# Patient Record
Sex: MALE | Race: WHITE | NOT HISPANIC OR LATINO | Employment: OTHER | URBAN - METROPOLITAN AREA
[De-identification: names, ages, dates, MRNs, and addresses within clinical notes are randomized per-mention and may not be internally consistent; named-entity substitution may affect disease eponyms.]

---

## 2017-01-17 PROBLEM — I65.23 BILATERAL CAROTID ARTERY OBSTRUCTION WITHOUT CEREBRAL INFARCTION: Status: ACTIVE | Noted: 2017-01-17

## 2017-03-17 ENCOUNTER — TELEPHONE (OUTPATIENT)
Dept: INTERNAL MEDICINE | Facility: CLINIC | Age: 82
End: 2017-03-17

## 2017-03-17 DIAGNOSIS — E78.5 HYPERLIPIDEMIA WITH TARGET LDL LESS THAN 100: Primary | ICD-10-CM

## 2017-03-17 DIAGNOSIS — E11.59 TYPE 2 DIABETES MELLITUS WITH OTHER CIRCULATORY COMPLICATIONS (H): ICD-10-CM

## 2017-03-17 NOTE — TELEPHONE ENCOUNTER
Pt calls, requesting orders be placed for lab appt prior to diabetes follow up in May.     Pt has standing order for A1c. Orders pended for CMP and Lipid panel. Sent to provider to review if any additional labs are needed.

## 2017-03-27 ENCOUNTER — OFFICE VISIT (OUTPATIENT)
Dept: INTERNAL MEDICINE | Facility: CLINIC | Age: 82
End: 2017-03-27
Payer: MEDICARE

## 2017-03-27 VITALS
HEIGHT: 65 IN | OXYGEN SATURATION: 95 % | SYSTOLIC BLOOD PRESSURE: 118 MMHG | HEART RATE: 70 BPM | DIASTOLIC BLOOD PRESSURE: 60 MMHG | TEMPERATURE: 97.9 F | WEIGHT: 185 LBS | BODY MASS INDEX: 30.82 KG/M2

## 2017-03-27 DIAGNOSIS — H60.313 ACUTE DIFFUSE OTITIS EXTERNA OF BOTH EARS: Primary | ICD-10-CM

## 2017-03-27 PROCEDURE — 99213 OFFICE O/P EST LOW 20 MIN: CPT | Performed by: NURSE PRACTITIONER

## 2017-03-27 RX ORDER — NEOMYCIN SULFATE, POLYMYXIN B SULFATE AND HYDROCORTISONE 10; 3.5; 1 MG/ML; MG/ML; [USP'U]/ML
4 SUSPENSION/ DROPS AURICULAR (OTIC) 4 TIMES DAILY
Qty: 10 ML | Refills: 0 | Status: SHIPPED | OUTPATIENT
Start: 2017-03-27 | End: 2017-05-18

## 2017-03-27 NOTE — MR AVS SNAPSHOT
After Visit Summary   3/27/2017    Reid Jimenes    MRN: [id number]           Patient Information     Date Of Birth          2/14/1934        Visit Information        Provider Department      3/27/2017 2:00 PM Brit Parsons NP [facility]        Today's Diagnoses     Acute diffuse otitis externa of both ears    -  1      Care Instructions    Have hearing aids reevaluated    Brit Parsons CNP          Follow-ups after your visit        Your next 10 appointments already scheduled     May 11, 2017  8:15 AM CDT   LAB with [facility]   [facility]    [address]  [facility] [id number] [phone]           Patient must bring picture ID.  Patient should be prepared to give a urine specimen  Please do not eat 10-12 hours before your appointment if you are coming in fasting for labs on lipids, cholesterol, or glucose (sugar).  Pregnant women should follow their Care Team instructions. Water with medications is okay. Do not drink coffee or other fluids.   If you have concerns about taking  your medications, please ask at office or if scheduling via Shotfarm, send a message by clicking on Secure Messaging, Message Your Care Team.            May 18, 2017  4:20 PM CDT   [facility] Visit with Viet Bingham MD   [facility]    [address]  [facility] [id number] [phone]           Bring a current list of meds and any records pertaining to this visit.  For Physicals, please bring immunization records and any forms needing to be filled out.  Please arrive 10 minutes early to complete paperwork.              Who to contact     If you have questions or need follow up information about today's clinic visit or your schedule please contact [facility] directly at [phone].  Normal or non-critical lab and imaging results will be communicated to you by  "MyChart, letter or phone within 4 business days after the clinic has received the results. If you do not hear from us within 7 days, please contact the clinic through [employer]Fire[employer] or phone. If you have a critical or abnormal lab result, we will notify you by phone as soon as possible.  Submit refill requests through [employer] or call your pharmacy and they will forward the refill request to us. Please allow 3 business days for your refill to be completed.          Additional Information About Your Visit        SureFireharTextbroker Information     [employer] lets you send messages to your doctor, view your test results, renew your prescriptions, schedule appointments and more. To sign up, go to [email or web address] . Click on \"Log in\" on the left side of the screen, which will take you to the Welcome page. Then click on \"Sign up Now\" on the right side of the page.     You will be asked to enter the access code listed below, as well as some personal information. Please follow the directions to create your username and password.     Your access code is: [email or web address]  Expires: 2017  2:31 PM     Your access code will  in 90 days. If you need help or a new code, please call your [place] clinic or [phone].        Care EveryWhere ID     This is your Care EveryWhere ID. This could be used by other organizations to access your [facility] medical records  [id number]        Your Vitals Were     Pulse Temperature Height Pulse Oximetry BMI (Body Mass Index)       70 97.9  F (36.6  C) (Oral) 5' 5\" (1.651 m) 95% 30.79 kg/m2        Blood Pressure from Last 3 Encounters:   17 118/60   10/05/16 136/64   16 130/60    Weight from Last 3 Encounters:   17 185 lb (83.9 kg)   10/05/16 185 lb (83.9 kg)   16 188 lb (85.3 kg)              Today, you had the following     No orders found for display         Today's Medication Changes          These changes are accurate as of: 3/27/17  2:31 PM.  If you have any questions, " ask your nurse or doctor.               Start taking these medicines.        Dose/Directions    neomycin-polymyxin-hydrocortisone 3.5-16772-7 otic suspension   Commonly known as:  CORTISPORIN   Used for:  Acute diffuse otitis externa of both ears   Started by:  Brit Parsons NP        Dose:  4 drop   Place 4 drops in ear(s) 4 times daily   Quantity:  10 mL   Refills:  0         These medicines have changed or have updated prescriptions.        Dose/Directions    blood glucose monitoring lancets   This may have changed:  Another medication with the same name was removed. Continue taking this medication, and follow the directions you see here.   Used for:  Type 2 diabetes mellitus with other circulatory complications (H)   Changed by:  Viet Bingham MD        Use to test blood sugar 1 times daily or as directed.   Quantity:  1 Box   Refills:  4            Where to get your medicines      These medications were sent to [id number] - [place], [place] - [address]  [address] [address]     Phone:  [phone]     neomycin-polymyxin-hydrocortisone 3.5-48731-6 otic suspension                Primary Care Provider Office Phone # Fax #    Viet Bingham [phone]       [facility] [address]  [facility] [address]        Thank you!     Thank you for choosing [facility]  for your care. Our goal is always to provide you with excellent care. Hearing back from our patients is one way we can continue to improve our services. Please take a few minutes to complete the written survey that you may receive in the mail after your visit with us. Thank you!             Your Updated Medication List - Protect others around you: Learn how to safely use, store and throw away your medicines at www.disposemymeds.org.          This list is accurate as of: 3/27/17  2:31 PM.  Always use your most recent med list.                   Brand Name  Dispense Instructions for use    ascorbic acid 500 MG Tabs          aspirin 81 MG tablet      Take by mouth daily       BIOTIN PO          blood glucose monitoring lancets     1 Box    Use to test blood sugar 1 times daily or as directed.       blood glucose monitoring test strip    MIRA CONTOUR NEXT    100 each    Use to test blood sugar one time daily or as directed.       Calcium 200 MG Tabs      Take 250 mg by mouth       CVS OMEPRAZOLE PO      Take by mouth daily       fluocinonide 0.05 % ointment    LIDEX     Apply topically 2 times daily       GLUCOSAMINE-CHONDROITIN PO          metFORMIN 1000 MG tablet    GLUCOPHAGE    90 tablet    Take 0.5 tablets (500 mg) by mouth 2 times daily (with meals)       MULTIVITAMIN/IRON PO          neomycin-polymyxin-hydrocortisone 3.5-22115-2 otic suspension    CORTISPORIN    10 mL    Place 4 drops in ear(s) 4 times daily       simvastatin 40 MG tablet    ZOCOR    45 tablet    Take 0.5 tablets (20 mg) by mouth At Bedtime

## 2017-03-27 NOTE — NURSING NOTE
"Chief Complaint   Patient presents with     Ear Problem       Initial /60 (BP Location: Right arm, Patient Position: Chair, Cuff Size: Adult Large)  Pulse 70  Temp 97.9  F (36.6  C) (Oral)  Ht 5' 5\" (1.651 m)  Wt 185 lb (83.9 kg)  SpO2 95%  BMI 30.79 kg/m2 Estimated body mass index is 30.79 kg/(m^2) as calculated from the following:    Height as of this encounter: 5' 5\" (1.651 m).    Weight as of this encounter: 185 lb (83.9 kg).  Medication Reconciliation: complete    "

## 2017-03-27 NOTE — PROGRESS NOTES
SUBJECTIVE:                                                    Reid Jimenes is a 83 year old male who presents to clinic today for the following health issues:    Bilateral ear problem x 1 week.    Bilateral ear pain      Duration: 3-4 days    Description (location/character/radiation): bilateral ears    Intensity:  mild, moderate    Accompanying signs and symptoms: afebrile, no URI sx    History (similar episodes/previous evaluation): None    Precipitating or alleviating factors: starting wearing his 10 year old hearing aids per his wifes request    Therapies tried and outcome: None       Problem list and histories reviewed & adjusted, as indicated.  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     Acute diffuse otitis externa of both ears     Past Surgical History:   Procedure Laterality Date     MANDIBLE SURGERY  1958       Social History   Substance Use Topics     Smoking status: Former Smoker     Packs/day: 1.00     Years: 30.00     Types: Cigarettes     Smokeless tobacco: Former User     Types: Chew     Quit date: 1/5/1983     Alcohol use No     Family History   Problem Relation Age of Onset     HEART DISEASE Father      Alcohol/Drug Father      Myocardial Infarction Grandchild 18     Grandson     Myocardial Infarction Other 18     Nephew      CANCER Mother 58     Stomach cancer     Alcohol/Drug Mother      CANCER Maternal Grandmother      stomach cancer     Alcohol/Drug Maternal Grandmother      Myocardial Infarction Sister 70     Name: Reena       Alcohol/Drug Sister      CEREBROVASCULAR DISEASE Brother 65     Name: Miguelito     Alcohol/Drug Brother      Alcohol/Drug Maternal Grandfather      Alcohol/Drug Paternal Grandmother      Alcohol/Drug Paternal Grandfather          Current Outpatient Prescriptions   Medication Sig Dispense Refill     neomycin-polymyxin-hydrocortisone (CORTISPORIN) 3.5-87264-0 otic suspension Place 4 drops in ear(s) 4 times daily 10 mL 0     simvastatin (ZOCOR) 40 MG tablet Take 0.5 tablets (20 mg) by mouth At Bedtime 45 tablet 3     metFORMIN (GLUCOPHAGE) 1000 MG tablet Take 0.5 tablets (500 mg) by mouth 2 times daily (with meals) 90 tablet 3     blood glucose monitoring (MIRA MICROLET) lancets Use to test blood sugar 1 times daily or as directed. 1 Box 4     blood glucose monitoring (MIRA CONTOUR NEXT) test strip Use to test blood sugar one time daily or as directed. 100 each 5     fluocinonide (LIDEX) 0.05 % ointment Apply topically 2 times daily       CVS OMEPRAZOLE PO Take by mouth daily       aspirin 81 MG tablet Take by mouth daily       Multiple Vitamins-Iron (MULTIVITAMIN/IRON PO)        GLUCOSAMINE-CHONDROITIN PO        Calcium 200 MG TABS Take 250 mg by mouth       BIOTIN PO        ascorbic acid 500 MG TABS        BP Readings from Last 3 Encounters:   03/27/17 118/60   10/05/16 136/64   04/13/16 130/60    Wt Readings from Last 3 Encounters:   03/27/17 185 lb (83.9 kg)   10/05/16 185 lb (83.9 kg)   04/13/16 188 lb (85.3 kg)                    Reviewed and updated as needed this visit by clinical staff  Tobacco  Allergies  Meds  Med Hx  Surg Hx  Fam Hx  Soc Hx      Reviewed and updated as needed this visit by Provider         ROS:  Constitutional, HEENT, cardiovascular, pulmonary, gi and gu systems are negative, except as otherwise noted.    OBJECTIVE:                                                    /60 (BP Location: Right arm, Patient Position: Chair, Cuff Size: Adult Large)  Pulse 70  Temp 97.9  F (36.6  C)  "(Oral)  Ht 5' 5\" (1.651 m)  Wt 185 lb (83.9 kg)  SpO2 95%  BMI 30.79 kg/m2  Body mass index is 30.79 kg/(m^2).  GENERAL: healthy, alert and no distress  HENT: bilateral tragus and ear canals swollen, canals have moderate debrie, TM's normal, nose and mouth without ulcers or lesions         ASSESSMENT/PLAN:                                                              ICD-10-CM    1. Acute diffuse otitis externa of both ears H60.313 neomycin-polymyxin-hydrocortisone (CORTISPORIN) 3.5-46422-7 otic suspension       Patient Instructions   Have hearing aids reevaluated    Brit Parsons, NP  [facility]  "

## 2017-05-11 DIAGNOSIS — E78.5 HYPERLIPIDEMIA WITH TARGET LDL LESS THAN 100: ICD-10-CM

## 2017-05-11 LAB
ALBUMIN SERPL-MCNC: 3.9 G/DL (ref 3.4–5)
ALP SERPL-CCNC: 82 U/L (ref 40–150)
ALT SERPL W P-5'-P-CCNC: 21 U/L (ref 0–70)
ANION GAP SERPL CALCULATED.3IONS-SCNC: 8 MMOL/L (ref 3–14)
AST SERPL W P-5'-P-CCNC: 15 U/L (ref 0–45)
BILIRUB SERPL-MCNC: 0.5 MG/DL (ref 0.2–1.3)
BUN SERPL-MCNC: 19 MG/DL (ref 7–30)
CALCIUM SERPL-MCNC: 9.1 MG/DL (ref 8.5–10.1)
CHLORIDE SERPL-SCNC: 105 MMOL/L (ref 94–109)
CHOLEST SERPL-MCNC: 105 MG/DL
CO2 SERPL-SCNC: 28 MMOL/L (ref 20–32)
CREAT SERPL-MCNC: 0.87 MG/DL (ref 0.66–1.25)
GFR SERPL CREATININE-BSD FRML MDRD: 84 ML/MIN/1.7M2
GLUCOSE SERPL-MCNC: 133 MG/DL (ref 70–99)
HDLC SERPL-MCNC: 45 MG/DL
LDLC SERPL CALC-MCNC: 37 MG/DL
NONHDLC SERPL-MCNC: 60 MG/DL
POTASSIUM SERPL-SCNC: 4.2 MMOL/L (ref 3.4–5.3)
PROT SERPL-MCNC: 7 G/DL (ref 6.8–8.8)
SODIUM SERPL-SCNC: 141 MMOL/L (ref 133–144)
TRIGL SERPL-MCNC: 113 MG/DL

## 2017-05-11 PROCEDURE — 80053 COMPREHEN METABOLIC PANEL: CPT | Performed by: INTERNAL MEDICINE

## 2017-05-11 PROCEDURE — 36415 COLL VENOUS BLD VENIPUNCTURE: CPT | Performed by: INTERNAL MEDICINE

## 2017-05-11 PROCEDURE — 80061 LIPID PANEL: CPT | Performed by: INTERNAL MEDICINE

## 2017-05-18 ENCOUNTER — OFFICE VISIT (OUTPATIENT)
Dept: INTERNAL MEDICINE | Facility: CLINIC | Age: 82
End: 2017-05-18
Payer: MEDICARE

## 2017-05-18 VITALS
DIASTOLIC BLOOD PRESSURE: 66 MMHG | WEIGHT: 185 LBS | TEMPERATURE: 97.9 F | HEIGHT: 65 IN | BODY MASS INDEX: 30.82 KG/M2 | SYSTOLIC BLOOD PRESSURE: 124 MMHG | RESPIRATION RATE: 12 BRPM | OXYGEN SATURATION: 94 % | HEART RATE: 85 BPM

## 2017-05-18 DIAGNOSIS — I65.23 BILATERAL CAROTID ARTERY OBSTRUCTION WITHOUT CEREBRAL INFARCTION: ICD-10-CM

## 2017-05-18 DIAGNOSIS — E11.59 TYPE 2 DIABETES MELLITUS WITH OTHER CIRCULATORY COMPLICATIONS (H): ICD-10-CM

## 2017-05-18 DIAGNOSIS — E11.29 TYPE 2 DIABETES MELLITUS WITH MICROALBUMINURIA, WITHOUT LONG-TERM CURRENT USE OF INSULIN (H): ICD-10-CM

## 2017-05-18 DIAGNOSIS — E78.5 HYPERLIPIDEMIA WITH TARGET LDL LESS THAN 100: ICD-10-CM

## 2017-05-18 DIAGNOSIS — I10 ESSENTIAL HYPERTENSION WITH GOAL BLOOD PRESSURE LESS THAN 140/90: ICD-10-CM

## 2017-05-18 DIAGNOSIS — E11.59 TYPE 2 DIABETES MELLITUS WITH OTHER CIRCULATORY COMPLICATIONS (H): Primary | ICD-10-CM

## 2017-05-18 DIAGNOSIS — I73.9 PVD (PERIPHERAL VASCULAR DISEASE) (H): ICD-10-CM

## 2017-05-18 DIAGNOSIS — R80.9 TYPE 2 DIABETES MELLITUS WITH MICROALBUMINURIA, WITHOUT LONG-TERM CURRENT USE OF INSULIN (H): ICD-10-CM

## 2017-05-18 DIAGNOSIS — I77.9 BILATERAL CAROTID ARTERY DISEASE (H): ICD-10-CM

## 2017-05-18 LAB — HBA1C MFR BLD: 6.6 % (ref 4.3–6)

## 2017-05-18 PROCEDURE — 83036 HEMOGLOBIN GLYCOSYLATED A1C: CPT | Performed by: INTERNAL MEDICINE

## 2017-05-18 PROCEDURE — 99214 OFFICE O/P EST MOD 30 MIN: CPT | Performed by: INTERNAL MEDICINE

## 2017-05-18 PROCEDURE — 36415 COLL VENOUS BLD VENIPUNCTURE: CPT | Performed by: INTERNAL MEDICINE

## 2017-05-18 NOTE — MR AVS SNAPSHOT
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     All the other labs from 5/11/2017 looked fine.     No medication changes.     Schedule an office appointment to see me in October, with a \"lab only\" appointment a few days prior to office appointment.   In October 2017 you will also be due to update the carotid artery ultrasound.         Follow-ups after your visit        Future tests that were ordered for you today     Open Future Orders        Priority Expected Expires Ordered    US Carotid Bilateral Routine 10/1/2017 5/18/2018 5/18/2017            Who to contact     If you have questions or need follow up information about today's clinic visit or your schedule please contact [facility] directly at [phone].  Normal or non-critical lab and imaging results will be communicated to you by MyChart, letter or phone within 4 business days after the clinic has received the results. If you do not hear from us within 7 days, please contact the clinic through MyChart or phone. If you have a critical or abnormal lab result, we will notify you by phone " "as soon as possible.  Submit refill requests through [employer] or call your pharmacy and they will forward the refill request to us. Please allow 3 business days for your refill to be completed.          Additional Information About Your Visit        [employer]amba Information     [employer] lets you send messages to your doctor, view your test results, renew your prescriptions, schedule appointments and more. To sign up, go to [email or web address] . Click on \"Log in\" on the left side of the screen, which will take you to the Welcome page. Then click on \"Sign up Now\" on the right side of the page.     You will be asked to enter the access code listed below, as well as some personal information. Please follow the directions to create your username and password.     Your access code is: [email or web address]  Expires: 2017  2:31 PM     Your access code will  in 90 days. If you need help or a new code, please call your [place] clinic or [phone].        Care EveryWhere ID     This is your Care EveryWhere ID. This could be used by other organizations to access your [facility] medical records  [id number]        Your Vitals Were     Pulse Temperature Respirations Height Pulse Oximetry BMI (Body Mass Index)    85 97.9  F (36.6  C) (Oral) 12 5' 5\" (1.651 m) 94% 30.79 kg/m2       Blood Pressure from Last 3 Encounters:   17 124/66   17 118/60   10/05/16 136/64    Weight from Last 3 Encounters:   17 185 lb (83.9 kg)   17 185 lb (83.9 kg)   10/05/16 185 lb (83.9 kg)              We Performed the Following     HEMOGLOBIN A1C          Where to get your medicines      These medications were sent to [id number] - [place] - [address]  [address] [place] [address]     Phone:  [phone]     blood glucose monitoring lancets    blood glucose monitoring test strip          Primary Care Provider Office Phone # Fax #    Viet Bingham [phone]       " [facility] [address] [facility] [address]  [facility] [address]        Thank you!     Thank you for choosing [facility]  for your care. Our goal is always to provide you with excellent care. Hearing back from our patients is one way we can continue to improve our services. Please take a few minutes to complete the written survey that you may receive in the mail after your visit with us. Thank you!             Your Updated Medication List - Protect others around you: Learn how to safely use, store and throw away your medicines at www.disposemymeds.org.          This list is accurate as of: 5/18/17  4:52 PM.  Always use your most recent med list.                   Brand Name Dispense Instructions for use    ascorbic acid 500 MG Tabs          aspirin 81 MG tablet      Take by mouth daily       BIOTIN PO          blood glucose monitoring lancets     1 Box    Use to test blood sugar 1 times daily or as directed.       blood glucose monitoring test strip    MIRA CONTOUR NEXT    100 each    Use to test blood sugar one time daily or as directed.       Calcium 200 MG Tabs      Take 250 mg by mouth       CVS OMEPRAZOLE PO      Take by mouth daily       fluocinonide 0.05 % ointment    LIDEX     Apply topically 2 times daily       GLUCOSAMINE-CHONDROITIN PO          metFORMIN 1000 MG tablet    GLUCOPHAGE    90 tablet    Take 0.5 tablets (500 mg) by mouth 2 times daily (with meals)       MULTIVITAMIN/IRON PO          simvastatin 40 MG tablet    ZOCOR    45 tablet    Take 0.5 tablets (20 mg) by mouth At Bedtime

## 2017-05-18 NOTE — PROGRESS NOTES
SUBJECTIVE:                                                    Reid Jimenes is a 83 year old male who presents to clinic today for the following health issues:  diabetes mellitus, carotid artery disease/PVD, hyperlipidemia, hypertension.     Medications: Patient denies any side effects from current medications.     Neurological: Patient notes paresthesias from left hip down to left ankle. No back pain. No weakness or pain into the legs.     Diabetes Follow-up      Patient is checking blood sugars: rarely.  Results 130's    Diabetic concerns: None     Symptoms of hypoglycemia (low blood sugar): shaky, dizzy, weak, confusion, Symptoms occur if sugars < 100.     Paresthesias (numbness or burning in feet) or sores: Yes      Date of last diabetic eye exam: December 2016       Amount of exercise or physical activity: daily activities    Problems taking medications regularly: No    Medication side effects: none    Diet: low salt and carbohydrate counting    Problem list and histories reviewed & adjusted, as indicated.  Additional history: as documented    BP Readings from Last 3 Encounters:   05/18/17 124/66   03/27/17 118/60   10/05/16 136/64    Wt Readings from Last 3 Encounters:   05/18/17 83.9 kg (185 lb)   03/27/17 83.9 kg (185 lb)   10/05/16 83.9 kg (185 lb)          Labs reviewed in EPIC    Reviewed and updated as needed this visit by clinical staff  Tobacco  Allergies  Meds  Med Hx  Surg Hx  Fam Hx  Soc Hx      Reviewed and updated as needed this visit by Provider    ROS:  E: NEGATIVE for vision changes or irritation  R: NEGATIVE for significant cough or SOB   CV: NEGATIVE for chest pain, palpitations or peripheral edema   GI: POSITIVE for occasional heartburn (antacids PRN) NEGATIVE for nausea, abdominal pain or change in bowel habits   N: POSITIVE for paresthesias from left hip down to left ankle NEGATIVE for weakness, dizziness    Past/recent records reviewed and discussed for --   Medications  Lab  "results   OBJECTIVE:                                                    /66  Pulse 85  Temp 97.9  F (36.6  C) (Oral)  Resp 12  Ht 1.651 m (5' 5\")  Wt 83.9 kg (185 lb)  SpO2 94%  BMI 30.79 kg/m2  Body mass index is 30.79 kg/(m^2).  GENERAL: healthy, alert and no distress  EYES: Eyes grossly normal to inspection, PERRL and conjunctivae and sclerae normal  RESP: lungs clear to auscultation - no rales, rhonchi or wheezes  CV: murmur, otherwise regular rate and rhythm, normal S1 S2, no S3 or S4, click or rub, no peripheral edema and peripheral pulses strong  NEURO: Normal strength and tone, mentation intact and speech normal  PSYCH: mentation appears normal, affect normal/bright         ASSESSMENT/PLAN:                                                      (E11.59) Type 2 diabetes mellitus with other circulatory complications (H)  (primary encounter diagnosis)  (E11.29,  R80.9) Type 2 diabetes mellitus with microalbuminuria, without long-term current use of insulin (H)  Plan: Update A1c. Continue current meds.   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 No symptoms of CNS ischemia.   Plan: US Carotid Bilateral            (E78.5) Hyperlipidemia with target LDL less than 100  Comment: LDL at target. Continue current meds  Plan: simvastatin (ZOCOR) 40 MG tablet    (I10) Essential hypertension with goal blood pressure less than 140/90  Comment: BP at target.     (E11.59) Type 2 diabetes mellitus with other circulatory complications  Plan: blood glucose monitoring (MIRA MICROLET)         lancets, blood glucose monitoring (MIRA         CONTOUR NEXT) test strip          Patient Instructions   Stop by the lab ([address]) to update your Hemoglobin A1c.     All the other labs from 5/11/2017 looked fine.     No medication changes. " "    Schedule an office appointment to see me in October, with a \"lab only\" appointment a few days prior to office appointment.   In October 2017 you will also be due to update the carotid artery ultrasound.     Viet Bingham MD  [facility]    This document serves as a record of the services and decisions personally performed and made by Viet Bingham MD. It was created on their behalf by Dasha Landers, a trained medical scribe. The creation of this document is based the provider's statements to the medical scribe.  Dasha Landers May 18, 2017 4:38 PM      "

## 2017-05-18 NOTE — NURSING NOTE
"Chief Complaint   Patient presents with     Diabetes       Initial /66  Pulse 85  Temp 97.9  F (36.6  C) (Oral)  Resp 12  Ht 5' 5\" (1.651 m)  Wt 185 lb (83.9 kg)  SpO2 94%  BMI 30.79 kg/m2 Estimated body mass index is 30.79 kg/(m^2) as calculated from the following:    Height as of this encounter: 5' 5\" (1.651 m).    Weight as of this encounter: 185 lb (83.9 kg).  Medication Reconciliation: complete   Jairon CMA      "

## 2017-05-18 NOTE — LETTER
"[facility]  [address] [place] [address]  [phone]    5/18/2017    Reid Jimenes  [address]   [facility] [address]           Dear Mr. Jimenes,    The results of your lab tests are enclosed. Everything looks fine. Unless noted otherwise below, any results that are outside the \"normal\" range are within acceptable limits and are of no concern.    LDL= Bad Cholesterol-- the target is below 100.     HDL= Good Cholesterol-- although this is determined mostly by heredity, exercise and/or medications may sometimes raise this number.    Triglycerides are another type of fat in the blood, and can sometimes be lowered by reducing intake of sweets or excess carbohydrates, alcohol, and by weight reduction if needed.  Sometimes medications are also used.    AST and ALT are liver tests, as are the bilirubin (total and direct), albumin, total protein, and alkaline phosphatase. Yours are all normal.     Urea Nitrogen and Creatinine are kidney tests--yours are normal. GFR stands for Glomerular Filtration Rate, a more precise estimate of kidney function.    Sodium, Potassium, Chloride, Carbon Dioxide, and Calcium are all normal salts in the bloodstream. Yours all look normal. Your glucose (blood sugar) also looks fine. (You can ignore the anion gap result).    If you have any further questions or problems, please contact our office.    Sincerely,        Viet Bingham MD  Attachment: Lab results     "

## 2017-05-18 NOTE — PATIENT INSTRUCTIONS
"Stop by the lab ([address]) to update your Hemoglobin A1c.     All the other labs from 5/11/2017 looked fine.     No medication changes.     Schedule an office appointment to see me in October, with a \"lab only\" appointment a few days prior to office appointment.   In October 2017 you will also be due to update the carotid artery ultrasound.   "

## 2017-10-17 ENCOUNTER — MEDICAL CORRESPONDENCE (OUTPATIENT)
Dept: HEALTH INFORMATION MANAGEMENT | Facility: CLINIC | Age: 82
End: 2017-10-17

## 2017-10-17 ENCOUNTER — TELEPHONE (OUTPATIENT)
Dept: INTERNAL MEDICINE | Facility: CLINIC | Age: 82
End: 2017-10-17

## 2017-10-20 ENCOUNTER — HOSPITAL ENCOUNTER (OUTPATIENT)
Dept: ULTRASOUND IMAGING | Facility: CLINIC | Age: 82
Discharge: HOME | End: 2017-10-20
Attending: INTERNAL MEDICINE | Admitting: INTERNAL MEDICINE
Payer: MEDICARE

## 2017-10-20 DIAGNOSIS — I65.23 BILATERAL CAROTID ARTERY OBSTRUCTION WITHOUT CEREBRAL INFARCTION: ICD-10-CM

## 2017-10-20 PROCEDURE — 93880 EXTRACRANIAL BILAT STUDY: CPT

## 2017-11-01 DIAGNOSIS — E11.59 TYPE 2 DIABETES MELLITUS WITH OTHER CIRCULATORY COMPLICATION, UNSPECIFIED LONG TERM INSULIN USE STATUS: ICD-10-CM

## 2017-11-01 LAB
CREAT UR-MCNC: 157 MG/DL
HBA1C MFR BLD: 6.7 % (ref 4.3–6)
MICROALBUMIN UR-MCNC: 36 MG/L
MICROALBUMIN/CREAT UR: 22.99 MG/G CR (ref 0–17)

## 2017-11-01 PROCEDURE — 82043 UR ALBUMIN QUANTITATIVE: CPT | Performed by: INTERNAL MEDICINE

## 2017-11-01 PROCEDURE — 36415 COLL VENOUS BLD VENIPUNCTURE: CPT | Performed by: INTERNAL MEDICINE

## 2017-11-01 PROCEDURE — 83036 HEMOGLOBIN GLYCOSYLATED A1C: CPT | Performed by: INTERNAL MEDICINE

## 2017-11-08 ENCOUNTER — TELEPHONE (OUTPATIENT)
Dept: OTHER | Facility: CLINIC | Age: 82
End: 2017-11-08

## 2017-11-08 ENCOUNTER — OFFICE VISIT (OUTPATIENT)
Dept: INTERNAL MEDICINE | Facility: CLINIC | Age: 82
End: 2017-11-08
Payer: MEDICARE

## 2017-11-08 VITALS
HEIGHT: 65 IN | HEART RATE: 74 BPM | DIASTOLIC BLOOD PRESSURE: 60 MMHG | OXYGEN SATURATION: 98 % | BODY MASS INDEX: 30.32 KG/M2 | TEMPERATURE: 97.6 F | WEIGHT: 182 LBS | SYSTOLIC BLOOD PRESSURE: 124 MMHG

## 2017-11-08 DIAGNOSIS — I10 ESSENTIAL HYPERTENSION WITH GOAL BLOOD PRESSURE LESS THAN 140/90: ICD-10-CM

## 2017-11-08 DIAGNOSIS — E11.29 TYPE 2 DIABETES MELLITUS WITH MICROALBUMINURIA, WITHOUT LONG-TERM CURRENT USE OF INSULIN (H): Primary | ICD-10-CM

## 2017-11-08 DIAGNOSIS — K21.9 GASTROESOPHAGEAL REFLUX DISEASE WITHOUT ESOPHAGITIS: ICD-10-CM

## 2017-11-08 DIAGNOSIS — R80.9 TYPE 2 DIABETES MELLITUS WITH MICROALBUMINURIA, WITHOUT LONG-TERM CURRENT USE OF INSULIN (H): Primary | ICD-10-CM

## 2017-11-08 DIAGNOSIS — I65.23 CAROTID ARTERY STENOSIS, ASYMPTOMATIC, BILATERAL: ICD-10-CM

## 2017-11-08 DIAGNOSIS — E78.5 HYPERLIPIDEMIA WITH TARGET LDL LESS THAN 100: ICD-10-CM

## 2017-11-08 PROCEDURE — 99207 C FOOT EXAM  NO CHARGE: CPT | Performed by: INTERNAL MEDICINE

## 2017-11-08 PROCEDURE — 99214 OFFICE O/P EST MOD 30 MIN: CPT | Performed by: INTERNAL MEDICINE

## 2017-11-08 RX ORDER — SIMVASTATIN 40 MG
20 TABLET ORAL AT BEDTIME
Qty: 45 TABLET | Refills: 3 | Status: SHIPPED | OUTPATIENT
Start: 2017-11-08 | End: 2018-12-27

## 2017-11-08 RX ORDER — LOSARTAN POTASSIUM 25 MG/1
12.5 TABLET ORAL DAILY
Qty: 45 TABLET | Refills: 3 | Status: SHIPPED | OUTPATIENT
Start: 2017-11-08 | End: 2017-11-30

## 2017-11-08 NOTE — TELEPHONE ENCOUNTER
Pt referred to [facility] by  for carotid artery stenosis.    Pt had carotid US 10/20/17:  IMPRESSION:    1. 50-69% diameter stenosis of the right ICA relative to the distal  ICA diameter, increased when compared to the prior exam.   2. 50-69% diameter stenosis of the left ICA relative to the distal ICA  diameter, velocities are increased when compared to the prior exam.       Pt needs to be scheduled for a consult with Vascular Surgery.  Will route to scheduling to coordinate an appointment next available.    Mary Alejandro RN BSN

## 2017-11-08 NOTE — PATIENT INSTRUCTIONS
"[facility]. This is a blood pressure pill, but also protects the kidneys in a patient with diabetes who \"spills\" microscopic amounts of protein in their urine. Take one-half tablet of this new pill every day.     All of the other medications stay the same. [facility]     You may want to consider seeing the Vascular Surgeon in their office (phone number provided). My guess is that surgery would not be recommended yet, unless/until narrowing is worsened further, or if you start having \"mini-stroke\" symptoms.     See me in six months, with a fasting \"lab only\" appointment a few days before the office appointment.   "

## 2017-11-08 NOTE — TELEPHONE ENCOUNTER
Left message on home number for patient to call back to schedule consult appointment for carotid stenosis with Vascular Surgery.

## 2017-11-08 NOTE — LETTER
"  [facility]  [address] [place] [address]  [phone]    11/8/2017    Reid L Yudy  [address]   [facility] [address]           Dear Gui Yudy,    The results of your lab tests are enclosed. Everything looks fine. Unless noted otherwise below, any results that are outside the \"normal\" range are within acceptable limits and are of no concern.    Hemoglobin A1C measures control of diabetes. Your Hemoglobin A1C was 6.7, with the target being under 7.0.    The results of your recent urine test showed an elevation in microalbumin, an early indicator of ill-effects to the kidney from diabetes.       If you have any further questions or problems, please contact our office.    Sincerely,        Viet Bingham MD  Attachment: Lab results      "

## 2017-11-08 NOTE — PROGRESS NOTES
SUBJECTIVE:   Reid Jimenes is a 83 year old male who presents to clinic today for the following health issues:    Diabetes Follow-up      Patient is checking blood sugars: not at all    Diabetic concerns: None     Symptoms of hypoglycemia (low blood sugar): shaky and irritable     Paresthesias (numbness or burning in feet) or sores: No     Date of last diabetic eye exam: a week or 2 ago    Patient's A1c is 6.7, previously 6.6 on 5/18. Albumin urine was 22.99 mg/g Cr. Discussed considering a BP med to help protect kidney function. Patient agreed. He has not been checking his blood glucose levels. He is taking half a tablet of metformin twice daily.     Hyperlipidemia Follow-Up      Rate your low fat/cholesterol diet?: good    Taking statin?  Yes, no muscle aches from statin    Other lipid medications/supplements?:  none    Continues taking half a tablet of simvastatin daily.     Hypertension Follow-up      Outpatient blood pressures are not being checked.    Low Salt Diet: no added salt    Amount of exercise or physical activity: None    Problems taking medications regularly: No    Medication side effects: none  Diet: low salt and low fat/cholesterol    GERD  He continues to take omeprazole daily for heartburn. Omeprazole works well for him.     Carotid arterial disease  Patient is not following with any specialists for carotid arterial disease. Bilateral carotid US from 10/20/2017 revealed 50-69% diameter stenosis of the right and left ICA relative to the distal ICA diameter. This is an increase in degree of stenosis on the right ICA compared to previous US 10//2016.     Past/recent records reviewed and discussed for:  [facility] Has an [facility] in about a month    Problem list and histories reviewed & adjusted, as indicated.  Additional history: as documented    Reviewed and updated as needed this visit by clinical staffTobacco  Allergies  Meds  Med Hx  Surg Hx  Fam Hx  Soc Hx  "     Reviewed and updated as needed this visit by Provider         ROS:  No dyspnea or cough. No chest discomfort, dizziness or palpitations. No diarrhea, abdominal pain or rectal bleeding.   No acute problems with vision or speech, lateralizing weakness or paresthesias.    ROS: as above or negative for Respiratory, CV, GI, endocrine, neuro systems.    GI: POSITIVE for heartburn    This document serves as a record of the services and decisions personally performed and made by Viet Bingham MD. It was created on his behalf by Stephanie Chow, a trained medical scribe. The creation of this document is based on the provider's statements to the medical scribe.  Stephanie Chow November 8, 2017 8:21 AM     OBJECTIVE:     /60 (BP Location: Left arm, Patient Position: Sitting, Cuff Size: Adult Large)  Pulse 74  Temp 97.6  F (36.4  C) (Oral)  Ht 1.651 m (5' 5\")  Wt 82.6 kg (182 lb)  SpO2 98%  BMI 30.29 kg/m2  Body mass index is 30.29 kg/(m^2).    GENERAL: healthy, alert and no distress  RESP: lungs clear to auscultation - no rales, rhonchi or wheezes  CV: regular rate and rhythm, normal S1 S2, no S3 or S4, no murmur, click or rub, no peripheral edema and peripheral pulses strong  MS: no gross musculoskeletal defects noted, no edema  SKIN: no suspicious lesions or rashes  NEURO: Normal strength and tone, mentation intact and speech normal  PSYCH: mentation appears normal, affect normal/bright  Diabetic foot exam: normal DP and PT pulses, no trophic changes or ulcerative lesions, normal sensory exam and normal monofilament exam    ASSESSMENT/PLAN:   (E11.29,  R80.9) Type 2 diabetes mellitus with microalbuminuria, without long-term current use of insulin (H)  (primary encounter diagnosis)  Comment: A1c at target. Continue current measures. Patient will start half a tablet of losartan (12.5 mg) daily due to high levels of microalbuminuria.   Plan: FOOT EXAM, blood glucose monitoring (MIRA         MICROLET) lancets, " "blood glucose monitoring         (MIRA CONTOUR NEXT) test strip, losartan         (COZAAR) 25 MG tablet, metFORMIN (GLUCOPHAGE)         1000 MG tablet          (I65.23) Carotid artery stenosis, asymptomatic, bilateral  Comment: Offered consult with vascular surgeon due to an increases in diameter stenosis of the right ICA from most recent US  Plan: VASCULAR SURGERY REFERRAL          (E78.5) Hyperlipidemia with target LDL less than 100  Comment: LDL at target. Continue current meds.  Plan: simvastatin (ZOCOR) 40 MG tablet         (I10) Essential hypertension with goal blood pressure less than 140/90  Comment: BP at target. Will start 12.5 mg of losartan daily.     (K21.9) Gastroesophageal reflux disease without esophagitis  Comment: Controlled with omeprazole. Refilled rx  Plan: omeprazole (PRILOSEC) 20 MG CR capsule          FUTURE APPOINTMENTS:       - Follow-up visit in 6 months    Patient Instructions   [facility]. This is a blood pressure pill, but also protects the kidneys in a patient with diabetes who \"spills\" microscopic amounts of protein in their urine. Take one-half tablet of this new pill every day.     All of the other medications stay the same. [facility]     You may want to consider seeing the Vascular Surgeon in their office (phone number provided). My guess is that surgery would not be recommended yet, unless/until narrowing is worsened further, or if you start having \"mini-stroke\" symptoms.     See me in six months, with a fasting \"lab only\" appointment a few days before the office appointment.     The information in this document, created by the medical scribe for me, accurately reflects the services I personally performed and the decisions made by me. I have reviewed and approved this document for accuracy prior to leaving the patient care area.  November 8, 2017 8:21 AM    Viet Bingham MD  [facility]    "

## 2017-11-08 NOTE — MR AVS SNAPSHOT
"              After Visit Summary   11/8/2017    Reid Jimenes    MRN: [id number]           Patient Information     Date Of Birth          2/14/1934        Visit Information        Provider Department      11/8/2017 8:00 AM Viet Bingham MD [facility]        Today's Diagnoses     Type 2 diabetes mellitus with microalbuminuria, without long-term current use of insulin (H)    -  1    Carotid artery stenosis, asymptomatic, bilateral        Hyperlipidemia with target LDL less than 100        Essential hypertension with goal blood pressure less than 140/90        Gastroesophageal reflux disease without esophagitis          Care Instructions    [facility] Losartan. This is a blood pressure pill, but also protects the kidneys in a patient with diabetes who \"spills\" microscopic amounts of protein in their urine. Take one-half tablet of this new pill every day.     All of the other medications stay the same. [facility]     You may want to consider seeing the Vascular Surgeon in their office (phone number provided). My guess is that surgery would not be recommended yet, unless/until narrowing is worsened further, or if you start having \"mini-stroke\" symptoms.     See me in six months, with a fasting \"lab only\" appointment a few days before the office appointment.           Follow-ups after your visit        Additional Services     VASCULAR SURGERY REFERRAL       Your provider has referred you to: **Vascular  Services [phone] - Carotid Artery Disease - Asymptomatic & Ultrasound   [email or web address]    Please be aware that coverage of these services is subject to the terms and limitations of your health insurance plan.  Call member services at your health plan with any benefit or coverage questions.      Please bring the following with you to your appointment:    (1) Any X-Rays, CTs or MRIs which have " "been performed.  Contact the facility where they were done to arrange for  prior to your scheduled appointment.    (2) List of current medications   (3) This referral request   (4) Any documents/labs given to you for this referral                  Who to contact     If you have questions or need follow up information about today's clinic visit or your schedule please contact [facility] directly at [phone].  Normal or non-critical lab and imaging results will be communicated to you by MyChart, letter or phone within 4 business days after the clinic has received the results. If you do not hear from us within 7 days, please contact the clinic through [employer] or phone. If you have a critical or abnormal lab result, we will notify you by phone as soon as possible.  Submit refill requests through [employer] or call your pharmacy and they will forward the refill request to us. Please allow 3 business days for your refill to be completed.          Additional Information About Your Visit        [employer] Information     [employer] lets you send messages to your doctor, view your test results, renew your prescriptions, schedule appointments and more. To sign up, go to [email or web address] . Click on \"Log in\" on the left side of the screen, which will take you to the Welcome page. Then click on \"Sign up Now\" on the right side of the page.     You will be asked to enter the access code listed below, as well as some personal information. Please follow the directions to create your username and password.     Your access code is: [email or web address]  Expires: 2018  8:44 AM     Your access code will  in 90 days. If you need help or a new code, please call your [facility] or [phone].        Care EveryWhere ID     This is your Care EveryWhere ID. This could be used by other organizations to access your [facility] medical records  [id number]        Your Vitals Were     Pulse Temperature Height " "Pulse Oximetry BMI (Body Mass Index)       74 97.6  F (36.4  C) (Oral) 5' 5\" (1.651 m) 98% 30.29 kg/m2        Blood Pressure from Last 3 Encounters:   11/08/17 124/60   05/18/17 124/66   03/27/17 118/60    Weight from Last 3 Encounters:   11/08/17 182 lb (82.6 kg)   05/18/17 185 lb (83.9 kg)   03/27/17 185 lb (83.9 kg)              We Performed the Following     FOOT EXAM     VASCULAR SURGERY REFERRAL          Today's Medication Changes          These changes are accurate as of: 11/8/17  8:44 AM.  If you have any questions, ask your nurse or doctor.               Start taking these medicines.        Dose/Directions    losartan 25 MG tablet   Commonly known as:  COZAAR   Used for:  Type 2 diabetes mellitus with microalbuminuria, without long-term current use of insulin (H)   Started by:  Viet Bingham MD        Dose:  12.5 mg   Take 0.5 tablets (12.5 mg) by mouth daily   Quantity:  45 tablet   Refills:  3       omeprazole 20 MG CR capsule   Commonly known as:  priLOSEC   Used for:  Gastroesophageal reflux disease without esophagitis   Started by:  Viet Bingham MD        Dose:  20 mg   Take 1 capsule (20 mg) by mouth daily   Quantity:  90 capsule   Refills:  3            Where to get your medicines      These medications were sent to [facility] [address] [place] [address]     Phone:  [phone]     blood glucose monitoring lancets    blood glucose monitoring test strip         Some of these will need a paper prescription and others can be bought over the counter.  Ask your nurse if you have questions.     Bring a paper prescription for each of these medications     losartan 25 MG tablet    metFORMIN 1000 MG tablet    omeprazole 20 MG CR capsule    simvastatin 40 MG tablet                Primary Care Provider Office Phone # Fax #    Viet Bingham [phone]       [address] [place] [address]        Equal " Access to Services     [facility]GER[facility]IMO : Hadii aad ku hadlashawnlinsey Sophiacinthya, wamarcusda luqadaha, qaybta karosyrigo darby. So [facility] [phone].    ATENCIÓN: Si habla español, tiene a philip disposición servicios gratuitos de asistencia lingüística. Llame al [phone].    We comply with applicable federal civil rights laws and [place] laws. We do not discriminate on the basis of race, color, national origin, age, disability, sex, sexual orientation, or gender identity.            Thank you!     Thank you for choosing [facility]  for your care. Our goal is always to provide you with excellent care. Hearing back from our patients is one way we can continue to improve our services. Please take a few minutes to complete the written survey that you may receive in the mail after your visit with us. Thank you!             Your Updated Medication List - Protect others around you: Learn how to safely use, store and throw away your medicines at www.disposemymeds.org.          This list is accurate as of: 11/8/17  8:44 AM.  Always use your most recent med list.                   Brand Name Dispense Instructions for use Diagnosis    ascorbic acid 500 MG Tabs           aspirin 81 MG tablet      Take 325 mg by mouth daily        BIOTIN PO      Take 5,000 mg by mouth        blood glucose monitoring lancets     100 each    Use to test blood sugar 1 times daily or as directed.    Type 2 diabetes mellitus with microalbuminuria, without long-term current use of insulin (H)       blood glucose monitoring test strip    MIRA CONTOUR NEXT    100 each    Use to test blood sugar one time daily or as directed.    Type 2 diabetes mellitus with microalbuminuria, without long-term current use of insulin (H)       Calcium 200 MG Tabs      Take 1,500 mg by mouth daily        [employer] OMEPRAZOLE PO      Take 20 mg by mouth daily        fluocinonide 0.05 % ointment    LIDEX     Apply topically 2 times  daily        GLUCOSAMINE-CHONDROITIN PO      Take by mouth daily        losartan 25 MG tablet    COZAAR    45 tablet    Take 0.5 tablets (12.5 mg) by mouth daily    Type 2 diabetes mellitus with microalbuminuria, without long-term current use of insulin (H)       metFORMIN 1000 MG tablet    GLUCOPHAGE    90 tablet    Take 0.5 tablets (500 mg) by mouth 2 times daily (with meals)    Type 2 diabetes mellitus with microalbuminuria, without long-term current use of insulin (H)       MULTIVITAMIN/IRON PO           omeprazole 20 MG CR capsule    priLOSEC    90 capsule    Take 1 capsule (20 mg) by mouth daily    Gastroesophageal reflux disease without esophagitis       simvastatin 40 MG tablet    ZOCOR    45 tablet    Take 0.5 tablets (20 mg) by mouth At Bedtime    Hyperlipidemia with target LDL less than 100

## 2017-11-08 NOTE — TELEPHONE ENCOUNTER
Pt is scheduled at [facility] with Dr Angel on 11/29/17. Des 11/8

## 2017-11-08 NOTE — NURSING NOTE
"Chief Complaint   Patient presents with     Diabetes     Hypertension     Lipids       Initial /60 (BP Location: Left arm, Patient Position: Sitting, Cuff Size: Adult Large)  Pulse 74  Temp 97.6  F (36.4  C) (Oral)  Ht 5' 5\" (1.651 m)  Wt 182 lb (82.6 kg)  SpO2 98%  BMI 30.29 kg/m2 Estimated body mass index is 30.29 kg/(m^2) as calculated from the following:    Height as of this encounter: 5' 5\" (1.651 m).    Weight as of this encounter: 182 lb (82.6 kg).  Medication Reconciliation: complete   Jairon CRAFT      "

## 2017-11-29 ENCOUNTER — OFFICE VISIT (OUTPATIENT)
Dept: SURGERY | Facility: CLINIC | Age: 82
End: 2017-11-29
Attending: INTERNAL MEDICINE
Payer: MEDICARE

## 2017-11-29 VITALS
HEART RATE: 61 BPM | HEIGHT: 65 IN | SYSTOLIC BLOOD PRESSURE: 122 MMHG | BODY MASS INDEX: 30.32 KG/M2 | OXYGEN SATURATION: 96 % | RESPIRATION RATE: 16 BRPM | DIASTOLIC BLOOD PRESSURE: 78 MMHG | WEIGHT: 182 LBS

## 2017-11-29 DIAGNOSIS — I65.23 CAROTID STENOSIS, BILATERAL: Primary | ICD-10-CM

## 2017-11-29 PROCEDURE — 99204 OFFICE O/P NEW MOD 45 MIN: CPT | Performed by: SURGERY

## 2017-11-29 NOTE — MR AVS SNAPSHOT
"              After Visit Summary   2017    Reid Jimenes    MRN: [id number]           Patient Information     Date Of Birth          1934        Visit Information        Provider Department      2017 11:30 AM Loki Angel MD [facility]      Today's Diagnoses     Carotid stenosis, bilateral    -  1       Follow-ups after your visit        Who to contact     If you have questions or need follow up information about today's clinic visit or your schedule please contact [facility] directly at [phone].  Normal or non-critical lab and imaging results will be communicated to you by Pomogatelhart, letter or phone within 4 business days after the clinic has received the results. If you do not hear from us within 7 days, please contact the clinic through Pomogatelhart or phone. If you have a critical or abnormal lab result, we will notify you by phone as soon as possible.  Submit refill requests through [employer] or call your pharmacy and they will forward the refill request to us. Please allow 3 business days for your refill to be completed.          Additional Information About Your Visit        MyChart Information     [employer] lets you send messages to your doctor, view your test results, renew your prescriptions, schedule appointments and more. To sign up, go to [email or web address] . Click on \"Log in\" on the left side of the screen, which will take you to the Welcome page. Then click on \"Sign up Now\" on the right side of the page.     You will be asked to enter the access code listed below, as well as some personal information. Please follow the directions to create your username and password.     Your access code is: [email or web address]  Expires: 2018  8:44 AM     Your access code will  in 90 days. If you need help or a new code, please call your [place] clinic or [phone].        Care EveryWhere ID     This is " "your Care EveryWhere ID. This could be used by other organizations to access your [place] medical records  [id number]        Your Vitals Were     Pulse Respirations Height Pulse Oximetry BMI (Body Mass Index)       61 16 5' 5\" (1.651 m) 96% 30.29 kg/m2        Blood Pressure from Last 3 Encounters:   11/29/17 122/78   11/08/17 124/60   05/18/17 124/66    Weight from Last 3 Encounters:   11/29/17 182 lb (82.6 kg)   11/08/17 182 lb (82.6 kg)   05/18/17 185 lb (83.9 kg)              Today, you had the following     No orders found for display       Primary Care Provider Office Phone # Fax #    Viet Bingham [phone]       [address] [facility]   [facility] [address]        Equal Access to Services     [facility]: Hadii maurice martinez hadasho Soomaali, waaxda luqadaha, qaybta kaalmada adeegyada, rigo llanes hayeleni hampton . So [facility] [phone].    ATENCIÓN: Si habla español, tiene a philip disposición servicios gratuitos de asistencia lingüística. Llame al [phone].    We comply with applicable federal civil rights laws and [place] laws. We do not discriminate on the basis of race, color, national origin, age, disability, sex, sexual orientation, or gender identity.            Thank you!     Thank you for choosing [facility]  for your care. Our goal is always to provide you with excellent care. Hearing back from our patients is one way we can continue to improve our services. Please take a few minutes to complete the written survey that you may receive in the mail after your visit with us. Thank you!             Your Updated Medication List - Protect others around you: Learn how to safely use, store and throw away your medicines at www.disposemymeds.org.          This list is accurate as of: 11/29/17 11:39 AM.  Always use your most recent med list.                   Brand Name Dispense Instructions for use Diagnosis    ascorbic acid 500 MG Tabs           aspirin " 81 MG tablet      Take 325 mg by mouth daily        BIOTIN PO      Take 5,000 mg by mouth        blood glucose monitoring lancets     100 each    Use to test blood sugar 1 times daily or as directed.    Type 2 diabetes mellitus with microalbuminuria, without long-term current use of insulin (H)       blood glucose monitoring test strip    MIRA CONTOUR NEXT    100 each    Use to test blood sugar one time daily or as directed.    Type 2 diabetes mellitus with microalbuminuria, without long-term current use of insulin (H)       Calcium 200 MG Tabs      Take 1,500 mg by mouth daily        [employer] OMEPRAZOLE PO      Take 20 mg by mouth daily        fluocinonide 0.05 % ointment    LIDEX     Apply topically 2 times daily        GLUCOSAMINE-CHONDROITIN PO      Take by mouth daily        losartan 25 MG tablet    COZAAR    45 tablet    Take 0.5 tablets (12.5 mg) by mouth daily    Type 2 diabetes mellitus with microalbuminuria, without long-term current use of insulin (H)       metFORMIN 1000 MG tablet    GLUCOPHAGE    90 tablet    Take 0.5 tablets (500 mg) by mouth 2 times daily (with meals)    Type 2 diabetes mellitus with microalbuminuria, without long-term current use of insulin (H)       MULTIVITAMIN/IRON PO           omeprazole 20 MG CR capsule    priLOSEC    90 capsule    Take 1 capsule (20 mg) by mouth daily    Gastroesophageal reflux disease without esophagitis       simvastatin 40 MG tablet    ZOCOR    45 tablet    Take 0.5 tablets (20 mg) by mouth At Bedtime    Hyperlipidemia with target LDL less than 100

## 2017-11-29 NOTE — LETTER
[facility] [address]  [place] [address]  Phone: [phone]  Fax: [phone]    2017    Re: Reid METCALF Ryanchemo, : 1934    82 y/o male being followed for a left carotid artery stenosis on ultrasound--change noted on the most recent U/S. Peak velocity left 266 right 138--by ratios both sides are 50-69% stenotic. Carotids 4/4 no bruits motor/sensory function intact Cr. N 2-12 intact no history TIA CVA Amaurosis fugax.  I believe a CTA of his carotids is indicated.  Discussed in detail with the patient, as well as parameters for operation--risks goals and alternatives.  He appears to understand and wishes to proceed with CTA.      JAYRO Angel MD

## 2017-11-29 NOTE — PROGRESS NOTES
82 y/o male being followed for a left carotid artery stenosis on ultrasound--change noted on the most recent U/S. Peak velocity left 266 right 138--by ratios both sides are 50-69% stenotic. Carotids 4/4 no bruits motor/sensory function intact Cr. N 2-12 intact no history TIA CVA Amaurosis fugax.  I believe a CTA of his carotids is indicated.  Discussed in detail with the patient, as well as parameters for operation--risks goals and alternatives.  He appears to understand and wishes to proceed with CTA.  Face to face time 45 minutes greater than 50% in  Consultation.

## 2017-11-30 ENCOUNTER — TELEPHONE (OUTPATIENT)
Dept: INTERNAL MEDICINE | Facility: CLINIC | Age: 82
End: 2017-11-30

## 2017-11-30 ENCOUNTER — TELEPHONE (OUTPATIENT)
Dept: ULTRASOUND IMAGING | Facility: CLINIC | Age: 82
End: 2017-11-30

## 2017-11-30 DIAGNOSIS — R80.9 TYPE 2 DIABETES MELLITUS WITH MICROALBUMINURIA, WITHOUT LONG-TERM CURRENT USE OF INSULIN (H): Primary | ICD-10-CM

## 2017-11-30 DIAGNOSIS — I65.29 CAROTID ARTERY STENOSIS: Primary | ICD-10-CM

## 2017-11-30 DIAGNOSIS — E11.29 TYPE 2 DIABETES MELLITUS WITH MICROALBUMINURIA, WITHOUT LONG-TERM CURRENT USE OF INSULIN (H): Primary | ICD-10-CM

## 2017-11-30 RX ORDER — LISINOPRIL 2.5 MG/1
2.5 TABLET ORAL DAILY
Qty: 90 TABLET | Refills: 3 | Status: SHIPPED | OUTPATIENT
Start: 2017-11-30 | End: 2018-12-24

## 2017-12-19 NOTE — TELEPHONE ENCOUNTER
Patient scheduled for 12/21/17 at [facility] 10am , flag put in Dr Angel's file to call with results *jam [id number]

## 2017-12-21 ENCOUNTER — HOSPITAL ENCOUNTER (OUTPATIENT)
Dept: CT IMAGING | Facility: CLINIC | Age: 82
Discharge: HOME | End: 2017-12-21
Attending: SURGERY | Admitting: SURGERY
Payer: MEDICARE

## 2017-12-21 DIAGNOSIS — I65.29 CAROTID ARTERY STENOSIS: ICD-10-CM

## 2017-12-21 LAB
CREAT BLD-MCNC: 1 MG/DL (ref 0.66–1.25)
GFR SERPL CREATININE-BSD FRML MDRD: 71 ML/MIN/1.7M2

## 2017-12-21 PROCEDURE — 25000128 H RX IP 250 OP 636: Performed by: RADIOLOGY

## 2017-12-21 PROCEDURE — 70498 CT ANGIOGRAPHY NECK: CPT

## 2017-12-21 PROCEDURE — 82565 ASSAY OF CREATININE: CPT

## 2017-12-21 RX ORDER — IOPAMIDOL 755 MG/ML
500 INJECTION, SOLUTION INTRAVASCULAR ONCE
Status: COMPLETED | OUTPATIENT
Start: 2017-12-21 | End: 2017-12-21

## 2017-12-21 RX ADMIN — SODIUM CHLORIDE 80 ML: 9 INJECTION, SOLUTION INTRAVENOUS at 10:21

## 2017-12-21 RX ADMIN — IOPAMIDOL 70 ML: 755 INJECTION, SOLUTION INTRAVENOUS at 10:21

## 2017-12-28 DIAGNOSIS — I65.29 CAROTID ARTERY STENOSIS: Primary | ICD-10-CM

## 2018-04-12 ENCOUNTER — OFFICE VISIT (OUTPATIENT)
Dept: INTERNAL MEDICINE | Facility: CLINIC | Age: 83
End: 2018-04-12
Payer: MEDICARE

## 2018-04-12 VITALS
HEART RATE: 68 BPM | RESPIRATION RATE: 16 BRPM | OXYGEN SATURATION: 97 % | DIASTOLIC BLOOD PRESSURE: 60 MMHG | BODY MASS INDEX: 29.99 KG/M2 | SYSTOLIC BLOOD PRESSURE: 124 MMHG | WEIGHT: 180 LBS | TEMPERATURE: 97.5 F | HEIGHT: 65 IN

## 2018-04-12 DIAGNOSIS — I73.9 PERIPHERAL VASCULAR DISEASE (H): ICD-10-CM

## 2018-04-12 DIAGNOSIS — R80.9 TYPE 2 DIABETES MELLITUS WITH MICROALBUMINURIA, WITHOUT LONG-TERM CURRENT USE OF INSULIN (H): Primary | ICD-10-CM

## 2018-04-12 DIAGNOSIS — I10 ESSENTIAL HYPERTENSION WITH GOAL BLOOD PRESSURE LESS THAN 140/90: ICD-10-CM

## 2018-04-12 DIAGNOSIS — R01.1 SYSTOLIC MURMUR: ICD-10-CM

## 2018-04-12 DIAGNOSIS — I65.23 BILATERAL CAROTID ARTERY OBSTRUCTION WITHOUT CEREBRAL INFARCTION: ICD-10-CM

## 2018-04-12 DIAGNOSIS — E78.5 HYPERLIPIDEMIA WITH TARGET LDL LESS THAN 100: ICD-10-CM

## 2018-04-12 DIAGNOSIS — E11.29 TYPE 2 DIABETES MELLITUS WITH MICROALBUMINURIA, WITHOUT LONG-TERM CURRENT USE OF INSULIN (H): Primary | ICD-10-CM

## 2018-04-12 LAB — HBA1C MFR BLD: 6.5 % (ref 0–6.4)

## 2018-04-12 PROCEDURE — 36415 COLL VENOUS BLD VENIPUNCTURE: CPT | Performed by: INTERNAL MEDICINE

## 2018-04-12 PROCEDURE — 83036 HEMOGLOBIN GLYCOSYLATED A1C: CPT | Performed by: INTERNAL MEDICINE

## 2018-04-12 PROCEDURE — 99214 OFFICE O/P EST MOD 30 MIN: CPT | Performed by: INTERNAL MEDICINE

## 2018-04-12 PROCEDURE — 80053 COMPREHEN METABOLIC PANEL: CPT | Performed by: INTERNAL MEDICINE

## 2018-04-12 PROCEDURE — 80061 LIPID PANEL: CPT | Performed by: INTERNAL MEDICINE

## 2018-04-12 RX ORDER — ASPIRIN 325 MG
325 TABLET ORAL EVERY EVENING
Status: ON HOLD | COMMUNITY
End: 2018-08-31

## 2018-04-12 NOTE — NURSING NOTE
"Chief Complaint   Patient presents with     Diabetes     Hypertension     Lipids       Initial /60 (BP Location: Left arm, Patient Position: Sitting, Cuff Size: Adult Large)  Pulse 68  Temp 97.5  F (36.4  C) (Oral)  Resp 16  Ht 5' 5\" (1.651 m)  Wt 180 lb (81.6 kg)  SpO2 97%  BMI 29.95 kg/m2 Estimated body mass index is 29.95 kg/(m^2) as calculated from the following:    Height as of this encounter: 5' 5\" (1.651 m).    Weight as of this encounter: 180 lb (81.6 kg).  Medication Reconciliation: complete   Jairon CRAFT      "

## 2018-04-12 NOTE — MR AVS SNAPSHOT
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     The heart murmur is likely from calcium buildup on your aortic valve. Someone will contact you to help schedule a heart echocardiogram (ultrasound) to get a better look at the valve.     Stop by the lab downstairs ([address]) before you go home today.     See me back in October 2018, if possible, with labs a few days in advance.           Follow-ups after your visit        Future tests that were ordered for you today     Open Future Orders        Priority Expected Expires Ordered    Echocardiogram Complete Routine  4/12/2019 4/12/2018            Who to contact     If you have questions or need follow up information about today's clinic visit or your schedule please contact [facility] directly at [phone].  Normal or non-critical lab and imaging results will be communicated to you by MyChart, letter or phone within 4 business days after the clinic has received the results. If you do not hear from us within 7 days, please contact the clinic through MyChart or phone. If you have a critical or abnormal lab result, we will notify you by phone as soon as possible.  Submit  "refill requests through [facility] or call your pharmacy and they will forward the refill request to us. Please allow 3 business days for your refill to be completed.          Additional Information About Your Visit        [employer] Information     [facility] lets you send messages to your doctor, view your test results, renew your prescriptions, schedule appointments and more. To sign up, go to [email or web address] . Click on \"Log in\" on the left side of the screen, which will take you to the Welcome page. Then click on \"Sign up Now\" on the right side of the page.     You will be asked to enter the access code listed below, as well as some personal information. Please follow the directions to create your username and password.     Your access code is: [email or web address]  Expires: 2018  4:27 PM     Your access code will  in 90 days. If you need help or a new code, please call your [place] clinic or [phone].        Care EveryWhere ID     This is your Care EveryWhere ID. This could be used by other organizations to access your [place] medical records  [id number]        Your Vitals Were     Pulse Temperature Respirations Height Pulse Oximetry BMI (Body Mass Index)    68 97.5  F (36.4  C) (Oral) 16 5' 5\" (1.651 m) 97% 29.95 kg/m2       Blood Pressure from Last 3 Encounters:   18 124/60   17 122/78   17 124/60    Weight from Last 3 Encounters:   18 180 lb (81.6 kg)   17 182 lb (82.6 kg)   17 182 lb (82.6 kg)              We Performed the Following     Comprehensive metabolic panel     Hemoglobin A1c     Lipid panel reflex to direct LDL Fasting          Today's Medication Changes          These changes are accurate as of 18  4:27 PM.  If you have any questions, ask your nurse or doctor.               These medicines have changed or have updated prescriptions.        Dose/Directions    lisinopril 2.5 MG tablet   Commonly known as:  PRINIVIL/Zestril   This may have changed:  " additional instructions   Used for:  Type 2 diabetes mellitus with microalbuminuria, without long-term current use of insulin (H)        Dose:  2.5 mg   Take 1 tablet (2.5 mg) by mouth daily   Quantity:  90 tablet   Refills:  3            Where to get your medicines      These medications were sent to [place] [address] [facility] - [place], [place] - [address] [place] [address] [address]  [address], [facility] [address]     Phone:  [phone]     blood glucose monitoring lancets    blood glucose monitoring test strip                Primary Care Provider Office Phone # Fax #    Viet Bingham [phone]       [address] [facility] [address]  [facility] [address]        Equal Access to Services     [facility] CR[facility] : Hadii aad ku hadasho Socinthya, waaxda luqadaha, qaybta kaalmada adeegyada, rigo myers. So [facility] [phone].    ATENCIÓN: Si habla español, tiene a philip disposición servicios gratuitos de asistencia lingüística. Llame al [phone].    We comply with applicable federal civil rights laws and [place] laws. We do not discriminate on the basis of race, color, national origin, age, disability, sex, sexual orientation, or gender identity.            Thank you!     Thank you for choosing [facility]  for your care. Our goal is always to provide you with excellent care. Hearing back from our patients is one way we can continue to improve our services. Please take a few minutes to complete the written survey that you may receive in the mail after your visit with us. Thank you!             Your Updated Medication List - Protect others around you: Learn how to safely use, store and throw away your medicines at www.disposemymeds.org.          This list is accurate as of 4/12/18  4:27 PM.  Always use your most recent med list.                   Brand Name Dispense Instructions for use Diagnosis    ascorbic acid 500 MG Tabs           aspirin 325 MG tablet      Take by mouth  daily        BIOTIN PO      Take 5,000 mg by mouth        blood glucose monitoring lancets     100 each    Use to test blood sugar 1 times daily or as directed.    Type 2 diabetes mellitus with microalbuminuria, without long-term current use of insulin (H)       blood glucose monitoring test strip    MIRA CONTOUR NEXT    100 each    Use to test blood sugar one time daily or as directed.    Type 2 diabetes mellitus with microalbuminuria, without long-term current use of insulin (H)       CALCIUM 500 PO      Take 500 mg by mouth 3 times daily        CVS OMEPRAZOLE PO      Take 20 mg by mouth daily        fluocinonide 0.05 % ointment    LIDEX     Apply topically 2 times daily        GLUCOSAMINE-CHONDROITIN PO      Take by mouth daily        lisinopril 2.5 MG tablet    PRINIVIL/Zestril    90 tablet    Take 1 tablet (2.5 mg) by mouth daily    Type 2 diabetes mellitus with microalbuminuria, without long-term current use of insulin (H)       metFORMIN 1000 MG tablet    GLUCOPHAGE    90 tablet    Take 0.5 tablets (500 mg) by mouth 2 times daily (with meals)    Type 2 diabetes mellitus with microalbuminuria, without long-term current use of insulin (H)       MULTIVITAMIN/IRON PO           simvastatin 40 MG tablet    ZOCOR    45 tablet    Take 0.5 tablets (20 mg) by mouth At Bedtime    Hyperlipidemia with target LDL less than 100

## 2018-04-12 NOTE — PROGRESS NOTES
SUBJECTIVE:   Reid Jimenes is a 84 year old male who presents to clinic today for the following health issues:      Diabetes Follow-up      Patient is checking blood sugars: 2-3 times a week; am 140-150, after eating is 180    Diabetic concerns: None     Symptoms of hypoglycemia (low blood sugar): shaky, weak and grouchy     Paresthesias (numbness or burning in feet) or sores: No     Date of last diabetic eye exam: December 2017    BP Readings from Last 2 Encounters:   04/12/18 124/60   11/29/17 122/78     Hemoglobin A1C (%)   Date Value   11/01/2017 6.7 (H)   05/18/2017 6.6 (H)     LDL Cholesterol Calculated (mg/dL)   Date Value   05/11/2017 37   04/07/2016 47     Hyperlipidemia Follow-Up      Rate your low fat/cholesterol diet?: good    Taking statin?  Yes, no muscle aches from statin    Other lipid medications/supplements?:  none    Hypertension Follow-up      Outpatient blood pressures are not being checked.    Low Salt Diet: no added salt      Amount of exercise or physical activity: None    Problems taking medications regularly: No    Medication side effects: none    Diet: low salt, low fat/cholesterol and carbohydrate counting    Diabetes  Patient checks blood sugars in the morning 2-3 times per week. Sugars usually around 140-160. He has not noticed adverse side effects from Metformin.     Heart Murmur  Patient relates he has had a longstanding heart murmur. He wonders if there is a test to see if it is worsening. [facility] not [facility].     Past/recent records reviewed and discussed for:  - Follows with [facility]  - CTA angiogram 12/21/2017 - left 65%, right 30%  - last lipid labs 05/11/2017    Problem list and histories reviewed & adjusted, as indicated.  Additional history: as documented      Reviewed and updated as needed this visit by clinical staff  Tobacco  Allergies  Meds  Med Hx  Surg Hx  Fam Hx  Soc Hx      Reviewed and updated as needed this visit by  "Provider         ROS:  POSITIVE for shortness of breath with activity, heartburn - managed with omeprazole    No cough. No chest discomfort, dizziness or palpitations. No diarrhea, abdominal pain or rectal bleeding.   No acute problems with vision or speech, lateralizing weakness or paresthesias.    ROS: as above or negative for Respiratory, CV, GI, endocrine, neuro systems.     This document serves as a record of the services and decisions personally performed and made by Viet Bingham MD. It was created on his behalf by Fatuma Montelongo, a trained medical scribe. The creation of this document is based on the provider's statements to the medical scribe.  Fatuma Montelongo April 12, 2018 4:17 PM      OBJECTIVE:   /60 (BP Location: Left arm, Patient Position: Sitting, Cuff Size: Adult Large)  Pulse 68  Temp 97.5  F (36.4  C) (Oral)  Resp 16  Ht 1.651 m (5' 5\")  Wt 81.6 kg (180 lb)  SpO2 97%  BMI 29.95 kg/m2  Body mass index is 29.95 kg/(m^2).  GENERAL: healthy, alert and no distress  RESP: lungs clear to auscultation - no rales, rhonchi or wheezes  CV: regular rate and rhythm, normal S1 S2, no S3 or S4, click or rub, no peripheral edema and peripheral pulses strong. Grade 2 systolic murmur.  MS: no gross musculoskeletal defects noted, no edema  NEURO: Normal strength and tone, mentation intact and speech normal  PSYCH: mentation appears normal, affect normal/bright    Lab Results   Component Value Date    A1C 6.5 04/12/2018    A1C 6.7 11/01/2017    A1C 6.6 05/18/2017    A1C 6.6 09/28/2016    A1C 6.5 04/07/2016     LDL-Cholesterol   LDL Cholesterol Calculated   Date Value Ref Range Status   04/12/2018 37 <100 mg/dL Final     Comment:     Desirable:       <100 mg/dl       ASSESSMENT/PLAN:   (E11.29,  R80.9) Type 2 diabetes mellitus with microalbuminuria, without long-term current use of insulin (H)  (primary encounter diagnosis)  Comment: A1c at target. Continue current measures.  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     (E78.5) Hyperlipidemia with target LDL less than 100  Comment: LDL at target. Continue current measures.   Plan: Comprehensive metabolic panel, Lipid panel         reflex to direct LDL Fasting          (I10) Essential hypertension with goal blood pressure less than 140/90  Comment: BP at target. Continue current meds.     (R01.1) Systolic murmur  Comment: Grade 2 systolic heard on exam. Update Echo.   Plan: Echocardiogram Complete            Patient Instructions   If today's Hemoglobin A1c lab is running higher than expected, we could consider increasing your metformin 1000 mg tabs up to a full tablet twice a day.     The heart murmur is likely from calcium buildup on your aortic valve. Someone will contact you to help schedule a heart echocardiogram (ultrasound) to get a better look at the valve.     Stop by the lab downstairs ([address]) before you go home today.     See me back in October 2018, if possible, with labs a few days in advance.       The information in this document, created by the medical scribe for me, accurately reflects the services I personally performed and the decisions made by me. I have reviewed and approved this document for accuracy prior to leaving the patient care area.  April 12, 2018 4:15 PM    Viet Bingham MD  [facility]    "

## 2018-04-12 NOTE — PATIENT INSTRUCTIONS
If today's Hemoglobin A1c lab is running higher than expected, we could consider increasing your metformin 1000 mg tabs up to a full tablet twice a day.     The heart murmur is likely from calcium buildup on your aortic valve. Someone will contact you to help schedule a heart echocardiogram (ultrasound) to get a better look at the valve.     Stop by the lab downstairs ([address]) before you go home today.     See me back in October 2018, if possible, with labs a few days in advance.

## 2018-04-13 LAB
ALBUMIN SERPL-MCNC: 4 G/DL (ref 3.4–5)
ALP SERPL-CCNC: 79 U/L (ref 40–150)
ALT SERPL W P-5'-P-CCNC: 25 U/L (ref 0–70)
ANION GAP SERPL CALCULATED.3IONS-SCNC: 3 MMOL/L (ref 3–14)
AST SERPL W P-5'-P-CCNC: 19 U/L (ref 0–45)
BILIRUB SERPL-MCNC: 0.3 MG/DL (ref 0.2–1.3)
BUN SERPL-MCNC: 19 MG/DL (ref 7–30)
CALCIUM SERPL-MCNC: 9.1 MG/DL (ref 8.5–10.1)
CHLORIDE SERPL-SCNC: 107 MMOL/L (ref 94–109)
CHOLEST SERPL-MCNC: 97 MG/DL
CO2 SERPL-SCNC: 33 MMOL/L (ref 20–32)
CREAT SERPL-MCNC: 1.25 MG/DL (ref 0.66–1.25)
GFR SERPL CREATININE-BSD FRML MDRD: 55 ML/MIN/1.7M2
GLUCOSE SERPL-MCNC: 166 MG/DL (ref 70–99)
HDLC SERPL-MCNC: 39 MG/DL
LDLC SERPL CALC-MCNC: 37 MG/DL
NONHDLC SERPL-MCNC: 58 MG/DL
POTASSIUM SERPL-SCNC: 4.8 MMOL/L (ref 3.4–5.3)
PROT SERPL-MCNC: 7.1 G/DL (ref 6.8–8.8)
SODIUM SERPL-SCNC: 143 MMOL/L (ref 133–144)
TRIGL SERPL-MCNC: 106 MG/DL

## 2018-05-01 ENCOUNTER — TELEPHONE (OUTPATIENT)
Dept: INTERNAL MEDICINE | Facility: CLINIC | Age: 83
End: 2018-05-01

## 2018-05-01 NOTE — LETTER
"  [facility]  [address] [place] [address]  [phone]    5/2/2018    Reid Baronesperanzachemo  [address]   [facility] [address]           Dear Mr. Jimenes,    The results of your lab tests are enclosed. Everything looks fine.   Please see me back in around October 2018 for an office visit, and schedule a NON-fasting \"lab only\" appointment a few days before the office visit.       Hemoglobin A1C measures control of diabetes. Your Hemoglobin A1C is shown. The ideal target is under 7.0, although below 8.0 may be acceptable for some patients.    LDL= Bad Cholesterol-- the target is below 100.     HDL= Good Cholesterol-- although this is determined mostly by heredity, exercise and/or medications may sometimes raise this number.    Triglycerides are another type of fat in the blood, and can sometimes be lowered by reducing intake of sweets or excess carbohydrates, alcohol, and by weight reduction if needed.  Sometimes medications are also used.    AST and ALT are liver tests, as are the bilirubin (total and direct), albumin, total protein, and alkaline phosphatase. Yours are all normal.     Urea Nitrogen and Creatinine are kidney tests--yours are normal. GFR stands for Glomerular Filtration Rate, a more complicated estimate of kidney function.    Sodium, Potassium, Chloride, Carbon Dioxide, and Calcium are all normal salts in the bloodstream. Yours all look normal. Your glucose (blood sugar) also looks fine. (You can ignore the anion gap result).     If you have any further questions or problems, please contact our office.    Sincerely,    Viet Bingham MD  Attachment: Lab results     "

## 2018-05-01 NOTE — TELEPHONE ENCOUNTER
Patient walked into clinic requesting copy of recent lab results. Copy given. No result note by MD. Please review and advise. Patient advised MD still to review results.

## 2018-05-10 ENCOUNTER — HOSPITAL ENCOUNTER (OUTPATIENT)
Dept: CARDIOLOGY | Facility: CLINIC | Age: 83
Discharge: HOME | End: 2018-05-10
Attending: INTERNAL MEDICINE | Admitting: INTERNAL MEDICINE
Payer: MEDICARE

## 2018-05-10 PROCEDURE — 93306 TTE W/DOPPLER COMPLETE: CPT

## 2018-05-10 PROCEDURE — 93306 TTE W/DOPPLER COMPLETE: CPT | Mod: 26 | Performed by: INTERNAL MEDICINE

## 2018-06-04 ENCOUNTER — TELEPHONE (OUTPATIENT)
Dept: INTERNAL MEDICINE | Facility: CLINIC | Age: 83
End: 2018-06-04

## 2018-06-04 ENCOUNTER — OFFICE VISIT (OUTPATIENT)
Dept: INTERNAL MEDICINE | Facility: CLINIC | Age: 83
End: 2018-06-04
Payer: MEDICARE

## 2018-06-04 VITALS
DIASTOLIC BLOOD PRESSURE: 66 MMHG | WEIGHT: 179.3 LBS | HEIGHT: 65 IN | BODY MASS INDEX: 29.87 KG/M2 | SYSTOLIC BLOOD PRESSURE: 122 MMHG | OXYGEN SATURATION: 95 % | TEMPERATURE: 97.7 F | HEART RATE: 72 BPM | RESPIRATION RATE: 16 BRPM

## 2018-06-04 DIAGNOSIS — B35.4 TINEA CORPORIS: Primary | ICD-10-CM

## 2018-06-04 DIAGNOSIS — I77.9 BILATERAL CAROTID ARTERY DISEASE (H): ICD-10-CM

## 2018-06-04 DIAGNOSIS — I35.0 SEVERE AORTIC STENOSIS: ICD-10-CM

## 2018-06-04 DIAGNOSIS — I35.0 NONRHEUMATIC AORTIC VALVE STENOSIS: Primary | ICD-10-CM

## 2018-06-04 PROCEDURE — 99214 OFFICE O/P EST MOD 30 MIN: CPT | Performed by: INTERNAL MEDICINE

## 2018-06-04 RX ORDER — CLOTRIMAZOLE 1 %
CREAM (GRAM) TOPICAL 2 TIMES DAILY
Qty: 15 G | Refills: 3 | Status: SHIPPED | OUTPATIENT
Start: 2018-06-04 | End: 2018-06-15

## 2018-06-04 NOTE — TELEPHONE ENCOUNTER
Patient informed Dr. Bingham recommends office visit. Appointment scheduled with Dr. Shah today.   Next 5 appointments (look out 90 days)     Jun 04, 2018  2:15 PM CDT   [facility]    [address]  [facility] [id number] [phone]

## 2018-06-04 NOTE — TELEPHONE ENCOUNTER
Reid Jimenes is a 84 year old male who calls with a rash.     NURSING ASSESSMENT:   The rash began  3 weeks ago. He also has noticed an increase in development of brown spots under his skin.   Patient's rash is located on arms bilateral.  Rash is described as flat, with a color of red with No drainage.  Rash is itching.  Associated symptoms: none  Patient denies exposure to allergens.  Patient is on any new medications - started on Lisinopril about 2 months ago.  Patient has not tried new soaps, detergents, perfumes or lotions.  Patients is allergic to none.  Other members of the household have not had symptoms.  Past medical history includes psoriasis - tried medicaiton he has for psoriasis, but no change.  Allergies: No Known Allergies    MEDICATIONS:  Taking medication(s) as prescribed? Yes      Patient has tried medication prescribed for psoriasis, no change.  Patient informed of the following home remedies  Ice packs - patient is not able to use hydrocortisone    NURSING PLAN: Routed to provider Yes    Indira Mena RN

## 2018-06-04 NOTE — MR AVS SNAPSHOT
After Visit Summary   6/4/2018    Reid Jimenes    MRN: [id number]           Patient Information     Date Of Birth          2/14/1934        Visit Information        Provider Department      6/4/2018 2:15 PM Monroe Shah MD [facility]        Today's Diagnoses     Tinea corporis    -  1    Severe aortic stenosis        Bilateral carotid artery disease (H)           Follow-ups after your visit        Additional Services     CARDIOLOGY EVAL ADULT REFERRAL       Preferred location:  [facility] [phone]   [email or web address]    Please be aware that coverage of these services is subject to the terms and limitations of your health insurance plan.  Call member services at your health plan with any benefit or coverage questions.      Please bring the following to your appointment:  Any x-rays, CTs or MRIs which have been performed. Contact the facility where they were done to arrange for  prior to your scheduled appointment.    List of current medications  This referral request   Any documents/labs given to you for this referral                  Who to contact     If you have questions or need follow up information about today's clinic visit or your schedule please contact [facility] directly at [phone].  Normal or non-critical lab and imaging results will be communicated to you by MyChart, letter or phone within 4 business days after the clinic has received the results. If you do not hear from us within 7 days, please contact the clinic through MyChart or phone. If you have a critical or abnormal lab result, we will notify you by phone as soon as possible.  Submit refill requests through [email or web address] or call your pharmacy and they will forward the refill request to us. Please allow 3 business days for your refill to be completed.          Additional Information About Your  "Visit        [place] Information     [employer] lets you send messages to your doctor, view your test results, renew your prescriptions, schedule appointments and more. To sign up, go to [email or web address] . Click on \"Log in\" on the left side of the screen, which will take you to the Welcome page. Then click on \"Sign up Now\" on the right side of the page.     You will be asked to enter the access code listed below, as well as some personal information. Please follow the directions to create your username and password.     Your access code is: [email or web address]  Expires: 2018  4:27 PM     Your access code will  in 90 days. If you need help or a new code, please call your [place] clinic or [phone].        Care EveryWhere ID     This is your Care EveryWhere ID. This could be used by other organizations to access your [place] medical records  [id number]        Your Vitals Were     Pulse Temperature Respirations Height Pulse Oximetry BMI (Body Mass Index)    72 97.7  F (36.5  C) (Oral) 16 5' 5\" (1.651 m) 95% 29.84 kg/m2       Blood Pressure from Last 3 Encounters:   18 122/66   18 124/60   17 122/78    Weight from Last 3 Encounters:   18 179 lb 4.8 oz (81.3 kg)   18 180 lb (81.6 kg)   17 182 lb (82.6 kg)              We Performed the Following     CARDIOLOGY EVAL ADULT REFERRAL          Today's Medication Changes          These changes are accurate as of 18  2:42 PM.  If you have any questions, ask your nurse or doctor.               Start taking these medicines.        Dose/Directions    clotrimazole 1 % cream   Commonly known as:  LOTRIMIN   Used for:  Tinea corporis   Started by:  Monroe Shah MD        Apply topically 2 times daily   Quantity:  15 g   Refills:  3         These medicines have changed or have updated prescriptions.        Dose/Directions    lisinopril 2.5 MG tablet   Commonly known as:  PRINIVIL/Zestril   This may have changed:  additional " instructions   Used for:  Type 2 diabetes mellitus with microalbuminuria, without long-term current use of insulin (H)        Dose:  2.5 mg   Take 1 tablet (2.5 mg) by mouth daily   Quantity:  90 tablet   Refills:  3            Where to get your medicines      Some of these will need a paper prescription and others can be bought over the counter.  Ask your nurse if you have questions.     Bring a paper prescription for each of these medications     clotrimazole 1 % cream                Primary Care Provider Office Phone # Fax #    Viet Bingham [phone]       [address] [facility] [address]  [facility] [address]        Equal Access to Services     [facility]: Hadii maurice martinez hadasho Soomaali, waaxda luqadaha, qaybta kaalmada adebrittanyyada, rigo hampton . So [facility] [phone].    ATENCIÓN: Si habla español, tiene a philip disposición servicios gratuitos de asistencia lingüística. Llame al [phone].    We comply with applicable federal civil rights laws and [place] laws. We do not discriminate on the basis of race, color, national origin, age, disability, sex, sexual orientation, or gender identity.            Thank you!     Thank you for choosing [facility]  for your care. Our goal is always to provide you with excellent care. Hearing back from our patients is one way we can continue to improve our services. Please take a few minutes to complete the written survey that you may receive in the mail after your visit with us. Thank you!             Your Updated Medication List - Protect others around you: Learn how to safely use, store and throw away your medicines at www.disposemymeds.org.          This list is accurate as of 6/4/18  2:42 PM.  Always use your most recent med list.                   Brand Name Dispense Instructions for use Diagnosis    ascorbic acid 500 MG Tabs           aspirin 325 MG tablet      Take by mouth daily        BIOTIN PO      Take 5,000  mg by mouth        blood glucose monitoring lancets     100 each    Use to test blood sugar 1 times daily or as directed.    Type 2 diabetes mellitus with microalbuminuria, without long-term current use of insulin (H)       blood glucose monitoring test strip    MIRA CONTOUR NEXT    100 each    Use to test blood sugar one time daily or as directed.    Type 2 diabetes mellitus with microalbuminuria, without long-term current use of insulin (H)       CALCIUM 500 PO      Take 500 mg by mouth 3 times daily        clotrimazole 1 % cream    LOTRIMIN    15 g    Apply topically 2 times daily    Tinea corporis       [employer] OMEPRAZOLE PO      Take 20 mg by mouth daily        fluocinonide 0.05 % ointment    LIDEX     Apply topically 2 times daily        GLUCOSAMINE-CHONDROITIN PO      Take by mouth daily        lisinopril 2.5 MG tablet    PRINIVIL/Zestril    90 tablet    Take 1 tablet (2.5 mg) by mouth daily    Type 2 diabetes mellitus with microalbuminuria, without long-term current use of insulin (H)       metFORMIN 1000 MG tablet    GLUCOPHAGE    90 tablet    Take 0.5 tablets (500 mg) by mouth 2 times daily (with meals)    Type 2 diabetes mellitus with microalbuminuria, without long-term current use of insulin (H)       MULTIVITAMIN/IRON PO           simvastatin 40 MG tablet    ZOCOR    45 tablet    Take 0.5 tablets (20 mg) by mouth At Bedtime    Hyperlipidemia with target LDL less than 100

## 2018-06-04 NOTE — TELEPHONE ENCOUNTER
Patient had significant aortic valve stenosis on recent echo. [facility].   Placed order for cardiology consult with [facility].     Please advise patient either by phone, or when he comes in for appt with Dr Shah today.

## 2018-06-04 NOTE — PROGRESS NOTES
ASSESSMENT/PLAN:     Reid Jimenes is a 84 year old male with hx of severe AS, DMII, Carotid artery disease who presents to clinic today for the following health issues:    1. Tinea corporis    Rash consistent with tinea likely.  Ketoconazole cream will interact with statin  Start with clotrimazole, if no improvement [facility]    - clotrimazole (LOTRIMIN) 1 % cream; Apply topically 2 times daily  Dispense: 15 g; Refill: 3    2. Severe aortic stenosis    ECHO showing severe AS.  Murmur on exam radiating to carotids  He denies syncope, does have shortness of breath intermittently.    Discussed importance of following with cardiology    - CARDIOLOGY EVAL ADULT REFERRAL    3. Bilateral carotid artery disease (H)  above  - CARDIOLOGY EVAL ADULT REFERRAL      Monroe Shah MD  [facility]        SUBJECTIVE:   Reid Jimenes is a 84 year old male with hx of severe AS, DMII, Carotid artery disease who presents to clinic today for the following health issues:    Rash  Patient notes rash bilateral antecubitall fossa for 3 weeks  He also has hx of psoriasis and tried lidex to area with no improvement.  Rash is pruritic   He started lisinopril 2 months ago but per records here was prescribed this back in November    Patient was seen last month for physical and murmur was detected, ECHO was done showing severe aortic stenosis      Problem list and histories reviewed & adjusted, as indicated.  Additional history: as documented    Patient Active Problem List   Diagnosis     Hyperlipidemia with target LDL less than 100     Carotid arterial disease (H)     Advanced directives, counseling/discussion     Type 2 diabetes mellitus with other circulatory complications     Peripheral vascular disease (H)     Essential hypertension with goal blood pressure less than 140/90     Carotid artery disease without cerebral infarction (H)     Bilateral carotid artery obstruction without cerebral  "infarction     Acute diffuse otitis externa of both ears     Type 2 diabetes mellitus with microalbuminuria, without long-term current use of insulin (H)     Past Surgical History:   Procedure Laterality Date     MANDIBLE SURGERY  1958       Social History   Substance Use Topics     Smoking status: Former Smoker     Packs/day: 1.00     Years: 30.00     Types: Cigarettes     Smokeless tobacco: Former User     Types: Chew     Quit date: 1/5/1983     Alcohol use No     Family History   Problem Relation Age of Onset     HEART DISEASE Father      Alcohol/Drug Father      Myocardial Infarction Grandchild 18     Grandson     Myocardial Infarction Other 18     Nephew      CANCER Mother 58     Stomach cancer     Alcohol/Drug Mother      CANCER Maternal Grandmother      stomach cancer     Alcohol/Drug Maternal Grandmother      Myocardial Infarction Sister 70     Name: Reena      Alcohol/Drug Sister      CEREBROVASCULAR DISEASE Brother 65     Name: Miguelito     Alcohol/Drug Brother      Alcohol/Drug Maternal Grandfather      Alcohol/Drug Paternal Grandmother      Alcohol/Drug Paternal Grandfather            Reviewed and updated as needed this visit by clinical staff  Tobacco  Allergies  Med Hx  Surg Hx  Fam Hx  Soc Hx      Reviewed and updated as needed this visit by Provider         ROS:  Constitutional, HEENT, cardiovascular, pulmonary, gi and gu systems are negative, except as otherwise noted.    OBJECTIVE:     /66 (BP Location: Right arm, Patient Position: Sitting, Cuff Size: Adult Regular)  Pulse 72  Temp 97.7  F (36.5  C) (Oral)  Resp 16  Ht 5' 5\" (1.651 m)  Wt 179 lb 4.8 oz (81.3 kg)  SpO2 95%  BMI 29.84 kg/m2  Body mass index is 29.84 kg/(m^2).  GENERAL: healthy, alert and no distress  NECK: no adenopathy, no asymmetry, masses, or scars and thyroid normal to palpation  RESP: lungs clear to auscultation - no rales, rhonchi or wheezes  CV: regular rate and rhythm, normal S1 S2, no S3 or S4, 4/6 systolic " ejection murmur radiating to carotids, no peripheral edema   ABDOMEN: obese, soft, nontender, no hepatosplenomegaly, no masses and bowel sounds normal  MS: no gross musculoskeletal defects noted, no edema  SKIN: Bilateral antecubital fossa with erythema, faint overlying scale    Diagnostic Test Results:  none

## 2018-06-05 ENCOUNTER — OFFICE VISIT (OUTPATIENT)
Dept: CARDIOLOGY | Facility: CLINIC | Age: 83
End: 2018-06-05
Attending: INTERNAL MEDICINE
Payer: MEDICARE

## 2018-06-05 VITALS
DIASTOLIC BLOOD PRESSURE: 74 MMHG | HEIGHT: 65 IN | HEART RATE: 64 BPM | WEIGHT: 180.6 LBS | BODY MASS INDEX: 30.09 KG/M2 | SYSTOLIC BLOOD PRESSURE: 116 MMHG

## 2018-06-05 DIAGNOSIS — I35.0 NONRHEUMATIC AORTIC VALVE STENOSIS: ICD-10-CM

## 2018-06-05 DIAGNOSIS — I73.9 PERIPHERAL VASCULAR DISEASE (H): ICD-10-CM

## 2018-06-05 DIAGNOSIS — I77.9 CAROTID ARTERY DISEASE WITHOUT CEREBRAL INFARCTION (H): ICD-10-CM

## 2018-06-05 DIAGNOSIS — I77.9 BILATERAL CAROTID ARTERY DISEASE (H): Primary | ICD-10-CM

## 2018-06-05 PROCEDURE — 99204 OFFICE O/P NEW MOD 45 MIN: CPT | Mod: 25 | Performed by: INTERNAL MEDICINE

## 2018-06-05 PROCEDURE — 93000 ELECTROCARDIOGRAM COMPLETE: CPT | Performed by: INTERNAL MEDICINE

## 2018-06-05 NOTE — PROGRESS NOTES
CARDIOLOGY CONSULT    REASON FOR CONSULT: Aortic stenosis    PRIMARY CARE PHYSICIAN:  Viet Bingham MD    HISTORY OF PRESENT ILLNESS:  84-year-old male with no previous cardiac history is seen for aortic stenosis.    He has a history of carotid artery disease.  Head and neck CTA December 2017 showed 30% right and 65% left internal carotid stenosis.    At baseline he does some light walking, but no moderate activity.  He can go walking in stores and go up 2 flights of stairs with minimal shortness of breath.  He denies any exertional chest pain or anginal symptoms.  He has no lightheadedness, dizziness, syncope, or lower extremity edema.  He feels a little more fatigued than one year ago, but not dramatically different.  Blood pressure historically is in the 120s.    Because of his mild fatigue and a murmur noted on exam, an echo was performed.    Echo May 2018 showed EF 60%, moderate LVH, severe aortic stenosis with mean 42 mmHg, V-max 3.9 m/s, HUNG 1.1 cm , DI 0.18, SVI 63 mL/m , mild aortic insufficiency.    PAST MEDICAL HISTORY:  Past Medical History:   Diagnosis Date     Hyperlipidaemia LDL goal < 100      Type 2 diabetes mellitus (H)      MEDICATIONS:  Current Outpatient Prescriptions   Medication     ascorbic acid 500 MG TABS     aspirin 325 MG tablet     BIOTIN PO     blood glucose monitoring (MIRA CONTOUR NEXT) test strip     blood glucose monitoring (MIRA MICROLET) lancets     Calcium-Magnesium-Vitamin D (CALCIUM 500 PO)     clotrimazole (LOTRIMIN) 1 % cream     CVS OMEPRAZOLE PO     fluocinonide (LIDEX) 0.05 % ointment     GLUCOSAMINE-CHONDROITIN PO     lisinopril (PRINIVIL/ZESTRIL) 2.5 MG tablet     metFORMIN (GLUCOPHAGE) 1000 MG tablet     Multiple Vitamins-Iron (MULTIVITAMIN/IRON PO)     simvastatin (ZOCOR) 40 MG tablet     No current facility-administered medications for this visit.      ALLERGIES:  No Known Allergies    SOCIAL HISTORY:  I have reviewed this patient's social history and updated it  with pertinent information if needed. Reid Jimenes  reports that he has quit smoking. His smoking use included Cigarettes. He has a 30.00 pack-year smoking history. He quit smokeless tobacco use about 35 years ago. His smokeless tobacco use included Chew. He reports that he does not drink alcohol or use illicit drugs.    FAMILY HISTORY:  I have reviewed this patient's family history and updated it with pertinent information if needed.   Family History   Problem Relation Age of Onset     HEART DISEASE Father      Alcohol/Drug Father      Myocardial Infarction Grandchild 18     Grandson     Myocardial Infarction Other 18     Nephew      CANCER Mother 58     Stomach cancer     Alcohol/Drug Mother      CANCER Maternal Grandmother      stomach cancer     Alcohol/Drug Maternal Grandmother      Myocardial Infarction Sister 70     Name: Reena      Alcohol/Drug Sister      CEREBROVASCULAR DISEASE Brother 65     Name: Miguelito     Alcohol/Drug Brother      Alcohol/Drug Maternal Grandfather      Alcohol/Drug Paternal Grandmother      Alcohol/Drug Paternal Grandfather        REVIEW OF SYSTEMS:  Constitutional:  No weight loss, fever, chills, weakness or fatigue.  HEENT:  Eyes:  No visual loss, blurred vision, double vision or yellow sclerae. No hearing loss, sneezing, congestion, runny nose or sore throat.  Skin:  No rash or itching.  Cardiovascular: per HPI  Respiratory: per HPI  GI:  No anorexia, nausea, vomiting or diarrhea. No abdominal pain or blood.  :  No dysurea, hematuria  Neurologic:  No headache, dizziness, syncope, paralysis, ataxia, numbness or tingling in the extremities. No change in bowel or bladder control.  Musculoskeletal:  No muscle, back pain, joint pain or stiffness.  Hematologic:  No anemia, bleeding or bruising.  Lymphatics:  No enlarged nodes. No history of splenectomy.  Psychiatric:  No history of depression or anxiety.  Endocrine:  No reports of sweating, cold or heat intolerance. No polyuria or  polydipsia.  Allergies:  No history of asthma, hives, eczema or rhinitis.    PHYSICAL EXAM:                     Vital Signs with Ranges  Temp:  [97.7  F (36.5  C)] 97.7  F (36.5  C)  Pulse:  [72] 72  Resp:  [16] 16  BP: (122)/(66) 122/66  SpO2:  [95 %] 95 %  0 lbs 0 oz    Constitutional: awake, alert, no distress  Eyes: PERRL, sclera nonicteric  ENT: trachea midline  Respiratory: Lungs clear  Cardiovascular: Regular rate and rhythm, 3/6 mid peaking crescendo systolic murmur at the upper sternal border with radiation to both carotids, no lower extremity edema  GI: nondistended, nontender, bowel sounds present  Lymph/Hematologic: no lymphadenopathy  Skin: dry, no rash  Musculoskeletal: good muscle tone, strength 5/5 in upper and lower extremities  Neurologic: no focal deficits  Neuropsychiatric: appropriate affact    DATA:  Labs:   April 2018: Cholesterol 97, LDL 37, creatinine 1.2    EKG: June 5, 2018: Sinus rhythm, rate 62, intraventricular conduction delay, no Q waves    ASSESSMENT:  84-year-old male seen for severe aortic stenosis.  His symptoms are quite minimal, he has some mild dyspnea and fatigue, but no angina, heart failure, or syncope.  He is euvolemic on exam.  Ejection fraction is preserved.    He is entering the window where aortic valve replacement would be appropriate.  We discussed the options of surgical versus transcatheter valve replacement.  He of course will need further evaluation such as a chest CTA and coronary angiogram.  He potentially could be a good TAVR candidate because of his advanced age.  The patient will be referred to the TAVR clinic at [facility].  Timing of surgery would likely be sometime this year.  [place] and would probably want to be recovered by January.    RECOMMENDATIONS:  1.  Severe aortic stenosis, minimally symptomatic with preserved ejection fraction  - Refer to TAVR clinic    Further workup per TAVR team, ultimate follow-up at [facility] after AVR.       Eulalio Higginbotham MD  Cardiology - [facility]  Pager:  [phone]  Text Page  June 5, 2018

## 2018-06-05 NOTE — PATIENT INSTRUCTIONS
1.  Your echocardiogram shows that you have severe aortic stenosis.  This means that the aortic valve and your heart is very stiffened and not opening very well.  Your heart function is still pumping normally.  If the aortic stenosis is not fixed, in coming years it can lead to heart failure.    2.  The only treatment option for this is to replace the aortic valve.  One option is to do an open heart surgery with a prosthetic aortic valve replacement.  The other option is to replace the valve with a catheter method that goes up a vessel in your chest and into your heart (transcatheter aortic valve replacement, or TAVR).    3.  I would like you to meet with our specialists at [facility] to discuss which of these options would be best for you.  There are additional tests that will be needed including a CT scan of your chest to look at your heart and the size of the blood vessels.  Also a coronary angiogram will be needed at some point, which is a procedure where a small catheter is advanced through a blood vessel up to the heart to look at the arteries to see if there are any blockages that will also need to be fixed.

## 2018-06-05 NOTE — LETTER
6/5/2018    Viet Bingham MD, MD  [address]  [facility] [address]    RE: Reid Jimenes       Dear Colleague,    I had the pleasure of seeing Reid DIXON Jimenes in the [facility].    CARDIOLOGY CONSULT    REASON FOR CONSULT: Aortic stenosis    PRIMARY CARE PHYSICIAN:  Viet Bingham MD    HISTORY OF PRESENT ILLNESS:  84-year-old male with no previous cardiac history is seen for aortic stenosis.    He has a history of carotid artery disease.  Head and neck CTA December 2017 showed 30% right and 65% left internal carotid stenosis.    At baseline he does some light walking, but no moderate activity.  He can go walking in stores and go up 2 flights of stairs with minimal shortness of breath.  He denies any exertional chest pain or anginal symptoms.  He has no lightheadedness, dizziness, syncope, or lower extremity edema.  He feels a little more fatigued than one year ago, but not dramatically different.  Blood pressure historically is in the 120s.    Because of his mild fatigue and a murmur noted on exam, an echo was performed.    Echo May 2018 showed EF 60%, moderate LVH, severe aortic stenosis with mean 42 mmHg, V-max 3.9 m/s, HUNG 1.1 cm , DI 0.18, SVI 63 mL/m , mild aortic insufficiency.    PAST MEDICAL HISTORY:  Past Medical History:   Diagnosis Date     Hyperlipidaemia LDL goal < 100      Type 2 diabetes mellitus (H)      MEDICATIONS:  Current Outpatient Prescriptions   Medication     ascorbic acid 500 MG TABS     aspirin 325 MG tablet     BIOTIN PO     blood glucose monitoring (MIRA CONTOUR NEXT) test strip     blood glucose monitoring (MIRA MICROLET) lancets     Calcium-Magnesium-Vitamin D (CALCIUM 500 PO)     clotrimazole (LOTRIMIN) 1 % cream     CVS OMEPRAZOLE PO     fluocinonide (LIDEX) 0.05 % ointment     GLUCOSAMINE-CHONDROITIN PO     lisinopril (PRINIVIL/ZESTRIL) 2.5 MG tablet     metFORMIN (GLUCOPHAGE) 1000 MG tablet     Multiple Vitamins-Iron  (MULTIVITAMIN/IRON PO)     simvastatin (ZOCOR) 40 MG tablet     No current facility-administered medications for this visit.      ALLERGIES:  No Known Allergies    SOCIAL HISTORY:  I have reviewed this patient's social history and updated it with pertinent information if needed. Reid Jimenes  reports that he has quit smoking. His smoking use included Cigarettes. He has a 30.00 pack-year smoking history. He quit smokeless tobacco use about 35 years ago. His smokeless tobacco use included Chew. He reports that he does not drink alcohol or use illicit drugs.    FAMILY HISTORY:  I have reviewed this patient's family history and updated it with pertinent information if needed.   Family History   Problem Relation Age of Onset     HEART DISEASE Father      Alcohol/Drug Father      Myocardial Infarction Grandchild 18     Grandson     Myocardial Infarction Other 18     Nephew      CANCER Mother 58     Stomach cancer     Alcohol/Drug Mother      CANCER Maternal Grandmother      stomach cancer     Alcohol/Drug Maternal Grandmother      Myocardial Infarction Sister 70     Name: Reena      Alcohol/Drug Sister      CEREBROVASCULAR DISEASE Brother 65     Name: Miguelito     Alcohol/Drug Brother      Alcohol/Drug Maternal Grandfather      Alcohol/Drug Paternal Grandmother      Alcohol/Drug Paternal Grandfather        REVIEW OF SYSTEMS:  Constitutional:  No weight loss, fever, chills, weakness or fatigue.  HEENT:  Eyes:  No visual loss, blurred vision, double vision or yellow sclerae. No hearing loss, sneezing, congestion, runny nose or sore throat.  Skin:  No rash or itching.  Cardiovascular: per HPI  Respiratory: per HPI  GI:  No anorexia, nausea, vomiting or diarrhea. No abdominal pain or blood.  :  No dysurea, hematuria  Neurologic:  No headache, dizziness, syncope, paralysis, ataxia, numbness or tingling in the extremities. No change in bowel or bladder control.  Musculoskeletal:  No muscle, back pain, joint pain or  stiffness.  Hematologic:  No anemia, bleeding or bruising.  Lymphatics:  No enlarged nodes. No history of splenectomy.  Psychiatric:  No history of depression or anxiety.  Endocrine:  No reports of sweating, cold or heat intolerance. No polyuria or polydipsia.  Allergies:  No history of asthma, hives, eczema or rhinitis.    PHYSICAL EXAM:                     Vital Signs with Ranges  Temp:  [97.7  F (36.5  C)] 97.7  F (36.5  C)  Pulse:  [72] 72  Resp:  [16] 16  BP: (122)/(66) 122/66  SpO2:  [95 %] 95 %  0 lbs 0 oz    Constitutional: awake, alert, no distress  Eyes: PERRL, sclera nonicteric  ENT: trachea midline  Respiratory: Lungs clear  Cardiovascular: Regular rate and rhythm, 3/6 mid peaking crescendo systolic murmur at the upper sternal border with radiation to both carotids, no lower extremity edema  GI: nondistended, nontender, bowel sounds present  Lymph/Hematologic: no lymphadenopathy  Skin: dry, no rash  Musculoskeletal: good muscle tone, strength 5/5 in upper and lower extremities  Neurologic: no focal deficits  Neuropsychiatric: appropriate affact    DATA:  Labs:   April 2018: Cholesterol 97, LDL 37, creatinine 1.2    EKG: June 5, 2018: Sinus rhythm, rate 62, intraventricular conduction delay, no Q waves    ASSESSMENT:  84-year-old male seen for severe aortic stenosis.  His symptoms are quite minimal, he has some mild dyspnea and fatigue, but no angina, heart failure, or syncope.  He is euvolemic on exam.  Ejection fraction is preserved.    He is entering the window where aortic valve replacement would be appropriate.  We discussed the options of surgical versus transcatheter valve replacement.  He of course will need further evaluation such as a chest CTA and coronary angiogram.  He potentially could be a good TAVR candidate because of his advanced age.  The patient will be referred to the TAVR clinic at [facility].  Timing of surgery would likely be sometime this year.  [place] and  would probably want to be recovered by January.    RECOMMENDATIONS:  1.  Severe aortic stenosis, minimally symptomatic with preserved ejection fraction  - Refer to TAVR clinic    Further workup per TAVR team, ultimate follow-up at [facility] after AVR.      Eulalio Higginbotham MD  Cardiology - [facility]  Pager:  [phone]  Text Page  June 5, 2018      Thank you for allowing me to participate in the care of your patient.    Sincerely,     Eulalio Higginbotham MD     [facility]    cc:   Monroe Shah MD  [address] [place] [address]

## 2018-06-05 NOTE — MR AVS SNAPSHOT
After Visit Summary   6/5/2018    Reid Jimenes    MRN: [id number]           Patient Information     Date Of Birth          2/14/1934        Visit Information        Provider Department      6/5/2018 10:15 AM Eulalio Higginbotham MD [facility]        Today's Diagnoses     Bilateral carotid artery disease (H)    -  1    Peripheral vascular disease (H)        Carotid artery disease without cerebral infarction (H)        Nonrheumatic aortic valve stenosis          Care Instructions    1.  Your echocardiogram shows that you have severe aortic stenosis.  This means that the aortic valve and your heart is very stiffened and not opening very well.  Your heart function is still pumping normally.  If the aortic stenosis is not fixed, in coming years it can lead to heart failure.    2.  The only treatment option for this is to replace the aortic valve.  One option is to do an open heart surgery with a prosthetic aortic valve replacement.  The other option is to replace the valve with a catheter method that goes up a vessel in your chest and into your heart (transcatheter aortic valve replacement, or TAVR).    3.  I would like you to meet with our specialists at [facility] to discuss which of these options would be best for you.  There are additional tests that will be needed including a CT scan of your chest to look at your heart and the size of the blood vessels.  Also a coronary angiogram will be needed at some point, which is a procedure where a small catheter is advanced through a blood vessel up to the heart to look at the arteries to see if there are any blockages that will also need to be fixed.          Follow-ups after your visit        Additional Services     Follow-Up with [facility]       Severe AS                  Your next 10 appointments already scheduled     Jul 03, 2018 12:45 PM CDT   [facility] with Eliseo Dye  "MD Martin   [facility] ([facility])    [address] [place] [id number] [phone] OPT 2              Future tests that were ordered for you today     Open Future Orders        Priority Expected Expires Ordered    Follow-Up with Card[facility] Clinic Routine 2018            Who to contact     If you have questions or need follow up information about today's clinic visit or your schedule please contact [facility] directly at [phone].  Normal or non-critical lab and imaging results will be communicated to you by Sovahart, letter or phone within 4 business days after the clinic has received the results. If you do not hear from us within 7 days, please contact the clinic through [facility] or phone. If you have a critical or abnormal lab result, we will notify you by phone as soon as possible.  Submit refill requests through [employer] or call your pharmacy and they will forward the refill request to us. Please allow 3 business days for your refill to be completed.          Additional Information About Your Visit        MyChart Information     [employer] lets you send messages to your doctor, view your test results, renew your prescriptions, schedule appointments and more. To sign up, go to [email or web address] . Click on \"Log in\" on the left side of the screen, which will take you to the Welcome page. Then click on \"Sign up Now\" on the right side of the page.     You will be asked to enter the access code listed below, as well as some personal information. Please follow the directions to create your username and password.     Your access code is: [email or web address]  Expires: 2018  4:27 PM     Your access code will  in 90 days. If you need help or a new code, please call your [facility] clinic or [phone].        Care EveryWhere ID     This is your Care EveryWhere ID. " "This could be used by other organizations to access your [facility] medical records  [id number]        Your Vitals Were     Pulse Height BMI (Body Mass Index)             64 1.651 m (5' 5\") 30.05 kg/m2          Blood Pressure from Last 3 Encounters:   06/05/18 116/74   06/04/18 122/66   04/12/18 124/60    Weight from Last 3 Encounters:   06/05/18 81.9 kg (180 lb 9.6 oz)   06/04/18 81.3 kg (179 lb 4.8 oz)   04/12/18 81.6 kg (180 lb)              We Performed the Following     EKG 12-lead complete w/read - Clinics (performed today)          Today's Medication Changes          These changes are accurate as of 6/5/18 11:15 AM.  If you have any questions, ask your nurse or doctor.               These medicines have changed or have updated prescriptions.        Dose/Directions    lisinopril 2.5 MG tablet   Commonly known as:  PRINIVIL/Zestril   This may have changed:  additional instructions   Used for:  Type 2 diabetes mellitus with microalbuminuria, without long-term current use of insulin (H)        Dose:  2.5 mg   Take 1 tablet (2.5 mg) by mouth daily   Quantity:  90 tablet   Refills:  3                Primary Care Provider Office Phone # Fax #    Viet Bingham [phone]       [address] [address]        Equal Access to Services     JEANNETTE CROW AH: Hadii maurice zaidi Socinthya, waaxda luqadaha, qaybta kaalmaray morales, rigo hampton . [facility] [phone].    ATENCIÓN: Si habla español, tiene a philip disposición servicios gratuitos de asistencia lingüística. Silke al [phone].    We comply with applicable federal civil rights laws and [place] laws. We do not discriminate on the basis of race, color, national origin, age, disability, sex, sexual orientation, or gender identity.            Thank you!     Thank you for choosing [facility]  for your care. Our goal is always to provide you with excellent " care. Hearing back from our patients is one way we can continue to improve our services. Please take a few minutes to complete the written survey that you may receive in the mail after your visit with us. Thank you!             Your Updated Medication List - Protect others around you: Learn how to safely use, store and throw away your medicines at www.disposemymeds.org.          This list is accurate as of 6/5/18 11:15 AM.  Always use your most recent med list.                   Brand Name Dispense Instructions for use Diagnosis    ascorbic acid 500 MG Tabs           aspirin 325 MG tablet      Take by mouth daily        BIOTIN PO      Take 5,000 mg by mouth        blood glucose monitoring lancets     100 each    Use to test blood sugar 1 times daily or as directed.    Type 2 diabetes mellitus with microalbuminuria, without long-term current use of insulin (H)       blood glucose monitoring test strip    MIRA CONTOUR NEXT    100 each    Use to test blood sugar one time daily or as directed.    Type 2 diabetes mellitus with microalbuminuria, without long-term current use of insulin (H)       CALCIUM 500 PO      Take 500 mg by mouth 3 times daily        clotrimazole 1 % cream    LOTRIMIN    15 g    Apply topically 2 times daily    Tinea corporis       CVS OMEPRAZOLE PO      Take 20 mg by mouth daily        fluocinonide 0.05 % ointment    LIDEX     Apply topically 2 times daily        GLUCOSAMINE-CHONDROITIN PO      Take by mouth daily        lisinopril 2.5 MG tablet    PRINIVIL/Zestril    90 tablet    Take 1 tablet (2.5 mg) by mouth daily    Type 2 diabetes mellitus with microalbuminuria, without long-term current use of insulin (H)       metFORMIN 1000 MG tablet    GLUCOPHAGE    90 tablet    Take 0.5 tablets (500 mg) by mouth 2 times daily (with meals)    Type 2 diabetes mellitus with microalbuminuria, without long-term current use of insulin (H)       MULTIVITAMIN/IRON PO           simvastatin 40 MG tablet    ZOCOR     45 tablet    Take 0.5 tablets (20 mg) by mouth At Bedtime    Hyperlipidemia with target LDL less than 100

## 2018-06-05 NOTE — LETTER
6/5/2018    Viet Bingham MD, MD  [address]  [facility] [address]    RE: Reid Jimenes       Dear Colleague,    I had the pleasure of seeing Reid DIXON Jimenes in the [facility].    CARDIOLOGY CONSULT    REASON FOR CONSULT: Aortic stenosis    PRIMARY CARE PHYSICIAN:  Viet Bingham MD    HISTORY OF PRESENT ILLNESS:  84-year-old male with no previous cardiac history is seen for aortic stenosis.    He has a history of carotid artery disease.  Head and neck CTA December 2017 showed 30% right and 65% left internal carotid stenosis.    At baseline he does some light walking, but no moderate activity.  He can go walking in stores and go up 2 flights of stairs with minimal shortness of breath.  He denies any exertional chest pain or anginal symptoms.  He has no lightheadedness, dizziness, syncope, or lower extremity edema.  He feels a little more fatigued than one year ago, but not dramatically different.  Blood pressure historically is in the 120s.    Because of his mild fatigue and a murmur noted on exam, an echo was performed.    Echo May 2018 showed EF 60%, moderate LVH, severe aortic stenosis with mean 42 mmHg, V-max 3.9 m/s, HUNG 1.1 cm , DI 0.18, SVI 63 mL/m , mild aortic insufficiency.    PAST MEDICAL HISTORY:  Past Medical History:   Diagnosis Date     Hyperlipidaemia LDL goal < 100      Type 2 diabetes mellitus (H)      MEDICATIONS:  Current Outpatient Prescriptions   Medication     ascorbic acid 500 MG TABS     aspirin 325 MG tablet     BIOTIN PO     blood glucose monitoring (MIRA CONTOUR NEXT) test strip     blood glucose monitoring (MIRA MICROLET) lancets     Calcium-Magnesium-Vitamin D (CALCIUM 500 PO)     clotrimazole (LOTRIMIN) 1 % cream     CVS OMEPRAZOLE PO     fluocinonide (LIDEX) 0.05 % ointment     GLUCOSAMINE-CHONDROITIN PO     lisinopril (PRINIVIL/ZESTRIL) 2.5 MG tablet     metFORMIN (GLUCOPHAGE) 1000 MG tablet     Multiple Vitamins-Iron  (MULTIVITAMIN/IRON PO)     simvastatin (ZOCOR) 40 MG tablet     No current facility-administered medications for this visit.      ALLERGIES:  No Known Allergies    SOCIAL HISTORY:  I have reviewed this patient's social history and updated it with pertinent information if needed. Reid Jimenes  reports that he has quit smoking. His smoking use included Cigarettes. He has a 30.00 pack-year smoking history. He quit smokeless tobacco use about 35 years ago. His smokeless tobacco use included Chew. He reports that he does not drink alcohol or use illicit drugs.    FAMILY HISTORY:  I have reviewed this patient's family history and updated it with pertinent information if needed.   Family History   Problem Relation Age of Onset     HEART DISEASE Father      Alcohol/Drug Father      Myocardial Infarction Grandchild 18     Grandson     Myocardial Infarction Other 18     Nephew      CANCER Mother 58     Stomach cancer     Alcohol/Drug Mother      CANCER Maternal Grandmother      stomach cancer     Alcohol/Drug Maternal Grandmother      Myocardial Infarction Sister 70     Name: Reena      Alcohol/Drug Sister      CEREBROVASCULAR DISEASE Brother 65     Name: Miguelito     Alcohol/Drug Brother      Alcohol/Drug Maternal Grandfather      Alcohol/Drug Paternal Grandmother      Alcohol/Drug Paternal Grandfather        REVIEW OF SYSTEMS:  Constitutional:  No weight loss, fever, chills, weakness or fatigue.  HEENT:  Eyes:  No visual loss, blurred vision, double vision or yellow sclerae. No hearing loss, sneezing, congestion, runny nose or sore throat.  Skin:  No rash or itching.  Cardiovascular: per HPI  Respiratory: per HPI  GI:  No anorexia, nausea, vomiting or diarrhea. No abdominal pain or blood.  :  No dysurea, hematuria  Neurologic:  No headache, dizziness, syncope, paralysis, ataxia, numbness or tingling in the extremities. No change in bowel or bladder control.  Musculoskeletal:  No muscle, back pain, joint pain or  stiffness.  Hematologic:  No anemia, bleeding or bruising.  Lymphatics:  No enlarged nodes. No history of splenectomy.  Psychiatric:  No history of depression or anxiety.  Endocrine:  No reports of sweating, cold or heat intolerance. No polyuria or polydipsia.  Allergies:  No history of asthma, hives, eczema or rhinitis.    PHYSICAL EXAM:                     Vital Signs with Ranges  Temp:  [97.7  F (36.5  C)] 97.7  F (36.5  C)  Pulse:  [72] 72  Resp:  [16] 16  BP: (122)/(66) 122/66  SpO2:  [95 %] 95 %  0 lbs 0 oz    Constitutional: awake, alert, no distress  Eyes: PERRL, sclera nonicteric  ENT: trachea midline  Respiratory: Lungs clear  Cardiovascular: Regular rate and rhythm, 3/6 mid peaking crescendo systolic murmur at the upper sternal border with radiation to both carotids, no lower extremity edema  GI: nondistended, nontender, bowel sounds present  Lymph/Hematologic: no lymphadenopathy  Skin: dry, no rash  Musculoskeletal: good muscle tone, strength 5/5 in upper and lower extremities  Neurologic: no focal deficits  Neuropsychiatric: appropriate affact    DATA:  Labs:   April 2018: Cholesterol 97, LDL 37, creatinine 1.2    EKG: June 5, 2018: Sinus rhythm, rate 62, intraventricular conduction delay, no Q waves    ASSESSMENT:  84-year-old male seen for severe aortic stenosis.  His symptoms are quite minimal, he has some mild dyspnea and fatigue, but no angina, heart failure, or syncope.  He is euvolemic on exam.  Ejection fraction is preserved.    He is entering the window where aortic valve replacement would be appropriate.  We discussed the options of surgical versus transcatheter valve replacement.  He of course will need further evaluation such as a chest CTA and coronary angiogram.  He potentially could be a good TAVR candidate because of his advanced age.  The patient will be referred to the TAVR clinic at [facility].  Timing of surgery would likely be sometime this year.  [place] and  would probably want to be recovered by January.    RECOMMENDATIONS:  1.  Severe aortic stenosis, minimally symptomatic with preserved ejection fraction  - Refer to TAVR clinic    Further workup per TAVR team, ultimate follow-up at [facility] after AVR.      Eulalio Higginbotham MD  Cardiology - [facility]  Pager:  [phone]  Text Page  June 5, 2018      Thank you for allowing me to participate in the care of your patient.      Sincerely,     Eulalio Higginbotham MD     [facility]    cc:   Monroe Shah MD  [address] [place] [address]

## 2018-06-06 ENCOUNTER — CARE COORDINATION (OUTPATIENT)
Dept: CARDIOLOGY | Facility: CLINIC | Age: 83
End: 2018-06-06

## 2018-06-06 DIAGNOSIS — I35.0 AORTIC STENOSIS: Primary | ICD-10-CM

## 2018-06-06 NOTE — PROGRESS NOTES
Dr. Higginbotham would like patient seen in TAVR clinic.  Called patient to introduce him to our program and the TAVR process.  Will schedule him for TAVR CT.  He has had a carotid US done 10/20/2018  Patient was given my contact information Lisa Mark RN ([phone]) and instructed to call for any questions.

## 2018-06-11 ENCOUNTER — PRE VISIT (OUTPATIENT)
Dept: CARDIOLOGY | Facility: CLINIC | Age: 83
End: 2018-06-11

## 2018-06-11 DIAGNOSIS — Z53.9 ERRONEOUS ENCOUNTER--DISREGARD: Primary | ICD-10-CM

## 2018-06-15 ENCOUNTER — TELEPHONE (OUTPATIENT)
Dept: INTERNAL MEDICINE | Facility: CLINIC | Age: 83
End: 2018-06-15

## 2018-06-15 DIAGNOSIS — B35.4 TINEA CORPORIS: ICD-10-CM

## 2018-06-15 RX ORDER — CLOTRIMAZOLE 1 %
CREAM (GRAM) TOPICAL 2 TIMES DAILY
Qty: 15 G | Refills: 3 | Status: SHIPPED | OUTPATIENT
Start: 2018-06-15 | End: 2020-07-10

## 2018-06-15 NOTE — TELEPHONE ENCOUNTER
Pt calling [facility] since [facility] not been able to get it [facility] yet.  [facility] written by MD. Barbara Moran RN

## 2018-06-21 NOTE — TELEPHONE ENCOUNTER
Progress notes 6/4/18 and Rx (Lotrimin) 1% cream were faxed to Fabiana Acevedo LPN, [facility] at [phone].

## 2018-06-28 ENCOUNTER — TELEPHONE (OUTPATIENT)
Dept: INTERNAL MEDICINE | Facility: CLINIC | Age: 83
End: 2018-06-28

## 2018-06-28 DIAGNOSIS — I10 ESSENTIAL HYPERTENSION WITH GOAL BLOOD PRESSURE LESS THAN 140/90: Primary | ICD-10-CM

## 2018-06-28 DIAGNOSIS — I10 ESSENTIAL HYPERTENSION WITH GOAL BLOOD PRESSURE LESS THAN 140/90: ICD-10-CM

## 2018-06-28 PROCEDURE — 36415 COLL VENOUS BLD VENIPUNCTURE: CPT | Performed by: INTERNAL MEDICINE

## 2018-06-28 PROCEDURE — 80048 BASIC METABOLIC PNL TOTAL CA: CPT | Performed by: INTERNAL MEDICINE

## 2018-06-28 NOTE — TELEPHONE ENCOUNTER
Pt stopped by clinic. Stated he needs a BMP within 30d of his CT angio that is sched for 7/2.    Per Otilia-ok to order BMP     Last BMP-4/12/18      Entered order-pt will go to lab

## 2018-06-29 LAB
ANION GAP SERPL CALCULATED.3IONS-SCNC: 6 MMOL/L (ref 3–14)
BUN SERPL-MCNC: 19 MG/DL (ref 7–30)
CALCIUM SERPL-MCNC: 9.4 MG/DL (ref 8.5–10.1)
CHLORIDE SERPL-SCNC: 104 MMOL/L (ref 94–109)
CO2 SERPL-SCNC: 31 MMOL/L (ref 20–32)
CREAT SERPL-MCNC: 0.92 MG/DL (ref 0.66–1.25)
GFR SERPL CREATININE-BSD FRML MDRD: 78 ML/MIN/1.7M2
GLUCOSE SERPL-MCNC: 131 MG/DL (ref 70–99)
POTASSIUM SERPL-SCNC: 4.9 MMOL/L (ref 3.4–5.3)
SODIUM SERPL-SCNC: 141 MMOL/L (ref 133–144)

## 2018-07-03 ENCOUNTER — HOSPITAL ENCOUNTER (OUTPATIENT)
Dept: CARDIOLOGY | Facility: CLINIC | Age: 83
Discharge: HOME | End: 2018-07-03
Attending: INTERNAL MEDICINE | Admitting: INTERNAL MEDICINE
Payer: MEDICARE

## 2018-07-03 ENCOUNTER — TELEPHONE (OUTPATIENT)
Dept: INTERNAL MEDICINE | Facility: CLINIC | Age: 83
End: 2018-07-03

## 2018-07-03 DIAGNOSIS — I35.0 AORTIC STENOSIS: ICD-10-CM

## 2018-07-03 PROCEDURE — 74174 CTA ABD&PLVS W/CONTRAST: CPT | Mod: 26 | Performed by: INTERNAL MEDICINE

## 2018-07-03 PROCEDURE — 71275 CT ANGIOGRAPHY CHEST: CPT | Mod: 26 | Performed by: INTERNAL MEDICINE

## 2018-07-03 PROCEDURE — 25000128 H RX IP 250 OP 636: Performed by: INTERNAL MEDICINE

## 2018-07-03 PROCEDURE — 74174 CTA ABD&PLVS W/CONTRAST: CPT

## 2018-07-03 RX ORDER — ACYCLOVIR 200 MG/1
0-1 CAPSULE ORAL
Status: DISCONTINUED | OUTPATIENT
Start: 2018-07-03 | End: 2018-07-04 | Stop reason: HOSPADM

## 2018-07-03 RX ORDER — ONDANSETRON 2 MG/ML
4 INJECTION INTRAMUSCULAR; INTRAVENOUS
Status: DISCONTINUED | OUTPATIENT
Start: 2018-07-03 | End: 2018-07-04 | Stop reason: HOSPADM

## 2018-07-03 RX ORDER — IOPAMIDOL 755 MG/ML
150 INJECTION, SOLUTION INTRAVASCULAR ONCE
Status: COMPLETED | OUTPATIENT
Start: 2018-07-03 | End: 2018-07-03

## 2018-07-03 RX ORDER — DIPHENHYDRAMINE HCL 25 MG
25 CAPSULE ORAL
Status: DISCONTINUED | OUTPATIENT
Start: 2018-07-03 | End: 2018-07-04 | Stop reason: HOSPADM

## 2018-07-03 RX ORDER — METHYLPREDNISOLONE SODIUM SUCCINATE 125 MG/2ML
125 INJECTION, POWDER, LYOPHILIZED, FOR SOLUTION INTRAMUSCULAR; INTRAVENOUS
Status: DISCONTINUED | OUTPATIENT
Start: 2018-07-03 | End: 2018-07-04 | Stop reason: HOSPADM

## 2018-07-03 RX ORDER — DIPHENHYDRAMINE HYDROCHLORIDE 50 MG/ML
25-50 INJECTION INTRAMUSCULAR; INTRAVENOUS
Status: DISCONTINUED | OUTPATIENT
Start: 2018-07-03 | End: 2018-07-04 | Stop reason: HOSPADM

## 2018-07-03 RX ADMIN — IOPAMIDOL 115 ML: 755 INJECTION, SOLUTION INTRAVENOUS at 13:30

## 2018-07-03 RX ADMIN — SODIUM CHLORIDE 100 ML: 9 INJECTION, SOLUTION INTRAVENOUS at 13:30

## 2018-07-03 NOTE — TELEPHONE ENCOUNTER
Reason for Call:  Request for results:    Name of test or procedure: labs    Date of test of procedure: 06/28/18    Location of the test or procedure: Pt needs to hear today before 1:00.  Pt has a test at this time.    OK to leave the result message on voice mail or with a family member? YES    Phone number Patient can be reached at:  Home number on file [phone] (home)    Additional comments: none    Call taken on 7/3/2018 at 9:34 AM by Martina White

## 2018-07-03 NOTE — TELEPHONE ENCOUNTER
Left voice message for patient apologizing that message was not responded to prior to his appointment. The hospital should have had access to the results from Dr. Bingham.

## 2018-07-05 LAB — RADIOLOGIST FLAGS: ABNORMAL

## 2018-07-12 ENCOUNTER — MEDICAL CORRESPONDENCE (OUTPATIENT)
Dept: HEALTH INFORMATION MANAGEMENT | Facility: CLINIC | Age: 83
End: 2018-07-12

## 2018-07-12 ENCOUNTER — CARE COORDINATION (OUTPATIENT)
Dept: CARDIOLOGY | Facility: CLINIC | Age: 83
End: 2018-07-12

## 2018-07-12 ENCOUNTER — OFFICE VISIT (OUTPATIENT)
Dept: CARDIOLOGY | Facility: CLINIC | Age: 83
End: 2018-07-12
Attending: INTERNAL MEDICINE
Payer: MEDICARE

## 2018-07-12 ENCOUNTER — TELEPHONE (OUTPATIENT)
Dept: INTERNAL MEDICINE | Facility: CLINIC | Age: 83
End: 2018-07-12

## 2018-07-12 ENCOUNTER — OFFICE VISIT (OUTPATIENT)
Dept: CARDIOLOGY | Facility: CLINIC | Age: 83
End: 2018-07-12
Payer: MEDICARE

## 2018-07-12 VITALS
DIASTOLIC BLOOD PRESSURE: 61 MMHG | BODY MASS INDEX: 29.32 KG/M2 | HEART RATE: 61 BPM | WEIGHT: 176 LBS | SYSTOLIC BLOOD PRESSURE: 115 MMHG | HEIGHT: 65 IN

## 2018-07-12 DIAGNOSIS — I35.0 NONRHEUMATIC AORTIC VALVE STENOSIS: ICD-10-CM

## 2018-07-12 DIAGNOSIS — I35.0 NONRHEUMATIC AORTIC VALVE STENOSIS: Primary | ICD-10-CM

## 2018-07-12 DIAGNOSIS — I35.0 AORTIC STENOSIS: Primary | ICD-10-CM

## 2018-07-12 PROCEDURE — 99203 OFFICE O/P NEW LOW 30 MIN: CPT | Performed by: INTERNAL MEDICINE

## 2018-07-12 NOTE — LETTER
7/12/2018    Viet Bingham MD, MD  [address] [facility] [address]  [facility] [address]    RE: Reid L Yudy       Dear Colleague,    I had the pleasure of seeing Reid Jimenes in the [facility].    HPI and Plan:   See dictation    No orders of the defined types were placed in this encounter.    No orders of the defined types were placed in this encounter.    Medications Discontinued During This Encounter   Medication Reason     Calcium-Magnesium-Vitamin D (CALCIUM 500 PO) Stopped by Patient         Encounter Diagnosis   Name Primary?     Nonrheumatic aortic valve stenosis        CURRENT MEDICATIONS:  Current Outpatient Prescriptions   Medication Sig Dispense Refill     ascorbic acid 500 MG TABS        aspirin 325 MG tablet Take by mouth daily       BIOTIN PO Take 5,000 mg by mouth        blood glucose monitoring (MIRA CONTOUR NEXT) test strip Use to test blood sugar one time daily or as directed. 100 each 5     blood glucose monitoring (MIRA MICROLET) lancets Use to test blood sugar 1 times daily or as directed. 100 each 5     clotrimazole (LOTRIMIN) 1 % cream Apply topically 2 times daily 15 g 3     CVS OMEPRAZOLE PO Take 20 mg by mouth daily        fluocinonide (LIDEX) 0.05 % ointment Apply topically 2 times daily       GLUCOSAMINE-CHONDROITIN PO Take by mouth daily        lisinopril (PRINIVIL/ZESTRIL) 2.5 MG tablet Take 1 tablet (2.5 mg) by mouth daily (Patient taking differently: Take 2.5 mg by mouth daily Patient takes 1/2 tablet of a 5 mg tablet to equal 2.5 mg daily) 90 tablet 3     metFORMIN (GLUCOPHAGE) 1000 MG tablet Take 0.5 tablets (500 mg) by mouth 2 times daily (with meals) 90 tablet 3     Multiple Vitamins-Iron (MULTIVITAMIN/IRON PO)        simvastatin (ZOCOR) 40 MG tablet Take 0.5 tablets (20 mg) by mouth At Bedtime 45 tablet 3       ALLERGIES   No Known Allergies    PAST MEDICAL HISTORY:  Past Medical History:   Diagnosis Date     Hyperlipidaemia LDL goal < 100       "Type 2 diabetes mellitus (H)        PAST SURGICAL HISTORY:  Past Surgical History:   Procedure Laterality Date     MANDIBLE SURGERY  1958       FAMILY HISTORY:  Family History   Problem Relation Age of Onset     HEART DISEASE Father      Alcohol/Drug Father      Myocardial Infarction Grandchild 18     Grandson     Myocardial Infarction Other 18     Nephew      Cancer Mother 58     Stomach cancer     Alcohol/Drug Mother      Cancer Maternal Grandmother      stomach cancer     Alcohol/Drug Maternal Grandmother      Myocardial Infarction Sister 70     Name: Reena      Alcohol/Drug Sister      Cerebrovascular Disease Brother 65     Name: Miguelito     Alcohol/Drug Brother      Alcohol/Drug Maternal Grandfather      Alcohol/Drug Paternal Grandmother      Alcohol/Drug Paternal Grandfather        SOCIAL HISTORY:  Social History     Social History     Marital status:      Spouse name: Alyssa     Number of children: 3     Years of education: N/A     Social History Main Topics     Smoking status: Former Smoker     Packs/day: 1.00     Years: 30.00     Types: Cigarettes     Smokeless tobacco: Former User     Types: Chew     Quit date: 1/5/1983     Alcohol use No     Drug use: No     Sexual activity: Not Currently     Partners: Female     Other Topics Concern     None     Social History Narrative       Review of Systems:  Skin:  Positive for rash   Eyes:  Positive for glasses  ENT:  Positive for hearing loss  Respiratory:  Positive for dyspnea on exertion  Cardiovascular:    fatigue;Positive for  Gastroenterology: Negative    Genitourinary:  Negative    Musculoskeletal:  Positive for nocturnal cramping  Neurologic:  Negative    Psychiatric:  Positive for sleep disturbances  Heme/Lymph/Imm:  Negative    Endocrine:    diabetes    Physical Exam:  Vitals: /61  Pulse 61  Ht 1.651 m (5' 5\")  Wt 79.8 kg (176 lb)  BMI 29.29 kg/m2    Constitutional:           Skin:             Head:           Eyes:           Lymph:      ENT:   "         Neck:           Respiratory:            Cardiac:                                                           GI:           Extremities and Muscular Skeletal:                 Neurological:           Psych:           Recent Lab Results:  LIPID RESULTS:  Lab Results   Component Value Date    CHOL 97 04/12/2018    HDL 39 (L) 04/12/2018    LDL 37 04/12/2018    TRIG 106 04/12/2018    CHOLHDLRATIO 2.3 10/07/2015       LIVER ENZYME RESULTS:  Lab Results   Component Value Date    AST 19 04/12/2018    ALT 25 04/12/2018       CBC RESULTS:  No results found for: WBC, RBC, HGB, HCT, MCV, MCH, MCHC, RDW, PLT    BMP RESULTS:  Lab Results   Component Value Date     06/28/2018    POTASSIUM 4.9 06/28/2018    CHLORIDE 104 06/28/2018    CO2 31 06/28/2018    ANIONGAP 6 06/28/2018     (H) 06/28/2018    BUN 19 06/28/2018    CR 0.92 06/28/2018    GFRESTIMATED 78 06/28/2018    GFRESTBLACK >90 06/28/2018    DEEPTI 9.4 06/28/2018        A1C RESULTS:  Lab Results   Component Value Date    A1C 6.5 (H) 04/12/2018       INR RESULTS:  No results found for: INR        CC  Eulalio Higginbotham MD  [place] [address] [place] [address]                  Thank you for allowing me to participate in the care of your patient.      Sincerely,     UMESH ADEN MD     [facility]    cc:   Eulalio Higginbotham MD  [facility]  [address] [address]

## 2018-07-12 NOTE — TELEPHONE ENCOUNTER
Lisa from [facility] calling regarding an incidental finding on CT angiogram TAVR.  CT done as part of their prep for aortic valve replacement.  They will advise the patient of results and they will proceed toward aortic valve replacement but would like PCP to follow up on these findings. AVI Meek R.N.        IMPRESSION: 5 mm nodule in the left lower lobe. Recommend follow-up as  below.   Recommendations for one or multiple incidental lung nodules < 6mm :    Low risk patients: No routine follow-up.    High risk patients: Optional follow-up CT at 12 months; if  unchanged, no further follow-up.

## 2018-07-12 NOTE — MR AVS SNAPSHOT
After Visit Summary   7/12/2018    Reid Jimenes    MRN: [id number]           Patient Information     Date Of Birth          2/14/1934        Visit Information        Provider Department      7/12/2018 12:45 PM Leticia Morales MD [facility] Cardiothoracic        Today's Diagnoses     Nonrheumatic aortic valve stenosis    -  1       Follow-ups after your visit        Your next 10 appointments already scheduled     Jul 31, 2018 12:30 PM CDT   Return Visit with SARY Ball CNP   [facility] ([facility])    [address] [place] [id number] [phone] OPT 2              Who to contact     Please call your clinic at [phone] to:    Ask questions about your health    Make or cancel appointments    Discuss your medicines    Learn about your test results    Speak to your doctor            Additional Information About Your Visit        MyChart Information     [employer]ot gives you secure access to your electronic health record. If you see a primary care provider, you can also send messages to your care team and make appointments. If you have questions, please call your primary care clinic.  If you do not have a primary care provider, please call [phone] and they will assist you.      Foldees is an electronic gateway that provides easy, online access to your medical records. With [employer]es, you can request a clinic appointment, read your test results, renew a prescription or communicate with your care team.     To access your existing account, please contact your [facility] or call [phone] for assistance.        Care EveryWhere ID     This is your Care EveryWhere ID. This could be used by other organizations to access your [place] medical records  [id number]         Blood Pressure from Last 3 Encounters:   07/24/18 120/55   07/12/18 115/61   06/05/18 116/74    Weight from Last 3  Encounters:   07/24/18 79.8 kg (176 lb)   07/12/18 79.8 kg (176 lb)   06/05/18 81.9 kg (180 lb 9.6 oz)              Today, you had the following     No orders found for display         Today's Medication Changes          These changes are accurate as of 7/12/18 11:59 PM.  If you have any questions, ask your nurse or doctor.               These medicines have changed or have updated prescriptions.        Dose/Directions    lisinopril 2.5 MG tablet   Commonly known as:  PRINIVIL/Zestril   This may have changed:  additional instructions   Used for:  Type 2 diabetes mellitus with microalbuminuria, without long-term current use of insulin (H)        Dose:  2.5 mg   Take 1 tablet (2.5 mg) by mouth daily   Quantity:  90 tablet   Refills:  3                Primary Care Provider Office Phone # Fax #    Viet Bingham [phone]       [address] [address]        Equal Access to Services     [facility] : Hadii maurice martinez hadasho Soomaali, waaxda luqadaha, qaybta kaalmada adeegyada, rigo hampton . So [facility] [phone].    ATENCIÓN: Si habla español, tiene a philip disposición servicios gratuitos de asistencia lingüística. Llame al [phone].    We comply with applicable federal civil rights laws and [place] laws. We do not discriminate on the basis of race, color, national origin, age, disability, sex, sexual orientation, or gender identity.            Thank you!     Thank you for choosing [facility]  for your care. Our goal is always to provide you with excellent care. Hearing back from our patients is one way we can continue to improve our services. Please take a few minutes to complete the written survey that you may receive in the mail after your visit with us. Thank you!             Your Updated Medication List - Protect others around you: Learn how to safely use, store and throw away your medicines at www.disposemymeds.org.          This list is  accurate as of 7/12/18 11:59 PM.  Always use your most recent med list.                   Brand Name Dispense Instructions for use Diagnosis    ascorbic acid 500 MG Tabs           aspirin 325 MG tablet      Take by mouth daily        BIOTIN PO      Take 5,000 mg by mouth        blood glucose monitoring lancets     100 each    Use to test blood sugar 1 times daily or as directed.    Type 2 diabetes mellitus with microalbuminuria, without long-term current use of insulin (H)       blood glucose monitoring test strip    MIRA CONTOUR NEXT    100 each    Use to test blood sugar one time daily or as directed.    Type 2 diabetes mellitus with microalbuminuria, without long-term current use of insulin (H)       clotrimazole 1 % cream    LOTRIMIN    15 g    Apply topically 2 times daily    Tinea corporis       CVS OMEPRAZOLE PO      Take 20 mg by mouth daily        fluocinonide 0.05 % ointment    LIDEX     Apply topically 2 times daily        GLUCOSAMINE-CHONDROITIN PO      Take by mouth daily        lisinopril 2.5 MG tablet    PRINIVIL/Zestril    90 tablet    Take 1 tablet (2.5 mg) by mouth daily    Type 2 diabetes mellitus with microalbuminuria, without long-term current use of insulin (H)       metFORMIN 1000 MG tablet    GLUCOPHAGE    90 tablet    Take 0.5 tablets (500 mg) by mouth 2 times daily (with meals)    Type 2 diabetes mellitus with microalbuminuria, without long-term current use of insulin (H)       MULTIVITAMIN/IRON PO           simvastatin 40 MG tablet    ZOCOR    45 tablet    Take 0.5 tablets (20 mg) by mouth At Bedtime    Hyperlipidemia with target LDL less than 100

## 2018-07-12 NOTE — MR AVS SNAPSHOT
"              After Visit Summary   7/12/2018    Reid Jimenes    MRN: [id number]           Patient Information     Date Of Birth          2/14/1934        Visit Information        Provider Department      7/12/2018 12:45 PM Eliseo Salazar MD [facility]        Today's Diagnoses     Nonrheumatic aortic valve stenosis           Follow-ups after your visit        Future tests that were ordered for you today     Open Future Orders        Priority Expected Expires Ordered    Cardiac Cath: Coronary Angiography Adult Order Routine 7/19/2018 7/12/2019 7/12/2018            Who to contact     If you have questions or need follow up information about today's clinic visit or your schedule please contact [facility] directly at [phone].  Normal or non-critical lab and imaging results will be communicated to you by MyChart, letter or phone within 4 business days after the clinic has received the results. If you do not hear from us within 7 days, please contact the clinic through MyChart or phone. If you have a critical or abnormal lab result, we will notify you by phone as soon as possible.  Submit refill requests through [employer] or call your pharmacy and they will forward the refill request to us. Please allow 3 business days for your refill to be completed.          Additional Information About Your Visit        Care EveryWhere ID     This is your Care EveryWhere ID. This could be used by other organizations to access your [place] medical records  [id number]        Your Vitals Were     Pulse Height BMI (Body Mass Index)             61 1.651 m (5' 5\") 29.29 kg/m2          Blood Pressure from Last 3 Encounters:   07/12/18 115/61   06/05/18 116/74   06/04/18 122/66    Weight from Last 3 Encounters:   07/12/18 79.8 kg (176 lb)   06/05/18 81.9 kg (180 lb 9.6 oz)   06/04/18 81.3 kg (179 lb 4.8 oz)              We Performed the Following  "    Follow-Up with [facility]          Today's Medication Changes          These changes are accurate as of 7/12/18  1:46 PM.  If you have any questions, ask your nurse or doctor.               These medicines have changed or have updated prescriptions.        Dose/Directions    lisinopril 2.5 MG tablet   Commonly known as:  PRINIVIL/Zestril   This may have changed:  additional instructions   Used for:  Type 2 diabetes mellitus with microalbuminuria, without long-term current use of insulin (H)        Dose:  2.5 mg   Take 1 tablet (2.5 mg) by mouth daily   Quantity:  90 tablet   Refills:  3                Primary Care Provider Office Phone # Fax #    Viet Bingham [phone]       [address] [place] [address]        Equal Access to Services     JEANNETTE CROW : Candace diaso Socinthya, waaxda luqadaha, qaybta kaalmada adeegyada, rigo hampton . So [facility] [phone].    ATENCIÓN: Si habla español, tiene a philip disposición servicios gratuitos de asistencia lingüística. LlKettering Health Hamilton [phone].    We comply with applicable federal civil rights laws and [place] laws. We do not discriminate on the basis of race, color, national origin, age, disability, sex, sexual orientation, or gender identity.            Thank you!     Thank you for choosing [facility]  for your care. Our goal is always to provide you with excellent care. Hearing back from our patients is one way we can continue to improve our services. Please take a few minutes to complete the written survey that you may receive in the mail after your visit with us. Thank you!             Your Updated Medication List - Protect others around you: Learn how to safely use, store and throw away your medicines at www.disposemymeds.org.          This list is accurate as of 7/12/18  1:46 PM.  Always use your most recent med list.                    Brand Name Dispense Instructions for use Diagnosis    ascorbic acid 500 MG Tabs           aspirin 325 MG tablet      Take by mouth daily        BIOTIN PO      Take 5,000 mg by mouth        blood glucose monitoring lancets     100 each    Use to test blood sugar 1 times daily or as directed.    Type 2 diabetes mellitus with microalbuminuria, without long-term current use of insulin (H)       blood glucose monitoring test strip    MIRA CONTOUR NEXT    100 each    Use to test blood sugar one time daily or as directed.    Type 2 diabetes mellitus with microalbuminuria, without long-term current use of insulin (H)       clotrimazole 1 % cream    LOTRIMIN    15 g    Apply topically 2 times daily    Tinea corporis       CVS OMEPRAZOLE PO      Take 20 mg by mouth daily        fluocinonide 0.05 % ointment    LIDEX     Apply topically 2 times daily        GLUCOSAMINE-CHONDROITIN PO      Take by mouth daily        lisinopril 2.5 MG tablet    PRINIVIL/Zestril    90 tablet    Take 1 tablet (2.5 mg) by mouth daily    Type 2 diabetes mellitus with microalbuminuria, without long-term current use of insulin (H)       metFORMIN 1000 MG tablet    GLUCOPHAGE    90 tablet    Take 0.5 tablets (500 mg) by mouth 2 times daily (with meals)    Type 2 diabetes mellitus with microalbuminuria, without long-term current use of insulin (H)       MULTIVITAMIN/IRON PO           simvastatin 40 MG tablet    ZOCOR    45 tablet    Take 0.5 tablets (20 mg) by mouth At Bedtime    Hyperlipidemia with target LDL less than 100

## 2018-07-12 NOTE — LETTER
7/12/2018      Viet Bingham MD, MD  [address] [facility] [address]  [facility] [address]      RE: Reid Jimenes       Dear Colleague,    I had the pleasure of seeing Reid Jimenes in the [facility].    Service Date: 07/12/2018      HISTORY OF PRESENT ILLNESS:  Mr. Jimenes is a very pleasant 84-year-old gentleman who comes in with his wife today to TAVR clinic and is seen both myself and Dr. Morales.  He is referred by Dr. Dale Higginbotham from Cardiology.  Mr. Jimenes is 84.  He has a history of carotid stenosis without previous stroke, history of hypertension, peripheral vascular disease, hyperlipidemia, diabetes, who over the past 2 years has had increasing fatigue and shortness of breath with activity.  He states it is slowly progressive.  An echocardiogram done on 05/10/2018 showed severe aortic valve stenosis with preserved ejection fraction.  Mean gradient 42 mmHg.  There was trace to mild mitral regurgitation, otherwise unremarkable.  He had a carotid CTA done in 12/2017 showing 65% stenosis of the left carotid artery and 30% on the right.  He has a distant history of smoking but no history of COPD, emphysema, asthma.  He has had no syncope or near-syncope.  He does get some mild chest discomfort or tightness, he says, when walking further distances or up an incline.      A 12-lead EKG done from 06/05 shows LVH with voltage and some QRS widening.      ASSESSMENT AND PLAN:  Overall, the patient is a delightful 84-year-old who has STS risk of 4%, making him intermediate risk, who presents with slowly progressive symptoms consistent with severe aortic valve stenosis and echocardiogram confirming that.  He has some shortness of breath, fatigue and chest tightness with activity.  I think the patient would best be served by a transcatheter aortic valve replacement.  He needs coronary angiography prior.  We will review the TAVR CT scan, and he is to notify us of any changes in his  clinical condition.      Eliseo Aden MD, [facility]         ELISEO ADEN MD [facility]             D: 2018   T: 2018   MT: STEPH      Name:     SARAH BOYD   MRN:      [id number]        Account:      [id number]   :      1934           Service Date: 2018      Document: [id number]         Outpatient Encounter Prescriptions as of 2018   Medication Sig Dispense Refill     ascorbic acid 500 MG TABS        aspirin 325 MG tablet Take by mouth daily       BIOTIN PO Take 5,000 mg by mouth        blood glucose monitoring (MIRA CONTOUR NEXT) test strip Use to test blood sugar one time daily or as directed. 100 each 5     blood glucose monitoring (MIRA MICROLET) lancets Use to test blood sugar 1 times daily or as directed. 100 each 5     clotrimazole (LOTRIMIN) 1 % cream Apply topically 2 times daily 15 g 3     CVS OMEPRAZOLE PO Take 20 mg by mouth daily        fluocinonide (LIDEX) 0.05 % ointment Apply topically 2 times daily       GLUCOSAMINE-CHONDROITIN PO Take by mouth daily        lisinopril (PRINIVIL/ZESTRIL) 2.5 MG tablet Take 1 tablet (2.5 mg) by mouth daily (Patient taking differently: Take 2.5 mg by mouth daily Patient takes 1/2 tablet of a 5 mg tablet to equal 2.5 mg daily) 90 tablet 3     metFORMIN (GLUCOPHAGE) 1000 MG tablet Take 0.5 tablets (500 mg) by mouth 2 times daily (with meals) 90 tablet 3     Multiple Vitamins-Iron (MULTIVITAMIN/IRON PO)        simvastatin (ZOCOR) 40 MG tablet Take 0.5 tablets (20 mg) by mouth At Bedtime 45 tablet 3     [DISCONTINUED] Calcium-Magnesium-Vitamin D (CALCIUM 500 PO) Take 500 mg by mouth 3 times daily       No facility-administered encounter medications on file as of 2018.        Again, thank you for allowing me to participate in the care of your patient.      Sincerely,    ELISEO ADEN MD     [facility]

## 2018-07-12 NOTE — PROGRESS NOTES
CT radiology report showing 5 mm nodule in the left lower lobe.   This report was called to Dr. Viet Bingham,  his nurse will give him the report.  Patient has also been given a copy of report so he knows to follow up in the future.

## 2018-07-12 NOTE — LETTER
7/12/2018      RE: Reid Jimenes  [address]  [facility] [address]       Dear Colleague,    Thank you for the opportunity to participate in the care of your patient, Reid Jimenes, at the [facility] CARDIOTHORACIC at [facility]. Please see a copy of my visit note below.    Service Date: 07/12/2018      REFERRING CARDIOLOGIST:  Eulalio Higginbotham MD       REASON FOR CONSULTATION:  Evaluation for severe aortic stenosis.      HISTORY OF PRESENT ILLNESS:  Mr. Jimenes is a very pleasant, 84-year-old gentleman who comes to our [facility] today to be evaluated for his severe aortic stenosis.  I am seeing him together with my Interventional Cardiology colleague, Dr. Salazar.  He has a history of previous stroke, carotid disease, hypertension, peripheral vascular disease, diabetes and hyperlipidemia.  Over the last couple of years, he has developed progressively worsening shortness of breath with activity.  His most recent echocardiogram from May demonstrated severe aortic stenosis with a mean aortic valve gradient of 42 mmHg.  For this reason and given his age, he was referred to us for consideration for possible TAVR.        PAST MEDICAL HISTORY:     1.  Hypertension.     2.  Hyperlipidemia.   3.  Type 2 diabetes.      PAST SURGICAL HISTORY:  Mandibular surgery in 1958.      FAMILY HISTORY:  Noncontributory.      SOCIAL HISTORY:  He is .  Former pack-a-day smoker for 30 years, quit in 1983.  He also quit alcohol in 1983.      ALLERGIES:  No known drug allergies.      CURRENT MEDICATIONS.  Reviewed in EPIC chart.        REVIEW OF SYSTEMS:  Noted in the HPI.  All other 10 point reviews of systems are completed and were otherwise negative unless stated above.      PHYSICAL EXAMINATION:   VITAL SIGNS:  All vital signs are stable.   GENERAL:  He appears well, in no acute distress.   HEENT:  Within normal limits.   NECK:  Supple.  No lymphadenopathy.   No thyromegaly.   CARDIOVASCULAR:  Regular rate and rhythm.  Normal S1, S2.  Grade 3/6 systolic murmur at the right upper sternal border.   LUNGS:  Clear bilaterally.   ABDOMEN:  Soft, nontender, nondistended.   EXTREMITIES:  Negative for edema, cyanosis or clubbing.   NEUROLOGIC:  A&O x3 with no focal deficits.      LABORATORY STUDIES:  Transthoracic echo from 2018 demonstrates normal LV systolic size and function with EF of 55%-60%, severe aortic stenosis with a mean gradient of 42 mmHg, calculated aortic valve area of 1.0 cm2.  Trace to mild mitral valve regurgitation.  Trace tricuspid valve regurgitation.      IMPRESSION AND PLAN:  Mr. Jimenes is a very pleasant, 84-year-old gentleman with severe symptomatic aortic stenosis with preserved LV function.  His calculated STS risk score is 4%, making him an intermediate risk.  I think he is a reasonable TAVR candidate.  We will review his TAVR protocol CT scan, and we will order a coronary angiogram and discuss his case at our upcoming multidisciplinary conference.        It was a pleasure to see Mr. Jimenes today.      Thank you very much for this referral.         OMKAR PEPE MD          D: 2018   T: 2018   MT: lewis   Name:     SARAH JIMENES   MRN:      [id number]        Account:      [id number]   :      1934           Service Date: 2018   Document: [id number]

## 2018-07-12 NOTE — PROGRESS NOTES
HPI and Plan:   See dictation    No orders of the defined types were placed in this encounter.    No orders of the defined types were placed in this encounter.    Medications Discontinued During This Encounter   Medication Reason     Calcium-Magnesium-Vitamin D (CALCIUM 500 PO) Stopped by Patient         Encounter Diagnosis   Name Primary?     Nonrheumatic aortic valve stenosis        CURRENT MEDICATIONS:  Current Outpatient Prescriptions   Medication Sig Dispense Refill     ascorbic acid 500 MG TABS        aspirin 325 MG tablet Take by mouth daily       BIOTIN PO Take 5,000 mg by mouth        blood glucose monitoring (MIRA CONTOUR NEXT) test strip Use to test blood sugar one time daily or as directed. 100 each 5     blood glucose monitoring (MIRA MICROLET) lancets Use to test blood sugar 1 times daily or as directed. 100 each 5     clotrimazole (LOTRIMIN) 1 % cream Apply topically 2 times daily 15 g 3     CVS OMEPRAZOLE PO Take 20 mg by mouth daily        fluocinonide (LIDEX) 0.05 % ointment Apply topically 2 times daily       GLUCOSAMINE-CHONDROITIN PO Take by mouth daily        lisinopril (PRINIVIL/ZESTRIL) 2.5 MG tablet Take 1 tablet (2.5 mg) by mouth daily (Patient taking differently: Take 2.5 mg by mouth daily Patient takes 1/2 tablet of a 5 mg tablet to equal 2.5 mg daily) 90 tablet 3     metFORMIN (GLUCOPHAGE) 1000 MG tablet Take 0.5 tablets (500 mg) by mouth 2 times daily (with meals) 90 tablet 3     Multiple Vitamins-Iron (MULTIVITAMIN/IRON PO)        simvastatin (ZOCOR) 40 MG tablet Take 0.5 tablets (20 mg) by mouth At Bedtime 45 tablet 3       ALLERGIES   No Known Allergies    PAST MEDICAL HISTORY:  Past Medical History:   Diagnosis Date     Hyperlipidaemia LDL goal < 100      Type 2 diabetes mellitus (H)        PAST SURGICAL HISTORY:  Past Surgical History:   Procedure Laterality Date     MANDIBLE SURGERY  1958       FAMILY HISTORY:  Family History   Problem Relation Age of Onset     HEART DISEASE  "Father      Alcohol/Drug Father      Myocardial Infarction Grandchild 18     Grandson     Myocardial Infarction Other 18     Nephew      Cancer Mother 58     Stomach cancer     Alcohol/Drug Mother      Cancer Maternal Grandmother      stomach cancer     Alcohol/Drug Maternal Grandmother      Myocardial Infarction Sister 70     Name: Reena      Alcohol/Drug Sister      Cerebrovascular Disease Brother 65     Name: Miguelito     Alcohol/Drug Brother      Alcohol/Drug Maternal Grandfather      Alcohol/Drug Paternal Grandmother      Alcohol/Drug Paternal Grandfather        SOCIAL HISTORY:  Social History     Social History     Marital status:      Spouse name: Alyssa     Number of children: 3     Years of education: N/A     Social History Main Topics     Smoking status: Former Smoker     Packs/day: 1.00     Years: 30.00     Types: Cigarettes     Smokeless tobacco: Former User     Types: Chew     Quit date: 1/5/1983     Alcohol use No     Drug use: No     Sexual activity: Not Currently     Partners: Female     Other Topics Concern     None     Social History Narrative       Review of Systems:  Skin:  Positive for rash   Eyes:  Positive for glasses  ENT:  Positive for hearing loss  Respiratory:  Positive for dyspnea on exertion  Cardiovascular:    fatigue;Positive for  Gastroenterology: Negative    Genitourinary:  Negative    Musculoskeletal:  Positive for nocturnal cramping  Neurologic:  Negative    Psychiatric:  Positive for sleep disturbances  Heme/Lymph/Imm:  Negative    Endocrine:    diabetes    Physical Exam:  Vitals: /61  Pulse 61  Ht 1.651 m (5' 5\")  Wt 79.8 kg (176 lb)  BMI 29.29 kg/m2    Constitutional:           Skin:             Head:           Eyes:           Lymph:      ENT:           Neck:           Respiratory:            Cardiac:                                                           GI:           Extremities and Muscular Skeletal:                 Neurological:           Psych:     "       Recent Lab Results:  LIPID RESULTS:  Lab Results   Component Value Date    CHOL 97 04/12/2018    HDL 39 (L) 04/12/2018    LDL 37 04/12/2018    TRIG 106 04/12/2018    CHOLHDLRATIO 2.3 10/07/2015       LIVER ENZYME RESULTS:  Lab Results   Component Value Date    AST 19 04/12/2018    ALT 25 04/12/2018       CBC RESULTS:  No results found for: WBC, RBC, HGB, HCT, MCV, MCH, MCHC, RDW, PLT    BMP RESULTS:  Lab Results   Component Value Date     06/28/2018    POTASSIUM 4.9 06/28/2018    CHLORIDE 104 06/28/2018    CO2 31 06/28/2018    ANIONGAP 6 06/28/2018     (H) 06/28/2018    BUN 19 06/28/2018    CR 0.92 06/28/2018    GFRESTIMATED 78 06/28/2018    GFRESTBLACK >90 06/28/2018    DEEPTI 9.4 06/28/2018        A1C RESULTS:  Lab Results   Component Value Date    A1C 6.5 (H) 04/12/2018       INR RESULTS:  No results found for: INR        CC  Eulalio Higginbotham MD  [facility]  [address] [id number]

## 2018-07-13 NOTE — PROGRESS NOTES
Service Date: 2018      HISTORY OF PRESENT ILLNESS:  Mr. Boyd is a very pleasant 84-year-old gentleman who comes in with his wife today to TAVR clinic and is seen both myself and Dr. Morales.  He is referred by Dr. Dale Higginbotham from Cardiology.  Mr. Boyd is 84.  He has a history of carotid stenosis without previous stroke, history of hypertension, peripheral vascular disease, hyperlipidemia, diabetes, who over the past 2 years has had increasing fatigue and shortness of breath with activity.  He states it is slowly progressive.  An echocardiogram done on 05/10/2018 showed severe aortic valve stenosis with preserved ejection fraction.  Mean gradient 42 mmHg.  There was trace to mild mitral regurgitation, otherwise unremarkable.  He had a carotid CTA done in 2017 showing 65% stenosis of the left carotid artery and 30% on the right.  He has a distant history of smoking but no history of COPD, emphysema, asthma.  He has had no syncope or near-syncope.  He does get some mild chest discomfort or tightness, he says, when walking further distances or up an incline.      A 12-lead EKG done from  shows LVH with voltage and some QRS widening.      ASSESSMENT AND PLAN:  Overall, the patient is a delightful 84-year-old who has STS risk of 4%, making him intermediate risk, who presents with slowly progressive symptoms consistent with severe aortic valve stenosis and echocardiogram confirming that.  He has some shortness of breath, fatigue and chest tightness with activity.  I think the patient would best be served by a transcatheter aortic valve replacement.  He needs coronary angiography prior.  We will review the TAVR CT scan, and he is to notify us of any changes in his clinical condition.      Eliseo Aden MD, FACC         ELISEO ADEN MD FACC             D: 2018   T: 2018   MT: STEPH      Name:     SARAH BOYD   MRN:      [id number]        Account:      [id number]   :       02/14/1934           Service Date: 07/12/2018      Document: [id number]

## 2018-07-17 NOTE — PROGRESS NOTES
Service Date: 07/12/2018      REFERRING CARDIOLOGIST:  Eulalio Higginbotham MD       REASON FOR CONSULTATION:  Evaluation for severe aortic stenosis.      HISTORY OF PRESENT ILLNESS:  Mr. Jimenes is a very pleasant, 84-year-old gentleman who comes to our [facility] Clinic today to be evaluated for his severe aortic stenosis.  I am seeing him together with my Interventional Cardiology colleague, Dr. Salazar.  He has a history of previous stroke, carotid disease, hypertension, peripheral vascular disease, diabetes and hyperlipidemia.  Over the last couple of years, he has developed progressively worsening shortness of breath with activity.  His most recent echocardiogram from May demonstrated severe aortic stenosis with a mean aortic valve gradient of 42 mmHg.  For this reason and given his age, he was referred to us for consideration for possible TAVR.        PAST MEDICAL HISTORY:     1.  Hypertension.     2.  Hyperlipidemia.   3.  Type 2 diabetes.      PAST SURGICAL HISTORY:  Mandibular surgery in 1958.      FAMILY HISTORY:  Noncontributory.      SOCIAL HISTORY:  He is .  Former pack-a-day smoker for 30 years, quit in 1983.  He also quit alcohol in 1983.      ALLERGIES:  No known drug allergies.      CURRENT MEDICATIONS.  Reviewed in EPIC chart.        REVIEW OF SYSTEMS:  Noted in the HPI.  All other 10 point reviews of systems are completed and were otherwise negative unless stated above.      PHYSICAL EXAMINATION:   VITAL SIGNS:  All vital signs are stable.   GENERAL:  He appears well, in no acute distress.   HEENT:  Within normal limits.   NECK:  Supple.  No lymphadenopathy.  No thyromegaly.   CARDIOVASCULAR:  Regular rate and rhythm.  Normal S1, S2.  Grade 3/6 systolic murmur at the right upper sternal border.   LUNGS:  Clear bilaterally.   ABDOMEN:  Soft, nontender, nondistended.   EXTREMITIES:  Negative for edema, cyanosis or clubbing.   NEUROLOGIC:  A&O x3 with no focal deficits.      LABORATORY  STUDIES:  Transthoracic echo from 2018 demonstrates normal LV systolic size and function with EF of 55%-60%, severe aortic stenosis with a mean gradient of 42 mmHg, calculated aortic valve area of 1.0 cm2.  Trace to mild mitral valve regurgitation.  Trace tricuspid valve regurgitation.      IMPRESSION AND PLAN:  Mr. Jimenes is a very pleasant, 84-year-old gentleman with severe symptomatic aortic stenosis with preserved LV function.  His calculated STS risk score is 4%, making him an intermediate risk.  I think he is a reasonable TAVR candidate.  We will review his TAVR protocol CT scan, and we will order a coronary angiogram and discuss his case at our upcoming multidisciplinary conference.        It was a pleasure to see Mr. Jimenes today.      Thank you very much for this referral.         OMKAR PEPE MD             D: 2018   T: 2018   MT: lewis      Name:     SARAH JIMENES   MRN:      [id number]        Account:      [id number]   :      1934           Service Date: 2018      Document: [id number]

## 2018-07-19 ENCOUNTER — TELEPHONE (OUTPATIENT)
Dept: INTERNAL MEDICINE | Facility: CLINIC | Age: 83
End: 2018-07-19

## 2018-07-23 ENCOUNTER — TELEPHONE (OUTPATIENT)
Dept: CARDIOLOGY | Facility: CLINIC | Age: 83
End: 2018-07-23

## 2018-07-23 RX ORDER — SODIUM CHLORIDE 9 MG/ML
INJECTION, SOLUTION INTRAVENOUS CONTINUOUS
Status: CANCELLED | OUTPATIENT
Start: 2018-07-23 | End: 2019-07-23

## 2018-07-23 RX ORDER — ASPIRIN 81 MG/1
81 TABLET ORAL DAILY
Status: CANCELLED | OUTPATIENT
Start: 2018-07-23 | End: 2019-07-23

## 2018-07-23 RX ORDER — POTASSIUM CHLORIDE 1500 MG/1
20 TABLET, EXTENDED RELEASE ORAL
Status: CANCELLED | OUTPATIENT
Start: 2018-07-23 | End: 2018-07-24

## 2018-07-23 RX ORDER — LIDOCAINE 40 MG/G
CREAM TOPICAL
Status: CANCELLED | OUTPATIENT
Start: 2018-07-23 | End: 2019-07-23

## 2018-07-23 NOTE — TELEPHONE ENCOUNTER
Called patient with Pre cath instructions:     Contrast allergy: no  Anticoagulation: no   Metformin: yes, holding day of and two days after.   Oral DM meds: no  Insulin: no  Diuretic: no  Use of phosphodiesterase type 5 inhibitor: no  Aspirin: yes, 81 mg. He is aware to take today and tomorrow morning.  Pt informed to be NPO at midnight  Renal issues no  Pt has transportation and 24 hours post procedure monitoring set up.   Pt aware of no driving for 24 hours post procedure.     Pt aware of arrival time and location. Pt verbalized understanding of instructions.

## 2018-07-24 ENCOUNTER — APPOINTMENT (OUTPATIENT)
Dept: CARDIOLOGY | Facility: CLINIC | Age: 83
End: 2018-07-24
Attending: INTERNAL MEDICINE
Payer: MEDICARE

## 2018-07-24 ENCOUNTER — HOSPITAL ENCOUNTER (OUTPATIENT)
Facility: CLINIC | Age: 83
Discharge: HOME | End: 2018-07-24
Attending: INTERNAL MEDICINE | Admitting: INTERNAL MEDICINE
Payer: MEDICARE

## 2018-07-24 VITALS
RESPIRATION RATE: 18 BRPM | BODY MASS INDEX: 29.32 KG/M2 | OXYGEN SATURATION: 95 % | DIASTOLIC BLOOD PRESSURE: 55 MMHG | TEMPERATURE: 97 F | WEIGHT: 176 LBS | SYSTOLIC BLOOD PRESSURE: 120 MMHG | HEIGHT: 65 IN | HEART RATE: 51 BPM

## 2018-07-24 DIAGNOSIS — I35.0 AORTIC STENOSIS: ICD-10-CM

## 2018-07-24 PROBLEM — Z98.890 STATUS POST CORONARY ANGIOGRAM: Status: ACTIVE | Noted: 2018-07-24

## 2018-07-24 LAB
ANION GAP SERPL CALCULATED.3IONS-SCNC: 6 MMOL/L (ref 3–14)
APTT PPP: 27 SEC (ref 22–37)
BUN SERPL-MCNC: 12 MG/DL (ref 7–30)
CALCIUM SERPL-MCNC: 8.4 MG/DL (ref 8.5–10.1)
CHLORIDE SERPL-SCNC: 108 MMOL/L (ref 94–109)
CO2 BLDCOV-SCNC: 28 MMOL/L (ref 21–28)
CO2 SERPL-SCNC: 28 MMOL/L (ref 20–32)
CREAT SERPL-MCNC: 0.92 MG/DL (ref 0.66–1.25)
ERYTHROCYTE [DISTWIDTH] IN BLOOD BY AUTOMATED COUNT: 13 % (ref 10–15)
GFR SERPL CREATININE-BSD FRML MDRD: 78 ML/MIN/1.7M2
GLUCOSE SERPL-MCNC: 137 MG/DL (ref 70–99)
HCT VFR BLD AUTO: 37.4 % (ref 40–53)
HGB BLD-MCNC: 12.5 G/DL (ref 13.3–17.7)
INR PPP: 1.01 (ref 0.86–1.14)
MCH RBC QN AUTO: 29.8 PG (ref 26.5–33)
MCHC RBC AUTO-ENTMCNC: 33.4 G/DL (ref 31.5–36.5)
MCV RBC AUTO: 89 FL (ref 78–100)
PCO2 BLDV: 48 MM HG (ref 40–50)
PH BLDV: 7.37 PH (ref 7.32–7.43)
PLATELET # BLD AUTO: 144 10E9/L (ref 150–450)
PO2 BLDV: 36 MM HG (ref 25–47)
POTASSIUM SERPL-SCNC: 4.4 MMOL/L (ref 3.4–5.3)
RBC # BLD AUTO: 4.2 10E12/L (ref 4.4–5.9)
SAO2 % BLDV FROM PO2: 67 %
SODIUM SERPL-SCNC: 142 MMOL/L (ref 133–144)
WBC # BLD AUTO: 6.5 10E9/L (ref 4–11)

## 2018-07-24 PROCEDURE — 27210946 ZZH KIT HC TOTES DISP CR8

## 2018-07-24 PROCEDURE — 93456 R HRT CORONARY ARTERY ANGIO: CPT | Mod: 26 | Performed by: INTERNAL MEDICINE

## 2018-07-24 PROCEDURE — 36415 COLL VENOUS BLD VENIPUNCTURE: CPT | Performed by: INTERNAL MEDICINE

## 2018-07-24 PROCEDURE — 27210795 ZZH PAD DEFIB QUICK CR4

## 2018-07-24 PROCEDURE — 99152 MOD SED SAME PHYS/QHP 5/>YRS: CPT

## 2018-07-24 PROCEDURE — 99153 MOD SED SAME PHYS/QHP EA: CPT

## 2018-07-24 PROCEDURE — 27210856 ZZH ACCESS HEART CATH CR2

## 2018-07-24 PROCEDURE — 93005 ELECTROCARDIOGRAM TRACING: CPT

## 2018-07-24 PROCEDURE — 40000852 ZZH STATISTIC HEART CATH LAB OR EP LAB

## 2018-07-24 PROCEDURE — 40000235 ZZH STATISTIC TELEMETRY

## 2018-07-24 PROCEDURE — 27210890 ZZ H BALLOON (NON-PTA) CR5

## 2018-07-24 PROCEDURE — 85610 PROTHROMBIN TIME: CPT | Performed by: INTERNAL MEDICINE

## 2018-07-24 PROCEDURE — 25000128 H RX IP 250 OP 636: Performed by: STUDENT IN AN ORGANIZED HEALTH CARE EDUCATION/TRAINING PROGRAM

## 2018-07-24 PROCEDURE — 85027 COMPLETE CBC AUTOMATED: CPT | Performed by: INTERNAL MEDICINE

## 2018-07-24 PROCEDURE — 27210892 ZZH CATH CR4

## 2018-07-24 PROCEDURE — 93010 ELECTROCARDIOGRAM REPORT: CPT | Performed by: INTERNAL MEDICINE

## 2018-07-24 PROCEDURE — 82803 BLOOD GASES ANY COMBINATION: CPT

## 2018-07-24 PROCEDURE — 27211089 ZZH KIT ACIST INJECTOR CR3

## 2018-07-24 PROCEDURE — 99152 MOD SED SAME PHYS/QHP 5/>YRS: CPT | Mod: GC | Performed by: INTERNAL MEDICINE

## 2018-07-24 PROCEDURE — 27210910 ZZH NEEDLE CR6

## 2018-07-24 PROCEDURE — 85730 THROMBOPLASTIN TIME PARTIAL: CPT | Performed by: INTERNAL MEDICINE

## 2018-07-24 PROCEDURE — 93456 R HRT CORONARY ARTERY ANGIO: CPT

## 2018-07-24 PROCEDURE — 40000065 ZZH STATISTIC EKG NON-CHARGEABLE

## 2018-07-24 PROCEDURE — C1769 GUIDE WIRE: HCPCS

## 2018-07-24 PROCEDURE — 80048 BASIC METABOLIC PNL TOTAL CA: CPT | Performed by: INTERNAL MEDICINE

## 2018-07-24 PROCEDURE — 25000125 ZZHC RX 250: Performed by: STUDENT IN AN ORGANIZED HEALTH CARE EDUCATION/TRAINING PROGRAM

## 2018-07-24 PROCEDURE — 25000128 H RX IP 250 OP 636: Performed by: INTERNAL MEDICINE

## 2018-07-24 PROCEDURE — 27210914 ZZH SHEATH CR8

## 2018-07-24 PROCEDURE — 27210787 ZZH MANIFOLD CR2

## 2018-07-24 RX ORDER — DIPHENHYDRAMINE HYDROCHLORIDE 50 MG/ML
25-50 INJECTION INTRAMUSCULAR; INTRAVENOUS
Status: DISCONTINUED | OUTPATIENT
Start: 2018-07-24 | End: 2018-07-24 | Stop reason: HOSPADM

## 2018-07-24 RX ORDER — ASPIRIN 81 MG/1
81-324 TABLET, CHEWABLE ORAL
Status: DISCONTINUED | OUTPATIENT
Start: 2018-07-24 | End: 2018-07-24 | Stop reason: HOSPADM

## 2018-07-24 RX ORDER — SODIUM CHLORIDE 9 MG/ML
INJECTION, SOLUTION INTRAVENOUS CONTINUOUS
Status: DISCONTINUED | OUTPATIENT
Start: 2018-07-24 | End: 2018-07-24 | Stop reason: HOSPADM

## 2018-07-24 RX ORDER — ONDANSETRON 2 MG/ML
4 INJECTION INTRAMUSCULAR; INTRAVENOUS EVERY 4 HOURS PRN
Status: DISCONTINUED | OUTPATIENT
Start: 2018-07-24 | End: 2018-07-24 | Stop reason: HOSPADM

## 2018-07-24 RX ORDER — NITROGLYCERIN 0.4 MG/1
0.4 TABLET SUBLINGUAL EVERY 5 MIN PRN
Status: DISCONTINUED | OUTPATIENT
Start: 2018-07-24 | End: 2018-07-24 | Stop reason: HOSPADM

## 2018-07-24 RX ORDER — POTASSIUM CHLORIDE 29.8 MG/ML
20 INJECTION INTRAVENOUS
Status: DISCONTINUED | OUTPATIENT
Start: 2018-07-24 | End: 2018-07-24 | Stop reason: HOSPADM

## 2018-07-24 RX ORDER — NITROGLYCERIN 5 MG/ML
100-200 VIAL (ML) INTRAVENOUS
Status: DISCONTINUED | OUTPATIENT
Start: 2018-07-24 | End: 2018-07-24 | Stop reason: HOSPADM

## 2018-07-24 RX ORDER — PROTAMINE SULFATE 10 MG/ML
25-100 INJECTION, SOLUTION INTRAVENOUS EVERY 5 MIN PRN
Status: DISCONTINUED | OUTPATIENT
Start: 2018-07-24 | End: 2018-07-24 | Stop reason: HOSPADM

## 2018-07-24 RX ORDER — ACETAMINOPHEN 325 MG/1
325-650 TABLET ORAL EVERY 4 HOURS PRN
Status: DISCONTINUED | OUTPATIENT
Start: 2018-07-24 | End: 2018-07-24 | Stop reason: HOSPADM

## 2018-07-24 RX ORDER — PHENYLEPHRINE HCL IN 0.9% NACL 1 MG/10 ML
20-100 SYRINGE (ML) INTRAVENOUS
Status: DISCONTINUED | OUTPATIENT
Start: 2018-07-24 | End: 2018-07-24 | Stop reason: HOSPADM

## 2018-07-24 RX ORDER — EPINEPHRINE 1 MG/ML
0.3 INJECTION, SOLUTION, CONCENTRATE INTRAVENOUS
Status: DISCONTINUED | OUTPATIENT
Start: 2018-07-24 | End: 2018-07-24 | Stop reason: HOSPADM

## 2018-07-24 RX ORDER — NALOXONE HYDROCHLORIDE 0.4 MG/ML
.1-.4 INJECTION, SOLUTION INTRAMUSCULAR; INTRAVENOUS; SUBCUTANEOUS
Status: DISCONTINUED | OUTPATIENT
Start: 2018-07-24 | End: 2018-07-24 | Stop reason: HOSPADM

## 2018-07-24 RX ORDER — FENTANYL CITRATE 50 UG/ML
25-50 INJECTION, SOLUTION INTRAMUSCULAR; INTRAVENOUS
Status: DISCONTINUED | OUTPATIENT
Start: 2018-07-24 | End: 2018-07-24 | Stop reason: HOSPADM

## 2018-07-24 RX ORDER — VERAPAMIL HYDROCHLORIDE 2.5 MG/ML
1-2.5 INJECTION, SOLUTION INTRAVENOUS
Status: DISCONTINUED | OUTPATIENT
Start: 2018-07-24 | End: 2018-07-24 | Stop reason: HOSPADM

## 2018-07-24 RX ORDER — CLOPIDOGREL 300 MG/1
300-600 TABLET, FILM COATED ORAL
Status: DISCONTINUED | OUTPATIENT
Start: 2018-07-24 | End: 2018-07-24 | Stop reason: HOSPADM

## 2018-07-24 RX ORDER — METHYLPREDNISOLONE SODIUM SUCCINATE 125 MG/2ML
125 INJECTION, POWDER, LYOPHILIZED, FOR SOLUTION INTRAMUSCULAR; INTRAVENOUS
Status: DISCONTINUED | OUTPATIENT
Start: 2018-07-24 | End: 2018-07-24 | Stop reason: HOSPADM

## 2018-07-24 RX ORDER — LIDOCAINE 40 MG/G
CREAM TOPICAL
Status: DISCONTINUED | OUTPATIENT
Start: 2018-07-24 | End: 2018-07-24 | Stop reason: HOSPADM

## 2018-07-24 RX ORDER — POTASSIUM CHLORIDE 1500 MG/1
20 TABLET, EXTENDED RELEASE ORAL
Status: DISCONTINUED | OUTPATIENT
Start: 2018-07-24 | End: 2018-07-24 | Stop reason: HOSPADM

## 2018-07-24 RX ORDER — CLOPIDOGREL BISULFATE 75 MG/1
75 TABLET ORAL
Status: DISCONTINUED | OUTPATIENT
Start: 2018-07-24 | End: 2018-07-24 | Stop reason: HOSPADM

## 2018-07-24 RX ORDER — FLUMAZENIL 0.1 MG/ML
0.2 INJECTION, SOLUTION INTRAVENOUS
Status: DISCONTINUED | OUTPATIENT
Start: 2018-07-24 | End: 2018-07-24 | Stop reason: HOSPADM

## 2018-07-24 RX ORDER — DOBUTAMINE HYDROCHLORIDE 200 MG/100ML
2-20 INJECTION INTRAVENOUS CONTINUOUS PRN
Status: DISCONTINUED | OUTPATIENT
Start: 2018-07-24 | End: 2018-07-24 | Stop reason: HOSPADM

## 2018-07-24 RX ORDER — FUROSEMIDE 10 MG/ML
20-100 INJECTION INTRAMUSCULAR; INTRAVENOUS
Status: DISCONTINUED | OUTPATIENT
Start: 2018-07-24 | End: 2018-07-24 | Stop reason: HOSPADM

## 2018-07-24 RX ORDER — HYDRALAZINE HYDROCHLORIDE 20 MG/ML
10-20 INJECTION INTRAMUSCULAR; INTRAVENOUS
Status: DISCONTINUED | OUTPATIENT
Start: 2018-07-24 | End: 2018-07-24 | Stop reason: HOSPADM

## 2018-07-24 RX ORDER — METOPROLOL TARTRATE 1 MG/ML
5 INJECTION, SOLUTION INTRAVENOUS EVERY 5 MIN PRN
Status: DISCONTINUED | OUTPATIENT
Start: 2018-07-24 | End: 2018-07-24 | Stop reason: HOSPADM

## 2018-07-24 RX ORDER — ENALAPRILAT 1.25 MG/ML
1.25-2.5 INJECTION INTRAVENOUS
Status: DISCONTINUED | OUTPATIENT
Start: 2018-07-24 | End: 2018-07-24 | Stop reason: HOSPADM

## 2018-07-24 RX ORDER — DEXTROSE MONOHYDRATE 25 G/50ML
12.5-5 INJECTION, SOLUTION INTRAVENOUS EVERY 30 MIN PRN
Status: DISCONTINUED | OUTPATIENT
Start: 2018-07-24 | End: 2018-07-24 | Stop reason: HOSPADM

## 2018-07-24 RX ORDER — MORPHINE SULFATE 2 MG/ML
1-2 INJECTION, SOLUTION INTRAMUSCULAR; INTRAVENOUS EVERY 5 MIN PRN
Status: DISCONTINUED | OUTPATIENT
Start: 2018-07-24 | End: 2018-07-24 | Stop reason: HOSPADM

## 2018-07-24 RX ORDER — ADENOSINE 3 MG/ML
12-12000 INJECTION, SOLUTION INTRAVENOUS
Status: DISCONTINUED | OUTPATIENT
Start: 2018-07-24 | End: 2018-07-24 | Stop reason: HOSPADM

## 2018-07-24 RX ORDER — HYDROCODONE BITARTRATE AND ACETAMINOPHEN 5; 325 MG/1; MG/1
1-2 TABLET ORAL EVERY 4 HOURS PRN
Status: DISCONTINUED | OUTPATIENT
Start: 2018-07-24 | End: 2018-07-24 | Stop reason: HOSPADM

## 2018-07-24 RX ORDER — PRASUGREL 10 MG/1
10-60 TABLET, FILM COATED ORAL
Status: DISCONTINUED | OUTPATIENT
Start: 2018-07-24 | End: 2018-07-24 | Stop reason: HOSPADM

## 2018-07-24 RX ORDER — LORAZEPAM 2 MG/ML
.5-2 INJECTION INTRAMUSCULAR EVERY 4 HOURS PRN
Status: DISCONTINUED | OUTPATIENT
Start: 2018-07-24 | End: 2018-07-24 | Stop reason: HOSPADM

## 2018-07-24 RX ORDER — ATROPINE SULFATE 0.1 MG/ML
0.5 INJECTION INTRAVENOUS EVERY 5 MIN PRN
Status: DISCONTINUED | OUTPATIENT
Start: 2018-07-24 | End: 2018-07-24 | Stop reason: HOSPADM

## 2018-07-24 RX ORDER — NITROGLYCERIN 5 MG/ML
100-500 VIAL (ML) INTRAVENOUS
Status: DISCONTINUED | OUTPATIENT
Start: 2018-07-24 | End: 2018-07-24 | Stop reason: HOSPADM

## 2018-07-24 RX ORDER — NICARDIPINE HYDROCHLORIDE 2.5 MG/ML
100 INJECTION INTRAVENOUS
Status: DISCONTINUED | OUTPATIENT
Start: 2018-07-24 | End: 2018-07-24 | Stop reason: HOSPADM

## 2018-07-24 RX ORDER — LIDOCAINE HYDROCHLORIDE 10 MG/ML
1-10 INJECTION, SOLUTION EPIDURAL; INFILTRATION; INTRACAUDAL; PERINEURAL
Status: COMPLETED | OUTPATIENT
Start: 2018-07-24 | End: 2018-07-24

## 2018-07-24 RX ORDER — POTASSIUM CHLORIDE 7.45 MG/ML
10 INJECTION INTRAVENOUS
Status: DISCONTINUED | OUTPATIENT
Start: 2018-07-24 | End: 2018-07-24 | Stop reason: HOSPADM

## 2018-07-24 RX ORDER — BUPIVACAINE HYDROCHLORIDE 2.5 MG/ML
1-10 INJECTION, SOLUTION EPIDURAL; INFILTRATION; INTRACAUDAL
Status: DISCONTINUED | OUTPATIENT
Start: 2018-07-24 | End: 2018-07-24 | Stop reason: HOSPADM

## 2018-07-24 RX ORDER — PROTAMINE SULFATE 10 MG/ML
1-5 INJECTION, SOLUTION INTRAVENOUS
Status: DISCONTINUED | OUTPATIENT
Start: 2018-07-24 | End: 2018-07-24 | Stop reason: HOSPADM

## 2018-07-24 RX ORDER — DOPAMINE HYDROCHLORIDE 160 MG/100ML
2-20 INJECTION, SOLUTION INTRAVENOUS CONTINUOUS PRN
Status: DISCONTINUED | OUTPATIENT
Start: 2018-07-24 | End: 2018-07-24 | Stop reason: HOSPADM

## 2018-07-24 RX ORDER — ATROPINE SULFATE 0.1 MG/ML
.5-1 INJECTION INTRAVENOUS
Status: DISCONTINUED | OUTPATIENT
Start: 2018-07-24 | End: 2018-07-24 | Stop reason: HOSPADM

## 2018-07-24 RX ORDER — NALOXONE HYDROCHLORIDE 0.4 MG/ML
0.4 INJECTION, SOLUTION INTRAMUSCULAR; INTRAVENOUS; SUBCUTANEOUS EVERY 5 MIN PRN
Status: DISCONTINUED | OUTPATIENT
Start: 2018-07-24 | End: 2018-07-24 | Stop reason: HOSPADM

## 2018-07-24 RX ORDER — HEPARIN SODIUM 1000 [USP'U]/ML
1000-10000 INJECTION, SOLUTION INTRAVENOUS; SUBCUTANEOUS EVERY 5 MIN PRN
Status: DISCONTINUED | OUTPATIENT
Start: 2018-07-24 | End: 2018-07-24 | Stop reason: HOSPADM

## 2018-07-24 RX ORDER — IOPAMIDOL 755 MG/ML
65 INJECTION, SOLUTION INTRAVASCULAR ONCE
Status: COMPLETED | OUTPATIENT
Start: 2018-07-24 | End: 2018-07-24

## 2018-07-24 RX ORDER — NIFEDIPINE 10 MG/1
10 CAPSULE ORAL
Status: DISCONTINUED | OUTPATIENT
Start: 2018-07-24 | End: 2018-07-24 | Stop reason: HOSPADM

## 2018-07-24 RX ORDER — NITROGLYCERIN 20 MG/100ML
.07-2 INJECTION INTRAVENOUS CONTINUOUS PRN
Status: DISCONTINUED | OUTPATIENT
Start: 2018-07-24 | End: 2018-07-24 | Stop reason: HOSPADM

## 2018-07-24 RX ORDER — ASPIRIN 81 MG/1
81 TABLET ORAL DAILY
Status: DISCONTINUED | OUTPATIENT
Start: 2018-07-24 | End: 2018-07-24 | Stop reason: HOSPADM

## 2018-07-24 RX ORDER — ASPIRIN 325 MG
325 TABLET ORAL
Status: DISCONTINUED | OUTPATIENT
Start: 2018-07-24 | End: 2018-07-24 | Stop reason: HOSPADM

## 2018-07-24 RX ORDER — SODIUM NITROPRUSSIDE 25 MG/ML
100-200 INJECTION INTRAVENOUS
Status: DISCONTINUED | OUTPATIENT
Start: 2018-07-24 | End: 2018-07-24 | Stop reason: HOSPADM

## 2018-07-24 RX ORDER — NALOXONE HYDROCHLORIDE 0.4 MG/ML
.2-.4 INJECTION, SOLUTION INTRAMUSCULAR; INTRAVENOUS; SUBCUTANEOUS
Status: DISCONTINUED | OUTPATIENT
Start: 2018-07-24 | End: 2018-07-24 | Stop reason: HOSPADM

## 2018-07-24 RX ORDER — LIDOCAINE HYDROCHLORIDE 10 MG/ML
30 INJECTION, SOLUTION EPIDURAL; INFILTRATION; INTRACAUDAL; PERINEURAL
Status: DISCONTINUED | OUTPATIENT
Start: 2018-07-24 | End: 2018-07-24 | Stop reason: HOSPADM

## 2018-07-24 RX ADMIN — IOPAMIDOL 65 ML: 755 INJECTION, SOLUTION INTRAVASCULAR at 11:00

## 2018-07-24 RX ADMIN — SODIUM CHLORIDE: 9 INJECTION, SOLUTION INTRAVENOUS at 08:18

## 2018-07-24 RX ADMIN — LIDOCAINE HYDROCHLORIDE 10 ML: 10 INJECTION, SOLUTION EPIDURAL; INFILTRATION; INTRACAUDAL; PERINEURAL at 10:39

## 2018-07-24 RX ADMIN — MIDAZOLAM 0.5 MG: 1 INJECTION INTRAMUSCULAR; INTRAVENOUS at 10:27

## 2018-07-24 RX ADMIN — HEPARIN SODIUM 5000 UNITS: 1000 INJECTION, SOLUTION INTRAVENOUS; SUBCUTANEOUS at 10:38

## 2018-07-24 RX ADMIN — FENTANYL CITRATE 25 MCG: 50 INJECTION, SOLUTION INTRAMUSCULAR; INTRAVENOUS at 10:27

## 2018-07-24 NOTE — IP AVS SNAPSHOT
MRN:[id number]                      After Visit Summary   7/24/2018    Reid Jimenes    MRN: [id number]           Visit Information        Department      7/24/2018  7:18 AM [facility]          Review of your medicines      UNREVIEWED medicines. Ask your doctor about these medicines        Dose / Directions    ascorbic acid 500 MG Tabs        Refills:  0       aspirin 325 MG tablet        Take by mouth daily   Refills:  0       BIOTIN PO        Dose:  5000 mg   Take 5,000 mg by mouth   Refills:  0       clotrimazole 1 % cream   Commonly known as:  LOTRIMIN   Used for:  Tinea corporis        Apply topically 2 times daily   Quantity:  15 g   Refills:  3       CVS OMEPRAZOLE PO        Dose:  20 mg   Take 20 mg by mouth daily   Refills:  0       fluocinonide 0.05 % ointment   Commonly known as:  LIDEX        Apply topically 2 times daily   Refills:  0       GLUCOSAMINE-CHONDROITIN PO        Take by mouth daily   Refills:  0       lisinopril 2.5 MG tablet   Commonly known as:  PRINIVIL/Zestril   Used for:  Type 2 diabetes mellitus with microalbuminuria, without long-term current use of insulin (H)        Dose:  2.5 mg   Take 1 tablet (2.5 mg) by mouth daily   Quantity:  90 tablet   Refills:  3       metFORMIN 1000 MG tablet   Commonly known as:  GLUCOPHAGE   Used for:  Type 2 diabetes mellitus with microalbuminuria, without long-term current use of insulin (H)        Dose:  500 mg   Take 0.5 tablets (500 mg) by mouth 2 times daily (with meals)   Quantity:  90 tablet   Refills:  3       MULTIVITAMIN/IRON PO        Refills:  0       simvastatin 40 MG tablet   Commonly known as:  ZOCOR   Used for:  Hyperlipidemia with target LDL less than 100        Dose:  20 mg   Take 0.5 tablets (20 mg) by mouth At Bedtime   Quantity:  45 tablet   Refills:  3         CONTINUE these medicines which have NOT CHANGED        Dose / Directions    blood glucose monitoring lancets   Used for:  Type 2  diabetes mellitus with microalbuminuria, without long-term current use of insulin (H)        Use to test blood sugar 1 times daily or as directed.   Quantity:  100 each   Refills:  5       blood glucose monitoring test strip   Commonly known as:  MIRA CONTOUR NEXT   Used for:  Type 2 diabetes mellitus with microalbuminuria, without long-term current use of insulin (H)        Use to test blood sugar one time daily or as directed.   Quantity:  100 each   Refills:  5                Protect others around you: Learn how to safely use, store and throw away your medicines at www.disposemymeds.org.         Follow-ups after your visit        Your next 10 appointments already scheduled     Jul 31, 2018 12:30 PM CDT   Return Visit with SARY Ball CNP   [facility] ([facility])    [address] [place] [id number] [phone] OPT 2               Care Instructions        After Care Instructions     Discharge Instructions - IF on Metformin (Glucophage or Glucovance) or Metformin containing medications       IF on Metformin (Glucophage or Glucovance) or Metformin containing medications , schedule a Basic Metabolic Panel at [facility] Primary [facility] in 48 - 72 hours post procedure and PRIOR TO resuming the Metformin or Metformin containing medications.  Hold Metformin (Glucophage or Glucovance) or Metformin containing medications until after the Basic Metabolic Panel on the 2nd or 3rd day following the procedure.  May resume after blood draw is complete.                  Further instructions from your care team       Cardiac Angiogram Discharge Instructions - Brachial & Radial    After you go home:      Have an adult stay with you until tomorrow.    Drink extra fluids for 2 days.    You may resume your normal diet.    No smoking       For 24 hours - due to the sedation you received:    Relax and take it easy.    Do NOT make any important or legal  decisions.    Do NOT drive or operate machines at home or at work.    Do NOT drink alcohol.    Care of Arm & Wrist Puncture Sites:      For the first 24 hrs - check the puncture site every 1-2 hours while awake.    It is normal to have soreness at the puncture site and mild tingling in your hand for up to 3 days.    Remove the bandaid after 24 hours. If there is minor oozing, apply another bandaid and remove it after 12 hours.    You may shower. Do NOT take a bath, or use a hot tub or pool for at least 3 days. Do NOT scrub the site. Do not use lotion or powder near the puncture site.           Activity - For 2 days:    Arm site:      Do not use your hand or arm to support your weight (such as rising from a chair)     Do not lift more than 5 pounds or exercise your arm (such as tennis, golf or bowling).       Wrist site:    do not use your hand or arm to support your weight (such as rising from a chair)     do not bend your wrist (such as lifting a garage door).    do not lift more than 5 pounds or exercise your arm (such as tennis, golf or bowling).    Do NOT do any heavy activity such as exercise, lifting, or straining.     Bleeding:     Arm site:    If you start bleeding from the site in your arm, sit down and press firmly on/above the site with your fingers for 10 minutes.    Once bleeding stops, keep your arm straight for 2 hours.     Call the [facility] as soon as you can.    Wrist site:    If you start bleeding from the site in your wrist, sit down and press firmly on/above the site for 10 minutes.     Once bleeding stops, keep arm still for 2 hours.     Call [facility] as soon as you can.    Call 911 right away if you have heavy bleeding or bleeding that does not stop.      Medicines:         If you are on Metformin (Glucophage), do not restart it until you have blood tests (within 2 to 3 days after discharge).  After you have your blood drawn, you may restart the Metformin.    Take your  "medications, including blood thinners, unless your provider tells you not to.     If you have stopped any other medicines, check with your provider about when to restart them.    Follow Up Appointments:      Follow up with [facility] Nurse Practitioner at [facility] of patient preference in 7-10 days.    Call the clinic if:      You have increased pain or a large or growing hard lump around the site.    The site is red, swollen, hot or tender.    Blood or fluid is draining from the site.    You have chills or a fever greater than 101 F (38 C).    Your arm feels numb, cool or changes color.    You have hives, a rash or unusual itching.    Any questions or concerns.      [facility] [place]:    [phone] [facility] (7 days a week)     Additional Information About Your Visit        MyChart Information     [employer] gives you secure access to your electronic health record. If you see a primary care provider, you can also send messages to your care team and make appointments. If you have questions, please call your primary care clinic.  If you do not have a primary care provider, please call [phone] and they will assist you.        Care EveryWhere ID     This is your Care EveryWhere ID. This could be used by other organizations to access your [facility] medical records  [id number]        Your Vitals Were     Blood Pressure Pulse Temperature Respirations Height Weight    160/60 51 97  F (36.1  C) (Oral) 18 1.651 m (5' 5\") 79.8 kg (176 lb)    Pulse Oximetry BMI (Body Mass Index)                96% 29.29 kg/m2           Primary Care Provider Office Phone # Fax #    Viet Bingham [phone]      Equal Access to Services     [facility] : Hadii maurice Mendez, wanena hong, qarigo rudolph. So [facility] [phone].    ATENCIÓN: Si habla español, tiene a philip disposición servicios gratuitos de asistencia " lingüísticaGui Edouard al [phone].    We comply with applicable federal civil rights laws and [place] laws. We do not discriminate on the basis of race, color, national origin, age, disability, sex, sexual orientation, or gender identity.            Thank you!     Thank you for choosing [facility] for your care. Our goal is always to provide you with excellent care. Hearing back from our patients is one way we can continue to improve our services. Please take a few minutes to complete the written survey that you may receive in the mail after you visit with us. Thank you!             Medication List: This is a list of all your medications and when to take them. Check marks below indicate your daily home schedule. Keep this list as a reference.      Medications           Morning Afternoon Evening Bedtime As Needed    ascorbic acid 500 MG Tabs                                aspirin 325 MG tablet   Take by mouth daily                                BIOTIN PO   Take 5,000 mg by mouth                                blood glucose monitoring lancets   Use to test blood sugar 1 times daily or as directed.                                blood glucose monitoring test strip   Commonly known as:  [employer] CONTOUR NEXT   Use to test blood sugar one time daily or as directed.                                clotrimazole 1 % cream   Commonly known as:  LOTRIMIN   Apply topically 2 times daily                                [employer]S OMEPRAZOLE PO   Take 20 mg by mouth daily                                fluocinonide 0.05 % ointment   Commonly known as:  LIDEX   Apply topically 2 times daily                                GLUCOSAMINE-CHONDROITIN PO   Take by mouth daily                                lisinopril 2.5 MG tablet   Commonly known as:  PRINIVIL/Zestril   Take 1 tablet (2.5 mg) by mouth daily                                metFORMIN 1000 MG tablet   Commonly known as:  GLUCOPHAGE   Take 0.5 tablets (500 mg) by mouth 2 times daily  (with meals)                                MULTIVITAMIN/IRON PO                                simvastatin 40 MG tablet   Commonly known as:  ZOCOR   Take 0.5 tablets (20 mg) by mouth At Bedtime

## 2018-07-24 NOTE — PROGRESS NOTES
0842 denies pain. Procedure explained and questions answered. Reviewed contrast discharge instructions sheet with pt and wife and copy given. Quit smoking about 30 yrs ago. Wife buddy here,  and to be with pt after discharge and overnight. Left hearing aids at home. Wallet and cell to wife.  1130 pt back at 1115, no pain. See flow sheets. Right brachial venous site has large dark bruise below site, site dresssed with 4x4 and coban. Tr band site CDI, soft. Wife here, dr simon in to speak with them both.  1245 denies pain. Both sites unchanged. Pt voided in urinal per henrique rn. Pt has had meal. Air being removed from tr band no bleeding. See flow sheets.  1345 reviewed avs discharge instructions with pt and wife and copy given. Questions answered.  1515 VSS. Denies pain. Radial site soft, no bleed or hematoma, bandaid to site, CDI. bracial site no change since return at 1115, no change to bruise, no bleeding, site soft, no hematoma. Pt walked and tolerated well. Used restroom, able to void. Iv d/c'd. Discharged via wheel chair to wife.

## 2018-07-24 NOTE — IP AVS SNAPSHOT
[facility] [address]    [place] [address]    Phone:  [phone]                                       After Visit Summary   7/24/2018    Reid Jimenes    MRN: [id number]           After Visit Summary Signature Page     I have received my discharge instructions, and my questions have been answered. I have discussed any challenges I see with this plan with the nurse or doctor.    ..........................................................................................................................................  Patient/Patient Representative Signature      ..........................................................................................................................................  Patient Representative Print Name and Relationship to Patient    ..................................................               ................................................  Date                                            Time    ..........................................................................................................................................  Reviewed by Signature/Title    ...................................................              ..............................................  Date                                                            Time

## 2018-07-24 NOTE — DISCHARGE INSTRUCTIONS
Cardiac Angiogram Discharge Instructions - Brachial & Radial    After you go home:      Have an adult stay with you until tomorrow.    Drink extra fluids for 2 days.    You may resume your normal diet.    No smoking       For 24 hours - due to the sedation you received:    Relax and take it easy.    Do NOT make any important or legal decisions.    Do NOT drive or operate machines at home or at work.    Do NOT drink alcohol.    Care of Arm & Wrist Puncture Sites:      For the first 24 hrs - check the puncture site every 1-2 hours while awake.    It is normal to have soreness at the puncture site and mild tingling in your hand for up to 3 days.    Remove the bandaid after 24 hours. If there is minor oozing, apply another bandaid and remove it after 12 hours.    You may shower. Do NOT take a bath, or use a hot tub or pool for at least 3 days. Do NOT scrub the site. Do not use lotion or powder near the puncture site.           Activity - For 2 days:    Arm site:      Do not use your hand or arm to support your weight (such as rising from a chair)     Do not lift more than 5 pounds or exercise your arm (such as tennis, golf or bowling).       Wrist site:    do not use your hand or arm to support your weight (such as rising from a chair)     do not bend your wrist (such as lifting a garage door).    do not lift more than 5 pounds or exercise your arm (such as tennis, golf or bowling).    Do NOT do any heavy activity such as exercise, lifting, or straining.     Bleeding:     Arm site:    If you start bleeding from the site in your arm, sit down and press firmly on/above the site with your fingers for 10 minutes.    Once bleeding stops, keep your arm straight for 2 hours.     [facility] as soon as you can.    Wrist site:    If you start bleeding from the site in your wrist, sit down and press firmly on/above the site for 10 minutes.     Once bleeding stops, keep arm still for 2 hours.     Call [facility] as soon as you can.    Call 911 right away if you have heavy bleeding or bleeding that does not stop.      Medicines:         If you are on Metformin (Glucophage), do not restart it until you have blood tests (within 2 to 3 days after discharge).  After you have your blood drawn, you may restart the Metformin.    Take your medications, including blood thinners, unless your provider tells you not to.     If you have stopped any other medicines, check with your provider about when to restart them.    Follow Up Appointments:      Follow up with [facility] Nurse Practitioner at [facility] of patient preference in 7-10 days.    Call the clinic if:      You have increased pain or a large or growing hard lump around the site.    The site is red, swollen, hot or tender.    Blood or fluid is draining from the site.    You have chills or a fever greater than 101 F (38 C).    Your arm feels numb, cool or changes color.    You have hives, a rash or unusual itching.    Any questions or concerns.      [facility] [place]:    [phone] [facility] (7 days a week)

## 2018-07-24 NOTE — PROGRESS NOTES
CARDIAC SURGERY CONSULT NOTE    Consult Reason: aortic valve stenosis    HPI: 84 year old male with progressively symptomatic severe aortic stenosis. He was previously seen by my colleague Dr Morales.    He underwent cor angio today, which revealed non obstructive disease. His additional history is notable for diabetes, hypertension, prior CVA, and peripheral vascular disease.    He affirms the history as noted.      A/P: Patient is a 84 year old male with severe symptomatic aortic stenosis. His STS risk is in the intermediate range. With his age and comorbidities, I think he is an appropriate candidate for TAVR. Would proceed with any remaining workup and present him at our multidisciplinary conference.    Thank you for the opportunity to participate in the care of this patient.      Reji Bird MD        PMH:  Past Medical History:   Diagnosis Date     Hyperlipidaemia LDL goal < 100      Hypertension      Type 2 diabetes mellitus (H)          PSH:  Past Surgical History:   Procedure Laterality Date     MANDIBLE SURGERY  1958         FH:  family history includes Alcohol/Drug in his brother, father, maternal grandfather, maternal grandmother, mother, paternal grandfather, paternal grandmother, and sister; Cancer in his maternal grandmother; Cancer (age of onset: 58) in his mother; Cerebrovascular Disease (age of onset: 65) in his brother; HEART DISEASE in his father; Myocardial Infarction (age of onset: 18) in his grandchild and another family member; Myocardial Infarction (age of onset: 70) in his sister.      SH:  Former Tobacco use      Allergies:  No Known Allergies    Home Meds:  No prescriptions prior to admission.       ROS: + as noted above    Physical Exam:  Temp:  [97  F (36.1  C)] 97  F (36.1  C)  Pulse:  [48-51] 51  Heart Rate:  [50-54] 51  Resp:  [16-18] 18  BP: (120-160)/(50-99) 120/55  SpO2:  [93 %-98 %] 95 %    Gen: NAD, resting comfortably in bed  Lungs: CTAB, non-labored breathing  CV: regular  rhythm, normal rate  Abd: soft  Ext: warm  Neuro: AOx3    Labs:  ABG No lab results found in last 7 days.  CBC  Recent Labs  Lab 07/24/18  0819   WBC 6.5   HGB 12.5*   *     BMP  Recent Labs  Lab 07/24/18  0819      POTASSIUM 4.4   CHLORIDE 108   CO2 28   BUN 12   CR 0.92   *     LFT  Recent Labs  Lab 07/24/18  0819   INR 1.01     PancreasNo lab results found in last 7 days.    Imaging:    COR ANGIO    SUMMARY:  1. Symptomatic severe aortic stenosis.  2. Normal right-sided and normal left-sided filling pressures.  3. Mild pulmonary hypertension with a mean PAP of 26 mmHg.  4. Reduced cardiac output of 4.3 L/min and reduced cardiac index of  2.3.  5. Mild nonobstructive coronary artery disease.        TAVR CTA    FINDINGS:     1.  Tricuspid aortic valve. Aortic valve calcium score is 2038. The  ascending aorta is moderate calcified, aortic arch is  moderate  calcified, the descending thoracic aorta is moderate to severe  calcified.   2.  The arch vessel branching pattern is normal.   3.  The suprarenal abdominal aorta is moderate calcified; infrarenal  abdominal aorta is moderate to severe Calcified  4.  Widely patent origins of celiac trunk, superior mesenteric artery  and inferior mesenteric artery.  Single bilateral renal arteries with  widely patent origins.   5.  There is no acute aortic pathology, such as dissection, intramural  hematoma, or contained rupture.      MEASUREMENTS:   Representative dimensions of the thoracoabdominal aorta are as  follows:     1. AORTIC ANNULUS MEASUREMENTS:     *  The average aortic annulus diameter is 24.6 mm,   *  Aortic annulus area is 4.77 cm2  *  Aortic annulus perimeter is 80 mm  *  The 3 cusp coaxial angle for aortic valve is ([place]oz[place]ican 21 , CAU 10) and  the AP coaxial angle is ([place]oz[place] 0 , CAU 29 ), these  angles will vary  depending upon the patient's body position  *  Left main - Annulus distance: 15.6 mm, RCA - Annulus distance: 20.4  mm     2. LVOT  MEASUREMENTS:     *  The average LVOT diameter is 25.1 mm  *  LVOT area is 4.94 cm2  *  LVOT perimeter is 81.2 mm  *  LVOT calcification moderate mm     3. AORTA MEASUREMENTS     1.  The aortic root at the sinuses of Valsalva (L/R/N) 28.5 mm x  30.1  mm 99.4 mm  2.  The sinotubular junction:  26.2 mm x 28.7 mm there is moderate  calcification  3.  Sinotubular junction height: 21 mm x 24.4 mm  4.  Proximal ascending aorta: 32.9 mm x 1.1 mm  5.  Distal abdominal aorta proximal to the bifurcation: 22 mm x 20.3  mm  this is a small abdominal aortic aneurysm.     4. ILIOFEMORAL MEASUREMENTS:     RIGHT:  1.  Right common iliac artery: 8.84 mm x 6.91 mm   2.  Right external iliac artery: 7.68 mm x 6.44 mm   3.  Right common femoral artery: 6.7 mm x 6.92 mm high right common  femoral stick  4.  Tortuosity: mild   5.  Calcification: Moderate severe      LEFT:  1.  Left common iliac artery: 5.09 mm x  5.36 mm  2.  Left external iliac artery: 6.48 mm x 6.17 mm  3.  Left common femoral artery: 5.63 mm x 7.45 mm  4.  Tortuosity: mild   5.  Calcification: severe      6.  Mitral Annular Calcification  none     7. Aortic Root Angle 44.7 degrees     8. Innominate Artery :  4.08 mm X 7.97mm  this represents a 50-60%  calcified stenosis     9. Left Carotid Artery:  23mm X 5.99 mm        ECHO    Interpretation Summary     The left ventricle is normal in size.  Left ventricular systolic function is normal.  The visual ejection fraction is estimated at 55-60%.  The right ventricle is normal in structure, function and size.  Severe valvular aortic stenosis. The mean AoV pressure gradient is 42mmHg. The  calculated aortic valve area is 1.0cm2.  The inferior vena cava is not dilated.  The study was technically adequate. There is no comparison study available.  _____________________________________________________________________________  __        Left Ventricle  The left ventricle is normal in size. There is moderate concentric  left  ventricular hypertrophy. The visual ejection fraction is estimated at 55-60%.  Left ventricular systolic function is normal. Left ventricular diastolic  function is indeterminate. No regional wall motion abnormalities noted. There  is no thrombus seen in the left ventricle.     Right Ventricle  The right ventricle is normal in structure, function and size. There is normal  right ventricular wall thickness.     Atria  Normal left atrial size. Right atrial size is normal.     Mitral Valve  The mitral valve leaflets appear normal. There is no evidence of stenosis,  fluttering, or prolapse. There is trace to mild mitral regurgitation.        Tricuspid Valve  The tricuspid valve is not well visualized. There is trace tricuspid  regurgitation.     Aortic Valve  The aortic valve leaflets are heavily calcified. There is mild (1+) aortic  regurgitation. Severe valvular aortic stenosis. The mean AoV pressure gradient  is 42mmHg. The calculated aortic valve area is 1.0cm2.     Pulmonic Valve  The pulmonic valve is not well visualized.     Vessels  The aortic root is normal size. Normal size ascending aorta. The inferior vena  cava is not dilated. Pulmonary arteries not well visualized.     Pericardium  The pericardium appears normal. There is no pericardial effusion.        Rhythm  Sinus rhythm was noted.

## 2018-07-26 DIAGNOSIS — I35.0 AORTIC STENOSIS: ICD-10-CM

## 2018-07-26 DIAGNOSIS — I35.0 AORTIC STENOSIS: Primary | ICD-10-CM

## 2018-07-26 LAB — INTERPRETATION ECG - MUSE: NORMAL

## 2018-07-26 PROCEDURE — 36415 COLL VENOUS BLD VENIPUNCTURE: CPT | Performed by: INTERNAL MEDICINE

## 2018-07-26 PROCEDURE — 80048 BASIC METABOLIC PNL TOTAL CA: CPT | Performed by: INTERNAL MEDICINE

## 2018-07-27 LAB
ANION GAP SERPL CALCULATED.3IONS-SCNC: 4 MMOL/L (ref 3–14)
BUN SERPL-MCNC: 16 MG/DL (ref 7–30)
CALCIUM SERPL-MCNC: 8.7 MG/DL (ref 8.5–10.1)
CHLORIDE SERPL-SCNC: 106 MMOL/L (ref 94–109)
CO2 SERPL-SCNC: 30 MMOL/L (ref 20–32)
CREAT SERPL-MCNC: 0.9 MG/DL (ref 0.66–1.25)
GFR SERPL CREATININE-BSD FRML MDRD: 81 ML/MIN/1.7M2
GLUCOSE SERPL-MCNC: 101 MG/DL (ref 70–99)
POTASSIUM SERPL-SCNC: 4.5 MMOL/L (ref 3.4–5.3)
SODIUM SERPL-SCNC: 140 MMOL/L (ref 133–144)

## 2018-07-30 ENCOUNTER — CARE COORDINATION (OUTPATIENT)
Dept: CARDIOLOGY | Facility: CLINIC | Age: 83
End: 2018-07-30

## 2018-07-30 DIAGNOSIS — I35.0 AORTIC STENOSIS: Primary | ICD-10-CM

## 2018-07-30 NOTE — PROGRESS NOTES
Patient was discussed at TAVR conference.  He is good candidate for TAVR procedure.  Plan is:  Greene Valve - 26 mm  Right TF approach  No sentinel device due to carotid artery disease  MAC sedation.  Will see Lizz CÁRDENAS tomorrow post angiogram.  Patient states he has a skin tear which he received in hospital.  We will look at this tomorrow in clinic.

## 2018-07-31 ENCOUNTER — OFFICE VISIT (OUTPATIENT)
Dept: CARDIOLOGY | Facility: CLINIC | Age: 83
End: 2018-07-31
Payer: MEDICARE

## 2018-07-31 VITALS
SYSTOLIC BLOOD PRESSURE: 130 MMHG | HEIGHT: 65 IN | DIASTOLIC BLOOD PRESSURE: 66 MMHG | WEIGHT: 179 LBS | BODY MASS INDEX: 29.82 KG/M2 | HEART RATE: 58 BPM

## 2018-07-31 DIAGNOSIS — I77.9 CAROTID ARTERY DISEASE WITHOUT CEREBRAL INFARCTION (H): ICD-10-CM

## 2018-07-31 DIAGNOSIS — I35.0 SEVERE AORTIC STENOSIS: Primary | ICD-10-CM

## 2018-07-31 DIAGNOSIS — E78.5 HYPERLIPIDEMIA WITH TARGET LDL LESS THAN 100: ICD-10-CM

## 2018-07-31 DIAGNOSIS — I10 ESSENTIAL HYPERTENSION WITH GOAL BLOOD PRESSURE LESS THAN 140/90: ICD-10-CM

## 2018-07-31 PROCEDURE — 99214 OFFICE O/P EST MOD 30 MIN: CPT | Performed by: NURSE PRACTITIONER

## 2018-07-31 NOTE — PROGRESS NOTES
Cardiology Clinic Progress Note  Reid Jimenes MRN# [id number]   YOB: 1934 Age: 84 year old     Reason For Visit:  Follow-up post right and left heart cath   Primary Cardiologist:   Dr. Higginbotham          History of Presenting Illness:    Reid Jimenes is a pleasant 84 year old patient who comes to the office today for follow up after his right and left heart cath.     He carries a  history significant for severe aortic stenosis, hypertension, hyperlipidemia, type 2 diabetes.  Over the last couple of years, he has developed progressively worsening shortness of breath with activity.   He was recently evaluated in our valve clinic due to his last echocardiogram on 5/10/2018 showing a mean aortic pressure gradient of 42 mmHg, aortic valve area of 1.0 cm , and normal ejection fraction of 55-60%. As part of his TAVR workup, he underwent a right and left heart cath that showed mild nonobstructive coronary artery disease, mild pulmonary hypertension and normal right-sided and normal left-sided filling pressures.  In December, a CT of the head and neck showed approximately 30% stenosis to the right internal carotid and approximately 65% stenosis to the left internal carotid.  He was reviewed at TAVR conference and felt to be a good candidate to proceed with TAVR.  He is scheduled for August 28.    He is feeling well on a cardiac standpoint with the exception of exertional shortness of breath.  He denies chest pain, , PND, orthopnea, presyncope, syncope, edema, heart racing, or palpitations.     Blood pressure is well controlled at 130/66, heart rate 58 managed well on lisinopril.  Recent blood work showed sodium 140, potassium 4.5, BUN 16, creatinine 0.9, GFR 81, hemoglobin 12.5, platelet 144  A1c is 6.5, managed on metformin.  He  remains compliant with all medications.                   Assessment and Plan:     1. Severe aortic stenosis -symptomatic. Recent echo showed mean aortic pressure gradient of  "42 mmHg, aortic valve area of 1.0 cm  planned TAVR scheduled for August 28.  Continue on current medical therapy.   2. Carotid disease -last evaluation in December 2017 showed 30% stenosis to the right internal carotid and 65% stenosis to the left internal carotid.  Continue on aspirin and statin  3. Hypertension-well-controlled on lisinopril  4. Hyperlipidemia -LDL at goal, continue on low-dose simvastatin.                Thank you for allowing me to participate in this delightful patient's care.        Lizz Love, APRN, CNP          Review of Systems:   Review of Systems:  Skin:  Positive for     Eyes:  Positive for glasses  ENT:  Positive for hearing loss  Respiratory:  Positive for dyspnea on exertion  Cardiovascular:  Negative    Gastroenterology: Negative    Genitourinary:  Negative    Musculoskeletal:  Negative    Neurologic:  Negative    Psychiatric:  Negative    Heme/Lymph/Imm:  Negative    Endocrine:  Positive for diabetes              Physical Exam:     Vitals: /66  Pulse 58  Ht 1.651 m (5' 5\")  Wt 81.2 kg (179 lb)  BMI 29.79 kg/m2  Constitutional:  cooperative, alert and oriented, well developed, well nourished, in no acute distress        Skin:  warm and dry to the touch   Small skin tear at right anticubital site    Head:  normocephalic, no masses or lesions        Eyes:  pupils equal and round, conjunctivae and lids unremarkable, sclera white, no xanthalasma, EOMS intact, no nystagmus        ENT:  no pallor or cyanosis, dentition good        Neck:    bilateral carotid bruit      Chest:  normal breath sounds, clear to auscultation, normal A-P diameter, normal symmetry, normal respiratory excursion, no use of accessory muscles        Cardiac: regular rhythm;normal S1 and S2       systolic murmur          Abdomen:  abdomen soft, non-tender, BS normoactive, no mass, no HSM, no bruits        Vascular:       3+ 3+           3+ 3+                Extremities and Back:  no deformities, clubbing, " cyanosis, erythema observed        Neurological:  no gross motor deficits                 Medications:     Current Outpatient Prescriptions   Medication Sig Dispense Refill     ascorbic acid 500 MG TABS        aspirin 325 MG tablet Take by mouth daily       BIOTIN PO Take 5,000 mg by mouth        blood glucose monitoring (MIRA CONTOUR NEXT) test strip Use to test blood sugar one time daily or as directed. 100 each 5     blood glucose monitoring (MIRA MICROLET) lancets Use to test blood sugar 1 times daily or as directed. 100 each 5     clotrimazole (LOTRIMIN) 1 % cream Apply topically 2 times daily 15 g 3     [employer] OMEPRAZOLE PO Take 20 mg by mouth daily        fluocinonide (LIDEX) 0.05 % ointment Apply topically 2 times daily       GLUCOSAMINE-CHONDROITIN PO Take by mouth daily        lisinopril (PRINIVIL/ZESTRIL) 2.5 MG tablet Take 1 tablet (2.5 mg) by mouth daily (Patient taking differently: Take 2.5 mg by mouth daily Patient takes 1/2 tablet of a 5 mg tablet to equal 2.5 mg daily) 90 tablet 3     metFORMIN (GLUCOPHAGE) 1000 MG tablet Take 0.5 tablets (500 mg) by mouth 2 times daily (with meals) 90 tablet 3     Multiple Vitamins-Iron (MULTIVITAMIN/IRON PO)        simvastatin (ZOCOR) 40 MG tablet Take 0.5 tablets (20 mg) by mouth At Bedtime 45 tablet 3           Family History   Problem Relation Age of Onset     HEART DISEASE Father      Alcohol/Drug Father      Myocardial Infarction Grandchild 18     Grandson     Myocardial Infarction Other 18     Nephew      Cancer Mother 58     Stomach cancer     Alcohol/Drug Mother      Cancer Maternal Grandmother      stomach cancer     Alcohol/Drug Maternal Grandmother      Myocardial Infarction Sister 70     Name: Reena      Alcohol/Drug Sister      Cerebrovascular Disease Brother 65     Name: Miguelito     Alcohol/Drug Brother      Alcohol/Drug Maternal Grandfather      Alcohol/Drug Paternal Grandmother      Alcohol/Drug Paternal Grandfather        Social History     Social  History     Marital status:      Spouse name: Alyssa     Number of children: 3     Years of education: N/A     Occupational History     Not on file.     Social History Main Topics     Smoking status: Former Smoker     Packs/day: 1.00     Years: 30.00     Types: Cigarettes     Smokeless tobacco: Former User     Types: Chew     Quit date: 1/5/1983     Alcohol use No     Drug use: No     Sexual activity: Not Currently     Partners: Female     Other Topics Concern     Not on file     Social History Narrative            Past Medical History:     Past Medical History:   Diagnosis Date     Hyperlipidaemia LDL goal < 100      Hypertension      Type 2 diabetes mellitus (H)               Past Surgical History:     Past Surgical History:   Procedure Laterality Date     MANDIBLE SURGERY  1958              Allergies:   Review of patient's allergies indicates no known allergies.       Data:   All laboratory data reviewed:    LAST CHOLESTEROL:  Lab Results   Component Value Date    CHOL 97 04/12/2018     Lab Results   Component Value Date    HDL 39 04/12/2018     Lab Results   Component Value Date    LDL 37 04/12/2018     Lab Results   Component Value Date    TRIG 106 04/12/2018     Lab Results   Component Value Date    CHOLHDLRATIO 2.3 10/07/2015       LAST BMP:  Lab Results   Component Value Date     07/26/2018      Lab Results   Component Value Date    POTASSIUM 4.5 07/26/2018     Lab Results   Component Value Date    CHLORIDE 106 07/26/2018     Lab Results   Component Value Date    DEEPTI 8.7 07/26/2018     Lab Results   Component Value Date    CO2 30 07/26/2018     Lab Results   Component Value Date    BUN 16 07/26/2018     Lab Results   Component Value Date    CR 0.90 07/26/2018     Lab Results   Component Value Date     07/26/2018       LAST CBC:  Lab Results   Component Value Date    WBC 6.5 07/24/2018     Lab Results   Component Value Date    RBC 4.20 07/24/2018     Lab Results   Component Value Date    HGB  12.5 07/24/2018     Lab Results   Component Value Date    HCT 37.4 07/24/2018     Lab Results   Component Value Date    MCV 89 07/24/2018     Lab Results   Component Value Date    MCH 29.8 07/24/2018     Lab Results   Component Value Date    MCHC 33.4 07/24/2018     Lab Results   Component Value Date    RDW 13.0 07/24/2018     Lab Results   Component Value Date     07/24/2018

## 2018-07-31 NOTE — MR AVS SNAPSHOT
After Visit Summary   7/31/2018    Reid Jimenes    MRN: [id number]           Patient Information     Date Of Birth          2/14/1934        Visit Information        Provider Department      7/31/2018 12:30 PM Lizz Love APRN [facility]        Care Instructions    Thanks for coming into [facility] clinic today.    Doing well post right and left heart catheterization.  Continue on current medical therapy.   Will need to follow up in office prior to TAVR procedure.           Please call my nurse at [phone] with any questions or concerns.    Scheduling phone number: [phone]  Reminder: Please bring in all current medications, over the counter supplements and vitamin bottles to your next appointment.                Follow-ups after your visit        Who to contact     If you have questions or need follow up information about today's clinic visit or your schedule please contact [facility] directly at [phone].  Normal or non-critical lab and imaging results will be communicated to you by IN-PIPE TECHNOLOGYhart, letter or phone within 4 business days after the clinic has received the results. If you do not hear from us within 7 days, please contact the clinic through IN-PIPE TECHNOLOGYhart or phone. If you have a critical or abnormal lab result, we will notify you by phone as soon as possible.  Submit refill requests through Roadstruck or call your pharmacy and they will forward the refill request to us. Please allow 3 business days for your refill to be completed.          Additional Information About Your Visit        IN-PIPE TECHNOLOGYhart Information     Roadstruck gives you secure access to your electronic health record. If you see a primary care provider, you can also send messages to your care team and make appointments. If you have questions, please call your primary care clinic.  If you do not have a primary care provider,  "please call [phone] and they will assist you.        Care EveryWhere ID     This is your Care EveryWhere ID. This could be used by other organizations to access your [facility] medical records  [id number]        Your Vitals Were     Pulse Height BMI (Body Mass Index)             58 1.651 m (5' 5\") 29.79 kg/m2          Blood Pressure from Last 3 Encounters:   07/31/18 130/66   07/24/18 120/55   07/12/18 115/61    Weight from Last 3 Encounters:   07/31/18 81.2 kg (179 lb)   07/24/18 79.8 kg (176 lb)   07/12/18 79.8 kg (176 lb)              Today, you had the following     No orders found for display         Today's Medication Changes          These changes are accurate as of 7/31/18  1:01 PM.  If you have any questions, ask your nurse or doctor.               These medicines have changed or have updated prescriptions.        Dose/Directions    lisinopril 2.5 MG tablet   Commonly known as:  PRINIVIL/Zestril   This may have changed:  additional instructions   Used for:  Type 2 diabetes mellitus with microalbuminuria, without long-term current use of insulin (H)        Dose:  2.5 mg   Take 1 tablet (2.5 mg) by mouth daily   Quantity:  90 tablet   Refills:  3                Primary Care Provider Office Phone # Fax #    Viet Bingham [phone]       [address]  [place] [id number]        Equal Access to Services     [facility]: Hadii aad ku hadasho Soomaali, waaxda luqadaha, qaybta kaalmada adeegyada, rigo myers. So [facility] [phone].    ATENCIÓN: Si habla español, tiene a philip disposición servicios gratuitos de asistencia lingüística. Silke al [phone].    We comply with applicable federal civil rights laws and [place] laws. We do not discriminate on the basis of race, color, national origin, age, disability, sex, sexual orientation, or gender identity.            Thank you!     Thank you for choosing [facility]  for your care. Our goal is always to provide you with excellent care. Hearing back from our patients is one way we can continue to improve our services. Please take a few minutes to complete the written survey that you may receive in the mail after your visit with us. Thank you!             Your Updated Medication List - Protect others around you: Learn how to safely use, store and throw away your medicines at www.disposemymeds.org.          This list is accurate as of 7/31/18  1:01 PM.  Always use your most recent med list.                   Brand Name Dispense Instructions for use Diagnosis    ascorbic acid 500 MG Tabs           aspirin 325 MG tablet      Take by mouth daily        BIOTIN PO      Take 5,000 mg by mouth        blood glucose monitoring lancets     100 each    Use to test blood sugar 1 times daily or as directed.    Type 2 diabetes mellitus with microalbuminuria, without long-term current use of insulin (H)       blood glucose monitoring test strip    MIRA CONTOUR NEXT    100 each    Use to test blood sugar one time daily or as directed.    Type 2 diabetes mellitus with microalbuminuria, without long-term current use of insulin (H)       clotrimazole 1 % cream    LOTRIMIN    15 g    Apply topically 2 times daily    Tinea corporis       CVS OMEPRAZOLE PO      Take 20 mg by mouth daily        fluocinonide 0.05 % ointment    LIDEX     Apply topically 2 times daily        GLUCOSAMINE-CHONDROITIN PO      Take by mouth daily        lisinopril 2.5 MG tablet    PRINIVIL/Zestril    90 tablet    Take 1 tablet (2.5 mg) by mouth daily    Type 2 diabetes mellitus with microalbuminuria, without long-term current use of insulin (H)       metFORMIN 1000 MG tablet    GLUCOPHAGE    90 tablet    Take 0.5 tablets (500 mg) by mouth 2 times daily (with meals)    Type 2 diabetes mellitus with microalbuminuria, without long-term current use of insulin (H)       MULTIVITAMIN/IRON PO           simvastatin 40 MG tablet     ZOCOR    45 tablet    Take 0.5 tablets (20 mg) by mouth At Bedtime    Hyperlipidemia with target LDL less than 100

## 2018-07-31 NOTE — LETTER
7/31/2018    Viet Bingham MD, MD  [address] [facility] [address]  [facility] [address]    RE: Reid Jimenes       Dear Colleague,    I had the pleasure of seeing Reid Jimenes in the [facility].    Cardiology Clinic Progress Note  Reid Jimenes MRN# [id number]   YOB: 1934 Age: 84 year old     Reason For Visit:  Follow-up post right and left heart cath   Primary Cardiologist:   Dr. Higginbotham          History of Presenting Illness:    Reid Jimenes is a pleasant 84 year old patient who comes to the office today for follow up after his right and left heart cath.     He carries a  history significant for severe aortic stenosis, hypertension, hyperlipidemia, type 2 diabetes.  Over the last couple of years, he has developed progressively worsening shortness of breath with activity.   He was recently evaluated in our valve clinic due to his last echocardiogram on 5/10/2018 showing a mean aortic pressure gradient of 42 mmHg, aortic valve area of 1.0 cm  , and normal ejection fraction of 55-60%. As part of his TAVR workup, he underwent a right and left heart cath that showed mild nonobstructive coronary artery disease, mild pulmonary hypertension and normal right-sided and normal left-sided filling pressures.  In December, a CT of the head and neck showed approximately 30% stenosis to the right internal carotid and approximately 65% stenosis to the left internal carotid.  He was reviewed at TAVR conference and felt to be a good candidate to proceed with TAVR.  He is scheduled for August 28.    He is feeling well on a cardiac standpoint with the exception of exertional shortness of breath.  He denies chest pain, , PND, orthopnea, presyncope, syncope, edema, heart racing, or palpitations.     Blood pressure is well controlled at 130/66, heart rate 58 managed well on lisinopril.  Recent blood work showed sodium 140, potassium 4.5, BUN 16, creatinine 0.9, GFR 81, hemoglobin  "12.5, platelet 144  A1c is 6.5, managed on metformin.  He  remains compliant with all medications.                   Assessment and Plan:     1. Severe aortic stenosis -symptomatic. Recent echo showed mean aortic pressure gradient of 42 mmHg, aortic valve area of 1.0 cm  planned TAVR scheduled for August 28.  Continue on current medical therapy.   2. Carotid disease -last evaluation in December 2017 showed 30% stenosis to the right internal carotid and 65% stenosis to the left internal carotid.  Continue on aspirin and statin  3. Hypertension-well-controlled on lisinopril  4. Hyperlipidemia -LDL at goal, continue on low-dose simvastatin.                Thank you for allowing me to participate in this delightful patient's care.        Lizz Love, APRN, CNP          Review of Systems:   Review of Systems:  Skin:  Positive for     Eyes:  Positive for glasses  ENT:  Positive for hearing loss  Respiratory:  Positive for dyspnea on exertion  Cardiovascular:  Negative    Gastroenterology: Negative    Genitourinary:  Negative    Musculoskeletal:  Negative    Neurologic:  Negative    Psychiatric:  Negative    Heme/Lymph/Imm:  Negative    Endocrine:  Positive for diabetes              Physical Exam:     Vitals: /66  Pulse 58  Ht 1.651 m (5' 5\")  Wt 81.2 kg (179 lb)  BMI 29.79 kg/m2  Constitutional:  cooperative, alert and oriented, well developed, well nourished, in no acute distress        Skin:  warm and dry to the touch   Small skin tear at right anticubital site    Head:  normocephalic, no masses or lesions        Eyes:  pupils equal and round, conjunctivae and lids unremarkable, sclera white, no xanthalasma, EOMS intact, no nystagmus        ENT:  no pallor or cyanosis, dentition good        Neck:    bilateral carotid bruit      Chest:  normal breath sounds, clear to auscultation, normal A-P diameter, normal symmetry, normal respiratory excursion, no use of accessory muscles        Cardiac: regular " rhythm;normal S1 and S2       systolic murmur          Abdomen:  abdomen soft, non-tender, BS normoactive, no mass, no HSM, no bruits        Vascular:       3+ 3+           3+ 3+                Extremities and Back:  no deformities, clubbing, cyanosis, erythema observed        Neurological:  no gross motor deficits                 Medications:     Current Outpatient Prescriptions   Medication Sig Dispense Refill     ascorbic acid 500 MG TABS        aspirin 325 MG tablet Take by mouth daily       BIOTIN PO Take 5,000 mg by mouth        blood glucose monitoring (MIRA CONTOUR NEXT) test strip Use to test blood sugar one time daily or as directed. 100 each 5     blood glucose monitoring (MIRA MICROLET) lancets Use to test blood sugar 1 times daily or as directed. 100 each 5     clotrimazole (LOTRIMIN) 1 % cream Apply topically 2 times daily 15 g 3     CVS OMEPRAZOLE PO Take 20 mg by mouth daily        fluocinonide (LIDEX) 0.05 % ointment Apply topically 2 times daily       GLUCOSAMINE-CHONDROITIN PO Take by mouth daily        lisinopril (PRINIVIL/ZESTRIL) 2.5 MG tablet Take 1 tablet (2.5 mg) by mouth daily (Patient taking differently: Take 2.5 mg by mouth daily Patient takes 1/2 tablet of a 5 mg tablet to equal 2.5 mg daily) 90 tablet 3     metFORMIN (GLUCOPHAGE) 1000 MG tablet Take 0.5 tablets (500 mg) by mouth 2 times daily (with meals) 90 tablet 3     Multiple Vitamins-Iron (MULTIVITAMIN/IRON PO)        simvastatin (ZOCOR) 40 MG tablet Take 0.5 tablets (20 mg) by mouth At Bedtime 45 tablet 3           Family History   Problem Relation Age of Onset     HEART DISEASE Father      Alcohol/Drug Father      Myocardial Infarction Grandchild 18     Grandson     Myocardial Infarction Other 18     Nephew      Cancer Mother 58     Stomach cancer     Alcohol/Drug Mother      Cancer Maternal Grandmother      stomach cancer     Alcohol/Drug Maternal Grandmother      Myocardial Infarction Sister 70     Name: Reena       Alcohol/Drug Sister      Cerebrovascular Disease Brother 65     Name: Miguelito     Alcohol/Drug Brother      Alcohol/Drug Maternal Grandfather      Alcohol/Drug Paternal Grandmother      Alcohol/Drug Paternal Grandfather        Social History     Social History     Marital status:      Spouse name: Alyssa     Number of children: 3     Years of education: N/A     Occupational History     Not on file.     Social History Main Topics     Smoking status: Former Smoker     Packs/day: 1.00     Years: 30.00     Types: Cigarettes     Smokeless tobacco: Former User     Types: Chew     Quit date: 1/5/1983     Alcohol use No     Drug use: No     Sexual activity: Not Currently     Partners: Female     Other Topics Concern     Not on file     Social History Narrative            Past Medical History:     Past Medical History:   Diagnosis Date     Hyperlipidaemia LDL goal < 100      Hypertension      Type 2 diabetes mellitus (H)               Past Surgical History:     Past Surgical History:   Procedure Laterality Date     MANDIBLE SURGERY  1958              Allergies:   Review of patient's allergies indicates no known allergies.       Data:   All laboratory data reviewed:    LAST CHOLESTEROL:  Lab Results   Component Value Date    CHOL 97 04/12/2018     Lab Results   Component Value Date    HDL 39 04/12/2018     Lab Results   Component Value Date    LDL 37 04/12/2018     Lab Results   Component Value Date    TRIG 106 04/12/2018     Lab Results   Component Value Date    CHOLHDLRATIO 2.3 10/07/2015       LAST BMP:  Lab Results   Component Value Date     07/26/2018      Lab Results   Component Value Date    POTASSIUM 4.5 07/26/2018     Lab Results   Component Value Date    CHLORIDE 106 07/26/2018     Lab Results   Component Value Date    DEEPTI 8.7 07/26/2018     Lab Results   Component Value Date    CO2 30 07/26/2018     Lab Results   Component Value Date    BUN 16 07/26/2018     Lab Results   Component Value Date    CR 0.90  07/26/2018     Lab Results   Component Value Date     07/26/2018       LAST CBC:  Lab Results   Component Value Date    WBC 6.5 07/24/2018     Lab Results   Component Value Date    RBC 4.20 07/24/2018     Lab Results   Component Value Date    HGB 12.5 07/24/2018     Lab Results   Component Value Date    HCT 37.4 07/24/2018     Lab Results   Component Value Date    MCV 89 07/24/2018     Lab Results   Component Value Date    MCH 29.8 07/24/2018     Lab Results   Component Value Date    MCHC 33.4 07/24/2018     Lab Results   Component Value Date    RDW 13.0 07/24/2018     Lab Results   Component Value Date     07/24/2018       Thank you for allowing me to participate in the care of your patient.    Sincerely,     SARY Ball CNP     [facility]

## 2018-07-31 NOTE — PATIENT INSTRUCTIONS
Thanks for coming into [facility] clinic today.    Doing well post right and left heart catheterization.  Continue on current medical therapy.   Will need to follow up in office prior to TAVR procedure.           Please call my nurse at [phone] with any questions or concerns.    Scheduling phone number: [phone]  Reminder: Please bring in all current medications, over the counter supplements and vitamin bottles to your next appointment.

## 2018-08-02 DIAGNOSIS — I35.0 AORTIC STENOSIS: Primary | ICD-10-CM

## 2018-08-10 ENCOUNTER — HOSPITAL ENCOUNTER (OUTPATIENT)
Dept: CARDIOLOGY | Facility: CLINIC | Age: 83
Discharge: HOME | End: 2018-08-10
Attending: SURGERY | Admitting: SURGERY
Payer: MEDICARE

## 2018-08-10 DIAGNOSIS — I65.29 CAROTID ARTERY STENOSIS: ICD-10-CM

## 2018-08-10 PROCEDURE — 93880 EXTRACRANIAL BILAT STUDY: CPT

## 2018-08-10 PROCEDURE — 93880 EXTRACRANIAL BILAT STUDY: CPT | Mod: 26 | Performed by: INTERNAL MEDICINE

## 2018-08-26 RX ORDER — CEFAZOLIN SODIUM 2 G/100ML
2 INJECTION, SOLUTION INTRAVENOUS
Status: CANCELLED | OUTPATIENT
Start: 2018-08-26 | End: 2038-08-27

## 2018-08-26 RX ORDER — LIDOCAINE 40 MG/G
CREAM TOPICAL
Status: CANCELLED | OUTPATIENT
Start: 2018-08-26 | End: 2019-08-26

## 2018-08-26 RX ORDER — SODIUM CHLORIDE 9 MG/ML
INJECTION, SOLUTION INTRAVENOUS CONTINUOUS
Status: CANCELLED | OUTPATIENT
Start: 2018-08-26 | End: 2019-08-26

## 2018-08-26 RX ORDER — ASPIRIN 81 MG/1
81 TABLET ORAL DAILY
Status: CANCELLED | OUTPATIENT
Start: 2018-08-26 | End: 2019-08-26

## 2018-08-27 ENCOUNTER — ANESTHESIA EVENT (OUTPATIENT)
Dept: SURGERY | Facility: CLINIC | Age: 83
DRG: 267 | End: 2018-08-27
Payer: MEDICARE

## 2018-08-27 ENCOUNTER — HOSPITAL ENCOUNTER (OUTPATIENT)
Dept: LAB | Facility: CLINIC | Age: 83
Discharge: HOME | DRG: 267 | End: 2018-08-27
Attending: INTERNAL MEDICINE | Admitting: INTERNAL MEDICINE
Payer: MEDICARE

## 2018-08-27 ENCOUNTER — OFFICE VISIT (OUTPATIENT)
Dept: CARDIOLOGY | Facility: CLINIC | Age: 83
End: 2018-08-27
Payer: MEDICARE

## 2018-08-27 VITALS
BODY MASS INDEX: 29.32 KG/M2 | HEART RATE: 60 BPM | WEIGHT: 176 LBS | SYSTOLIC BLOOD PRESSURE: 130 MMHG | DIASTOLIC BLOOD PRESSURE: 62 MMHG | HEIGHT: 65 IN

## 2018-08-27 DIAGNOSIS — I35.0 SEVERE AORTIC STENOSIS: Primary | ICD-10-CM

## 2018-08-27 DIAGNOSIS — I10 ESSENTIAL HYPERTENSION WITH GOAL BLOOD PRESSURE LESS THAN 140/90: ICD-10-CM

## 2018-08-27 DIAGNOSIS — I77.9 CAROTID ARTERY DISEASE WITHOUT CEREBRAL INFARCTION (H): ICD-10-CM

## 2018-08-27 DIAGNOSIS — E78.5 HYPERLIPIDEMIA WITH TARGET LDL LESS THAN 100: ICD-10-CM

## 2018-08-27 DIAGNOSIS — I35.0 AORTIC STENOSIS: ICD-10-CM

## 2018-08-27 LAB
ABO + RH BLD: NORMAL
ABO + RH BLD: NORMAL
ALBUMIN SERPL-MCNC: 3.8 G/DL (ref 3.4–5)
ALP SERPL-CCNC: 81 U/L (ref 40–150)
ALT SERPL W P-5'-P-CCNC: 23 U/L (ref 0–70)
ANION GAP SERPL CALCULATED.3IONS-SCNC: 7 MMOL/L (ref 3–14)
AST SERPL W P-5'-P-CCNC: 17 U/L (ref 0–45)
BILIRUB SERPL-MCNC: 0.5 MG/DL (ref 0.2–1.3)
BLD GP AB SCN SERPL QL: NORMAL
BLOOD BANK CMNT PATIENT-IMP: NORMAL
BUN SERPL-MCNC: 22 MG/DL (ref 7–30)
CALCIUM SERPL-MCNC: 8.8 MG/DL (ref 8.5–10.1)
CHLORIDE SERPL-SCNC: 106 MMOL/L (ref 94–109)
CO2 SERPL-SCNC: 29 MMOL/L (ref 20–32)
CREAT SERPL-MCNC: 0.94 MG/DL (ref 0.66–1.25)
ERYTHROCYTE [DISTWIDTH] IN BLOOD BY AUTOMATED COUNT: 12.9 % (ref 10–15)
GFR SERPL CREATININE-BSD FRML MDRD: 76 ML/MIN/1.7M2
GLUCOSE SERPL-MCNC: 125 MG/DL (ref 70–99)
HCT VFR BLD AUTO: 40.9 % (ref 40–53)
HGB BLD-MCNC: 13.7 G/DL (ref 13.3–17.7)
MCH RBC QN AUTO: 29.7 PG (ref 26.5–33)
MCHC RBC AUTO-ENTMCNC: 33.5 G/DL (ref 31.5–36.5)
MCV RBC AUTO: 89 FL (ref 78–100)
PLATELET # BLD AUTO: 147 10E9/L (ref 150–450)
POTASSIUM SERPL-SCNC: 4.2 MMOL/L (ref 3.4–5.3)
PROT SERPL-MCNC: 7.1 G/DL (ref 6.8–8.8)
RBC # BLD AUTO: 4.62 10E12/L (ref 4.4–5.9)
SODIUM SERPL-SCNC: 142 MMOL/L (ref 133–144)
SPECIMEN EXP DATE BLD: NORMAL
WBC # BLD AUTO: 7.7 10E9/L (ref 4–11)

## 2018-08-27 PROCEDURE — 99215 OFFICE O/P EST HI 40 MIN: CPT | Performed by: NURSE PRACTITIONER

## 2018-08-27 PROCEDURE — 86901 BLOOD TYPING SEROLOGIC RH(D): CPT | Performed by: INTERNAL MEDICINE

## 2018-08-27 PROCEDURE — 86900 BLOOD TYPING SEROLOGIC ABO: CPT | Performed by: INTERNAL MEDICINE

## 2018-08-27 PROCEDURE — 80053 COMPREHEN METABOLIC PANEL: CPT | Performed by: INTERNAL MEDICINE

## 2018-08-27 PROCEDURE — 86850 RBC ANTIBODY SCREEN: CPT | Performed by: INTERNAL MEDICINE

## 2018-08-27 PROCEDURE — 36415 COLL VENOUS BLD VENIPUNCTURE: CPT | Performed by: INTERNAL MEDICINE

## 2018-08-27 PROCEDURE — 85027 COMPLETE CBC AUTOMATED: CPT | Performed by: INTERNAL MEDICINE

## 2018-08-27 NOTE — MR AVS SNAPSHOT
After Visit Summary   8/27/2018    Reid Jimenes    MRN: [id number]           Patient Information     Date Of Birth          2/14/1934        Visit Information        Provider Department      8/27/2018 10:10 AM Lizz Love APRN CNP [facility]   [place]        Today's Diagnoses     Severe aortic stenosis    -  1    Carotid artery disease without cerebral infarction (H)        Essential hypertension with goal blood pressure less than 140/90        Hyperlipidemia with target LDL less than 100           Follow-ups after your visit        Your next 10 appointments already scheduled     Aug 28, 2018  7:30 AM CDT   Transcath Aortic-Valve Replace with SHCVR1   [facility] Cardiac Catheterization Lab ([facility])    [address]  [place] [address] [phone]            Aug 28, 2018  7:30 AM CDT   ECH VIDHI INTERVENTIONAL with SHECHR2   [facility] Radiology ([facility])    [address] [place] [address]   [phone]           1.  Please bring or wear a comfortable two-piece outfit and walking shoes. 2.  Arrival time: -   [facility]:  arrive 90 minutes prior to examination time. -   [facility]:  arrive 2 hrs prior to examination time. -   [facility]:   arrive 15 minutes prior to examination time. 3.  Plan to have someone here to drive you home after the test. -   Someone should stay with you for 6 hours after your test. 4.  No food or drink: -   6 hours before the test 5.  If you take antacids or water pills (diuretics): Do not take them until after your test. You may take blood pressure medicine with a few sips of water. 6.  If you have diabetes: -   Morning slots preferred -   If you take insulin, call your diabetes care team. Ask if you should take a   dose the morning of your test. -   If you take diabetes medicine by mouth, don't take it on the morning of your test. Bring it with you to take after  the test. (If you have questions, call your diabetes care team.) 7.  Bring a list of any medicines you are taking. 8.  Do not drive for 24 hours after the test. 9.  For any questions that cannot be answered, please contact the ordering physician 10.  Please do not wear perfumes or scented lotions on the day of your exam.            Aug 28, 2018   Procedure with GENERIC ANESTHESIA PROVIDER   [facility] PeriOP Services (--)    [address]  [facility] [address] [phone]            Sep 06, 2018  3:10 PM CDT   Return Visit with SARY Ball CNP   [facility] ([facility])    [address] [facility] [address]  [place] [address] [phone] OPT 2            Sep 27, 2018  2:30 PM CDT   TAVR Return with Jose Saldana PA-C   [facility] ([facility])    [address] [place] [id number]  [facility] [id number] [phone] OPT 2              Who to contact     If you have questions or need follow up information about today's clinic visit or your schedule please contact [facility] directly at [phone].  Normal or non-critical lab and imaging results will be communicated to you by Cactushart, letter or phone within 4 business days after the clinic has received the results. If you do not hear from us within 7 days, please contact the clinic through MyChart or phone. If you have a critical or abnormal lab result, we will notify you by phone as soon as possible.  Submit refill requests through Practice Fusion or call your pharmacy and they will forward the refill request to us. Please allow 3 business days for your refill to be completed.          Additional Information About Your Visit        Practice Fusion Information     Practice Fusion gives you secure access to your electronic health record. If you see a primary care provider, you can also send messages to your care team  "and make appointments. If you have questions, please call your primary care clinic.  If you do not have a primary care provider, please call [phone] and they will assist you.        Care EveryWhere ID     This is your Care EveryWhere ID. This could be used by other organizations to access your [facility] medical records  [id number]        Your Vitals Were     Pulse Height BMI (Body Mass Index)             60 1.651 m (5' 5\") 29.29 kg/m2          Blood Pressure from Last 3 Encounters:   08/27/18 130/62   07/31/18 130/66   07/24/18 120/55    Weight from Last 3 Encounters:   08/27/18 79.8 kg (176 lb)   07/31/18 81.2 kg (179 lb)   07/24/18 79.8 kg (176 lb)              Today, you had the following     No orders found for display         Today's Medication Changes          These changes are accurate as of 8/27/18  5:43 PM.  If you have any questions, ask your nurse or doctor.               These medicines have changed or have updated prescriptions.        Dose/Directions    lisinopril 2.5 MG tablet   Commonly known as:  PRINIVIL/Zestril   This may have changed:  additional instructions   Used for:  Type 2 diabetes mellitus with microalbuminuria, without long-term current use of insulin (H)        Dose:  2.5 mg   Take 1 tablet (2.5 mg) by mouth daily   Quantity:  90 tablet   Refills:  3                Primary Care Provider Office Phone # Fax #    Viet Bingham [phone]       [address] [facility] [address]  [place] [id number]        Equal Access to Services     [facility] AH: Hadii maurice martinez hadasho Soomaali, waaxda luqadaha, qaybta kaalmada adeegyada, rigo myers. So [facility] [phone].    ATENCIÓN: Si habla español, tiene a philip disposición servicios gratuitos de asistencia lingüística. Llame al [phone].    We comply with applicable federal civil rights laws and [place] laws. We do not discriminate on the basis of race, color, national origin, age, disability, sex, " sexual orientation, or gender identity.            Thank you!     Thank you for choosing [facility]  for your care. Our goal is always to provide you with excellent care. Hearing back from our patients is one way we can continue to improve our services. Please take a few minutes to complete the written survey that you may receive in the mail after your visit with us. Thank you!             Your Updated Medication List - Protect others around you: Learn how to safely use, store and throw away your medicines at www.disposemymeds.org.          This list is accurate as of 8/27/18  5:43 PM.  Always use your most recent med list.                   Brand Name Dispense Instructions for use Diagnosis    ascorbic acid 500 MG Tabs           aspirin 325 MG tablet      Take by mouth daily        BIOTIN PO      Take 5,000 mg by mouth        blood glucose monitoring lancets     100 each    Use to test blood sugar 1 times daily or as directed.    Type 2 diabetes mellitus with microalbuminuria, without long-term current use of insulin (H)       blood glucose monitoring test strip    MIRA CONTOUR NEXT    100 each    Use to test blood sugar one time daily or as directed.    Type 2 diabetes mellitus with microalbuminuria, without long-term current use of insulin (H)       clotrimazole 1 % cream    LOTRIMIN    15 g    Apply topically 2 times daily    Tinea corporis       CVS OMEPRAZOLE PO      Take 20 mg by mouth daily        fluocinonide 0.05 % ointment    LIDEX     Apply topically 2 times daily        GLUCOSAMINE-CHONDROITIN PO      Take by mouth daily        lisinopril 2.5 MG tablet    PRINIVIL/Zestril    90 tablet    Take 1 tablet (2.5 mg) by mouth daily    Type 2 diabetes mellitus with microalbuminuria, without long-term current use of insulin (H)       metFORMIN 1000 MG tablet    GLUCOPHAGE    90 tablet    Take 0.5 tablets (500 mg) by mouth 2 times daily (with meals)    Type 2 diabetes  mellitus with microalbuminuria, without long-term current use of insulin (H)       MULTIVITAMIN/IRON PO           simvastatin 40 MG tablet    ZOCOR    45 tablet    Take 0.5 tablets (20 mg) by mouth At Bedtime    Hyperlipidemia with target LDL less than 100

## 2018-08-27 NOTE — H&P (VIEW-ONLY)
Cardiology Clinic Progress Note  Reid Jimenes MRN# [id number]   YOB: 1934 Age: 84 year old     Reason For Visit:  Follow-up post right and left heart cath   Primary Cardiologist:   Dr. Higginbotham          History of Presenting Illness:    Reid Jimenes is a pleasant 84 year old patient who comes to the office today for follow up after his right and left heart cath.     He carries a  history significant for severe aortic stenosis, hypertension, hyperlipidemia, type 2 diabetes.  Over the last couple of years, he has developed progressively worsening shortness of breath with activity.   He was recently evaluated in our valve clinic due to his last echocardiogram on 5/10/2018 showing a mean aortic pressure gradient of 42 mmHg, aortic valve area of 1.0 cm , and normal ejection fraction of 55-60%. As part of his TAVR workup, he underwent a right and left heart cath that showed mild nonobstructive coronary artery disease, mild pulmonary hypertension and normal right-sided and normal left-sided filling pressures.  In December, a CT of the head and neck showed approximately 30% stenosis to the right internal carotid and approximately 65% stenosis to the left internal carotid.  He was reviewed at TAVR conference and felt to be a good candidate to proceed with TAVR.  He is scheduled for August 28.    He is feeling well on a cardiac standpoint with the exception of exertional shortness of breath.  He denies chest pain, , PND, orthopnea, presyncope, syncope, edema, heart racing, or palpitations.     Blood pressure is well controlled at 130/66, heart rate 58 managed well on lisinopril.  Recent blood work showed sodium 140, potassium 4.5, BUN 16, creatinine 0.9, GFR 81, hemoglobin 12.5, platelet 144  A1c is 6.5, managed on metformin.  He  remains compliant with all medications.                   Assessment and Plan:     1. Severe aortic stenosis -symptomatic. Recent echo showed mean aortic pressure gradient of  "42 mmHg, aortic valve area of 1.0 cm  planned TAVR scheduled for August 28.  Continue on current medical therapy.   2. Carotid disease -last evaluation in December 2017 showed 30% stenosis to the right internal carotid and 65% stenosis to the left internal carotid.  Continue on aspirin and statin  3. Hypertension-well-controlled on lisinopril  4. Hyperlipidemia -LDL at goal, continue on low-dose simvastatin.                Thank you for allowing me to participate in this delightful patient's care.        Lizz Love, APRN, CNP          Review of Systems:   Review of Systems:  Skin:  Positive for     Eyes:  Positive for glasses  ENT:  Positive for hearing loss  Respiratory:  Positive for dyspnea on exertion  Cardiovascular:  Negative    Gastroenterology: Negative    Genitourinary:  Negative    Musculoskeletal:  Negative    Neurologic:  Negative    Psychiatric:  Negative    Heme/Lymph/Imm:  Negative    Endocrine:  Positive for diabetes              Physical Exam:     Vitals: /66  Pulse 58  Ht 1.651 m (5' 5\")  Wt 81.2 kg (179 lb)  BMI 29.79 kg/m2  Constitutional:  cooperative, alert and oriented, well developed, well nourished, in no acute distress        Skin:  warm and dry to the touch   Small skin tear at right anticubital site    Head:  normocephalic, no masses or lesions        Eyes:  pupils equal and round, conjunctivae and lids unremarkable, sclera white, no xanthalasma, EOMS intact, no nystagmus        ENT:  no pallor or cyanosis, dentition good        Neck:    bilateral carotid bruit      Chest:  normal breath sounds, clear to auscultation, normal A-P diameter, normal symmetry, normal respiratory excursion, no use of accessory muscles        Cardiac: regular rhythm;normal S1 and S2       systolic murmur          Abdomen:  abdomen soft, non-tender, BS normoactive, no mass, no HSM, no bruits        Vascular:       3+ 3+           3+ 3+                Extremities and Back:  no deformities, clubbing, " cyanosis, erythema observed        Neurological:  no gross motor deficits                 Medications:     Current Outpatient Prescriptions   Medication Sig Dispense Refill     ascorbic acid 500 MG TABS        aspirin 325 MG tablet Take by mouth daily       BIOTIN PO Take 5,000 mg by mouth        blood glucose monitoring (MIRA CONTOUR NEXT) test strip Use to test blood sugar one time daily or as directed. 100 each 5     blood glucose monitoring (MIRA MICROLET) lancets Use to test blood sugar 1 times daily or as directed. 100 each 5     clotrimazole (LOTRIMIN) 1 % cream Apply topically 2 times daily 15 g 3     [employer] OMEPRAZOLE PO Take 20 mg by mouth daily        fluocinonide (LIDEX) 0.05 % ointment Apply topically 2 times daily       GLUCOSAMINE-CHONDROITIN PO Take by mouth daily        lisinopril (PRINIVIL/ZESTRIL) 2.5 MG tablet Take 1 tablet (2.5 mg) by mouth daily (Patient taking differently: Take 2.5 mg by mouth daily Patient takes 1/2 tablet of a 5 mg tablet to equal 2.5 mg daily) 90 tablet 3     metFORMIN (GLUCOPHAGE) 1000 MG tablet Take 0.5 tablets (500 mg) by mouth 2 times daily (with meals) 90 tablet 3     Multiple Vitamins-Iron (MULTIVITAMIN/IRON PO)        simvastatin (ZOCOR) 40 MG tablet Take 0.5 tablets (20 mg) by mouth At Bedtime 45 tablet 3           Family History   Problem Relation Age of Onset     HEART DISEASE Father      Alcohol/Drug Father      Myocardial Infarction Grandchild 18     Grandson     Myocardial Infarction Other 18     Nephew      Cancer Mother 58     Stomach cancer     Alcohol/Drug Mother      Cancer Maternal Grandmother      stomach cancer     Alcohol/Drug Maternal Grandmother      Myocardial Infarction Sister 70     Name: Reena      Alcohol/Drug Sister      Cerebrovascular Disease Brother 65     Name: Miguelito     Alcohol/Drug Brother      Alcohol/Drug Maternal Grandfather      Alcohol/Drug Paternal Grandmother      Alcohol/Drug Paternal Grandfather        Social History     Social  History     Marital status:      Spouse name: Alyssa     Number of children: 3     Years of education: N/A     Occupational History     Not on file.     Social History Main Topics     Smoking status: Former Smoker     Packs/day: 1.00     Years: 30.00     Types: Cigarettes     Smokeless tobacco: Former User     Types: Chew     Quit date: 1/5/1983     Alcohol use No     Drug use: No     Sexual activity: Not Currently     Partners: Female     Other Topics Concern     Not on file     Social History Narrative            Past Medical History:     Past Medical History:   Diagnosis Date     Hyperlipidaemia LDL goal < 100      Hypertension      Type 2 diabetes mellitus (H)               Past Surgical History:     Past Surgical History:   Procedure Laterality Date     MANDIBLE SURGERY  1958              Allergies:   Review of patient's allergies indicates no known allergies.       Data:   All laboratory data reviewed:    LAST CHOLESTEROL:  Lab Results   Component Value Date    CHOL 97 04/12/2018     Lab Results   Component Value Date    HDL 39 04/12/2018     Lab Results   Component Value Date    LDL 37 04/12/2018     Lab Results   Component Value Date    TRIG 106 04/12/2018     Lab Results   Component Value Date    CHOLHDLRATIO 2.3 10/07/2015       LAST BMP:  Lab Results   Component Value Date     07/26/2018      Lab Results   Component Value Date    POTASSIUM 4.5 07/26/2018     Lab Results   Component Value Date    CHLORIDE 106 07/26/2018     Lab Results   Component Value Date    DEEPTI 8.7 07/26/2018     Lab Results   Component Value Date    CO2 30 07/26/2018     Lab Results   Component Value Date    BUN 16 07/26/2018     Lab Results   Component Value Date    CR 0.90 07/26/2018     Lab Results   Component Value Date     07/26/2018       LAST CBC:  Lab Results   Component Value Date    WBC 6.5 07/24/2018     Lab Results   Component Value Date    RBC 4.20 07/24/2018     Lab Results   Component Value Date    HGB  12.5 07/24/2018     Lab Results   Component Value Date    HCT 37.4 07/24/2018     Lab Results   Component Value Date    MCV 89 07/24/2018     Lab Results   Component Value Date    MCH 29.8 07/24/2018     Lab Results   Component Value Date    MCHC 33.4 07/24/2018     Lab Results   Component Value Date    RDW 13.0 07/24/2018     Lab Results   Component Value Date     07/24/2018

## 2018-08-27 NOTE — LETTER
8/27/2018    Viet Bingham MD, MD  [address] [facility] [address]  [facility] [address]    RE: Reid Jimenes       Dear Colleague,    I had the pleasure of seeing Reid Jimenes in the [facility].        [facility]   pre-TAVR H&P     Primary Cardiologist:  Dr. Neftaly SANZ   Reid Jimenes is a pleasant 84 year old patient who presents for pre-operative H&P in preparation for TAVR  . He is scheduled for TAVR via transfemoral approach using a Greene Valve 26 mm on 8/28/18 at [facility].     He carries a  history significant for severe aortic stenosis, hypertension, hyperlipidemia, type 2 diabetes.  Over the last couple of years, he has developed progressively worsening shortness of breath with activity.   He was recently evaluated in our valve clinic due to his last echocardiogram on 5/10/2018 showing a mean aortic pressure gradient of 42 mmHg, aortic valve area of 1.0 cm , and normal ejection fraction of 55-60%. As part of his TAVR workup, he underwent a right and left heart cath that showed mild nonobstructive coronary artery disease, mild pulmonary hypertension and normal right-sided and normal left-sided filling pressures.  In December, a CT of the head and neck showed approximately 30% stenosis to the right internal carotid and approximately 65% stenosis to the left internal carotid.  He was reviewed at TAVR conference and felt to be a good candidate to proceed with TAVR.     He is feeling well on a cardiac standpoint with the exception of exertional shortness of breath.  He denies chest pain, , PND, orthopnea, presyncope, syncope, edema, heart racing, or palpitations.     Blood pressure today is stable at 130/62, heart rate 60, managed well on lisinopril.  Labs today showed a sodium of 142, potassium 4.2, BUN 22, creatinine 0.94, GFR 76, hemoglobin is stable at 13.7, hematocrit 40.9, and platelets at 147.    His diabetes is well managed on  metformin, Last A1C 6.5    Activity level is unchanged, and remains compliant with all medication.    History of blood transfusions/reactions: No   History of abnormal bleeding/anti-platelet use: No   Steroid use in the last year: No   Personal or family history with difficulty with anesthesia: No      Assessment and Plan    1. Severe aortic stenosis -symptomatic.  -  Recent echo showed mean aortic pressure gradient of 42 mmHg, aortic valve area of 1.0 cm  .   - normal ejection fraction of 55-60%.   -  Right and left heart cath that showed mild nonobstructive coronary artery disease, mild pulmonary hypertension and normal right-sided and normal left-sided filling pressures.  - CT of the head and neck showed approximately 30% stenosis to the right internal carotid and approximately 65% stenosis to the left internal carotid.  - Reviewed in TAVR clinic with recommendation to undergo TAVR via femoral approach with a 26 mm Greene Valve.  -  TAVR scheduled for August 28.       Type and screen orders completed. Supplies for scrubbing provided. Patient is cleared by their dentist.   Patient has no fever, chills, or urinary symptoms. Patient does not have contrast dye allergy.   Patient is optimized and is acceptable candidate for the proposed procedure.  No further diagnostic evaluation is needed. Pre-TAVR instructions provided in written format.     Medication Recommendations:  - Hold metformin   - Continue on all other medication.     2. Carotid disease -last evaluation in December 2017 showed 30% stenosis to the right internal carotid and 65% stenosis to the left internal carotid.  Continue on aspirin and statin  3. Hypertension-well-controlled on lisinopril  4. Hyperlipidemia -LDL at goal, continue on low-dose simvastatin.        Current Medications:  Current Outpatient Prescriptions   Medication Sig Dispense Refill     ascorbic acid 500 MG TABS        aspirin 325 MG tablet Take by mouth daily       BIOTIN PO Take  5,000 mg by mouth        blood glucose monitoring (MIRA CONTOUR NEXT) test strip Use to test blood sugar one time daily or as directed. 100 each 5     blood glucose monitoring (MIRA MICROLET) lancets Use to test blood sugar 1 times daily or as directed. 100 each 5     clotrimazole (LOTRIMIN) 1 % cream Apply topically 2 times daily 15 g 3     [employer] OMEPRAZOLE PO Take 20 mg by mouth daily        fluocinonide (LIDEX) 0.05 % ointment Apply topically 2 times daily       GLUCOSAMINE-CHONDROITIN PO Take by mouth daily        lisinopril (PRINIVIL/ZESTRIL) 2.5 MG tablet Take 1 tablet (2.5 mg) by mouth daily (Patient taking differently: Take 2.5 mg by mouth daily Patient takes 1/2 tablet of a 5 mg tablet to equal 2.5 mg daily) 90 tablet 3     metFORMIN (GLUCOPHAGE) 1000 MG tablet Take 0.5 tablets (500 mg) by mouth 2 times daily (with meals) 90 tablet 3     Multiple Vitamins-Iron (MULTIVITAMIN/IRON PO)        simvastatin (ZOCOR) 40 MG tablet Take 0.5 tablets (20 mg) by mouth At Bedtime 45 tablet 3       Allergies:   No Known Allergies    Past Medical History:  Past Medical History:   Diagnosis Date     Hyperlipidaemia LDL goal < 100      Hypertension      Type 2 diabetes mellitus (H)        Past Surgical History:  Past Surgical History:   Procedure Laterality Date     MANDIBLE SURGERY  1958       Family History:  Family History   Problem Relation Age of Onset     HEART DISEASE Father      Alcohol/Drug Father      Myocardial Infarction Grandchild 18     Grandson     Myocardial Infarction Other 18     Nephew      Cancer Mother 58     Stomach cancer     Alcohol/Drug Mother      Cancer Maternal Grandmother      stomach cancer     Alcohol/Drug Maternal Grandmother      Myocardial Infarction Sister 70     Name: Reena      Alcohol/Drug Sister      Cerebrovascular Disease Brother 65     Name: Miguelito     Alcohol/Drug Brother      Alcohol/Drug Maternal Grandfather      Alcohol/Drug Paternal Grandmother      Alcohol/Drug Paternal  "Grandfather        Social History:  Social History     Social History     Marital status:      Spouse name: Alyssa     Number of children: 3     Years of education: N/A     Social History Main Topics     Smoking status: Former Smoker     Packs/day: 1.00     Years: 30.00     Types: Cigarettes     Smokeless tobacco: Former User     Types: Chew     Quit date: 1/5/1983     Alcohol use No     Drug use: No     Sexual activity: Not Currently     Partners: Female     Other Topics Concern     None     Social History Narrative       Review of Systems:  Constitutional: No fever, chills, or sweats. No weight gain/loss   ENT: No visual disturbance, ear ache, epistaxis, sore throat  Allergies/Immunologic: Negative.   Respiratory: No cough, hemoptysia  Cardiovascular: As per HPI  GI: No nausea, vomiting, hematemesis, melena, or hematochezia  : No urinary frequency, dysuria, or hematuria  Integument: Negative  Psychiatric: Negative  Neuro: Negative  Endocrinology: Negative   Musculoskeletal: Negative      Physical Exam:  Vitals: Ht 1.651 m (5' 5\")  Wt 79.8 kg (176 lb)  BMI 29.29 kg/m2   In general, the patient is a pleasant male in no apparent distress.    HEENT: NC/AT.  PERRLA.  EOMI.  Sclerae white, not injected.  Nares clear.  Pharynx without erythema or exudate.    Upper dentures, Lower Dentition intact.    Neck: No adenopathy.  No thyromegaly. Carotids +4/4 bilaterally without bruits.  No jugular venous distension.   Heart: RRR. Normal S1, S2 splits physiologically. Audible murmur, no rub, click, or gallop.   Lungs: CTA.  No ronchi, wheezes, rales.  No dullness to percussion.   Abdomen: Soft, nontender, nondistended. No organomegaly.  No bruits.   Extremities: No clubbing, cyanosis, or edema.  The pulses are +4/4 at the radial, brachial, femoral, popliteal, DP, and PT sites bilaterally.  No bruits are noted.  Neurologic: Alert and oriented to person/place/time, mild short term memory deficit, normal speech, gait and " affect.   Skin: No petechiae, purpura or rash.      Laboratory Studies:  LIPID RESULTS:  Lab Results   Component Value Date    CHOL 97 04/12/2018    HDL 39 (L) 04/12/2018    LDL 37 04/12/2018    TRIG 106 04/12/2018    CHOLHDLRATIO 2.3 10/07/2015       LIVER ENZYME RESULTS:  Lab Results   Component Value Date    AST 19 04/12/2018    ALT 25 04/12/2018       CBC RESULTS:  Lab Results   Component Value Date    WBC 6.5 07/24/2018    RBC 4.20 (L) 07/24/2018    HGB 12.5 (L) 07/24/2018    HCT 37.4 (L) 07/24/2018    MCV 89 07/24/2018    MCH 29.8 07/24/2018    MCHC 33.4 07/24/2018    RDW 13.0 07/24/2018     (L) 07/24/2018       BMP RESULTS:  Lab Results   Component Value Date     07/26/2018    POTASSIUM 4.5 07/26/2018    CHLORIDE 106 07/26/2018    CO2 30 07/26/2018    ANIONGAP 4 07/26/2018     (H) 07/26/2018    BUN 16 07/26/2018    CR 0.90 07/26/2018    GFRESTIMATED 81 07/26/2018    GFRESTBLACK >90 07/26/2018    DEEPTI 8.7 07/26/2018        A1C RESULTS:  Lab Results   Component Value Date    A1C 6.5 (H) 04/12/2018       INR RESULTS:  Lab Results   Component Value Date    INR 1.01 07/24/2018               Thank you for allowing me to participate in the care of your patient.      Sincerely,     SARY [facility]    cc:   No referring provider defined for this encounter.

## 2018-08-27 NOTE — PROGRESS NOTES
[facility] HEART CARE   pre-TAVR H&P     Primary Cardiologist:  Dr. Higginbotham    YVON Jimenes is a pleasant 84 year old patient who presents for pre-operative H&P in preparation for TAVR . He is scheduled for TAVR via transfemoral approach using a Greene Valve 26 mm on 8/28/18 at [facility].     He carries a  history significant for severe aortic stenosis, hypertension, hyperlipidemia, type 2 diabetes.  Over the last couple of years, he has developed progressively worsening shortness of breath with activity.   He was recently evaluated in our valve clinic due to his last echocardiogram on 5/10/2018 showing a mean aortic pressure gradient of 42 mmHg, aortic valve area of 1.0 cm , and normal ejection fraction of 55-60%. As part of his TAVR workup, he underwent a right and left heart cath that showed mild nonobstructive coronary artery disease, mild pulmonary hypertension and normal right-sided and normal left-sided filling pressures.  In December, a CT of the head and neck showed approximately 30% stenosis to the right internal carotid and approximately 65% stenosis to the left internal carotid.  He was reviewed at TAVR conference and felt to be a good candidate to proceed with TAVR.     He is feeling well on a cardiac standpoint with the exception of exertional shortness of breath.  He denies chest pain, , PND, orthopnea, presyncope, syncope, edema, heart racing, or palpitations.     Blood pressure today is stable at 130/62, heart rate 60, managed well on lisinopril.  Labs today showed a sodium of 142, potassium 4.2, BUN 22, creatinine 0.94, GFR 76, hemoglobin is stable at 13.7, hematocrit 40.9, and platelets at 147.    His diabetes is well managed on metformin, Last A1C 6.5    Activity level is unchanged, and remains compliant with all medication.    History of blood transfusions/reactions: No   History of abnormal bleeding/anti-platelet use: No   Steroid use in the last year: No    Personal or family history with difficulty with anesthesia: No      Assessment and Plan    1. Severe aortic stenosis -symptomatic.  -  Recent echo showed mean aortic pressure gradient of 42 mmHg, aortic valve area of 1.0 cm .   - normal ejection fraction of 55-60%.   -  Right and left heart cath that showed mild nonobstructive coronary artery disease, mild pulmonary hypertension and normal right-sided and normal left-sided filling pressures.  - CT of the head and neck showed approximately 30% stenosis to the right internal carotid and approximately 65% stenosis to the left internal carotid.  - Reviewed in TAVR clinic with recommendation to undergo TAVR via femoral approach with a 26 mm Greene Valve.  -  TAVR scheduled for August 28.       Type and screen orders completed. Supplies for scrubbing provided. Patient is cleared by their dentist.   Patient has no fever, chills, or urinary symptoms. Patient does not have contrast dye allergy.   Patient is optimized and is acceptable candidate for the proposed procedure.  No further diagnostic evaluation is needed. Pre-TAVR instructions provided in written format.     Medication Recommendations:  - Hold metformin   - Continue on all other medication.     2. Carotid disease -last evaluation in December 2017 showed 30% stenosis to the right internal carotid and 65% stenosis to the left internal carotid.  Continue on aspirin and statin  3. Hypertension-well-controlled on lisinopril  4. Hyperlipidemia -LDL at goal, continue on low-dose simvastatin.        Current Medications:  Current Outpatient Prescriptions   Medication Sig Dispense Refill     ascorbic acid 500 MG TABS        aspirin 325 MG tablet Take by mouth daily       BIOTIN PO Take 5,000 mg by mouth        blood glucose monitoring (MIRA CONTOUR NEXT) test strip Use to test blood sugar one time daily or as directed. 100 each 5     blood glucose monitoring (MIRA MICROLET) lancets Use to test blood sugar 1 times daily  or as directed. 100 each 5     clotrimazole (LOTRIMIN) 1 % cream Apply topically 2 times daily 15 g 3     [employer] OMEPRAZOLE PO Take 20 mg by mouth daily        fluocinonide (LIDEX) 0.05 % ointment Apply topically 2 times daily       GLUCOSAMINE-CHONDROITIN PO Take by mouth daily        lisinopril (PRINIVIL/ZESTRIL) 2.5 MG tablet Take 1 tablet (2.5 mg) by mouth daily (Patient taking differently: Take 2.5 mg by mouth daily Patient takes 1/2 tablet of a 5 mg tablet to equal 2.5 mg daily) 90 tablet 3     metFORMIN (GLUCOPHAGE) 1000 MG tablet Take 0.5 tablets (500 mg) by mouth 2 times daily (with meals) 90 tablet 3     Multiple Vitamins-Iron (MULTIVITAMIN/IRON PO)        simvastatin (ZOCOR) 40 MG tablet Take 0.5 tablets (20 mg) by mouth At Bedtime 45 tablet 3       Allergies:   No Known Allergies    Past Medical History:  Past Medical History:   Diagnosis Date     Hyperlipidaemia LDL goal < 100      Hypertension      Type 2 diabetes mellitus (H)        Past Surgical History:  Past Surgical History:   Procedure Laterality Date     MANDIBLE SURGERY  1958       Family History:  Family History   Problem Relation Age of Onset     HEART DISEASE Father      Alcohol/Drug Father      Myocardial Infarction Grandchild 18     Grandson     Myocardial Infarction Other 18     Nephew      Cancer Mother 58     Stomach cancer     Alcohol/Drug Mother      Cancer Maternal Grandmother      stomach cancer     Alcohol/Drug Maternal Grandmother      Myocardial Infarction Sister 70     Name: Reena      Alcohol/Drug Sister      Cerebrovascular Disease Brother 65     Name: Miguelito     Alcohol/Drug Brother      Alcohol/Drug Maternal Grandfather      Alcohol/Drug Paternal Grandmother      Alcohol/Drug Paternal Grandfather        Social History:  Social History     Social History     Marital status:      Spouse name: Alyssa     Number of children: 3     Years of education: N/A     Social History Main Topics     Smoking status: Former Smoker      "Packs/day: 1.00     Years: 30.00     Types: Cigarettes     Smokeless tobacco: Former User     Types: Chew     Quit date: 1/5/1983     Alcohol use No     Drug use: No     Sexual activity: Not Currently     Partners: Female     Other Topics Concern     None     Social History Narrative       Review of Systems:  Constitutional: No fever, chills, or sweats. No weight gain/loss   ENT: No visual disturbance, ear ache, epistaxis, sore throat  Allergies/Immunologic: Negative.   Respiratory: No cough, hemoptysia  Cardiovascular: As per HPI  GI: No nausea, vomiting, hematemesis, melena, or hematochezia  : No urinary frequency, dysuria, or hematuria  Integument: Negative  Psychiatric: Negative  Neuro: Negative  Endocrinology: Negative   Musculoskeletal: Negative      Physical Exam:  Vitals: Ht 1.651 m (5' 5\")  Wt 79.8 kg (176 lb)  BMI 29.29 kg/m2   In general, the patient is a pleasant male in no apparent distress.    HEENT: NC/AT.  PERRLA.  EOMI.  Sclerae white, not injected.  Nares clear.  Pharynx without erythema or exudate.    Upper dentures, Lower Dentition intact.    Neck: No adenopathy.  No thyromegaly. Carotids +4/4 bilaterally without bruits.  No jugular venous distension.   Heart: RRR. Normal S1, S2 splits physiologically. Audible murmur, no rub, click, or gallop.   Lungs: CTA.  No ronchi, wheezes, rales.  No dullness to percussion.   Abdomen: Soft, nontender, nondistended. No organomegaly.  No bruits.   Extremities: No clubbing, cyanosis, or edema.  The pulses are +4/4 at the radial, brachial, femoral, popliteal, DP, and PT sites bilaterally.  No bruits are noted.  Neurologic: Alert and oriented to person/place/time, mild short term memory deficit, normal speech, gait and affect.   Skin: No petechiae, purpura or rash.      Laboratory Studies:  LIPID RESULTS:  Lab Results   Component Value Date    CHOL 97 04/12/2018    HDL 39 (L) 04/12/2018    LDL 37 04/12/2018    TRIG 106 04/12/2018    CHOLHDLRATIO 2.3 " 10/07/2015       LIVER ENZYME RESULTS:  Lab Results   Component Value Date    AST 19 04/12/2018    ALT 25 04/12/2018       CBC RESULTS:  Lab Results   Component Value Date    WBC 6.5 07/24/2018    RBC 4.20 (L) 07/24/2018    HGB 12.5 (L) 07/24/2018    HCT 37.4 (L) 07/24/2018    MCV 89 07/24/2018    MCH 29.8 07/24/2018    MCHC 33.4 07/24/2018    RDW 13.0 07/24/2018     (L) 07/24/2018       BMP RESULTS:  Lab Results   Component Value Date     07/26/2018    POTASSIUM 4.5 07/26/2018    CHLORIDE 106 07/26/2018    CO2 30 07/26/2018    ANIONGAP 4 07/26/2018     (H) 07/26/2018    BUN 16 07/26/2018    CR 0.90 07/26/2018    GFRESTIMATED 81 07/26/2018    GFRESTBLACK >90 07/26/2018    DEEPTI 8.7 07/26/2018        A1C RESULTS:  Lab Results   Component Value Date    A1C 6.5 (H) 04/12/2018       INR RESULTS:  Lab Results   Component Value Date    INR 1.01 07/24/2018

## 2018-08-27 NOTE — INTERVAL H&P NOTE
This note is for the purpose of making the H & P performed in clinic within the last 30 days available in the hospital surgical encounter.

## 2018-08-27 NOTE — LETTER
8/27/2018    Viet Bingham MD, MD  [address] [facility] [address]  [facility] [address]    RE: Reid Jimenes       Dear Colleague,    I had the pleasure of seeing Reid Jimenes in the [facility].        [facility]CT[facility] HEART CARE   pre-TAVR H&P     Primary Cardiologist:  Dr. Neftaly SANZ   Reid Jimenes is a pleasant 84 year old patient who presents for pre-operative H&P in preparation for TAVR  . He is scheduled for TAVR via transfemoral approach using a Greene Valve 26 mm on 8/28/18 at [facility].     He carries a  history significant for severe aortic stenosis, hypertension, hyperlipidemia, type 2 diabetes.  Over the last couple of years, he has developed progressively worsening shortness of breath with activity.   He was recently evaluated in our valve clinic due to his last echocardiogram on 5/10/2018 showing a mean aortic pressure gradient of 42 mmHg, aortic valve area of 1.0 cm , and normal ejection fraction of 55-60%. As part of his TAVR workup, he underwent a right and left heart cath that showed mild nonobstructive coronary artery disease, mild pulmonary hypertension and normal right-sided and normal left-sided filling pressures.  In December, a CT of the head and neck showed approximately 30% stenosis to the right internal carotid and approximately 65% stenosis to the left internal carotid.  He was reviewed at TAVR conference and felt to be a good candidate to proceed with TAVR.     He is feeling well on a cardiac standpoint with the exception of exertional shortness of breath.  He denies chest pain, , PND, orthopnea, presyncope, syncope, edema, heart racing, or palpitations.     Blood pressure today is stable at 130/62, heart rate 60, managed well on lisinopril.  Labs today showed a sodium of 142, potassium 4.2, BUN 22, creatinine 0.94, GFR 76, hemoglobin is stable at 13.7, hematocrit 40.9, and platelets at 147.    His diabetes is well managed on  metformin, Last A1C 6.5    Activity level is unchanged, and remains compliant with all medication.    History of blood transfusions/reactions: No   History of abnormal bleeding/anti-platelet use: No   Steroid use in the last year: No   Personal or family history with difficulty with anesthesia: No      Assessment and Plan    1. Severe aortic stenosis -symptomatic.  -  Recent echo showed mean aortic pressure gradient of 42 mmHg, aortic valve area of 1.0 cm  .   - normal ejection fraction of 55-60%.   -  Right and left heart cath that showed mild nonobstructive coronary artery disease, mild pulmonary hypertension and normal right-sided and normal left-sided filling pressures.  - CT of the head and neck showed approximately 30% stenosis to the right internal carotid and approximately 65% stenosis to the left internal carotid.  - Reviewed in TAVR clinic with recommendation to undergo TAVR via femoral approach with a 26 mm Greene Valve.  -  TAVR scheduled for August 28.       Type and screen orders completed. Supplies for scrubbing provided. Patient is cleared by their dentist.   Patient has no fever, chills, or urinary symptoms. Patient does not have contrast dye allergy.   Patient is optimized and is acceptable candidate for the proposed procedure.  No further diagnostic evaluation is needed. Pre-TAVR instructions provided in written format.     Medication Recommendations:  - Hold metformin   - Continue on all other medication.     2. Carotid disease -last evaluation in December 2017 showed 30% stenosis to the right internal carotid and 65% stenosis to the left internal carotid.  Continue on aspirin and statin  3. Hypertension-well-controlled on lisinopril  4. Hyperlipidemia -LDL at goal, continue on low-dose simvastatin.        Current Medications:  Current Outpatient Prescriptions   Medication Sig Dispense Refill     ascorbic acid 500 MG TABS        aspirin 325 MG tablet Take by mouth daily       BIOTIN PO Take  5,000 mg by mouth        blood glucose monitoring (MIRA CONTOUR NEXT) test strip Use to test blood sugar one time daily or as directed. 100 each 5     blood glucose monitoring (MIRA MICROLET) lancets Use to test blood sugar 1 times daily or as directed. 100 each 5     clotrimazole (LOTRIMIN) 1 % cream Apply topically 2 times daily 15 g 3     [employer]S OMEPRAZOLE PO Take 20 mg by mouth daily        fluocinonide (LIDEX) 0.05 % ointment Apply topically 2 times daily       GLUCOSAMINE-CHONDROITIN PO Take by mouth daily        lisinopril (PRINIVIL/ZESTRIL) 2.5 MG tablet Take 1 tablet (2.5 mg) by mouth daily (Patient taking differently: Take 2.5 mg by mouth daily Patient takes 1/2 tablet of a 5 mg tablet to equal 2.5 mg daily) 90 tablet 3     metFORMIN (GLUCOPHAGE) 1000 MG tablet Take 0.5 tablets (500 mg) by mouth 2 times daily (with meals) 90 tablet 3     Multiple Vitamins-Iron (MULTIVITAMIN/IRON PO)        simvastatin (ZOCOR) 40 MG tablet Take 0.5 tablets (20 mg) by mouth At Bedtime 45 tablet 3       Allergies:   No Known Allergies    Past Medical History:  Past Medical History:   Diagnosis Date     Hyperlipidaemia LDL goal < 100      Hypertension      Type 2 diabetes mellitus (H)        Past Surgical History:  Past Surgical History:   Procedure Laterality Date     MANDIBLE SURGERY  1958       Family History:  Family History   Problem Relation Age of Onset     HEART DISEASE Father      Alcohol/Drug Father      Myocardial Infarction Grandchild 18     Grandson     Myocardial Infarction Other 18     Nephew      Cancer Mother 58     Stomach cancer     Alcohol/Drug Mother      Cancer Maternal Grandmother      stomach cancer     Alcohol/Drug Maternal Grandmother      Myocardial Infarction Sister 70     Name: Reena      Alcohol/Drug Sister      Cerebrovascular Disease Brother 65     Name: Miguelito     Alcohol/Drug Brother      Alcohol/Drug Maternal Grandfather      Alcohol/Drug Paternal Grandmother      Alcohol/Drug Paternal  "Grandfather        Social History:  Social History     Social History     Marital status:      Spouse name: Alyssa     Number of children: 3     Years of education: N/A     Social History Main Topics     Smoking status: Former Smoker     Packs/day: 1.00     Years: 30.00     Types: Cigarettes     Smokeless tobacco: Former User     Types: Chew     Quit date: 1/5/1983     Alcohol use No     Drug use: No     Sexual activity: Not Currently     Partners: Female     Other Topics Concern     None     Social History Narrative       Review of Systems:  Constitutional: No fever, chills, or sweats. No weight gain/loss   ENT: No visual disturbance, ear ache, epistaxis, sore throat  Allergies/Immunologic: Negative.   Respiratory: No cough, hemoptysia  Cardiovascular: As per HPI  GI: No nausea, vomiting, hematemesis, melena, or hematochezia  : No urinary frequency, dysuria, or hematuria  Integument: Negative  Psychiatric: Negative  Neuro: Negative  Endocrinology: Negative   Musculoskeletal: Negative      Physical Exam:  Vitals: Ht 1.651 m (5' 5\")  Wt 79.8 kg (176 lb)  BMI 29.29 kg/m2   In general, the patient is a pleasant male in no apparent distress.    HEENT: NC/AT.  PERRLA.  EOMI.  Sclerae white, not injected.  Nares clear.  Pharynx without erythema or exudate.    Upper dentures, Lower Dentition intact.    Neck: No adenopathy.  No thyromegaly. Carotids +4/4 bilaterally without bruits.  No jugular venous distension.   Heart: RRR. Normal S1, S2 splits physiologically. Audible murmur, no rub, click, or gallop.   Lungs: CTA.  No ronchi, wheezes, rales.  No dullness to percussion.   Abdomen: Soft, nontender, nondistended. No organomegaly.  No bruits.   Extremities: No clubbing, cyanosis, or edema.  The pulses are +4/4 at the radial, brachial, femoral, popliteal, DP, and PT sites bilaterally.  No bruits are noted.  Neurologic: Alert and oriented to person/place/time, mild short term memory deficit, normal speech, gait and " affect.   Skin: No petechiae, purpura or rash.      Laboratory Studies:  LIPID RESULTS:  Lab Results   Component Value Date    CHOL 97 04/12/2018    HDL 39 (L) 04/12/2018    LDL 37 04/12/2018    TRIG 106 04/12/2018    CHOLHDLRATIO 2.3 10/07/2015       LIVER ENZYME RESULTS:  Lab Results   Component Value Date    AST 19 04/12/2018    ALT 25 04/12/2018       CBC RESULTS:  Lab Results   Component Value Date    WBC 6.5 07/24/2018    RBC 4.20 (L) 07/24/2018    HGB 12.5 (L) 07/24/2018    HCT 37.4 (L) 07/24/2018    MCV 89 07/24/2018    MCH 29.8 07/24/2018    MCHC 33.4 07/24/2018    RDW 13.0 07/24/2018     (L) 07/24/2018       BMP RESULTS:  Lab Results   Component Value Date     07/26/2018    POTASSIUM 4.5 07/26/2018    CHLORIDE 106 07/26/2018    CO2 30 07/26/2018    ANIONGAP 4 07/26/2018     (H) 07/26/2018    BUN 16 07/26/2018    CR 0.90 07/26/2018    GFRESTIMATED 81 07/26/2018    GFRESTBLACK >90 07/26/2018    DEEPTI 8.7 07/26/2018        A1C RESULTS:  Lab Results   Component Value Date    A1C 6.5 (H) 04/12/2018       INR RESULTS:  Lab Results   Component Value Date    INR 1.01 07/24/2018       Thank you for allowing me to participate in the care of your patient.    Sincerely,     SARY Ball CNP     [facility]

## 2018-08-28 ENCOUNTER — APPOINTMENT (OUTPATIENT)
Dept: CARDIOLOGY | Facility: CLINIC | Age: 83
DRG: 267 | End: 2018-08-28
Attending: INTERNAL MEDICINE
Payer: MEDICARE

## 2018-08-28 ENCOUNTER — SURGERY (OUTPATIENT)
Age: 83
End: 2018-08-28

## 2018-08-28 ENCOUNTER — HOSPITAL ENCOUNTER (OUTPATIENT)
Dept: CARDIOLOGY | Facility: CLINIC | Age: 83
DRG: 267 | End: 2018-08-28
Attending: INTERNAL MEDICINE | Admitting: INTERNAL MEDICINE
Payer: MEDICARE

## 2018-08-28 ENCOUNTER — HOSPITAL ENCOUNTER (INPATIENT)
Facility: CLINIC | Age: 83
LOS: 3 days | Discharge: HOME | DRG: 267 | End: 2018-08-31
Attending: INTERNAL MEDICINE | Admitting: SURGERY
Payer: MEDICARE

## 2018-08-28 ENCOUNTER — ANESTHESIA (OUTPATIENT)
Dept: SURGERY | Facility: CLINIC | Age: 83
DRG: 267 | End: 2018-08-28
Payer: MEDICARE

## 2018-08-28 DIAGNOSIS — I35.0 SEVERE AORTIC STENOSIS: Primary | ICD-10-CM

## 2018-08-28 DIAGNOSIS — Z95.2 S/P TAVR (TRANSCATHETER AORTIC VALVE REPLACEMENT): ICD-10-CM

## 2018-08-28 DIAGNOSIS — I35.0 AORTIC STENOSIS: ICD-10-CM

## 2018-08-28 LAB
ALBUMIN SERPL-MCNC: 3.1 G/DL (ref 3.4–5)
ALP SERPL-CCNC: 65 U/L (ref 40–150)
ALT SERPL W P-5'-P-CCNC: 19 U/L (ref 0–70)
ANION GAP SERPL CALCULATED.3IONS-SCNC: 6 MMOL/L (ref 3–14)
AST SERPL W P-5'-P-CCNC: 23 U/L (ref 0–45)
BILIRUB SERPL-MCNC: 0.2 MG/DL (ref 0.2–1.3)
BLD PROD TYP BPU: NORMAL
BLD PROD TYP BPU: NORMAL
BLD UNIT ID BPU: 0
BLD UNIT ID BPU: 0
BLOOD PRODUCT CODE: NORMAL
BLOOD PRODUCT CODE: NORMAL
BPU ID: NORMAL
BPU ID: NORMAL
BUN SERPL-MCNC: 19 MG/DL (ref 7–30)
CALCIUM SERPL-MCNC: 7.9 MG/DL (ref 8.5–10.1)
CHLORIDE SERPL-SCNC: 112 MMOL/L (ref 94–109)
CO2 SERPL-SCNC: 28 MMOL/L (ref 20–32)
CREAT SERPL-MCNC: 0.84 MG/DL (ref 0.66–1.25)
ERYTHROCYTE [DISTWIDTH] IN BLOOD BY AUTOMATED COUNT: 12.9 % (ref 10–15)
GFR SERPL CREATININE-BSD FRML MDRD: 87 ML/MIN/1.7M2
GLUCOSE BLDC GLUCOMTR-MCNC: 107 MG/DL (ref 70–99)
GLUCOSE BLDC GLUCOMTR-MCNC: 117 MG/DL (ref 70–99)
GLUCOSE BLDC GLUCOMTR-MCNC: 120 MG/DL (ref 70–99)
GLUCOSE BLDC GLUCOMTR-MCNC: 171 MG/DL (ref 70–99)
GLUCOSE SERPL-MCNC: 130 MG/DL (ref 70–99)
HBA1C MFR BLD: 6.6 % (ref 0–5.6)
HCT VFR BLD AUTO: 34.8 % (ref 40–53)
HGB BLD-MCNC: 11.8 G/DL (ref 13.3–17.7)
MAGNESIUM SERPL-MCNC: 2.2 MG/DL (ref 1.6–2.3)
MCH RBC QN AUTO: 30.3 PG (ref 26.5–33)
MCHC RBC AUTO-ENTMCNC: 33.9 G/DL (ref 31.5–36.5)
MCV RBC AUTO: 89 FL (ref 78–100)
PHOSPHATE SERPL-MCNC: 3.7 MG/DL (ref 2.5–4.5)
PLATELET # BLD AUTO: 107 10E9/L (ref 150–450)
POTASSIUM SERPL-SCNC: 4.1 MMOL/L (ref 3.4–5.3)
PROT SERPL-MCNC: 5.6 G/DL (ref 6.8–8.8)
RBC # BLD AUTO: 3.9 10E12/L (ref 4.4–5.9)
SODIUM SERPL-SCNC: 146 MMOL/L (ref 133–144)
TRANSFUSION STATUS PATIENT QL: NORMAL
WBC # BLD AUTO: 7.3 10E9/L (ref 4–11)

## 2018-08-28 PROCEDURE — 80053 COMPREHEN METABOLIC PANEL: CPT

## 2018-08-28 PROCEDURE — 93010 ELECTROCARDIOGRAM REPORT: CPT | Performed by: INTERNAL MEDICINE

## 2018-08-28 PROCEDURE — 25000125 ZZHC RX 250: Performed by: INTERNAL MEDICINE

## 2018-08-28 PROCEDURE — 27211089 ZZH KIT ACIST INJECTOR CR3

## 2018-08-28 PROCEDURE — 25000125 ZZHC RX 250: Performed by: NURSE ANESTHETIST, CERTIFIED REGISTERED

## 2018-08-28 PROCEDURE — 25000128 H RX IP 250 OP 636: Performed by: ANESTHESIOLOGY

## 2018-08-28 PROCEDURE — 33361 REPLACE AORTIC VALVE PERQ: CPT

## 2018-08-28 PROCEDURE — 99232 SBSQ HOSP IP/OBS MODERATE 35: CPT | Performed by: PHYSICIAN ASSISTANT

## 2018-08-28 PROCEDURE — 93306 TTE W/DOPPLER COMPLETE: CPT | Mod: 26 | Performed by: INTERNAL MEDICINE

## 2018-08-28 PROCEDURE — 36415 COLL VENOUS BLD VENIPUNCTURE: CPT

## 2018-08-28 PROCEDURE — 25000128 H RX IP 250 OP 636: Performed by: INTERNAL MEDICINE

## 2018-08-28 PROCEDURE — 83036 HEMOGLOBIN GLYCOSYLATED A1C: CPT

## 2018-08-28 PROCEDURE — 85027 COMPLETE CBC AUTOMATED: CPT

## 2018-08-28 PROCEDURE — 40000171 ZZH STATISTIC PRE-PROCEDURE ASSESSMENT III

## 2018-08-28 PROCEDURE — A9270 NON-COVERED ITEM OR SERVICE: HCPCS | Mod: GY | Performed by: INTERNAL MEDICINE

## 2018-08-28 PROCEDURE — 71000014 ZZH RECOVERY PHASE 1 LEVEL 2 FIRST HR

## 2018-08-28 PROCEDURE — 93005 ELECTROCARDIOGRAM TRACING: CPT

## 2018-08-28 PROCEDURE — 02RF38Z REPLACEMENT OF AORTIC VALVE WITH ZOOPLASTIC TISSUE, PERCUTANEOUS APPROACH: ICD-10-PCS | Performed by: SURGERY

## 2018-08-28 PROCEDURE — 27810348 ZZH KIT EDWARDS SAPIEN VALVE/RETRO DELIVERY SYSTEM

## 2018-08-28 PROCEDURE — 33361 REPLACE AORTIC VALVE PERQ: CPT | Mod: 62 | Performed by: INTERNAL MEDICINE

## 2018-08-28 PROCEDURE — 37000009 ZZH ANESTHESIA TECHNICAL FEE, EACH ADDTL 15 MIN

## 2018-08-28 PROCEDURE — C1769 GUIDE WIRE: HCPCS

## 2018-08-28 PROCEDURE — 27210910 ZZH NEEDLE CR6

## 2018-08-28 PROCEDURE — 93306 TTE W/DOPPLER COMPLETE: CPT

## 2018-08-28 PROCEDURE — 27210757 ZZH CATH TEMP PACING HEART CR8

## 2018-08-28 PROCEDURE — 25000128 H RX IP 250 OP 636: Performed by: NURSE ANESTHETIST, CERTIFIED REGISTERED

## 2018-08-28 PROCEDURE — 27210805 ZZH SHEATH CR4

## 2018-08-28 PROCEDURE — 25000132 ZZH RX MED GY IP 250 OP 250 PS 637: Mod: GY | Performed by: INTERNAL MEDICINE

## 2018-08-28 PROCEDURE — 27210795 ZZH PAD DEFIB QUICK CR4

## 2018-08-28 PROCEDURE — 84100 ASSAY OF PHOSPHORUS: CPT

## 2018-08-28 PROCEDURE — 85347 COAGULATION TIME ACTIVATED: CPT

## 2018-08-28 PROCEDURE — 25000132 ZZH RX MED GY IP 250 OP 250 PS 637: Mod: GY

## 2018-08-28 PROCEDURE — A9270 NON-COVERED ITEM OR SERVICE: HCPCS | Mod: GY

## 2018-08-28 PROCEDURE — 21000000 ZZH R&B IMCU HEART CARE

## 2018-08-28 PROCEDURE — 71000015 ZZH RECOVERY PHASE 1 LEVEL 2 EA ADDTL HR

## 2018-08-28 PROCEDURE — 27210804 ZZH SHEATH CR3

## 2018-08-28 PROCEDURE — 83735 ASSAY OF MAGNESIUM: CPT

## 2018-08-28 PROCEDURE — C1760 CLOSURE DEV, VASC: HCPCS

## 2018-08-28 PROCEDURE — 37000008 ZZH ANESTHESIA TECHNICAL FEE, 1ST 30 MIN

## 2018-08-28 PROCEDURE — 93010 ELECTROCARDIOGRAM REPORT: CPT | Mod: 77 | Performed by: INTERNAL MEDICINE

## 2018-08-28 PROCEDURE — 99207 ZZC CONSULT E&M CHANGED TO SUBSEQUENT LEVEL: CPT | Performed by: PHYSICIAN ASSISTANT

## 2018-08-28 PROCEDURE — 27210914 ZZH SHEATH CR8

## 2018-08-28 PROCEDURE — 00000146 ZZHCL STATISTIC GLUCOSE BY METER IP

## 2018-08-28 PROCEDURE — 27210946 ZZH KIT HC TOTES DISP CR8

## 2018-08-28 RX ORDER — ONDANSETRON 2 MG/ML
INJECTION INTRAMUSCULAR; INTRAVENOUS PRN
Status: DISCONTINUED | OUTPATIENT
Start: 2018-08-28 | End: 2018-08-28

## 2018-08-28 RX ORDER — POTASSIUM CHLORIDE 7.45 MG/ML
10 INJECTION INTRAVENOUS
Status: DISCONTINUED | OUTPATIENT
Start: 2018-08-28 | End: 2018-08-28 | Stop reason: HOSPADM

## 2018-08-28 RX ORDER — EPINEPHRINE 1 MG/ML
0.3 INJECTION, SOLUTION, CONCENTRATE INTRAVENOUS
Status: DISCONTINUED | OUTPATIENT
Start: 2018-08-28 | End: 2018-08-28 | Stop reason: HOSPADM

## 2018-08-28 RX ORDER — HEPARIN SODIUM 1000 [USP'U]/ML
INJECTION, SOLUTION INTRAVENOUS; SUBCUTANEOUS PRN
Status: DISCONTINUED | OUTPATIENT
Start: 2018-08-28 | End: 2018-08-28

## 2018-08-28 RX ORDER — IOPAMIDOL 755 MG/ML
40 INJECTION, SOLUTION INTRAVASCULAR ONCE
Status: DISCONTINUED | OUTPATIENT
Start: 2018-08-28 | End: 2018-08-31 | Stop reason: HOSPADM

## 2018-08-28 RX ORDER — CLOPIDOGREL BISULFATE 75 MG/1
75 TABLET ORAL DAILY
Status: DISCONTINUED | OUTPATIENT
Start: 2018-08-29 | End: 2018-08-31 | Stop reason: HOSPADM

## 2018-08-28 RX ORDER — ONDANSETRON 2 MG/ML
4 INJECTION INTRAMUSCULAR; INTRAVENOUS EVERY 30 MIN PRN
Status: DISCONTINUED | OUTPATIENT
Start: 2018-08-28 | End: 2018-08-28 | Stop reason: HOSPADM

## 2018-08-28 RX ORDER — ADENOSINE 3 MG/ML
12-12000 INJECTION, SOLUTION INTRAVENOUS
Status: DISCONTINUED | OUTPATIENT
Start: 2018-08-28 | End: 2018-08-28 | Stop reason: HOSPADM

## 2018-08-28 RX ORDER — NITROGLYCERIN 0.4 MG/1
0.4 TABLET SUBLINGUAL EVERY 5 MIN PRN
Status: DISCONTINUED | OUTPATIENT
Start: 2018-08-28 | End: 2018-08-31 | Stop reason: HOSPADM

## 2018-08-28 RX ORDER — NIFEDIPINE 10 MG/1
10 CAPSULE ORAL
Status: DISCONTINUED | OUTPATIENT
Start: 2018-08-28 | End: 2018-08-28 | Stop reason: HOSPADM

## 2018-08-28 RX ORDER — FLUMAZENIL 0.1 MG/ML
0.2 INJECTION, SOLUTION INTRAVENOUS
Status: DISCONTINUED | OUTPATIENT
Start: 2018-08-28 | End: 2018-08-28 | Stop reason: HOSPADM

## 2018-08-28 RX ORDER — FENTANYL CITRATE 50 UG/ML
25-50 INJECTION, SOLUTION INTRAMUSCULAR; INTRAVENOUS
Status: DISCONTINUED | OUTPATIENT
Start: 2018-08-28 | End: 2018-08-28 | Stop reason: HOSPADM

## 2018-08-28 RX ORDER — DEXTROSE MONOHYDRATE 25 G/50ML
12.5-5 INJECTION, SOLUTION INTRAVENOUS EVERY 30 MIN PRN
Status: DISCONTINUED | OUTPATIENT
Start: 2018-08-28 | End: 2018-08-28 | Stop reason: HOSPADM

## 2018-08-28 RX ORDER — NITROGLYCERIN 0.4 MG/1
0.4 TABLET SUBLINGUAL EVERY 5 MIN PRN
Status: DISCONTINUED | OUTPATIENT
Start: 2018-08-28 | End: 2018-08-28 | Stop reason: HOSPADM

## 2018-08-28 RX ORDER — LIDOCAINE 40 MG/G
CREAM TOPICAL
Status: DISCONTINUED | OUTPATIENT
Start: 2018-08-28 | End: 2018-08-28 | Stop reason: HOSPADM

## 2018-08-28 RX ORDER — PRASUGREL 10 MG/1
10-60 TABLET, FILM COATED ORAL
Status: DISCONTINUED | OUTPATIENT
Start: 2018-08-28 | End: 2018-08-28 | Stop reason: HOSPADM

## 2018-08-28 RX ORDER — CLOPIDOGREL BISULFATE 75 MG/1
75 TABLET ORAL
Status: DISCONTINUED | OUTPATIENT
Start: 2018-08-28 | End: 2018-08-28 | Stop reason: HOSPADM

## 2018-08-28 RX ORDER — LISINOPRIL 2.5 MG/1
2.5 TABLET ORAL DAILY
Status: DISCONTINUED | OUTPATIENT
Start: 2018-08-28 | End: 2018-08-30

## 2018-08-28 RX ORDER — PROTAMINE SULFATE 10 MG/ML
25-100 INJECTION, SOLUTION INTRAVENOUS EVERY 5 MIN PRN
Status: DISCONTINUED | OUTPATIENT
Start: 2018-08-28 | End: 2018-08-28 | Stop reason: HOSPADM

## 2018-08-28 RX ORDER — DIPHENHYDRAMINE HYDROCHLORIDE 50 MG/ML
25-50 INJECTION INTRAMUSCULAR; INTRAVENOUS
Status: DISCONTINUED | OUTPATIENT
Start: 2018-08-28 | End: 2018-08-28 | Stop reason: HOSPADM

## 2018-08-28 RX ORDER — ENALAPRILAT 1.25 MG/ML
1.25-2.5 INJECTION INTRAVENOUS
Status: DISCONTINUED | OUTPATIENT
Start: 2018-08-28 | End: 2018-08-28 | Stop reason: HOSPADM

## 2018-08-28 RX ORDER — NITROGLYCERIN 20 MG/100ML
.07-2 INJECTION INTRAVENOUS CONTINUOUS PRN
Status: DISCONTINUED | OUTPATIENT
Start: 2018-08-28 | End: 2018-08-28 | Stop reason: HOSPADM

## 2018-08-28 RX ORDER — BUPIVACAINE HYDROCHLORIDE 2.5 MG/ML
1-10 INJECTION, SOLUTION EPIDURAL; INFILTRATION; INTRACAUDAL
Status: DISCONTINUED | OUTPATIENT
Start: 2018-08-28 | End: 2018-08-28 | Stop reason: HOSPADM

## 2018-08-28 RX ORDER — SODIUM NITROPRUSSIDE 25 MG/ML
100-200 INJECTION INTRAVENOUS
Status: DISCONTINUED | OUTPATIENT
Start: 2018-08-28 | End: 2018-08-28 | Stop reason: HOSPADM

## 2018-08-28 RX ORDER — METHYLPREDNISOLONE SODIUM SUCCINATE 125 MG/2ML
125 INJECTION, POWDER, LYOPHILIZED, FOR SOLUTION INTRAMUSCULAR; INTRAVENOUS
Status: DISCONTINUED | OUTPATIENT
Start: 2018-08-28 | End: 2018-08-28 | Stop reason: HOSPADM

## 2018-08-28 RX ORDER — ASPIRIN 81 MG/1
81-324 TABLET, CHEWABLE ORAL
Status: DISCONTINUED | OUTPATIENT
Start: 2018-08-28 | End: 2018-08-28 | Stop reason: HOSPADM

## 2018-08-28 RX ORDER — HYDROMORPHONE HYDROCHLORIDE 1 MG/ML
.3-.5 INJECTION, SOLUTION INTRAMUSCULAR; INTRAVENOUS; SUBCUTANEOUS EVERY 5 MIN PRN
Status: DISCONTINUED | OUTPATIENT
Start: 2018-08-28 | End: 2018-08-28 | Stop reason: HOSPADM

## 2018-08-28 RX ORDER — NITROGLYCERIN 5 MG/ML
100-500 VIAL (ML) INTRAVENOUS
Status: DISCONTINUED | OUTPATIENT
Start: 2018-08-28 | End: 2018-08-28 | Stop reason: HOSPADM

## 2018-08-28 RX ORDER — DEXTROSE MONOHYDRATE 25 G/50ML
25-50 INJECTION, SOLUTION INTRAVENOUS
Status: DISCONTINUED | OUTPATIENT
Start: 2018-08-28 | End: 2018-08-31 | Stop reason: HOSPADM

## 2018-08-28 RX ORDER — FENTANYL CITRATE 50 UG/ML
INJECTION, SOLUTION INTRAMUSCULAR; INTRAVENOUS PRN
Status: DISCONTINUED | OUTPATIENT
Start: 2018-08-28 | End: 2018-08-28

## 2018-08-28 RX ORDER — SIMVASTATIN 20 MG
20 TABLET ORAL AT BEDTIME
Status: DISCONTINUED | OUTPATIENT
Start: 2018-08-28 | End: 2018-08-31 | Stop reason: HOSPADM

## 2018-08-28 RX ORDER — ASPIRIN 81 MG/1
81 TABLET ORAL DAILY
Status: DISCONTINUED | OUTPATIENT
Start: 2018-08-28 | End: 2018-08-28 | Stop reason: HOSPADM

## 2018-08-28 RX ORDER — NICOTINE POLACRILEX 4 MG
15-30 LOZENGE BUCCAL
Status: DISCONTINUED | OUTPATIENT
Start: 2018-08-28 | End: 2018-08-31 | Stop reason: HOSPADM

## 2018-08-28 RX ORDER — SODIUM CHLORIDE 9 MG/ML
INJECTION, SOLUTION INTRAVENOUS CONTINUOUS
Status: DISCONTINUED | OUTPATIENT
Start: 2018-08-28 | End: 2018-08-28 | Stop reason: HOSPADM

## 2018-08-28 RX ORDER — PROPOFOL 10 MG/ML
INJECTION, EMULSION INTRAVENOUS CONTINUOUS PRN
Status: DISCONTINUED | OUTPATIENT
Start: 2018-08-28 | End: 2018-08-28

## 2018-08-28 RX ORDER — HEPARIN SODIUM 1000 [USP'U]/ML
1000-10000 INJECTION, SOLUTION INTRAVENOUS; SUBCUTANEOUS EVERY 5 MIN PRN
Status: DISCONTINUED | OUTPATIENT
Start: 2018-08-28 | End: 2018-08-28 | Stop reason: HOSPADM

## 2018-08-28 RX ORDER — ACETAMINOPHEN 325 MG/1
325-650 TABLET ORAL EVERY 4 HOURS PRN
Status: DISCONTINUED | OUTPATIENT
Start: 2018-08-28 | End: 2018-08-31 | Stop reason: HOSPADM

## 2018-08-28 RX ORDER — ONDANSETRON 2 MG/ML
4 INJECTION INTRAMUSCULAR; INTRAVENOUS EVERY 4 HOURS PRN
Status: DISCONTINUED | OUTPATIENT
Start: 2018-08-28 | End: 2018-08-28 | Stop reason: HOSPADM

## 2018-08-28 RX ORDER — NITROGLYCERIN 5 MG/ML
100-200 VIAL (ML) INTRAVENOUS
Status: DISCONTINUED | OUTPATIENT
Start: 2018-08-28 | End: 2018-08-28 | Stop reason: HOSPADM

## 2018-08-28 RX ORDER — SODIUM CHLORIDE, SODIUM LACTATE, POTASSIUM CHLORIDE, CALCIUM CHLORIDE 600; 310; 30; 20 MG/100ML; MG/100ML; MG/100ML; MG/100ML
INJECTION, SOLUTION INTRAVENOUS CONTINUOUS PRN
Status: DISCONTINUED | OUTPATIENT
Start: 2018-08-28 | End: 2018-08-28

## 2018-08-28 RX ORDER — ASPIRIN 325 MG
325 TABLET ORAL
Status: DISCONTINUED | OUTPATIENT
Start: 2018-08-28 | End: 2018-08-28 | Stop reason: HOSPADM

## 2018-08-28 RX ORDER — ATROPINE SULFATE 0.1 MG/ML
.5-1 INJECTION INTRAVENOUS
Status: DISCONTINUED | OUTPATIENT
Start: 2018-08-28 | End: 2018-08-28 | Stop reason: HOSPADM

## 2018-08-28 RX ORDER — METOPROLOL TARTRATE 1 MG/ML
5 INJECTION, SOLUTION INTRAVENOUS EVERY 5 MIN PRN
Status: DISCONTINUED | OUTPATIENT
Start: 2018-08-28 | End: 2018-08-28 | Stop reason: HOSPADM

## 2018-08-28 RX ORDER — DOBUTAMINE HYDROCHLORIDE 200 MG/100ML
2-20 INJECTION INTRAVENOUS CONTINUOUS PRN
Status: DISCONTINUED | OUTPATIENT
Start: 2018-08-28 | End: 2018-08-28 | Stop reason: HOSPADM

## 2018-08-28 RX ORDER — ONDANSETRON 4 MG/1
4 TABLET, ORALLY DISINTEGRATING ORAL EVERY 30 MIN PRN
Status: DISCONTINUED | OUTPATIENT
Start: 2018-08-28 | End: 2018-08-28 | Stop reason: HOSPADM

## 2018-08-28 RX ORDER — MORPHINE SULFATE 2 MG/ML
1-2 INJECTION, SOLUTION INTRAMUSCULAR; INTRAVENOUS EVERY 5 MIN PRN
Status: DISCONTINUED | OUTPATIENT
Start: 2018-08-28 | End: 2018-08-28 | Stop reason: HOSPADM

## 2018-08-28 RX ORDER — PHENYLEPHRINE HCL IN 0.9% NACL 1 MG/10 ML
20-100 SYRINGE (ML) INTRAVENOUS
Status: DISCONTINUED | OUTPATIENT
Start: 2018-08-28 | End: 2018-08-28 | Stop reason: HOSPADM

## 2018-08-28 RX ORDER — HYDRALAZINE HYDROCHLORIDE 20 MG/ML
10-20 INJECTION INTRAMUSCULAR; INTRAVENOUS
Status: DISCONTINUED | OUTPATIENT
Start: 2018-08-28 | End: 2018-08-28 | Stop reason: HOSPADM

## 2018-08-28 RX ORDER — LIDOCAINE HYDROCHLORIDE 10 MG/ML
30 INJECTION, SOLUTION EPIDURAL; INFILTRATION; INTRACAUDAL; PERINEURAL
Status: COMPLETED | OUTPATIENT
Start: 2018-08-28 | End: 2018-08-28

## 2018-08-28 RX ORDER — NALOXONE HYDROCHLORIDE 0.4 MG/ML
.1-.4 INJECTION, SOLUTION INTRAMUSCULAR; INTRAVENOUS; SUBCUTANEOUS
Status: DISCONTINUED | OUTPATIENT
Start: 2018-08-28 | End: 2018-08-28

## 2018-08-28 RX ORDER — ASPIRIN 81 MG/1
81 TABLET ORAL DAILY
Status: DISCONTINUED | OUTPATIENT
Start: 2018-08-29 | End: 2018-08-31 | Stop reason: HOSPADM

## 2018-08-28 RX ORDER — NALOXONE HYDROCHLORIDE 0.4 MG/ML
.1-.4 INJECTION, SOLUTION INTRAMUSCULAR; INTRAVENOUS; SUBCUTANEOUS
Status: DISCONTINUED | OUTPATIENT
Start: 2018-08-28 | End: 2018-08-31 | Stop reason: HOSPADM

## 2018-08-28 RX ORDER — LIDOCAINE HYDROCHLORIDE 10 MG/ML
1-10 INJECTION, SOLUTION EPIDURAL; INFILTRATION; INTRACAUDAL; PERINEURAL
Status: DISCONTINUED | OUTPATIENT
Start: 2018-08-28 | End: 2018-08-28 | Stop reason: HOSPADM

## 2018-08-28 RX ORDER — VERAPAMIL HYDROCHLORIDE 2.5 MG/ML
1-2.5 INJECTION, SOLUTION INTRAVENOUS
Status: DISCONTINUED | OUTPATIENT
Start: 2018-08-28 | End: 2018-08-28 | Stop reason: HOSPADM

## 2018-08-28 RX ORDER — SODIUM CHLORIDE, SODIUM LACTATE, POTASSIUM CHLORIDE, CALCIUM CHLORIDE 600; 310; 30; 20 MG/100ML; MG/100ML; MG/100ML; MG/100ML
INJECTION, SOLUTION INTRAVENOUS CONTINUOUS
Status: DISCONTINUED | OUTPATIENT
Start: 2018-08-28 | End: 2018-08-28 | Stop reason: HOSPADM

## 2018-08-28 RX ORDER — POTASSIUM CHLORIDE 29.8 MG/ML
20 INJECTION INTRAVENOUS
Status: DISCONTINUED | OUTPATIENT
Start: 2018-08-28 | End: 2018-08-28 | Stop reason: HOSPADM

## 2018-08-28 RX ORDER — LORAZEPAM 2 MG/ML
.5-2 INJECTION INTRAMUSCULAR EVERY 4 HOURS PRN
Status: DISCONTINUED | OUTPATIENT
Start: 2018-08-28 | End: 2018-08-28 | Stop reason: HOSPADM

## 2018-08-28 RX ORDER — DOPAMINE HYDROCHLORIDE 160 MG/100ML
2-20 INJECTION, SOLUTION INTRAVENOUS CONTINUOUS PRN
Status: DISCONTINUED | OUTPATIENT
Start: 2018-08-28 | End: 2018-08-28 | Stop reason: HOSPADM

## 2018-08-28 RX ORDER — PROTAMINE SULFATE 10 MG/ML
1-5 INJECTION, SOLUTION INTRAVENOUS
Status: DISCONTINUED | OUTPATIENT
Start: 2018-08-28 | End: 2018-08-28 | Stop reason: HOSPADM

## 2018-08-28 RX ORDER — NICARDIPINE HYDROCHLORIDE 2.5 MG/ML
100 INJECTION INTRAVENOUS
Status: DISCONTINUED | OUTPATIENT
Start: 2018-08-28 | End: 2018-08-28 | Stop reason: HOSPADM

## 2018-08-28 RX ORDER — PROTAMINE SULFATE 10 MG/ML
INJECTION, SOLUTION INTRAVENOUS PRN
Status: DISCONTINUED | OUTPATIENT
Start: 2018-08-28 | End: 2018-08-28

## 2018-08-28 RX ORDER — CLOPIDOGREL 300 MG/1
300-600 TABLET, FILM COATED ORAL
Status: DISCONTINUED | OUTPATIENT
Start: 2018-08-28 | End: 2018-08-28 | Stop reason: HOSPADM

## 2018-08-28 RX ORDER — CEFAZOLIN SODIUM 2 G/100ML
2 INJECTION, SOLUTION INTRAVENOUS
Status: COMPLETED | OUTPATIENT
Start: 2018-08-28 | End: 2018-08-28

## 2018-08-28 RX ORDER — FUROSEMIDE 10 MG/ML
20-100 INJECTION INTRAMUSCULAR; INTRAVENOUS
Status: DISCONTINUED | OUTPATIENT
Start: 2018-08-28 | End: 2018-08-28 | Stop reason: HOSPADM

## 2018-08-28 RX ORDER — SODIUM CHLORIDE 9 MG/ML
INJECTION, SOLUTION INTRAVENOUS CONTINUOUS PRN
Status: DISCONTINUED | OUTPATIENT
Start: 2018-08-28 | End: 2018-08-28

## 2018-08-28 RX ORDER — EPHEDRINE SULFATE 50 MG/ML
INJECTION, SOLUTION INTRAMUSCULAR; INTRAVENOUS; SUBCUTANEOUS PRN
Status: DISCONTINUED | OUTPATIENT
Start: 2018-08-28 | End: 2018-08-28

## 2018-08-28 RX ORDER — CLOPIDOGREL 300 MG/1
300 TABLET, FILM COATED ORAL ONCE
Status: COMPLETED | OUTPATIENT
Start: 2018-08-28 | End: 2018-08-28

## 2018-08-28 RX ORDER — NALOXONE HYDROCHLORIDE 0.4 MG/ML
0.4 INJECTION, SOLUTION INTRAMUSCULAR; INTRAVENOUS; SUBCUTANEOUS EVERY 5 MIN PRN
Status: DISCONTINUED | OUTPATIENT
Start: 2018-08-28 | End: 2018-08-28 | Stop reason: HOSPADM

## 2018-08-28 RX ADMIN — FENTANYL CITRATE 25 MCG: 50 INJECTION, SOLUTION INTRAMUSCULAR; INTRAVENOUS at 08:57

## 2018-08-28 RX ADMIN — Medication 5 MG: at 09:50

## 2018-08-28 RX ADMIN — PROPOFOL 75 MCG/KG/MIN: 10 INJECTION, EMULSION INTRAVENOUS at 07:45

## 2018-08-28 RX ADMIN — SODIUM CHLORIDE: 9 INJECTION, SOLUTION INTRAVENOUS at 08:44

## 2018-08-28 RX ADMIN — SODIUM CHLORIDE, POTASSIUM CHLORIDE, SODIUM LACTATE AND CALCIUM CHLORIDE: 600; 310; 30; 20 INJECTION, SOLUTION INTRAVENOUS at 10:31

## 2018-08-28 RX ADMIN — HEPARIN SODIUM 4000 UNITS: 1000 INJECTION, SOLUTION INTRAVENOUS; SUBCUTANEOUS at 09:15

## 2018-08-28 RX ADMIN — HEPARIN SODIUM 12000 UNITS: 1000 INJECTION, SOLUTION INTRAVENOUS; SUBCUTANEOUS at 09:01

## 2018-08-28 RX ADMIN — Medication 5 MG: at 08:33

## 2018-08-28 RX ADMIN — SIMVASTATIN 20 MG: 20 TABLET, FILM COATED ORAL at 21:14

## 2018-08-28 RX ADMIN — ASPIRIN 81 MG: 81 TABLET, COATED ORAL at 07:35

## 2018-08-28 RX ADMIN — CLOPIDOGREL BISULFATE 300 MG: 300 TABLET, FILM COATED ORAL at 18:19

## 2018-08-28 RX ADMIN — DEXMEDETOMIDINE HYDROCHLORIDE 0.5 MCG/KG/HR: 100 INJECTION, SOLUTION INTRAVENOUS at 07:45

## 2018-08-28 RX ADMIN — LIDOCAINE HYDROCHLORIDE 20 ML: 10 INJECTION, SOLUTION EPIDURAL; INFILTRATION; INTRACAUDAL; PERINEURAL at 08:42

## 2018-08-28 RX ADMIN — PROTAMINE SULFATE 100 MG: 10 INJECTION, SOLUTION INTRAVENOUS at 09:31

## 2018-08-28 RX ADMIN — Medication 5 MG: at 08:37

## 2018-08-28 RX ADMIN — LISINOPRIL 2.5 MG: 2.5 TABLET ORAL at 18:19

## 2018-08-28 RX ADMIN — SODIUM CHLORIDE: 9 INJECTION, SOLUTION INTRAVENOUS at 07:34

## 2018-08-28 RX ADMIN — ONDANSETRON 4 MG: 2 INJECTION INTRAMUSCULAR; INTRAVENOUS at 09:43

## 2018-08-28 RX ADMIN — CEFAZOLIN SODIUM 2 G: 2 INJECTION, SOLUTION INTRAVENOUS at 07:53

## 2018-08-28 ASSESSMENT — LIFESTYLE VARIABLES: TOBACCO_USE: 1

## 2018-08-28 ASSESSMENT — ENCOUNTER SYMPTOMS: ORTHOPNEA: 0

## 2018-08-28 ASSESSMENT — COPD QUESTIONNAIRES: COPD: 0

## 2018-08-28 NOTE — PLAN OF CARE
Problem: Patient Care Overview  Goal: Plan of Care/Patient Progress Review  Outcome: Improving  Pt transferred post TAVR. A/O, clear speech, Neuro's Intact, vitals stable on RA. Family at bedside. Bilateral groin sites CDI, noted bruit in rt groin. Good pulses + doppler. On BR till 6pm, NPO till 6pm. Tele SR/SB w/ BBB.  Planning echo and Cxray tomorrow. continuing to monitor closely.

## 2018-08-28 NOTE — CONSULTS
Consult Date:  08/28/2018      PRIMARY CARE PROVIDER:  Viet Bingham MD      PRIMARY CARDIOLOGIST:  Eulalio Higginbotham MD      REQUESTING PHYSICIAN:  Huseyin Maza MD      REASON FOR CONSULTATION:  Medical management.      HISTORY OF PRESENT ILLNESS:  Reid Jimenes is an 84-year-old male with past medical history of carotid artery disease with no previous history of stroke and severe aortic stenosis as well as non-insulin dependent type 2 diabetes who is admitted under the care of Cardiology and is status post TAVR earlier today.  Procedure was performed under MAC anesthesia.  The patient tolerated the procedure well and was transferred to the cardiac ICU postoperatively for close management.      At present, the patient is evaluated in his hospital room with nurse and multiple family members at bedside.  Denies concerns or complaints.  No chest pain or shortness breath.  No nausea or vomiting.  He did not take his metformin for 2 days prior to surgery.      PAST MEDICAL HISTORY:   1.  Carotid artery disease with no previous history of stroke, noted to be 30% on the right and 65% on the left.   2.  Hypertension.   3.  Dyslipidemia.   4.  Non-insulin dependent type 2 diabetes.  A1c is 6.5.   5.  Peripheral vascular disease.   6.  Severe aortic stenosis followed by Dr. Higginbotham of Cardiology.      PRIOR TO ADMISSION MEDICATIONS:    Prior to Admission medications    Medication Sig Last Dose Taking? Auth Provider   ascorbic acid 500 MG TABS Take 500 mg by mouth every evening  8/27/2018 at 1900 Yes Reported, Patient   aspirin 325 MG tablet Take 325 mg by mouth every evening  8/27/2018 at 1900 Yes Reported, Patient   BIOTIN PO Take 5,000 mg by mouth 2 times daily  8/28/2018 at 0400 Yes Reported, Patient   clotrimazole (LOTRIMIN) 1 % cream Apply topically 2 times daily 8/26/2018 at PM Yes Monroe Shah MD   fluocinonide (LIDEX) 0.05 % ointment Apply topically 2 times daily 8/27/2018 at AM Yes Reported, Patient    GLUCOSAMINE-CHONDROITIN PO Take 1 tablet by mouth 2 times daily  8/28/2018 at 0400 Yes Reported, Patient   lisinopril (PRINIVIL/ZESTRIL) 2.5 MG tablet Take 1 tablet (2.5 mg) by mouth daily  Patient taking differently: Take 2.5 mg by mouth daily Patient takes 1/2 tablet of a 5 mg tablet to equal 2.5 mg daily 8/27/2018 at AM Yes Viet Bingham MD   metFORMIN (GLUCOPHAGE) 1000 MG tablet Take 0.5 tablets (500 mg) by mouth 2 times daily (with meals) 8/26/2018 at PM Yes Viet Bingham MD   Multiple Vitamins-Iron (MULTIVITAMIN/IRON PO) Take 1 tablet by mouth daily  8/28/2018 at 0400 Yes Reported, Patient   OMEPRAZOLE PO Take 20 mg by mouth daily 8/28/2018 at 0400 Yes Reported, Patient   simvastatin (ZOCOR) 40 MG tablet Take 0.5 tablets (20 mg) by mouth At Bedtime 8/27/2018 at 1900 Yes Viet Bingham MD   blood glucose monitoring (MIRA CONTOUR NEXT) test strip Use to test blood sugar one time daily or as directed.   Viet Bingham MD   blood glucose monitoring (MIRA MICROLET) lancets Use to test blood sugar 1 times daily or as directed.   Viet Bingham MD          ALLERGIES:  NONE.      PAST SURGICAL HISTORY:  Mandibular surgery.      FAMILY HISTORY:   1.  Mother had stomach cancer.   2.  Father had heart disease.   3.  Sister had an MI.      SOCIAL HISTORY:  He is a previous smoker, smoked for 30 years, quit 20 years ago.  History of alcohol abuse but has been sober for 30 years.  He is .  He and his wife live in a [facility].      REVIEW OF SYSTEMS:  A 10-point review of systems was completed.  Pertinent positives noted in HPI, all other systems negative.      PHYSICAL EXAMINATION:   GENERAL:  Reid Jimenes is a well-developed, well-nourished 84-year-old male who appears his stated age and is lying supine in bed.   VITAL SIGNS:  Blood pressure is 129/58, heart rate 55, O2 sat is 95% on room air, temp is 97.9.   HEAD:  Normocephalic and atraumatic.   CARDIOVASCULAR:  Regular rate and rhythm.  Systolic  murmur noted.   PULMONARY:  Normal effort.  Lungs are clear.   ABDOMEN:  Normal bowel sounds.  Abdomen is soft and nontender.   EXTREMITIES:  Moves all 4 extremities.  Lower extremities without edema.   NEUROLOGIC:  Alert and oriented.  Cranial nerves II through XII grossly intact.      LABORATORY DATA:  Reviewed in Epic.      ASSESSMENT:  Sarah Boyd is an 84-year-old male with past medical history of severe aortic stenosis and non-insulin dependent type 2 diabetes who is status post a TAVR earlier today.  Hospitalist Service was consulted for medical co-management.   1.  Status post transcatheter aortic valve replacement, postoperative day 0:  Routine postoperative cares and general cardiac management deferred to the primary Cardiology team.  He has already been initiated on his prior to admission lisinopril as well as aspirin and Plavix.   2.  Non-insulin dependent type 2 diabetes:  A1c is 6.5.  Hold prior to admission metformin.  Will place on sliding scale insulin with meals and at bedtime.   3.  Mild hypernatremia:  Sodium noted to be at 146.  He is on a regular diet.  We will avoid hydration at this point as he appears euvolemic.  BMP in the morning.   4.  Deep venous thrombosis prophylaxis:  Ambulation.      CODE STATUS:  FULL CODE.      We, the Hospitalist Service, thank you for this consult.  We will follow along with you.  Please call if questions.      This patient was discussed with Dr. Patricia of the Hospitalist Service who independently interviewed and examined the patient.  He is in agreement with the above plan.         PRACHI PATRICIA MD       As dictated by CRISTINO POP PA-C            D: 2018   T: 2018   MT: CARISA      Name:     SARAH BOYD   MRN:      [id number]        Account:       [id number]   :      1934           Consult Date:  2018      Document: [id number]       cc: Huseyin Bingham MD

## 2018-08-28 NOTE — IP AVS SNAPSHOT
[facility]    [address] [place] [address]., [address]    [place] [address]    Phone:  [phone]                                       After Visit Summary   8/28/2018    Reid Jimenes    MRN: [id number]           After Visit Summary Signature Page     I have received my discharge instructions, and my questions have been answered. I have discussed any challenges I see with this plan with the nurse or doctor.    ..........................................................................................................................................  Patient/Patient Representative Signature      ..........................................................................................................................................  Patient Representative Print Name and Relationship to Patient    ..................................................               ................................................  Date                                            Time    ..........................................................................................................................................  Reviewed by Signature/Title    ...................................................              ..............................................  Date                                                            Time          22EPIC Rev 08/18

## 2018-08-28 NOTE — OR NURSING
ALL OR STAFF WERE INTRA-CATHLAB;  ON STANDBY DURING THE WHOLE PROCEDURE OF THE TAVR CASE.    INITIAL SOFT COUNT DONE. TAVR WAS DONE PERCUTANEOUSLY.     POSITIONING DIRECTED AND VERIFIED BY THE SURGEON AND ANESTHESIA PROVIDER.     PREPPED BY: LEONARD SUN, RN FROM NECK DOWN TO THE KNEES.     GROUNDED ON THE LEFT LOWER EXTREMITY: LOT NUMBER: [id number]    PREOP SKIN:   NO SKIN ISSUE NOTED AT THE END OF THE CASE.     ENDORSED TO PACU NOD FOR CONTINUITY OF CARE BY THE CATHLAB RN.       Last edited by: LEONARD SUN, RN at 08/28/18  ==========================================================

## 2018-08-28 NOTE — ANESTHESIA POSTPROCEDURE EVALUATION
Patient: Reid Jimenes    Procedure(s):  ANESTHESIA NEEDED FOR TAVR PROCEDURE - Wound Class: I-Clean    Diagnosis:AORTIC STENOSIS  Diagnosis Additional Information: No value filed.    Anesthesia Type:  MAC    Note:  Anesthesia Post Evaluation    Patient location during evaluation: PACU  Patient participation: Able to fully participate in evaluation  Level of consciousness: awake  Pain management: adequate  Airway patency: patent  Cardiovascular status: acceptable, blood pressure returned to baseline and hemodynamically stable  Respiratory status: acceptable  Hydration status: acceptable  PONV: none             Last vitals:  Vitals:    08/28/18 0618 08/28/18 1000   BP: 134/59 111/51   Resp: 16 12   Temp: 36.5  C (97.7  F) 36.2  C (97.2  F)   SpO2: 97% 99%         Electronically Signed By: Ez Blancas MD  August 28, 2018  10:11 AM

## 2018-08-28 NOTE — PROGRESS NOTES
Admission medication history interview status for the 8/28/2018  admission is complete. See EPIC admission navigator for prior to admission medications     Medication history source reliability:Moderate    Medication history interview source(s):Patient    Medication history resources (including written lists, pill bottles, clinic record):[facility]    [facility]    Additional medication history information not noted on [facility] med list :None    Time spent in this activity: 40 minutes    Prior to Admission medications    Medication Sig Last Dose Taking? Auth Provider   ascorbic acid 500 MG TABS Take 500 mg by mouth every evening  8/27/2018 at 1900 Yes Reported, Patient   aspirin 325 MG tablet Take 325 mg by mouth every evening  8/27/2018 at 1900 Yes Reported, Patient   BIOTIN PO Take 5,000 mg by mouth 2 times daily  8/28/2018 at 0400 Yes Reported, Patient   clotrimazole (LOTRIMIN) 1 % cream Apply topically 2 times daily 8/26/2018 at PM Yes Monroe Shah MD   fluocinonide (LIDEX) 0.05 % ointment Apply topically 2 times daily 8/27/2018 at AM Yes Reported, Patient   GLUCOSAMINE-CHONDROITIN PO Take 1 tablet by mouth 2 times daily  8/28/2018 at 0400 Yes Reported, Patient   lisinopril (PRINIVIL/ZESTRIL) 2.5 MG tablet Take 1 tablet (2.5 mg) by mouth daily  Patient taking differently: Take 2.5 mg by mouth daily Patient takes 1/2 tablet of a 5 mg tablet to equal 2.5 mg daily 8/27/2018 at AM Yes Viet Bingham MD   metFORMIN (GLUCOPHAGE) 1000 MG tablet Take 0.5 tablets (500 mg) by mouth 2 times daily (with meals) 8/26/2018 at PM Yes Viet Bingham MD   Multiple Vitamins-Iron (MULTIVITAMIN/IRON PO) Take 1 tablet by mouth daily  8/28/2018 at 0400 Yes Reported, Patient   OMEPRAZOLE PO Take 20 mg by mouth daily 8/28/2018 at 0400 Yes Reported, Patient   simvastatin (ZOCOR) 40 MG tablet Take 0.5 tablets (20 mg) by mouth At Bedtime 8/27/2018 at 1900 Yes Viet Bingham MD   blood glucose monitoring  (MIRA CONTOUR NEXT) test strip Use to test blood sugar one time daily or as directed.   Viet Bingham MD   blood glucose monitoring (MIRA MICROLET) lancets Use to test blood sugar 1 times daily or as directed.   Viet Bingham MD

## 2018-08-28 NOTE — ANESTHESIA CARE TRANSFER NOTE
Patient: Reid Jimenes    Procedure(s):  ANESTHESIA NEEDED FOR TAVR PROCEDURE - Wound Class: I-Clean    Diagnosis: AORTIC STENOSIS  Diagnosis Additional Information: No value filed.    Anesthesia Type:   MAC     Note:  Airway :Face Mask  Patient transferred to:PACU  Comments: At end of procedure, spontaneous respirations, patient alert to voice, able to follow commands. Oxygen via facemask at 10 liters per minute to PACU. Oxygen tubing connected to wall O2 in PACU, SpO2, NiBP, and EKG monitors and alarms on and functioning, Falguni Hugger warmer connected to patient gown, report on patient's clinical status given to PACU RN, RN questions answered.Handoff Report: Identifed the Patient, Identified the Reponsible Provider, Reviewed the pertinent medical history, Discussed the surgical course, Reviewed Intra-OP anesthesia mangement and issues during anesthesia, Set expectations for post-procedure period and Allowed opportunity for questions and acknowledgement of understanding      Vitals: (Last set prior to Anesthesia Care Transfer)    CRNA VITALS  8/28/2018 0928 - 8/28/2018 1008      8/28/2018             Resp Rate (set): 10                Electronically Signed By: SARY Rey CRNA  August 28, 2018  10:08 AM

## 2018-08-28 NOTE — IP AVS SNAPSHOT
MRN:[id number]                      After Visit Summary   8/28/2018    Reid Jimenes    MRN: [id number]           Thank you!     Thank you for choosing [facility] for your care. Our goal is always to provide you with excellent care. Hearing back from our patients is one way we can continue to improve our services. Please take a few minutes to complete the written survey that you may receive in the mail after you visit with us. Thank you!        Patient Information     Date Of Birth          2/14/1934        Designated Caregiver       Most Recent Value    Caregiver    Will someone help with your care after discharge? 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                  Who to Call     For medical emergencies, please call 911.  For non-urgent questions about your medical care, please call your primary care provider or clinic, [phone]  For questions related to your surgery, please call your surgery clinic        Attending Provider     Provider Specialty    Eliseo Salazar MD Cardiology    [facility], Rod Doherty MD Cardiology       Primary Care Provider Office Phone # Fax #    Viet Bingham [phone]      Follow-up Appointments     Follow-up and recommended labs and tests        Follow up with primary care provider, Viet Bingham MD, within 7 days for hospital follow- up.  The following labs/tests are recommended: repeat CBC and BMP.   Follow-up with cardiology as suggested.                  Your next 10 appointments already scheduled     Sep 06, 2018  3:10 PM CDT   Return Visit with SARY Ball CNP  "  [facility])    [address]  [place] [address] [phone] OPT 2            Sep 07, 2018  2:20 PM CDT   SHORT with Viet Bingham MD   [facility]    [address]  [place] [address]   [phone]            Sep 27, 2018  2:30 PM CDT   TAVR Return with Jose Saldana PA-C   [facility])    [address]  [place] [address]   [phone] OPT 2              Additional Services     Cardiac Rehab Referral       *This therapy referral will be filtered to a centralized scheduling office at [facility] and the patient will receive a call to schedule an appointment at a [facility] location most convenient for them.*     If you have not heard from the scheduling office within 2 business days, please call [phone] for all locations, with the exception of [place], please call [phone].    Please be aware that coverage of these services is subject to the terms and limitations of your health insurance plan.  Call member services at your health plan with any benefit or coverage questions.      **Note to Provider:  If you are referring outside of [facility] for the therapy appointment, please list the name of the location in the \"special instructions\" above, print the referral and give to the patient to schedule the appointment.                         Further instructions from your care team         A follow-up appointment was scheduled for you [see above].  It is very important to go to it--bring these papers and your insurance card with you.  At the visit, talk about your hospital stay.  Tell your doctor how you feel.  Show your new list of medications.  Your doctor will update records, make sure you are still doing OK, and decide if any tests or medication changes " are needed.          Going home after Transcatheter Aortic Valve Replacement (TAVR)  Femoral Access    You have small procedure sites at both groins. You may have small amounts of bruising or discomfort, this is expected. If you develop worse swelling, redness and warmth that does not go away, fever and chills, Increasing numbness in your legs, worsening pain at the site then you need to contact your nurse coordinator.    You may shower, but do not soak in a bath tub or pool or apply lotions, ointments, powder, etc for 3-5 days or until sites look healed.     Activity: No lifting, pushing, pulling more than 10 pounds (examples to avoid: groceries, vacuuming, gardening, golfing): For one week with a procedure through the groin.  No driving until you follow up with your primary care provider.     After the initial healing process of the access site, we recommend cardiac rehabilitation for all TAVR patients. Cardiac rehabilitation will help you:  - Rebuild stamina, strength and balance.  - Learn how to participate in activities safely, as well as help you regain confidence to do so.  - Return to activities of daily living and leisure.  Please contact their central scheduling to arrange at [phone]    We prefer you to follow up with your primary care provider within 2 weeks. You will be arranged to see the TAVR clinic in month. At that time you will have a repeat ultrasound of the heart to look at the valve.     Should you have any questions or concerns please contact your assigned TAVR nurse coordinator:  Lisa [phone] (Dr. Salazar nurse)   Syeda [phone] ( Dr. Reed nurse)  Fernanda [phone] ([place] nurse.  At the first prompt enter #1, second prompt enter #3, then ask the triage nurse if you can speak with Fernanda).          Pending Results     Date and Time Order Name Status Description    8/31/2018 0500 EKG 12-lead, tracing only Preliminary     8/29/2018 0500 EKG 12-lead, tracing only Preliminary      "8/28/2018 1019 EKG 12-lead, tracing only Preliminary             Statement of Approval     Ordered          08/31/18 0934  I have reviewed and agree with all the recommendations and orders detailed in this document.  EFFECTIVE NOW     Approved and electronically signed by:  Luke Patricia MD             Admission Information     Date & Time Provider Department Dept. Phone    8/28/2018 Rod Taylor MD [facility] [phone]      Your Vitals Were     Blood Pressure Pulse Temperature Respirations Height Weight    148/69 90 98.3  F (36.8  C) (Oral) 16 1.651 m (5' 5\") 78.9 kg (173 lb 14.4 oz)    Pulse Oximetry BMI (Body Mass Index)                97% 28.94 kg/m2          MyChart Information     [facility] gives you secure access to your electronic health record. If you see a primary care provider, you can also send messages to your care team and make appointments. If you have questions, please call your primary care clinic.  If you do not have a primary care provider, please call [phone] and they will assist you.        Care EveryWhere ID     This is your Care EveryWhere ID. This could be used by other organizations to access your [facility] medical records  [id number]        Equal Access to Services     JEANNETTE CROW AH: Candace diaso Vanessa, waaxda luqadaha, qaybta kaalmada adejamelda, rigo myers. So [facility] [phone].    ATENCIÓN: Si habla español, tiene a philip disposición servicios gratuitos de asistencia lingüística. Llame al [phone].    We comply with applicable federal civil rights laws and [place] laws. We do not discriminate on the basis of race, color, national origin, age, disability, sex, sexual orientation, or gender identity.               Review of your medicines      START taking        Dose / Directions    aspirin 81 MG EC tablet   Used for:  Severe aortic stenosis   Replaces:  aspirin 325 MG tablet        Dose:  81 " mg   Take 1 tablet (81 mg) by mouth daily   Quantity:  30 tablet   Refills:  0       clopidogrel 75 MG tablet   Commonly known as:  PLAVIX   Used for:  Severe aortic stenosis        Dose:  75 mg   Take 1 tablet (75 mg) by mouth daily   Quantity:  30 tablet   Refills:  3       pantoprazole 40 MG EC tablet   Commonly known as:  PROTONIX   Used for:  Severe aortic stenosis        Dose:  40 mg   Take 1 tablet (40 mg) by mouth daily Take 30-60 minutes before a meal.   Quantity:  30 tablet   Refills:  3         CONTINUE these medicines which may have CHANGED, or have new prescriptions. If we are uncertain of the size of tablets/capsules you have at home, strength may be listed as something that might have changed.        Dose / Directions    lisinopril 2.5 MG tablet   Commonly known as:  PRINIVIL/Zestril   This may have changed:  additional instructions   Used for:  Type 2 diabetes mellitus with microalbuminuria, without long-term current use of insulin (H)        Dose:  2.5 mg   Take 1 tablet (2.5 mg) by mouth daily   Quantity:  90 tablet   Refills:  3         CONTINUE these medicines which have NOT CHANGED        Dose / Directions    ascorbic acid 500 MG Tabs        Dose:  500 mg   Take 500 mg by mouth every evening   Refills:  0       BIOTIN PO        Dose:  5000 mg   Take 5,000 mg by mouth 2 times daily   Refills:  0       blood glucose monitoring lancets   Used for:  Type 2 diabetes mellitus with microalbuminuria, without long-term current use of insulin (H)        Use to test blood sugar 1 times daily or as directed.   Quantity:  100 each   Refills:  5       blood glucose monitoring test strip   Commonly known as:  MIRA CONTOUR NEXT   Used for:  Type 2 diabetes mellitus with microalbuminuria, without long-term current use of insulin (H)        Use to test blood sugar one time daily or as directed.   Quantity:  100 each   Refills:  5       clotrimazole 1 % cream   Commonly known as:  LOTRIMIN   Used for:  Tinea  corporis        Apply topically 2 times daily   Quantity:  15 g   Refills:  3       fluocinonide 0.05 % ointment   Commonly known as:  LIDEX        Apply topically 2 times daily   Refills:  0       GLUCOSAMINE-CHONDROITIN PO        Dose:  1 tablet   Take 1 tablet by mouth 2 times daily   Refills:  0       metFORMIN 1000 MG tablet   Commonly known as:  GLUCOPHAGE   Used for:  Type 2 diabetes mellitus with microalbuminuria, without long-term current use of insulin (H)        Dose:  500 mg   Take 0.5 tablets (500 mg) by mouth 2 times daily (with meals)   Quantity:  90 tablet   Refills:  3       MULTIVITAMIN/IRON PO        Dose:  1 tablet   Take 1 tablet by mouth daily   Refills:  0       simvastatin 40 MG tablet   Commonly known as:  ZOCOR   Used for:  Hyperlipidemia with target LDL less than 100        Dose:  20 mg   Take 0.5 tablets (20 mg) by mouth At Bedtime   Quantity:  45 tablet   Refills:  3         STOP taking     aspirin 325 MG tablet   Replaced by:  aspirin 81 MG EC tablet           OMEPRAZOLE PO                Where to get your medicines      These medications were sent to [facility] - [address]  [address] [facility] [address], [place] [address]     Phone:  [phone]     clopidogrel 75 MG tablet    pantoprazole 40 MG EC tablet         Some of these will need a paper prescription and others can be bought over the counter. Ask your nurse if you have questions.     You don't need a prescription for these medications     aspirin 81 MG EC tablet                Protect others around you: Learn how to safely use, store and throw away your medicines at www.disposemymeds.org.             Medication List: This is a list of all your medications and when to take them. Check marks below indicate your daily home schedule. Keep this list as a reference.      Medications           Morning Afternoon Evening Bedtime As Needed    ascorbic acid 500 MG Tabs   Take 500 mg by mouth every evening                                    aspirin 81 MG EC tablet   Take 1 tablet (81 mg) by mouth daily   Last time this was given:  81 mg on 8/31/2018  8:48 AM                                   BIOTIN PO   Take 5,000 mg by mouth 2 times daily                                      blood glucose monitoring lancets   Use to test blood sugar 1 times daily or as directed.                                blood glucose monitoring test strip   Commonly known as:  [employer] CONTOUR NEXT   Use to test blood sugar one time daily or as directed.                                clopidogrel 75 MG tablet   Commonly known as:  PLAVIX   Take 1 tablet (75 mg) by mouth daily   Last time this was given:  75 mg on 8/31/2018  8:48 AM                                   clotrimazole 1 % cream   Commonly known as:  LOTRIMIN   Apply topically 2 times daily                                      fluocinonide 0.05 % ointment   Commonly known as:  LIDEX   Apply topically 2 times daily                                      GLUCOSAMINE-CHONDROITIN PO   Take 1 tablet by mouth 2 times daily                                      lisinopril 2.5 MG tablet   Commonly known as:  PRINIVIL/Zestril   Take 1 tablet (2.5 mg) by mouth daily   Last time this was given:  5 mg on 8/31/2018  8:48 AM                                   metFORMIN 1000 MG tablet   Commonly known as:  GLUCOPHAGE   Take 0.5 tablets (500 mg) by mouth 2 times daily (with meals)                                      MULTIVITAMIN/IRON PO   Take 1 tablet by mouth daily                                   pantoprazole 40 MG EC tablet   Commonly known as:  PROTONIX   Take 1 tablet (40 mg) by mouth daily Take 30-60 minutes before a meal.                                   simvastatin 40 MG tablet   Commonly known as:  ZOCOR   Take 0.5 tablets (20 mg) by mouth At Bedtime   Last time this was given:  20 mg on 8/30/2018  8:55 PM

## 2018-08-28 NOTE — PROVIDER NOTIFICATION
Pt has + bruit on right groin site, now pt c/o pain in that site, paged out to Dr. Maza to update.     Dr. Maza came to unit to assess. Does not note any mass, area soft. advised to continue to monitor, pain does not increase with palpation.  No new orders.

## 2018-08-28 NOTE — ANESTHESIA PREPROCEDURE EVALUATION
"Procedure: Procedure(s):  TRANSCATHETER AORTIC VALVE IMPLANT ANESTHESIA  Preop diagnosis: AORTIC STENOSIS    No Known Allergies  Past Medical History:   Diagnosis Date     Hyperlipidaemia LDL goal < 100      Hypertension      Type 2 diabetes mellitus (H)      Past Surgical History:   Procedure Laterality Date     MANDIBLE SURGERY  1958     Social History   Substance Use Topics     Smoking status: Former Smoker     Packs/day: 1.00     Years: 30.00     Types: Cigarettes     Quit date: 8/28/1980     Smokeless tobacco: Former User     Types: Chew     Quit date: 1/5/1983     Alcohol use No      Comment: quit 30+ years ago. states, \"I'm an alcoholic\".     Prior to Admission medications    Medication Sig Start Date End Date Taking? Authorizing Provider   ascorbic acid 500 MG TABS Take 500 mg by mouth every evening    Yes Reported, Patient   aspirin 325 MG tablet Take 325 mg by mouth every evening    Yes Reported, Patient   BIOTIN PO Take 5,000 mg by mouth 2 times daily    Yes Reported, Patient   clotrimazole (LOTRIMIN) 1 % cream Apply topically 2 times daily 6/15/18  Yes Monroe Shah MD   fluocinonide (LIDEX) 0.05 % ointment Apply topically 2 times daily   Yes Reported, Patient   GLUCOSAMINE-CHONDROITIN PO Take 1 tablet by mouth 2 times daily    Yes Reported, Patient   lisinopril (PRINIVIL/ZESTRIL) 2.5 MG tablet Take 1 tablet (2.5 mg) by mouth daily  Patient taking differently: Take 2.5 mg by mouth daily Patient takes 1/2 tablet of a 5 mg tablet to equal 2.5 mg daily 11/30/17  Yes Viet Bingham MD   metFORMIN (GLUCOPHAGE) 1000 MG tablet Take 0.5 tablets (500 mg) by mouth 2 times daily (with meals) 11/8/17  Yes Viet Bingham MD   Multiple Vitamins-Iron (MULTIVITAMIN/IRON PO) Take 1 tablet by mouth daily    Yes Reported, Patient   OMEPRAZOLE PO Take 20 mg by mouth daily   Yes Reported, Patient   simvastatin (ZOCOR) 40 MG tablet Take 0.5 tablets (20 mg) by mouth At Bedtime 11/8/17  Yes Viet Bingham MD   blood " glucose monitoring (MIRA CONTOUR NEXT) test strip Use to test blood sugar one time daily or as directed. 4/12/18   Viet Bingham MD   blood glucose monitoring (MIRA MICROLET) lancets Use to test blood sugar 1 times daily or as directed. 4/12/18   Viet Bingham MD     Current Facility-Administered Medications Ordered in Epic   Medication Dose Route Frequency Last Rate Last Dose     aspirin EC tablet 81 mg  81 mg Oral Daily         ceFAZolin (ANCEF) intermittent infusion 2 g in 100 mL dextrose PRE-MIX  2 g Intravenous Pre-Op/Pre-procedure x 1 dose         HOLD: metformin (GLUCOPHAGE, GLUMETZA, FORTAMET, RIOMET) and metformin containing medications: alogliptin/metformin (KAZANO), glipizide/metformin (METAGLIP), glyburide/metformin (GLUCOVANCE), rosiglitazone/metformin (AVANDAMET), dapagliflozin/metformin (XIGDUO XR), sitagliptin/metformin (JANUMET, JANUMET XR), linagliptin/metformin (JENTADUETO), repaglinide/metformin (PRANDIMET), saxagliptin/metformin (KOMBIGLYZE XR), canagliflozin/metformin (INVOKAMET), and pioglitazone/metformin (ACTOPLUS MET, ACTOPLUS MET XR) the morning of the procedure.   Does not apply HOLD         lidocaine (LMX4) cream   Topical Q1H PRN         lidocaine 1 % 1 mL  1 mL Other Q1H PRN         lidocaine 1 % 1 mL  1 mL Other Q1H PRN         Patient is NOT allergic to contrast dye OR was given pre-procedure oral steroids for treatment   Does not apply DOES NOT GO TO MAR         sodium chloride (PF) 0.9% PF flush 3 mL  3 mL Intracatheter Q8H         sodium chloride (PF) 0.9% PF flush 3 mL  3 mL Intracatheter Q1H PRN         sodium chloride (PF) 0.9% PF flush 3 mL  3 mL Intracatheter Q8H         sodium chloride 0.9% infusion   Intravenous Continuous         No current [facility]-ordered outpatient prescriptions on file.       - MEDICATION INSTRUCTIONS -       sodium chloride       Wt Readings from Last 1 Encounters:   08/28/18 79.6 kg (175 lb 9 oz)     Temp Readings from Last 1 Encounters:    08/28/18 36.5  C (97.7  F) (Temporal)     BP Readings from Last 6 Encounters:   08/28/18 134/59   08/27/18 130/62   07/31/18 130/66   07/24/18 120/55   07/12/18 115/61   06/05/18 116/74     Pulse Readings from Last 4 Encounters:   08/27/18 60   07/31/18 58   07/24/18 51   07/12/18 61     Resp Readings from Last 1 Encounters:   08/28/18 16     SpO2 Readings from Last 1 Encounters:   08/28/18 97%     Recent Labs   Lab Test  08/27/18   0950  07/26/18   1453   NA  142  140   POTASSIUM  4.2  4.5   CHLORIDE  106  106   CO2  29  30   ANIONGAP  7  4   GLC  125*  101*   BUN  22  16   CR  0.94  0.90   DEEPTI  8.8  8.7     Recent Labs   Lab Test  08/27/18   0950  04/12/18   1626   AST  17  19   ALT  23  25   ALKPHOS  81  79   BILITOTAL  0.5  0.3     Recent Labs   Lab Test  08/27/18   0950  07/24/18   0819   WBC  7.7  6.5   HGB  13.7  12.5*   PLT  147*  144*     Recent Labs   Lab Test  08/27/18   0950   ABO  A   RH  Pos     Recent Labs   Lab Test  07/24/18   0819   INR  1.01   PTT  27      No results for input(s): TROPI in the last 45837 hours.  No results for input(s): PH, PCO2, PO2, HCO3 in the last 87803 hours.  No results for input(s): HCG in the last 00084 hours.  Recent Results (from the past 744 hour(s))   US Carotid Bilateral    Narrative    INDICATION: Known Carotid Artery Stenosis      Impression    IMPRESSION:  1. There is a calcific atherosclerotic plaque in the carotid  bifurcation and proximal internal carotid arteries bilaterally.  2. There is a mild (less than 50%) right internal carotid artery  stenosis and moderate (50-69%) left internal carotid artery stenosis.   3. Vertebral blood flow is anterograde bilaterally.  4. Compared to previous study dated 10/20/2017, carotid artery  stenoses are unchanged with slightly improved velocities on the left.    Right side  Right ICA peak systolic velocity (PSV) 120 cm/s  Right ICA end diastolic velocity (EDV) 27 cm/s  Right ICA/CCA peak systolic velocity ratio 1.1    Left  side  Left ICA peak systolic velocity (PSV) 221  cm/s  Left ICA end diastolic velocity (EDV) 38 cm/s  Left ICA/CCA peak systolic velocity ratio  2.5    Study quality: Excellent    [facility]  Dedicated to Excellence in Vascular Care    To schedule a Vascular Lab study or consultation  Call [phone]. OPTION 2.          TRISHA LEWIS MD       RECENT LABS:   ECG:   ECHO:     Anesthesia Evaluation     . Pt has had prior anesthetic.     No history of anesthetic complications          ROS/MED HX    ENT/Pulmonary:     (+)tobacco use, Past use , . .   (-) asthma, COPD, sleep apnea, MANNY risk factors and recent URI   Neurologic:  - neg neurologic ROS    (-) Delerium and Dementia   Cardiovascular: Comment: 1. There is a calcific atherosclerotic plaque in the carotid  bifurcation and proximal internal carotid arteries bilaterally.  2. There is a mild (less than 50%) right internal carotid artery stenosis and moderate (50-69%) left internal carotid artery stenosis.   3. Vertebral blood flow is anterograde bilaterally.  4. Compared to previous study dated 10/20/2017, carotid artery stenoses are unchanged with slightly improved velocities on the left.    1. Symptomatic severe aortic stenosis.  2. Normal right-sided and normal left-sided filling pressures.  3. Mild pulmonary hypertension with pulmonary arteriolar resistance of  3 Woods units with a mean PAP of 26 mmHg.    4. cardiac output of 4.3 L/min and cardiac index of 2.3.  5. Mild nonobstructive coronary artery disease.    (+) Dyslipidemia, hypertension-Peripheral Vascular Disease-- Carotid Stenosis and Non Symptomatic, CAD, --. : . . MAGUIRE, . :. valvular problems/murmurs type: AS . Previous cardiac testing Ech[place]ate:5/2018results:The left ventricle is normal in size.  Left ventricular systolic function is normal.  The visual ejection fraction is estimated at 55-60%.  The right ventricle is normal in structure, function and  size.  Severe valvular aortic stenosis. The mean AoV pressure gradient is 42mmHg. The  calculated aortic valve area is 1.0cm2.  The inferior vena cava is not dilated.  The study was technically adequate. There is no comparison study available.date: results: date: results: date: results:         (-) angina, past MI, taking anticoagulants/antiplatelets, CHF, orthopnea/PND, stent, angina, past MI and CABG   METS/Exercise Tolerance:  >4 METS   Hematologic:         Musculoskeletal:  - neg musculoskeletal ROS       GI/Hepatic:     (+) GERD Asymptomatic on medication,      (-) hiatal hernia   Renal/Genitourinary:  - ROS Renal section negative       Endo:     (+) type II DM .      Psychiatric:  - neg psychiatric ROS       Infectious Disease:  - neg infectious disease ROS       Malignancy:      - no malignancy   Other:    - neg other ROS                 Physical Exam  Normal systems: pulmonary    Airway   Mallampati: II  TM distance: >3 FB  Neck ROM: limited    Dental   (+) partials and caps    Cardiovascular   Rhythm and rate: regular and normal  (+) murmur (Holosystolic in RUSB)       Pulmonary    breath sounds clear to auscultation                    Anesthesia Plan      History & Physical Review  History and physical reviewed and following examination; no interval change.    ASA Status:  4 .    NPO Status:  > 8 hours    Plan for MAC Reason for MAC:  Deep or markedly invasive procedure (G8) and Chronic cardiopulmonary disease (G9)  PONV prophylaxis:  Ondansetron (or other 5HT-3)  Additional equipment: 2nd IV      Postoperative Care  Postoperative pain management:  IV analgesics.      Consents  Anesthetic plan, risks, benefits and alternatives discussed with:  Patient.  Use of blood products discussed: Yes.   Use of blood products discussed with Patient.  Consented to blood products.  .

## 2018-08-28 NOTE — PLAN OF CARE
Problem: Patient Care Overview  Goal: Plan of Care/Patient Progress Review  Outcome: Improving  VSS. Neuros intact, speech clear. Bilateral groin sites CDI, bruit present in right groin. Pulses present with doppler. Off bedrest at 1800, passed bedside dysphagia screen. Up in chair for dinner. . Will continue to monitor closely. Plan for echo and CXR in AM.

## 2018-08-28 NOTE — OR NURSING
Dr. Blancas by to see patient.  Pt has BBB noted during procedure.  12 lead ekg order obtained.  Pt denies chest pain or SOB.

## 2018-08-29 ENCOUNTER — APPOINTMENT (OUTPATIENT)
Dept: ULTRASOUND IMAGING | Facility: CLINIC | Age: 83
DRG: 267 | End: 2018-08-29
Attending: NURSE PRACTITIONER
Payer: MEDICARE

## 2018-08-29 ENCOUNTER — APPOINTMENT (OUTPATIENT)
Dept: CARDIOLOGY | Facility: CLINIC | Age: 83
DRG: 267 | End: 2018-08-29
Payer: MEDICARE

## 2018-08-29 ENCOUNTER — APPOINTMENT (OUTPATIENT)
Dept: GENERAL RADIOLOGY | Facility: CLINIC | Age: 83
DRG: 267 | End: 2018-08-29
Payer: MEDICARE

## 2018-08-29 LAB
ANION GAP SERPL CALCULATED.3IONS-SCNC: 7 MMOL/L (ref 3–14)
BUN SERPL-MCNC: 14 MG/DL (ref 7–30)
CALCIUM SERPL-MCNC: 8.6 MG/DL (ref 8.5–10.1)
CHLORIDE SERPL-SCNC: 108 MMOL/L (ref 94–109)
CO2 SERPL-SCNC: 28 MMOL/L (ref 20–32)
CREAT SERPL-MCNC: 0.83 MG/DL (ref 0.66–1.25)
GFR SERPL CREATININE-BSD FRML MDRD: 88 ML/MIN/1.7M2
GLUCOSE BLDC GLUCOMTR-MCNC: 124 MG/DL (ref 70–99)
GLUCOSE BLDC GLUCOMTR-MCNC: 136 MG/DL (ref 70–99)
GLUCOSE BLDC GLUCOMTR-MCNC: 139 MG/DL (ref 70–99)
GLUCOSE BLDC GLUCOMTR-MCNC: 146 MG/DL (ref 70–99)
GLUCOSE BLDC GLUCOMTR-MCNC: 156 MG/DL (ref 70–99)
GLUCOSE SERPL-MCNC: 128 MG/DL (ref 70–99)
INTERPRETATION ECG - MUSE: NORMAL
KCT BLD-ACNC: 107 SEC (ref 75–150)
KCT BLD-ACNC: 152 SEC (ref 75–150)
KCT BLD-ACNC: 217 SEC (ref 75–150)
KCT BLD-ACNC: 257 SEC (ref 75–150)
POTASSIUM SERPL-SCNC: 4.2 MMOL/L (ref 3.4–5.3)
SODIUM SERPL-SCNC: 143 MMOL/L (ref 133–144)

## 2018-08-29 PROCEDURE — 99233 SBSQ HOSP IP/OBS HIGH 50: CPT | Performed by: INTERNAL MEDICINE

## 2018-08-29 PROCEDURE — 25500064 ZZH RX 255 OP 636: Performed by: INTERNAL MEDICINE

## 2018-08-29 PROCEDURE — 25000132 ZZH RX MED GY IP 250 OP 250 PS 637: Mod: GY

## 2018-08-29 PROCEDURE — 99207 ZZC CDG-MDM COMPONENT: MEETS LOW - DOWN CODED: CPT | Performed by: HOSPITALIST

## 2018-08-29 PROCEDURE — 80048 BASIC METABOLIC PNL TOTAL CA: CPT

## 2018-08-29 PROCEDURE — 21000000 ZZH R&B IMCU HEART CARE

## 2018-08-29 PROCEDURE — 40000884 ZZH STATISTIC STEP DOWN HRS NIGHT

## 2018-08-29 PROCEDURE — 36415 COLL VENOUS BLD VENIPUNCTURE: CPT

## 2018-08-29 PROCEDURE — 93306 TTE W/DOPPLER COMPLETE: CPT

## 2018-08-29 PROCEDURE — 93306 TTE W/DOPPLER COMPLETE: CPT | Mod: 26 | Performed by: INTERNAL MEDICINE

## 2018-08-29 PROCEDURE — 93010 ELECTROCARDIOGRAM REPORT: CPT | Performed by: INTERNAL MEDICINE

## 2018-08-29 PROCEDURE — 25000125 ZZHC RX 250: Performed by: RADIOLOGY

## 2018-08-29 PROCEDURE — 99231 SBSQ HOSP IP/OBS SF/LOW 25: CPT | Performed by: HOSPITALIST

## 2018-08-29 PROCEDURE — 25000131 ZZH RX MED GY IP 250 OP 636 PS 637: Mod: GY | Performed by: PHYSICIAN ASSISTANT

## 2018-08-29 PROCEDURE — 36002 PSEUDOANEURYSM INJECTION TRT: CPT

## 2018-08-29 PROCEDURE — 71045 X-RAY EXAM CHEST 1 VIEW: CPT

## 2018-08-29 PROCEDURE — 93005 ELECTROCARDIOGRAM TRACING: CPT

## 2018-08-29 PROCEDURE — 00000146 ZZHCL STATISTIC GLUCOSE BY METER IP

## 2018-08-29 PROCEDURE — 40000886 ZZH STATISTIC STEP DOWN HRS DAY

## 2018-08-29 PROCEDURE — A9270 NON-COVERED ITEM OR SERVICE: HCPCS | Mod: GY

## 2018-08-29 PROCEDURE — 3E053GC INTRODUCTION OF OTHER THERAPEUTIC SUBSTANCE INTO PERIPHERAL ARTERY, PERCUTANEOUS APPROACH: ICD-10-PCS | Performed by: RADIOLOGY

## 2018-08-29 RX ORDER — LIDOCAINE HYDROCHLORIDE 10 MG/ML
5 INJECTION, SOLUTION EPIDURAL; INFILTRATION; INTRACAUDAL; PERINEURAL ONCE
Status: COMPLETED | OUTPATIENT
Start: 2018-08-29 | End: 2018-08-29

## 2018-08-29 RX ADMIN — THROMBIN, TOPICAL (BOVINE) 5000 UNITS: KIT at 15:58

## 2018-08-29 RX ADMIN — CLOPIDOGREL 75 MG: 75 TABLET, FILM COATED ORAL at 08:33

## 2018-08-29 RX ADMIN — INSULIN ASPART 1 UNITS: 100 INJECTION, SOLUTION INTRAVENOUS; SUBCUTANEOUS at 12:15

## 2018-08-29 RX ADMIN — ASPIRIN 81 MG: 81 TABLET, COATED ORAL at 08:33

## 2018-08-29 RX ADMIN — SIMVASTATIN 20 MG: 20 TABLET, FILM COATED ORAL at 22:38

## 2018-08-29 RX ADMIN — HUMAN ALBUMIN MICROSPHERES AND PERFLUTREN 7 ML: 10; .22 INJECTION, SOLUTION INTRAVENOUS at 10:07

## 2018-08-29 RX ADMIN — LIDOCAINE HYDROCHLORIDE 5 ML: 10 INJECTION, SOLUTION EPIDURAL; INFILTRATION; INTRACAUDAL; PERINEURAL at 15:57

## 2018-08-29 RX ADMIN — LISINOPRIL 2.5 MG: 2.5 TABLET ORAL at 08:33

## 2018-08-29 ASSESSMENT — PAIN DESCRIPTION - DESCRIPTORS
DESCRIPTORS: ACHING
DESCRIPTORS: ACHING

## 2018-08-29 NOTE — PROVIDER NOTIFICATION
Spoke with Dr. Taylor regarding pt having pseudoaneurysm which was compressed and required thrombin injection. Per US- keep pt on bedrest tonight, and repeat left groin US in [place]. Will complete on additional neuro exam and closely monitor groin site.

## 2018-08-29 NOTE — PLAN OF CARE
Problem: Patient Care Overview  Goal: Plan of Care/Patient Progress Review  OT/cardiac rehab lolis held this AM as patient awaiting US of groin per nursing.

## 2018-08-29 NOTE — PROGRESS NOTES
[facility]- TAVR Progress Note     Date of Service: 08/29/18     HPI  Reid Jimenes is a 84 year old male who was admitted on 8/28/2018 for elective Transfemoral Aortic Valve Replacement (TAVR). He carries a PMH for severe aortic stenosis, hypertension, hyperlipidemia, and type 2 diabetes.     Assessment and Plan  1) POD #1 S/p TAVR, using a 26 mm Greene Sabino 3 valve    - ECG demonstrated sinus rhythm with 1st degree AV block with known LBBB   - Echocardiogram this am, results pending. Post TAVR deployment echo showed normal EF 60-65%, mean gradient 6 mmHg, trace AI, no paravalvular AI.    - Access Site, bilateral groin sites bruised with positive bruit bilaterally, will get bilateral groin US this am for further evaluation.    - Brief Neuro check normal    - Antiplatelet therapy,  ASA + Plavix x 6months   - CBC results pending, BMP stable, creatinine 0.83, potassium 4.2, sodium 143   - Voiding without difficulty   - Cardiac rehab/PT/OT    2) Hypertension - Well controlled on lisinopril   3) Hyperlipidemia - LDL stable on low dose simvastatin  4) Diabetes- A1C 6.6, managed on metformin       Interval History:   Up in chair without cardiac complaint, denies chest pain, shortness of breath, palpitations, PND, orthopnea, or LE edema. Requesting to walk independently after evaluated by cardiac rehab. Reviewed need for groin US with verbal understanding.     Physical Exam   Temp: 98.4  F (36.9  C) Temp src: Oral BP: 140/53   Heart Rate: 56 Resp: 16 SpO2: 100 % O2 Device: None (Room air) Oxygen Delivery: 2 LPM  Vitals:    08/28/18 0618   Weight: 79.6 kg (175 lb 9 oz)     Vital Signs with Ranges  Temp:  [97.2  F (36.2  C)-98.9  F (37.2  C)] 98.4  F (36.9  C)  Heart Rate:  [52-71] 56  Resp:  [9-25] 16  BP: (103-162)/(50-85) 140/53  SpO2:  [92 %-100 %] 100 %  I/O last 3 completed shifts:  In: 1440 [P.O.:240; I.V.:1200]  Out: 1475 [Urine:1475]    Constitutional: awake, alert,  cooperative, no apparent distress, and appears stated age  Hematologic / Lymphatic: no cervical lymphadenopathy  Respiratory: No increased work of breathing, good air exchange, clear to auscultation bilaterally, no crackles or wheezing  Cardiovascular: regular rate and rhythm, systolic murmur, bilateral groin bruit  GI: normal bowel sounds, neg bowel movement, + flatulence   Genitounirinary: voiding   Skin: ecchymosis to bilateral groin  Neurologic: Mental Status Exam:  Level of Alertness: Awake, steady gait, equal strengths  Orientation:   person, place, time  Neuropsychiatric: General: normal, calm and normal eye contact    Medications       aspirin  81 mg Oral Daily     clopidogrel  75 mg Oral Daily     insulin aspart  1-7 Units Subcutaneous TID AC     insulin aspart  1-5 Units Subcutaneous At Bedtime     iopamidol  40 mL INTRA-ARTERIAL Once     lisinopril  2.5 mg Oral Daily     simvastatin  20 mg Oral At Bedtime       Data   Reviewed labs, echo results pending.         Lizz Love, APRN, CNP  Pager [phone]  8/29/2018

## 2018-08-29 NOTE — PROGRESS NOTES
Pt off the unit at US to assess bilateral groin bruit so RN is unable to complete neuro and vital signed at 1400. Will assess neuro and vitals signs when pt is back in room.

## 2018-08-29 NOTE — PLAN OF CARE
Problem: Patient Care Overview  Goal: Plan of Care/Patient Progress Review  Outcome: No Change  VSS. Tele: SR c/ BBB. Denies pain. Bruit present bilateral. Pulses present with doppler. Neuro intact. Left groin US- compressed pseudoaneurysm  For 18 minutes and thrombin inject into site. Bedrest until tomorrow AM when repeat US and/or compression completed. Repeat echo and CRX completed. Voiding well, passing gas no BM. Will continue to monitor closely.

## 2018-08-29 NOTE — PROGRESS NOTES
"[facility]  Hospitalist Progress Note        Luke Patricia MD   08/29/2018        Interval History:      S/p 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 bu right transfemoral approach; bruise noted right groin         Assessment and Plan:        Reid Jimenes is an 84-year-old male with past medical history of severe aortic stenosis and non-insulin dependent type 2 diabetes who is status post a TAVR .  Hospitalist Service was consulted for medical co-management.     1.  Status post transcatheter aortic valve replacement 8/28/18  # Post procedure groin bruise and bruit  - routine postoperative cares and general cardiac management deferred to the primary Cardiology team  - He has already been initiated on his prior to admission lisinopril as well as aspirin and Plavix.   - Post TAVR echo showed normal EF 60-65%, mean gradient 6 mmHg, trace AI, no paravalvular AI  - Access Site, bilateral groin sites bruised with positive bruit bilaterally, plan to get bilateral groin US per cards  ; Hb 13.7--->11.8; will monitor Hb; labs pending today    2.  Non-insulin dependent type 2 diabetes:    - A1c is 6.5; hold PTA metformin  - SSI with meals and at bedtime.     #.  Deep venous thrombosis prophylaxis:  Ambulation.       CODE STATUS:  FULL CODE.     Disposition: when cleared by cardiology; pending US groin and stable Hb    # Pain Assessment:  Current Pain Score 8/29/2018   Patient currently in pain? yes   Pain score (0-10) -   Pain location Back   Pain descriptors Aching   Reid rivera pain level was assessed and he currently denies pain.                         Physical Exam:      Blood pressure 119/50, temperature 98.4  F (36.9  C), temperature source Oral, resp. rate 14, height 1.651 m (5' 5\"), weight 79.6 kg (175 lb 9 oz), SpO2 94 %.  Vitals:    08/28/18 0618   Weight: 79.6 kg (175 lb 9 oz)     Vital Signs with Ranges  Temp:  [97.6  F (36.4  C)-98.9  F (37.2  C)] 98.4  F (36.9  C)  Heart Rate:  [52-70] 64  Resp:  [9-25] 14  BP: " (111-162)/(50-85) 119/50  SpO2:  [92 %-100 %] 94 %  I/O's Last 24 hours  I/O last 3 completed shifts:  In: 1490 [P.O.:290; I.V.:1200]  Out: 1475 [Urine:1475]    Constitutional: Alert, awake and orienetd X 3; lying comfortably in bed in no apparent distress   HEENT: Pupils equal and reactive to light and accomodation, EOMI intact; neck supple no raised JVD or rigidity    Oral cavity: Moist mucosa   Cardiovascular: Normal s1 s2, regular rate and rhythm, systolic murmur +   Lungs: B/l clear to auscultation, no wheezes or crepitations   Abdomen: Soft, nt, nd, no guarding, rigidity or rebound; BS +   LE : No edema   Musculoskeletal: Power 5/5 in all extremities   Neuro: No focal neurological deficits noted, CN II to XII grossly intact   Psychiatry: normal mood and affect  Right groin: bruise noted with bruit+; non-tender                Medications:          aspirin  81 mg Oral Daily     clopidogrel  75 mg Oral Daily     insulin aspart  1-7 Units Subcutaneous TID AC     insulin aspart  1-5 Units Subcutaneous At Bedtime     iopamidol  40 mL INTRA-ARTERIAL Once     lisinopril  2.5 mg Oral Daily     simvastatin  20 mg Oral At Bedtime     PRN Meds: acetaminophen, glucose **OR** dextrose **OR** glucagon, HOLD MEDICATION, naloxone, nitroGLYcerin         Data:      All new lab and imaging data was reviewed.   Recent Labs   Lab Test  08/28/18   1105  08/27/18   0950  07/24/18   0819   WBC  7.3  7.7  6.5   HGB  11.8*  13.7  12.5*   MCV  89  89  89   PLT  107*  147*  144*   INR   --    --   1.01      Recent Labs   Lab Test  08/29/18   0545  08/28/18   1105  08/27/18   0950   NA  143  146*  142   POTASSIUM  4.2  4.1  4.2   CHLORIDE  108  112*  106   CO2  28  28  29   BUN  14  19  22   CR  0.83  0.84  0.94   ANIONGAP  7  6  7   DEEPTI  8.6  7.9*  8.8   GLC  128*  130*  125*     No lab results found.    Invalid input(s): TROP, TROPONINIES

## 2018-08-29 NOTE — PLAN OF CARE
Problem: Patient Care Overview  Goal: Plan of Care/Patient Progress Review  Outcome: Therapy, progress toward functional goals as expected  A&O, forgetful. VSS on room air. Q 2 hour neuros intact. Tele: SR/SB w/ BBB, HR 50-60's. Denies CP and SOB. Up w/ SBA. L and R groin site post TAVR w/ bilat bruit present, L groin bruised w/ some swelling noted. CMS intact. Pedal pulses palpable bilat and present w/ doppler. Plan for chest x-ray and echo today.

## 2018-08-29 NOTE — PLAN OF CARE
Problem: Patient Care Overview  Goal: Plan of Care/Patient Progress Review  Outcome: Improving  VSS.  Afebrile. Q2hr Neuros intact. SR-SB.  On RA.  Good UOP. sliding scale insulin.   Denies pain.  Groin sites intact, dried drainage on R.  BLE pulses palpable and Doppler.   Up with SBA.

## 2018-08-30 ENCOUNTER — APPOINTMENT (OUTPATIENT)
Dept: OCCUPATIONAL THERAPY | Facility: CLINIC | Age: 83
DRG: 267 | End: 2018-08-30
Payer: MEDICARE

## 2018-08-30 ENCOUNTER — APPOINTMENT (OUTPATIENT)
Dept: ULTRASOUND IMAGING | Facility: CLINIC | Age: 83
DRG: 267 | End: 2018-08-30
Attending: RADIOLOGY
Payer: MEDICARE

## 2018-08-30 LAB
ANION GAP SERPL CALCULATED.3IONS-SCNC: 7 MMOL/L (ref 3–14)
BUN SERPL-MCNC: 15 MG/DL (ref 7–30)
CALCIUM SERPL-MCNC: 8.8 MG/DL (ref 8.5–10.1)
CHLORIDE SERPL-SCNC: 105 MMOL/L (ref 94–109)
CO2 SERPL-SCNC: 30 MMOL/L (ref 20–32)
CREAT SERPL-MCNC: 0.83 MG/DL (ref 0.66–1.25)
ERYTHROCYTE [DISTWIDTH] IN BLOOD BY AUTOMATED COUNT: 12.8 % (ref 10–15)
GFR SERPL CREATININE-BSD FRML MDRD: 89 ML/MIN/1.7M2
GLUCOSE BLDC GLUCOMTR-MCNC: 128 MG/DL (ref 70–99)
GLUCOSE BLDC GLUCOMTR-MCNC: 128 MG/DL (ref 70–99)
GLUCOSE BLDC GLUCOMTR-MCNC: 140 MG/DL (ref 70–99)
GLUCOSE BLDC GLUCOMTR-MCNC: 218 MG/DL (ref 70–99)
GLUCOSE BLDC GLUCOMTR-MCNC: 224 MG/DL (ref 70–99)
GLUCOSE SERPL-MCNC: 138 MG/DL (ref 70–99)
HCT VFR BLD AUTO: 37.1 % (ref 40–53)
HGB BLD-MCNC: 12.5 G/DL (ref 13.3–17.7)
MAGNESIUM SERPL-MCNC: 2.3 MG/DL (ref 1.6–2.3)
MCH RBC QN AUTO: 29.9 PG (ref 26.5–33)
MCHC RBC AUTO-ENTMCNC: 33.7 G/DL (ref 31.5–36.5)
MCV RBC AUTO: 89 FL (ref 78–100)
PHOSPHATE SERPL-MCNC: 2.3 MG/DL (ref 2.5–4.5)
PLATELET # BLD AUTO: 98 10E9/L (ref 150–450)
POTASSIUM SERPL-SCNC: 3.9 MMOL/L (ref 3.4–5.3)
RBC # BLD AUTO: 4.18 10E12/L (ref 4.4–5.9)
SODIUM SERPL-SCNC: 142 MMOL/L (ref 133–144)
WBC # BLD AUTO: 10.3 10E9/L (ref 4–11)

## 2018-08-30 PROCEDURE — 25000132 ZZH RX MED GY IP 250 OP 250 PS 637: Mod: GY

## 2018-08-30 PROCEDURE — 36415 COLL VENOUS BLD VENIPUNCTURE: CPT | Performed by: HOSPITALIST

## 2018-08-30 PROCEDURE — 00000146 ZZHCL STATISTIC GLUCOSE BY METER IP

## 2018-08-30 PROCEDURE — 83735 ASSAY OF MAGNESIUM: CPT | Performed by: HOSPITALIST

## 2018-08-30 PROCEDURE — 85027 COMPLETE CBC AUTOMATED: CPT | Performed by: HOSPITALIST

## 2018-08-30 PROCEDURE — 99232 SBSQ HOSP IP/OBS MODERATE 35: CPT | Performed by: HOSPITALIST

## 2018-08-30 PROCEDURE — 40000133 ZZH STATISTIC OT WARD VISIT

## 2018-08-30 PROCEDURE — 99232 SBSQ HOSP IP/OBS MODERATE 35: CPT | Performed by: INTERNAL MEDICINE

## 2018-08-30 PROCEDURE — 80048 BASIC METABOLIC PNL TOTAL CA: CPT | Performed by: HOSPITALIST

## 2018-08-30 PROCEDURE — 84100 ASSAY OF PHOSPHORUS: CPT | Performed by: HOSPITALIST

## 2018-08-30 PROCEDURE — A9270 NON-COVERED ITEM OR SERVICE: HCPCS | Mod: GY | Performed by: HOSPITALIST

## 2018-08-30 PROCEDURE — 97165 OT EVAL LOW COMPLEX 30 MIN: CPT | Mod: GO

## 2018-08-30 PROCEDURE — 25000132 ZZH RX MED GY IP 250 OP 250 PS 637: Mod: GY | Performed by: NURSE PRACTITIONER

## 2018-08-30 PROCEDURE — 93926 LOWER EXTREMITY STUDY: CPT

## 2018-08-30 PROCEDURE — 93005 ELECTROCARDIOGRAM TRACING: CPT

## 2018-08-30 PROCEDURE — A9270 NON-COVERED ITEM OR SERVICE: HCPCS | Mod: GY

## 2018-08-30 PROCEDURE — 97110 THERAPEUTIC EXERCISES: CPT | Mod: GO

## 2018-08-30 PROCEDURE — 25000132 ZZH RX MED GY IP 250 OP 250 PS 637: Mod: GY | Performed by: HOSPITALIST

## 2018-08-30 PROCEDURE — 21400004 ZZH R&B CCU CSC INTERMEDIATE

## 2018-08-30 PROCEDURE — 93010 ELECTROCARDIOGRAM REPORT: CPT | Performed by: INTERNAL MEDICINE

## 2018-08-30 PROCEDURE — A9270 NON-COVERED ITEM OR SERVICE: HCPCS | Mod: GY | Performed by: NURSE PRACTITIONER

## 2018-08-30 PROCEDURE — 97535 SELF CARE MNGMENT TRAINING: CPT | Mod: GO

## 2018-08-30 RX ORDER — LISINOPRIL 5 MG/1
5 TABLET ORAL DAILY
Status: DISCONTINUED | OUTPATIENT
Start: 2018-08-30 | End: 2018-08-31 | Stop reason: HOSPADM

## 2018-08-30 RX ADMIN — ASPIRIN 81 MG: 81 TABLET, COATED ORAL at 09:59

## 2018-08-30 RX ADMIN — OMEPRAZOLE 20 MG: 20 CAPSULE, DELAYED RELEASE ORAL at 09:59

## 2018-08-30 RX ADMIN — LISINOPRIL 5 MG: 5 TABLET ORAL at 09:59

## 2018-08-30 RX ADMIN — SIMVASTATIN 20 MG: 20 TABLET, FILM COATED ORAL at 20:55

## 2018-08-30 RX ADMIN — CLOPIDOGREL 75 MG: 75 TABLET, FILM COATED ORAL at 09:59

## 2018-08-30 RX ADMIN — INSULIN ASPART 2 UNITS: 100 INJECTION, SOLUTION INTRAVENOUS; SUBCUTANEOUS at 12:17

## 2018-08-30 ASSESSMENT — ACTIVITIES OF DAILY LIVING (ADL)
ADLS_ACUITY_SCORE: 8
PREVIOUS_RESPONSIBILITIES: MEAL PREP;HOUSEKEEPING;LAUNDRY;SHOPPING;MEDICATION MANAGEMENT;FINANCES;DRIVING

## 2018-08-30 NOTE — PROGRESS NOTES
[facility]- TAVR Progress Note     Date of Service: 08/29/18     HPI  Reid Jimenes is a 84 year old male who was admitted on 8/28/2018 for elective Transfemoral Aortic Valve Replacement (TAVR). He carries a PMH for severe aortic stenosis, hypertension, hyperlipidemia, and type 2 diabetes.     Assessment and Plan  1) POD #2 S/p TAVR, using a 26 mm Greene Sabino 3 valve    - ECG demonstrated sinus rhythm with 1st degree AV block with known LBBB   - Post TAVR Echocardiogram showed normal EF 65-70%, mild LVH, Ao mean gradient 10.7, HUNG 2.2.    - Access Site, bilateral groin sites bruised. Bilateral bruit, faint. Left sided pseudoaneurysm 3.5x2.5x 1.7cm s/p thrombin injection on 8/29/18. Follow up femoral US this am.    - Brief Neuro check normal    - Antiplatelet therapy,  ASA + Plavix x 6months   - Hgb stable 12.5, hematocrit 37.1, platelet mildly reduced to 98 from 107,  creatinine 0.83, potassium 3.9, sodium 142   - Voiding without difficulty              - No BM, + flatulance   - Cardiac rehab/PT/OT today.              - Anticipate discharge tomorrow                  2) Hypertension -  Elevated, increase lisinopril to 5mg daily, goal < 130/80   3) Hyperlipidemia - LDL stable on low dose simvastatin  4) Diabetes- A1C 6.6, managed on metformin       Interval History:   Continues on bed rest s/p pseudoaneurysm repair, denies chest pain shortness of breath, palpitations, PND, orthopnea, or LE edema. Awaiting follow up femoral US this am.       Physical Exam   Temp: 98.4  F (36.9  C) Temp src: Oral BP: 165/75   Heart Rate: 68 Resp: 16 SpO2: 95 % O2 Device: None (Room air) Oxygen Delivery: 1 LPM  Vitals:    08/28/18 0618 08/30/18 0530   Weight: 79.6 kg (175 lb 9 oz) 83 kg (183 lb)     Vital Signs with Ranges  Temp:  [98.1  F (36.7  C)-99.1  F (37.3  C)] 98.4  F (36.9  C)  Heart Rate:  [63-70] 68  Resp:  [15-20] 16  BP: (115-183)/(59-80) 165/75  SpO2:  [95 %-100 %] 95 %  I/O  last 3 completed shifts:  In: 480 [P.O.:480]  Out: 1450 [Urine:1450]    Constitutional: awake, alert, cooperative, no apparent distress, and appears stated age  Hematologic / Lymphatic: no cervical lymphadenopathy  Respiratory: No increased work of breathing, good air exchange, clear to auscultation bilaterally, no crackles or wheezing  Cardiovascular: regular rate and rhythm, soft systolic murmur, bilateral groin bruit faint. Dressing dry and intact.   GI: normal bowel sounds, neg bowel movement, + flatulence   Genitounirinary: voiding   Skin: significant ecchymosis to bilateral groins  Neurologic: Mental Status Exam:  Level of Alertness: Awake, steady gait, equal strengths  Orientation:   person, place, time  Neuropsychiatric: General: normal, calm and normal eye contact    Medications       aspirin  81 mg Oral Daily     clopidogrel  75 mg Oral Daily     insulin aspart  1-7 Units Subcutaneous TID AC     insulin aspart  1-5 Units Subcutaneous At Bedtime     iopamidol  40 mL INTRA-ARTERIAL Once     lisinopril  2.5 mg Oral Daily     omeprazole (priLOSEC) CR capsule 20 mg  20 mg Oral Daily     simvastatin  20 mg Oral At Bedtime       Data   Reviewed labs, Femoral US pending.         Lizz Love, APRN, CNP  Pager [phone]  8/29/2018

## 2018-08-30 NOTE — PLAN OF CARE
"Problem: Patient Care Overview  Goal: Plan of Care/Patient Progress Review  Discharge Planner OT   Patient plan for discharge: Home  Current status: Pt completed timed ambulation in the hallway with SBA/CGA for 12 minutes. Pt required 1 seated rest break during ambulation. Pt demonstrated some SOB and fatigue. Pt required increase in assist during ambulation SBA/CGA and demonstrated some difficulty with functional balance, pt attributing this to just waking up from a nap and reported that he typically feels \"off balance\" after upon waking up. Educated safety strategies during ambulation. Pt completed 3 sets of 15 stairs with SBA and extra time. Pt demonstrated increase in fatigue and SOB during session. Encouraged pt to walk in the hallway with nursing   Barriers to return to prior living situation: none anticipated   Recommendations for discharge: Home with OP CR at [facility]  Rationale for recommendations: Pt would benefit from monitored physical activity and education in OP CR       Entered by: Luca Rosa 08/30/2018 3:13 PM          "

## 2018-08-30 NOTE — PROGRESS NOTES
"[facility]  Hospitalist Progress Note        Luke Patricia MD   08/30/2018        Interval History:      - had thrombin injection of left common femoral artery pseudoaneurysm yesterday         Assessment and Plan:        Reid Jimenes is an 84-year-old male with past medical history of severe aortic stenosis and non-insulin dependent type 2 diabetes who is status post a TAVR .  Hospitalist Service was consulted for medical co-management.     # Severe aortic stenosis Status post transcatheter aortic valve replacement 8/28/18  # Post procedure left common femoral artery pseudoaneurysm   - routine postoperative cares and general cardiac management deferred to the primary Cardiology team  - Continue lisinopril , aspirin and Plavix.   - Post TAVR echo showed normal EF 60-65%, mean gradient 6 mmHg, trace AI, no paravalvular AI  - Access Site, bilateral groin sites bruised with positive bruit bilaterally, groin US noted with left common femoral artery pseudoaneurysm and had thrombin injection on 8/29/18; repeat US 8/301/18 noted with Residual hematoma measuring  3.4 x 2.5 x 1.5 cm in the left inguinal region, no recurrent pseudoaneurysm.; stable Hb; Hb 13.7--->11.8----12.5    2.  Non-insulin dependent type 2 diabetes:    - A1c is 6.5; hold PTA metformin, resume upon discharge  - SSI with meals and at bedtime.     #.  Deep venous thrombosis prophylaxis:  Ambulation.       CODE STATUS:  FULL CODE.     Disposition: per cards plan for home tomorrow    # Pain Assessment:  Current Pain Score 8/30/2018   Patient currently in pain? denies   Pain score (0-10) -   Pain location -   Pain descriptors -   Reid rivera pain level was assessed and he currently denies pain.                         Physical Exam:      Blood pressure 147/70, temperature 98.4  F (36.9  C), temperature source Oral, resp. rate 16, height 1.651 m (5' 5\"), weight 83 kg (183 lb), SpO2 97 %.  Vitals:    08/28/18 0618 08/30/18 0530   Weight: " 79.6 kg (175 lb 9 oz) 83 kg (183 lb)     Vital Signs with Ranges  Temp:  [98.1  F (36.7  C)-99.1  F (37.3  C)] 98.4  F (36.9  C)  Heart Rate:  [63-98] 78  Resp:  [15-20] 16  BP: (115-183)/() 147/70  SpO2:  [95 %-100 %] 97 %  I/O's Last 24 hours  I/O last 3 completed shifts:  In: 480 [P.O.:480]  Out: 1450 [Urine:1450]    Constitutional: Alert, awake and orienetd X 3; lying comfortably in bed in no apparent distress   HEENT: Pupils equal and reactive to light and accomodation, EOMI intact; neck supple no raised JVD or rigidity    Oral cavity: Moist mucosa   Cardiovascular: Normal s1 s2, regular rate and rhythm, systolic murmur +   Lungs: B/l clear to auscultation, no wheezes or crepitations   Abdomen: Soft, nt, nd, no guarding, rigidity or rebound; BS +   LE : No edema   Musculoskeletal: Power 5/5 in all extremities   Neuro: No focal neurological deficits noted, CN II to XII grossly intact   Psychiatry: normal mood and affect  Right groin: bruise noted ; non-tender                Medications:          aspirin  81 mg Oral Daily     clopidogrel  75 mg Oral Daily     insulin aspart  1-7 Units Subcutaneous TID AC     insulin aspart  1-5 Units Subcutaneous At Bedtime     iopamidol  40 mL INTRA-ARTERIAL Once     lisinopril  5 mg Oral Daily     omeprazole (priLOSEC) CR capsule 20 mg  20 mg Oral Daily     simvastatin  20 mg Oral At Bedtime     PRN Meds: acetaminophen, glucose **OR** dextrose **OR** glucagon, HOLD MEDICATION, naloxone, nitroGLYcerin         Data:      All new lab and imaging data was reviewed.   Recent Labs   Lab Test  08/30/18   0550  08/28/18   1105  08/27/18   0950  07/24/18   0819   WBC  10.3  7.3  7.7  6.5   HGB  12.5*  11.8*  13.7  12.5*   MCV  89  89  89  89   PLT  98*  107*  147*  144*   INR   --    --    --   1.01      Recent Labs   Lab Test  08/30/18   0550  08/29/18   0545  08/28/18   1105   NA  142  143  146*   POTASSIUM  3.9  4.2  4.1   CHLORIDE  105  108  112*   CO2  30 28 28   BUN  15   14  19   CR  0.83  0.83  0.84   ANIONGAP  7  7  6   DEEPTI  8.8  8.6  7.9*   GLC  138*  128*  130*     No lab results found.    Invalid input(s): TROP, TROPONINIES

## 2018-08-30 NOTE — PROGRESS NOTES
08/30/18 1015   Quick Adds   Type of Visit Initial Occupational Therapy Evaluation   Living Environment   Lives With spouse   Living Arrangements condominium   Home Accessibility bed and bath on same level   Number of Stairs to Enter Home 0   Number of Stairs Within Home (Has an elevator. 15 stairs each level (3 levels) )   Transportation Available car;family or friend will provide   Living Environment Comment Spouse available to assist as needed    Self-Care   Usual Activity Tolerance good   Current Activity Tolerance moderate   Regular Exercise no   Equipment Currently Used at Home none   Functional Level Prior   Ambulation 0-->independent   Transferring 0-->independent   Toileting 0-->independent   Bathing 0-->independent   Dressing 0-->independent   Fall history within last six months no   General Information   Onset of Illness/Injury or Date of Surgery - Date 08/28/18   Referring Physician Duc Mullins MD   Patient/Family Goals Statement Home   Additional Occupational Profile Info/Pertinent History of Current Problem Per chart: Reid Jimenes is a 84 year old male who was admitted on 8/28/2018 for elective Transfemoral Aortic Valve Replacement (TAVR). He carries a PMH for severe aortic stenosis, hypertension, hyperlipidemia, and type 2 diabetes.    Precautions/Limitations (TAVR Precautions)   Heart Disease Risk Factors Age;Race;Gender;Medical history;Lack of physical activity   Cognitive Status Examination   Orientation orientation to person, place and time   Level of Consciousness alert   Visual Perception   Visual Perception Wears glasses   Sensory Examination   Sensory Quick Adds No deficits were identified   Pain Assessment   Patient Currently in Pain No   Transfer Skills   Transfer Transfer Safety Analysis Bed/Chair;Transfer Skill: Stand to Sit;Transfer Safety Analysis Sit/Stand   Transfer Skill: Bed to Chair/Chair to Bed   Level of [place]: Bed to Chair independent   Transfer Skill: Sit to  "Stand   Level of [place]: Sit/Stand independent   Instrumental Activities of Daily Living (IADL)   Previous Responsibilities meal prep;housekeeping;laundry;shopping;medication management;finances;driving   General Therapy Interventions   Planned Therapy Interventions ADL retraining;IADL retraining;risk factor education;progressive activity/exercise;home program guidelines;transfer training;strengthening   Clinical Impression   Criteria for Skilled Therapeutic Interventions Met yes, treatment indicated   OT Diagnosis Decreased endurance for I/ADLs   Influenced by the following impairments Decreased endurance for I/ADLs   Assessment of Occupational Performance 1-3 Performance Deficits   Identified Performance Deficits Decreased endurance for I/ADLs (dressing, bathing, toileting)   Clinical Decision Making (Complexity) Low complexity   Therapy Frequency 2 times/day   Predicted Duration of Therapy Intervention (days/wks) 3 days   Anticipated Discharge Disposition Home with Outpatient Therapy  (OP CR)   Risks and Benefits of Treatment have been explained. Yes   Patient, Family & other staff in agreement with plan of care Yes   [facility] TM \"6 Clicks\"   2016, Trustees of [facility], under license to [employer].  All rights reserved.   6 Clicks Short Forms Daily Activity Inpatient Short Form   [facility] AM-PAC  \"6 Clicks\" Daily Activity Inpatient Short Form   1. Putting on and taking off regular lower body clothing? 3 - A Little   2. Bathing (including washing, rinsing, drying)? 3 - A Little   3. Toileting, which includes using toilet, bedpan or urinal? 4 - None   4. Putting on and taking off regular upper body clothing? 4 - None   5. Taking care of personal grooming such as brushing teeth? 4 - None   6. Eating meals? 4 - None   Daily Activity Raw Score (Score out of 24.Lower scores equate to lower levels of function) 22   Total Evaluation Time   Total Evaluation Time (Minutes) 8     "

## 2018-08-30 NOTE — PLAN OF CARE
"Problem: Patient Care Overview  Goal: Plan of Care/Patient Progress Review  Outcome: No Change  Pt A/O. VSS with elevated BP. Up with SBA but currently on bedrest d/t pseudoaneurysm in left groin site. Tele shows SR BBB. Pt denies any CP or SOB. Tolerated bedrest well. CMS intact, groin sites ecchymotic with bruit noted. Planned for US of groin to reassess. Pt has no new complaints.    /64  Temp 98.5  F (36.9  C) (Oral)  Resp 18  Ht 1.651 m (5' 5\")  Wt 79.6 kg (175 lb 9 oz)  SpO2 98%  BMI 29.22 kg/m2        "

## 2018-08-30 NOTE — PLAN OF CARE
Problem: Patient Care Overview  Goal: Plan of Care/Patient Progress Review  Outcome: Improving  VSS. Tele: SR c/ BBB. Denies pain. Bilateral groin sites ecchymotic. Pulses present with doppler , CMS intact. Off bedrest post US this AM. Working with cardiac rehab. Plan to discharge tomorrow. Report given to [facility] RN, pt and his belongings were transferred to [facility].

## 2018-08-30 NOTE — PLAN OF CARE
Problem: Patient Care Overview  Goal: Plan of Care/Patient Progress Review  OT/CR: Evaluation and treatment initiated. Pt lives in a senior condominium with his spouse. Prior pt independent in all I/ADLs. Pt is S/p TAVR.  Discharge Planner OT   Patient plan for discharge: Home  Current status: Pt completed timed ambulation in the hallway with SBA for 5 minutes. Pt demonstrated some SOB and fatigue however tolerated session well. Hypertensive response to exercise. Educated on safe activities post TAVR.   Barriers to return to prior living situation: none  Recommendations for discharge: Home with OP CR at [facility]  Rationale for recommendations: Pt would benefit from monitored physical activity and education in OP CR       Entered by: Luca Rosa 08/30/2018 11:07 AM

## 2018-08-31 ENCOUNTER — APPOINTMENT (OUTPATIENT)
Dept: OCCUPATIONAL THERAPY | Facility: CLINIC | Age: 83
DRG: 267 | End: 2018-08-31
Attending: INTERNAL MEDICINE
Payer: MEDICARE

## 2018-08-31 VITALS
TEMPERATURE: 98.3 F | RESPIRATION RATE: 16 BRPM | HEART RATE: 90 BPM | OXYGEN SATURATION: 97 % | BODY MASS INDEX: 28.97 KG/M2 | HEIGHT: 65 IN | DIASTOLIC BLOOD PRESSURE: 69 MMHG | SYSTOLIC BLOOD PRESSURE: 148 MMHG | WEIGHT: 173.9 LBS

## 2018-08-31 LAB
ANION GAP SERPL CALCULATED.3IONS-SCNC: 6 MMOL/L (ref 3–14)
BUN SERPL-MCNC: 29 MG/DL (ref 7–30)
CALCIUM SERPL-MCNC: 9.1 MG/DL (ref 8.5–10.1)
CHLORIDE SERPL-SCNC: 106 MMOL/L (ref 94–109)
CO2 SERPL-SCNC: 30 MMOL/L (ref 20–32)
CREAT SERPL-MCNC: 1.17 MG/DL (ref 0.66–1.25)
GFR SERPL CREATININE-BSD FRML MDRD: 59 ML/MIN/1.7M2
GLUCOSE BLDC GLUCOMTR-MCNC: 140 MG/DL (ref 70–99)
GLUCOSE BLDC GLUCOMTR-MCNC: 170 MG/DL (ref 70–99)
GLUCOSE SERPL-MCNC: 140 MG/DL (ref 70–99)
HGB BLD-MCNC: 12.5 G/DL (ref 13.3–17.7)
INTERPRETATION ECG - MUSE: NORMAL
INTERPRETATION ECG - MUSE: NORMAL
MAGNESIUM SERPL-MCNC: 2.5 MG/DL (ref 1.6–2.3)
PHOSPHATE SERPL-MCNC: 3.3 MG/DL (ref 2.5–4.5)
POTASSIUM SERPL-SCNC: 4.5 MMOL/L (ref 3.4–5.3)
SODIUM SERPL-SCNC: 142 MMOL/L (ref 133–144)

## 2018-08-31 PROCEDURE — 40000133 ZZH STATISTIC OT WARD VISIT

## 2018-08-31 PROCEDURE — 80051 ELECTROLYTE PANEL: CPT | Performed by: HOSPITALIST

## 2018-08-31 PROCEDURE — 93005 ELECTROCARDIOGRAM TRACING: CPT

## 2018-08-31 PROCEDURE — 83735 ASSAY OF MAGNESIUM: CPT | Performed by: HOSPITALIST

## 2018-08-31 PROCEDURE — 93010 ELECTROCARDIOGRAM REPORT: CPT | Performed by: INTERNAL MEDICINE

## 2018-08-31 PROCEDURE — 00000146 ZZHCL STATISTIC GLUCOSE BY METER IP

## 2018-08-31 PROCEDURE — 97110 THERAPEUTIC EXERCISES: CPT | Mod: GO

## 2018-08-31 PROCEDURE — 84520 ASSAY OF UREA NITROGEN: CPT | Performed by: HOSPITALIST

## 2018-08-31 PROCEDURE — 82310 ASSAY OF CALCIUM: CPT | Performed by: HOSPITALIST

## 2018-08-31 PROCEDURE — 85018 HEMOGLOBIN: CPT | Performed by: HOSPITALIST

## 2018-08-31 PROCEDURE — 84100 ASSAY OF PHOSPHORUS: CPT | Performed by: HOSPITALIST

## 2018-08-31 PROCEDURE — A9270 NON-COVERED ITEM OR SERVICE: HCPCS | Mod: GY | Performed by: NURSE PRACTITIONER

## 2018-08-31 PROCEDURE — A9270 NON-COVERED ITEM OR SERVICE: HCPCS | Mod: GY

## 2018-08-31 PROCEDURE — 82565 ASSAY OF CREATININE: CPT | Performed by: HOSPITALIST

## 2018-08-31 PROCEDURE — 99239 HOSP IP/OBS DSCHRG MGMT >30: CPT | Performed by: HOSPITALIST

## 2018-08-31 PROCEDURE — A9270 NON-COVERED ITEM OR SERVICE: HCPCS | Mod: GY | Performed by: HOSPITALIST

## 2018-08-31 PROCEDURE — 25000132 ZZH RX MED GY IP 250 OP 250 PS 637: Mod: GY

## 2018-08-31 PROCEDURE — 25000132 ZZH RX MED GY IP 250 OP 250 PS 637: Mod: GY | Performed by: NURSE PRACTITIONER

## 2018-08-31 PROCEDURE — 36415 COLL VENOUS BLD VENIPUNCTURE: CPT | Performed by: HOSPITALIST

## 2018-08-31 PROCEDURE — 82947 ASSAY GLUCOSE BLOOD QUANT: CPT | Performed by: HOSPITALIST

## 2018-08-31 PROCEDURE — 25000132 ZZH RX MED GY IP 250 OP 250 PS 637: Mod: GY | Performed by: HOSPITALIST

## 2018-08-31 RX ORDER — PANTOPRAZOLE SODIUM 40 MG/1
40 TABLET, DELAYED RELEASE ORAL DAILY
Qty: 30 TABLET | Refills: 3 | Status: SHIPPED | OUTPATIENT
Start: 2018-08-31 | End: 2018-09-07

## 2018-08-31 RX ORDER — CLOPIDOGREL BISULFATE 75 MG/1
75 TABLET ORAL DAILY
Qty: 30 TABLET | Refills: 3 | Status: SHIPPED | OUTPATIENT
Start: 2018-08-31 | End: 2018-09-07

## 2018-08-31 RX ADMIN — LISINOPRIL 5 MG: 5 TABLET ORAL at 08:48

## 2018-08-31 RX ADMIN — INSULIN ASPART 1 UNITS: 100 INJECTION, SOLUTION INTRAVENOUS; SUBCUTANEOUS at 09:15

## 2018-08-31 RX ADMIN — CLOPIDOGREL 75 MG: 75 TABLET, FILM COATED ORAL at 08:48

## 2018-08-31 RX ADMIN — OMEPRAZOLE 20 MG: 20 CAPSULE, DELAYED RELEASE ORAL at 08:48

## 2018-08-31 RX ADMIN — ASPIRIN 81 MG: 81 TABLET, COATED ORAL at 08:48

## 2018-08-31 ASSESSMENT — ACTIVITIES OF DAILY LIVING (ADL)
ADLS_ACUITY_SCORE: 8

## 2018-08-31 ASSESSMENT — VISUAL ACUITY: OU: NORMAL ACUITY

## 2018-08-31 NOTE — DISCHARGE SUMMARY
[facility]  Discharge Summary        Reid Jimenes MRN# [id number]   YOB: 1934 Age: 84 year old     Date of Admission:  8/28/2018  Date of Discharge:  8/31/2018  Admitting Physician:  Huseyin Maza MD  Discharge Physician: Luke Patricia MD  Discharging Service: Hospitalist     Primary Provider: Viet Bingham  Primary Care Physician Phone Number: [phone]         Discharge Diagnoses/Problem Oriented Hospital Course (Providers):    Reid Jimenes was admitted on 8/28/2018 by Huseyin Maza MD and I would refer you to their history and physical.  The following problems were addressed during his hospitalization:    Reid Jimenes is an 84-year-old male with past medical history of severe aortic stenosis and non-insulin dependent type 2 diabetes who is status post a TAVR .     # Severe aortic stenosis Status post transcatheter aortic valve replacement 8/28/18  # Post procedure left common femoral artery pseudoaneurysm   - Cardiology team followed post procedure  - was continued on lisinopril , aspirin and Plavix.   - Post TAVR echo showed normal EF 60-65%, mean gradient 6 mmHg, trace AI, no paravalvular AI  - Access Site, bilateral groin sites bruised with positive bruit bilaterally, groin US noted with left common femoral artery pseudoaneurysm and had thrombin injection on 8/29/18; repeat US 8/30/18 noted with Residual hematoma measuring 3.4 x 2.5 x 1.5 cm in the left inguinal region, no recurrent pseudoaneurysm.; stable Hb; Hb 13.7--->11.8----12.5     2.  Non-insulin dependent type 2 diabetes:    - A1c is 6.5; resumed PTA metformin upon discharge      CODE STATUS:  FULL CODE.     # Discharge Pain Plan:   - Patient currently has NO PAIN and is not being prescribed pain medications on discharge.            Brief Hospital Stay Summary Sent Home With Patient in AVS:        Reason for your hospital stay       You had transcatheter aortic valve replacement.                         Pending Results:        Unresulted Labs Ordered in the Past 30 Days of this Admission     No orders found from 6/29/2018 to 8/29/2018.            Discharge Instructions and Follow-Up:      Follow-up Appointments     Follow-up and recommended labs and tests        Follow up with primary care provider, Viet Bingham MD, within 7 days   for hospital follow- up.  The following labs/tests are recommended: repeat   CBC and BMP.   Follow-up with cardiology as suggested.                        Discharge Disposition:      Discharged to home        Discharge Medications:        Discharge Medication List as of 8/31/2018 10:32 AM      START taking these medications    Details   aspirin 81 MG EC tablet Take 1 tablet (81 mg) by mouth daily, Disp-30 tablet, OTC      clopidogrel (PLAVIX) 75 MG tablet Take 1 tablet (75 mg) by mouth daily, Disp-30 tablet, R-3, E-Prescribe      pantoprazole (PROTONIX) 40 MG EC tablet Take 1 tablet (40 mg) by mouth daily Take 30-60 minutes before a meal., Disp-30 tablet, R-3, E-Prescribe         CONTINUE these medications which have NOT CHANGED    Details   ascorbic acid 500 MG TABS Take 500 mg by mouth every evening , Historical      BIOTIN PO Take 5,000 mg by mouth 2 times daily , Historical      clotrimazole (LOTRIMIN) 1 % cream Apply topically 2 times dailyDisp-15 g, R-3Local Print      fluocinonide (LIDEX) 0.05 % ointment Apply topically 2 times dailyHistorical      GLUCOSAMINE-CHONDROITIN PO Take 1 tablet by mouth 2 times daily , Historical      lisinopril (PRINIVIL/ZESTRIL) 2.5 MG tablet Take 1 tablet (2.5 mg) by mouth daily, Disp-90 tablet, R-3, Local Print      metFORMIN (GLUCOPHAGE) 1000 MG tablet Take 0.5 tablets (500 mg) by mouth 2 times daily (with meals), Disp-90 tablet, R-3, Local Print      Multiple Vitamins-Iron (MULTIVITAMIN/IRON PO) Take 1 tablet by mouth daily , Historical      simvastatin (ZOCOR) 40 MG tablet Take 0.5 tablets (20 mg) by mouth At Bedtime, Disp-45  "tablet, R-3, Local Print      blood glucose monitoring (MIRA CONTOUR NEXT) test strip Use to test blood sugar one time daily or as directed., Disp-100 each, R-5, E-Prescribe      blood glucose monitoring (MIRA MICROLET) lancets Use to test blood sugar 1 times daily or as directed., Disp-100 each, R-5, E-Prescribe         STOP taking these medications       aspirin 325 MG tablet Comments:   Reason for Stopping:         OMEPRAZOLE PO Comments:   Reason for Stopping:                 Allergies:       No Known Allergies        Consultations This Hospital Stay:      Consultation during this admission received from internal medicine        Condition and Physical on Discharge:      Discharge condition: Stable   Vitals: Blood pressure 148/69, pulse 90, temperature 98.3  F (36.8  C), temperature source Oral, resp. rate 16, height 1.651 m (5' 5\"), weight 78.9 kg (173 lb 14.4 oz), SpO2 97 %.     Constitutional: Alert, awake and orienetd X 3; sitting comfortably in chair in no apparent distress   HEENT: Pupils equal and reactive to light and accomodation, EOMI intact; neck supple no raised JVD or rigidity    Oral cavity: Moist mucosa   Cardiovascular: Normal s1 s2, regular rate and rhythm, systolic murmur +   Lungs: B/l clear to auscultation, no wheezes or crepitations   Abdomen: Soft, nt, nd, no guarding, rigidity or rebound; BS +   LE : No edema   Musculoskeletal: Power 5/5 in all extremities   Neuro: No focal neurological deficits noted, CN II to XII grossly intact   Psychiatry: normal mood and affect  Right groin: bruise noted ; non-tender               Discharge Time:      Greater than 30 minutes.        Image Results From This Hospital Stay (For Non-EPIC Providers):        Results for orders placed or performed during the hospital encounter of 08/28/18   XR Chest Port 1 View    Narrative    CHEST ONE VIEW PORTABLE August 29, 2018 5:40 AM     HISTORY: Status post transcatheter aortic valve replacement.     COMPARISON: " None.      Impression    IMPRESSION: Transcatheter aortic valve noted in position. Heart size  and pulmonary vasculature appear normal. No infiltrates or effusions.    MARY DORMAN MD   US Pseudoaneurysm Thrombin Inj Repair    Narrative    US PSEUDOANEURYSM THROMBIN INJECTION REPAIR  8/29/2018 4:14 PM     HISTORY:  Patient has swelling in the left groin at site of recent  catheterization.    COMPARISON: None.    PROCEDURE: Evaluation of the left groin revealed a pseudoaneurysm  arising from the left common femoral artery. This measured 3.5 x 2.5 x  1.7 cm. This was compressed for 18 minutes however did not completely  resolve. Therefore, the skin overlying the pseudoaneurysm was prepped  and draped in the usual sterile manner. 1% lidocaine was injected for  local anesthesia. Under ultrasound guidance, a 25-gauge needle was  passed into the pseudoaneurysm and 0.2 cc volume of a mixture of 5000  units thrombin mixed in 5 cc normal saline was injected. This  immediately led to thrombosis of the pseudoaneurysm.    I determined this patient to be an appropriate candidate for the  planned sedation and procedure and reassessed the patient immediately  prior to sedation and procedure. The patient tolerated the procedure  well. There were no immediate postprocedure complications. The  patient's vital signs were monitored by radiology nursing staff under  my supervision and remained stable throughout the study.       Impression    IMPRESSION: Thrombin injection to treat a pseudoaneurysm arising from  the left common femoral artery performed as described above.      KARISHMA SOTO MD   Us Post Vascular Access Low Ext Duplex    Narrative    ULTRASOUND POST VASCULAR ACCESS LOW EXT DUPLEX  8/30/2018 10:20 AM     HISTORY: 84-year-old male underwent thrombin injection to treat a left  femoral artery pseudoaneurysm yesterday.    COMPARISON: Ultrasound dated 8/29/2018.    FINDINGS: Color Doppler and spectral waveform analysis  performed.    No recurrent pseudoaneurysm identified. Residual hematoma measuring  3.4 x 2.5 x 1.5 cm in the left inguinal region.    KARISHMA SOTO MD           Most Recent Lab Results In EPIC (For Non-EPIC Providers):    Most Recent 3 CBC's:  Recent Labs   Lab Test  08/31/18   0529  08/30/18   0550  08/28/18   1105  08/27/18   0950   WBC   --   10.3  7.3  7.7   HGB  12.5*  12.5*  11.8*  13.7   MCV   --   89  89  89   PLT   --   98*  107*  147*      Most Recent 3 BMP's:  Recent Labs   Lab Test  08/31/18   0529  08/30/18   0550  08/29/18   0545   NA  142  142  143   POTASSIUM  4.5  3.9  4.2   CHLORIDE  106  105  108   CO2  30  30  28   BUN  29  15  14   CR  1.17  0.83  0.83   ANIONGAP  6  7  7   DEEPTI  9.1  8.8  8.6   GLC  140*  138*  128*     Most Recent 3 Troponin's:No lab results found.    Invalid input(s): TROP, TROPONINIES  Most Recent 3 INR's:  Recent Labs   Lab Test  07/24/18   0819   INR  1.01     Most Recent 2 LFT's:  Recent Labs   Lab Test  08/28/18   1105  08/27/18   0950   AST  23  17   ALT  19  23   ALKPHOS  65  81   BILITOTAL  0.2  0.5     Most Recent Cholesterol Panel:  Recent Labs   Lab Test  04/12/18   1627   CHOL  97   LDL  37   HDL  39*   TRIG  106     Most Recent 6 Bacteria Isolates From Any Culture (See EPIC Reports for Culture Details):No lab results found.  Most Recent TSH, T4 and HgbA1c:   Recent Labs   Lab Test  08/28/18   1105   09/28/16   1119   TSH   --    --   0.88   A1C  6.6*   < >  6.6*    < > = values in this interval not displayed.

## 2018-08-31 NOTE — PROGRESS NOTES
[facility]- TAVR Progress Note     Date of Service: 08/31/18     HPI  Reid Jimenes is a 84 year old male who was admitted on 8/28/2018 for elective Transfemoral Aortic Valve Replacement (TAVR). He carries a PMH for severe aortic stenosis, hypertension, hyperlipidemia, and type 2 diabetes.     Assessment and Plan  1) POD #3 S/p TAVR, using a 26 mm Greene Sabino 3 valve    - ECG demonstrated sinus rhythm with 1st degree AV block with known LBBB   - Post TAVR Echocardiogram showed normal EF 65-70%, mild LVH, Ao mean gradient 10.7, HUNG 2.2.    - Access Site, bilateral groin sites bruised. Bilateral bruit, faint. Left sided pseudoaneurysm 3.5x2.5x 1.7cm s/p thrombin injection on 8/29/18. Follow up US showed no recurrent pseudoaneurysm.   - Brief Neuro check normal    - Antiplatelet therapy,  ASA + Plavix x 6months   - Hgb stable 12.5,               - Voiding without difficulty              - No BM, + flatulance   - Cardiac rehab/PT/OT, steady gait              - Groin precautions reviewed with verbal understanding              - Follow up with cardiology in 1 week, as scheduled                -  OK to discharge on a cardiac standpoint     2) Hypertension -  Lisinopril increased yesterday to 5mg daily. Soft BP this am, asymptomatic.  Reduce lisinopril to 2.5mg daily.  Hold lisinopril  if SBP <100, BMP this am  3) Hyperlipidemia - LDL stable on low dose simvastatin  4) Diabetes- A1C 6.6, managed on metformin       Interval History:   Up in chair without cardiac complaint. Denies chest pain, shortness of breath, palpitations, PND, orthopnea, or LE edema. Mild tenderness to right thigh, upon exam no redness, swelling, bruising, or signs of infection. Anxious to go home today.     Physical Exam   Temp: 98.4  F (36.9  C) Temp src: Oral BP: 126/49 Pulse: 90 Heart Rate: 77 Resp: 16 SpO2: 96 % O2 Device: None (Room air)    Vitals:    08/28/18 0618 08/30/18 0530 08/31/18 0500    Weight: 79.6 kg (175 lb 9 oz) 83 kg (183 lb) 78.9 kg (173 lb 14.4 oz)     Vital Signs with Ranges  Temp:  [98  F (36.7  C)-99.3  F (37.4  C)] 98.4  F (36.9  C)  Pulse:  [78-90] 90  Heart Rate:  [68-98] 77  Resp:  [16] 16  BP: ()/() 126/49  SpO2:  [90 %-98 %] 96 %  I/O last 3 completed shifts:  In: 580 [P.O.:580]  Out: 225 [Urine:225]    Constitutional: awake, alert, cooperative, no apparent distress, and appears stated age  Hematologic / Lymphatic: no cervical lymphadenopathy  Respiratory: No increased work of breathing, good air exchange, clear to auscultation bilaterally, no crackles or wheezing  Cardiovascular: regular rate and rhythm, soft systolic murmur, bilateral groin soft, flat, and dry. Faint bilateral bruit  GI: normal bowel sounds, + BS, + flatulence   Genitounirinary: voiding   Skin: significant ecchymosis to bilateral groins  Neurologic: Mental Status Exam:  Level of Alertness: Awake, steady gait, equal strengths  Orientation:   person, place, time  Neuropsychiatric: General: normal, calm and normal eye contact    Medications       aspirin  81 mg Oral Daily     clopidogrel  75 mg Oral Daily     insulin aspart  1-7 Units Subcutaneous TID AC     insulin aspart  1-5 Units Subcutaneous At Bedtime     iopamidol  40 mL INTRA-ARTERIAL Once     lisinopril  5 mg Oral Daily     omeprazole (priLOSEC) CR capsule 20 mg  20 mg Oral Daily     simvastatin  20 mg Oral At Bedtime       Data   Reviewed labs        SARY Ball, CNP  Pager [phone]  8/29/2018

## 2018-08-31 NOTE — PLAN OF CARE
Alert and oriented x4. VSS. Denies pain. Bruising throughout groin. Up independently. Discharge paperwork gone over with pt and wife, questions answered. Sent with belongings, paperwork, and medication.

## 2018-08-31 NOTE — PLAN OF CARE
Problem: Patient Care Overview  Goal: Plan of Care/Patient Progress Review  Outcome: Improving  Pt denied pain. Vitals stable RA. independent. Tele SR BBB. BG monitoring. Rt and Lt groin sites bruised w/ bilateral faint bruits. Plan is for possible discharge today. Continue to monitor.

## 2018-08-31 NOTE — PLAN OF CARE
Problem: Patient Care Overview  Goal: Plan of Care/Patient Progress Review  Discharge Planner OT   Patient plan for discharge: home w/OPCR at [facility]  Current status: Pt seen for AM cardiac rehab session. Ambulated 15min continuously maintaining stable response, denied symptoms except 2/10 fatigue.  See VS flowsheet. Pt has met all inpatient cardiac rehab goals therefore is discharged from service.   Barriers to return to prior living situation: none noted  Recommendations for discharge: home w/[facility]CR  Rationale for recommendations: pt will benefit from progressive monitored exercise program at [facility] as well as further education in CAD mgmt to maximize recovery and general cardiac health       Entered by: Abel Hyatt 08/31/2018 9:06 AM       Comments: Occupational Therapy Discharge Summary    Reason for therapy discharge:    Discharged to home with outpatient therapy.  All goals and outcomes met, no further needs identified.    Progress towards therapy goal(s). See goals on Care Plan in [facility] electronic health record for goal details.  Goals met    Therapy recommendation(s):    Continued therapy is recommended.  Rationale/Recommendations:  OPCR planned to maximize recovery and general cardiac health.

## 2018-08-31 NOTE — DISCHARGE INSTRUCTIONS
A follow-up appointment was scheduled for you [see above].  It is very important to go to it--bring these papers and your insurance card with you.  At the visit, talk about your hospital stay.  Tell your doctor how you feel.  Show your new list of medications.  Your doctor will update records, make sure you are still doing OK, and decide if any tests or medication changes are needed.          Going home after Transcatheter Aortic Valve Replacement (TAVR)  Femoral Access    You have small procedure sites at both groins. You may have small amounts of bruising or discomfort, this is expected. If you develop worse swelling, redness and warmth that does not go away, fever and chills, Increasing numbness in your legs, worsening pain at the site then you need to contact your nurse coordinator.    You may shower, but do not soak in a bath tub or pool or apply lotions, ointments, powder, etc for 3-5 days or until sites look healed.     Activity: No lifting, pushing, pulling more than 10 pounds (examples to avoid: groceries, vacuuming, gardening, golfing): For one week with a procedure through the groin.  No driving until you follow up with your primary care provider.     After the initial healing process of the access site, we recommend cardiac rehabilitation for all TAVR patients. Cardiac rehabilitation will help you:  - Rebuild stamina, strength and balance.  - Learn how to participate in activities safely, as well as help you regain confidence to do so.  - Return to activities of daily living and leisure.  Please contact their central scheduling to arrange at [phone]    We prefer you to follow up with your primary care provider within 2 weeks. You will be arranged to see the TAVR clinic in month. At that time you will have a repeat ultrasound of the heart to look at the valve.     Should you have any questions or concerns please contact your assigned TAVR nurse coordinator:  Lisa [phone] (Dr. Salazar nurse)    Syeda [phone] ( Dr. Reed nurse)  Fernanda [phone] ([place] nurse.  At the first prompt enter #1, second prompt enter #3, then ask the triage nurse if you can speak with Fernanda).

## 2018-08-31 NOTE — PLAN OF CARE
Problem: Patient Care Overview  Goal: Plan of Care/Patient Progress Review  Outcome: Improving  Neuro/CMS: A&O x4  CV: SR   Resp/O2: Room air, lungs dim  GI/Diet: Mod Cho, good appetite  : Voiding adequately  Skin/Incisions: B Groin sites ecchymotic, Bruit on R noted  Activity: Indep  Lines: PIV saline locked  Labs/tests: glucoses 128 and 218  VS/Pain: Denies pain  D/C Plan: Home tomorrow

## 2018-09-04 ENCOUNTER — TELEPHONE (OUTPATIENT)
Dept: INTERNAL MEDICINE | Facility: CLINIC | Age: 83
End: 2018-09-04

## 2018-09-04 LAB — INTERPRETATION ECG - MUSE: NORMAL

## 2018-09-04 NOTE — TELEPHONE ENCOUNTER
IP F/U    Date: 08/31/18  Diagnosis: Aortic Stenosis, Severe Aortic Stenosis  Is patient active in care coordination? No  Was patient in TCU? No    Next 5 appointments (look out 90 days)     Sep 06, 2018  3:10 PM CDT   Return Visit with SARY Ball CNP   [facility] ([facility])    [address] [place] [id number]   [phone] OPT 2            Sep 07, 2018  2:20 PM CDT   [facility]    [address]  [facility] [id number] [phone]

## 2018-09-06 ENCOUNTER — OFFICE VISIT (OUTPATIENT)
Dept: CARDIOLOGY | Facility: CLINIC | Age: 83
End: 2018-09-06
Payer: MEDICARE

## 2018-09-06 ENCOUNTER — CARE COORDINATION (OUTPATIENT)
Dept: CARDIOLOGY | Facility: CLINIC | Age: 83
End: 2018-09-06

## 2018-09-06 VITALS
SYSTOLIC BLOOD PRESSURE: 122 MMHG | HEIGHT: 65 IN | BODY MASS INDEX: 29.32 KG/M2 | WEIGHT: 176 LBS | HEART RATE: 62 BPM | DIASTOLIC BLOOD PRESSURE: 56 MMHG

## 2018-09-06 DIAGNOSIS — I35.0 SEVERE AORTIC STENOSIS: Primary | ICD-10-CM

## 2018-09-06 DIAGNOSIS — I77.9 BILATERAL CAROTID ARTERY DISEASE (H): ICD-10-CM

## 2018-09-06 DIAGNOSIS — E11.29 TYPE 2 DIABETES MELLITUS WITH MICROALBUMINURIA, WITHOUT LONG-TERM CURRENT USE OF INSULIN (H): ICD-10-CM

## 2018-09-06 DIAGNOSIS — Z95.2 S/P TAVR (TRANSCATHETER AORTIC VALVE REPLACEMENT): Primary | ICD-10-CM

## 2018-09-06 DIAGNOSIS — E78.5 HYPERLIPIDEMIA WITH TARGET LDL LESS THAN 100: ICD-10-CM

## 2018-09-06 DIAGNOSIS — I10 ESSENTIAL HYPERTENSION WITH GOAL BLOOD PRESSURE LESS THAN 140/90: ICD-10-CM

## 2018-09-06 DIAGNOSIS — I72.4 PSEUDOANEURYSM OF FEMORAL ARTERY (H): ICD-10-CM

## 2018-09-06 DIAGNOSIS — R80.9 TYPE 2 DIABETES MELLITUS WITH MICROALBUMINURIA, WITHOUT LONG-TERM CURRENT USE OF INSULIN (H): ICD-10-CM

## 2018-09-06 DIAGNOSIS — Z95.2 S/P TAVR (TRANSCATHETER AORTIC VALVE REPLACEMENT): ICD-10-CM

## 2018-09-06 PROCEDURE — 99214 OFFICE O/P EST MOD 30 MIN: CPT | Performed by: NURSE PRACTITIONER

## 2018-09-06 RX ORDER — AMOXICILLIN 500 MG/1
2000 CAPSULE ORAL ONCE
Qty: 4 CAPSULE | Refills: 0 | Status: SHIPPED | OUTPATIENT
Start: 2018-09-06 | End: 2018-09-06

## 2018-09-06 NOTE — MR AVS SNAPSHOT
After Visit Summary   9/6/2018    Reid Jimenes    MRN: [id number]           Patient Information     Date Of Birth          2/14/1934        Visit Information        Provider Department      9/6/2018 3:10 PM Lizz Love APRN CNP [facility]        Today's Diagnoses     Severe aortic stenosis          Care Instructions    Thanks for coming into [facility] today.   Doing well post TAVR   Continue with current medical therapy.   Starting cardiac rehab next week.  Monitor groin for any worsening swelling, bleeding, bruising or pain.  Follow up dental appointments will require antibiotics 30-60 min prior to any dental visit.   Follow up on 9/27/18 for echo, labs and VINOD visit.           Please call my nurse at [phone] with any questions or concerns.    Scheduling phone number: [phone]  Reminder: Please bring in all current medications, over the counter supplements and vitamin bottles to your next appointment.                Follow-ups after your visit        Your next 10 appointments already scheduled     Sep 07, 2018  2:20 PM CDT   [facility] with Viet Bingham MD   [facility]    [address] [facility] [address]   [phone]            Sep 10, 2018  1:00 PM CDT   Cardiac Evaluation with  CARDIAC REHAB 4   [facility] ([facility])    [id number] [place] [address] [address]  [facility] [id number] [phone]            Sep 27, 2018  2:30 PM CDT   TAVR Return with Jose Saldana PA-C   [facility] ([facility])    [address] [place] [address] [address]  [facility] [address] [phone] OPT 2              Future tests that were ordered for you today     Open Future Orders        Priority Expected Expires Ordered    EKG 12-lead complete w/read - Clinics (to be scheduled) Routine 9/27/2018  "9/6/2019 9/6/2018    Basic metabolic panel Routine 9/27/2018 9/6/2019 9/6/2018    CBC with platelets Routine 9/27/2018 9/6/2019 9/6/2018    Echocardiogram Routine 9/27/2018 9/6/2019 9/6/2018            Who to contact     If you have questions or need follow up information about today's clinic visit or your schedule please contact [facility] directly at [phone].  Normal or non-critical lab and imaging results will be communicated to you by Boedohart, letter or phone within 4 business days after the clinic has received the results. If you do not hear from us within 7 days, please contact the clinic through A[employer] or phone. If you have a critical or abnormal lab result, we will notify you by phone as soon as possible.  Submit refill requests through [employer] or call your pharmacy and they will forward the refill request to us. Please allow 3 business days for your refill to be completed.          Additional Information About Your Visit        [employer] Information     [employer] gives you secure access to your electronic health record. If you see a primary care provider, you can also send messages to your care team and make appointments. If you have questions, please call your primary care clinic.  If you do not have a primary care provider, please call [phone] and they will assist you.        Care EveryWhere ID     This is your Care EveryWhere ID. This could be used by other organizations to access your [facility] medical records  [id number]        Your Vitals Were     Pulse Height BMI (Body Mass Index)             62 1.651 m (5' 5\") 29.29 kg/m2          Blood Pressure from Last 3 Encounters:   09/06/18 122/56   08/31/18 148/69   08/27/18 130/62    Weight from Last 3 Encounters:   09/06/18 79.8 kg (176 lb)   08/31/18 78.9 kg (173 lb 14.4 oz)   08/27/18 79.8 kg (176 lb)              Today, you had the following     No orders found for display         Today's Medication Changes "          These changes are accurate as of 9/6/18  3:56 PM.  If you have any questions, ask your nurse or doctor.               Start taking these medicines.        Dose/Directions    amoxicillin 500 MG capsule   Commonly known as:  AMOXIL   Used for:  Severe aortic stenosis   Started by:  Lizz Love APRN CNP        Dose:  2000 mg   Take 4 capsules (2,000 mg) by mouth once for 1 dose   Quantity:  4 capsule   Refills:  0         These medicines have changed or have updated prescriptions.        Dose/Directions    lisinopril 2.5 MG tablet   Commonly known as:  PRINIVIL/Zestril   This may have changed:  additional instructions   Used for:  Type 2 diabetes mellitus with microalbuminuria, without long-term current use of insulin (H)        Dose:  2.5 mg   Take 1 tablet (2.5 mg) by mouth daily   Quantity:  90 tablet   Refills:  3            Where to get your medicines      These medications were sent to [facility] [address] [place] [address] [facility]  [address] [id number]     Phone:  [phone]     amoxicillin 500 MG capsule                Primary Care Provider Office Phone # Fax #    Viet Bingham [phone]       [address]  [facility] [address]        Equal Access to Services     JOSIAH CROW AH: Hadii maurice martinez hadasho Soomaali, waaxda luqadaha, qaybta kaalmada adeegyada, rigo myers. So [facility] [phone].    ATENCIÓN: Si habla español, tiene a philip disposición servicios gratuitos de asistencia lingüística. Silke al [phone].    We comply with applicable federal civil rights laws and [place] laws. We do not discriminate on the basis of race, color, national origin, age, disability, sex, sexual orientation, or gender identity.            Thank you!     Thank you for choosing [facility]  for your care. Our goal is always to provide you with excellent care. Hearing back from  our patients is one way we can continue to improve our services. Please take a few minutes to complete the written survey that you may receive in the mail after your visit with us. Thank you!             Your Updated Medication List - Protect others around you: Learn how to safely use, store and throw away your medicines at www.disposemymeds.org.          This list is accurate as of 9/6/18  3:56 PM.  Always use your most recent med list.                   Brand Name Dispense Instructions for use Diagnosis    amoxicillin 500 MG capsule    AMOXIL    4 capsule    Take 4 capsules (2,000 mg) by mouth once for 1 dose    Severe aortic stenosis       ascorbic acid 500 MG Tabs      Take 500 mg by mouth every evening        aspirin 81 MG EC tablet     30 tablet    Take 1 tablet (81 mg) by mouth daily    Severe aortic stenosis       BIOTIN PO      Take 5,000 mg by mouth 2 times daily        blood glucose monitoring lancets     100 each    Use to test blood sugar 1 times daily or as directed.    Type 2 diabetes mellitus with microalbuminuria, without long-term current use of insulin (H)       blood glucose monitoring test strip    MIRA CONTOUR NEXT    100 each    Use to test blood sugar one time daily or as directed.    Type 2 diabetes mellitus with microalbuminuria, without long-term current use of insulin (H)       clopidogrel 75 MG tablet    PLAVIX    30 tablet    Take 1 tablet (75 mg) by mouth daily    Severe aortic stenosis       clotrimazole 1 % cream    LOTRIMIN    15 g    Apply topically 2 times daily    Tinea corporis       fluocinonide 0.05 % ointment    LIDEX     Apply topically 2 times daily        GLUCOSAMINE-CHONDROITIN PO      Take 1 tablet by mouth 2 times daily        lisinopril 2.5 MG tablet    PRINIVIL/Zestril    90 tablet    Take 1 tablet (2.5 mg) by mouth daily    Type 2 diabetes mellitus with microalbuminuria, without long-term current use of insulin (H)       metFORMIN 1000 MG tablet    GLUCOPHAGE    90  tablet    Take 0.5 tablets (500 mg) by mouth 2 times daily (with meals)    Type 2 diabetes mellitus with microalbuminuria, without long-term current use of insulin (H)       MULTIVITAMIN/IRON PO      Take 1 tablet by mouth daily        pantoprazole 40 MG EC tablet    PROTONIX    30 tablet    Take 1 tablet (40 mg) by mouth daily Take 30-60 minutes before a meal.    Severe aortic stenosis       simvastatin 40 MG tablet    ZOCOR    45 tablet    Take 0.5 tablets (20 mg) by mouth At Bedtime    Hyperlipidemia with target LDL less than 100       VITAMIN B-12 ER PO      Take by mouth daily

## 2018-09-06 NOTE — LETTER
9/6/2018    Viet Bingham MD, MD  [address] [facility] [address]  [facility] [address]    RE: Reid Jimenes       Dear Colleague,    I had the pleasure of seeing Reid Jimenes in the [facility].    Cardiology Clinic Progress Note  Reid Jimenes MRN# [id number]   YOB: 1934 Age: 84 year old     Reason For Visit: 1 week post TAVR follow up   Primary Cardiologist:   Dr. Higginbotham          History of Presenting Illness:    Reid Jimenes is a pleasant 84 year old patient who comes to the office today for a 1 week post TAVR follow up.     He carries a  history significant for symptomatic severe aortic stenosis, hypertension, hyperlipidemia, type 2 diabetes.  Echocardiogram on 5/10/2018 showed a mean aortic pressure gradient of 42 mmHg and aortic valve area of 1.0 cm   .  He was subsequently evaluated in valve clinic and felt to be a good candidate for TAVR.  His workup for TAVR consisted of a right and left heart cath showing nonobstructive coronary disease, mild pulmonary hypertension, and normal right and left-sided filling pressures.  A CT of the head and neck showed mild right carotid stenosis of 30% and 65% stenosis to the left internal carotid.  On 8/28/2018 he underwent transcatheter aortic valve replacement via femoral approach using a 26 mm Greene Sabino 3 valve.  Post TAVR echo showed normal EF of 60-65%, mean gradient 6 mmHg, trace AI, and no perivalvular AI.  On postop day #1 he was found to have bilateral groin bruits, followed by groin ultrasound that noted a left common femoral artery pseudoaneurysm with subsequent thrombin injection.  A repeat ultrasound the following day noted a residual hematoma measuring 3.4 x 2.5 x 1.5 cm in the left inguinal region, with no reoccurrent pseudoaneurysm.  His hemoglobin remained stable at 12.5.  On postop day #3 he was discharged in stable condition.      Today, he is feeling well on a cardiac standpoint, denies chest  pain, shortness of breath, PND, orthopnea, presyncope, syncope, edema, heart racing, or palpitations.  He states there is a noticeable improvement in his exertional shortness of breath.  His only complaint is bilateral groin tenderness and bruising.  He denies any bleeding on dual antiplatelet therapy.    Blood pressure today is well controlled at 122/56, managed well on lisinopril.  Last BMP showed a sodium of 142, 4 potassium 4.5, BUN 29, creatinine 1.17, GFR 59.  Last A1c was 6.6 on 8/28/2018, managed on metformin.  Lipid panel is stable with a total cholesterol of 97, HDL 39, LDL 37, triglycerides 106, managed on simvastatin.    He is enrolled in cardiac rehab and is scheduled to begin next week.  He follows a heart healthy diet and remains compliant with all medications.                       Assessment and Plan:     1. Severe aortic stenosis-status post transcatheter aortic valve replacement with a 26 mm Greene Sabino 3 valve.  Follow-up echocardiogram showed a mean gradient of 6 mmHg, trace AI, and no perivalvular AI.  Continue on Plavix ×6 months and aspirin.  Follow-up echo in 1 month, cardiac rehab, exercise, weight loss.  2. Pseudoaneurysm -status post thrombin injection.  Follow-up ultrasound confirmed residual hematoma with no recurrent pseudoaneurysm.  3. Carotid disease-last ultrasound on 8/10/2018 showed mild right internal carotid stenosis and moderate left internal carotid stenosis.  Continue on aspirin and statin.  4.  Hypertension-well-controlled on current medical therapy  5.   Hyperlipidemia-LDL at goal on current statin  6.  Type 2 diabetes-last A1c was 6.6, managed on metformin.             Thank you for allowing me to participate in this delightful patient's care.        Lizz Love, APRN, CNP          Review of Systems:   Review of Systems:  Skin:  Positive for     Eyes:  Positive for glasses  ENT:  Positive for hearing loss  Respiratory:  Positive for dyspnea on  "exertion  Cardiovascular:  Negative    Gastroenterology: Negative    Genitourinary:  Negative    Musculoskeletal:  Positive for muscular weakness  Neurologic:  Negative    Psychiatric:  Negative    Heme/Lymph/Imm:  Negative    Endocrine:  Positive for diabetes              Physical Exam:     Vitals: /56  Pulse 62  Ht 1.651 m (5' 5\")  Wt 79.8 kg (176 lb)  BMI 29.29 kg/m2  Constitutional:  cooperative, alert and oriented, well developed, well nourished, in no acute distress        Skin:  warm and dry to the touch bruises present Bilateral groin, pubic area    Head:  normocephalic, no masses or lesions        Eyes:  pupils equal and round, conjunctivae and lids unremarkable, sclera white, no xanthalasma, EOMS intact, no nystagmus        ENT:  no pallor or cyanosis, dentition good        Neck:    left carotid bruit      Chest:  normal breath sounds, clear to auscultation, normal A-P diameter, normal symmetry, normal respiratory excursion, no use of accessory muscles        Cardiac: regular rhythm;normal S1 and S2       systolic murmur          Abdomen:  abdomen soft, non-tender, BS normoactive, no mass, no HSM, no bruits        Vascular:     3+ 3+ 3+         3+ 3+ 3+         right femoral bruit (+)      Extremities and Back:  no deformities, clubbing, cyanosis, erythema observed;no edema        Neurological:  no gross motor deficits                 Medications:     Current Outpatient Prescriptions   Medication Sig Dispense Refill     amoxicillin (AMOXIL) 500 MG capsule Take 4 capsules (2,000 mg) by mouth once for 1 dose 4 capsule 0     ascorbic acid 500 MG TABS Take 500 mg by mouth every evening        aspirin 81 MG EC tablet Take 1 tablet (81 mg) by mouth daily 30 tablet      BIOTIN PO Take 5,000 mg by mouth 2 times daily        clopidogrel (PLAVIX) 75 MG tablet Take 1 tablet (75 mg) by mouth daily 30 tablet 3     clotrimazole (LOTRIMIN) 1 % cream Apply topically 2 times daily 15 g 3     Cyanocobalamin " (VITAMIN B-12 ER PO) Take by mouth daily       fluocinonide (LIDEX) 0.05 % ointment Apply topically 2 times daily       GLUCOSAMINE-CHONDROITIN PO Take 1 tablet by mouth 2 times daily        lisinopril (PRINIVIL/ZESTRIL) 2.5 MG tablet Take 1 tablet (2.5 mg) by mouth daily (Patient taking differently: Take 2.5 mg by mouth daily Patient takes 1/2 tablet of a 5 mg tablet to equal 2.5 mg daily) 90 tablet 3     metFORMIN (GLUCOPHAGE) 1000 MG tablet Take 0.5 tablets (500 mg) by mouth 2 times daily (with meals) 90 tablet 3     Multiple Vitamins-Iron (MULTIVITAMIN/IRON PO) Take 1 tablet by mouth daily        pantoprazole (PROTONIX) 40 MG EC tablet Take 1 tablet (40 mg) by mouth daily Take 30-60 minutes before a meal. 30 tablet 3     simvastatin (ZOCOR) 40 MG tablet Take 0.5 tablets (20 mg) by mouth At Bedtime 45 tablet 3     blood glucose monitoring (MIRA CONTOUR NEXT) test strip Use to test blood sugar one time daily or as directed. 100 each 5     blood glucose monitoring (MIRA MICROLET) lancets Use to test blood sugar 1 times daily or as directed. 100 each 5           Family History   Problem Relation Age of Onset     HEART DISEASE Father      Alcohol/Drug Father      Myocardial Infarction Grandchild 18     Grandson     Myocardial Infarction Other 18     Nephew      Cancer Mother 58     Stomach cancer     Alcohol/Drug Mother      Cancer Maternal Grandmother      stomach cancer     Alcohol/Drug Maternal Grandmother      Myocardial Infarction Sister 70     Name: Reena      Alcohol/Drug Sister      Cerebrovascular Disease Brother 65     Name: Miguelito     Alcohol/Drug Brother      Alcohol/Drug Maternal Grandfather      Alcohol/Drug Paternal Grandmother      Alcohol/Drug Paternal Grandfather        Social History     Social History     Marital status:      Spouse name: Alyssa     Number of children: 3     Years of education: N/A     Occupational History     Not on file.     Social History Main Topics     Smoking status:  "Former Smoker     Packs/day: 1.00     Years: 30.00     Types: Cigarettes     Start date: 1956     Quit date: 8/28/1980     Smokeless tobacco: Former User     Types: Chew     Quit date: 1/5/1983     Alcohol use No      Comment: quit 30+ years ago. states, \"I'm an alcoholic\".     Drug use: No     Sexual activity: Not Currently     Partners: Female     Other Topics Concern     Not on file     Social History Narrative            Past Medical History:     Past Medical History:   Diagnosis Date     Hyperlipidaemia LDL goal < 100      Hypertension      Type 2 diabetes mellitus (H)               Past Surgical History:     Past Surgical History:   Procedure Laterality Date     MANDIBLE SURGERY  1958     TRANSCATHETER AORTIC VALVE IMPLANT ANESTHESIA N/A 8/28/2018    Procedure: TRANSCATHETER AORTIC VALVE IMPLANT ANESTHESIA;  ANESTHESIA NEEDED FOR TAVR PROCEDURE;  Surgeon: GENERIC ANESTHESIA PROVIDER;  Location:  OR              Allergies:   Review of patient's allergies indicates no known allergies.       Data:   All laboratory data reviewed:    LAST CHOLESTEROL:  Lab Results   Component Value Date    CHOL 97 04/12/2018     Lab Results   Component Value Date    HDL 39 04/12/2018     Lab Results   Component Value Date    LDL 37 04/12/2018     Lab Results   Component Value Date    TRIG 106 04/12/2018     Lab Results   Component Value Date    CHOLHDLRATIO 2.3 10/07/2015       LAST BMP:  Lab Results   Component Value Date     08/31/2018      Lab Results   Component Value Date    POTASSIUM 4.5 08/31/2018     Lab Results   Component Value Date    CHLORIDE 106 08/31/2018     Lab Results   Component Value Date    DEEPTI 9.1 08/31/2018     Lab Results   Component Value Date    CO2 30 08/31/2018     Lab Results   Component Value Date    BUN 29 08/31/2018     Lab Results   Component Value Date    CR 1.17 08/31/2018     Lab Results   Component Value Date     08/31/2018       LAST CBC:  Lab Results   Component Value Date    WBC " 10.3 08/30/2018     Lab Results   Component Value Date    RBC 4.18 08/30/2018     Lab Results   Component Value Date    HGB 12.5 08/31/2018     Lab Results   Component Value Date    HCT 37.1 08/30/2018     Lab Results   Component Value Date    MCV 89 08/30/2018     Lab Results   Component Value Date    MCH 29.9 08/30/2018     Lab Results   Component Value Date    MCHC 33.7 08/30/2018     Lab Results   Component Value Date    RDW 12.8 08/30/2018     Lab Results   Component Value Date    PLT 98 08/30/2018             Thank you for allowing me to participate in the care of your patient.    Sincerely,     SARY Blal [facility]

## 2018-09-06 NOTE — PATIENT INSTRUCTIONS
Thanks for coming into [facility] clinic today.   Doing well post TAVR   Continue with current medical therapy.   Starting cardiac rehab next week.  Monitor groin for any worsening swelling, bleeding, bruising or pain.  Follow up dental appointments will require antibiotics 30-60 min prior to any dental visit.   Follow up on 9/27/18 for echo, labs and VINOD visit.           Please call my nurse at [phone] with any questions or concerns.    Scheduling phone number: [phone]  Reminder: Please bring in all current medications, over the counter supplements and vitamin bottles to your next appointment.

## 2018-09-06 NOTE — LETTER
9/6/2018    Viet Bingham MD, MD  [address] [facility] [address]  [facility] [address]    RE: Reid Jimenes       Dear Colleague,    I had the pleasure of seeing Reid Jimenes in the [facility].    Cardiology Clinic Progress Note  Reid Jimenes MRN# [id number]   YOB: 1934 Age: 84 year old     Reason For Visit: 1 week post TAVR follow up   Primary Cardiologist:   Dr. Higginbotham          History of Presenting Illness:    Reid Jimenes is a pleasant 84 year old patient who comes to the office today for a 1 week post TAVR follow up.     He carries a  history significant for symptomatic severe aortic stenosis, hypertension, hyperlipidemia, type 2 diabetes.  Echocardiogram on 5/10/2018 showed a mean aortic pressure gradient of 42 mmHg and aortic valve area of 1.0 cm   .  He was subsequently evaluated in valve clinic and felt to be a good candidate for TAVR.  His workup for TAVR consisted of a right and left heart cath showing nonobstructive coronary disease, mild pulmonary hypertension, and normal right and left-sided filling pressures.  A CT of the head and neck showed mild right carotid stenosis of 30% and 65% stenosis to the left internal carotid.  On 8/28/2018 he underwent transcatheter aortic valve replacement via femoral approach using a 26 mm Greene Sabino 3 valve.  Post TAVR echo showed normal EF of 60-65%, mean gradient 6 mmHg, trace AI, and no perivalvular AI.  On postop day #1 he was found to have bilateral groin bruits, followed by groin ultrasound that noted a left common femoral artery pseudoaneurysm with subsequent thrombin injection.  A repeat ultrasound the following day noted a residual hematoma measuring 3.4 x 2.5 x 1.5 cm in the left inguinal region, with no reoccurrent pseudoaneurysm.  His hemoglobin remained stable at 12.5.  On postop day #3 he was discharged in stable condition.      Today, he is feeling well on a cardiac standpoint, denies chest  pain, shortness of breath, PND, orthopnea, presyncope, syncope, edema, heart racing, or palpitations.  He states there is a noticeable improvement in his exertional shortness of breath.  His only complaint is bilateral groin tenderness and bruising.  He denies any bleeding on dual antiplatelet therapy.    Blood pressure today is well controlled at 122/56, managed well on lisinopril.  Last BMP showed a sodium of 142, 4 potassium 4.5, BUN 29, creatinine 1.17, GFR 59.  Last A1c was 6.6 on 8/28/2018, managed on metformin.  Lipid panel is stable with a total cholesterol of 97, HDL 39, LDL 37, triglycerides 106, managed on simvastatin.    He is enrolled in cardiac rehab and is scheduled to begin next week.  He follows a heart healthy diet and remains compliant with all medications.                       Assessment and Plan:     1. Severe aortic stenosis-status post transcatheter aortic valve replacement with a 26 mm Greene Sabino 3 valve.  Follow-up echocardiogram showed a mean gradient of 6 mmHg, trace AI, and no perivalvular AI.  Continue on Plavix ×6 months and aspirin.  Follow-up echo in 1 month, cardiac rehab, exercise, weight loss.  2. Pseudoaneurysm -status post thrombin injection.  Follow-up ultrasound confirmed residual hematoma with no recurrent pseudoaneurysm.  3. Carotid disease-last ultrasound on 8/10/2018 showed mild right internal carotid stenosis and moderate left internal carotid stenosis.  Continue on aspirin and statin.  4.  Hypertension-well-controlled on current medical therapy  5.   Hyperlipidemia-LDL at goal on current statin  6.  Type 2 diabetes-last A1c was 6.6, managed on metformin.             Thank you for allowing me to participate in this delightful patient's care.        Lizz Love, APRN, CNP          Review of Systems:   Review of Systems:  Skin:  Positive for     Eyes:  Positive for glasses  ENT:  Positive for hearing loss  Respiratory:  Positive for dyspnea on  "exertion  Cardiovascular:  Negative    Gastroenterology: Negative    Genitourinary:  Negative    Musculoskeletal:  Positive for muscular weakness  Neurologic:  Negative    Psychiatric:  Negative    Heme/Lymph/Imm:  Negative    Endocrine:  Positive for diabetes              Physical Exam:     Vitals: /56  Pulse 62  Ht 1.651 m (5' 5\")  Wt 79.8 kg (176 lb)  BMI 29.29 kg/m2  Constitutional:  cooperative, alert and oriented, well developed, well nourished, in no acute distress        Skin:  warm and dry to the touch bruises present Bilateral groin, pubic area    Head:  normocephalic, no masses or lesions        Eyes:  pupils equal and round, conjunctivae and lids unremarkable, sclera white, no xanthalasma, EOMS intact, no nystagmus        ENT:  no pallor or cyanosis, dentition good        Neck:    left carotid bruit      Chest:  normal breath sounds, clear to auscultation, normal A-P diameter, normal symmetry, normal respiratory excursion, no use of accessory muscles        Cardiac: regular rhythm;normal S1 and S2       systolic murmur          Abdomen:  abdomen soft, non-tender, BS normoactive, no mass, no HSM, no bruits        Vascular:     3+ 3+ 3+         3+ 3+ 3+         right femoral bruit (+)      Extremities and Back:  no deformities, clubbing, cyanosis, erythema observed;no edema        Neurological:  no gross motor deficits                 Medications:     Current Outpatient Prescriptions   Medication Sig Dispense Refill     amoxicillin (AMOXIL) 500 MG capsule Take 4 capsules (2,000 mg) by mouth once for 1 dose 4 capsule 0     ascorbic acid 500 MG TABS Take 500 mg by mouth every evening        aspirin 81 MG EC tablet Take 1 tablet (81 mg) by mouth daily 30 tablet      BIOTIN PO Take 5,000 mg by mouth 2 times daily        clopidogrel (PLAVIX) 75 MG tablet Take 1 tablet (75 mg) by mouth daily 30 tablet 3     clotrimazole (LOTRIMIN) 1 % cream Apply topically 2 times daily 15 g 3     Cyanocobalamin " (VITAMIN B-12 ER PO) Take by mouth daily       fluocinonide (LIDEX) 0.05 % ointment Apply topically 2 times daily       GLUCOSAMINE-CHONDROITIN PO Take 1 tablet by mouth 2 times daily        lisinopril (PRINIVIL/ZESTRIL) 2.5 MG tablet Take 1 tablet (2.5 mg) by mouth daily (Patient taking differently: Take 2.5 mg by mouth daily Patient takes 1/2 tablet of a 5 mg tablet to equal 2.5 mg daily) 90 tablet 3     metFORMIN (GLUCOPHAGE) 1000 MG tablet Take 0.5 tablets (500 mg) by mouth 2 times daily (with meals) 90 tablet 3     Multiple Vitamins-Iron (MULTIVITAMIN/IRON PO) Take 1 tablet by mouth daily        pantoprazole (PROTONIX) 40 MG EC tablet Take 1 tablet (40 mg) by mouth daily Take 30-60 minutes before a meal. 30 tablet 3     simvastatin (ZOCOR) 40 MG tablet Take 0.5 tablets (20 mg) by mouth At Bedtime 45 tablet 3     blood glucose monitoring (MIRA CONTOUR NEXT) test strip Use to test blood sugar one time daily or as directed. 100 each 5     blood glucose monitoring (MIRA MICROLET) lancets Use to test blood sugar 1 times daily or as directed. 100 each 5           Family History   Problem Relation Age of Onset     HEART DISEASE Father      Alcohol/Drug Father      Myocardial Infarction Grandchild 18     Grandson     Myocardial Infarction Other 18     Nephew      Cancer Mother 58     Stomach cancer     Alcohol/Drug Mother      Cancer Maternal Grandmother      stomach cancer     Alcohol/Drug Maternal Grandmother      Myocardial Infarction Sister 70     Name: Reena      Alcohol/Drug Sister      Cerebrovascular Disease Brother 65     Name: Miguelito     Alcohol/Drug Brother      Alcohol/Drug Maternal Grandfather      Alcohol/Drug Paternal Grandmother      Alcohol/Drug Paternal Grandfather        Social History     Social History     Marital status:      Spouse name: Alyssa     Number of children: 3     Years of education: N/A     Occupational History     Not on file.     Social History Main Topics     Smoking status:  "Former Smoker     Packs/day: 1.00     Years: 30.00     Types: Cigarettes     Start date: 1956     Quit date: 8/28/1980     Smokeless tobacco: Former User     Types: Chew     Quit date: 1/5/1983     Alcohol use No      Comment: quit 30+ years ago. states, \"I'm an alcoholic\".     Drug use: No     Sexual activity: Not Currently     Partners: Female     Other Topics Concern     Not on file     Social History Narrative            Past Medical History:     Past Medical History:   Diagnosis Date     Hyperlipidaemia LDL goal < 100      Hypertension      Type 2 diabetes mellitus (H)               Past Surgical History:     Past Surgical History:   Procedure Laterality Date     MANDIBLE SURGERY  1958     TRANSCATHETER AORTIC VALVE IMPLANT ANESTHESIA N/A 8/28/2018    Procedure: TRANSCATHETER AORTIC VALVE IMPLANT ANESTHESIA;  ANESTHESIA NEEDED FOR TAVR PROCEDURE;  Surgeon: GENERIC ANESTHESIA PROVIDER;  Location:  OR              Allergies:   Review of patient's allergies indicates no known allergies.       Data:   All laboratory data reviewed:    LAST CHOLESTEROL:  Lab Results   Component Value Date    CHOL 97 04/12/2018     Lab Results   Component Value Date    HDL 39 04/12/2018     Lab Results   Component Value Date    LDL 37 04/12/2018     Lab Results   Component Value Date    TRIG 106 04/12/2018     Lab Results   Component Value Date    CHOLHDLRATIO 2.3 10/07/2015       LAST BMP:  Lab Results   Component Value Date     08/31/2018      Lab Results   Component Value Date    POTASSIUM 4.5 08/31/2018     Lab Results   Component Value Date    CHLORIDE 106 08/31/2018     Lab Results   Component Value Date    DEEPTI 9.1 08/31/2018     Lab Results   Component Value Date    CO2 30 08/31/2018     Lab Results   Component Value Date    BUN 29 08/31/2018     Lab Results   Component Value Date    CR 1.17 08/31/2018     Lab Results   Component Value Date     08/31/2018       LAST CBC:  Lab Results   Component Value Date    WBC " 10.3 08/30/2018     Lab Results   Component Value Date    RBC 4.18 08/30/2018     Lab Results   Component Value Date    HGB 12.5 08/31/2018     Lab Results   Component Value Date    HCT 37.1 08/30/2018     Lab Results   Component Value Date    MCV 89 08/30/2018     Lab Results   Component Value Date    MCH 29.9 08/30/2018     Lab Results   Component Value Date    MCHC 33.7 08/30/2018     Lab Results   Component Value Date    RDW 12.8 08/30/2018     Lab Results   Component Value Date    PLT 98 08/30/2018             Thank you for allowing me to participate in the care of your patient.      Sincerely,     SARY Ball [facility]    cc:   No referring provider defined for this encounter.

## 2018-09-07 ENCOUNTER — OFFICE VISIT (OUTPATIENT)
Dept: INTERNAL MEDICINE | Facility: CLINIC | Age: 83
End: 2018-09-07
Payer: MEDICARE

## 2018-09-07 VITALS
TEMPERATURE: 98 F | HEIGHT: 65 IN | RESPIRATION RATE: 14 BRPM | HEART RATE: 76 BPM | OXYGEN SATURATION: 97 % | SYSTOLIC BLOOD PRESSURE: 118 MMHG | WEIGHT: 177 LBS | DIASTOLIC BLOOD PRESSURE: 58 MMHG | BODY MASS INDEX: 29.49 KG/M2

## 2018-09-07 DIAGNOSIS — Z95.2 S/P TAVR (TRANSCATHETER AORTIC VALVE REPLACEMENT): Primary | ICD-10-CM

## 2018-09-07 DIAGNOSIS — D64.9 ANEMIA, UNSPECIFIED TYPE: ICD-10-CM

## 2018-09-07 DIAGNOSIS — I35.0 AORTIC STENOSIS, SEVERE: ICD-10-CM

## 2018-09-07 DIAGNOSIS — I72.4 PSEUDOANEURYSM OF FEMORAL ARTERY (H): ICD-10-CM

## 2018-09-07 LAB
ERYTHROCYTE [DISTWIDTH] IN BLOOD BY AUTOMATED COUNT: 12.9 % (ref 10–15)
HCT VFR BLD AUTO: 36.4 % (ref 40–53)
HGB BLD-MCNC: 11.7 G/DL (ref 13.3–17.7)
MCH RBC QN AUTO: 30.2 PG (ref 26.5–33)
MCHC RBC AUTO-ENTMCNC: 32.1 G/DL (ref 31.5–36.5)
MCV RBC AUTO: 94 FL (ref 78–100)
PLATELET # BLD AUTO: 202 10E9/L (ref 150–450)
RBC # BLD AUTO: 3.88 10E12/L (ref 4.4–5.9)
WBC # BLD AUTO: 8.9 10E9/L (ref 4–11)

## 2018-09-07 PROCEDURE — 99214 OFFICE O/P EST MOD 30 MIN: CPT | Performed by: INTERNAL MEDICINE

## 2018-09-07 PROCEDURE — 85027 COMPLETE CBC AUTOMATED: CPT | Performed by: INTERNAL MEDICINE

## 2018-09-07 PROCEDURE — 36415 COLL VENOUS BLD VENIPUNCTURE: CPT | Performed by: INTERNAL MEDICINE

## 2018-09-07 PROCEDURE — 80048 BASIC METABOLIC PNL TOTAL CA: CPT | Performed by: INTERNAL MEDICINE

## 2018-09-07 RX ORDER — CLOPIDOGREL BISULFATE 75 MG/1
75 TABLET ORAL DAILY
Qty: 30 TABLET | Refills: 3 | Status: SHIPPED | OUTPATIENT
Start: 2018-09-07 | End: 2018-09-27

## 2018-09-07 RX ORDER — PANTOPRAZOLE SODIUM 40 MG/1
40 TABLET, DELAYED RELEASE ORAL DAILY
Qty: 30 TABLET | Refills: 3 | Status: SHIPPED | OUTPATIENT
Start: 2018-09-07 | End: 2018-09-27

## 2018-09-07 NOTE — PATIENT INSTRUCTIONS
Check labs today ([address]).     Follow up with cardiology as they recommend.     Recent diabetes and cholesterol labs looked fine.     See me back in January 2019, before leaving for [place]. Call sooner if problems.

## 2018-09-07 NOTE — PROGRESS NOTES
SUBJECTIVE:   Reid Jimenes is a 84 year old male who presents to clinic today for the following health issues:    Hospital Follow-up Visit:    Hospital/Nursing Home/IP Rehab Facility: [facility]  Date of Admission: 8/28/2018  Date of Discharge: 8/31/2018  Reason(s) for Admission: aortic stenosis            Problems taking medications regularly:  None       Medication changes since discharge: None       Problems adhering to non-medication therapy:  None    Summary of hospitalization:  [facility] discharge summary reviewed  Diagnostic Tests/Treatments reviewed.  Follow up needed: none  Other Healthcare Providers Involved in Patient s Care:         Specialist appointment - cardiology  Update since discharge: stable.     Post Discharge Medication Reconciliation: discharge medications reconciled and changed, per note/orders (see AVS).  Plan of care communicated with patient and wife     Coding guidelines for this visit:  Type of Medical   Decision Making Face-to-Face Visit       within 7 Days of discharge Face-to-Face Visit        within 14 days of discharge   Moderate Complexity [id number]   High Complexity [id number]           Reid Jimenes is an 84-year-old male with past medical history of carotid artery disease with no previous history of stroke and severe aortic stenosis who was admitted for hospitalization on 8/28/2018 for post status transcatheter valve replacement. Noted that hemoglobin levels were 12.5 prior to surgery and were still 12.5 upon hospital discharge. He is still on plavix.    Patient did develop bilateral groin bruits post op and a groin US noted a femoral artery pseudoaneurysm. Follow up US confirmed residual hematoma with no recurrent pseudoaneurysm but a hematoma was present in left inguinal region.    Patient reports that he still has bilateral groin discomfort. This has been slowly improving. He is still getting up and moving around. Denies any chest pain,  "chest pressure, or SOB. Patient's wife comments that he has been complaining of unsteadiness when they walk at night. This is new, but she also states that he was not walking this much prior to surgery.     He was seen by Lizz Love of cardiology yesterday 9/6/2018. He was recommended a follow up echocardiogram in 1 month, cardiac rehab, exercise, and weight loss. Patient expresses that he is confused about whether Dr. Salazar is still his cardiology.     Diabetes    Lab Results   Component Value Date    A1C 6.6 08/28/2018    A1C 6.5 04/12/2018    A1C 6.7 11/01/2017    A1C 6.6 05/18/2017    A1C 6.6 09/28/2016     A1c has remained within target range. Taking metformin 500 mg twice daily.     Lung nodules  Chest CT scan on 8/28/2018 revealed an incidental finding of a left lower lung nodule and it was recommended repeating a chest CT scan 7/2019.    Problem list and histories reviewed & adjusted, as indicated.  Additional history: as documented    Reviewed and updated as needed this visit by clinical staff  Tobacco  Allergies  Meds  Med Hx  Surg Hx  Fam Hx  Soc Hx      Reviewed and updated as needed this visit by Provider         ROS:  No dyspnea or cough. No chest discomfort, dizziness or palpitations. No diarrhea, abdominal pain or rectal bleeding.   No acute problems with vision or speech, lateralizing weakness or paresthesias.    ROS: as above or negative for Respiratory, CV, GI, endocrine, neuro systems.    This document serves as a record of the services and decisions personally performed and made by Viet Bingham MD. It was created on his behalf by Stephanie Chow, a trained medical scribe. The creation of this document is based on the provider's statements to the medical scribe.  Stephanie Chow September 7, 2018 2:58 PM     OBJECTIVE:     /58 (BP Location: Left arm, Patient Position: Sitting, Cuff Size: Adult Large)  Pulse 76  Temp 98  F (36.7  C) (Oral)  Resp 14  Ht 1.651 m (5' 5\")  Wt " 80.3 kg (177 lb)  SpO2 97%  BMI 29.45 kg/m2  Body mass index is 29.45 kg/(m^2).     GENERAL: healthy, alert and no distress  RESP: lungs clear to auscultation - no rales, rhonchi or wheezes  CV: regular rate and rhythm, normal S1 S2, no S3 or S4, no murmur, click or rub, no peripheral edema and peripheral pulses strong  MS: no gross musculoskeletal defects noted, no edema  SKIN: no suspicious lesions or rashes  NEURO: Normal strength and tone, mentation intact and speech normal  PSYCH: mentation appears normal, affect normal/bright    Diagnostic Test Results:  none     ASSESSMENT/PLAN:   (Z95.2) S/P TAVR (transcatheter aortic valve replacement)  (primary encounter diagnosis)  Comment: Patient doing well post op. Continue current meds and follow up with specialists as advised  Plan: Basic metabolic panel, CBC with platelets          (I35.0) Aortic stenosis, severe  Comment: Stable, no symptoms of CHF or angina present today. Continue current meds and follow up with cardiology as recommended (suggested that patient continue to follow up with Dr. Salazar)  Plan: clopidogrel (PLAVIX) 75 MG tablet, pantoprazole        (PROTONIX) 40 MG EC tablet          (I72.4) Pseudoaneurysm of femoral artery (H)  Comment: Stable, cleared upon second groin US post op    (D64.9) Anemia, unspecified type  Comment: Checking CBC today. Noted that hemoglobin was also 12.5 prior to surgery  Plan: CBC with platelets          FUTURE APPOINTMENTS:       - Follow-up visit in January 2019     Patient Instructions   Check labs today ([address]).     Follow up with cardiology as they recommend.     Recent diabetes and cholesterol labs looked fine.     See me back in January 2019, before leaving for [place]. Call sooner if problems.     The information in this document, created by the medical scribe for me, accurately reflects the services I personally performed and the decisions made by me. I have reviewed and approved this document for accuracy  prior to leaving the patient care area.  September 7, 2018 2:58 PM    Viet Bingham MD  [facility]

## 2018-09-07 NOTE — MR AVS SNAPSHOT
After Visit Summary   9/7/2018    Reid Jimenes    MRN: [id number]           Patient Information     Date Of Birth          2/14/1934        Visit Information        Provider Department      9/7/2018 2:20 PM Viet Bingham MD [facility]        Today's Diagnoses     S/P TAVR (transcatheter aortic valve replacement)    -  1    Aortic stenosis, severe        Pseudoaneurysm of femoral artery (H)        Anemia, unspecified type          Care Instructions    Check labs today ([address]).     Follow up with cardiology as they recommend.     Recent diabetes and cholesterol labs looked fine.     See me back in January 2019, before leaving for [place]. Call sooner if problems.           Follow-ups after your visit        Your next 10 appointments already scheduled     Sep 10, 2018  1:00 PM CDT   Cardiac Evaluation with  CARDIAC REHAB 4   [facility] ([facility])    [address], [address]  [place] [id number] [phone]            Sep 26, 2018 12:10 PM CDT   LAB with [facility]   [facility] [place] ([facility])    [address]  [place] [facility] [id number] [phone]           Please do not eat 10-12 hours before your appointment if you are coming in fasting for labs on lipids, cholesterol, or glucose (sugar). This does not apply to pregnant women. Water, hot tea and black coffee (with nothing added) are okay. Do not drink other fluids, diet soda or chew gum.            Sep 26, 2018 12:45 PM CDT   Ech Complete with [id number]   [facility] CV Echocardiography (Cardiovascular Imaging at [facility])    [address]  [facility] [place] [id number] [phone]           1.  Please bring or wear a comfortable two-piece outfit. 2.  You may eat, drink and take your normal medicines. 3.  For any questions that cannot be answered, please contact the ordering  physician 4.  Please do not wear perfumes or scented lotions on the day of your exam.            Sep 27, 2018  2:30 PM CDT   TAVR Return with Jose Saldana PA-C   [facility] ([facility])    [address] [place], [place] [id number]  [place] [id number] [phone] OPT 2              Future tests that were ordered for you today     Open Future Orders        Priority Expected Expires Ordered    EKG 12-lead complete w/read - Clinics (to be scheduled) Routine 9/27/2018 9/6/2019 9/6/2018    Basic metabolic panel Routine 9/27/2018 9/6/2019 9/6/2018    CBC with platelets Routine 9/27/2018 9/6/2019 9/6/2018    Echocardiogram Routine 9/27/2018 9/6/2019 9/6/2018            Who to contact     If you have questions or need follow up information about today's clinic visit or your schedule please contact [facility] directly at [phone].  Normal or non-critical lab and imaging results will be communicated to you by [email or web address], letter or phone within 4 business days after the clinic has received the results. If you do not hear from us within 7 days, please contact the clinic through [email or web address] or phone. If you have a critical or abnormal lab result, we will notify you by phone as soon as possible.  Submit refill requests through Scandlines or call your pharmacy and they will forward the refill request to us. Please allow 3 business days for your refill to be completed.          Additional Information About Your Visit        Scandlines Information     Scandlines gives you secure access to your electronic health record. If you see a primary care provider, you can also send messages to your care team and make appointments. If you have questions, please call your primary care clinic.  If you do not have a primary care provider, please call [phone] and they will assist you.        Care EveryWhere ID     This is your Care EveryWhere ID. This could be used by other organizations  "to access your [facility] medical records  [id number]        Your Vitals Were     Pulse Temperature Respirations Height Pulse Oximetry BMI (Body Mass Index)    76 98  F (36.7  C) (Oral) 14 5' 5\" (1.651 m) 97% 29.45 kg/m2       Blood Pressure from Last 3 Encounters:   09/07/18 118/58   09/06/18 122/56   08/31/18 148/69    Weight from Last 3 Encounters:   09/07/18 177 lb (80.3 kg)   09/06/18 176 lb (79.8 kg)   08/31/18 173 lb 14.4 oz (78.9 kg)              We Performed the Following     Basic metabolic panel     CBC with platelets          Today's Medication Changes          These changes are accurate as of 9/7/18  3:19 PM.  If you have any questions, ask your nurse or doctor.               These medicines have changed or have updated prescriptions.        Dose/Directions    lisinopril 2.5 MG tablet   Commonly known as:  PRINIVIL/Zestril   This may have changed:  additional instructions   Used for:  Type 2 diabetes mellitus with microalbuminuria, without long-term current use of insulin (H)        Dose:  2.5 mg   Take 1 tablet (2.5 mg) by mouth daily   Quantity:  90 tablet   Refills:  3                Primary Care Provider Office Phone # Fax #    Viet Bingham [phone]       [address] [address]        Equal Access to Services     JEANNETTE CROW AH: Hadii maurice diaso Socinthya, waaxda luqadaha, qaybta kaalmada carmen, rigo hampton . So [facility] [phone].    ATENCIÓN: Si habla español, tiene a philip disposición servicios gratuitos de asistencia lingüística. Silke al [phone].    We comply with applicable federal civil rights laws and [place] laws. We do not discriminate on the basis of race, color, national origin, age, disability, sex, sexual orientation, or gender identity.            Thank you!     Thank you for choosing [facility]  for your care. Our goal is always to provide you with excellent care. Hearing back from our " patients is one way we can continue to improve our services. Please take a few minutes to complete the written survey that you may receive in the mail after your visit with us. Thank you!             Your Updated Medication List - Protect others around you: Learn how to safely use, store and throw away your medicines at www.disposemymeds.org.          This list is accurate as of 9/7/18  3:19 PM.  Always use your most recent med list.                   Brand Name Dispense Instructions for use Diagnosis    ascorbic acid 500 MG Tabs      Take 500 mg by mouth every evening        aspirin 81 MG EC tablet     30 tablet    Take 1 tablet (81 mg) by mouth daily    Severe aortic stenosis       BIOTIN PO      Take 5,000 mg by mouth 2 times daily        blood glucose monitoring lancets     100 each    Use to test blood sugar 1 times daily or as directed.    Type 2 diabetes mellitus with microalbuminuria, without long-term current use of insulin (H)       blood glucose monitoring test strip    MIRA CONTOUR NEXT    100 each    Use to test blood sugar one time daily or as directed.    Type 2 diabetes mellitus with microalbuminuria, without long-term current use of insulin (H)       clopidogrel 75 MG tablet    PLAVIX    30 tablet    Take 1 tablet (75 mg) by mouth daily    Severe aortic stenosis       clotrimazole 1 % cream    LOTRIMIN    15 g    Apply topically 2 times daily    Tinea corporis       fluocinonide 0.05 % ointment    LIDEX     Apply topically 2 times daily        GLUCOSAMINE-CHONDROITIN PO      Take 1 tablet by mouth 2 times daily        lisinopril 2.5 MG tablet    PRINIVIL/Zestril    90 tablet    Take 1 tablet (2.5 mg) by mouth daily    Type 2 diabetes mellitus with microalbuminuria, without long-term current use of insulin (H)       metFORMIN 1000 MG tablet    GLUCOPHAGE    90 tablet    Take 0.5 tablets (500 mg) by mouth 2 times daily (with meals)    Type 2 diabetes mellitus with microalbuminuria, without long-term  current use of insulin (H)       MULTIVITAMIN/IRON PO      Take 1 tablet by mouth daily        pantoprazole 40 MG EC tablet    PROTONIX    30 tablet    Take 1 tablet (40 mg) by mouth daily Take 30-60 minutes before a meal.    Severe aortic stenosis       simvastatin 40 MG tablet    ZOCOR    45 tablet    Take 0.5 tablets (20 mg) by mouth At Bedtime    Hyperlipidemia with target LDL less than 100       VITAMIN B-12 ER PO      Take by mouth daily

## 2018-09-07 NOTE — PROGRESS NOTES
Cardiology Clinic Progress Note  Reid Jimenes MRN# [id number]   YOB: 1934 Age: 84 year old     Reason For Visit: 1 week post TAVR follow up   Primary Cardiologist:   Dr. Higginbotham          History of Presenting Illness:    Reid Jimenes is a pleasant 84 year old patient who comes to the office today for a 1 week post TAVR follow up.     He carries a  history significant for symptomatic severe aortic stenosis, hypertension, hyperlipidemia, type 2 diabetes.  Echocardiogram on 5/10/2018 showed a mean aortic pressure gradient of 42 mmHg and aortic valve area of 1.0 cm  .  He was subsequently evaluated in valve clinic and felt to be a good candidate for TAVR.  His workup for TAVR consisted of a right and left heart cath showing nonobstructive coronary disease, mild pulmonary hypertension, and normal right and left-sided filling pressures.  A CT of the head and neck showed mild right carotid stenosis of 30% and 65% stenosis to the left internal carotid.  On 8/28/2018 he underwent transcatheter aortic valve replacement via femoral approach using a 26 mm Greene Sabino 3 valve.  Post TAVR echo showed normal EF of 60-65%, mean gradient 6 mmHg, trace AI, and no perivalvular AI.  On postop day #1 he was found to have bilateral groin bruits, followed by groin ultrasound that noted a left common femoral artery pseudoaneurysm with subsequent thrombin injection.  A repeat ultrasound the following day noted a residual hematoma measuring 3.4 x 2.5 x 1.5 cm in the left inguinal region, with no reoccurrent pseudoaneurysm.  His hemoglobin remained stable at 12.5.  On postop day #3 he was discharged in stable condition.      Today, he is feeling well on a cardiac standpoint, denies chest pain, shortness of breath, PND, orthopnea, presyncope, syncope, edema, heart racing, or palpitations.  He states there is a noticeable improvement in his exertional shortness of breath.  His only complaint is bilateral groin  tenderness and bruising.  He denies any bleeding on dual antiplatelet therapy.    Blood pressure today is well controlled at 122/56, managed well on lisinopril.  Last BMP showed a sodium of 142, 4 potassium 4.5, BUN 29, creatinine 1.17, GFR 59.  Last A1c was 6.6 on 8/28/2018, managed on metformin.  Lipid panel is stable with a total cholesterol of 97, HDL 39, LDL 37, triglycerides 106, managed on simvastatin.    He is enrolled in cardiac rehab and is scheduled to begin next week.  He follows a heart healthy diet and remains compliant with all medications.                       Assessment and Plan:     1. Severe aortic stenosis-status post transcatheter aortic valve replacement with a 26 mm Greene Sabino 3 valve.  Follow-up echocardiogram showed a mean gradient of 6 mmHg, trace AI, and no perivalvular AI.  Continue on Plavix ×6 months and aspirin.  Follow-up echo in 1 month, cardiac rehab, exercise, weight loss.  2. Pseudoaneurysm -status post thrombin injection.  Follow-up ultrasound confirmed residual hematoma with no recurrent pseudoaneurysm.  3. Carotid disease-last ultrasound on 8/10/2018 showed mild right internal carotid stenosis and moderate left internal carotid stenosis.  Continue on aspirin and statin.  4.  Hypertension-well-controlled on current medical therapy  5.   Hyperlipidemia-LDL at goal on current statin  6.  Type 2 diabetes-last A1c was 6.6, managed on metformin.             Thank you for allowing me to participate in this delightful patient's care.        SARY Ball, CNP          Review of Systems:   Review of Systems:  Skin:  Positive for     Eyes:  Positive for glasses  ENT:  Positive for hearing loss  Respiratory:  Positive for dyspnea on exertion  Cardiovascular:  Negative    Gastroenterology: Negative    Genitourinary:  Negative    Musculoskeletal:  Positive for muscular weakness  Neurologic:  Negative    Psychiatric:  Negative    Heme/Lymph/Imm:  Negative    Endocrine:  Positive  "for diabetes              Physical Exam:     Vitals: /56  Pulse 62  Ht 1.651 m (5' 5\")  Wt 79.8 kg (176 lb)  BMI 29.29 kg/m2  Constitutional:  cooperative, alert and oriented, well developed, well nourished, in no acute distress        Skin:  warm and dry to the touch bruises present Bilateral groin, pubic area    Head:  normocephalic, no masses or lesions        Eyes:  pupils equal and round, conjunctivae and lids unremarkable, sclera white, no xanthalasma, EOMS intact, no nystagmus        ENT:  no pallor or cyanosis, dentition good        Neck:    left carotid bruit      Chest:  normal breath sounds, clear to auscultation, normal A-P diameter, normal symmetry, normal respiratory excursion, no use of accessory muscles        Cardiac: regular rhythm;normal S1 and S2       systolic murmur          Abdomen:  abdomen soft, non-tender, BS normoactive, no mass, no HSM, no bruits        Vascular:     3+ 3+ 3+         3+ 3+ 3+         right femoral bruit (+)      Extremities and Back:  no deformities, clubbing, cyanosis, erythema observed;no edema        Neurological:  no gross motor deficits                 Medications:     Current Outpatient Prescriptions   Medication Sig Dispense Refill     amoxicillin (AMOXIL) 500 MG capsule Take 4 capsules (2,000 mg) by mouth once for 1 dose 4 capsule 0     ascorbic acid 500 MG TABS Take 500 mg by mouth every evening        aspirin 81 MG EC tablet Take 1 tablet (81 mg) by mouth daily 30 tablet      BIOTIN PO Take 5,000 mg by mouth 2 times daily        clopidogrel (PLAVIX) 75 MG tablet Take 1 tablet (75 mg) by mouth daily 30 tablet 3     clotrimazole (LOTRIMIN) 1 % cream Apply topically 2 times daily 15 g 3     Cyanocobalamin (VITAMIN B-12 ER PO) Take by mouth daily       fluocinonide (LIDEX) 0.05 % ointment Apply topically 2 times daily       GLUCOSAMINE-CHONDROITIN PO Take 1 tablet by mouth 2 times daily        lisinopril (PRINIVIL/ZESTRIL) 2.5 MG tablet Take 1 tablet (2.5 " mg) by mouth daily (Patient taking differently: Take 2.5 mg by mouth daily Patient takes 1/2 tablet of a 5 mg tablet to equal 2.5 mg daily) 90 tablet 3     metFORMIN (GLUCOPHAGE) 1000 MG tablet Take 0.5 tablets (500 mg) by mouth 2 times daily (with meals) 90 tablet 3     Multiple Vitamins-Iron (MULTIVITAMIN/IRON PO) Take 1 tablet by mouth daily        pantoprazole (PROTONIX) 40 MG EC tablet Take 1 tablet (40 mg) by mouth daily Take 30-60 minutes before a meal. 30 tablet 3     simvastatin (ZOCOR) 40 MG tablet Take 0.5 tablets (20 mg) by mouth At Bedtime 45 tablet 3     blood glucose monitoring (MIRA CONTOUR NEXT) test strip Use to test blood sugar one time daily or as directed. 100 each 5     blood glucose monitoring (MIRA MICROLET) lancets Use to test blood sugar 1 times daily or as directed. 100 each 5           Family History   Problem Relation Age of Onset     HEART DISEASE Father      Alcohol/Drug Father      Myocardial Infarction Grandchild 18     Grandson     Myocardial Infarction Other 18     Nephew      Cancer Mother 58     Stomach cancer     Alcohol/Drug Mother      Cancer Maternal Grandmother      stomach cancer     Alcohol/Drug Maternal Grandmother      Myocardial Infarction Sister 70     Name: Reena      Alcohol/Drug Sister      Cerebrovascular Disease Brother 65     Name: Miguelito     Alcohol/Drug Brother      Alcohol/Drug Maternal Grandfather      Alcohol/Drug Paternal Grandmother      Alcohol/Drug Paternal Grandfather        Social History     Social History     Marital status:      Spouse name: Alyssa     Number of children: 3     Years of education: N/A     Occupational History     Not on file.     Social History Main Topics     Smoking status: Former Smoker     Packs/day: 1.00     Years: 30.00     Types: Cigarettes     Start date: 1956     Quit date: 8/28/1980     Smokeless tobacco: Former User     Types: Chew     Quit date: 1/5/1983     Alcohol use No      Comment: quit 30+ years ago. states,  "\"I'm an alcoholic\".     Drug use: No     Sexual activity: Not Currently     Partners: Female     Other Topics Concern     Not on file     Social History Narrative            Past Medical History:     Past Medical History:   Diagnosis Date     Hyperlipidaemia LDL goal < 100      Hypertension      Type 2 diabetes mellitus (H)               Past Surgical History:     Past Surgical History:   Procedure Laterality Date     MANDIBLE SURGERY  1958     TRANSCATHETER AORTIC VALVE IMPLANT ANESTHESIA N/A 8/28/2018    Procedure: TRANSCATHETER AORTIC VALVE IMPLANT ANESTHESIA;  ANESTHESIA NEEDED FOR TAVR PROCEDURE;  Surgeon: GENERIC ANESTHESIA PROVIDER;  Location:  OR              Allergies:   Review of patient's allergies indicates no known allergies.       Data:   All laboratory data reviewed:    LAST CHOLESTEROL:  Lab Results   Component Value Date    CHOL 97 04/12/2018     Lab Results   Component Value Date    HDL 39 04/12/2018     Lab Results   Component Value Date    LDL 37 04/12/2018     Lab Results   Component Value Date    TRIG 106 04/12/2018     Lab Results   Component Value Date    CHOLHDLRATIO 2.3 10/07/2015       LAST BMP:  Lab Results   Component Value Date     08/31/2018      Lab Results   Component Value Date    POTASSIUM 4.5 08/31/2018     Lab Results   Component Value Date    CHLORIDE 106 08/31/2018     Lab Results   Component Value Date    DEEPTI 9.1 08/31/2018     Lab Results   Component Value Date    CO2 30 08/31/2018     Lab Results   Component Value Date    BUN 29 08/31/2018     Lab Results   Component Value Date    CR 1.17 08/31/2018     Lab Results   Component Value Date     08/31/2018       LAST CBC:  Lab Results   Component Value Date    WBC 10.3 08/30/2018     Lab Results   Component Value Date    RBC 4.18 08/30/2018     Lab Results   Component Value Date    HGB 12.5 08/31/2018     Lab Results   Component Value Date    HCT 37.1 08/30/2018     Lab Results   Component Value Date    MCV 89 " 08/30/2018     Lab Results   Component Value Date    MCH 29.9 08/30/2018     Lab Results   Component Value Date    MCHC 33.7 08/30/2018     Lab Results   Component Value Date    RDW 12.8 08/30/2018     Lab Results   Component Value Date    PLT 98 08/30/2018

## 2018-09-08 LAB
ANION GAP SERPL CALCULATED.3IONS-SCNC: 4 MMOL/L (ref 3–14)
BUN SERPL-MCNC: 18 MG/DL (ref 7–30)
CALCIUM SERPL-MCNC: 8.7 MG/DL (ref 8.5–10.1)
CHLORIDE SERPL-SCNC: 105 MMOL/L (ref 94–109)
CO2 SERPL-SCNC: 32 MMOL/L (ref 20–32)
CREAT SERPL-MCNC: 0.94 MG/DL (ref 0.66–1.25)
GFR SERPL CREATININE-BSD FRML MDRD: 77 ML/MIN/1.7M2
GLUCOSE SERPL-MCNC: 166 MG/DL (ref 70–99)
INTERPRETATION ECG - MUSE: NORMAL
POTASSIUM SERPL-SCNC: 4.6 MMOL/L (ref 3.4–5.3)
SODIUM SERPL-SCNC: 141 MMOL/L (ref 133–144)

## 2018-09-10 ENCOUNTER — HOSPITAL ENCOUNTER (OUTPATIENT)
Dept: CARDIAC REHAB | Facility: CLINIC | Age: 83
End: 2018-09-10
Attending: INTERNAL MEDICINE
Payer: MEDICARE

## 2018-09-10 PROCEDURE — 93798 PHYS/QHP OP CAR RHAB W/ECG: CPT | Performed by: OCCUPATIONAL THERAPIST

## 2018-09-10 PROCEDURE — 40000575 ZZH STATISTIC OP CARDIAC VISIT #2: Performed by: OCCUPATIONAL THERAPIST

## 2018-09-10 PROCEDURE — 40000116 ZZH STATISTIC OP CR VISIT: Performed by: OCCUPATIONAL THERAPIST

## 2018-09-10 PROCEDURE — 93797 PHYS/QHP OP CAR RHAB WO ECG: CPT | Performed by: OCCUPATIONAL THERAPIST

## 2018-09-11 ENCOUNTER — HOSPITAL ENCOUNTER (OUTPATIENT)
Dept: CARDIAC REHAB | Facility: CLINIC | Age: 83
End: 2018-09-11
Payer: MEDICARE

## 2018-09-11 PROCEDURE — 40000116 ZZH STATISTIC OP CR VISIT

## 2018-09-11 PROCEDURE — 93798 PHYS/QHP OP CAR RHAB W/ECG: CPT

## 2018-09-13 ENCOUNTER — HOSPITAL ENCOUNTER (OUTPATIENT)
Dept: CARDIAC REHAB | Facility: CLINIC | Age: 83
End: 2018-09-13
Payer: MEDICARE

## 2018-09-13 PROCEDURE — 93798 PHYS/QHP OP CAR RHAB W/ECG: CPT | Performed by: OCCUPATIONAL THERAPIST

## 2018-09-13 PROCEDURE — 40000116 ZZH STATISTIC OP CR VISIT: Performed by: OCCUPATIONAL THERAPIST

## 2018-09-14 ENCOUNTER — TELEPHONE (OUTPATIENT)
Dept: INTERNAL MEDICINE | Facility: CLINIC | Age: 83
End: 2018-09-14

## 2018-09-14 NOTE — TELEPHONE ENCOUNTER
[facility] is requesting [facility] visit notes supporting prescriptions for Plavix and Protonix.     Dr. Bingham's 9/7/18 office visit notes address Plavix, but not Protonix. Routed to provider to review if addendum can be made for Protonix for [facility] to fill this medication.     Visit note can then be faxed to [phone].   Please call [phone] (professional staff only) with questions.

## 2018-09-18 ENCOUNTER — HOSPITAL ENCOUNTER (OUTPATIENT)
Dept: CARDIAC REHAB | Facility: CLINIC | Age: 83
End: 2018-09-18
Payer: MEDICARE

## 2018-09-18 PROCEDURE — 93798 PHYS/QHP OP CAR RHAB W/ECG: CPT

## 2018-09-18 PROCEDURE — 40000116 ZZH STATISTIC OP CR VISIT

## 2018-09-20 ENCOUNTER — HOSPITAL ENCOUNTER (OUTPATIENT)
Dept: CARDIAC REHAB | Facility: CLINIC | Age: 83
End: 2018-09-20
Attending: INTERNAL MEDICINE
Payer: MEDICARE

## 2018-09-20 PROCEDURE — 93798 PHYS/QHP OP CAR RHAB W/ECG: CPT

## 2018-09-20 PROCEDURE — 40000116 ZZH STATISTIC OP CR VISIT

## 2018-09-21 ENCOUNTER — HOSPITAL ENCOUNTER (OUTPATIENT)
Dept: CARDIAC REHAB | Facility: CLINIC | Age: 83
End: 2018-09-21
Payer: MEDICARE

## 2018-09-21 PROCEDURE — 40000116 ZZH STATISTIC OP CR VISIT

## 2018-09-21 PROCEDURE — 93798 PHYS/QHP OP CAR RHAB W/ECG: CPT

## 2018-09-25 ENCOUNTER — HOSPITAL ENCOUNTER (OUTPATIENT)
Dept: CARDIAC REHAB | Facility: CLINIC | Age: 83
End: 2018-09-25
Payer: MEDICARE

## 2018-09-25 PROCEDURE — 40000116 ZZH STATISTIC OP CR VISIT: Performed by: REHABILITATION PRACTITIONER

## 2018-09-25 PROCEDURE — 93798 PHYS/QHP OP CAR RHAB W/ECG: CPT | Performed by: REHABILITATION PRACTITIONER

## 2018-09-26 ENCOUNTER — HOSPITAL ENCOUNTER (OUTPATIENT)
Dept: CARDIOLOGY | Facility: CLINIC | Age: 83
Discharge: HOME | End: 2018-09-26
Attending: INTERNAL MEDICINE | Admitting: INTERNAL MEDICINE
Payer: MEDICARE

## 2018-09-26 ENCOUNTER — HOSPITAL ENCOUNTER (OUTPATIENT)
Dept: CARDIAC REHAB | Facility: CLINIC | Age: 83
End: 2018-09-26
Payer: MEDICARE

## 2018-09-26 DIAGNOSIS — Z95.2 S/P TAVR (TRANSCATHETER AORTIC VALVE REPLACEMENT): ICD-10-CM

## 2018-09-26 DIAGNOSIS — R80.9 TYPE 2 DIABETES MELLITUS WITH MICROALBUMINURIA, WITHOUT LONG-TERM CURRENT USE OF INSULIN (H): ICD-10-CM

## 2018-09-26 DIAGNOSIS — E11.29 TYPE 2 DIABETES MELLITUS WITH MICROALBUMINURIA, WITHOUT LONG-TERM CURRENT USE OF INSULIN (H): ICD-10-CM

## 2018-09-26 LAB
ANION GAP SERPL CALCULATED.3IONS-SCNC: 11.4 MMOL/L (ref 6–17)
BUN SERPL-MCNC: 18 MG/DL (ref 7–30)
CALCIUM SERPL-MCNC: 9.8 MG/DL (ref 8.5–10.5)
CHLORIDE SERPL-SCNC: 102 MMOL/L (ref 98–107)
CO2 SERPL-SCNC: 30 MMOL/L (ref 23–29)
CREAT SERPL-MCNC: 1.14 MG/DL (ref 0.7–1.3)
ERYTHROCYTE [DISTWIDTH] IN BLOOD BY AUTOMATED COUNT: 12.9 % (ref 10–15)
GFR SERPL CREATININE-BSD FRML MDRD: 61 ML/MIN/1.7M2
GLUCOSE SERPL-MCNC: 130 MG/DL (ref 70–105)
HCT VFR BLD AUTO: 38.2 % (ref 40–53)
HGB BLD-MCNC: 12.4 G/DL (ref 13.3–17.7)
MCH RBC QN AUTO: 29.6 PG (ref 26.5–33)
MCHC RBC AUTO-ENTMCNC: 32.5 G/DL (ref 31.5–36.5)
MCV RBC AUTO: 91 FL (ref 78–100)
PLATELET # BLD AUTO: 145 10E9/L (ref 150–450)
POTASSIUM SERPL-SCNC: 4.4 MMOL/L (ref 3.5–5.1)
RBC # BLD AUTO: 4.19 10E12/L (ref 4.4–5.9)
SODIUM SERPL-SCNC: 139 MMOL/L (ref 136–145)
TSH SERPL DL<=0.005 MIU/L-ACNC: 0.68 MU/L (ref 0.4–4)
WBC # BLD AUTO: 6.9 10E9/L (ref 4–11)

## 2018-09-26 PROCEDURE — 40000116 ZZH STATISTIC OP CR VISIT: Performed by: OCCUPATIONAL THERAPIST

## 2018-09-26 PROCEDURE — 84443 ASSAY THYROID STIM HORMONE: CPT | Performed by: INTERNAL MEDICINE

## 2018-09-26 PROCEDURE — 85027 COMPLETE CBC AUTOMATED: CPT | Performed by: INTERNAL MEDICINE

## 2018-09-26 PROCEDURE — 80048 BASIC METABOLIC PNL TOTAL CA: CPT | Performed by: INTERNAL MEDICINE

## 2018-09-26 PROCEDURE — 93325 DOPPLER ECHO COLOR FLOW MAPG: CPT | Mod: 26 | Performed by: INTERNAL MEDICINE

## 2018-09-26 PROCEDURE — 93798 PHYS/QHP OP CAR RHAB W/ECG: CPT | Performed by: OCCUPATIONAL THERAPIST

## 2018-09-26 PROCEDURE — 93308 TTE F-UP OR LMTD: CPT | Mod: 26 | Performed by: INTERNAL MEDICINE

## 2018-09-26 PROCEDURE — 36415 COLL VENOUS BLD VENIPUNCTURE: CPT | Performed by: INTERNAL MEDICINE

## 2018-09-26 PROCEDURE — 93321 DOPPLER ECHO F-UP/LMTD STD: CPT | Mod: 26 | Performed by: INTERNAL MEDICINE

## 2018-09-26 PROCEDURE — 93325 DOPPLER ECHO COLOR FLOW MAPG: CPT

## 2018-09-27 ENCOUNTER — OFFICE VISIT (OUTPATIENT)
Dept: CARDIOLOGY | Facility: CLINIC | Age: 83
End: 2018-09-27
Payer: MEDICARE

## 2018-09-27 ENCOUNTER — MEDICAL CORRESPONDENCE (OUTPATIENT)
Dept: HEALTH INFORMATION MANAGEMENT | Facility: CLINIC | Age: 83
End: 2018-09-27

## 2018-09-27 VITALS
HEART RATE: 66 BPM | DIASTOLIC BLOOD PRESSURE: 55 MMHG | WEIGHT: 179 LBS | HEIGHT: 65 IN | BODY MASS INDEX: 29.82 KG/M2 | SYSTOLIC BLOOD PRESSURE: 111 MMHG

## 2018-09-27 DIAGNOSIS — I10 ESSENTIAL HYPERTENSION WITH GOAL BLOOD PRESSURE LESS THAN 140/90: ICD-10-CM

## 2018-09-27 DIAGNOSIS — I25.10 CORONARY ARTERY DISEASE INVOLVING NATIVE CORONARY ARTERY OF NATIVE HEART WITHOUT ANGINA PECTORIS: ICD-10-CM

## 2018-09-27 DIAGNOSIS — Z95.2 S/P TAVR (TRANSCATHETER AORTIC VALVE REPLACEMENT): Primary | ICD-10-CM

## 2018-09-27 DIAGNOSIS — I77.9 BILATERAL CAROTID ARTERY DISEASE (H): ICD-10-CM

## 2018-09-27 DIAGNOSIS — E11.29 TYPE 2 DIABETES MELLITUS WITH MICROALBUMINURIA, WITHOUT LONG-TERM CURRENT USE OF INSULIN (H): ICD-10-CM

## 2018-09-27 DIAGNOSIS — R80.9 TYPE 2 DIABETES MELLITUS WITH MICROALBUMINURIA, WITHOUT LONG-TERM CURRENT USE OF INSULIN (H): ICD-10-CM

## 2018-09-27 PROCEDURE — 99214 OFFICE O/P EST MOD 30 MIN: CPT | Mod: 25 | Performed by: PHYSICIAN ASSISTANT

## 2018-09-27 PROCEDURE — 93005 ELECTROCARDIOGRAM TRACING: CPT | Performed by: PHYSICIAN ASSISTANT

## 2018-09-27 RX ORDER — PANTOPRAZOLE SODIUM 40 MG/1
40 TABLET, DELAYED RELEASE ORAL DAILY
Qty: 30 TABLET | Refills: 3 | Status: SHIPPED | OUTPATIENT
Start: 2018-09-27 | End: 2018-09-27

## 2018-09-27 RX ORDER — CLOPIDOGREL BISULFATE 75 MG/1
75 TABLET ORAL DAILY
Qty: 90 TABLET | Refills: 1 | Status: SHIPPED | OUTPATIENT
Start: 2018-09-27 | End: 2019-03-13

## 2018-09-27 RX ORDER — CLOPIDOGREL BISULFATE 75 MG/1
75 TABLET ORAL DAILY
Qty: 30 TABLET | Refills: 3 | Status: SHIPPED | OUTPATIENT
Start: 2018-09-27 | End: 2018-09-27

## 2018-09-27 RX ORDER — PANTOPRAZOLE SODIUM 40 MG/1
40 TABLET, DELAYED RELEASE ORAL DAILY
Qty: 90 TABLET | Refills: 1 | Status: SHIPPED | OUTPATIENT
Start: 2018-09-27 | End: 2019-01-08

## 2018-09-27 NOTE — MR AVS SNAPSHOT
After Visit Summary   9/27/2018    Reid Jimenes    MRN: [id number]           Patient Information     Date Of Birth          2/14/1934        Visit Information        Provider Department      9/27/2018 2:30 PM Jose Saldana PA-C [facility]   [place]        Today's Diagnoses     S/P TAVR (transcatheter aortic valve replacement)    -  1    Bilateral carotid artery disease (H)        Type 2 diabetes mellitus with microalbuminuria, without long-term current use of insulin (H)        Essential hypertension with goal blood pressure less than 140/90          Care Instructions    CARDIOLOGY CLINIC INSTRUCTIONS:   -- Continue with current medications.  -- Remember you will need antibiotic medication prior to ANY dental cleaning/work.    -- Come back in 5 months for follow up with repeat heart ultrasound.     IMPORTANT PHONE NUMBERS:  Cardiology Scheduling~[phone]  Cardiology Clinic Nurse Lisa ~[phone]  Cardiology Clinic Nurse Syeda ~[phone]              Follow-ups after your visit        Your next 10 appointments already scheduled     Sep 28, 2018  1:00 PM CDT   Cardiac Treatment with Rh Cardiac Rehab 1   [facility] ([facility])    [address] [place] [address], [address]  [facility] [address]   [phone]            Oct 01, 2018  3:00 PM CDT   Cardiac Treatment with Rh Cardiac Rehab 1   [facility] ([facility])    [address], [address]  [facility] [address]   [phone]            Oct 03, 2018  1:00 PM CDT   Cardiac Treatment with Rh Cardiac Rehab 1   [facility] ([facility])    [address] [place] [address], [address]  [facility] [address]   [phone]            Oct 03, 2018  2:00 PM CDT   CONSULT with Rh Cardiac Rehab 1   [facility] ([facility])    [address], [address]  [facility] [address]   [phone]             Oct 05, 2018  1:00 PM CDT   Cardiac Treatment with Rh Cardiac Rehab 1   [facility] ([facility])    [address], [address]  [facility] [address]   [phone]            Oct 08, 2018  1:00 PM CDT   Cardiac Treatment with Rh Cardiac Rehab 1   [facility] ([facility])    [address], [address]  [facility] [address]   [phone]            Oct 10, 2018  1:00 PM CDT   Cardiac Treatment with Rh Cardiac Rehab 1   [facility] ([facility])    [address]  [facility] [address]   [phone]            Oct 15, 2018  1:00 PM CDT   Cardiac Treatment with Rh Cardiac Rehab 1   [facility] ([facility])    [address]  [facility] [address]   [phone]            Oct 17, 2018  1:00 PM CDT   Cardiac Treatment with Rh Cardiac Rehab 1   [facility] ([facility])    [address], [address]  [facility] [address]   [phone]            Oct 19, 2018  1:00 PM CDT   Cardiac Treatment with Rh Cardiac Rehab 1   [facility] ([facility])    [address]  [facility] [address]   [phone]              Future tests that were ordered for you today     Open Future Orders        Priority Expected Expires Ordered    Echocardiogram Limited Routine 9/27/2018 9/6/2019 9/6/2018            Who to contact     If you have questions or need follow up information about today's clinic visit or your schedule please contact [facility] directly at [phone].  Normal or non-critical lab and imaging results will be communicated to you by MyChart, letter or phone within 4 business days after the clinic has received the results. If you do not hear from us within 7 days, please contact the clinic through MyChart or  "phone. If you have a critical or abnormal lab result, we will notify you by phone as soon as possible.  Submit refill requests through [employer] or call your pharmacy and they will forward the refill request to us. Please allow 3 business days for your refill to be completed.          Additional Information About Your Visit        Caster[employer]art Information     [employer] gives you secure access to your electronic health record. If you see a primary care provider, you can also send messages to your care team and make appointments. If you have questions, please call your primary care clinic.  If you do not have a primary care provider, please call [phone] and they will assist you.        Care EveryWhere ID     This is your Care EveryWhere ID. This could be used by other organizations to access your [place] medical records  [id number]        Your Vitals Were     Pulse Height BMI (Body Mass Index)             66 1.651 m (5' 5\") 29.79 kg/m2          Blood Pressure from Last 3 Encounters:   09/27/18 111/55   09/07/18 118/58   09/06/18 122/56    Weight from Last 3 Encounters:   09/27/18 81.2 kg (179 lb)   09/07/18 80.3 kg (177 lb)   09/06/18 79.8 kg (176 lb)              We Performed the Following     EKG 12-lead complete w/read - Clinics (to be scheduled)          Today's Medication Changes          These changes are accurate as of 9/27/18  2:53 PM.  If you have any questions, ask your nurse or doctor.               These medicines have changed or have updated prescriptions.        Dose/Directions    lisinopril 2.5 MG tablet   Commonly known as:  PRINIVIL/Zestril   This may have changed:  additional instructions   Used for:  Type 2 diabetes mellitus with microalbuminuria, without long-term current use of insulin (H)        Dose:  2.5 mg   Take 1 tablet (2.5 mg) by mouth daily   Quantity:  90 tablet   Refills:  3            Where to get your medicines      Some of these will need a paper prescription and others can be bought " over the counter.  Ask your nurse if you have questions.     Bring a paper prescription for each of these medications     clopidogrel 75 MG tablet    pantoprazole 40 MG EC tablet                Primary Care Provider Office Phone # Fax #    Viet Bingham [phone]       [address] [facility] [address]  [facility] [address]        Equal Access to Services     JEANNETTE CROW : Hadii aad ku hadasho Soomaali, waaxda luqadaha, qaybta kaalmada adeegyada, waxay idiin hayaan adeeg kharash la'aan . So [facility] [phone].    ATENCIÓN: Si habla español, tiene a philip disposición servicios gratuitos de asistencia lingüística. Llame al [phone].    We comply with applicable federal civil rights laws and [place] laws. We do not discriminate on the basis of race, color, national origin, age, disability, sex, sexual orientation, or gender identity.            Thank you!     Thank you for choosing [facility]  for your care. Our goal is always to provide you with excellent care. Hearing back from our patients is one way we can continue to improve our services. Please take a few minutes to complete the written survey that you may receive in the mail after your visit with us. Thank you!             Your Updated Medication List - Protect others around you: Learn how to safely use, store and throw away your medicines at www.disposemymeds.org.          This list is accurate as of 9/27/18  2:53 PM.  Always use your most recent med list.                   Brand Name Dispense Instructions for use Diagnosis    ascorbic acid 500 MG Tabs      Take 500 mg by mouth every evening        aspirin 81 MG EC tablet     30 tablet    Take 1 tablet (81 mg) by mouth daily    Severe aortic stenosis       BIOTIN PO      Take 5,000 mg by mouth 2 times daily        blood glucose monitoring lancets     100 each    Use to test blood sugar 1 times daily or as directed.    Type 2 diabetes mellitus with  microalbuminuria, without long-term current use of insulin (H)       blood glucose monitoring test strip    MIRA CONTOUR NEXT    100 each    Use to test blood sugar one time daily or as directed.    Type 2 diabetes mellitus with microalbuminuria, without long-term current use of insulin (H)       clopidogrel 75 MG tablet    PLAVIX    30 tablet    Take 1 tablet (75 mg) by mouth daily        clotrimazole 1 % cream    LOTRIMIN    15 g    Apply topically 2 times daily    Tinea corporis       fluocinonide 0.05 % ointment    LIDEX     Apply topically 2 times daily        GLUCOSAMINE-CHONDROITIN PO      Take 1 tablet by mouth 2 times daily        lisinopril 2.5 MG tablet    PRINIVIL/Zestril    90 tablet    Take 1 tablet (2.5 mg) by mouth daily    Type 2 diabetes mellitus with microalbuminuria, without long-term current use of insulin (H)       metFORMIN 1000 MG tablet    GLUCOPHAGE    90 tablet    Take 0.5 tablets (500 mg) by mouth 2 times daily (with meals)    Type 2 diabetes mellitus with microalbuminuria, without long-term current use of insulin (H)       MULTIVITAMIN/IRON PO      Take 1 tablet by mouth daily        pantoprazole 40 MG EC tablet    PROTONIX    30 tablet    Take 1 tablet (40 mg) by mouth daily Take 30-60 minutes before a meal.        simvastatin 40 MG tablet    ZOCOR    45 tablet    Take 0.5 tablets (20 mg) by mouth At Bedtime    Hyperlipidemia with target LDL less than 100       VITAMIN B-12 ER PO      Take by mouth daily

## 2018-09-27 NOTE — PATIENT INSTRUCTIONS
CARDIOLOGY CLINIC INSTRUCTIONS:   -- Continue with current medications.  -- Remember you will need antibiotic medication prior to ANY dental cleaning/work.    -- Come back in 5 months for follow up with repeat heart ultrasound.     IMPORTANT PHONE NUMBERS:  Cardiology Scheduling~[phone]  Cardiology Clinic Nurse Lisa ~[phone]  Cardiology Clinic Nurse Syeda ~[phone]

## 2018-09-27 NOTE — LETTER
9/27/2018    Viet Bingham MD, MD  [address] [facility] [address]  [facility] [address]    RE: Reid METCALF Yudy       Dear Colleague,    I had the pleasure of seeing Reid METCALF Yudy in the [facility].        STRUCTURAL HEART CARE  TAVR Clinic Visit      History of Present Illness:   This is a very pleasant 84-year-old gentleman who presents to structural heart clinic for 1 month post TAVR follow-up.  His past medical history is notable for history of severe aortic stenosis (underwent TAVR on 8/28/2018 via femoral approach receiving a 26 mm Greene Sabino 3 valve), mild nonobstructive coronary artery disease, hypertension, hyperlipidemia, type 2 diabetes (non-insulin-dependent), and carotid artery disease.    He returns to clinic with his spouse stating that overall he is doing much better.  He has more energy and he feels less short of breath.  He is participating in cardiac rehab and this is going well for him.  He continues to be on aspirin and Plavix and he has had no bleeding issues.  He denies chest discomfort, shortness of breath, palpitations, PND, peripheral edema, orthopnea, or significant weight gain.    Assessment and Plan:   This is a very pleasant 84-year-old gentleman who presents to cardiology clinic for 1 month post TAVR follow-up.  His past medical history is notable for history of severe aortic stenosis, mild nonobstructive coronary artery disease, hypertension, hyperlipidemia, type 2 diabetes (non-insulin-dependent), carotid artery disease.    He underwent successful TAVR on 8/28/2018 via femoral approach, receiving a 26 mm Greene Sabino 3 valve.  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  He also developed a new left bundle branch block but he has had no high degree AV blocks.  He overall had a smooth postop procedure course and was discharged in stable condition.    His  12-lead ECG today shows no new changes.  He continues to be in left bundle branch block.  His laboratory work shows improved hemoglobin and normal kidney function.  His sodium and potassium levels are normal as well.  His echocardiogram yesterday shows well-seated bioprosthetic aortic valve with good function.  There is trace perivalvular regurgitation.  Mean gradient is measured to be 8.2 mmHg.  LVEF is preserved.    Overall he is doing well from a cardiac standpoint.  He had significant improvement in his symptoms since the TAVR procedure.  He will continue with Plavix and aspirin.  I have asked him to return in 5 months with repeat echocardiogram.  If he is doing stable at that time we will discontinue Plavix and have him continue with aspirin alone.  We also discussed SBE prophylaxis and he is well aware of this.    NYHA class: 1    Thank you for allowing me to participate in the care of this patient today.    This note was completed in part using Dragon voice recognition software. Although reviewed after completion, some word and grammatical errors may occur.    PAST MEDICAL HISTORY:  Past Medical History:   Diagnosis Date     Hyperlipidaemia LDL goal < 100      Hypertension      Type 2 diabetes mellitus (H)        CURRENT MEDICATIONS:  Current Outpatient Prescriptions   Medication Sig Dispense Refill     ascorbic acid 500 MG TABS Take 500 mg by mouth every evening        aspirin 81 MG EC tablet Take 1 tablet (81 mg) by mouth daily 30 tablet      BIOTIN PO Take 5,000 mg by mouth 2 times daily        blood glucose monitoring (MIRA CONTOUR NEXT) test strip Use to test blood sugar one time daily or as directed. 100 each 5     blood glucose monitoring (MIRA MICROLET) lancets Use to test blood sugar 1 times daily or as directed. 100 each 5     clopidogrel (PLAVIX) 75 MG tablet Take 1 tablet (75 mg) by mouth daily 30 tablet 3     clotrimazole (LOTRIMIN) 1 % cream Apply topically 2 times daily 15 g 3      Cyanocobalamin (VITAMIN B-12 ER PO) Take by mouth daily       fluocinonide (LIDEX) 0.05 % ointment Apply topically 2 times daily       GLUCOSAMINE-CHONDROITIN PO Take 1 tablet by mouth 2 times daily        lisinopril (PRINIVIL/ZESTRIL) 2.5 MG tablet Take 1 tablet (2.5 mg) by mouth daily (Patient taking differently: Take 2.5 mg by mouth daily Patient takes 1/2 tablet of a 5 mg tablet to equal 2.5 mg daily) 90 tablet 3     metFORMIN (GLUCOPHAGE) 1000 MG tablet Take 0.5 tablets (500 mg) by mouth 2 times daily (with meals) 90 tablet 3     Multiple Vitamins-Iron (MULTIVITAMIN/IRON PO) Take 1 tablet by mouth daily        pantoprazole (PROTONIX) 40 MG EC tablet Take 1 tablet (40 mg) by mouth daily Take 30-60 minutes before a meal. 30 tablet 3     simvastatin (ZOCOR) 40 MG tablet Take 0.5 tablets (20 mg) by mouth At Bedtime 45 tablet 3       PAST SURGICAL HISTORY:  Past Surgical History:   Procedure Laterality Date     MANDIBLE SURGERY  1958     TRANSCATHETER AORTIC VALVE IMPLANT ANESTHESIA N/A 8/28/2018    Procedure: TRANSCATHETER AORTIC VALVE IMPLANT ANESTHESIA;  ANESTHESIA NEEDED FOR TAVR PROCEDURE;  Surgeon: GENERIC ANESTHESIA PROVIDER;  Location: [facility] OR       ALLERGIES   No Known Allergies    FAMILY HISTORY:  Family History   Problem Relation Age of Onset     HEART DISEASE Father      Alcohol/Drug Father      Myocardial Infarction Grandchild 18     Grandson     Myocardial Infarction Other 18     Nephew      Cancer Mother 58     Stomach cancer     Alcohol/Drug Mother      Cancer Maternal Grandmother      stomach cancer     Alcohol/Drug Maternal Grandmother      Myocardial Infarction Sister 70     Name: Reena      Alcohol/Drug Sister      Cerebrovascular Disease Brother 65     Name: Miguelito     Alcohol/Drug Brother      Alcohol/Drug Maternal Grandfather      Alcohol/Drug Paternal Grandmother      Alcohol/Drug Paternal Grandfather        SOCIAL HISTORY:  Social History     Social History     Marital status:       "Spouse name: Alyssa     Number of children: 3     Years of education: N/A     Social History Main Topics     Smoking status: Former Smoker     Packs/day: 1.00     Years: 30.00     Types: Cigarettes     Start date: 1956     Quit date: 8/28/1980     Smokeless tobacco: Former User     Types: Chew     Quit date: 1/5/1983     Alcohol use No      Comment: quit 30+ years ago. states, \"I'm an alcoholic\".     Drug use: No     Sexual activity: Not Currently     Partners: Female     Other Topics Concern     None     Social History Narrative         Review of Systems:  Skin:  not assessed     Eyes:  Positive for glasses  ENT:  Positive for hearing loss  Respiratory:  Negative    Cardiovascular:    Positive for;fatigue  Gastroenterology: Negative    Genitourinary:  Negative    Musculoskeletal:  Positive for muscular weakness  Neurologic:  Negative    Psychiatric:  Negative    Heme/Lymph/Imm:  Negative    Endocrine:  Positive for diabetes    EXAM:  /55  Pulse 66  Ht 1.651 m (5' 5\")  Wt 81.2 kg (179 lb)  BMI 29.79 kg/m2  Physical Exam:  Vitals: /55  Pulse 66  Ht 1.651 m (5' 5\")  Wt 81.2 kg (179 lb)  BMI 29.79 kg/m2    GEN:  NAD  NECK: No JVD  C/V:  Regular rate and rhythm, 1 out of 6 systolic ejection murmur auscultated at right upper sternal border  RESP: Clear to auscultation bilaterally without wheezing, rales, or rhonchi.  GI: Abdomen soft, nontender, nondistended. No HSM appreciated.   EXTREM: No LE edema.   NEURO: Alert and oriented, cooperative. No obvious focal deficits.   PSYCH: Normal affect.  SKIN: Warm and dry.       Labs:  LIPID RESULTS:  Lab Results   Component Value Date    CHOL 97 04/12/2018    HDL 39 (L) 04/12/2018    LDL 37 04/12/2018    TRIG 106 04/12/2018    CHOLHDLRATIO 2.3 10/07/2015       LIVER ENZYME RESULTS:  Lab Results   Component Value Date    AST 23 08/28/2018    ALT 19 08/28/2018       CBC RESULTS:  Lab Results   Component Value Date    WBC 6.9 09/26/2018    RBC 4.19 (L) 09/26/2018 "    HGB 12.4 (L) 09/26/2018    HCT 38.2 (L) 09/26/2018    MCV 91 09/26/2018    MCH 29.6 09/26/2018    MCHC 32.5 09/26/2018    RDW 12.9 09/26/2018     (L) 09/26/2018       BMP RESULTS:  Lab Results   Component Value Date     09/26/2018    POTASSIUM 4.4 09/26/2018    CHLORIDE 102 09/26/2018    CO2 30 (H) 09/26/2018    ANIONGAP 11.4 09/26/2018     (H) 09/26/2018    BUN 18 09/26/2018    CR 1.14 09/26/2018    GFRESTIMATED 61 09/26/2018    GFRESTBLACK 74 09/26/2018    DEEPTI 9.8 09/26/2018        A1C RESULTS:  Lab Results   Component Value Date    A1C 6.6 (H) 08/28/2018       INR RESULTS:  Lab Results   Component Value Date    INR 1.01 07/24/2018           Jose Saldana PA-C   9/27/2018  Pager: [phone]    Thank you for allowing me to participate in the care of your patient.      Sincerely,     Jose Saldana PA-C     [facility]

## 2018-09-27 NOTE — PROGRESS NOTES
STRUCTURAL HEART CARE  TAVR Clinic Visit      History of Present Illness:   This is a very pleasant 84-year-old gentleman who presents to structural heart clinic for 1 month post TAVR follow-up.  His past medical history is notable for history of severe aortic stenosis (underwent TAVR on 8/28/2018 via femoral approach receiving a 26 mm Greene Sabino 3 valve), mild nonobstructive coronary artery disease, hypertension, hyperlipidemia, type 2 diabetes (non-insulin-dependent), and carotid artery disease.    He returns to clinic with his spouse stating that overall he is doing much better.  He has more energy and he feels less short of breath.  He is participating in cardiac rehab and this is going well for him.  He continues to be on aspirin and Plavix and he has had no bleeding issues.  He denies chest discomfort, shortness of breath, palpitations, PND, peripheral edema, orthopnea, or significant weight gain.    Assessment and Plan:   This is a very pleasant 84-year-old gentleman who presents to cardiology clinic for 1 month post TAVR follow-up.  His past medical history is notable for history of severe aortic stenosis, mild nonobstructive coronary artery disease, hypertension, hyperlipidemia, type 2 diabetes (non-insulin-dependent), carotid artery disease.    He underwent successful TAVR on 8/28/2018 via femoral approach, receiving a 26 mm Greene Sabino 3 valve.  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  He also developed a new left bundle branch block but he has had no high degree AV blocks.  He overall had a smooth postop procedure course and was discharged in stable condition.    His 12-lead ECG today shows no new changes.  He continues to be in left bundle branch block.  His laboratory work shows improved hemoglobin and normal kidney function.  His sodium and potassium levels are normal as well.  His echocardiogram  yesterday shows well-seated bioprosthetic aortic valve with good function.  There is trace perivalvular regurgitation.  Mean gradient is measured to be 8.2 mmHg.  LVEF is preserved.    Overall he is doing well from a cardiac standpoint.  He had significant improvement in his symptoms since the TAVR procedure.  He will continue with Plavix and aspirin.  I have asked him to return in 5 months with repeat echocardiogram.  If he is doing stable at that time we will discontinue Plavix and have him continue with aspirin alone.  We also discussed SBE prophylaxis and he is well aware of this.    NYHA class: 1    Thank you for allowing me to participate in the care of this patient today.    This note was completed in part using Dragon voice recognition software. Although reviewed after completion, some word and grammatical errors may occur.    PAST MEDICAL HISTORY:  Past Medical History:   Diagnosis Date     Hyperlipidaemia LDL goal < 100      Hypertension      Type 2 diabetes mellitus (H)        CURRENT MEDICATIONS:  Current Outpatient Prescriptions   Medication Sig Dispense Refill     ascorbic acid 500 MG TABS Take 500 mg by mouth every evening        aspirin 81 MG EC tablet Take 1 tablet (81 mg) by mouth daily 30 tablet      BIOTIN PO Take 5,000 mg by mouth 2 times daily        blood glucose monitoring (MIRA CONTOUR NEXT) test strip Use to test blood sugar one time daily or as directed. 100 each 5     blood glucose monitoring (MIRA MICROLET) lancets Use to test blood sugar 1 times daily or as directed. 100 each 5     clopidogrel (PLAVIX) 75 MG tablet Take 1 tablet (75 mg) by mouth daily 30 tablet 3     clotrimazole (LOTRIMIN) 1 % cream Apply topically 2 times daily 15 g 3     Cyanocobalamin (VITAMIN B-12 ER PO) Take by mouth daily       fluocinonide (LIDEX) 0.05 % ointment Apply topically 2 times daily       GLUCOSAMINE-CHONDROITIN PO Take 1 tablet by mouth 2 times daily        lisinopril (PRINIVIL/ZESTRIL) 2.5 MG  tablet Take 1 tablet (2.5 mg) by mouth daily (Patient taking differently: Take 2.5 mg by mouth daily Patient takes 1/2 tablet of a 5 mg tablet to equal 2.5 mg daily) 90 tablet 3     metFORMIN (GLUCOPHAGE) 1000 MG tablet Take 0.5 tablets (500 mg) by mouth 2 times daily (with meals) 90 tablet 3     Multiple Vitamins-Iron (MULTIVITAMIN/IRON PO) Take 1 tablet by mouth daily        pantoprazole (PROTONIX) 40 MG EC tablet Take 1 tablet (40 mg) by mouth daily Take 30-60 minutes before a meal. 30 tablet 3     simvastatin (ZOCOR) 40 MG tablet Take 0.5 tablets (20 mg) by mouth At Bedtime 45 tablet 3       PAST SURGICAL HISTORY:  Past Surgical History:   Procedure Laterality Date     MANDIBLE SURGERY  1958     TRANSCATHETER AORTIC VALVE IMPLANT ANESTHESIA N/A 8/28/2018    Procedure: TRANSCATHETER AORTIC VALVE IMPLANT ANESTHESIA;  ANESTHESIA NEEDED FOR TAVR PROCEDURE;  Surgeon: GENERIC ANESTHESIA PROVIDER;  Location: [facility]       ALLERGIES   No Known Allergies    FAMILY HISTORY:  Family History   Problem Relation Age of Onset     HEART DISEASE Father      Alcohol/Drug Father      Myocardial Infarction Grandchild 18     Grandson     Myocardial Infarction Other 18     Nephew      Cancer Mother 58     Stomach cancer     Alcohol/Drug Mother      Cancer Maternal Grandmother      stomach cancer     Alcohol/Drug Maternal Grandmother      Myocardial Infarction Sister 70     Name: Reena      Alcohol/Drug Sister      Cerebrovascular Disease Brother 65     Name: Miguelito     Alcohol/Drug Brother      Alcohol/Drug Maternal Grandfather      Alcohol/Drug Paternal Grandmother      Alcohol/Drug Paternal Grandfather        SOCIAL HISTORY:  Social History     Social History     Marital status:      Spouse name: Alyssa     Number of children: 3     Years of education: N/A     Social History Main Topics     Smoking status: Former Smoker     Packs/day: 1.00     Years: 30.00     Types: Cigarettes     Start date: 1956     Quit date: 8/28/1980      "Smokeless tobacco: Former User     Types: Chew     Quit date: 1/5/1983     Alcohol use No      Comment: quit 30+ years ago. states, \"I'm an alcoholic\".     Drug use: No     Sexual activity: Not Currently     Partners: Female     Other Topics Concern     None     Social History Narrative         Review of Systems:  Skin:  not assessed     Eyes:  Positive for glasses  ENT:  Positive for hearing loss  Respiratory:  Negative    Cardiovascular:    Positive for;fatigue  Gastroenterology: Negative    Genitourinary:  Negative    Musculoskeletal:  Positive for muscular weakness  Neurologic:  Negative    Psychiatric:  Negative    Heme/Lymph/Imm:  Negative    Endocrine:  Positive for diabetes    EXAM:  /55  Pulse 66  Ht 1.651 m (5' 5\")  Wt 81.2 kg (179 lb)  BMI 29.79 kg/m2  Physical Exam:  Vitals: /55  Pulse 66  Ht 1.651 m (5' 5\")  Wt 81.2 kg (179 lb)  BMI 29.79 kg/m2    GEN:  NAD  NECK: No JVD  C/V:  Regular rate and rhythm, 1 out of 6 systolic ejection murmur auscultated at right upper sternal border  RESP: Clear to auscultation bilaterally without wheezing, rales, or rhonchi.  GI: Abdomen soft, nontender, nondistended. No HSM appreciated.   EXTREM: No LE edema.   NEURO: Alert and oriented, cooperative. No obvious focal deficits.   PSYCH: Normal affect.  SKIN: Warm and dry.       Labs:  LIPID RESULTS:  Lab Results   Component Value Date    CHOL 97 04/12/2018    HDL 39 (L) 04/12/2018    LDL 37 04/12/2018    TRIG 106 04/12/2018    CHOLHDLRATIO 2.3 10/07/2015       LIVER ENZYME RESULTS:  Lab Results   Component Value Date    AST 23 08/28/2018    ALT 19 08/28/2018       CBC RESULTS:  Lab Results   Component Value Date    WBC 6.9 09/26/2018    RBC 4.19 (L) 09/26/2018    HGB 12.4 (L) 09/26/2018    HCT 38.2 (L) 09/26/2018    MCV 91 09/26/2018    MCH 29.6 09/26/2018    MCHC 32.5 09/26/2018    RDW 12.9 09/26/2018     (L) 09/26/2018       BMP RESULTS:  Lab Results   Component Value Date     09/26/2018 "    POTASSIUM 4.4 09/26/2018    CHLORIDE 102 09/26/2018    CO2 30 (H) 09/26/2018    ANIONGAP 11.4 09/26/2018     (H) 09/26/2018    BUN 18 09/26/2018    CR 1.14 09/26/2018    GFRESTIMATED 61 09/26/2018    GFRESTBLACK 74 09/26/2018    DEEPTI 9.8 09/26/2018        A1C RESULTS:  Lab Results   Component Value Date    A1C 6.6 (H) 08/28/2018       INR RESULTS:  Lab Results   Component Value Date    INR 1.01 07/24/2018           Jose Saldana PA-C   9/27/2018  Pager: [phone]

## 2018-09-28 ENCOUNTER — HOSPITAL ENCOUNTER (OUTPATIENT)
Dept: CARDIAC REHAB | Facility: CLINIC | Age: 83
End: 2018-09-28
Payer: MEDICARE

## 2018-09-28 PROCEDURE — 40000116 ZZH STATISTIC OP CR VISIT

## 2018-09-28 PROCEDURE — 93798 PHYS/QHP OP CAR RHAB W/ECG: CPT

## 2018-10-01 ENCOUNTER — HOSPITAL ENCOUNTER (OUTPATIENT)
Dept: CARDIAC REHAB | Facility: CLINIC | Age: 83
End: 2018-10-01
Payer: MEDICARE

## 2018-10-01 PROCEDURE — 93798 PHYS/QHP OP CAR RHAB W/ECG: CPT | Performed by: OCCUPATIONAL THERAPIST

## 2018-10-01 PROCEDURE — 40000116 ZZH STATISTIC OP CR VISIT: Performed by: OCCUPATIONAL THERAPIST

## 2018-10-03 ENCOUNTER — HOSPITAL ENCOUNTER (OUTPATIENT)
Dept: CARDIAC REHAB | Facility: CLINIC | Age: 83
End: 2018-10-03
Payer: MEDICARE

## 2018-10-03 ENCOUNTER — HOSPITAL ENCOUNTER (OUTPATIENT)
Dept: CARDIAC REHAB | Facility: CLINIC | Age: 83
End: 2018-10-03
Attending: INTERNAL MEDICINE
Payer: MEDICARE

## 2018-10-03 PROCEDURE — 93797 PHYS/QHP OP CAR RHAB WO ECG: CPT | Mod: 59 | Performed by: OCCUPATIONAL THERAPIST

## 2018-10-03 PROCEDURE — 40000116 ZZH STATISTIC OP CR VISIT

## 2018-10-03 PROCEDURE — 93798 PHYS/QHP OP CAR RHAB W/ECG: CPT

## 2018-10-03 PROCEDURE — 40000575 ZZH STATISTIC OP CARDIAC VISIT #2: Performed by: OCCUPATIONAL THERAPIST

## 2018-10-05 ENCOUNTER — HOSPITAL ENCOUNTER (OUTPATIENT)
Dept: CARDIAC REHAB | Facility: CLINIC | Age: 83
End: 2018-10-05
Payer: MEDICARE

## 2018-10-05 PROCEDURE — 40000116 ZZH STATISTIC OP CR VISIT

## 2018-10-05 PROCEDURE — 93798 PHYS/QHP OP CAR RHAB W/ECG: CPT

## 2018-10-08 ENCOUNTER — HOSPITAL ENCOUNTER (OUTPATIENT)
Dept: CARDIAC REHAB | Facility: CLINIC | Age: 83
End: 2018-10-08
Payer: MEDICARE

## 2018-10-08 PROCEDURE — 93798 PHYS/QHP OP CAR RHAB W/ECG: CPT | Performed by: OCCUPATIONAL THERAPIST

## 2018-10-08 PROCEDURE — 40000116 ZZH STATISTIC OP CR VISIT: Performed by: OCCUPATIONAL THERAPIST

## 2018-10-10 ENCOUNTER — HOSPITAL ENCOUNTER (OUTPATIENT)
Dept: CARDIAC REHAB | Facility: CLINIC | Age: 83
End: 2018-10-10
Payer: MEDICARE

## 2018-10-10 PROCEDURE — 93798 PHYS/QHP OP CAR RHAB W/ECG: CPT

## 2018-10-10 PROCEDURE — 40000116 ZZH STATISTIC OP CR VISIT

## 2018-10-12 ENCOUNTER — HOSPITAL ENCOUNTER (OUTPATIENT)
Dept: CARDIAC REHAB | Facility: CLINIC | Age: 83
End: 2018-10-12
Payer: MEDICARE

## 2018-10-12 PROCEDURE — 40000116 ZZH STATISTIC OP CR VISIT: Performed by: REHABILITATION PRACTITIONER

## 2018-10-12 PROCEDURE — 93798 PHYS/QHP OP CAR RHAB W/ECG: CPT | Performed by: REHABILITATION PRACTITIONER

## 2018-10-15 ENCOUNTER — HOSPITAL ENCOUNTER (OUTPATIENT)
Dept: CARDIAC REHAB | Facility: CLINIC | Age: 83
End: 2018-10-15
Payer: MEDICARE

## 2018-10-15 PROCEDURE — 93798 PHYS/QHP OP CAR RHAB W/ECG: CPT | Performed by: REHABILITATION PRACTITIONER

## 2018-10-15 PROCEDURE — 40000116 ZZH STATISTIC OP CR VISIT: Performed by: REHABILITATION PRACTITIONER

## 2018-10-19 ENCOUNTER — HOSPITAL ENCOUNTER (OUTPATIENT)
Dept: CARDIAC REHAB | Facility: CLINIC | Age: 83
End: 2018-10-19
Payer: MEDICARE

## 2018-10-19 PROCEDURE — 93798 PHYS/QHP OP CAR RHAB W/ECG: CPT

## 2018-10-19 PROCEDURE — 40000116 ZZH STATISTIC OP CR VISIT

## 2018-10-22 ENCOUNTER — HOSPITAL ENCOUNTER (OUTPATIENT)
Dept: CARDIAC REHAB | Facility: CLINIC | Age: 83
End: 2018-10-22
Payer: MEDICARE

## 2018-10-22 PROCEDURE — 93798 PHYS/QHP OP CAR RHAB W/ECG: CPT

## 2018-10-22 PROCEDURE — 40000116 ZZH STATISTIC OP CR VISIT

## 2018-10-24 ENCOUNTER — HOSPITAL ENCOUNTER (OUTPATIENT)
Dept: CARDIAC REHAB | Facility: CLINIC | Age: 83
End: 2018-10-24
Payer: MEDICARE

## 2018-10-24 PROCEDURE — 40000116 ZZH STATISTIC OP CR VISIT

## 2018-10-24 PROCEDURE — 93798 PHYS/QHP OP CAR RHAB W/ECG: CPT

## 2018-10-26 ENCOUNTER — HOSPITAL ENCOUNTER (OUTPATIENT)
Dept: CARDIAC REHAB | Facility: CLINIC | Age: 83
End: 2018-10-26
Payer: MEDICARE

## 2018-10-26 PROCEDURE — 40000116 ZZH STATISTIC OP CR VISIT

## 2018-10-26 PROCEDURE — 93798 PHYS/QHP OP CAR RHAB W/ECG: CPT

## 2018-10-29 ENCOUNTER — HOSPITAL ENCOUNTER (OUTPATIENT)
Dept: CARDIAC REHAB | Facility: CLINIC | Age: 83
End: 2018-10-29
Payer: MEDICARE

## 2018-10-29 PROCEDURE — 93798 PHYS/QHP OP CAR RHAB W/ECG: CPT

## 2018-10-29 PROCEDURE — 40000116 ZZH STATISTIC OP CR VISIT

## 2018-11-01 ENCOUNTER — TELEPHONE (OUTPATIENT)
Dept: INTERNAL MEDICINE | Facility: CLINIC | Age: 83
End: 2018-11-01

## 2018-11-04 ENCOUNTER — MEDICAL CORRESPONDENCE (OUTPATIENT)
Dept: HEALTH INFORMATION MANAGEMENT | Facility: CLINIC | Age: 83
End: 2018-11-04

## 2018-11-23 DIAGNOSIS — R80.9 TYPE 2 DIABETES MELLITUS WITH MICROALBUMINURIA, WITHOUT LONG-TERM CURRENT USE OF INSULIN (H): ICD-10-CM

## 2018-11-23 DIAGNOSIS — E11.29 TYPE 2 DIABETES MELLITUS WITH MICROALBUMINURIA, WITHOUT LONG-TERM CURRENT USE OF INSULIN (H): ICD-10-CM

## 2018-11-23 NOTE — TELEPHONE ENCOUNTER
"Requested Prescriptions   Pending Prescriptions Disp Refills     MICROLET LANCETS MISC [Pharmacy Med Name: MICROLET LANCETS MIS] 100 each 5    Last Written Prescription Date:  04/12/2018  Last Fill Quantity: 90,  # refills: 1   Last office visit: 9/7/2018 with prescribing provider:     Future Office Visit:   Next 5 appointments (look out 90 days)     Jan 08, 2019  8:20 AM CST   SHORT with Viet Bingham MD   [facility]    [address]  [facility] [address]   [phone]                Sig: USE ONE  TO CHECK GLUCOSE ONCE DAILY OR  AS  DIRECTED    Diabetic Supplies Protocol Passed    11/23/2018  1:02 PM       Passed - Patient is 18 years of age or older       Passed - Recent (6 mo) or future (30 days) visit within the authorizing provider's specialty    Patient had office visit in the last 6 months or has a visit in the next 30 days with authorizing provider.  See \"Patient Info\" tab in inbasket, or \"Choose Columns\" in Meds & Orders section of the refill encounter.            CONTOUR NEXT TEST test strip [Pharmacy Med Name: CONTOUR NEXT        NICKIE] 100 strip 5    Last Written Prescription Date:  04/12/2018  Last Fill Quantity: 100,  # refills: 5   Last office visit: 9/7/2018 with prescribing provider:     Future Office Visit:   Next 5 appointments (look out 90 days)     Jan 08, 2019  8:20 AM CST   SHORT with Viet Bingham MD   [facility]    [address]  [facility] [address]   [phone]                Sig: USE ONE STRIP TO CHECK GLUCOSE ONCE DAILY OR  AS  DIRECTED    Diabetic Supplies Protocol Passed    11/23/2018  1:02 PM       Passed - Patient is 18 years of age or older       Passed - Recent (6 mo) or future (30 days) visit within the authorizing provider's specialty    Patient had office visit in the last 6 months or has a visit in the next 30 days with authorizing provider.  See \"Patient " "Info\" tab in inbasket, or \"Choose Columns\" in Meds & Orders section of the refill encounter.            "

## 2018-11-27 RX ORDER — LANCETS
EACH MISCELLANEOUS
Qty: 100 EACH | Refills: 5 | Status: SHIPPED | OUTPATIENT
Start: 2018-11-27 | End: 2020-07-10

## 2018-12-24 DIAGNOSIS — R80.9 TYPE 2 DIABETES MELLITUS WITH MICROALBUMINURIA, WITHOUT LONG-TERM CURRENT USE OF INSULIN (H): ICD-10-CM

## 2018-12-24 DIAGNOSIS — E78.5 HYPERLIPIDEMIA WITH TARGET LDL LESS THAN 100: ICD-10-CM

## 2018-12-24 DIAGNOSIS — E11.29 TYPE 2 DIABETES MELLITUS WITH MICROALBUMINURIA, WITHOUT LONG-TERM CURRENT USE OF INSULIN (H): ICD-10-CM

## 2018-12-24 NOTE — TELEPHONE ENCOUNTER
"Requested Prescriptions   Pending Prescriptions Disp Refills     lisinopril (PRINIVIL/ZESTRIL) 2.5 MG tablet  Last Written Prescription Date:  11/30/17  Last Fill Quantity: 90,  # refills: 3   Last office visit: 9/7/2018 with prescribing provider:     Future Office Visit:   Next 5 appointments (look out 90 days)    Jan 08, 2019  8:20 AM CST  SHORT with Viet Bingham MD  [facility] ([facility]) [address]  [facility] [address]  [phone]          90 tablet 3     Sig: Take 1 tablet (2.5 mg) by mouth daily    ACE Inhibitors (Including Combos) Protocol Passed - 12/24/2018  9:27 AM       Passed - Blood pressure under 140/90 in past 12 months    BP Readings from Last 3 Encounters:   09/27/18 111/55   09/07/18 118/58   09/06/18 122/56                Passed - Recent (12 mo) or future (30 days) visit within the authorizing provider's specialty    Patient had office visit in the last 12 months or has a visit in the next 30 days with authorizing provider or within the authorizing provider's specialty.  See \"Patient Info\" tab in inbasket, or \"Choose Columns\" in Meds & Orders section of the refill encounter.             Passed - Patient is age 18 or older       Passed - Normal serum creatinine on file in past 12 months    Recent Labs   Lab Test 09/26/18  1207  12/21/17  1009   CR 1.14   < >  --    CREAT  --   --  1.0    < > = values in this interval not displayed.            Passed - Normal serum potassium on file in past 12 months    Recent Labs   Lab Test 09/26/18  1207   POTASSIUM 4.4             metFORMIN (GLUCOPHAGE) 1000 MG tablet  Last Written Prescription Date:  11/8/17  Last Fill Quantity: 90,  # refills: 3   Last office visit: 9/7/2018 with prescribing provider:     Future Office Visit:   Next 5 appointments (look out 90 days)    Jan 08, 2019  8:20 AM CST  SHORT with Viet Bingham MD  [facility] ([facility]) [address]  [facility] [address]  [phone]          90 tablet 3     Sig: Take 0.5 tablets (500 mg) by mouth 2 times daily (with meals)    Biguanide Agents Passed - 12/24/2018  9:27 AM       Passed - Blood pressure less than 140/90 in past 6 months    BP Readings from Last 3 Encounters:   09/27/18 111/55   09/07/18 118/58   09/06/18 122/56                Passed - Patient has documented LDL within the past 12 mos.    Recent Labs   Lab Test 04/12/18  1627   LDL 37            Passed - Patient has had a Microalbumin in the past 15 mos.    Recent Labs   Lab Test 11/01/17  0758   MICROL 36   UMALCR 22.99*            Passed - Patient is age 10 or older       Passed - Patient has documented A1c within the specified period of time.    If HgbA1C is 8 or greater, it needs to be on file within the past 3 months.  If less than 8, must be on file within the past 6 months.     Recent Labs   Lab Test 08/28/18  1105   A1C 6.6*            Passed - Patient's CR is NOT>1.4 OR Patient's EGFR is NOT<45 within past 12 mos.    Recent Labs   Lab Test 09/26/18  1207   GFRESTIMATED 61   GFRESTBLACK 74       Recent Labs   Lab Test 09/26/18  1207   CR 1.14            Passed - Patient does NOT have a diagnosis of CHF.       Passed - Recent (6 mo) or future (30 days) visit within the authorizing provider's specialty    Patient had office visit in the last 6 months or has a visit in the next 30 days with authorizing provider or within the authorizing provider's specialty.  See \"Patient Info\" tab in inbasket, or \"Choose Columns\" in Meds & Orders section of the refill encounter.            simvastatin (ZOCOR) 40 MG tablet  Last Written Prescription Date:  11/8/17  Last Fill Quantity: 45,  # refills: 3   Last office visit: 9/7/2018 with prescribing provider:     Future Office Visit:   Next 5 appointments (look out 90 days)    Jan 08, 2019  8:20 AM CST  SHORT with Viet Bingham MD  [facility] ([facility]) [address]  [facility] [address]  [phone]          45 tablet 3     Sig: Take 0.5 tablets (20 mg) by mouth At Bedtime    Statins Protocol Passed - 12/24/2018  9:27 AM       Passed - LDL on file in past 12 months    Recent Labs   Lab Test 04/12/18  1627   LDL 37            Passed - No abnormal creatine kinase in past 12 months    No lab results found.            Passed - Recent (12 mo) or future (30 days) visit within the authorizing provider's specialty    Patient had office visit in the last 12 months or has a visit in the next 30 days with authorizing provider or within the authorizing provider's specialty.  See \"Patient Info\" tab in inbasket, or \"Choose Columns\" in Meds & Orders section of the refill encounter.             Passed - Patient is age 18 or older          "

## 2018-12-27 RX ORDER — SIMVASTATIN 40 MG
20 TABLET ORAL AT BEDTIME
Qty: 45 TABLET | Refills: 0 | Status: SHIPPED | OUTPATIENT
Start: 2018-12-27 | End: 2019-01-08

## 2018-12-27 RX ORDER — LISINOPRIL 2.5 MG/1
2.5 TABLET ORAL DAILY
Qty: 90 TABLET | Refills: 0 | Status: SHIPPED | OUTPATIENT
Start: 2018-12-27 | End: 2019-01-08

## 2018-12-28 NOTE — TELEPHONE ENCOUNTER
Medication is being filled for 1 time refill only due to:  Patient needs to be seen because needs recheck in Jan-pt is already scheduled.. Dasha Matthews RN

## 2019-01-02 DIAGNOSIS — I10 ESSENTIAL HYPERTENSION WITH GOAL BLOOD PRESSURE LESS THAN 140/90: ICD-10-CM

## 2019-01-02 DIAGNOSIS — E78.5 HYPERLIPIDEMIA WITH TARGET LDL LESS THAN 100: ICD-10-CM

## 2019-01-02 DIAGNOSIS — E11.59 TYPE 2 DIABETES MELLITUS WITH OTHER CIRCULATORY COMPLICATIONS (H): Primary | ICD-10-CM

## 2019-01-02 LAB
ANION GAP SERPL CALCULATED.3IONS-SCNC: 11 MMOL/L (ref 3–14)
BUN SERPL-MCNC: 26 MG/DL (ref 7–30)
CALCIUM SERPL-MCNC: 8.9 MG/DL (ref 8.5–10.1)
CHLORIDE SERPL-SCNC: 104 MMOL/L (ref 94–109)
CO2 SERPL-SCNC: 24 MMOL/L (ref 20–32)
CREAT SERPL-MCNC: 1.03 MG/DL (ref 0.66–1.25)
ERYTHROCYTE [DISTWIDTH] IN BLOOD BY AUTOMATED COUNT: 13 % (ref 10–15)
GFR SERPL CREATININE-BSD FRML MDRD: 66 ML/MIN/{1.73_M2}
GLUCOSE SERPL-MCNC: 131 MG/DL (ref 70–99)
HBA1C MFR BLD: 6.7 % (ref 0–5.6)
HCT VFR BLD AUTO: 40.6 % (ref 40–53)
HGB BLD-MCNC: 13.2 G/DL (ref 13.3–17.7)
MCH RBC QN AUTO: 29.9 PG (ref 26.5–33)
MCHC RBC AUTO-ENTMCNC: 32.5 G/DL (ref 31.5–36.5)
MCV RBC AUTO: 92 FL (ref 78–100)
PLATELET # BLD AUTO: 164 10E9/L (ref 150–450)
POTASSIUM SERPL-SCNC: 4.5 MMOL/L (ref 3.4–5.3)
RBC # BLD AUTO: 4.41 10E12/L (ref 4.4–5.9)
SODIUM SERPL-SCNC: 139 MMOL/L (ref 133–144)
WBC # BLD AUTO: 8.3 10E9/L (ref 4–11)

## 2019-01-02 PROCEDURE — 82043 UR ALBUMIN QUANTITATIVE: CPT | Performed by: INTERNAL MEDICINE

## 2019-01-02 PROCEDURE — 80048 BASIC METABOLIC PNL TOTAL CA: CPT | Performed by: INTERNAL MEDICINE

## 2019-01-02 PROCEDURE — 85027 COMPLETE CBC AUTOMATED: CPT | Performed by: INTERNAL MEDICINE

## 2019-01-02 PROCEDURE — 83036 HEMOGLOBIN GLYCOSYLATED A1C: CPT | Performed by: INTERNAL MEDICINE

## 2019-01-02 PROCEDURE — 36415 COLL VENOUS BLD VENIPUNCTURE: CPT | Performed by: INTERNAL MEDICINE

## 2019-01-03 LAB
CREAT UR-MCNC: 70 MG/DL
MICROALBUMIN UR-MCNC: 18 MG/L
MICROALBUMIN/CREAT UR: 26.18 MG/G CR (ref 0–17)

## 2019-01-08 ENCOUNTER — OFFICE VISIT (OUTPATIENT)
Dept: INTERNAL MEDICINE | Facility: CLINIC | Age: 84
End: 2019-01-08
Payer: MEDICARE

## 2019-01-08 VITALS
WEIGHT: 173 LBS | RESPIRATION RATE: 16 BRPM | TEMPERATURE: 98 F | OXYGEN SATURATION: 95 % | SYSTOLIC BLOOD PRESSURE: 110 MMHG | HEIGHT: 63 IN | BODY MASS INDEX: 30.65 KG/M2 | DIASTOLIC BLOOD PRESSURE: 58 MMHG | HEART RATE: 77 BPM

## 2019-01-08 DIAGNOSIS — R80.9 TYPE 2 DIABETES MELLITUS WITH MICROALBUMINURIA, WITHOUT LONG-TERM CURRENT USE OF INSULIN (H): Primary | ICD-10-CM

## 2019-01-08 DIAGNOSIS — I77.9 BILATERAL CAROTID ARTERY DISEASE, UNSPECIFIED TYPE (H): ICD-10-CM

## 2019-01-08 DIAGNOSIS — Z95.2 S/P TAVR (TRANSCATHETER AORTIC VALVE REPLACEMENT): ICD-10-CM

## 2019-01-08 DIAGNOSIS — E78.5 HYPERLIPIDEMIA WITH TARGET LDL LESS THAN 100: ICD-10-CM

## 2019-01-08 DIAGNOSIS — R41.3 MEMORY DIFFICULTIES: ICD-10-CM

## 2019-01-08 DIAGNOSIS — R21 RASH: ICD-10-CM

## 2019-01-08 DIAGNOSIS — E11.29 TYPE 2 DIABETES MELLITUS WITH MICROALBUMINURIA, WITHOUT LONG-TERM CURRENT USE OF INSULIN (H): Primary | ICD-10-CM

## 2019-01-08 PROCEDURE — 99214 OFFICE O/P EST MOD 30 MIN: CPT | Performed by: INTERNAL MEDICINE

## 2019-01-08 RX ORDER — PANTOPRAZOLE SODIUM 40 MG/1
40 TABLET, DELAYED RELEASE ORAL DAILY
Qty: 90 TABLET | Refills: 3 | Status: SHIPPED | OUTPATIENT
Start: 2019-01-08 | End: 2019-05-10

## 2019-01-08 RX ORDER — SIMVASTATIN 40 MG
20 TABLET ORAL AT BEDTIME
Qty: 45 TABLET | Refills: 3 | Status: SHIPPED | OUTPATIENT
Start: 2019-01-08 | End: 2020-07-10

## 2019-01-08 RX ORDER — LISINOPRIL 2.5 MG/1
2.5 TABLET ORAL DAILY
Qty: 90 TABLET | Refills: 3 | Status: SHIPPED | OUTPATIENT
Start: 2019-01-08 | End: 2019-05-10

## 2019-01-08 ASSESSMENT — MIFFLIN-ST. JEOR: SCORE: 1373.02

## 2019-01-08 NOTE — PROGRESS NOTES
SUBJECTIVE:   Reid Jimenes is a 84 year old male who presents to clinic today for the following health issues:    Diabetes Follow-up    Patient is checking blood sugars: once daily.  Results are as follows:         am - 140    Diabetic concerns: None     Symptoms of hypoglycemia (low blood sugar): shaky, dizzy, weak, lethargy <100     Paresthesias (numbness or burning in feet) or sores: No     Date of last diabetic eye exam: 3 months ago at [facility]    BP Readings from Last 2 Encounters:   01/08/19 110/58   09/27/18 111/55     Hemoglobin A1C (%)   Date Value   01/02/2019 6.7 (H)   08/28/2018 6.6 (H)     LDL Cholesterol Calculated (mg/dL)   Date Value   04/12/2018 37   05/11/2017 37       Diabetes Management Resources  Hyperlipidemia Follow-Up      Rate your low fat/cholesterol diet?: fair    Taking statin?  Yes, no muscle aches from statin    Other lipid medications/supplements?:  none    Hypertension Follow-up      Outpatient blood pressures are not being checked.    Low Salt Diet: no added salt      Amount of exercise or physical activity: tries to exercise everyday, walking     Problems taking medications regularly: No    Medication side effects: none    Diet: low salt and reads labels.    Blood Sugar  Notes that he checks his blood sugar regularly in the morning when he gets up. Reports that his blood sugar is usually at 140 . Mentions that he feels shaky when his blood sugar gets down to 100.    Memory   Reports that he can not remember or recall names. Mentions that his wife noticed his decline in memory.     Carotid artery disease  Pt had a procedure done on 8/28/2018.He mentions that he does not see a specialist but will make an appointment sometime in February.     Skin Rash  Pt mentions that he has a rash on the inner part of his arm. Reports that he used antifungal cream, which did not improve his condition. Notes that the area is red during the day.     Past/recent records reviewed and discussed  "for:  -Reviewed and updated medications.       Problem list and histories reviewed & adjusted, as indicated.  Additional history: as documented    Labs reviewed in EPIC    Reviewed and updated as needed this visit by clinical staff  Tobacco  Allergies  Meds  Med Hx  Surg Hx  Fam Hx  Soc Hx      Reviewed and updated as needed this visit by Provider         ROS:  No dyspnea or cough. No chest discomfort, dizziness or palpitations. No diarrhea, abdominal pain or rectal bleeding.   No acute problems with vision or speech, lateralizing weakness or paresthesias.    ROS: as above or negative for Respiratory, CV, GI, endocrine, neuro systems.     This document serves as a record of the services and decisions personally performed and made by Viet Bingham MD. It was created on his behalf by vEelyn Martinez, a trained medical scribe. The creation of this document is based on the provider's statements to the medical scribe.  Evelyn Martinez January 8, 2019 8:48 AM      OBJECTIVE:   /58 (BP Location: Right arm, Patient Position: Sitting, Cuff Size: Adult Large)   Pulse 77   Temp 98  F (36.7  C) (Oral)   Resp 16   Ht 1.605 m (5' 3.2\")   Wt 78.5 kg (173 lb)   SpO2 95%   BMI 30.45 kg/m    Body mass index is 30.45 kg/m .  GENERAL: healthy, alert and no distress  EYES: Eyes grossly normal to inspection, PERRL and conjunctivae and sclerae normal  NECK: no adenopathy, no asymmetry, masses, or scars and thyroid normal to palpation  RESP: lungs clear to auscultation - no rales, rhonchi or wheezes  CV: regular rate and rhythm, normal S1 S2, no S3 or S4, no murmur, click or rub, no peripheral edema and peripheral pulses strong  MS: no gross musculoskeletal defects noted, no edema  SKIN: no suspicious lesions or rashes  NEURO: Normal strength and tone, mentation intact and speech normal  BACK: no CVA tenderness, no paralumbar tenderness  PSYCH: mentation appears normal, affect normal/bright  Diabetic foot exam: pedal pulses a " "bit diminished but present.  Normal soft touch sensation to monofilament testing. No ulcerations or lesions. Lower extremities otherwise normal to inspection and palpation.    Diagnostic Test Results:  No results found for this or any previous visit (from the past 24 hour(s)).    ASSESSMENT/PLAN:   (R41.3) Memory difficulties  (primary encounter diagnosis)  Comment: Recommended to see a specialist in Neurology.   Plan: NEUROLOGY ADULT REFERRAL       (E11.29,  R80.9) Type 2 diabetes mellitus with microalbuminuria, without long-term current use of insulin (H)  Comment: A1c at target. Continue current measures.  Plan: lisinopril (PRINIVIL/ZESTRIL) 2.5 MG tablet,         metFORMIN (GLUCOPHAGE) 1000 MG tablet          (I77.9) Bilateral carotid artery disease, unspecified type (H)  Comment: Followed annually by CV surgery.     (E78.5) Hyperlipidemia with target LDL less than 100  Comment: LDL at target. Continue current meds.  Plan: simvastatin (ZOCOR) 40 MG tablet          (Z95.2) S/P TAVR (transcatheter aortic valve replacement)  Comment:Continue Protonix, started evidently in the context of TAVR procedure.   Plan: pantoprazole (PROTONIX) 40 MG EC tablet         FUTURE APPOINTMENTS:       - Follow-up visit in April 2019  Patient Instructions   You might try some non-prescription \"1% Hydrocortisone cream\" over the inner elbow areas. May try using twice a day when elbows turn red. This is available without prescription.     Keep taking the lisinopril. This is helpful for diabetes effects on the kidneys (when spilling microscopic amounts of protein in the urine).     Diabetes and blood pressure look fine. Your hemoglobin keeps improving.     Last cholesterol check in April 2018 looked fine. We can check that again after you return from [place].    You might consider seeing the Neurology specialist--they are experts about memory and whether to prescribe pills that retard any decline in memory.     See me back in April 2019, " with fasting labs a few days in advance.     The information in this document, created by the medical scribe for me, accurately reflects the services I personally performed and the decisions made by me. I have reviewed and approved this document for accuracy.   January 8, 2019 9:56 AM       Viet Bingham MD, MD  [facility]

## 2019-01-08 NOTE — PATIENT INSTRUCTIONS
"You might try some non-prescription \"1% Hydrocortisone cream\" over the inner elbow areas. May try using twice a day when elbows turn red. This is available without prescription.     Keep taking the lisinopril. This is helpful for diabetes effects on the kidneys (when spilling microscopic amounts of protein in the urine).     Diabetes and blood pressure look fine. Your hemoglobin keeps improving.     Last cholesterol check in April 2018 looked fine. We can check that again after you return from [place].    You might consider seeing the Neurology specialist--they are experts about memory and whether to prescribe pills that retard any decline in memory.     See me back in April 2019, with fasting labs a few days in advance.   "

## 2019-03-13 ENCOUNTER — HOSPITAL ENCOUNTER (OUTPATIENT)
Dept: CARDIOLOGY | Facility: CLINIC | Age: 84
Discharge: HOME | End: 2019-03-13
Attending: PHYSICIAN ASSISTANT | Admitting: PHYSICIAN ASSISTANT
Payer: MEDICARE

## 2019-03-13 ENCOUNTER — OFFICE VISIT (OUTPATIENT)
Dept: CARDIOLOGY | Facility: CLINIC | Age: 84
End: 2019-03-13
Attending: PHYSICIAN ASSISTANT
Payer: MEDICARE

## 2019-03-13 VITALS
DIASTOLIC BLOOD PRESSURE: 60 MMHG | HEART RATE: 60 BPM | SYSTOLIC BLOOD PRESSURE: 120 MMHG | HEIGHT: 65 IN | WEIGHT: 174 LBS | OXYGEN SATURATION: 96 % | BODY MASS INDEX: 28.99 KG/M2

## 2019-03-13 DIAGNOSIS — R80.9 TYPE 2 DIABETES MELLITUS WITH MICROALBUMINURIA, WITHOUT LONG-TERM CURRENT USE OF INSULIN (H): ICD-10-CM

## 2019-03-13 DIAGNOSIS — I10 ESSENTIAL HYPERTENSION WITH GOAL BLOOD PRESSURE LESS THAN 140/90: ICD-10-CM

## 2019-03-13 DIAGNOSIS — E11.29 TYPE 2 DIABETES MELLITUS WITH MICROALBUMINURIA, WITHOUT LONG-TERM CURRENT USE OF INSULIN (H): ICD-10-CM

## 2019-03-13 DIAGNOSIS — I77.9 BILATERAL CAROTID ARTERY DISEASE (H): ICD-10-CM

## 2019-03-13 DIAGNOSIS — E78.5 HYPERLIPIDEMIA LDL GOAL <70: Primary | ICD-10-CM

## 2019-03-13 DIAGNOSIS — Z95.2 S/P TAVR (TRANSCATHETER AORTIC VALVE REPLACEMENT): ICD-10-CM

## 2019-03-13 LAB
ANION GAP SERPL CALCULATED.3IONS-SCNC: 11.5 MMOL/L (ref 6–17)
BUN SERPL-MCNC: 18 MG/DL (ref 7–30)
CALCIUM SERPL-MCNC: 9.4 MG/DL (ref 8.5–10.5)
CHLORIDE SERPL-SCNC: 103 MMOL/L (ref 98–107)
CO2 SERPL-SCNC: 28 MMOL/L (ref 23–29)
CREAT SERPL-MCNC: 1.3 MG/DL (ref 0.7–1.3)
GFR SERPL CREATININE-BSD FRML MDRD: 52 ML/MIN/{1.73_M2}
GLUCOSE SERPL-MCNC: 123 MG/DL (ref 70–105)
POTASSIUM SERPL-SCNC: 4.5 MMOL/L (ref 3.5–5.1)
SODIUM SERPL-SCNC: 138 MMOL/L (ref 136–145)

## 2019-03-13 PROCEDURE — 80048 BASIC METABOLIC PNL TOTAL CA: CPT | Performed by: PHYSICIAN ASSISTANT

## 2019-03-13 PROCEDURE — 36415 COLL VENOUS BLD VENIPUNCTURE: CPT | Performed by: PHYSICIAN ASSISTANT

## 2019-03-13 PROCEDURE — 93306 TTE W/DOPPLER COMPLETE: CPT

## 2019-03-13 PROCEDURE — 99214 OFFICE O/P EST MOD 30 MIN: CPT | Performed by: PHYSICIAN ASSISTANT

## 2019-03-13 PROCEDURE — 93306 TTE W/DOPPLER COMPLETE: CPT | Mod: 26 | Performed by: INTERNAL MEDICINE

## 2019-03-13 ASSESSMENT — MIFFLIN-ST. JEOR: SCORE: 1401.14

## 2019-03-13 NOTE — LETTER
3/13/2019    Viet Bingham MD, MD  [address] [facility] [address]  [facility] [address]    RE: Reid Baroncar       Dear Colleague,    I had the pleasure of seeing Reid DIXON Jimenes in the [facility].    Primary Cardiologist: Dr. Higginbotham     Reason for visit: 6-month post TAVR follow-up with echocardiogram prior    History of Present Illness:   This is a very pleasant 85-year-old gentleman who presents to structural heart clinic for 6 month post TAVR follow-up.  His past medical history is notable for history of severe aortic stenosis (underwent TAVR on 8/28/2018 via femoral approach receiving a 26 mm Greene Sabino 3 valve), mild nonobstructive coronary artery disease, hypertension, hyperlipidemia, type 2 diabetes (non-insulin-dependent), and carotid artery disease.    His one month echocardiogram demonstrated well seated bioprosthetic aortic valve with good function.  He had trace perivalvular regurgitation.  His mean gradient was 8.2 mmHg and his LVEF was preserved.    He returns to clinic with his spouse, stating he is overall doing well.  He denies any chest discomfort, shortness of breath, palpitations, peripheral edema, orthopnea, PND, or bleeding issues.    Assessment and Plan:   This is a very pleasant 85-year-old gentleman who presents to structural heart clinic for 6 month post TAVR follow-up.  His past medical history is notable for history of severe aortic stenosis (underwent TAVR on 8/28/2018 via femoral approach receiving a 26 mm Greene Sabino 3 valve), mild nonobstructive coronary artery disease, hypertension, hyperlipidemia, type 2 diabetes (non-insulin-dependent), and carotid artery disease.    His 6-month echocardiogram demonstrates good position of the bioprosthetic aortic valve.  The mean aortic gradient is 11 mmHg.  LVEF is preserved and there is mild concentric LVH.  There is no comment on whether there is regurgitation.     I have asked him to finish his current  prescription of Plavix and only continue with aspirin 81 mg daily.  We will see him back in 6 months with repeat echocardiogram, labs, and ECG.  We will need to complete a KCCQ form at that point as well.    His clinical picture is consistent with NYHA class I.    Thank you for allowing me to participate in the care of this pleasant patient today.      This note was completed in part using Dragon voice recognition software. Although reviewed after completion, some word and grammatical errors may occur.    Orders this Visit:  No orders of the defined types were placed in this encounter.    No orders of the defined types were placed in this encounter.    There are no discontinued medications.      Encounter Diagnoses   Name Primary?     S/P TAVR (transcatheter aortic valve replacement)      Bilateral carotid artery disease (H)      Type 2 diabetes mellitus with microalbuminuria, without long-term current use of insulin (H)      Essential hypertension with goal blood pressure less than 140/90        CURRENT MEDICATIONS:  Current Outpatient Medications   Medication Sig Dispense Refill     ascorbic acid 500 MG TABS Take 500 mg by mouth every evening        aspirin 81 MG EC tablet Take 1 tablet (81 mg) by mouth daily 30 tablet      BIOTIN PO Take 5,000 mg by mouth 2 times daily        blood glucose monitoring (MIRA CONTOUR NEXT) test strip Use to test blood sugar one time daily or as directed. 100 each 5     blood glucose monitoring (MIRA MICROLET) lancets Use to test blood sugar 1 times daily or as directed. 100 each 5     clopidogrel (PLAVIX) 75 MG tablet Take 1 tablet (75 mg) by mouth daily 90 tablet 1     clotrimazole (LOTRIMIN) 1 % cream Apply topically 2 times daily (Patient not taking: Reported on 1/8/2019) 15 g 3     CONTOUR NEXT TEST test strip USE ONE STRIP TO CHECK GLUCOSE ONCE DAILY OR  AS  DIRECTED 100 strip 5     Cyanocobalamin (VITAMIN B-12 ER PO) Take by mouth daily       fluocinonide (LIDEX) 0.05 %  ointment Apply topically 2 times daily       GLUCOSAMINE-CHONDROITIN PO Take 1 tablet by mouth 2 times daily        lisinopril (PRINIVIL/ZESTRIL) 2.5 MG tablet Take 1 tablet (2.5 mg) by mouth daily 90 tablet 3     metFORMIN (GLUCOPHAGE) 1000 MG tablet Take 0.5 tablets (500 mg) by mouth 2 times daily (with meals) 90 tablet 3     MICROLET LANCETS MISC USE ONE  TO CHECK GLUCOSE ONCE DAILY OR  AS  DIRECTED 100 each 5     Multiple Vitamins-Iron (MULTIVITAMIN/IRON PO) Take 1 tablet by mouth daily        pantoprazole (PROTONIX) 40 MG EC tablet Take 1 tablet (40 mg) by mouth daily Take 30-60 minutes before a meal. 90 tablet 3     simvastatin (ZOCOR) 40 MG tablet Take 0.5 tablets (20 mg) by mouth At Bedtime 45 tablet 3       ALLERGIES   No Known Allergies    PAST MEDICAL HISTORY:  Past Medical History:   Diagnosis Date     Aortic valve stenosis, nonrheumatic     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: 26mm Edward Sabino 3 valve     Coronary artery disease     cardiac cath 7/24/18: mild non-obstructive disease     Hyperlipidaemia LDL goal < 100      Hypertension      LBBB (left bundle branch block)      Need for SBE (subacute bacterial endocarditis) prophylaxis      Pulmonary hypertension (H)      Type 2 diabetes mellitus (H)        PAST SURGICAL HISTORY:  Past Surgical History:   Procedure Laterality Date     MANDIBLE SURGERY  1958     OTHER SURGICAL HISTORY      cardiac cath 7/24/18: mild non-obstructive disease     TRANSCATHETER AORTIC VALVE IMPLANT ANESTHESIA N/A 8/28/2018    Procedure: TRANSCATHETER AORTIC VALVE IMPLANT ANESTHESIA;  ANESTHESIA NEEDED FOR TAVR PROCEDURE;  Surgeon: GENERIC ANESTHESIA PROVIDER;  Location: [facility],  26mm Edward Sabino 3 valve       FAMILY HISTORY:  Family History   Problem Relation Age of Onset     Heart Disease Father      Alcohol/Drug Father      Myocardial Infarction Grandchild 18        Grandson     Myocardial Infarction Other 18        Nephew      Cancer Mother 58        Stomach cancer     Alcohol/Drug Mother       "Cancer Maternal Grandmother         stomach cancer     Alcohol/Drug Maternal Grandmother      Myocardial Infarction Sister 70        Name: Reena      Alcohol/Drug Sister      Cerebrovascular Disease Brother 65        Name: Miguelito     Alcohol/Drug Brother      Alcohol/Drug Maternal Grandfather      Alcohol/Drug Paternal Grandmother      Alcohol/Drug Paternal Grandfather        SOCIAL HISTORY:  Social History     Socioeconomic History     Marital status:      Spouse name: Alyssa     Number of children: 3     Years of education: Not on file     Highest education level: Not on file   Occupational History     Not on file   Social Needs     Financial resource strain: Not on file     Food insecurity:     Worry: Not on file     Inability: Not on file     Transportation needs:     Medical: Not on file     Non-medical: Not on file   Tobacco Use     Smoking status: Former Smoker     Packs/day: 1.00     Years: 30.00     Pack years: 30.00     Types: Cigarettes     Start date:      Last attempt to quit: 1980     Years since quittin.5     Smokeless tobacco: Former User     Types: Chew     Quit date: 1983   Substance and Sexual Activity     Alcohol use: No     Comment: quit 30+ years ago. states, \"I'm an alcoholic\".     Drug use: No     Sexual activity: Not Currently     Partners: Female   Lifestyle     Physical activity:     Days per week: Not on file     Minutes per session: Not on file     Stress: Not on file   Relationships     Social connections:     Talks on phone: Not on file     Gets together: Not on file     Attends [employer] service: Not on file     Active member of club or organization: Not on file     Attends meetings of clubs or organizations: Not on file     Relationship status: Not on file     Intimate partner violence:     Fear of current or ex partner: Not on file     Emotionally abused: Not on file     Physically abused: Not on file     Forced sexual activity: Not on file   Other Topics " Concern     Parent/sibling w/ CABG, MI or angioplasty before 65F 55M? Not Asked   Social History Narrative     Not on file       Review of Systems:  Skin:        Eyes:       ENT:       Respiratory:       Cardiovascular:       Gastroenterology:      Genitourinary:       Musculoskeletal:       Neurologic:       Psychiatric:       Heme/Lymph/Imm:       Endocrine:         Physical Exam:  Vitals: There were no vitals taken for this visit.     GEN:  NAD  NECK: No JVD  C/V:  Regular rate and rhythm, no murmur, rub or gallop.  RESP: Clear to auscultation bilaterally without wheezing, rales, or rhonchi.  GI: Abdomen soft, nontender, nondistended.   EXTREM: No LE edema.   NEURO: Alert and oriented, cooperative. No obvious focal deficits.   PSYCH: Normal affect.  SKIN: Warm and dry.       Recent Lab Results:  LIPID RESULTS:  Lab Results   Component Value Date    CHOL 97 04/12/2018    HDL 39 (L) 04/12/2018    LDL 37 04/12/2018    TRIG 106 04/12/2018    CHOLHDLRATIO 2.3 10/07/2015       LIVER ENZYME RESULTS:  Lab Results   Component Value Date    AST 23 08/28/2018    ALT 19 08/28/2018       CBC RESULTS:  Lab Results   Component Value Date    WBC 8.3 01/02/2019    RBC 4.41 01/02/2019    HGB 13.2 (L) 01/02/2019    HCT 40.6 01/02/2019    MCV 92 01/02/2019    MCH 29.9 01/02/2019    MCHC 32.5 01/02/2019    RDW 13.0 01/02/2019     01/02/2019       BMP RESULTS:  Lab Results   Component Value Date     03/13/2019    POTASSIUM 4.5 03/13/2019    CHLORIDE 103 03/13/2019    CO2 28 03/13/2019    ANIONGAP 11.5 03/13/2019     (H) 03/13/2019    BUN 18 03/13/2019    CR 1.30 03/13/2019    GFRESTIMATED 52 (L) 03/13/2019    GFRESTBLACK 63 03/13/2019    DEEPTI 9.4 03/13/2019        A1C RESULTS:  Lab Results   Component Value Date    A1C 6.7 (H) 01/02/2019       INR RESULTS:  Lab Results   Component Value Date    INR 1.01 07/24/2018         Thank you for allowing me to participate in the care of your patient.    Sincerely,     Jose  BERTHA Saldana     [facility]

## 2019-03-13 NOTE — PATIENT INSTRUCTIONS
Today's Plan:   1)  Finish your plavix prescription.   2) Come back in 6 months with repeat heart ultrasound, labs, and ECG. After this visit, you will be able to see your regular heart doctor in [place].    If you have questions or concerns please call my nurse team at [phone].     Scheduling phone number: [phone]  Reminder: Please bring in all current medications, over the counter supplements and vitamin bottles to your next appointment.    It was a pleasure seeing you today!     Jose Saldana  3/13/2019

## 2019-03-13 NOTE — PROGRESS NOTES
Primary Cardiologist: Dr. Higginbotham     Reason for visit: 6-month post TAVR follow-up with echocardiogram prior    History of Present Illness:   This is a very pleasant 85-year-old gentleman who presents to structural heart clinic for 6 month post TAVR follow-up.  His past medical history is notable for history of severe aortic stenosis (underwent TAVR on 8/28/2018 via femoral approach receiving a 26 mm Greene Sabino 3 valve), mild nonobstructive coronary artery disease, hypertension, hyperlipidemia, type 2 diabetes (non-insulin-dependent), and carotid artery disease.    His one month echocardiogram demonstrated well seated bioprosthetic aortic valve with good function.  He had trace perivalvular regurgitation.  His mean gradient was 8.2 mmHg and his LVEF was preserved.    He returns to clinic with his spouse, stating he is overall doing well.  He denies any chest discomfort, shortness of breath, palpitations, peripheral edema, orthopnea, PND, or bleeding issues.    Assessment and Plan:   This is a very pleasant 85-year-old gentleman who presents to structural heart clinic for 6 month post TAVR follow-up.  His past medical history is notable for history of severe aortic stenosis (underwent TAVR on 8/28/2018 via femoral approach receiving a 26 mm Greene Sabino 3 valve), mild nonobstructive coronary artery disease, hypertension, hyperlipidemia, type 2 diabetes (non-insulin-dependent), and carotid artery disease.    His 6-month echocardiogram demonstrates good position of the bioprosthetic aortic valve.  The mean aortic gradient is 11 mmHg.  LVEF is preserved and there is mild concentric LVH.  There is no comment on whether there is regurgitation.     I have asked him to finish his current prescription of Plavix and only continue with aspirin 81 mg daily.  We will see him back in 6 months with repeat echocardiogram, labs, and ECG.  We will need to complete a KCCQ form at that point as well.    His clinical picture is  consistent with NYHA class I.    Thank you for allowing me to participate in the care of this pleasant patient today.      This note was completed in part using Dragon voice recognition software. Although reviewed after completion, some word and grammatical errors may occur.    Orders this Visit:  No orders of the defined types were placed in this encounter.    No orders of the defined types were placed in this encounter.    There are no discontinued medications.      Encounter Diagnoses   Name Primary?     S/P TAVR (transcatheter aortic valve replacement)      Bilateral carotid artery disease (H)      Type 2 diabetes mellitus with microalbuminuria, without long-term current use of insulin (H)      Essential hypertension with goal blood pressure less than 140/90        CURRENT MEDICATIONS:  Current Outpatient Medications   Medication Sig Dispense Refill     ascorbic acid 500 MG TABS Take 500 mg by mouth every evening        aspirin 81 MG EC tablet Take 1 tablet (81 mg) by mouth daily 30 tablet      BIOTIN PO Take 5,000 mg by mouth 2 times daily        blood glucose monitoring (MIRA CONTOUR NEXT) test strip Use to test blood sugar one time daily or as directed. 100 each 5     blood glucose monitoring (MIRA MICROLET) lancets Use to test blood sugar 1 times daily or as directed. 100 each 5     clopidogrel (PLAVIX) 75 MG tablet Take 1 tablet (75 mg) by mouth daily 90 tablet 1     clotrimazole (LOTRIMIN) 1 % cream Apply topically 2 times daily (Patient not taking: Reported on 1/8/2019) 15 g 3     CONTOUR NEXT TEST test strip USE ONE STRIP TO CHECK GLUCOSE ONCE DAILY OR  AS  DIRECTED 100 strip 5     Cyanocobalamin (VITAMIN B-12 ER PO) Take by mouth daily       fluocinonide (LIDEX) 0.05 % ointment Apply topically 2 times daily       GLUCOSAMINE-CHONDROITIN PO Take 1 tablet by mouth 2 times daily        lisinopril (PRINIVIL/ZESTRIL) 2.5 MG tablet Take 1 tablet (2.5 mg) by mouth daily 90 tablet 3     metFORMIN (GLUCOPHAGE)  1000 MG tablet Take 0.5 tablets (500 mg) by mouth 2 times daily (with meals) 90 tablet 3     MICROLET LANCETS MISC USE ONE  TO CHECK GLUCOSE ONCE DAILY OR  AS  DIRECTED 100 each 5     Multiple Vitamins-Iron (MULTIVITAMIN/IRON PO) Take 1 tablet by mouth daily        pantoprazole (PROTONIX) 40 MG EC tablet Take 1 tablet (40 mg) by mouth daily Take 30-60 minutes before a meal. 90 tablet 3     simvastatin (ZOCOR) 40 MG tablet Take 0.5 tablets (20 mg) by mouth At Bedtime 45 tablet 3       ALLERGIES   No Known Allergies    PAST MEDICAL HISTORY:  Past Medical History:   Diagnosis Date     Aortic valve stenosis, nonrheumatic     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: 26mm Edward Sabino 3 valve     Coronary artery disease     cardiac cath 7/24/18: mild non-obstructive disease     Hyperlipidaemia LDL goal < 100      Hypertension      LBBB (left bundle branch block)      Need for SBE (subacute bacterial endocarditis) prophylaxis      Pulmonary hypertension (H)      Type 2 diabetes mellitus (H)        PAST SURGICAL HISTORY:  Past Surgical History:   Procedure Laterality Date     MANDIBLE SURGERY  1958     OTHER SURGICAL HISTORY      cardiac cath 7/24/18: mild non-obstructive disease     TRANSCATHETER AORTIC VALVE IMPLANT ANESTHESIA N/A 8/28/2018    Procedure: TRANSCATHETER AORTIC VALVE IMPLANT ANESTHESIA;  ANESTHESIA NEEDED FOR TAVR PROCEDURE;  Surgeon: GENERIC ANESTHESIA PROVIDER;  Location: [facility],  26mm Edward Sabino 3 valve       FAMILY HISTORY:  Family History   Problem Relation Age of Onset     Heart Disease Father      Alcohol/Drug Father      Myocardial Infarction Grandchild 18        Grandson     Myocardial Infarction Other 18        Nephew      Cancer Mother 58        Stomach cancer     Alcohol/Drug Mother      Cancer Maternal Grandmother         stomach cancer     Alcohol/Drug Maternal Grandmother      Myocardial Infarction Sister 70        Name: Reena      Alcohol/Drug Sister      Cerebrovascular Disease Brother 65        Name: Miguelito      "Alcohol/Drug Brother      Alcohol/Drug Maternal Grandfather      Alcohol/Drug Paternal Grandmother      Alcohol/Drug Paternal Grandfather        SOCIAL HISTORY:  Social History     Socioeconomic History     Marital status:      Spouse name: Alyssa     Number of children: 3     Years of education: Not on file     Highest education level: Not on file   Occupational History     Not on file   Social Needs     Financial resource strain: Not on file     Food insecurity:     Worry: Not on file     Inability: Not on file     Transportation needs:     Medical: Not on file     Non-medical: Not on file   Tobacco Use     Smoking status: Former Smoker     Packs/day: 1.00     Years: 30.00     Pack years: 30.00     Types: Cigarettes     Start date:      Last attempt to quit: 1980     Years since quittin.5     Smokeless tobacco: Former User     Types: Chew     Quit date: 1983   Substance and Sexual Activity     Alcohol use: No     Comment: quit 30+ years ago. states, \"I'm an alcoholic\".     Drug use: No     Sexual activity: Not Currently     Partners: Female   Lifestyle     Physical activity:     Days per week: Not on file     Minutes per session: Not on file     Stress: Not on file   Relationships     Social connections:     Talks on phone: Not on file     Gets together: Not on file     Attends Denominational service: Not on file     Active member of club or organization: Not on file     Attends meetings of clubs or organizations: Not on file     Relationship status: Not on file     Intimate partner violence:     Fear of current or ex partner: Not on file     Emotionally abused: Not on file     Physically abused: Not on file     Forced sexual activity: Not on file   Other Topics Concern     Parent/sibling w/ CABG, MI or angioplasty before 65F 55M? Not Asked   Social History Narrative     Not on file       Review of Systems:  Skin:        Eyes:       ENT:       Respiratory:       Cardiovascular:     "   Gastroenterology:      Genitourinary:       Musculoskeletal:       Neurologic:       Psychiatric:       Heme/Lymph/Imm:       Endocrine:         Physical Exam:  Vitals: There were no vitals taken for this visit.     GEN:  NAD  NECK: No JVD  C/V:  Regular rate and rhythm, no murmur, rub or gallop.  RESP: Clear to auscultation bilaterally without wheezing, rales, or rhonchi.  GI: Abdomen soft, nontender, nondistended.   EXTREM: No LE edema.   NEURO: Alert and oriented, cooperative. No obvious focal deficits.   PSYCH: Normal affect.  SKIN: Warm and dry.       Recent Lab Results:  LIPID RESULTS:  Lab Results   Component Value Date    CHOL 97 04/12/2018    HDL 39 (L) 04/12/2018    LDL 37 04/12/2018    TRIG 106 04/12/2018    CHOLHDLRATIO 2.3 10/07/2015       LIVER ENZYME RESULTS:  Lab Results   Component Value Date    AST 23 08/28/2018    ALT 19 08/28/2018       CBC RESULTS:  Lab Results   Component Value Date    WBC 8.3 01/02/2019    RBC 4.41 01/02/2019    HGB 13.2 (L) 01/02/2019    HCT 40.6 01/02/2019    MCV 92 01/02/2019    MCH 29.9 01/02/2019    MCHC 32.5 01/02/2019    RDW 13.0 01/02/2019     01/02/2019       BMP RESULTS:  Lab Results   Component Value Date     03/13/2019    POTASSIUM 4.5 03/13/2019    CHLORIDE 103 03/13/2019    CO2 28 03/13/2019    ANIONGAP 11.5 03/13/2019     (H) 03/13/2019    BUN 18 03/13/2019    CR 1.30 03/13/2019    GFRESTIMATED 52 (L) 03/13/2019    GFRESTBLACK 63 03/13/2019    DEEPTI 9.4 03/13/2019        A1C RESULTS:  Lab Results   Component Value Date    A1C 6.7 (H) 01/02/2019       INR RESULTS:  Lab Results   Component Value Date    INR 1.01 07/24/2018           Jose Saldana PA-C   March 13, 2019

## 2019-03-13 NOTE — LETTER
3/13/2019    Viet Bingham MD, MD  [address] [facility] [address]  [facility] [address]    RE: Reid Baroncar       Dear Colleague,    I had the pleasure of seeing Reid DIXON Jimenes in the [facility].    Primary Cardiologist: Dr. Higginbotham     Reason for visit: 6-month post TAVR follow-up with echocardiogram prior    History of Present Illness:   This is a very pleasant 85-year-old gentleman who presents to structural heart clinic for 6 month post TAVR follow-up.  His past medical history is notable for history of severe aortic stenosis (underwent TAVR on 8/28/2018 via femoral approach receiving a 26 mm Greene Sabino 3 valve), mild nonobstructive coronary artery disease, hypertension, hyperlipidemia, type 2 diabetes (non-insulin-dependent), and carotid artery disease.    His one month echocardiogram demonstrated well seated bioprosthetic aortic valve with good function.  He had trace perivalvular regurgitation.  His mean gradient was 8.2 mmHg and his LVEF was preserved.    He returns to clinic with his spouse, stating he is overall doing well.  He denies any chest discomfort, shortness of breath, palpitations, peripheral edema, orthopnea, PND, or bleeding issues.    Assessment and Plan:   This is a very pleasant 85-year-old gentleman who presents to structural heart clinic for 6 month post TAVR follow-up.  His past medical history is notable for history of severe aortic stenosis (underwent TAVR on 8/28/2018 via femoral approach receiving a 26 mm Greene Sabino 3 valve), mild nonobstructive coronary artery disease, hypertension, hyperlipidemia, type 2 diabetes (non-insulin-dependent), and carotid artery disease.    His 6-month echocardiogram demonstrates good position of the bioprosthetic aortic valve.  The mean aortic gradient is 11 mmHg.  LVEF is preserved and there is mild concentric LVH.  There is no comment on whether there is regurgitation.     I have asked him to finish his current  prescription of Plavix and only continue with aspirin 81 mg daily.  We will see him back in 6 months with repeat echocardiogram, labs, and ECG.  We will need to complete a KCCQ form at that point as well.    His clinical picture is consistent with NYHA class I.    Thank you for allowing me to participate in the care of this pleasant patient today.      This note was completed in part using Dragon voice recognition software. Although reviewed after completion, some word and grammatical errors may occur.    Orders this Visit:  No orders of the defined types were placed in this encounter.    No orders of the defined types were placed in this encounter.    There are no discontinued medications.      Encounter Diagnoses   Name Primary?     S/P TAVR (transcatheter aortic valve replacement)      Bilateral carotid artery disease (H)      Type 2 diabetes mellitus with microalbuminuria, without long-term current use of insulin (H)      Essential hypertension with goal blood pressure less than 140/90        CURRENT MEDICATIONS:  Current Outpatient Medications   Medication Sig Dispense Refill     ascorbic acid 500 MG TABS Take 500 mg by mouth every evening        aspirin 81 MG EC tablet Take 1 tablet (81 mg) by mouth daily 30 tablet      BIOTIN PO Take 5,000 mg by mouth 2 times daily        blood glucose monitoring (MIRA CONTOUR NEXT) test strip Use to test blood sugar one time daily or as directed. 100 each 5     blood glucose monitoring (MIRA MICROLET) lancets Use to test blood sugar 1 times daily or as directed. 100 each 5     clopidogrel (PLAVIX) 75 MG tablet Take 1 tablet (75 mg) by mouth daily 90 tablet 1     clotrimazole (LOTRIMIN) 1 % cream Apply topically 2 times daily (Patient not taking: Reported on 1/8/2019) 15 g 3     CONTOUR NEXT TEST test strip USE ONE STRIP TO CHECK GLUCOSE ONCE DAILY OR  AS  DIRECTED 100 strip 5     Cyanocobalamin (VITAMIN B-12 ER PO) Take by mouth daily       fluocinonide (LIDEX) 0.05 %  ointment Apply topically 2 times daily       GLUCOSAMINE-CHONDROITIN PO Take 1 tablet by mouth 2 times daily        lisinopril (PRINIVIL/ZESTRIL) 2.5 MG tablet Take 1 tablet (2.5 mg) by mouth daily 90 tablet 3     metFORMIN (GLUCOPHAGE) 1000 MG tablet Take 0.5 tablets (500 mg) by mouth 2 times daily (with meals) 90 tablet 3     MICROLET LANCETS MISC USE ONE  TO CHECK GLUCOSE ONCE DAILY OR  AS  DIRECTED 100 each 5     Multiple Vitamins-Iron (MULTIVITAMIN/IRON PO) Take 1 tablet by mouth daily        pantoprazole (PROTONIX) 40 MG EC tablet Take 1 tablet (40 mg) by mouth daily Take 30-60 minutes before a meal. 90 tablet 3     simvastatin (ZOCOR) 40 MG tablet Take 0.5 tablets (20 mg) by mouth At Bedtime 45 tablet 3       ALLERGIES   No Known Allergies    PAST MEDICAL HISTORY:  Past Medical History:   Diagnosis Date     Aortic valve stenosis, nonrheumatic     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: 26mm Edward Sabino 3 valve     Coronary artery disease     cardiac cath 7/24/18: mild non-obstructive disease     Hyperlipidaemia LDL goal < 100      Hypertension      LBBB (left bundle branch block)      Need for SBE (subacute bacterial endocarditis) prophylaxis      Pulmonary hypertension (H)      Type 2 diabetes mellitus (H)        PAST SURGICAL HISTORY:  Past Surgical History:   Procedure Laterality Date     MANDIBLE SURGERY  1958     OTHER SURGICAL HISTORY      cardiac cath 7/24/18: mild non-obstructive disease     TRANSCATHETER AORTIC VALVE IMPLANT ANESTHESIA N/A 8/28/2018    Procedure: TRANSCATHETER AORTIC VALVE IMPLANT ANESTHESIA;  ANESTHESIA NEEDED FOR TAVR PROCEDURE;  Surgeon: GENERIC ANESTHESIA PROVIDER;  Location: [facility],  26mm Edward Sabino 3 valve       FAMILY HISTORY:  Family History   Problem Relation Age of Onset     Heart Disease Father      Alcohol/Drug Father      Myocardial Infarction Grandchild 18        Grandson     Myocardial Infarction Other 18        Nephew      Cancer Mother 58        Stomach cancer     Alcohol/Drug Mother       "Cancer Maternal Grandmother         stomach cancer     Alcohol/Drug Maternal Grandmother      Myocardial Infarction Sister 70        Name: Reena      Alcohol/Drug Sister      Cerebrovascular Disease Brother 65        Name: Miguelito     Alcohol/Drug Brother      Alcohol/Drug Maternal Grandfather      Alcohol/Drug Paternal Grandmother      Alcohol/Drug Paternal Grandfather        SOCIAL HISTORY:  Social History     Socioeconomic History     Marital status:      Spouse name: Alyssa     Number of children: 3     Years of education: Not on file     Highest education level: Not on file   Occupational History     Not on file   Social Needs     Financial resource strain: Not on file     Food insecurity:     Worry: Not on file     Inability: Not on file     Transportation needs:     Medical: Not on file     Non-medical: Not on file   Tobacco Use     Smoking status: Former Smoker     Packs/day: 1.00     Years: 30.00     Pack years: 30.00     Types: Cigarettes     Start date:      Last attempt to quit: 1980     Years since quittin.5     Smokeless tobacco: Former User     Types: Chew     Quit date: 1983   Substance and Sexual Activity     Alcohol use: No     Comment: quit 30+ years ago. states, \"I'm an alcoholic\".     Drug use: No     Sexual activity: Not Currently     Partners: Female   Lifestyle     Physical activity:     Days per week: Not on file     Minutes per session: Not on file     Stress: Not on file   Relationships     Social connections:     Talks on phone: Not on file     Gets together: Not on file     Attends [employer] service: Not on file     Active member of club or organization: Not on file     Attends meetings of clubs or organizations: Not on file     Relationship status: Not on file     Intimate partner violence:     Fear of current or ex partner: Not on file     Emotionally abused: Not on file     Physically abused: Not on file     Forced sexual activity: Not on file   Other Topics " Concern     Parent/sibling w/ CABG, MI or angioplasty before 65F 55M? Not Asked   Social History Narrative     Not on file       Review of Systems:  Skin:        Eyes:       ENT:       Respiratory:       Cardiovascular:       Gastroenterology:      Genitourinary:       Musculoskeletal:       Neurologic:       Psychiatric:       Heme/Lymph/Imm:       Endocrine:         Physical Exam:  Vitals: There were no vitals taken for this visit.     GEN:  NAD  NECK: No JVD  C/V:  Regular rate and rhythm, no murmur, rub or gallop.  RESP: Clear to auscultation bilaterally without wheezing, rales, or rhonchi.  GI: Abdomen soft, nontender, nondistended.   EXTREM: No LE edema.   NEURO: Alert and oriented, cooperative. No obvious focal deficits.   PSYCH: Normal affect.  SKIN: Warm and dry.       Recent Lab Results:  LIPID RESULTS:  Lab Results   Component Value Date    CHOL 97 04/12/2018    HDL 39 (L) 04/12/2018    LDL 37 04/12/2018    TRIG 106 04/12/2018    CHOLHDLRATIO 2.3 10/07/2015       LIVER ENZYME RESULTS:  Lab Results   Component Value Date    AST 23 08/28/2018    ALT 19 08/28/2018       CBC RESULTS:  Lab Results   Component Value Date    WBC 8.3 01/02/2019    RBC 4.41 01/02/2019    HGB 13.2 (L) 01/02/2019    HCT 40.6 01/02/2019    MCV 92 01/02/2019    MCH 29.9 01/02/2019    MCHC 32.5 01/02/2019    RDW 13.0 01/02/2019     01/02/2019       BMP RESULTS:  Lab Results   Component Value Date     03/13/2019    POTASSIUM 4.5 03/13/2019    CHLORIDE 103 03/13/2019    CO2 28 03/13/2019    ANIONGAP 11.5 03/13/2019     (H) 03/13/2019    BUN 18 03/13/2019    CR 1.30 03/13/2019    GFRESTIMATED 52 (L) 03/13/2019    GFRESTBLACK 63 03/13/2019    DEEPTI 9.4 03/13/2019        A1C RESULTS:  Lab Results   Component Value Date    A1C 6.7 (H) 01/02/2019       INR RESULTS:  Lab Results   Component Value Date    INR 1.01 07/24/2018           Jose Saldana PA-C   March 13, 2019       Thank you for allowing me to participate in  the care of your patient.      Sincerely,     Jose Saldana PA-C     [facility]    cc:   Jose Saldana PA-C  [address] [place] [address]

## 2019-04-08 ENCOUNTER — TRANSFERRED RECORDS (OUTPATIENT)
Dept: HEALTH INFORMATION MANAGEMENT | Facility: CLINIC | Age: 84
End: 2019-04-08

## 2019-05-06 DIAGNOSIS — R80.9 TYPE 2 DIABETES MELLITUS WITH MICROALBUMINURIA, WITHOUT LONG-TERM CURRENT USE OF INSULIN (H): ICD-10-CM

## 2019-05-06 DIAGNOSIS — E78.5 HYPERLIPIDEMIA WITH TARGET LDL LESS THAN 100: ICD-10-CM

## 2019-05-06 DIAGNOSIS — E11.29 TYPE 2 DIABETES MELLITUS WITH MICROALBUMINURIA, WITHOUT LONG-TERM CURRENT USE OF INSULIN (H): ICD-10-CM

## 2019-05-06 LAB
ALBUMIN SERPL-MCNC: 3.9 G/DL (ref 3.4–5)
ALP SERPL-CCNC: 79 U/L (ref 40–150)
ALT SERPL W P-5'-P-CCNC: 22 U/L (ref 0–70)
ANION GAP SERPL CALCULATED.3IONS-SCNC: 3 MMOL/L (ref 3–14)
AST SERPL W P-5'-P-CCNC: 17 U/L (ref 0–45)
BILIRUB SERPL-MCNC: 0.4 MG/DL (ref 0.2–1.3)
BUN SERPL-MCNC: 25 MG/DL (ref 7–30)
CALCIUM SERPL-MCNC: 9.1 MG/DL (ref 8.5–10.1)
CHLORIDE SERPL-SCNC: 105 MMOL/L (ref 94–109)
CHOLEST SERPL-MCNC: 98 MG/DL
CO2 SERPL-SCNC: 31 MMOL/L (ref 20–32)
CREAT SERPL-MCNC: 1.1 MG/DL (ref 0.66–1.25)
GFR SERPL CREATININE-BSD FRML MDRD: 61 ML/MIN/{1.73_M2}
GLUCOSE SERPL-MCNC: 142 MG/DL (ref 70–99)
HBA1C MFR BLD: 6.5 % (ref 0–5.6)
HDLC SERPL-MCNC: 48 MG/DL
LDLC SERPL CALC-MCNC: 35 MG/DL
NONHDLC SERPL-MCNC: 50 MG/DL
POTASSIUM SERPL-SCNC: 4.3 MMOL/L (ref 3.4–5.3)
PROT SERPL-MCNC: 7.4 G/DL (ref 6.8–8.8)
SODIUM SERPL-SCNC: 139 MMOL/L (ref 133–144)
TRIGL SERPL-MCNC: 76 MG/DL

## 2019-05-06 PROCEDURE — 80061 LIPID PANEL: CPT | Performed by: INTERNAL MEDICINE

## 2019-05-06 PROCEDURE — 36415 COLL VENOUS BLD VENIPUNCTURE: CPT | Performed by: INTERNAL MEDICINE

## 2019-05-06 PROCEDURE — 80053 COMPREHEN METABOLIC PANEL: CPT | Performed by: INTERNAL MEDICINE

## 2019-05-06 PROCEDURE — 83036 HEMOGLOBIN GLYCOSYLATED A1C: CPT | Performed by: INTERNAL MEDICINE

## 2019-05-10 ENCOUNTER — OFFICE VISIT (OUTPATIENT)
Dept: INTERNAL MEDICINE | Facility: CLINIC | Age: 84
End: 2019-05-10
Payer: MEDICARE

## 2019-05-10 VITALS
OXYGEN SATURATION: 99 % | BODY MASS INDEX: 28.99 KG/M2 | WEIGHT: 174 LBS | HEIGHT: 65 IN | SYSTOLIC BLOOD PRESSURE: 118 MMHG | TEMPERATURE: 97.8 F | RESPIRATION RATE: 16 BRPM | DIASTOLIC BLOOD PRESSURE: 60 MMHG | HEART RATE: 70 BPM

## 2019-05-10 DIAGNOSIS — E11.29 TYPE 2 DIABETES MELLITUS WITH MICROALBUMINURIA, WITHOUT LONG-TERM CURRENT USE OF INSULIN (H): ICD-10-CM

## 2019-05-10 DIAGNOSIS — R21 RASH: ICD-10-CM

## 2019-05-10 DIAGNOSIS — I10 ESSENTIAL HYPERTENSION WITH GOAL BLOOD PRESSURE LESS THAN 140/90: ICD-10-CM

## 2019-05-10 DIAGNOSIS — E78.5 HYPERLIPIDEMIA WITH TARGET LDL LESS THAN 100: ICD-10-CM

## 2019-05-10 DIAGNOSIS — R80.9 TYPE 2 DIABETES MELLITUS WITH MICROALBUMINURIA, WITHOUT LONG-TERM CURRENT USE OF INSULIN (H): ICD-10-CM

## 2019-05-10 DIAGNOSIS — L67.9 HAIR CHANGES: Primary | ICD-10-CM

## 2019-05-10 DIAGNOSIS — K21.9 GASTROESOPHAGEAL REFLUX DISEASE WITHOUT ESOPHAGITIS: ICD-10-CM

## 2019-05-10 DIAGNOSIS — M25.512 LEFT SHOULDER PAIN, UNSPECIFIED CHRONICITY: ICD-10-CM

## 2019-05-10 PROCEDURE — 99214 OFFICE O/P EST MOD 30 MIN: CPT | Performed by: INTERNAL MEDICINE

## 2019-05-10 ASSESSMENT — MIFFLIN-ST. JEOR: SCORE: 1401.14

## 2019-05-10 NOTE — PATIENT INSTRUCTIONS
Recommend seeing the dermatologist about the skin/hair changes on your left scalp, and the rash in your forearms. [facility] or schedule with one of the other dermatology options on this After Visit Summary.    For now, try stopping the lisinopril to see whether this makes any difference in the scalp or the forearms.     Since you've done well for years on omeprazole, let's try switching back to this from the pantoprazole that they started after your TAVR.     Diabetes, LDL-Cholesterol and blood pressure all look fine.     See me back in about 3-4 months, with labs a few days before the office appointment.

## 2019-05-10 NOTE — PROGRESS NOTES
SUBJECTIVE:   Reid Jimenes is a 85 year old male who presents to clinic today for the following   health issues:    Diabetes Follow-up    Patient is checking blood sugars: once daily.  Results are as follows:  Breakfast - 140         lunchtime - 100    Diabetic concerns: None     Symptoms of hypoglycemia (low blood sugar): shaky, dizzy, weak, lethargy     Paresthesias (numbness or burning in feet) or sores: cramp in big toe, left foot.     Date of last diabetic eye exam: Fall of 2018    Lab Results   Component Value Date    A1C 6.5 05/06/2019    A1C 6.7 01/02/2019    A1C 6.6 08/28/2018    A1C 6.5 04/12/2018    A1C 6.7 11/01/2017     Diabetes Management Resources    A1c improved and remains within target range. He is currently taking metformin 500 mg twice daily.     Hyperlipidemia Follow-Up      Rate your low fat/cholesterol diet?: fair    Taking statin?  Yes, no muscle aches from statin    Other lipid medications/supplements?:  None    Recent Labs   Lab Test 05/06/19  0819 04/12/18  1627  10/07/15  0737 04/17/15  0812   CHOL 98 97   < > 97 106   HDL 48 39*   < > 43 49   LDL 35 37   < > 33 40   TRIG 76 106   < > 106 86   CHOLHDLRATIO  --   --   --  2.3 2.2    < > = values in this interval not displayed.     LDL controlled with simvastatin 20 mg daily.    Hypertension Follow-up      Outpatient blood pressures are not being checked.    Low Salt Diet: no added salt    Amount of exercise or physical activity: tries to walk everyday, does not time his walks.    Problems taking medications regularly: No    Medication side effects: none    Diet: low salt, low fat/cholesterol and label reading.    BP Readings from Last 3 Encounters:   05/10/19 118/60   03/13/19 120/60   01/08/19 110/58     BP stable with lisinopril 2.5 mg daily.     Scalp concern  He has an area on the left side of his scalp that he wishes to have inspected today. The hair in this area is more sparse and darker in color. It started out nickel sized  "and has since grown in size. It is not painful or itchy.     Arm rash  He has a rash on his arms bilaterally. It started out on his left arm and has then grown and spread. It is itchy at times. [facility]. Notes that lisinopril is his only new medication. He recalls that the rash occurred shortly after he started lisinopril.     Left shoulder pain  Patient complains of left shoulder pain. Worsens at night when lying down and with use of shoulder. He has no hx of left shoulder injections or surgery.     GERD  He was switched to Protonix 40 mg daily since TAVR 8/2018. He was on omeprazole prior to Protonix.     Aortic stenosis   Underwent TAVR on 8/28/2018. Recently seen by cardiology on 3/13/2019.       Additional history: as documented    Reviewed  and updated as needed this visit by clinical staff  Tobacco  Allergies  Meds  Med Hx  Surg Hx  Fam Hx  Soc Hx        Reviewed and updated as needed this visit by Provider         Labs reviewed in EPIC    ROS:  No dyspnea or cough. No chest discomfort, dizziness or palpitations. No diarrhea, abdominal pain or rectal bleeding.   No acute problems with vision or speech, lateralizing weakness or paresthesias.    ROS: as above or negative for Respiratory, CV, GI, endocrine, derm, neuro systems.    This document serves as a record of the services and decisions personally performed and made by Viet Bingham MD. It was created on his behalf by Stephanie Chow, a trained medical scribe. The creation of this document is based on the provider's statements to the medical scribe.  Stephanie Chow May 10, 2019 10:36 AM     OBJECTIVE:     /60 (BP Location: Right arm, Patient Position: Sitting, Cuff Size: Adult Large)   Pulse 70   Temp 97.8  F (36.6  C) (Oral)   Resp 16   Ht 1.651 m (5' 5\")   Wt 78.9 kg (174 lb)   SpO2 99%   BMI 28.96 kg/m    Body mass index is 28.96 kg/m .     GENERAL: healthy, alert and no " distress  RESP: lungs clear to auscultation - no rales, rhonchi or wheezes  CV: regular rate and rhythm, normal S1 S2, no S3 or S4, no murmur, click or rub, no peripheral edema and peripheral pulses strong  MS: Pain and limited ROM with internal rotation of left shoulder.  Otherwise, no gross musculoskeletal defects noted, no edema  SKIN: rash on forearms bilaterally. Large area involving much of left temporoparietal scalp with hair changes--darker in color and slightly shorter in length as compared to hair over remainder of scalp. No other suspicious lesions or rashes  NEURO: Normal strength and tone, mentation intact and speech normal  PSYCH: mentation appears normal, affect normal/bright    Diagnostic Test Results:  none     ASSESSMENT/PLAN:   (L67.9) Hair changes  (primary encounter diagnosis)  Comment: Recommended patient follow up with dermatology for further evaluation  Plan: DERMATOLOGY REFERRAL          (R21) Rash  Comment: Recommended patient discontinue lisinopril and monitor if rash improves/cleared. Encouraged patient to also follow up with dermatology.   Plan: DERMATOLOGY REFERRAL          (K21.9) Gastroesophageal reflux disease without esophagitis  Comment: Controlled. Switching from Protonix to omeprazole, which he was on prior to TAVR 8/2018  Plan: omeprazole (PRILOSEC) 20 MG DR capsule          (M25.512) Left shoulder pain, unspecified chronicity  Comment: Pain and limited ROM with internal rotation. Recommended patient follow up with orthopedics  Plan: ORTHO  REFERRAL          (E11.29,  R80.9) Type 2 diabetes mellitus with microalbuminuria, without long-term current use of insulin (H)  Comment: A1c at target. Continue current measures.    (E78.5) Hyperlipidemia with target LDL less than 100  Comment: LDL at target. Continue current meds.    (I10) Essential hypertension with goal blood pressure less than 140/90  Comment: BP at target. Continue current meds.    FUTURE APPOINTMENTS:       -  Follow-up visit in 3-4 months    Patient Instructions   Recommend seeing the dermatologist about the skin/hair changes on your left scalp, and the rash in your forearms. [facility], or schedule with one of the other dermatology options on this After Visit Summary.    For now, try stopping the lisinopril to see whether this makes any difference in the scalp or the forearms.     Since you've done well for years on omeprazole, let's try switching back to this from the pantoprazole that they started after your TAVR.     Diabetes, LDL-Cholesterol and blood pressure all look fine.     See me back in about 3-4 months, with labs a few days before the office appointment.     The information in this document, created by the medical scribe for me, accurately reflects the services I personally performed and the decisions made by me. I have reviewed and approved this document for accuracy prior to leaving the patient care area.  May 10, 2019 10:59 AM    Viet Bingham MD,   [facility]

## 2019-05-14 ENCOUNTER — OFFICE VISIT (OUTPATIENT)
Dept: ORTHOPEDICS | Facility: CLINIC | Age: 84
End: 2019-05-14
Payer: MEDICARE

## 2019-05-14 ENCOUNTER — ANCILLARY PROCEDURE (OUTPATIENT)
Dept: GENERAL RADIOLOGY | Facility: CLINIC | Age: 84
End: 2019-05-14
Attending: FAMILY MEDICINE
Payer: MEDICARE

## 2019-05-14 VITALS
WEIGHT: 174 LBS | SYSTOLIC BLOOD PRESSURE: 128 MMHG | BODY MASS INDEX: 28.99 KG/M2 | HEIGHT: 65 IN | DIASTOLIC BLOOD PRESSURE: 68 MMHG

## 2019-05-14 DIAGNOSIS — M25.512 CHRONIC LEFT SHOULDER PAIN: ICD-10-CM

## 2019-05-14 DIAGNOSIS — M19.012 LOCALIZED OSTEOARTHRITIS OF LEFT SHOULDER: Primary | ICD-10-CM

## 2019-05-14 DIAGNOSIS — M75.42 IMPINGEMENT SYNDROME OF LEFT SHOULDER: ICD-10-CM

## 2019-05-14 DIAGNOSIS — G89.29 CHRONIC LEFT SHOULDER PAIN: ICD-10-CM

## 2019-05-14 PROCEDURE — 73030 X-RAY EXAM OF SHOULDER: CPT | Mod: LT

## 2019-05-14 PROCEDURE — 99213 OFFICE O/P EST LOW 20 MIN: CPT | Performed by: FAMILY MEDICINE

## 2019-05-14 ASSESSMENT — MIFFLIN-ST. JEOR: SCORE: 1401.14
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
## 2019-05-14 NOTE — PATIENT INSTRUCTIONS
1. Localized osteoarthritis of left shoulder    2. Impingement syndrome of left shoulder    3. Chronic left shoulder pain      -Patient has left shoulder pain due to aggravation of arthritis and bursitis  -Patient will start formal physical therapy and home exercise program  -Patient will progress activities as he can tolerated  -Patient will follow up in 4 weeks if pain does not improve  -Call direct clinic number [[phone]] at any time with questions or concerns.    Albert Yeo MD [facility]  [facility] [place]

## 2019-05-23 ENCOUNTER — THERAPY VISIT (OUTPATIENT)
Dept: PHYSICAL THERAPY | Facility: CLINIC | Age: 84
End: 2019-05-23
Attending: FAMILY MEDICINE
Payer: MEDICARE

## 2019-05-23 DIAGNOSIS — M25.512 LEFT SHOULDER PAIN: ICD-10-CM

## 2019-05-23 PROCEDURE — 97162 PT EVAL MOD COMPLEX 30 MIN: CPT | Mod: GP | Performed by: PHYSICAL THERAPIST

## 2019-05-23 PROCEDURE — 97110 THERAPEUTIC EXERCISES: CPT | Mod: GP | Performed by: PHYSICAL THERAPIST

## 2019-05-23 NOTE — PROGRESS NOTES
[facility]: Physical Therapy Initial Evaluation   May 23, 2019    Subjective:   Chief Complaint: left shoulder pain    Pain: all over the left shoulder. Radiates into the back of the shoulder blade.    Numbness/Tingling: in the shoulder if he lays on it or uses it.    Weakness: hard to lift things, but has to do with pain   Stiffness: in the shoulder blade  New/Recurrent/Chronic: Chronic  DOI/onset: a year, maybe more   Referral Date: 5/14/2019 - Yeo, Albert, MD  Mechanism of onset: Thinks it started by laying on it in bed.   PMH/surgical history/trauma:    - Chemical Dependency   - Diabetes   - heart problems (artificial heart valve, August 2018)   - overweight   - arthritis of the shoulder   - memory loss  General health as reported by patient: Good    Medications: HTN, Statin,   Occupation: Retired  Previous Treatment (Effect): None  Imaging: X-ray IMPRESSION: Thinly corticated cyst in the proximal humerus. Glenohumeral joint space width and alignment appear within normal limits.  AM/PM: only painful when laying on it, or if using it for something  Quality of Pain: sharp  Pain: 0/10 at present, 0/10 at best, 9/10 at worst  Worse: laying on the left side, lifting the arm, reaching across or back,   Better: stop doing what he was doing  Progression of Symptoms since onset: getting worse   Hx of Falls in the last year: None  Sleeping: hurts right away with laying on the left side. Does not wake him, but he prefers to sleep on that side.    Current Functional Status: reaching - pain with reaching overhead, across the body, out to the side   Current HEP/exercise regimen: None  Hand/Leg Dominance: Right-handed  Transportation to Therapy: Independent with transportation  Live with Others: Wife, no pets  Red Flags:   - Patient denies the following: Pain with Cough / Sneeze / Laughing ; Night Pain ; Chest Pain ;     Patient's goal(s): find out what's wrong and be able to use the arm more again.        Objective:    Standing Posture: mild forward head posture. Trunk tilted to the left.     Scapular Positioning: Scapular elevation, abduction    Shoulder: (* indicates patient's pain)   AROM R AROM L MMT R MMT L   Flex/  Elevation 145 146 5 5   Abd 155 95* 5 3*   IR   5 5*   ER 40 40 5 4   IR/Ext T10 L3       Palpation: No tenderness to palpation.     Special tests:   R L   Impingement     Hawkin's-Alexis (-) (-)   Biceps      Speed's (-) (-)     GH/Capsular Mobility  R L   Posterior glide WNL WNL   Inferior glide WNL WNL   Anterior glide WNL WNL           Assessment/Plan:    Patient is a 85 year old male with left side shoulder complaints.    Patient has the following significant findings with corresponding treatment plan.                Referring Diagnosis: Localized osteoarthritis of left shoulder ; Impingement syndrome of left shoulder   Pain -  hot/cold therapy, manual therapy, splint/taping/bracing/orthotics, self management, education, directional preference exercise and home program  Decreased ROM/flexibility - manual therapy, therapeutic exercise, therapeutic activity and home program  Decreased strength - therapeutic exercise, therapeutic activities and home program  Decreased function - therapeutic activities and home program  Impaired posture - neuro re-education, therapeutic activities and home program      Therapy Evaluation Codes:   1) History comprised of:   Personal factors that impact the plan of care:      Cognition.    Comorbidity factors that impact the plan of care are:      Diabetes, Heart problems and Osteoarthritis.     Medications impacting care: None.  2) Examination of Body Systems comprised of:   Body structures and functions that impact the plan of care:      Fingers, Shoulder and Thoracic Spine.   Activity limitations that impact the plan of care are:      Sleeping and Reaching.  3) Clinical presentation characteristics are:   Evolving/Changing.  4) Decision-Making    Moderate  complexity using standardized patient assessment instrument and/or measureable assessment of functional outcome.  Cumulative Therapy Evaluation is: Moderate complexity.    Previous and current functional limitations:  (See Goal Flow Sheet for this information)    Short term and Long term goals: (See Goal Flow Sheet for this information)     Communication ability:  Patient appears to be able to clearly communicate and understand verbal and written communication and follow directions correctly.  Treatment Explanation - The following has been discussed with the patient:   RX ordered/plan of care  Anticipated outcomes  Possible risks and side effects  This patient would benefit from PT intervention to resume normal activities.   Rehab potential is good.    Frequency:  1 X week, once daily  Duration:  for 4 weeks tapering to 2 X a month over 4 weeks  Discharge Plan:  Achieve all LTG.  Independent in home treatment program.  Reach maximal therapeutic benefit.    Please refer to the daily flowsheet for treatment today, total treatment time and time spent performing 1:1 timed codes.

## 2019-05-23 NOTE — LETTER
DEPARTMENT OF HEALTH AND HUMAN SERVICES  CENTERS FOR MEDICARE & MEDICAID SERVICES    PLAN/UPDATED PLAN OF PROGRESS FOR OUTPATIENT REHABILITATION    PATIENTS NAME:  Reid Jimenes   : 1934  PROVIDER NUMBER:    [id number]  [id number]  PROVIDER NAME: ALESSIO TIAN PT  MEDICAL RECORD NUMBER: [id number]   START OF CARE DATE:  SOC Date: 19   TYPE:  PT  PRIMARY/TREATMENT DIAGNOSIS:Left shoulder pain  VISITS FROM START OF CARE:  Rxs Used: 1     [facility]: Physical Therapy Initial Evaluation   May 23, 2019  Subjective:   Chief Complaint: left shoulder pain    Pain: all over the left shoulder. Radiates into the back of the shoulder blade.    Numbness/Tingling: in the shoulder if he lays on it or uses it.    Weakness: hard to lift things, but has to do with pain   Stiffness: in the shoulder blade  New/Recurrent/Chronic: Chronic  DOI/onset: a year, maybe more   Referral Date: 2019 - Yeo, Albert, MD  Mechanism of onset: Thinks it started by laying on it in bed.   PMH/surgical history/trauma:    - Chemical Dependency   - Diabetes   - heart problems (artificial heart valve, 2018)   - overweight   - arthritis of the shoulder   - memory loss  General health as reported by patient: Good    Medications: HTN, Statin,   PATIENTS NAME:  Reid Jimenes   : 1934    Occupation: Retired  Previous Treatment (Effect): None  Imaging: X-ray IMPRESSION: Thinly corticated cyst in the proximal humerus. Glenohumeral joint space width and alignment appear within normal limits.  AM/PM: only painful when laying on it, or if using it for something  Quality of Pain: sharp  Pain: 0/10 at present, 0/10 at best, 9/10 at worst  Worse: laying on the left side, lifting the arm, reaching across or back,   Better: stop doing what he was doing  Progression of Symptoms since onset: getting worse   Hx of Falls in the last year: None  Sleeping: hurts right away with laying on the left side. Does  not wake him, but he prefers to sleep on that side.    Current Functional Status: reaching - pain with reaching overhead, across the body, out to the side   Current HEP/exercise regimen: None  Hand/Leg Dominance: Right-handed  Transportation to Therapy: Independent with transportation  Live with Others: Wife, no pets  Red Flags:   - Patient denies the following: Pain with Cough / Sneeze / Laughing ; Night Pain ; Chest Pain ;   Patient's goal(s): find out what's wrong and be able to use the arm more again.     Objective:    Standing Posture: mild forward head posture. Trunk tilted to the left.     Scapular Positioning: Scapular elevation, abduction      PATIENTS NAME:  Reid Jimenes   : 1934      Shoulder: (* indicates patient's pain)   AROM R AROM L MMT R MMT L   Flex/  Elevation 145 146 5 5   Abd 155 95* 5 3*   IR   5 5*   ER 40 40 5 4   IR/Ext T10 L3       Palpation: No tenderness to palpation.     Special tests:   R L   Impingement     Hawkin's-Alexis (-) (-)   Biceps      Speed's (-) (-)     GH/Capsular Mobility  R L   Posterior glide WNL WNL   Inferior glide WNL WNL   Anterior glide WNL WNL     Assessment/Plan:    Patient is a 85 year old male with left side shoulder complaints.    Patient has the following significant findings with corresponding treatment plan.                Referring Diagnosis: Localized osteoarthritis of left shoulder ; Impingement syndrome of left shoulder   Pain -  hot/cold therapy, manual therapy, splint/taping/bracing/orthotics, self management, education, directional preference exercise and home program  Decreased ROM/flexibility - manual therapy, therapeutic exercise, therapeutic activity and home program  Decreased strength - therapeutic exercise, therapeutic activities and home program  Decreased function - therapeutic activities and home program  Impaired posture - neuro re-education, therapeutic activities and home program      Therapy Evaluation Codes:   1) History  comprised of:   Personal factors that impact the plan of care:      Cognition.    Comorbidity factors that impact the plan of care are:      Diabetes, Heart problems and Osteoarthritis.     Medications impacting care: None.        PATIENTS NAME:  Reid Jimenes   : 1934      2) Examination of Body Systems comprised of:   Body structures and functions that impact the plan of care:      Fingers, Shoulder and Thoracic Spine.   Activity limitations that impact the plan of care are:      Sleeping and Reaching.  3) Clinical presentation characteristics are:   Evolving/Changing.  4) Decision-Making    Moderate complexity using standardized patient assessment instrument and/or measureable assessment of functional outcome.  Cumulative Therapy Evaluation is: Moderate complexity.    Previous and current functional limitations:  (See Goal Flow Sheet for this information)    Short term and Long term goals: (See Goal Flow Sheet for this information)     Communication ability:  Patient appears to be able to clearly communicate and understand verbal and written communication and follow directions correctly.  Treatment Explanation - The following has been discussed with the patient:   RX ordered/plan of care  Anticipated outcomes  Possible risks and side effects  This patient would benefit from PT intervention to resume normal activities.   Rehab potential is good.    Frequency:  1 X week, once daily  Duration:  for 4 weeks tapering to 2 X a month over 4 weeks  Discharge Plan:  Achieve all LTG.  Independent in home treatment program.  Reach maximal therapeutic benefit.      Caregiver Signature/Credentials _____________________________ Date ________       Treating Provider: Kuldip Gee, PT, OCS   I have reviewed and certified the need for these services and plan of treatment while under my care.        PHYSICIAN'S SIGNATURE:   ___________________________________  Date___________        Albert Yeo MD    Certification period:   "Beginning of Cert date period: 05/23/19 to  End of Cert period date: 07/25/19     Functional Level Progress Report: Please see attached \"Goal Flow sheet for Functional level.\"    ____X____ Continue Services or       ________ DC Services                Service dates: From  SOC Date: 05/23/19 date to present                         "

## 2019-05-29 ENCOUNTER — TRANSFERRED RECORDS (OUTPATIENT)
Dept: HEALTH INFORMATION MANAGEMENT | Facility: CLINIC | Age: 84
End: 2019-05-29

## 2019-05-30 ENCOUNTER — THERAPY VISIT (OUTPATIENT)
Dept: PHYSICAL THERAPY | Facility: CLINIC | Age: 84
End: 2019-05-30
Payer: MEDICARE

## 2019-05-30 DIAGNOSIS — M25.512 LEFT SHOULDER PAIN: ICD-10-CM

## 2019-05-30 PROCEDURE — 97112 NEUROMUSCULAR REEDUCATION: CPT | Mod: GP | Performed by: PHYSICAL THERAPIST

## 2019-05-30 PROCEDURE — 97110 THERAPEUTIC EXERCISES: CPT | Mod: GP | Performed by: PHYSICAL THERAPIST

## 2019-06-12 ENCOUNTER — THERAPY VISIT (OUTPATIENT)
Dept: PHYSICAL THERAPY | Facility: CLINIC | Age: 84
End: 2019-06-12
Payer: MEDICARE

## 2019-06-12 DIAGNOSIS — M25.512 LEFT SHOULDER PAIN: ICD-10-CM

## 2019-06-12 PROCEDURE — 97110 THERAPEUTIC EXERCISES: CPT | Mod: GP | Performed by: PHYSICAL THERAPIST

## 2019-06-12 PROCEDURE — 97530 THERAPEUTIC ACTIVITIES: CPT | Mod: GP | Performed by: PHYSICAL THERAPIST

## 2019-06-19 ENCOUNTER — THERAPY VISIT (OUTPATIENT)
Dept: PHYSICAL THERAPY | Facility: CLINIC | Age: 84
End: 2019-06-19
Payer: MEDICARE

## 2019-06-19 DIAGNOSIS — M25.512 LEFT SHOULDER PAIN: ICD-10-CM

## 2019-06-19 PROCEDURE — 97112 NEUROMUSCULAR REEDUCATION: CPT | Mod: GP | Performed by: PHYSICAL THERAPIST

## 2019-06-19 PROCEDURE — 97110 THERAPEUTIC EXERCISES: CPT | Mod: GP | Performed by: PHYSICAL THERAPIST

## 2019-06-19 NOTE — PROGRESS NOTES
DISCHARGE  REPORT    Progress reporting period is from May 23, 2019 to Jun 19, 2019.       SUBJECTIVE  Subjective changes noted by patient: Still has trouble with laying on the left side, but not bad. No issues with the shoulder otherwise. Exercise modifications have been going fine. Broke his band, but was able to tie it back together. Not sure if he tried the sleeping position suggestions made last time.     Current Pain level: 0/10.     Initial Pain level: 9/10.   Changes in function:  Yes (See Goal flowsheet attached for changes in current functional level)  Adverse reaction to treatment or activity: activity - Still gets some increase in pain with laying on the involved side    OBJECTIVE  Changes noted in objective findings:  Yes, Patient demonstrates improvements in strength and ROM.     Objective:    Scapular Positioning: Right scapula elevated    Shoulder: (* indicates patient's pain)   AROM R AROM L MMT R MMT L   Abd  170  5-* (can feel it)   ER    5     Other:  - Still needed cuing with row, pulldown for scapular derpression       ASSESSMENT/PLAN  Updated problem list and treatment plan: Diagnosis 1:  Localized osteoarthritis of left shoulder ; Impingement syndrome of left shoulder   Pain -  hot/cold therapy, manual therapy, splint/taping/bracing/orthotics, self management, education, directional preference exercise and home program  Decreased ROM/flexibility - manual therapy, therapeutic exercise, therapeutic activity and home program  Decreased strength - therapeutic exercise, therapeutic activities and home program  Decreased function - therapeutic activities and home program  Impaired posture - neuro re-education, therapeutic activities and home program    STG/LTGs have been met or progress has been made towards goals:  Yes (See Goal flow sheet completed today.)  Assessment of Progress: The patient's condition is improving.  The patient's condition has potential to improve.  Patient is meeting short  term goals and is progressing towards long term goals.  Self Management Plans:  Patient has been instructed in a home treatment program.  Patient  has been instructed in self management of symptoms.  I have re-evaluated this patient and find that the nature, scope, duration and intensity of the therapy is appropriate for the medical condition of the patient.  Reid continues to require the following intervention to meet STG and LTG's:  PT intervention is no longer required to meet STG/LTG.    Recommendations:  This patient is ready to be discharged from therapy and continue their home treatment program.    Please refer to the daily flowsheet for treatment today, total treatment time and time spent performing 1:1 timed codes.

## 2019-08-21 DIAGNOSIS — E11.29 TYPE 2 DIABETES MELLITUS WITH MICROALBUMINURIA, WITHOUT LONG-TERM CURRENT USE OF INSULIN (H): ICD-10-CM

## 2019-08-21 DIAGNOSIS — R80.9 TYPE 2 DIABETES MELLITUS WITH MICROALBUMINURIA, WITHOUT LONG-TERM CURRENT USE OF INSULIN (H): ICD-10-CM

## 2019-08-21 LAB — HBA1C MFR BLD: 6.4 % (ref 0–5.6)

## 2019-08-21 PROCEDURE — 36415 COLL VENOUS BLD VENIPUNCTURE: CPT | Performed by: INTERNAL MEDICINE

## 2019-08-21 PROCEDURE — 83036 HEMOGLOBIN GLYCOSYLATED A1C: CPT | Performed by: INTERNAL MEDICINE

## 2019-08-28 ENCOUNTER — OFFICE VISIT (OUTPATIENT)
Dept: INTERNAL MEDICINE | Facility: CLINIC | Age: 84
End: 2019-08-28
Payer: MEDICARE

## 2019-08-28 VITALS
OXYGEN SATURATION: 97 % | BODY MASS INDEX: 28.66 KG/M2 | WEIGHT: 172 LBS | HEART RATE: 58 BPM | TEMPERATURE: 98 F | RESPIRATION RATE: 14 BRPM | SYSTOLIC BLOOD PRESSURE: 136 MMHG | HEIGHT: 65 IN | DIASTOLIC BLOOD PRESSURE: 64 MMHG

## 2019-08-28 DIAGNOSIS — L67.9 HAIR CHANGES: Primary | ICD-10-CM

## 2019-08-28 DIAGNOSIS — R41.3 MEMORY DIFFICULTIES: ICD-10-CM

## 2019-08-28 DIAGNOSIS — R80.9 TYPE 2 DIABETES MELLITUS WITH MICROALBUMINURIA, WITHOUT LONG-TERM CURRENT USE OF INSULIN (H): ICD-10-CM

## 2019-08-28 DIAGNOSIS — I10 ESSENTIAL HYPERTENSION WITH GOAL BLOOD PRESSURE LESS THAN 140/90: ICD-10-CM

## 2019-08-28 DIAGNOSIS — Z95.2 S/P TAVR (TRANSCATHETER AORTIC VALVE REPLACEMENT): ICD-10-CM

## 2019-08-28 DIAGNOSIS — E78.5 HYPERLIPIDEMIA WITH TARGET LDL LESS THAN 100: ICD-10-CM

## 2019-08-28 DIAGNOSIS — K21.9 GASTROESOPHAGEAL REFLUX DISEASE WITHOUT ESOPHAGITIS: ICD-10-CM

## 2019-08-28 DIAGNOSIS — E11.29 TYPE 2 DIABETES MELLITUS WITH MICROALBUMINURIA, WITHOUT LONG-TERM CURRENT USE OF INSULIN (H): ICD-10-CM

## 2019-08-28 PROCEDURE — 99214 OFFICE O/P EST MOD 30 MIN: CPT | Performed by: INTERNAL MEDICINE

## 2019-08-28 ASSESSMENT — MIFFLIN-ST. JEOR: SCORE: 1392.07

## 2019-08-28 NOTE — NURSING NOTE
"Chief Complaint   Patient presents with     RECHECK     diabetes, lipids     initial BP (!) 140/66   Pulse 58   Temp 98  F (36.7  C) (Oral)   Resp 14   Ht 1.651 m (5' 5\")   Wt 78 kg (172 lb)   SpO2 97%   BMI 28.62 kg/m   Estimated body mass index is 28.62 kg/m  as calculated from the following:    Height as of this encounter: 1.651 m (5' 5\").    Weight as of this encounter: 78 kg (172 lb)..  bp completed using cuff size large  MALLORY KOEHLER LPN  "

## 2019-08-28 NOTE — PATIENT INSTRUCTIONS
Dr Bingham will try to reach the [facility] to see if they need to see your left scalp discoloration.     Dr Bingham has placed an order, hopefully to have cardiology call you for your next post-TAVR visit due in September.     You will be due to see the Neurology specialist in April 2020. Rocael Higginbotham with [facility] in [place].     Most recent diabetes test looks great (in August), so did the last LDL-Cholesterol in May.  Repeat BP looks fine.     [facility]     See me back for diabetes check or a wellness visit in 6 months.

## 2019-08-28 NOTE — PROGRESS NOTES
.Subjective     Reid Jimenes is a 85 year old male who presents to clinic today for the following health issues:    HPI   Diabetes Follow-up      How often are you checking your blood sugar? Once a week    What time of day are you checking your blood sugars (select all that apply)?  Before meals    Have you had any blood sugars above 200?  No    Have you had any blood sugars below 70?  No    What symptoms do you notice when your blood sugar is low?  None    What concerns do you have today about your diabetes? None     Do you have any of these symptoms? (Select all that apply)  No numbness or tingling in feet.  No redness, sores or blisters on feet.  No complaints of excessive thirst.  No reports of blurry vision.  No significant changes to weight.     Have you had a diabetic eye exam in the last 12 months? No    Diabetes Management Resources    Lab Results   Component Value Date    A1C 6.4 08/21/2019    A1C 6.5 05/06/2019    A1C 6.7 01/02/2019    A1C 6.6 08/28/2018    A1C 6.5 04/12/2018     A1c within target range. Currently on metformin 500 mg twice daily. He checks blood glucose levels 2x a week. AM fasting levels average 135 mg/dL.     Hyperlipidemia Follow-Up      Are you having any of the following symptoms? (Select all that apply)  Shortness of breath and Left-sided neck or arm pain    Are you regularly taking any medication or supplement to lower your cholesterol?   Yes- simvastatin    Are you having muscle aches or other side effects that you think could be caused by your cholesterol lowering medication?  No     Recent Labs   Lab Test 05/06/19  0819 04/12/18  1627  10/07/15  0737 04/17/15  0812   CHOL 98 97   < > 97 106   HDL 48 39*   < > 43 49   LDL 35 37   < > 33 40   TRIG 76 106   < > 106 86   CHOLHDLRATIO  --   --   --  2.3 2.2    < > = values in this interval not displayed.     Managed with simvastatin 20 mg daily.     Hypertension Follow-up      Do you check your blood pressure regularly outside of  the clinic? No     Are you following a low salt diet? Yes    Are your blood pressures ever more than 140 on the top number (systolic) OR more   than 90 on the bottom number (diastolic), for example 140/90? No    How many servings of fruits and vegetables do you eat daily?  0-1    On average, how many sweetened beverages do you drink each day (soda, juice, sweet tea, etc)?   0  How many days per week do you miss taking your medication? 1    What makes it hard for you to take your medications?  remembering to take    BP Readings from Last 3 Encounters:   08/28/19 136/64   05/14/19 128/68   05/10/19 118/60     First BP reading was elevated, 140/66. Second BP reading improved to 136/64. Notes that he did not sleep well last night.     Hair changes  Recall that at [facility] patient had an area on the left side of his scalp with more sparse and darker colored hair. It still persists. He did see dermatology on 5/29/2019, but his scalp and hair changes were not addressed, the rashes on his arms were the primary focus of that visit.     Memory  Patient was seen by Dr. Higginbotham of neurology on 4/8/2019 for an evaluation. He was diagnosed with mild cognitive impairment and recommended an MRI and checking vitamin B12 levels. He was recommended a follow up in 1 year.     S/p TAVR  Patient was last seen by cardiology on 3/13/2019 at [facility]. He was recommended a follow up in 6 months with an echocardiogram prior to visit.     Past/recent records reviewed and discussed for:  -Balance has worsened.    Reviewed and updated as needed this visit by Provider         Review of Systems   No dyspnea or cough. No chest discomfort, dizziness or palpitations. No diarrhea, abdominal pain or rectal bleeding.   No acute problems with vision or speech, lateralizing weakness or paresthesias.    ROS: as above or negative for Respiratory, CV, GI, endocrine, neuro systems.    This document serves as a record of the services and decisions personally  "performed and made by Viet Bingham MD. It was created on his behalf by Stephanie Chow, a trained medical scribe. The creation of this document is based on the provider's statements to the medical scribe.  Stephanie Chow August 28, 2019 9:50 AM         Objective    /64   Pulse 58   Temp 98  F (36.7  C) (Oral)   Resp 14   Ht 1.651 m (5' 5\")   Wt 78 kg (172 lb)   SpO2 97%   BMI 28.62 kg/m    Body mass index is 28.62 kg/m .     Physical Exam   GENERAL: healthy, alert and no distress  RESP: lungs clear to auscultation - no rales, rhonchi or wheezes  CV: regular rate and rhythm, normal S1 S2, no S3 or S4, no murmur, click or rub, no peripheral edema and peripheral pulses strong  MS: no gross musculoskeletal defects noted, no edema  SKIN: left sided scalp discoloration, no suspicious lesions or rashes  NEURO: Normal strength and tone, mentation intact and speech normal  PSYCH: mentation appears normal, affect normal/bright      Diagnostic Test Results:  Labs reviewed in Epic        Assessment & Plan     (L67.9) Hair changes  (primary encounter diagnosis)  Comment: Dr. Bingham will try to contact dermatology to see if patient needs to be seen for the scalp discoloration    (E11.29,  R80.9) Type 2 diabetes mellitus with microalbuminuria, without long-term current use of insulin (H)  Comment: A1c at target. Continue current measures.    (Z95.2) S/P TAVR (transcatheter aortic valve replacement)  Comment: Patient due for next TAVR follow up with cardiology in September, referral placed  Plan: CARDIO  ADULT REFERRAL          (K21.9) Gastroesophageal reflux disease without esophagitis  Comment: Stable. Continue current meds  Plan: omeprazole (PRILOSEC) 20 MG DR capsule          (I10) Essential hypertension with goal blood pressure less than 140/90  Comment: BP at target. Continue current meds.    (R41.3) Memory difficulties  Comment: Stable. Patient due for follow up with neurology April 2020  Plan: " "NEUROLOGY ADULT REFERRAL          (E78.5) Hyperlipidemia with target LDL less than 100  Comment: LDL at target. Continue current meds.       BMI:   Estimated body mass index is 28.62 kg/m  as calculated from the following:    Height as of this encounter: 1.651 m (5' 5\").    Weight as of this encounter: 78 kg (172 lb).     FUTURE APPOINTMENTS:       - Follow-up visit in 4-6 months    Patient Instructions   Dr Bingham will try to reach the [facility] to see if they need to see your left scalp discoloration.     Dr Bingham has placed an order, hopefully to have cardiology call you for your next post-TAVR visit due in September.     You will be due to see the Neurology specialist in April 2020. Rocael Higginbotham with [facility] in [place].     Most recent diabetes test looks great (in August), so did the last LDL-Cholesterol in May.  Repeat BP looks fine.     [facility]     See me back for diabetes check or a wellness visit in 6 months.         The information in this document, created by the medical scribe for me, accurately reflects the services I personally performed and the decisions made by me. I have reviewed and approved this document for accuracy prior to leaving the patient care area.  August 28, 2019 10:16 AM    Viet Bingham MD,   [facility]        "

## 2019-09-03 DIAGNOSIS — Z95.2 S/P TAVR (TRANSCATHETER AORTIC VALVE REPLACEMENT): Primary | ICD-10-CM

## 2019-10-01 ENCOUNTER — HEALTH MAINTENANCE LETTER (OUTPATIENT)
Age: 84
End: 2019-10-01

## 2019-10-09 NOTE — PROGRESS NOTES
Primary Cardiologist: Dr. Salazar    Reason for Visit: 1 year TAVR follow up    History of Present Illness:   This is a very pleasant 85-year-old gentleman with past medical history is notable for history of severe aortic stenosis (underwent TAVR on 8/28/2018 via femoral approach receiving a 26 mm Greene Sabino 3 valve; developed LBBB subsequently), mild nonobstructive coronary artery disease, hypertension, hyperlipidemia, type 2 diabetes (non-insulin-dependent), and carotid artery disease.    6-month postTAVR echocardiogram showed good position of the bioprosthetic aortic valve with mean aortic gradient of 11 mmHg and no regurgitation.    He returns to clinic, stating he is doing well. He will have occasional lightheadedness mainly when he is standing for a long time. He has not had presyncope while sitting. No syncope. No bleeding issues. He thinks his exercise tolerance is good. He continues to play golf.    Assessment and Plan:   This is a very pleasant 85-year-old gentleman with past medical history is notable for history of severe aortic stenosis (underwent TAVR on 8/28/2018 via femoral approach receiving a 26 mm Greene Sabino 3 valve), mild nonobstructive coronary artery disease, hypertension, hyperlipidemia, type 2 diabetes (non-insulin-dependent), and carotid artery disease.    He is doing well from cardiac standpoint. He does have occasional lightheadedness that occurs while he is standing.  He denies any other symptoms or concerns.  Twelve-lead ECG demonstrates sinus bradycardia with left bundle branch block morphology and first-degree AV block.  His AK interval seems to be longer than previous ECGs at 240 ms.  He is not on any AV anibal blocking agents.  I recommended that we perform a 1 week event monitor for evaluation of his symptoms.  If he has any higher degree of AV block or sinus pauses he may need pacemaker implantation.      His basic metabolic panel and CBC demonstrate stable numbers.    Repeat  echocardiogram demonstrates preserved biventricular function.  His mean aortic valve gradient is slightly higher at 17 mmHg.  He appears to have no significant regurgitation.  He remains on baby aspirin.  He is aware of SBE prophylaxis.  I will review his gradients with Dr. Salazar.    Overall his clinical picture is consistent with NYHA class I.    He will follow with Dr. Higginbotham in [place] in 1 year with repeat echocardiogram    Thank you for allowing me to participate in the care of this pleasant patient today.    This note was completed in part using Dragon voice recognition software. Although reviewed after completion, some word and grammatical errors may occur.    Orders this Visit:  Orders Placed This Encounter   Procedures     EKG 12-lead complete w/read - Clinics (performed today)     Cardiac Event Monitor Adult Pediatric     No orders of the defined types were placed in this encounter.    There are no discontinued medications.      Encounter Diagnoses   Name Primary?     S/P TAVR (transcatheter aortic valve replacement) Yes     Essential hypertension with goal blood pressure less than 140/90      Bilateral carotid artery disease, unspecified type (H)      Coronary artery disease involving native coronary artery of native heart without angina pectoris      Hyperlipidemia with target LDL less than 100        CURRENT MEDICATIONS:  Current Outpatient Medications   Medication Sig Dispense Refill     ascorbic acid 500 MG TABS Take 500 mg by mouth every evening        aspirin 81 MG EC tablet Take 1 tablet (81 mg) by mouth daily 30 tablet      BIOTIN PO Take 5,000 mg by mouth 2 times daily        Cyanocobalamin (VITAMIN B-12 ER PO) Take by mouth daily       GLUCOSAMINE-CHONDROITIN PO Take 1 tablet by mouth 2 times daily        metFORMIN (GLUCOPHAGE) 1000 MG tablet Take 0.5 tablets (500 mg) by mouth 2 times daily (with meals) 90 tablet 3     Multiple Vitamins-Iron (MULTIVITAMIN/IRON PO) Take 1 tablet by mouth  daily        simvastatin (ZOCOR) 40 MG tablet Take 0.5 tablets (20 mg) by mouth At Bedtime 45 tablet 3     blood glucose monitoring (MIRA CONTOUR NEXT) test strip Use to test blood sugar one time daily or as directed. 100 each 5     blood glucose monitoring (MIRA MICROLET) lancets Use to test blood sugar 1 times daily or as directed. 100 each 5     clotrimazole (LOTRIMIN) 1 % cream Apply topically 2 times daily (Patient not taking: Reported on 10/10/2019) 15 g 3     CONTOUR NEXT TEST test strip USE ONE STRIP TO CHECK GLUCOSE ONCE DAILY OR  AS  DIRECTED 100 strip 5     MICROLET LANCETS MISC USE ONE  TO CHECK GLUCOSE ONCE DAILY OR  AS  DIRECTED 100 each 5     omeprazole (PRILOSEC) 20 MG DR capsule Take 1 capsule (20 mg) by mouth daily (Patient not taking: Reported on 10/10/2019) 90 capsule 3       ALLERGIES   No Known Allergies    PAST MEDICAL HISTORY:  Past Medical History:   Diagnosis Date     Aortic valve stenosis, nonrheumatic     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: 26mm Edward Sabino 3 valve     Coronary artery disease     cardiac cath 7/24/18: mild non-obstructive disease     Hyperlipidaemia LDL goal < 100      Hypertension      LBBB (left bundle branch block)      Need for SBE (subacute bacterial endocarditis) prophylaxis      Pulmonary hypertension (H)      Type 2 diabetes mellitus (H)        PAST SURGICAL HISTORY:  Past Surgical History:   Procedure Laterality Date     MANDIBLE SURGERY  1958     OTHER SURGICAL HISTORY      cardiac cath 7/24/18: mild non-obstructive disease     TRANSCATHETER AORTIC VALVE IMPLANT ANESTHESIA N/A 8/28/2018    Procedure: TRANSCATHETER AORTIC VALVE IMPLANT ANESTHESIA;  ANESTHESIA NEEDED FOR TAVR PROCEDURE;  Surgeon: GENERIC ANESTHESIA PROVIDER;  Location: [facility],  26mm Edward Sabino 3 valve       FAMILY HISTORY:  Family History   Problem Relation Age of Onset     Heart Disease Father      Alcohol/Drug Father      Myocardial Infarction Grandchild 18        Grandson     Myocardial Infarction Other 18      "   Nephew      Cancer Mother 58        Stomach cancer     Alcohol/Drug Mother      Cancer Maternal Grandmother         stomach cancer     Alcohol/Drug Maternal Grandmother      Myocardial Infarction Sister 70        Name: Reena      Alcohol/Drug Sister      Cerebrovascular Disease Brother 65        Name: Miguelito     Alcohol/Drug Brother      Alcohol/Drug Maternal Grandfather      Alcohol/Drug Paternal Grandmother      Alcohol/Drug Paternal Grandfather        SOCIAL HISTORY:  Social History     Socioeconomic History     Marital status:      Spouse name: Alyssa     Number of children: 3     Years of education: None     Highest education level: None   Occupational History     None   Social Needs     Financial resource strain: None     Food insecurity:     Worry: None     Inability: None     Transportation needs:     Medical: None     Non-medical: None   Tobacco Use     Smoking status: Former Smoker     Packs/day: 1.00     Years: 30.00     Pack years: 30.00     Types: Cigarettes     Start date:      Last attempt to quit: 1980     Years since quittin.1     Smokeless tobacco: Former User     Types: Chew     Quit date: 1983   Substance and Sexual Activity     Alcohol use: No     Comment: quit 30+ years ago. states, \"I'm an alcoholic\".     Drug use: No     Sexual activity: Not Currently     Partners: Female   Lifestyle     Physical activity:     Days per week: None     Minutes per session: None     Stress: None   Relationships     Social connections:     Talks on phone: None     Gets together: None     Attends Christian service: None     Active member of club or organization: None     Attends meetings of clubs or organizations: None     Relationship status: None     Intimate partner violence:     Fear of current or ex partner: None     Emotionally abused: None     Physically abused: None     Forced sexual activity: None   Other Topics Concern     Parent/sibling w/ CABG, MI or angioplasty before 65F 55M? " "Not Asked   Social History Narrative     None       Review of Systems:  Skin:  not assessed rash   Eyes:  Positive for glasses  ENT:  Positive for hearing loss  Respiratory:  Positive for dyspnea on exertion;shortness of breath  Cardiovascular:    edema;Positive for  Gastroenterology: Negative    Genitourinary:  Negative    Musculoskeletal:  Positive for nocturnal cramping  Neurologic:  Negative    Psychiatric:  Negative sleep disturbances  Heme/Lymph/Imm:  Negative    Endocrine:  Positive for diabetes    Physical Exam:  Vitals: /62   Pulse (!) 48   Ht 1.651 m (5' 5\")   Wt 80.7 kg (178 lb)   BMI 29.62 kg/m       GEN:  NAD  NECK: No JVD  C/V:  Regular rate and rhythm, no murmur, rub or gallop.  RESP: Clear to auscultation bilaterally without wheezing, rales, or rhonchi.  GI: Abdomen soft, nontender, nondistended.   EXTREM: No LE edema.   NEURO: Alert and oriented, cooperative. No obvious focal deficits.   PSYCH: Normal affect.  SKIN: Warm and dry.       Recent Lab Results:  LIPID RESULTS:  Lab Results   Component Value Date    CHOL 98 05/06/2019    HDL 48 05/06/2019    LDL 35 05/06/2019    TRIG 76 05/06/2019    CHOLHDLRATIO 2.3 10/07/2015       LIVER ENZYME RESULTS:  Lab Results   Component Value Date    AST 17 05/06/2019    ALT 22 05/06/2019       CBC RESULTS:  Lab Results   Component Value Date    WBC 7.0 10/10/2019    RBC 4.48 10/10/2019    HGB 13.3 10/10/2019    HCT 40.6 10/10/2019    MCV 91 10/10/2019    MCH 29.7 10/10/2019    MCHC 32.8 10/10/2019    RDW 12.9 10/10/2019     (L) 10/10/2019       BMP RESULTS:  Lab Results   Component Value Date     10/10/2019    POTASSIUM 4.4 10/10/2019    CHLORIDE 105 10/10/2019    CO2 30 (H) 10/10/2019    ANIONGAP 10.4 10/10/2019     (H) 10/10/2019    BUN 20 10/10/2019    CR 1.15 10/10/2019    GFRESTIMATED 60 (L) 10/10/2019    GFRESTBLACK 73 10/10/2019    DEEPTI 9.8 10/10/2019        A1C RESULTS:  Lab Results   Component Value Date    A1C 6.4 (H) " 08/21/2019       INR RESULTS:  Lab Results   Component Value Date    INR 1.01 07/24/2018           Jose Saldana PA-C, [facility]   October 9, 2019

## 2019-10-10 ENCOUNTER — OFFICE VISIT (OUTPATIENT)
Dept: CARDIOLOGY | Facility: CLINIC | Age: 84
End: 2019-10-10
Attending: INTERNAL MEDICINE
Payer: MEDICARE

## 2019-10-10 ENCOUNTER — HOSPITAL ENCOUNTER (OUTPATIENT)
Dept: CARDIOLOGY | Facility: CLINIC | Age: 84
Discharge: HOME | End: 2019-10-10
Attending: INTERNAL MEDICINE | Admitting: INTERNAL MEDICINE
Payer: MEDICARE

## 2019-10-10 VITALS
BODY MASS INDEX: 29.66 KG/M2 | DIASTOLIC BLOOD PRESSURE: 62 MMHG | SYSTOLIC BLOOD PRESSURE: 120 MMHG | HEART RATE: 48 BPM | HEIGHT: 65 IN | WEIGHT: 178 LBS

## 2019-10-10 DIAGNOSIS — Z95.2 S/P TAVR (TRANSCATHETER AORTIC VALVE REPLACEMENT): ICD-10-CM

## 2019-10-10 DIAGNOSIS — E78.5 HYPERLIPIDEMIA WITH TARGET LDL LESS THAN 100: ICD-10-CM

## 2019-10-10 DIAGNOSIS — I10 ESSENTIAL HYPERTENSION WITH GOAL BLOOD PRESSURE LESS THAN 140/90: ICD-10-CM

## 2019-10-10 DIAGNOSIS — I25.10 CORONARY ARTERY DISEASE INVOLVING NATIVE CORONARY ARTERY OF NATIVE HEART WITHOUT ANGINA PECTORIS: ICD-10-CM

## 2019-10-10 DIAGNOSIS — Z95.2 S/P TAVR (TRANSCATHETER AORTIC VALVE REPLACEMENT): Primary | ICD-10-CM

## 2019-10-10 LAB
ANION GAP SERPL CALCULATED.3IONS-SCNC: 10.4 MMOL/L (ref 6–17)
BUN SERPL-MCNC: 20 MG/DL (ref 7–30)
CALCIUM SERPL-MCNC: 9.8 MG/DL (ref 8.5–10.5)
CHLORIDE SERPL-SCNC: 105 MMOL/L (ref 98–107)
CO2 SERPL-SCNC: 30 MMOL/L (ref 23–29)
CREAT SERPL-MCNC: 1.15 MG/DL (ref 0.7–1.3)
ERYTHROCYTE [DISTWIDTH] IN BLOOD BY AUTOMATED COUNT: 12.9 % (ref 10–15)
GFR SERPL CREATININE-BSD FRML MDRD: 60 ML/MIN/{1.73_M2}
GLUCOSE SERPL-MCNC: 122 MG/DL (ref 70–105)
HCT VFR BLD AUTO: 40.6 % (ref 40–53)
HGB BLD-MCNC: 13.3 G/DL (ref 13.3–17.7)
MCH RBC QN AUTO: 29.7 PG (ref 26.5–33)
MCHC RBC AUTO-ENTMCNC: 32.8 G/DL (ref 31.5–36.5)
MCV RBC AUTO: 91 FL (ref 78–100)
PLATELET # BLD AUTO: 145 10E9/L (ref 150–450)
POTASSIUM SERPL-SCNC: 4.4 MMOL/L (ref 3.5–5.1)
RBC # BLD AUTO: 4.48 10E12/L (ref 4.4–5.9)
SODIUM SERPL-SCNC: 141 MMOL/L (ref 136–145)
WBC # BLD AUTO: 7 10E9/L (ref 4–11)

## 2019-10-10 PROCEDURE — 80048 BASIC METABOLIC PNL TOTAL CA: CPT | Performed by: INTERNAL MEDICINE

## 2019-10-10 PROCEDURE — 93306 TTE W/DOPPLER COMPLETE: CPT | Mod: 26 | Performed by: INTERNAL MEDICINE

## 2019-10-10 PROCEDURE — 36415 COLL VENOUS BLD VENIPUNCTURE: CPT | Performed by: INTERNAL MEDICINE

## 2019-10-10 PROCEDURE — 93306 TTE W/DOPPLER COMPLETE: CPT

## 2019-10-10 PROCEDURE — 85027 COMPLETE CBC AUTOMATED: CPT | Performed by: INTERNAL MEDICINE

## 2019-10-10 PROCEDURE — 99214 OFFICE O/P EST MOD 30 MIN: CPT | Mod: 25 | Performed by: PHYSICIAN ASSISTANT

## 2019-10-10 PROCEDURE — 93000 ELECTROCARDIOGRAM COMPLETE: CPT | Performed by: PHYSICIAN ASSISTANT

## 2019-10-10 ASSESSMENT — MIFFLIN-ST. JEOR: SCORE: 1419.28

## 2019-10-10 NOTE — PATIENT INSTRUCTIONS
Today's Plan:   1) Schedule 1-week heart monitor.   2) Your heart valve is working well.   3) Follow up with Dr. Higginbotham in [place] in 1 year with repeat heart ultrasound to look at your valve. Continue with aspirin.     If you have questions or concerns please call my nurse team at [phone].     Scheduling phone number: [phone]  Reminder: Please bring in all current medications, over the counter supplements and vitamin bottles to your next appointment.    It was a pleasure seeing you today!     Jose Saldana PA-C  10/10/2019

## 2019-10-10 NOTE — LETTER
10/10/2019    Viet Bingham MD, MD  [address] [facility] [address]  [facility] [address]    RE: Reid Jimenes       Dear Colleague,    I had the pleasure of seeing Reid METCALF Yudy in the [facility].    Primary Cardiologist: Dr. Salazar    Reason for Visit: 1 year TAVR follow up    History of Present Illness:   This is a very pleasant 85-year-old gentleman with past medical history is notable for history of severe aortic stenosis (underwent TAVR on 8/28/2018 via femoral approach receiving a 26 mm Greene Sabino 3 valve; developed LBBB subsequently), mild nonobstructive coronary artery disease, hypertension, hyperlipidemia, type 2 diabetes (non-insulin-dependent), and carotid artery disease.    6-month postTAVR echocardiogram showed good position of the bioprosthetic aortic valve with mean aortic gradient of 11 mmHg and no regurgitation.    He returns to clinic, stating he is doing well. He will have occasional lightheadedness mainly when he is standing for a long time. He has not had presyncope while sitting. No syncope. No bleeding issues. He thinks his exercise tolerance is good. He continues to play golf.    Assessment and Plan:   This is a very pleasant 85-year-old gentleman with past medical history is notable for history of severe aortic stenosis (underwent TAVR on 8/28/2018 via femoral approach receiving a 26 mm Greene Sabino 3 valve), mild nonobstructive coronary artery disease, hypertension, hyperlipidemia, type 2 diabetes (non-insulin-dependent), and carotid artery disease.    He is doing well from cardiac standpoint. He does have occasional lightheadedness that occurs while he is standing.  He denies any other symptoms or concerns.  Twelve-lead ECG demonstrates sinus bradycardia with left bundle branch block morphology and first-degree AV block.  His NV interval seems to be longer than previous ECGs at 240 ms.  He is not on any AV anibal blocking agents.  I recommended that we  perform a 1 week event monitor for evaluation of his symptoms.  If he has any higher degree of AV block or sinus pauses he may need pacemaker implantation.      His basic metabolic panel and CBC demonstrate stable numbers.    Repeat echocardiogram demonstrates preserved biventricular function.  His mean aortic valve gradient is slightly higher at 17 mmHg.  He appears to have no significant regurgitation.  He remains on baby aspirin.  He is aware of SBE prophylaxis.  I will review his gradients with Dr. Salazar.    Overall his clinical picture is consistent with NYHA class I.    He will follow with Dr. Higginbotham in [place] in 1 year with repeat echocardiogram    Thank you for allowing me to participate in the care of this pleasant patient today.    This note was completed in part using Dragon voice recognition software. Although reviewed after completion, some word and grammatical errors may occur.    Orders this Visit:  Orders Placed This Encounter   Procedures     EKG 12-lead complete w/read - Clinics (performed today)     Cardiac Event Monitor Adult Pediatric     No orders of the defined types were placed in this encounter.    There are no discontinued medications.      Encounter Diagnoses   Name Primary?     S/P TAVR (transcatheter aortic valve replacement) Yes     Essential hypertension with goal blood pressure less than 140/90      Bilateral carotid artery disease, unspecified type (H)      Coronary artery disease involving native coronary artery of native heart without angina pectoris      Hyperlipidemia with target LDL less than 100        CURRENT MEDICATIONS:  Current Outpatient Medications   Medication Sig Dispense Refill     ascorbic acid 500 MG TABS Take 500 mg by mouth every evening        aspirin 81 MG EC tablet Take 1 tablet (81 mg) by mouth daily 30 tablet      BIOTIN PO Take 5,000 mg by mouth 2 times daily        Cyanocobalamin (VITAMIN B-12 ER PO) Take by mouth daily       GLUCOSAMINE-CHONDROITIN  PO Take 1 tablet by mouth 2 times daily        metFORMIN (GLUCOPHAGE) 1000 MG tablet Take 0.5 tablets (500 mg) by mouth 2 times daily (with meals) 90 tablet 3     Multiple Vitamins-Iron (MULTIVITAMIN/IRON PO) Take 1 tablet by mouth daily        simvastatin (ZOCOR) 40 MG tablet Take 0.5 tablets (20 mg) by mouth At Bedtime 45 tablet 3     blood glucose monitoring (MIRA CONTOUR NEXT) test strip Use to test blood sugar one time daily or as directed. 100 each 5     blood glucose monitoring (MIRA MICROLET) lancets Use to test blood sugar 1 times daily or as directed. 100 each 5     clotrimazole (LOTRIMIN) 1 % cream Apply topically 2 times daily (Patient not taking: Reported on 10/10/2019) 15 g 3     CONTOUR NEXT TEST test strip USE ONE STRIP TO CHECK GLUCOSE ONCE DAILY OR  AS  DIRECTED 100 strip 5     MICROLET LANCETS MISC USE ONE  TO CHECK GLUCOSE ONCE DAILY OR  AS  DIRECTED 100 each 5     omeprazole (PRILOSEC) 20 MG DR capsule Take 1 capsule (20 mg) by mouth daily (Patient not taking: Reported on 10/10/2019) 90 capsule 3       ALLERGIES   No Known Allergies    PAST MEDICAL HISTORY:  Past Medical History:   Diagnosis Date     Aortic valve stenosis, nonrheumatic     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: 26mm Edward Sabino 3 valve     Coronary artery disease     cardiac cath 7/24/18: mild non-obstructive disease     Hyperlipidaemia LDL goal < 100      Hypertension      LBBB (left bundle branch block)      Need for SBE (subacute bacterial endocarditis) prophylaxis      Pulmonary hypertension (H)      Type 2 diabetes mellitus (H)        PAST SURGICAL HISTORY:  Past Surgical History:   Procedure Laterality Date     MANDIBLE SURGERY  1958     OTHER SURGICAL HISTORY      cardiac cath 7/24/18: mild non-obstructive disease     TRANSCATHETER AORTIC VALVE IMPLANT ANESTHESIA N/A 8/28/2018    Procedure: TRANSCATHETER AORTIC VALVE IMPLANT ANESTHESIA;  ANESTHESIA NEEDED FOR TAVR PROCEDURE;  Surgeon: GENERIC ANESTHESIA PROVIDER;  Location:  OR,  26mm  "Edshonna Sabino 3 valve       FAMILY HISTORY:  Family History   Problem Relation Age of Onset     Heart Disease Father      Alcohol/Drug Father      Myocardial Infarction Grandchild 18        Grandson     Myocardial Infarction Other 18        Nephew      Cancer Mother 58        Stomach cancer     Alcohol/Drug Mother      Cancer Maternal Grandmother         stomach cancer     Alcohol/Drug Maternal Grandmother      Myocardial Infarction Sister 70        Name: Reena      Alcohol/Drug Sister      Cerebrovascular Disease Brother 65        Name: Miguelito     Alcohol/Drug Brother      Alcohol/Drug Maternal Grandfather      Alcohol/Drug Paternal Grandmother      Alcohol/Drug Paternal Grandfather        SOCIAL HISTORY:  Social History     Socioeconomic History     Marital status:      Spouse name: Alyssa     Number of children: 3     Years of education: None     Highest education level: None   Occupational History     None   Social Needs     Financial resource strain: None     Food insecurity:     Worry: None     Inability: None     Transportation needs:     Medical: None     Non-medical: None   Tobacco Use     Smoking status: Former Smoker     Packs/day: 1.00     Years: 30.00     Pack years: 30.00     Types: Cigarettes     Start date:      Last attempt to quit: 1980     Years since quittin.1     Smokeless tobacco: Former User     Types: Chew     Quit date: 1983   Substance and Sexual Activity     Alcohol use: No     Comment: quit 30+ years ago. states, \"I'm an alcoholic\".     Drug use: No     Sexual activity: Not Currently     Partners: Female   Lifestyle     Physical activity:     Days per week: None     Minutes per session: None     Stress: None   Relationships     Social connections:     Talks on phone: None     Gets together: None     Attends Mosque service: None     Active member of club or organization: None     Attends meetings of clubs or organizations: None     Relationship status: None     " "Intimate partner violence:     Fear of current or ex partner: None     Emotionally abused: None     Physically abused: None     Forced sexual activity: None   Other Topics Concern     Parent/sibling w/ CABG, MI or angioplasty before 65F 55M? Not Asked   Social History Narrative     None       Review of Systems:  Skin:  not assessed rash   Eyes:  Positive for glasses  ENT:  Positive for hearing loss  Respiratory:  Positive for dyspnea on exertion;shortness of breath  Cardiovascular:    edema;Positive for  Gastroenterology: Negative    Genitourinary:  Negative    Musculoskeletal:  Positive for nocturnal cramping  Neurologic:  Negative    Psychiatric:  Negative sleep disturbances  Heme/Lymph/Imm:  Negative    Endocrine:  Positive for diabetes    Physical Exam:  Vitals: /62   Pulse (!) 48   Ht 1.651 m (5' 5\")   Wt 80.7 kg (178 lb)   BMI 29.62 kg/m        GEN:  NAD  NECK: No JVD  C/V:  Regular rate and rhythm, no murmur, rub or gallop.  RESP: Clear to auscultation bilaterally without wheezing, rales, or rhonchi.  GI: Abdomen soft, nontender, nondistended.   EXTREM: No LE edema.   NEURO: Alert and oriented, cooperative. No obvious focal deficits.   PSYCH: Normal affect.  SKIN: Warm and dry.       Recent Lab Results:  LIPID RESULTS:  Lab Results   Component Value Date    CHOL 98 05/06/2019    HDL 48 05/06/2019    LDL 35 05/06/2019    TRIG 76 05/06/2019    CHOLHDLRATIO 2.3 10/07/2015       LIVER ENZYME RESULTS:  Lab Results   Component Value Date    AST 17 05/06/2019    ALT 22 05/06/2019       CBC RESULTS:  Lab Results   Component Value Date    WBC 7.0 10/10/2019    RBC 4.48 10/10/2019    HGB 13.3 10/10/2019    HCT 40.6 10/10/2019    MCV 91 10/10/2019    MCH 29.7 10/10/2019    MCHC 32.8 10/10/2019    RDW 12.9 10/10/2019     (L) 10/10/2019       BMP RESULTS:  Lab Results   Component Value Date     10/10/2019    POTASSIUM 4.4 10/10/2019    CHLORIDE 105 10/10/2019    CO2 30 (H) 10/10/2019    ANIONGAP " 10.4 10/10/2019     (H) 10/10/2019    BUN 20 10/10/2019    CR 1.15 10/10/2019    GFRESTIMATED 60 (L) 10/10/2019    GFRESTBLACK 73 10/10/2019    DEEPTI 9.8 10/10/2019        A1C RESULTS:  Lab Results   Component Value Date    A1C 6.4 (H) 08/21/2019       INR RESULTS:  Lab Results   Component Value Date    INR 1.01 07/24/2018           Jose Saldana PA-C, BERTHA   October 9, 2019       Thank you for allowing me to participate in the care of your patient.      Sincerely,     Jose Saldana PA-C     [facility]

## 2019-10-15 ENCOUNTER — HOSPITAL ENCOUNTER (OUTPATIENT)
Dept: CARDIOLOGY | Facility: CLINIC | Age: 84
Discharge: HOME | End: 2019-10-15
Attending: PHYSICIAN ASSISTANT | Admitting: PHYSICIAN ASSISTANT
Payer: MEDICARE

## 2019-10-15 DIAGNOSIS — I25.10 CORONARY ARTERY DISEASE INVOLVING NATIVE CORONARY ARTERY OF NATIVE HEART WITHOUT ANGINA PECTORIS: ICD-10-CM

## 2019-10-15 DIAGNOSIS — I10 ESSENTIAL HYPERTENSION WITH GOAL BLOOD PRESSURE LESS THAN 140/90: ICD-10-CM

## 2019-10-15 DIAGNOSIS — Z95.2 S/P TAVR (TRANSCATHETER AORTIC VALVE REPLACEMENT): ICD-10-CM

## 2019-10-15 DIAGNOSIS — E78.5 HYPERLIPIDEMIA WITH TARGET LDL LESS THAN 100: ICD-10-CM

## 2019-10-15 PROCEDURE — 93270 REMOTE 30 DAY ECG REV/REPORT: CPT

## 2019-10-15 PROCEDURE — 93272 ECG/REVIEW INTERPRET ONLY: CPT | Performed by: INTERNAL MEDICINE

## 2019-10-16 ENCOUNTER — DOCUMENTATION ONLY (OUTPATIENT)
Dept: CARDIOLOGY | Facility: CLINIC | Age: 84
End: 2019-10-16

## 2019-10-16 DIAGNOSIS — Z95.2 S/P TAVR (TRANSCATHETER AORTIC VALVE REPLACEMENT): Primary | ICD-10-CM

## 2019-10-16 NOTE — PROGRESS NOTES
Reviewed increased gradients with Dr. Salazar. Let's repeat limited echocardiogram in 3 months for recheck.     Jose Saldana PA-C   10/16/2019  Pager: [phone]

## 2019-10-17 ENCOUNTER — DOCUMENTATION ONLY (OUTPATIENT)
Dept: CARDIOLOGY | Facility: CLINIC | Age: 84
End: 2019-10-17

## 2019-10-17 DIAGNOSIS — I35.0 AORTIC VALVE STENOSIS, NONRHEUMATIC: Primary | ICD-10-CM

## 2019-10-17 NOTE — PROGRESS NOTES
"Baseline strip recording for 10/15/19 at 08:19:13 noted sinus bradycardia with IVCD & PVC's, HR 58-61bpm. No symptoms reported. Strip filed to event folder.     OV note 10/10/19 w/ Jose: \"He is doing well from cardiac standpoint. He does have occasional lightheadedness that occurs while he is standing.  He denies any other symptoms or concerns.  Twelve-lead ECG demonstrates sinus bradycardia with left bundle branch block morphology and first-degree AV block.  His DE interval seems to be longer than previous ECGs at 240 ms.  He is not on any AV anibal blocking agents.  I recommended that we perform a 1 week event monitor for evaluation of his symptoms.  If he has any higher degree of AV block or sinus pauses he may need pacemaker implantation.\"   "

## 2019-10-31 NOTE — PROGRESS NOTES
End of service summary received. Dr Salazar to sign. Message to Jose for direction regarding f/up.

## 2019-11-07 NOTE — PROGRESS NOTES
Jose Saldana PA-C  You 21 minutes ago (3:53 PM)      No changes.  Follow-up with me in 6 months.      Called patient to review stable event monitor and Jose's recommendation to f/up in 6mos. Pt verbalized understanding, agreeable to plan. Order entered for f/up.

## 2019-12-15 ENCOUNTER — HEALTH MAINTENANCE LETTER (OUTPATIENT)
Age: 84
End: 2019-12-15

## 2020-01-16 ENCOUNTER — HOSPITAL ENCOUNTER (OUTPATIENT)
Dept: CARDIOLOGY | Facility: CLINIC | Age: 85
Discharge: HOME | End: 2020-01-16
Attending: PHYSICIAN ASSISTANT | Admitting: PHYSICIAN ASSISTANT
Payer: MEDICARE

## 2020-01-16 DIAGNOSIS — Z95.2 S/P TAVR (TRANSCATHETER AORTIC VALVE REPLACEMENT): ICD-10-CM

## 2020-01-16 PROCEDURE — 93325 DOPPLER ECHO COLOR FLOW MAPG: CPT

## 2020-01-16 PROCEDURE — 93308 TTE F-UP OR LMTD: CPT | Mod: 26 | Performed by: INTERNAL MEDICINE

## 2020-01-16 PROCEDURE — 93325 DOPPLER ECHO COLOR FLOW MAPG: CPT | Mod: 26 | Performed by: INTERNAL MEDICINE

## 2020-01-16 PROCEDURE — 93321 DOPPLER ECHO F-UP/LMTD STD: CPT | Mod: 26 | Performed by: INTERNAL MEDICINE

## 2020-01-17 ENCOUNTER — DOCUMENTATION ONLY (OUTPATIENT)
Dept: CARDIOLOGY | Facility: CLINIC | Age: 85
End: 2020-01-17

## 2020-01-17 DIAGNOSIS — I27.20 PULMONARY HYPERTENSION (H): ICD-10-CM

## 2020-01-17 DIAGNOSIS — I35.0 AORTIC VALVE STENOSIS, NONRHEUMATIC: Primary | ICD-10-CM

## 2020-01-17 DIAGNOSIS — I25.10 CORONARY ARTERY DISEASE: ICD-10-CM

## 2020-01-17 NOTE — PROGRESS NOTES
Echo 1/17/2020 noted, ordered to follow TAVR. Per dictation 10/2019: Reviewed increased gradients with Dr. Salazar. Let's repeat limited echocardiogram in 3 months for recheck.     Echo: Status post transfemoral transcatheter aortic valve replacement with a 26 mm  Greene Sabino 3 valve (8/28/2018).  The prosthetic aortic valve is well-seated.  This degree of valvular regurgitation is within normal limits.  The gradient is normal for this prosthetic aortic valve, 15 mmhg, 17 in most  recent study.  The visual ejection fraction is estimated at 50-55%.    Will message VINOD Jose Saldana to review

## 2020-01-22 DIAGNOSIS — E11.29 TYPE 2 DIABETES MELLITUS WITH MICROALBUMINURIA, WITHOUT LONG-TERM CURRENT USE OF INSULIN (H): ICD-10-CM

## 2020-01-22 DIAGNOSIS — R80.9 TYPE 2 DIABETES MELLITUS WITH MICROALBUMINURIA, WITHOUT LONG-TERM CURRENT USE OF INSULIN (H): ICD-10-CM

## 2020-01-22 NOTE — TELEPHONE ENCOUNTER
"Requested Prescriptions   Pending Prescriptions Disp Refills     CONTOUR NEXT TEST test strip [Pharmacy Med Name: CONTOUR NEXT         Last Written Prescription Date:  11/27/2018  Last Fill Quantity: 100,  # refills: 5   Last office visit: 8/28/2019 with prescribing provider:     Future Office Visit:   Next 5 appointments (look out 90 days)    Mar 19, 2020  2:00 PM CDT  PHYSICAL with Viet Bingham MD  [facility] ([facility]) [address]  [facility] [address]  [phone]        NICKIE]  0     Sig: USE 1 STRIP TO CHECK GLUCOSE ONCE DAILY OR  AS  DIRECTED       Diabetic Supplies Protocol Passed - 1/22/2020 11:00 AM        Passed - Medication is active on med list        Passed - Patient is 18 years of age or older        Passed - Recent (6 mo) or future (30 days) visit within the authorizing provider's specialty     Patient had office visit in the last 6 months or has a visit in the next 30 days with authorizing provider.  See \"Patient Info\" tab in inbasket, or \"Choose Columns\" in Meds & Orders section of the refill encounter.            "

## 2020-01-24 NOTE — TELEPHONE ENCOUNTER
Prescription approved per [facility] Refill Protocol.    Meera Muniz RN Flex

## 2020-02-05 DIAGNOSIS — R80.9 TYPE 2 DIABETES MELLITUS WITH MICROALBUMINURIA, WITHOUT LONG-TERM CURRENT USE OF INSULIN (H): ICD-10-CM

## 2020-02-05 DIAGNOSIS — E11.29 TYPE 2 DIABETES MELLITUS WITH MICROALBUMINURIA, WITHOUT LONG-TERM CURRENT USE OF INSULIN (H): ICD-10-CM

## 2020-02-05 NOTE — TELEPHONE ENCOUNTER
Patient is in [place] and his [facility] [place] and was sent [facility] by his daughter to [place] has been lost.  Patient is asking that Dr. Bingham send a new RX to [facility] in [place].    Requested Prescriptions   Pending Prescriptions Disp Refills     metFORMIN (GLUCOPHAGE) 1000 MG tablet  Last Written Prescription Date:  01/08/19  Last Fill Quantity: 90,  # refills: 3   Last office visit: 8/28/2019 with prescribing provider:  08/28/18   Future Office Visit:   Next 5 appointments (look out 90 days)    Mar 19, 2020  2:00 PM CDT  [facility] [address]  [facility] [address] [phone]          90 tablet 3     Sig: Take 0.5 tablets (500 mg) by mouth 2 times daily (with meals)       Biguanide Agents Passed - 2/5/2020  8:36 AM        Passed - Blood pressure less than 140/90 in past 6 months     BP Readings from Last 3 Encounters:   10/10/19 120/62   08/28/19 136/64   05/14/19 128/68                 Passed - Patient has documented LDL within the past 12 mos.     Recent Labs   Lab Test 05/06/19  0819   LDL 35             Passed - Patient has had a Microalbumin in the past 15 mos.     Recent Labs   Lab Test 01/02/19  0836   MICROL 18   UMALCR 26.18*             Passed - Patient is age 10 or older        Passed - Patient has documented A1c within the specified period of time.     If HgbA1C is 8 or greater, it needs to be on file within the past 3 months.  If less than 8, must be on file within the past 6 months.     Recent Labs   Lab Test 08/21/19  0817   A1C 6.4*             Passed - Patient's CR is NOT>1.4 OR Patient's EGFR is NOT<45 within past 12 mos.     Recent Labs   Lab Test 10/10/19  1014   GFRESTIMATED 60*   GFRESTBLACK 73       Recent Labs   Lab Test 10/10/19  1014   CR 1.15             Passed - Patient does NOT have a diagnosis of CHF.        Passed - Medication is active on med  "list        Passed - Recent (6 mo) or future (30 days) visit within the authorizing provider's specialty     Patient had office visit in the last 6 months or has a visit in the next 30 days with authorizing provider or within the authorizing provider's specialty.  See \"Patient Info\" tab in inbasket, or \"Choose Columns\" in Meds & Orders section of the refill encounter.              "

## 2020-03-22 ENCOUNTER — HEALTH MAINTENANCE LETTER (OUTPATIENT)
Age: 85
End: 2020-03-22

## 2020-05-07 ENCOUNTER — VIRTUAL VISIT (OUTPATIENT)
Dept: CARDIOLOGY | Facility: CLINIC | Age: 85
End: 2020-05-07
Attending: PHYSICIAN ASSISTANT
Payer: MEDICARE

## 2020-05-07 DIAGNOSIS — E78.5 HYPERLIPIDEMIA WITH TARGET LDL LESS THAN 100: ICD-10-CM

## 2020-05-07 DIAGNOSIS — I25.10 CORONARY ARTERY DISEASE INVOLVING NATIVE CORONARY ARTERY OF NATIVE HEART WITHOUT ANGINA PECTORIS: ICD-10-CM

## 2020-05-07 DIAGNOSIS — R80.9 TYPE 2 DIABETES MELLITUS WITH MICROALBUMINURIA, WITHOUT LONG-TERM CURRENT USE OF INSULIN (H): ICD-10-CM

## 2020-05-07 DIAGNOSIS — Z95.2 S/P TAVR (TRANSCATHETER AORTIC VALVE REPLACEMENT): Primary | ICD-10-CM

## 2020-05-07 DIAGNOSIS — E78.5 HYPERLIPIDEMIA LDL GOAL <70: ICD-10-CM

## 2020-05-07 DIAGNOSIS — I10 ESSENTIAL HYPERTENSION WITH GOAL BLOOD PRESSURE LESS THAN 140/90: ICD-10-CM

## 2020-05-07 DIAGNOSIS — I35.0 AORTIC VALVE STENOSIS, NONRHEUMATIC: ICD-10-CM

## 2020-05-07 DIAGNOSIS — E11.29 TYPE 2 DIABETES MELLITUS WITH MICROALBUMINURIA, WITHOUT LONG-TERM CURRENT USE OF INSULIN (H): ICD-10-CM

## 2020-05-07 PROCEDURE — 99442 ZZC PHYSICIAN TELEPHONE EVALUATION 11-20 MIN: CPT | Performed by: PHYSICIAN ASSISTANT

## 2020-05-07 NOTE — LETTER
5/7/2020      RE: Reid Jimenes  [address]  [facility] [address]       Dear Colleague,    Thank you for the opportunity to participate in the care of your patient, Reid Jimenes, at the [facility]. Please see a copy of my visit note below.    Reason for Phone Visit: Routine follow up    HPI:  This is a very pleasant 86-year-old gentleman with past medical history is notable for history of severe aortic stenosis (underwent TAVR on 8/28/2018 via femoral approach receiving a 26 mm Greene Sabino 3 valve; developed LBBB subsequently; most recent echocardiogram 16 mmHg), mild nonobstructive coronary artery disease, hypertension, hyperlipidemia, type 2 diabetes (non-insulin-dependent), and carotid artery disease.    He thinks he is a bit more tired during the day but denies any specific symptoms such as chest pain, shortness of breath, bleeding issues, lightheadedness, orthopnea, peripheral edema, abdominal distension, PND, or syncope.     A/P:   This is a very pleasant 86-year-old gentleman with past medical history is notable for history of severe aortic stenosis (underwent TAVR on 8/28/2018 via femoral approach receiving a 26 mm Greene Sabino 3 valve; developed LBBB subsequently; most recent echocardiogram 16 mmHg), mild nonobstructive coronary artery disease, hypertension, hyperlipidemia, type 2 diabetes (non-insulin-dependent), and carotid artery disease.    He is due for a repeat echocardiogram to re-evaluate mean gradient of his bioprosthetic aortic valve. We will plan to repeat echocardiogram in 3 months.    Call Duration: 12 minutes    This visit is being conducted as a virtual visit due to the emphasis on mitigation of the COVID-19 virus pandemic. The clinician has decided that the risk of an in-office visit outweighs the benefit for this patient.     Jose Saldana PA-C    5/7/2020  Pager: [phone]        Please do not hesitate to contact me if you have any questions/concerns.     Sincerely,     Jose Saldana PA-C

## 2020-05-07 NOTE — PATIENT INSTRUCTIONS
Today's Plan:   1) You're doing well.   2) We will repeat heart ultrasound in 3 months.     If you have questions or concerns please call my nurse team at [phone].     Scheduling phone number: [phone]  Reminder: Please bring in all current medications, over the counter supplements and vitamin bottles to your next appointment.    It was a pleasure seeing you today!     Jose Saldana PA-C  5/7/2020

## 2020-05-07 NOTE — PROGRESS NOTES
"Reid Jimenes is a 86 year old male who is being evaluated via a billable telephone visit.      The patient has been notified of following:     \"This telephone visit will be conducted via a call between you and your physician/provider. We have found that certain health care needs can be provided without the need for a physical exam.  This service lets us provide the care you need with a short phone conversation.  If a prescription is necessary we can send it directly to your pharmacy.  If lab work is needed we can place an order for that and you can then stop by our lab to have the test done at a later time.    Telephone visits are billed at different rates depending on your insurance coverage. During this emergency period, for some insurers they may be billed the same as an in-person visit.  Please reach out to your insurance provider with any questions.    If during the course of the call the physician/provider feels a telephone visit is not appropriate, you will not be charged for this service.\"    Patient has given verbal consent for Telephone visit?  Yes    What phone number would you like to be contacted at? [phone]    How would you like to obtain your AVS? MyChart    Vital signs reported by patient  BP.149/59  P.  59-60  Wt. 172LB  Ht. 5'4\"  Review Of Systems  Skin: NEGATIVE  Eyes:Ears/Nose/Throat: NEGATIVE  Respiratory: SOB on exertion  Cardiovascular: NEGATIVE  Gastrointestinal: NEGATIVE  Genitourinary:NEGATIVE   Musculoskeletal: NEGATIVE  Neurologic: NEGATIVE  Psychiatric: NEGATIVE  Hematologic/Lymphatic/Immunologic: NEGATIVE  Endocrine:  Diabetes  [facility]    -----------------------------------------------------------------------------------------------------------    Primary Cardiologist: Dr. Salazar    Reason for Phone Visit: Routine follow up    HPI:  This is a very pleasant 86-year-old gentleman with past medical history is notable for history of severe aortic stenosis (underwent " TAVR on 8/28/2018 via femoral approach receiving a 26 mm Greene Sabino 3 valve; developed LBBB subsequently; most recent echocardiogram 16 mmHg), mild nonobstructive coronary artery disease, hypertension, hyperlipidemia, type 2 diabetes (non-insulin-dependent), and carotid artery disease.    He thinks he is a bit more tired during the day but denies any specific symptoms such as chest pain, shortness of breath, bleeding issues, lightheadedness, orthopnea, peripheral edema, abdominal distension, PND, or syncope.     A/P:   This is a very pleasant 86-year-old gentleman with past medical history is notable for history of severe aortic stenosis (underwent TAVR on 8/28/2018 via femoral approach receiving a 26 mm Greene Sabino 3 valve; developed LBBB subsequently; most recent echocardiogram 16 mmHg), mild nonobstructive coronary artery disease, hypertension, hyperlipidemia, type 2 diabetes (non-insulin-dependent), and carotid artery disease.    He is due for a repeat echocardiogram to re-evaluate mean gradient of his bioprosthetic aortic valve. We will plan to repeat echocardiogram in 3 months.    Call Duration: 12 minutes    This visit is being conducted as a virtual visit due to the emphasis on mitigation of the COVID-19 virus pandemic. The clinician has decided that the risk of an in-office visit outweighs the benefit for this patient.     Jose Saldana PA-C   5/7/2020  Pager: [phone]

## 2020-07-10 ENCOUNTER — OFFICE VISIT (OUTPATIENT)
Dept: INTERNAL MEDICINE | Facility: CLINIC | Age: 85
End: 2020-07-10
Payer: MEDICARE

## 2020-07-10 VITALS
WEIGHT: 176.8 LBS | BODY MASS INDEX: 29.46 KG/M2 | HEIGHT: 65 IN | HEART RATE: 64 BPM | OXYGEN SATURATION: 95 % | DIASTOLIC BLOOD PRESSURE: 58 MMHG | RESPIRATION RATE: 14 BRPM | SYSTOLIC BLOOD PRESSURE: 132 MMHG | TEMPERATURE: 98.5 F

## 2020-07-10 DIAGNOSIS — R41.3 MEMORY DIFFICULTIES: ICD-10-CM

## 2020-07-10 DIAGNOSIS — I10 ESSENTIAL HYPERTENSION WITH GOAL BLOOD PRESSURE LESS THAN 140/90: ICD-10-CM

## 2020-07-10 DIAGNOSIS — R80.9 TYPE 2 DIABETES MELLITUS WITH MICROALBUMINURIA, WITHOUT LONG-TERM CURRENT USE OF INSULIN (H): ICD-10-CM

## 2020-07-10 DIAGNOSIS — I65.23 BILATERAL CAROTID ARTERY OBSTRUCTION WITHOUT CEREBRAL INFARCTION: ICD-10-CM

## 2020-07-10 DIAGNOSIS — E11.29 TYPE 2 DIABETES MELLITUS WITH MICROALBUMINURIA, WITHOUT LONG-TERM CURRENT USE OF INSULIN (H): ICD-10-CM

## 2020-07-10 DIAGNOSIS — I73.9 PERIPHERAL VASCULAR DISEASE (H): ICD-10-CM

## 2020-07-10 DIAGNOSIS — I25.10 CORONARY ARTERY DISEASE INVOLVING NATIVE CORONARY ARTERY OF NATIVE HEART WITHOUT ANGINA PECTORIS: ICD-10-CM

## 2020-07-10 DIAGNOSIS — I35.0 AORTIC VALVE STENOSIS, NONRHEUMATIC: ICD-10-CM

## 2020-07-10 DIAGNOSIS — I65.23 BILATERAL CAROTID ARTERY OCCLUSION: ICD-10-CM

## 2020-07-10 DIAGNOSIS — Z79.899 MEDICATION MANAGEMENT: ICD-10-CM

## 2020-07-10 DIAGNOSIS — E11.59 TYPE 2 DIABETES MELLITUS WITH OTHER CIRCULATORY COMPLICATIONS (H): ICD-10-CM

## 2020-07-10 DIAGNOSIS — E78.5 HYPERLIPIDEMIA WITH TARGET LDL LESS THAN 100: ICD-10-CM

## 2020-07-10 DIAGNOSIS — Z00.00 ENCOUNTER FOR MEDICARE ANNUAL WELLNESS EXAM: Primary | ICD-10-CM

## 2020-07-10 LAB
ERYTHROCYTE [DISTWIDTH] IN BLOOD BY AUTOMATED COUNT: 12.6 % (ref 10–15)
HBA1C MFR BLD: 6.4 % (ref 0–5.6)
HCT VFR BLD AUTO: 40.3 % (ref 40–53)
HGB BLD-MCNC: 13.1 G/DL (ref 13.3–17.7)
MCH RBC QN AUTO: 30.2 PG (ref 26.5–33)
MCHC RBC AUTO-ENTMCNC: 32.5 G/DL (ref 31.5–36.5)
MCV RBC AUTO: 93 FL (ref 78–100)
PLATELET # BLD AUTO: 155 10E9/L (ref 150–450)
RBC # BLD AUTO: 4.34 10E12/L (ref 4.4–5.9)
WBC # BLD AUTO: 8.3 10E9/L (ref 4–11)

## 2020-07-10 PROCEDURE — 80061 LIPID PANEL: CPT | Performed by: INTERNAL MEDICINE

## 2020-07-10 PROCEDURE — 82043 UR ALBUMIN QUANTITATIVE: CPT | Performed by: INTERNAL MEDICINE

## 2020-07-10 PROCEDURE — 83036 HEMOGLOBIN GLYCOSYLATED A1C: CPT | Performed by: INTERNAL MEDICINE

## 2020-07-10 PROCEDURE — 80053 COMPREHEN METABOLIC PANEL: CPT | Performed by: INTERNAL MEDICINE

## 2020-07-10 PROCEDURE — 85027 COMPLETE CBC AUTOMATED: CPT | Performed by: INTERNAL MEDICINE

## 2020-07-10 PROCEDURE — 99207 C FOOT EXAM  NO CHARGE: CPT | Mod: 25 | Performed by: INTERNAL MEDICINE

## 2020-07-10 PROCEDURE — G0439 PPPS, SUBSEQ VISIT: HCPCS | Performed by: INTERNAL MEDICINE

## 2020-07-10 PROCEDURE — 36415 COLL VENOUS BLD VENIPUNCTURE: CPT | Performed by: INTERNAL MEDICINE

## 2020-07-10 PROCEDURE — 99213 OFFICE O/P EST LOW 20 MIN: CPT | Mod: 25 | Performed by: INTERNAL MEDICINE

## 2020-07-10 PROCEDURE — 84443 ASSAY THYROID STIM HORMONE: CPT | Performed by: INTERNAL MEDICINE

## 2020-07-10 RX ORDER — SIMVASTATIN 40 MG
20 TABLET ORAL AT BEDTIME
Qty: 45 TABLET | Refills: 3 | Status: SHIPPED | OUTPATIENT
Start: 2020-07-10 | End: 2021-01-19

## 2020-07-10 ASSESSMENT — MIFFLIN-ST. JEOR: SCORE: 1408.84

## 2020-07-10 NOTE — PROGRESS NOTES
"  SUBJECTIVE:   Reid Jimenes is a 86 year old male who presents for Preventive Visit.    Are you in the first 12 months of your Medicare Part B coverage?  No    Physical Health:    In general, how would you rate your overall physical health? good    Outside of work, how many days during the week do you exercise? none    Outside of work, approximately how many minutes a day do you exercise?not applicable    If you drink alcohol do you typically have >3 drinks per day or >7 drinks per week? No    Do you usually eat at least 4 servings of fruit and vegetables a day, include whole grains & fiber and avoid regularly eating high fat or \"junk\" foods? Yes    Do you have any problems taking medications regularly?  No    Do you have any side effects from medications? none    Needs assistance for the following daily activities: no assistance needed    Which of the following safety concerns are present in your home?  none identified     Hearing impairment: No    In the past 6 months, have you been bothered by leaking of urine? no    Mental Health:    In general, how would you rate your overall mental or emotional health? good  PHQ-2 Score:      Do you feel safe in your environment? Yes    Have you ever done Advance Care Planning? (For example, a Health Directive, POLST, or a discussion with a medical provider or your loved ones about your wishes): Yes, advance care planning is on file.    Additional concerns to address?  No    Fall risk:       Cognitive Screenin) Repeat 3 items (Leader, Season, Table)    2) Clock draw: NORMAL  3) 3 item recall: Recalls 1 object   Results: NORMAL clock, 1-2 items recalled: COGNITIVE IMPAIRMENT LESS LIKELY    Mini-CogTM Copyright ARSEN Askew. Licensed by the author for use in [facility]; reprinted with permission ([email or web address]@.[email or web address]). All rights reserved.      Do you have sleep apnea, excessive snoring or daytime drowsiness?: no    In addition to a Wellness Visit, we addressed " "diabetes mellitus, hyperlipidemia, hypertension, peripheral vascular disease.     The patient is tolerating his current medications without any adverse effects. Diabetes and LDL-Cholesterol has been controlled. BP appears controlled.   He appears due to update carotid ultrasound.   He saw cardiology for a virtual visit on . He has a bioprosthetic aortic valve.       Reviewed and updated as needed this visit by clinical staff         Reviewed and updated as needed this visit by Provider        Social History     Tobacco Use     Smoking status: Former Smoker     Packs/day: 1.00     Years: 30.00     Pack years: 30.00     Types: Cigarettes     Start date:      Last attempt to quit: 1980     Years since quittin.8     Smokeless tobacco: Former User     Types: Chew     Quit date: 1983   Substance Use Topics     Alcohol use: No     Comment: quit 30+ years ago. states, \"I'm an alcoholic\".                           Current providers sharing in care for this patient include:   Patient Care Team:  Viet Bingham MD as PCP - General (Internal Medicine)  Viet Bingham MD as Assigned PCP    The following health maintenance items are reviewed in Epic and correct as of today:  Health Maintenance   Topic Date Due     MEDICARE ANNUAL WELLNESS VISIT  1999     ZOSTER IMMUNIZATION (2 of 3) 2012     EYE EXAM  2017     PHQ-2  2020     MICROALBUMIN  2020     DIABETIC FOOT EXAM  2020     A1C  2020     LIPID  2020     FALL RISK ASSESSMENT  05/10/2020     DTAP/TDAP/TD IMMUNIZATION (2 - Td) 2020     INFLUENZA VACCINE (1) 2020     BMP  10/10/2020     ADVANCE CARE PLANNING  2023     PNEUMOCOCCAL IMMUNIZATION 65+ LOW/MEDIUM RISK  Completed     IPV IMMUNIZATION  Aged Out     MENINGITIS IMMUNIZATION  Aged Out        Pneumonia Vaccine: Consider pneumovax-23.     ROS:  Some hearing loss noted in right ear. Urinary frequency noted at times.   Some concerns at " "times with his memory--asks about using [employer].  REVIEW OF SYSTEMS: The following systems have been completely reviewed and are negative except as noted above:   Constitutional, HEENT, respiratory, cardiovascular, gastrointestinal, genitourinary, musculoskeletal, dermatologic, hematologic, endocrine, psychiatric, and neurologic systems.      OBJECTIVE:   Vitals: /58   Pulse 64   Temp 98.5  F (36.9  C) (Oral)   Resp 14   Ht 1.651 m (5' 5\")   Wt 80.2 kg (176 lb 12.8 oz)   SpO2 95%   BMI 29.42 kg/m    BMI= Body mass index is 29.42 kg/m .     EXAM:   GENERAL: healthy, alert and no distress  EYES: Eyes grossly normal to inspection, PERRL and conjunctivae and sclerae normal  HENT: ear canals and TM's normal, nose and mouth without ulcers or lesions  NECK: ?bruits vs radiation of aortic sounds to neck bilaterally.   no adenopathy, no asymmetry, masses, or scars and thyroid normal to palpation  RESP: lungs clear to auscultation - no rales, rhonchi or wheezes  CV: regular rate and rhythm, normal S1 S2, no S3 or S4, grade 2/6 systolic murmur, click or rub, no peripheral edema and peripheral pulses strong  ABDOMEN: soft, nontender, no hepatosplenomegaly, no masses and bowel sounds normal   (male)/Rectal not performed.  MS: no gross musculoskeletal defects noted, no edema  Foot Exam: normal DP and PT pulses, no trophic changes or ulcerative lesions, normal sensory exam and normal monofilament exam  SKIN: no suspicious lesions or rashes  NEURO: Normal strength and tone, mentation intact and speech normal  PSYCH: mentation appears normal, affect normal/bright    Diagnostic Test Results:  Labs reviewed in Epic    ASSESSMENT / PLAN:   (Z00.00) Encounter for Medicare annual wellness exam  (primary encounter diagnosis)  Comment: Stable health.     (E11.59) Type 2 diabetes mellitus with other circulatory complications (H)  Comment: Update A1c and microalbumin. Continue current meds   Plan: Hemoglobin A1c, Albumin " "Random Urine         Quantitative with Creat Ratio, TSH with free T4        reflex, OFFICE/OUTPT VISIT,EST,LEVL III, FOOT         EXAM          (E11.29,  R80.9) Type 2 diabetes mellitus with microalbuminuria, without long-term current use of insulin (H)  Comment: Historically well controlled. Continue current meds update labs.   Plan: Hemoglobin A1c, Albumin Random Urine         Quantitative with Creat Ratio, TSH with free T4        reflex, metFORMIN (GLUCOPHAGE) 1000 MG tablet,         OFFICE/OUTPT VISIT,EST,LEVL III, FOOT EXAM          (E78.5) Hyperlipidemia with target LDL less than 100  Comment: previous LDL at target. Continue current meds.   Plan: Comprehensive metabolic panel, Lipid panel         reflex to direct LDL Fasting, simvastatin         (ZOCOR) 40 MG tablet, OFFICE/OUTPT         VISIT,EST,LEVL III        (I10) Essential hypertension with goal blood pressure less than 140/90  Comment: BP at target. Continue current meds.     (I25.10) Coronary artery disease involving native coronary artery of native heart without angina pectoris  (I35.0) Aortic valve stenosis, nonrheumatic  (I73.9) Peripheral vascular disease (H)  Comment: Continue to follow with cardiology as they advise.     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   Plan: MEMORY CLINIC REFERRAL, OFFICE/OUTPT         VISIT,EST,LEVL III    (Z79.899) Medication management  Plan: TSH with free T4 reflex, CBC with platelets            COUNSELING:  Reviewed preventive health counseling, as reflected in patient instructions    Estimated body mass index is 29.62 kg/m  as calculated from the following:    Height as of 10/10/19: 1.651 m (5' 5\").    Weight as of 10/10/19: 80.7 kg (178 lb).         reports that " he quit smoking about 39 years ago. His smoking use included cigarettes. He started smoking about 64 years ago. He has a 30.00 pack-year smoking history. He quit smokeless tobacco use about 37 years ago.  His smokeless tobacco use included chew.      Appropriate preventive services were discussed with this patient, including applicable screening as appropriate for cardiovascular disease, diabetes, osteopenia/osteoporosis, and glaucoma.  As appropriate for age/gender, discussed screening for colorectal cancer, prostate cancer, breast cancer, and cervical cancer. Checklist reviewing preventive services available has been given to the patient.    Reviewed patients plan of care and provided an AVS. The Basic Care Plan (routine screening as documented in Health Maintenance) for Reid meets the Care Plan requirement. This Care Plan has been established and reviewed with the Patient.    Counseling Resources:  ATP IV Guidelines  Pooled Cohorts Equation Calculator  Breast Cancer Risk Calculator  FRAX Risk Assessment  ICSI Preventive Guidelines  Dietary Guidelines for Americans, 2010  USDA's MyPlate  ASA Prophylaxis  Lung CA Screening    Viet Bingham MD,   [facility]

## 2020-07-10 NOTE — NURSING NOTE
"BP (!) 142/60   Pulse 64   Temp 98.5  F (36.9  C) (Oral)   Resp 14   Ht 1.651 m (5' 5\")   Wt 80.2 kg (176 lb 12.8 oz)   SpO2 95%   BMI 29.42 kg/m    Patient in for annual Medicare Visit.  Indira Amador CMA    "

## 2020-07-10 NOTE — PATIENT INSTRUCTIONS
Patient Education   Personalized Prevention Plan  You are due for the preventive services outlined below.  Your care team is available to assist you in scheduling these services.  If you have already completed any of these items, please share that information with your care team to update in your medical record.  Health Maintenance Due   Topic Date Due     Annual Wellness Visit  02/14/1999     Zoster (Shingles) Vaccine (2 of 3) 12/05/2012     Eye Exam  09/25/2017     PHQ-2  01/01/2020     Kidney Microalbumin Urine Test  01/02/2020     Diabetic Foot Exam  01/08/2020     A1C Lab  02/21/2020     Cholesterol Lab  05/06/2020     FALL RISK ASSESSMENT  05/10/2020     Diptheria Tetanus Pertussis (DTAP/TDAP/TD) Vaccine (2 - Td) 05/17/2020             Someone will contact you to update carotid artery ultrasound.    Phone number provided if interested in seeing Memory specialist in [place].     Meds refilled.     Stop by the lab ([address]) to update needed labs today.    [facility] if [facility] are [facility] Pneumovax-23 (pneumonia) shot, and TDAP (tetanus) shot.     See me in six months with labs a few days in advance (diabetes check).

## 2020-07-11 LAB
ALBUMIN SERPL-MCNC: 3.8 G/DL (ref 3.4–5)
ALP SERPL-CCNC: 83 U/L (ref 40–150)
ALT SERPL W P-5'-P-CCNC: 32 U/L (ref 0–70)
ANION GAP SERPL CALCULATED.3IONS-SCNC: 5 MMOL/L (ref 3–14)
AST SERPL W P-5'-P-CCNC: 21 U/L (ref 0–45)
BILIRUB SERPL-MCNC: 0.4 MG/DL (ref 0.2–1.3)
BUN SERPL-MCNC: 17 MG/DL (ref 7–30)
CALCIUM SERPL-MCNC: 9 MG/DL (ref 8.5–10.1)
CHLORIDE SERPL-SCNC: 106 MMOL/L (ref 94–109)
CHOLEST SERPL-MCNC: 98 MG/DL
CO2 SERPL-SCNC: 28 MMOL/L (ref 20–32)
CREAT SERPL-MCNC: 1.16 MG/DL (ref 0.66–1.25)
CREAT UR-MCNC: 49 MG/DL
GFR SERPL CREATININE-BSD FRML MDRD: 57 ML/MIN/{1.73_M2}
GLUCOSE SERPL-MCNC: 119 MG/DL (ref 70–99)
HDLC SERPL-MCNC: 48 MG/DL
LDLC SERPL CALC-MCNC: 23 MG/DL
MICROALBUMIN UR-MCNC: 22 MG/L
MICROALBUMIN/CREAT UR: 45.81 MG/G CR (ref 0–17)
NONHDLC SERPL-MCNC: 50 MG/DL
POTASSIUM SERPL-SCNC: 4.8 MMOL/L (ref 3.4–5.3)
PROT SERPL-MCNC: 7.4 G/DL (ref 6.8–8.8)
SODIUM SERPL-SCNC: 139 MMOL/L (ref 133–144)
TRIGL SERPL-MCNC: 136 MG/DL
TSH SERPL DL<=0.005 MIU/L-ACNC: 0.99 MU/L (ref 0.4–4)

## 2020-07-21 ENCOUNTER — TELEPHONE (OUTPATIENT)
Dept: INTERNAL MEDICINE | Facility: CLINIC | Age: 85
End: 2020-07-21

## 2020-07-21 NOTE — TELEPHONE ENCOUNTER
"Patient called asking for a referral for memory care to be sent to location here in [facility] [facility] \"in a building close by to the clinic\". He did not know the name of the facility. States the memory care in [place] is closed. Call him back to advise at [phone].    "

## 2020-07-28 ENCOUNTER — HOSPITAL ENCOUNTER (OUTPATIENT)
Dept: ULTRASOUND IMAGING | Facility: CLINIC | Age: 85
Discharge: HOME | End: 2020-07-28
Attending: INTERNAL MEDICINE | Admitting: INTERNAL MEDICINE
Payer: MEDICARE

## 2020-07-28 DIAGNOSIS — I65.23 BILATERAL CAROTID ARTERY OCCLUSION: ICD-10-CM

## 2020-07-28 LAB — RADIOLOGIST FLAGS: NORMAL

## 2020-07-28 PROCEDURE — 93880 EXTRACRANIAL BILAT STUDY: CPT

## 2020-07-29 ENCOUNTER — TELEPHONE (OUTPATIENT)
Dept: INTERNAL MEDICINE | Facility: CLINIC | Age: 85
End: 2020-07-29

## 2020-07-29 DIAGNOSIS — I65.23 BILATERAL CAROTID ARTERY OBSTRUCTION WITHOUT CEREBRAL INFARCTION: Primary | ICD-10-CM

## 2020-07-29 NOTE — TELEPHONE ENCOUNTER
[facility] calling to report an incidental finding on carotid ultrasound done on 7/28/20. They need call back from a Dr or nurse to verify that this was noted. They can be reached at [phone]. Please advise. Thanks.

## 2020-07-30 ENCOUNTER — TELEPHONE (OUTPATIENT)
Dept: OTHER | Facility: CLINIC | Age: 85
End: 2020-07-30

## 2020-07-30 DIAGNOSIS — I65.23 BILATERAL CAROTID ARTERY OBSTRUCTION WITHOUT CEREBRAL INFARCTION: Primary | ICD-10-CM

## 2020-07-30 NOTE — TELEPHONE ENCOUNTER
July 30, 2020    Spoke with patient in great detail about scheduling per provider referral to [facility].     Patient's desired location is [place] and declined all sooner available appointments.     Patient is scheduled for CTA HEAD NECK on 8/14/2020 and in-person OV to establish with Dr. Sotelo on 8/19/2020.     Elizabeth Lopez    [facility][note 3]  Office: [phone]  Fax [phone]

## 2020-07-30 NOTE — TELEPHONE ENCOUNTER
Spoke with representative at number listed below, acknowledged receipt of ultrasound findings.     Phoned patient. Advised him of progression of carotid artery narrowing on US, and advised f/u with Dr Angel's office.   Patient denies having any recent TIA symptoms including acute changes in vision or speech, or acute lateralizing weakness.     Placed order for Vascular Surgery consult..   Will forward this message to Dr Angel's office.

## 2020-07-30 NOTE — TELEPHONE ENCOUNTER
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     US carotid bilateral 7/28/20 in Epic.     Pt saw Dr. Angel on 11/29/17.     Pt needs to be scheduled for CTA head/neck and new In person pt consult with vascular surgery.  Will route to scheduling to coordinate an appointment sooner rather than later. CTA needs to be completed with results prior to OV please.     BRANT HernandezN, RN  [facility] [place]

## 2020-07-31 NOTE — TELEPHONE ENCOUNTER
DIAGNOSIS: Telephone Call  Consults: carotid stenosis Ref Dr. Viet Bingham- [facility]   DATE RECEIVED: 8.4.20   NOTES STATUS DETAILS   OFFICE NOTE from referring provider Internal 7.29.20, 7.10.20  Dr. Viet Bingham  [facility]   OFFICE NOTE from other specialist N/A    DISCHARGE SUMMARY from hospital N/A    DISCHARGE REPORT from the ER N/A    OPERATIVE REPORT N/A    MEDICATION LIST Internal    XRAYS (IMAGES & REPORTS) Internal *sched* for 8.14.20  CTA Head/Neck    7.28.20, 8.10.18  US Carotid Bilateral   PATHOLOGY  Slides & report N/A

## 2020-08-04 ENCOUNTER — PRE VISIT (OUTPATIENT)
Dept: RADIOLOGY | Facility: CLINIC | Age: 85
End: 2020-08-04

## 2020-08-04 ENCOUNTER — VIRTUAL VISIT (OUTPATIENT)
Dept: RADIOLOGY | Facility: CLINIC | Age: 85
End: 2020-08-04
Attending: RADIOLOGY
Payer: MEDICARE

## 2020-08-04 DIAGNOSIS — I65.23 BILATERAL CAROTID ARTERY STENOSIS: Primary | ICD-10-CM

## 2020-08-04 PROCEDURE — 40001009 ZZH VIDEO/TELEPHONE VISIT; NO CHARGE

## 2020-08-04 NOTE — LETTER
8/4/2020         RE: Reid Jimenes  [address]  [place] [address]        Dear Colleague,    Thank you for referring your patient, Reid Jimenes, to the [facility]. Please see a copy of my visit note below.        INTERVENTIONAL RADIOLOGY CONSULTATION    Name: Reid Jimenes  Age: 86 year old   Referring Physician: Dr. Bingham   REASON FOR REFERRAL: Carotid stenosis    HPI: Mr. Jimenes is a 86 year old male patient who lives independently with his wife with a longstanding history of left greater than right carotid artery stenosis. He does not have focal neurological deficits or history of CVA. He has had a known moderate left internal carotid artery stenosis stable since at least 2008 (patient reports it's been there for 40 years) that now is greater than 70% on recent carotid ultrasound. He is asymptomatic.    Mr. Jimenes is living independently with his wife.    PAST MEDICAL HISTORY:   Past Medical History:   Diagnosis Date     Aortic valve stenosis, nonrheumatic     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: 26mm Edward Sabino 3 valve     Coronary artery disease     cardiac cath 7/24/18: mild non-obstructive disease     Hyperlipidaemia LDL goal < 100      Hypertension      LBBB (left bundle branch block)      Need for SBE (subacute bacterial endocarditis) prophylaxis      Pulmonary hypertension (H)      Type 2 diabetes mellitus (H)        PAST SURGICAL HISTORY:   Past Surgical History:   Procedure Laterality Date     MANDIBLE SURGERY  1958     OTHER SURGICAL HISTORY      cardiac cath 7/24/18: mild non-obstructive disease     TRANSCATHETER AORTIC VALVE IMPLANT ANESTHESIA N/A 8/28/2018    Procedure: TRANSCATHETER AORTIC VALVE IMPLANT ANESTHESIA;  ANESTHESIA NEEDED FOR TAVR PROCEDURE;  Surgeon: GENERIC ANESTHESIA PROVIDER;  Location:  OR,  26mm Edward Sabino 3 valve       FAMILY HISTORY:   Family History   Problem Relation Age of Onset     Heart Disease Father      Alcohol/Drug Father       "Myocardial Infarction Grandchild 18        Grandson     Myocardial Infarction Other 18        Nephew      Cancer Mother 58        Stomach cancer     Alcohol/Drug Mother      Cancer Maternal Grandmother         stomach cancer     Alcohol/Drug Maternal Grandmother      Myocardial Infarction Sister 70        Name: Reena      Alcohol/Drug Sister      Cerebrovascular Disease Brother 65        Name: Miguelito     Alcohol/Drug Brother      Alcohol/Drug Maternal Grandfather      Alcohol/Drug Paternal Grandmother      Alcohol/Drug Paternal Grandfather        SOCIAL HISTORY:   Social History     Tobacco Use     Smoking status: Former Smoker     Packs/day: 1.00     Years: 30.00     Pack years: 30.00     Types: Cigarettes     Start date:      Last attempt to quit: 1980     Years since quittin.9     Smokeless tobacco: Former User     Types: Chew     Quit date: 1983   Substance Use Topics     Alcohol use: No     Comment: quit 30+ years ago. states, \"I'm an alcoholic\".       PROBLEM LIST:   Patient Active Problem List    Diagnosis Date Noted     Coronary artery disease      Priority: Medium     cardiac cath 18: mild non-obstructive disease       Aortic valve stenosis, nonrheumatic      Priority: Medium     TAVR 18: 26mm Edward Sabino 3 valve       Need for SBE (subacute bacterial endocarditis) prophylaxis      Priority: Medium     Pulmonary hypertension (H)      Priority: Medium     LBBB (left bundle branch block)      Priority: Medium     Pseudoaneurysm of femoral artery (H) 2018     Priority: Medium     Left         Type 2 diabetes mellitus with microalbuminuria, without long-term current use of insulin (H) 2017     Priority: Medium     Acute diffuse otitis externa of both ears 2017     Priority: Medium     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       Hyperlipidemia with target LDL less than 100 02/05/2014     Priority: Medium     Diagnosis updated by automated process. Provider to review and confirm.         MEDICATIONS:   Prescription Medications as of 8/4/2020       Rx Number Disp Refills Start End Last Dispensed Date Next Fill Date Owning Pharmacy    ascorbic acid 500 MG TABS            Sig: Take 500 mg by mouth every evening     Class: Historical    Route: Oral    aspirin 81 MG EC tablet  30 tablet  8/31/2018    [facility] [address] [place] [address]    Sig: Take 1 tablet (81 mg) by mouth daily    Class: OTC    Route: Oral    BIOTIN PO            Sig: Take 5,000 mg by mouth 2 times daily     Class: Historical    Route: Oral    Cyanocobalamin (VITAMIN B-12 ER PO)            Sig: Take by mouth daily    Class: Historical    Route: Oral    GLUCOSAMINE-CHONDROITIN PO            Sig: Take 1 tablet by mouth 2 times daily     Class: Historical    Route: Oral    metFORMIN (GLUCOPHAGE) 1000 MG tablet  90 tablet 3 7/10/2020    [facility]    Sig: Take 0.5 tablets (500 mg) by mouth 2 times daily (with meals)    Class: E-Prescribe    Route: Oral    Multiple Vitamins-Iron (MULTIVITAMIN/IRON PO)            Sig: Take 1 tablet by mouth 2 times daily     Class: Historical    Route: Oral    simvastatin (ZOCOR) 40 MG tablet  45 tablet 3 7/10/2020    [facility]    Sig: Take 0.5 tablets (20 mg) by mouth At Bedtime    Class: E-Prescribe    Route: Oral    omeprazole (PRILOSEC) 20 MG DR capsule  90 capsule 3 8/28/2019    [facility] -  [place], [place] - [address]    Sig: Take 1 capsule (20 mg) by mouth daily    Class: Local Print    Route: Oral          ALLERGIES:   Patient has no known allergies.    Labs:    BMP RESULTS:  Lab Results   Component Value Date     07/10/2020    POTASSIUM 4.8 07/10/2020    CHLORIDE 106 07/10/2020    CO2 28 07/10/2020    ANIONGAP 5 07/10/2020     (H) 07/10/2020    BUN 17 07/10/2020    CR 1.16 07/10/2020    GFRESTIMATED 57 (L) 07/10/2020    GFRESTBLACK 66 07/10/2020    DEEPTI 9.0 07/10/2020        CBC RESULTS:  Lab Results   Component Value Date    WBC 8.3 07/10/2020    RBC 4.34 (L) 07/10/2020    HGB 13.1 (L) 07/10/2020    HCT 40.3 07/10/2020    MCV 93 07/10/2020    MCH 30.2 07/10/2020    MCHC 32.5 07/10/2020    RDW 12.6 07/10/2020     07/10/2020       INR/PTT:  Lab Results   Component Value Date    INR 1.01 07/24/2018    PTT 27 07/24/2018       Diagnostic studies:     7/28/2020 carotid US:    1.  LEFT carotid artery: There is now severe, greater than 70%,  stenosis of the left carotid artery based on elevated peak systolic  velocity. This has worsened from the previous exam.  2.  RIGHT carotid artery: There is now moderate, 50-69%, stenosis of  the right carotid artery based on peak systolic velocity. This has  worsened from the previous exam.  3.  Bilateral vertebral artery flow is antegrade.    Left carotid stenosis has been moderate and unchanged since at least 2008.    Assessment/Plan:    Bilateral carotid stenosis, left greater than 70%, asymptomatic. Mr. Jimenes has not had an event corresponding to his carotid disease that has been essentially stable for many years. On ASA. If he were definitely greater than 80% stenotic then a case for intervention could be made however in light of his age (86) I believe the benefits of carotid stenting is not outweighed by the risks. Patient has a CTA of the head and neck upcoming, as ordered by Dr. Sotelo.  - No intervention at this time.  - CTA per  Dr. Sotelo. Will defer to vascular surgery.     Ez Retana, DO on 8/4/2020 at 4:51 PM    I have talked to the patient virtually together with the resident and agree with the note.    Sincerely,        Des Marks MD    CC  Patient Care Team:  Viet Bingham MD as PCP - General (Internal Medicine)  Des Marks MD as MD (Vascular and Interventional Radiology)  Mary España RN as Specialty Care Coordinator (Radiology)

## 2020-08-04 NOTE — PROGRESS NOTES
"Reid Jimenes is a 86 year old male who is being evaluated via a billable telephone visit.      The patient has been notified of following:     \"This telephone visit will be conducted via a call between you and your physician/provider. We have found that certain health care needs can be provided without the need for a physical exam.  This service lets us provide the care you need with a short phone conversation.  If a prescription is necessary we can send it directly to your pharmacy.  If lab work is needed we can place an order for that and you can then stop by our lab to have the test done at a later time.    Telephone visits are billed at different rates depending on your insurance coverage. During this emergency period, for some insurers they may be billed the same as an in-person visit.  Please reach out to your insurance provider with any questions.    If during the course of the call the physician/provider feels a telephone visit is not appropriate, you will not be charged for this service.\"    Patient has given verbal consent for Telephone visit?  Yes    What phone number would you like to be contacted at? [phone].    How would you like to obtain your AVS? Sagarhart    I have reviewed and updated the patient's allergies and medication list.    Concerns: No new concerns.   Refills: None needed.     Vitals - Patient Reported  Weight (Patient Reported): 80.2 kg (176 lb 12.9 oz)  Height (Patient Reported): 165.1 cm (5' 5\")  BMI (Based on Pt Reported Ht/Wt): 29.42  Pain Score: No Pain (0)      Liz Mallory [facility]          INTERVENTIONAL RADIOLOGY CONSULTATION    Name: Reid Jimenes  Age: 86 year old   Referring Physician: Dr. Bingham   REASON FOR REFERRAL: Carotid stenosis    HPI: Mr. Jimenes is a 86 year old male patient who lives independently with his wife with a longstanding history of left greater than right carotid artery stenosis. He does not have focal neurological deficits or history of CVA. He has " had a known moderate left internal carotid artery stenosis stable since at least 2008 (patient reports it's been there for 40 years) that now is greater than 70% on recent carotid ultrasound. He is asymptomatic.    Mr. Jimenes is living independently with his wife.    PAST MEDICAL HISTORY:   Past Medical History:   Diagnosis Date     Aortic valve stenosis, nonrheumatic     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: 26mm Edward Sabino 3 valve     Coronary artery disease     cardiac cath 7/24/18: mild non-obstructive disease     Hyperlipidaemia LDL goal < 100      Hypertension      LBBB (left bundle branch block)      Need for SBE (subacute bacterial endocarditis) prophylaxis      Pulmonary hypertension (H)      Type 2 diabetes mellitus (H)        PAST SURGICAL HISTORY:   Past Surgical History:   Procedure Laterality Date     MANDIBLE SURGERY  1958     OTHER SURGICAL HISTORY      cardiac cath 7/24/18: mild non-obstructive disease     TRANSCATHETER AORTIC VALVE IMPLANT ANESTHESIA N/A 8/28/2018    Procedure: TRANSCATHETER AORTIC VALVE IMPLANT ANESTHESIA;  ANESTHESIA NEEDED FOR TAVR PROCEDURE;  Surgeon: GENERIC ANESTHESIA PROVIDER;  Location: [employer],  26mm Edward Sabino 3 valve       FAMILY HISTORY:   Family History   Problem Relation Age of Onset     Heart Disease Father      Alcohol/Drug Father      Myocardial Infarction Grandchild 18        Grandson     Myocardial Infarction Other 18        Nephew      Cancer Mother 58        Stomach cancer     Alcohol/Drug Mother      Cancer Maternal Grandmother         stomach cancer     Alcohol/Drug Maternal Grandmother      Myocardial Infarction Sister 70        Name: Reena      Alcohol/Drug Sister      Cerebrovascular Disease Brother 65        Name: Miguelito     Alcohol/Drug Brother      Alcohol/Drug Maternal Grandfather      Alcohol/Drug Paternal Grandmother      Alcohol/Drug Paternal Grandfather        SOCIAL HISTORY:   Social History     Tobacco Use     Smoking status: Former Smoker     Packs/day: 1.00      "Years: 30.00     Pack years: 30.00     Types: Cigarettes     Start date:      Last attempt to quit: 1980     Years since quittin.9     Smokeless tobacco: Former User     Types: Chew     Quit date: 1983   Substance Use Topics     Alcohol use: No     Comment: quit 30+ years ago. states, \"I'm an alcoholic\".       PROBLEM LIST:   Patient Active Problem List    Diagnosis Date Noted     Coronary artery disease      Priority: Medium     cardiac cath 18: mild non-obstructive disease       Aortic valve stenosis, nonrheumatic      Priority: Medium     TAVR 18: 26mm Edward Sabino 3 valve       Need for SBE (subacute bacterial endocarditis) prophylaxis      Priority: Medium     Pulmonary hypertension (H)      Priority: Medium     LBBB (left bundle branch block)      Priority: Medium     Pseudoaneurysm of femoral artery (H) 2018     Priority: Medium     Left         Type 2 diabetes mellitus with microalbuminuria, without long-term current use of insulin (H) 2017     Priority: Medium     Acute diffuse otitis externa of both ears 2017     Priority: Medium     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       Hyperlipidemia with target LDL less than 100 2014     Priority: Medium     Diagnosis updated by automated process. Provider to review and confirm.         MEDICATIONS:   Prescription Medications as of 2020       Rx Number Disp Refills Start End Last Dispensed Date Next Fill Date Owning Pharmacy    ascorbic acid 500 MG TABS            Sig: " Take 500 mg by mouth every evening     Class: Historical    Route: Oral    aspirin 81 MG EC tablet  30 tablet  8/31/2018    [place] [id number] [address] -[facility]    Sig: Take 1 tablet (81 mg) by mouth daily    Class: OTC    Route: Oral    BIOTIN PO            Sig: Take 5,000 mg by mouth 2 times daily     Class: Historical    Route: Oral    Cyanocobalamin (VITAMIN B-12 ER PO)            Sig: Take by mouth daily    Class: Historical    Route: Oral    GLUCOSAMINE-CHONDROITIN PO            Sig: Take 1 tablet by mouth 2 times daily     Class: Historical    Route: Oral    metFORMIN (GLUCOPHAGE) 1000 MG tablet  90 tablet 3 7/10/2020    [facility]    Sig: Take 0.5 tablets (500 mg) by mouth 2 times daily (with meals)    Class: E-Prescribe    Route: Oral    Multiple Vitamins-Iron (MULTIVITAMIN/IRON PO)            Sig: Take 1 tablet by mouth 2 times daily     Class: Historical    Route: Oral    simvastatin (ZOCOR) 40 MG tablet  45 tablet 3 7/10/2020    [facility]    Sig: Take 0.5 tablets (20 mg) by mouth At Bedtime    Class: E-Prescribe    Route: Oral    omeprazole (PRILOSEC) 20 MG DR capsule  90 capsule 3 8/28/2019    [facility]    Sig: Take 1 capsule (20 mg) by mouth daily    Class: Local Print    Route: Oral          ALLERGIES:   Patient has no known allergies.    Labs:    BMP RESULTS:  Lab Results   Component Value Date     07/10/2020    POTASSIUM 4.8 07/10/2020    CHLORIDE 106 07/10/2020    CO2 28 07/10/2020    ANIONGAP 5 07/10/2020     (H) 07/10/2020    BUN 17 07/10/2020    CR 1.16 07/10/2020    GFRESTIMATED 57 (L) 07/10/2020    GFRESTBLACK 66 07/10/2020    DEEPTI 9.0 07/10/2020        CBC RESULTS:  Lab Results   Component Value Date    WBC 8.3 07/10/2020    RBC 4.34 (L) 07/10/2020    HGB 13.1 (L) 07/10/2020    HCT 40.3  07/10/2020    MCV 93 07/10/2020    MCH 30.2 07/10/2020    MCHC 32.5 07/10/2020    RDW 12.6 07/10/2020     07/10/2020       INR/PTT:  Lab Results   Component Value Date    INR 1.01 07/24/2018    PTT 27 07/24/2018       Diagnostic studies:     7/28/2020 carotid US:    1.  LEFT carotid artery: There is now severe, greater than 70%,  stenosis of the left carotid artery based on elevated peak systolic  velocity. This has worsened from the previous exam.  2.  RIGHT carotid artery: There is now moderate, 50-69%, stenosis of  the right carotid artery based on peak systolic velocity. This has  worsened from the previous exam.  3.  Bilateral vertebral artery flow is antegrade.    Left carotid stenosis has been moderate and unchanged since at least 2008.    Assessment/Plan:    Bilateral carotid stenosis, left greater than 70%, asymptomatic. Mr. Jimenes has not had an event corresponding to his carotid disease that has been essentially stable for many years. On ASA. If he were definitely greater than 80% stenotic then a case for intervention could be made however in light of his age (86) I believe the benefits of carotid stenting is not outweighed by the risks. Patient has a CTA of the head and neck upcoming, as ordered by Dr. Sotelo.  - No intervention at this time.  - CTA per Dr. Sotelo. Will defer to vascular surgery.       Ez Retana,  on 8/4/2020 at 4:51 PM      I have talked to the patient virtually together with the resident and agree with the note.          CC  Patient Care Team:  Jamel Bingham MD as PCP - General (Internal Medicine)  Jamel Bingham MD as Assigned PCP  Des Marks MD as MD (Vascular and Interventional Radiology)  Mary España RN as Specialty Care Coordinator (Radiology)  JAMEL BINGHAM

## 2020-08-14 ENCOUNTER — HOSPITAL ENCOUNTER (OUTPATIENT)
Dept: CT IMAGING | Facility: CLINIC | Age: 85
Discharge: HOME | End: 2020-08-14
Attending: SURGERY | Admitting: SURGERY
Payer: MEDICARE

## 2020-08-14 DIAGNOSIS — I65.23 BILATERAL CAROTID ARTERY OBSTRUCTION WITHOUT CEREBRAL INFARCTION: ICD-10-CM

## 2020-08-14 LAB
CREAT BLD-MCNC: 1.3 MG/DL (ref 0.66–1.25)
GFR SERPL CREATININE-BSD FRML MDRD: 52 ML/MIN/{1.73_M2}

## 2020-08-14 PROCEDURE — 70496 CT ANGIOGRAPHY HEAD: CPT

## 2020-08-14 PROCEDURE — 25000125 ZZHC RX 250: Performed by: SURGERY

## 2020-08-14 PROCEDURE — 82565 ASSAY OF CREATININE: CPT

## 2020-08-14 PROCEDURE — 25000128 H RX IP 250 OP 636: Performed by: SURGERY

## 2020-08-14 RX ORDER — IOPAMIDOL 755 MG/ML
500 INJECTION, SOLUTION INTRAVASCULAR ONCE
Status: COMPLETED | OUTPATIENT
Start: 2020-08-14 | End: 2020-08-14

## 2020-08-14 RX ADMIN — IOPAMIDOL 70 ML: 755 INJECTION, SOLUTION INTRAVENOUS at 09:20

## 2020-08-14 RX ADMIN — SODIUM CHLORIDE 80 ML: 9 INJECTION, SOLUTION INTRAVENOUS at 09:20

## 2020-08-17 ENCOUNTER — APPOINTMENT (OUTPATIENT)
Dept: CT IMAGING | Facility: CLINIC | Age: 85
End: 2020-08-17
Attending: EMERGENCY MEDICINE
Payer: MEDICARE

## 2020-08-17 ENCOUNTER — HOSPITAL ENCOUNTER (OUTPATIENT)
Facility: CLINIC | Age: 85
Setting detail: OBSERVATION
Discharge: HOME | End: 2020-08-18
Attending: EMERGENCY MEDICINE | Admitting: HOSPITALIST
Payer: MEDICARE

## 2020-08-17 DIAGNOSIS — R55 SYNCOPE AND COLLAPSE: ICD-10-CM

## 2020-08-17 LAB
ABO + RH BLD: NORMAL
ABO + RH BLD: NORMAL
ALBUMIN SERPL-MCNC: 3.5 G/DL (ref 3.4–5)
ALBUMIN UR-MCNC: 50 MG/DL
ALP SERPL-CCNC: 91 U/L (ref 40–150)
ALT SERPL W P-5'-P-CCNC: 33 U/L (ref 0–70)
ANION GAP SERPL CALCULATED.3IONS-SCNC: 5 MMOL/L (ref 3–14)
APPEARANCE UR: CLEAR
AST SERPL W P-5'-P-CCNC: 33 U/L (ref 0–45)
BASOPHILS # BLD AUTO: 0 10E9/L (ref 0–0.2)
BASOPHILS NFR BLD AUTO: 0.5 %
BILIRUB SERPL-MCNC: 0.4 MG/DL (ref 0.2–1.3)
BILIRUB UR QL STRIP: NEGATIVE
BLD GP AB SCN SERPL QL: NORMAL
BLOOD BANK CMNT PATIENT-IMP: NORMAL
BUN SERPL-MCNC: 18 MG/DL (ref 7–30)
CALCIUM SERPL-MCNC: 8.6 MG/DL (ref 8.5–10.1)
CHLORIDE SERPL-SCNC: 106 MMOL/L (ref 94–109)
CO2 SERPL-SCNC: 27 MMOL/L (ref 20–32)
COLOR UR AUTO: YELLOW
CREAT SERPL-MCNC: 1.03 MG/DL (ref 0.66–1.25)
DIFFERENTIAL METHOD BLD: ABNORMAL
EOSINOPHIL # BLD AUTO: 0.3 10E9/L (ref 0–0.7)
EOSINOPHIL NFR BLD AUTO: 4 %
ERYTHROCYTE [DISTWIDTH] IN BLOOD BY AUTOMATED COUNT: 12.1 % (ref 10–15)
GFR SERPL CREATININE-BSD FRML MDRD: 65 ML/MIN/{1.73_M2}
GLUCOSE BLDC GLUCOMTR-MCNC: 123 MG/DL (ref 70–99)
GLUCOSE BLDC GLUCOMTR-MCNC: 162 MG/DL (ref 70–99)
GLUCOSE SERPL-MCNC: 159 MG/DL (ref 70–99)
GLUCOSE UR STRIP-MCNC: NEGATIVE MG/DL
HCT VFR BLD AUTO: 39.8 % (ref 40–53)
HGB BLD-MCNC: 12.9 G/DL (ref 13.3–17.7)
HGB UR QL STRIP: NEGATIVE
HYALINE CASTS #/AREA URNS LPF: 1 /LPF (ref 0–2)
IMM GRANULOCYTES # BLD: 0 10E9/L (ref 0–0.4)
IMM GRANULOCYTES NFR BLD: 0.1 %
INR PPP: 1.02 (ref 0.86–1.14)
INTERPRETATION ECG - MUSE: NORMAL
KETONES UR STRIP-MCNC: NEGATIVE MG/DL
LEUKOCYTE ESTERASE UR QL STRIP: NEGATIVE
LYMPHOCYTES # BLD AUTO: 1.8 10E9/L (ref 0.8–5.3)
LYMPHOCYTES NFR BLD AUTO: 23.3 %
MCH RBC QN AUTO: 29.8 PG (ref 26.5–33)
MCHC RBC AUTO-ENTMCNC: 32.4 G/DL (ref 31.5–36.5)
MCV RBC AUTO: 92 FL (ref 78–100)
MONOCYTES # BLD AUTO: 0.7 10E9/L (ref 0–1.3)
MONOCYTES NFR BLD AUTO: 9.6 %
NEUTROPHILS # BLD AUTO: 4.8 10E9/L (ref 1.6–8.3)
NEUTROPHILS NFR BLD AUTO: 62.5 %
NITRATE UR QL: NEGATIVE
NRBC # BLD AUTO: 0 10*3/UL
NRBC BLD AUTO-RTO: 0 /100
PH UR STRIP: 7.5 PH (ref 5–7)
PLATELET # BLD AUTO: 148 10E9/L (ref 150–450)
POTASSIUM SERPL-SCNC: 4.1 MMOL/L (ref 3.4–5.3)
PROT SERPL-MCNC: 6.9 G/DL (ref 6.8–8.8)
RBC # BLD AUTO: 4.33 10E12/L (ref 4.4–5.9)
RBC #/AREA URNS AUTO: 1 /HPF (ref 0–2)
SODIUM SERPL-SCNC: 138 MMOL/L (ref 133–144)
SOURCE: ABNORMAL
SP GR UR STRIP: 1.01 (ref 1–1.03)
SPECIMEN EXP DATE BLD: NORMAL
TROPONIN I SERPL-MCNC: <0.015 UG/L (ref 0–0.04)
UROBILINOGEN UR STRIP-MCNC: NORMAL MG/DL (ref 0–2)
WBC # BLD AUTO: 7.7 10E9/L (ref 4–11)
WBC #/AREA URNS AUTO: 1 /HPF (ref 0–5)

## 2020-08-17 PROCEDURE — 74177 CT ABD & PELVIS W/CONTRAST: CPT

## 2020-08-17 PROCEDURE — U0003 INFECTIOUS AGENT DETECTION BY NUCLEIC ACID (DNA OR RNA); SEVERE ACUTE RESPIRATORY SYNDROME CORONAVIRUS 2 (SARS-COV-2) (CORONAVIRUS DISEASE [COVID-19]), AMPLIFIED PROBE TECHNIQUE, MAKING USE OF HIGH THROUGHPUT TECHNOLOGIES AS DESCRIBED BY CMS-2020-01-R: HCPCS | Performed by: EMERGENCY MEDICINE

## 2020-08-17 PROCEDURE — 99220 ZZC INITIAL OBSERVATION CARE,LEVL III: CPT | Performed by: PHYSICIAN ASSISTANT

## 2020-08-17 PROCEDURE — 00000146 ZZHCL STATISTIC GLUCOSE BY METER IP

## 2020-08-17 PROCEDURE — 25000128 H RX IP 250 OP 636: Performed by: EMERGENCY MEDICINE

## 2020-08-17 PROCEDURE — 25800030 ZZH RX IP 258 OP 636: Performed by: PHYSICIAN ASSISTANT

## 2020-08-17 PROCEDURE — 99285 EMERGENCY DEPT VISIT HI MDM: CPT | Mod: 25

## 2020-08-17 PROCEDURE — 80053 COMPREHEN METABOLIC PANEL: CPT | Performed by: EMERGENCY MEDICINE

## 2020-08-17 PROCEDURE — 25000125 ZZHC RX 250: Performed by: EMERGENCY MEDICINE

## 2020-08-17 PROCEDURE — C9803 HOPD COVID-19 SPEC COLLECT: HCPCS

## 2020-08-17 PROCEDURE — 86900 BLOOD TYPING SEROLOGIC ABO: CPT | Performed by: EMERGENCY MEDICINE

## 2020-08-17 PROCEDURE — 86901 BLOOD TYPING SEROLOGIC RH(D): CPT | Performed by: EMERGENCY MEDICINE

## 2020-08-17 PROCEDURE — 96374 THER/PROPH/DIAG INJ IV PUSH: CPT | Mod: 59

## 2020-08-17 PROCEDURE — 84484 ASSAY OF TROPONIN QUANT: CPT | Performed by: EMERGENCY MEDICINE

## 2020-08-17 PROCEDURE — 70450 CT HEAD/BRAIN W/O DYE: CPT

## 2020-08-17 PROCEDURE — 96361 HYDRATE IV INFUSION ADD-ON: CPT

## 2020-08-17 PROCEDURE — 93005 ELECTROCARDIOGRAM TRACING: CPT

## 2020-08-17 PROCEDURE — 25000132 ZZH RX MED GY IP 250 OP 250 PS 637: Mod: GY | Performed by: PHYSICIAN ASSISTANT

## 2020-08-17 PROCEDURE — 81001 URINALYSIS AUTO W/SCOPE: CPT | Performed by: EMERGENCY MEDICINE

## 2020-08-17 PROCEDURE — 86850 RBC ANTIBODY SCREEN: CPT | Performed by: EMERGENCY MEDICINE

## 2020-08-17 PROCEDURE — 85610 PROTHROMBIN TIME: CPT | Performed by: EMERGENCY MEDICINE

## 2020-08-17 PROCEDURE — G0378 HOSPITAL OBSERVATION PER HR: HCPCS

## 2020-08-17 PROCEDURE — 85025 COMPLETE CBC W/AUTO DIFF WBC: CPT | Performed by: EMERGENCY MEDICINE

## 2020-08-17 RX ORDER — PROCHLORPERAZINE 25 MG
12.5 SUPPOSITORY, RECTAL RECTAL EVERY 12 HOURS PRN
Status: DISCONTINUED | OUTPATIENT
Start: 2020-08-17 | End: 2020-08-18 | Stop reason: HOSPADM

## 2020-08-17 RX ORDER — SODIUM CHLORIDE 9 MG/ML
INJECTION, SOLUTION INTRAVENOUS CONTINUOUS
Status: ACTIVE | OUTPATIENT
Start: 2020-08-17 | End: 2020-08-18

## 2020-08-17 RX ORDER — SIMVASTATIN 20 MG
20 TABLET ORAL AT BEDTIME
Status: DISCONTINUED | OUTPATIENT
Start: 2020-08-17 | End: 2020-08-18 | Stop reason: HOSPADM

## 2020-08-17 RX ORDER — ACETAMINOPHEN 650 MG/1
650 SUPPOSITORY RECTAL EVERY 4 HOURS PRN
Status: DISCONTINUED | OUTPATIENT
Start: 2020-08-17 | End: 2020-08-18 | Stop reason: HOSPADM

## 2020-08-17 RX ORDER — NICOTINE POLACRILEX 4 MG
15-30 LOZENGE BUCCAL
Status: DISCONTINUED | OUTPATIENT
Start: 2020-08-17 | End: 2020-08-18 | Stop reason: HOSPADM

## 2020-08-17 RX ORDER — PROCHLORPERAZINE MALEATE 5 MG
5 TABLET ORAL EVERY 6 HOURS PRN
Status: DISCONTINUED | OUTPATIENT
Start: 2020-08-17 | End: 2020-08-18 | Stop reason: HOSPADM

## 2020-08-17 RX ORDER — LIDOCAINE 40 MG/G
CREAM TOPICAL
Status: DISCONTINUED | OUTPATIENT
Start: 2020-08-17 | End: 2020-08-18 | Stop reason: HOSPADM

## 2020-08-17 RX ORDER — ONDANSETRON 2 MG/ML
4 INJECTION INTRAMUSCULAR; INTRAVENOUS EVERY 30 MIN PRN
Status: DISCONTINUED | OUTPATIENT
Start: 2020-08-17 | End: 2020-08-17

## 2020-08-17 RX ORDER — IOPAMIDOL 755 MG/ML
500 INJECTION, SOLUTION INTRAVASCULAR ONCE
Status: COMPLETED | OUTPATIENT
Start: 2020-08-17 | End: 2020-08-17

## 2020-08-17 RX ORDER — LIDOCAINE 40 MG/G
CREAM TOPICAL
Status: DISCONTINUED | OUTPATIENT
Start: 2020-08-17 | End: 2020-08-17

## 2020-08-17 RX ORDER — ONDANSETRON 4 MG/1
4 TABLET, ORALLY DISINTEGRATING ORAL EVERY 6 HOURS PRN
Status: DISCONTINUED | OUTPATIENT
Start: 2020-08-17 | End: 2020-08-18 | Stop reason: HOSPADM

## 2020-08-17 RX ORDER — DEXTROSE MONOHYDRATE 25 G/50ML
25-50 INJECTION, SOLUTION INTRAVENOUS
Status: DISCONTINUED | OUTPATIENT
Start: 2020-08-17 | End: 2020-08-18 | Stop reason: HOSPADM

## 2020-08-17 RX ORDER — NITROGLYCERIN 0.4 MG/1
0.4 TABLET SUBLINGUAL EVERY 5 MIN PRN
Status: DISCONTINUED | OUTPATIENT
Start: 2020-08-17 | End: 2020-08-18 | Stop reason: HOSPADM

## 2020-08-17 RX ORDER — ACETAMINOPHEN 325 MG/1
650 TABLET ORAL EVERY 4 HOURS PRN
Status: DISCONTINUED | OUTPATIENT
Start: 2020-08-17 | End: 2020-08-18 | Stop reason: HOSPADM

## 2020-08-17 RX ORDER — ONDANSETRON 2 MG/ML
4 INJECTION INTRAMUSCULAR; INTRAVENOUS EVERY 6 HOURS PRN
Status: DISCONTINUED | OUTPATIENT
Start: 2020-08-17 | End: 2020-08-18 | Stop reason: HOSPADM

## 2020-08-17 RX ORDER — LOSARTAN POTASSIUM 50 MG/1
25 TABLET ORAL DAILY
Status: ON HOLD | COMMUNITY
End: 2020-08-17

## 2020-08-17 RX ORDER — SODIUM PHOSPHATE,MONO-DIBASIC 19G-7G/118
1 ENEMA (ML) RECTAL
COMMUNITY

## 2020-08-17 RX ORDER — ASPIRIN 81 MG/1
81 TABLET ORAL DAILY
Status: DISCONTINUED | OUTPATIENT
Start: 2020-08-18 | End: 2020-08-18 | Stop reason: HOSPADM

## 2020-08-17 RX ADMIN — METFORMIN HYDROCHLORIDE 500 MG: 500 TABLET, FILM COATED ORAL at 17:08

## 2020-08-17 RX ADMIN — SIMVASTATIN 20 MG: 20 TABLET, FILM COATED ORAL at 21:10

## 2020-08-17 RX ADMIN — ONDANSETRON 4 MG: 2 INJECTION INTRAMUSCULAR; INTRAVENOUS at 11:15

## 2020-08-17 RX ADMIN — SODIUM CHLORIDE 62 ML: 9 INJECTION, SOLUTION INTRAVENOUS at 12:23

## 2020-08-17 RX ADMIN — IOPAMIDOL 89 ML: 755 INJECTION, SOLUTION INTRAVENOUS at 12:23

## 2020-08-17 RX ADMIN — SODIUM CHLORIDE: 9 INJECTION, SOLUTION INTRAVENOUS at 17:05

## 2020-08-17 ASSESSMENT — ENCOUNTER SYMPTOMS
LIGHT-HEADEDNESS: 1
FEVER: 0
SHORTNESS OF BREATH: 0
NAUSEA: 1
COUGH: 0
VOMITING: 1

## 2020-08-17 ASSESSMENT — MIFFLIN-ST. JEOR: SCORE: 1393.87

## 2020-08-17 NOTE — PHARMACY-ADMISSION MEDICATION HISTORY
Admission medication history interview status for this patient is complete. See [facility] admission navigator for allergy information, prior to admission medications and immunization status.     Medication history interview done via telephone during Covid-19 pandemic, indicate source(s): Patient and Family  Medication history resources (including written lists, pill bottles, clinic record):None  Pharmacy: -  Changes made to PTA medication list:  Added: -  Deleted: vit B12, omeprazole  Changed: -    Actions taken by pharmacist (provider contacted, etc):called pt for mr     Additional medication history information:None    Medication reconciliation/reorder completed by provider prior to medication history?  no   (Y/N)     For patients on insulin therapy: no  (Y/N)  Sliding scale Novolog: -  Do you have a baseline novolog pre-meal dose:   __  units with meals or __ units/carb unit with meals  Do you eat three meals a day:  -  How many times do you check your blood glucose per day:  -  How many episodes of hypoglycemia do you have per week: -  Do you have a Continuous glucose monitor (CGM) : - (remind pt that not approved for hospital use)  Any specific barriers to therapy? -  (cost, comfortable with injections, confident with current diabetes regimen?)      Prior to Admission medications    Medication Sig Last Dose Taking? Auth Provider   ascorbic acid 500 MG TABS Take 500 mg by mouth every evening  8/16/2020 at Unknown time Yes Reported, Patient   aspirin 81 MG EC tablet Take 1 tablet (81 mg) by mouth daily 8/17/2020 at Unknown time Yes Lizz Love APRN CNP   BIOTIN PO Take 5,000 mg by mouth 2 times daily  8/17/2020 at x1 Yes Reported, Patient   glucosamine-chondroitin 500-400 MG CAPS per capsule Take 1 capsule by mouth 2 times daily 8/17/2020 at x1 Yes Unknown, Entered By History   metFORMIN (GLUCOPHAGE) 1000 MG tablet Take 0.5 tablets (500 mg) by mouth 2 times daily (with meals) 8/17/2020 at x1 Yes Viet Bingham,  MD   Multiple Vitamins-Iron (MULTIVITAMIN/IRON PO) Take 1 tablet by mouth 2 times daily  8/17/2020 at x1 Yes Reported, Patient   simvastatin (ZOCOR) 40 MG tablet Take 0.5 tablets (20 mg) by mouth At Bedtime 8/16/2020 at Unknown time Yes Viet Bingham MD

## 2020-08-17 NOTE — ED NOTES
[facility]  ED Nurse Handoff Report    Reid Jimenes is a 86 year old male   ED Chief complaint: Loss of Consciousness  . ED Diagnosis:   Final diagnoses:   None     Allergies: No Known Allergies    Code Status: Full Code  Activity level - Baseline/Home:  Independent. Activity Level - Current:   Assist X 1. Lift room needed: No. Bariatric: No   Needed: No   Isolation: No. Infection: Not Applicable.     Vital Signs:   Vitals:    08/17/20 1053   BP: (!) 143/62   Pulse: 54   Resp: 16   Temp: 97.6  F (36.4  C)   TempSrc: Oral   SpO2: 100%       Cardiac Rhythm:  ,      Pain level: 0-10 Pain Scale: 0  Patient confused: No. Patient Falls Risk: Yes.   Elimination Status: Has voided   Patient Report - Initial Complaint: 86 year old male presents via EMS after syncopal episode. Now complains of nausea. Denies chest pain, SOB.. Focused Assessment: Cognitive - Cognitive/Neuro/Behavioral WDL: all  Level Of Consciousness: alert  Arousal Level: opens eyes spontaneously  Orientation: oriented x 4  Follows Commands: yes  Speech: clear; spontaneous; logical  Best Language: 0 - No aphasia  Mood/Behavior: calm; cooperative; behavior appropriate to situation   Mary Coma Scale - Best Eye Response: 4-->(E4) spontaneous  Best Motor Response: 6-->(M6) obeys commands  Best Verbal Response: 5-->(V5) oriented  Clemons Coma Scale Score: 15      Tests Performed: ekg, labs, CT. Abnormal Results:   Labs Ordered and Resulted from Time of ED Arrival Up to the Time of Departure from the ED   CBC WITH PLATELETS DIFFERENTIAL - Abnormal; Notable for the following components:       Result Value    RBC Count 4.33 (*)     Hemoglobin 12.9 (*)     Hematocrit 39.8 (*)     Platelet Count 148 (*)     All other components within normal limits   COMPREHENSIVE METABOLIC PANEL - Abnormal; Notable for the following components:    Glucose 159 (*)     All other components within normal limits   GLUCOSE BY METER - Abnormal; Notable for  the following components:    Glucose 162 (*)     All other components within normal limits   INR   TROPONIN I   ROUTINE UA WITH MICROSCOPIC REFLEX TO CULTURE   PULSE OXIMETRY NURSING   CARDIAC CONTINUOUS MONITORING   PERIPHERAL IV CATHETER   FREE WATER   ABO/RH TYPE AND SCREEN     CT Head w/o Contrast   Final Result   IMPRESSION: No acute intracranial abnormality.      RAFAL LATIF MD      CT Abdomen Pelvis w Contrast   Preliminary Result   IMPRESSION:    1.  Mild wall thickening of the bladder may just relate to incomplete   distention. Correlate with urinalysis to assess for urinary tract   infection.   2.  No acute abnormality otherwise seen.        .   Treatments provided: zofran  Family Comments: wife at bedside   OBS brochure/video discussed/provided to patient:  Yes  ED Medications:   Medications   lidocaine 1 % 0.1-1 mL (has no administration in time range)   lidocaine (LMX4) cream (has no administration in time range)   sodium chloride (PF) 0.9% PF flush 3 mL (has no administration in time range)   sodium chloride (PF) 0.9% PF flush 3 mL (has no administration in time range)   ondansetron (ZOFRAN) injection 4 mg (4 mg Intravenous Given 8/17/20 1115)   CT Scan Flush (62 mLs Intravenous Given 8/17/20 1223)   iopamidol (ISOVUE-370) solution 500 mL (89 mLs Intravenous Given 8/17/20 1223)     Drips infusing:  No  For the majority of the shift, the patient's behavior Green. Interventions performed were NA.    Sepsis treatment initiated: No       ED Nurse Name/Phone Number: Minerva Cerda RN,   1:10 PM    RECEIVING UNIT ED HANDOFF REVIEW    Above ED Nurse Handoff Report was reviewed: Yes  Reviewed by: Shavonne Ayala RN on August 17, 2020 at 2:38 PM

## 2020-08-17 NOTE — ED TRIAGE NOTES
86 year old male presents via EMS after syncopal episode. Now complains of nausea. Denies chest pain, SOB.

## 2020-08-17 NOTE — ED PROVIDER NOTES
History     Chief Complaint:    Loss of Consciousness      The history is provided by the patient and the EMS personnel.      Reid Jimenes is a 86 year old male who presents with loss of consciousness. He was in the [place] to the bank with his wife when she looked over and he was slumped over in his seat passed out. He had an episode of emesis. He states that he feels a little woozy with intermittent nausea. En route, his sugars were 162 and his systolic was in the 100s. He woke up feeling okay and ate breakfast. He denies chest pain, shortness of breath, fever, cough, or Covid-19 symptoms or exposure.     Allergies:  No Known Drug Allergies    Medications:    Prilosec  Aspirin 81 mg  Glucophage  Zocor    Past Medical History:    Aortic valve stenosis  CAD  Hypertension  Hyperlipidemia  LBBB  SBE prophylaxis  Pulmonary hypertension  Type 2 diabetes mellitus  Peripheral vascular disease  Bilateral carotid artery obstruction  Pseudoaneurysm of femoral artery  Esophageal reflux  Tobacco abuse    Past Surgical History:    Mandible surgery  Cardiac cath  Transcatheter aortic valve implant    Family History:    Father: Heart disease, alcohol/drug  Mother: Stomach cancer, alcohol/drug  Sister: MI, alcohol/drug  Brother: Cerebrovascular disease, alcohol/drug    Social History:  The patient was accompanied to the ED by wife and EMS.  Smoking Status: Former Smoker   Quit: 1980  Smokeless Tobacco: Former User   Type: Chew  Alcohol Use: Quit 30+ years ago  Drug Use: Negative  PCP: Viet Bingham    Marital Status:       Review of Systems   Constitutional: Negative for fever.   Respiratory: Negative for cough and shortness of breath.    Cardiovascular: Negative for chest pain.   Gastrointestinal: Positive for nausea and vomiting (since resolved).   Neurological: Positive for light-headedness.   All other systems reviewed and are negative.      Physical Exam     Patient Vitals for the past 24 hrs:   BP Temp Temp  src Pulse Heart Rate Resp SpO2   08/17/20 1415 -- -- -- -- 55 8 --   08/17/20 1400 -- -- -- -- 59 17 --   08/17/20 1345 -- -- -- -- 58 -- --   08/17/20 1330 135/71 -- -- 58 62 -- 99 %   08/17/20 1315 -- -- -- -- 60 -- 97 %   08/17/20 1300 (!) 146/75 -- -- 55 52 (!) 7 98 %   08/17/20 1200 132/61 -- -- 52 56 8 --   08/17/20 1145 -- -- -- -- 55 17 --   08/17/20 1130 129/62 -- -- 52 52 11 --   08/17/20 1115 (!) 149/68 -- -- 53 55 25 --   08/17/20 1100 129/56 -- -- 58 58 12 99 %   08/17/20 1053 (!) 143/62 97.6  F (36.4  C) Oral 54 -- 16 100 %       Physical Exam  Constitutional:       Appearance: He is well-developed.   HENT:      Right Ear: External ear normal.      Left Ear: External ear normal.      Mouth/Throat:      Mouth: Mucous membranes are moist.      Pharynx: Oropharynx is clear. No oropharyngeal exudate or posterior oropharyngeal erythema.   Eyes:      General: No scleral icterus.     Extraocular Movements: Extraocular movements intact.      Conjunctiva/sclera: Conjunctivae normal.      Pupils: Pupils are equal, round, and reactive to light.   Neck:      Musculoskeletal: Normal range of motion and neck supple.   Cardiovascular:      Rate and Rhythm: Rhythm irregular.      Pulses: Normal pulses.      Heart sounds: Normal heart sounds. No murmur. No friction rub. No gallop.    Pulmonary:      Effort: Pulmonary effort is normal. No respiratory distress.      Breath sounds: Normal breath sounds. No wheezing or rales.   Abdominal:      General: Bowel sounds are normal. There is no distension.      Palpations: Abdomen is soft. There is no mass.      Tenderness: There is no abdominal tenderness.      Comments: +periumbilical pain   Musculoskeletal: Normal range of motion.   Skin:     General: Skin is warm and dry.      Capillary Refill: Capillary refill takes less than 2 seconds.      Findings: No rash.   Neurological:      General: No focal deficit present.      Mental Status: He is alert.      Cranial Nerves: No  cranial nerve deficit.      Sensory: No sensory deficit.      Comments: Speech clear. 5/5 strength x 4. Normal sensation to light touch thtoughout.   Psychiatric:         Mood and Affect: Mood normal.       Emergency Department Course     ECG:  Indication: Loss of consciousness  Time: 1058  Vent. Rate 54 bpm. NM interval 276. QRS duration 166. QT/QTc 478/453. P-R-T axis 61 -14 109. Sinus bradycardia with 1st degree AV block. Left bundle branch block. Abnormal ECG. Read time: 1108     Imaging:  Radiology findings were communicated with the patient and family who voiced understanding of the findings.    CT Abdomen/Pelvis with IV contrast:   1.  Mild wall thickening of the bladder may just relate to incomplete  distention. Correlate with urinalysis to assess for urinary tract  infection.  2.  No acute abnormality otherwise seen. As per radiology.    CT Head without contrast:   No acute intracranial abnormality. As per radiology.    Laboratory:  Laboratory findings were communicated with the patient and family who voiced understanding of the findings.    CMP: Glucose 159 (H) o/w WNL (Creatinine: 1.03)    CBC: WBC: 7.7, HGB: 12.9 (L), PLT: 148 (L)    Glucose by meter: 162 (H)    1106 Troponin: <0.015    INR: 1.02    ABO/Rh type and screen: ABO: A, RH: Pos, Antibody: Negative    UA with Microscopic: pH: 7.5 (H), Protein Albumin: 50 (A) o/w Negativ    Interventions:  1115 Zofran 4 mg IV    Emergency Department Course:  Past medical records, nursing notes, and vitals reviewed.    1050 I performed an exam of the patient as documented above.     EKG obtained in the ED, see results above.     IV was inserted and blood was drawn for laboratory testing, results above.    The patient provided a urine sample here in the emergency department. This was sent for laboratory testing, findings above.    The patient was sent for a Abdomen/Pelvis and Head CT while in the emergency department, results above.     1245 I rechecked the  patient and discussed the results of his workup thus far. Wife states that he was out for about 5 minutes.     1317 I consulted with Liz Myers, Hospitalist, regarding the patient's history and presentation here in the emergency department.    Findings and plan explained to the Patient and spouse who consents to admission. Discussed the patient with Dr. Myers, who will admit the patient to a Observation bed for further monitoring, evaluation, and treatment.    I personally reviewed the laboratory and imaging results with the Patient and spouse and answered all related questions prior to admission.     Impression & Plan     Medical Decision Making:  Reid Jimenes is a 86 year old male who presents with a syncopal episode witnessed by his wife in the car. Patient was awake by the time her got here. He does not recall the events. He have some abdominal pain which we did a CT on. Work-up so far is negative. Due to his syncopal episode he will be admitted for further work-up and evaluation. He will be placed in Observation on Telemetry to rule out cardiac causes. I discussed with him and his wife and they are in agreement. Patient was admitted to Liz Saini of the hospitalist service.      Diagnosis:    ICD-10-CM    1. Syncope and collapse  R55        Disposition:  Admitted to Dr. Myers.    Scribe Disclosure:  I, Vinny Mojica, am serving as a scribe at 11:01 AM on 8/17/2020 to document services personally performed by Robb Adams MD based on my observations and the provider's statements to me.        Robb Adams MD  08/17/20 1099

## 2020-08-17 NOTE — H&P
[facility] Outpatient / Observation Unit  History and Physical Exam     Reid Jimenes MRN# [id number]   YOB: 1934 Age: 86 year old      Date of Admission: 8/17/2020    Primary care provider: Viet Bingham   Reid Jimenes is a 86 year old male with a PMH significant for severe aortic stenosis s/p TAVR (8/2018), known LBBB, mild nonobstructive CAD, hypertension, hyperlipidemia, DM2, and carotid artery disease, who presented to the Emergency Room with an episode of syncope.     Work up in the ED reveals: slightly elevated blood pressure, mild bradycardia with heart rate of 54 bpm, CMP WNL, negative troponin, initial glucose of 159, hemoglobin of 12.9, INR of 1.02, CT of abdomen/pelvis shows mild wall thickening of the bladder may just relate to incomplete distention, correlate with urinalysis to assess for UTI, no acute abnormality otherwise seen, UA negative, CT of head negative, and EKG shows rate of 54 bpm and sinus bradycardia with first-degree AV block, LBBB.    Patient will be registered to Observation for further work-up and evaluation.     #Syncope: episode of LOC while sitting in the car with his wife this morning with no prodromal symptoms and reportedly unresponsive for 5 minutes. No seizure activity noticed and no prior h/o seizures. Mildly confused when he regained consciousness but does not sound like he was post-ictal. Currently at mental baseline. Mild bradycardia in the 50s and known LBBB on EKG. Workup unremarkable for underlying infectious cause or electrolyte abnormality. CT of abdomen/pelvis showed incomplete distention of bladder with UA negative. He has known carotid artery disease that he is suppose to follow with vascular surgery for but not significant enough stenosis on recent CTA to likely cause syncope. Uncertain the exact cause of the episode and does not sound vasovagal. Will check orthostatics, obtain echocardiogram, and give gentle IVF hydration  overnight. Concern for possible arrhythmia, if remainder of workup unremarkable patient may benefit from cardiac event monitor at discharge.     #Severe aortic stenosis s/p TAVR: last echocardiogram in 01/2020 showed aortic valve replacement with valve well seated, degree of valvular regurgitation is WNL, and EF of 50-55%. Follows with Dr. Salazar of cardiology.  - Obtain echocardiogram, suppose to have down in August or September per cardiology    #Mild nonobstructive CAD  #HTN  #HLD: BP mildly elevated initially, not currently on any antihypertensives and per patient he has issues with low blood pressures  - continue PTA ASA and Simvastatin     #DM2: initial blood glucose of 159, most recent HgbA1c of 6.4 on 7/10/20  - BG checks BID  - continue PTA Metformin     #Carotid artery disease: recently underwent bilateral carotid artery ultrasound on 7/28 and subsequently CTA of head/neck on 8/14 showing 47% stenosis right carotid bifurcation (prior study showed 30%) and 67% stenosis left carotid bifurcation (prior study showed 65%), and no high-grade intracranial vascular stenosis identified. Referred by PCP to vascular surgery for assessment.   - Keep scheduled appointment with vascular surgery on 8/19         Plan     1. Registered to Observation  2. Continue telemetry monitoring  3. Orthostatic vitals on admission (if not performed already).   4. IV hydration with Normal saline, @ 100 ml/hr overnight   5. Obtain ECHO to evaluate for valvular dysfunction and assess for LV function  6. Consider physical therapy for gait and mobility assessment    DVT prophylaxis: pt at low risk, encourage ambulation   Code: Full, discussed with patient   Dispo: likely discharge in 24-48 hours, will admit to observation                 Chief Complaint:   Syncopal episode         History of Present Illness:   Reid Jimenes is a 86 year old male who complains of an episode of loss of consciousness this morning around 10 AM when in the  car with his wife going to the bank.  The patient's wife was driving and had pulled off the freeway asking her  a question when she noticed he was unresponsive and slumped over.  She believes he was unresponsive for 5 minutes.  She drove to [place] and called EMS. When the patient regained consciousness his eyes seemed glazed over and he was confused.  The patient does not recall prodromal symptoms or LOC.  He felt like it was slightly harder to breathe after the episode but otherwise denies chest pain or palpitations before or after.  Denies lightheadedness, dizziness, or focal areas of pain.  His wife did not notice any seizure activity and the patient denies biting his tongue.  Denies any recent fevers, chills, nausea, vomiting, diarrhea, abdominal pain, or urinary symptoms.  The patient does state that he urinates frequently in small amounts. Denies any recent cough, congestion, loss of taste or smell, recent illness, or known COVID exposure.  He reports he has had a tendency to have low blood pressures in the past and is not currently on any antihypertensives.  No prior similar symptoms.  Denies any associated focal weakness but says that he feels generally weak.  The patient is not good about drinking an adequate amount of water during the day.  Denies any vision changes, hearing changes, or numbness/tingling.           Past Medical History:     Past Medical History:   Diagnosis Date     Aortic valve stenosis, nonrheumatic     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: 26mm Delonte[place] Sabino 3 valve     Coronary artery disease     cardiac cath 7/24/18: mild non-obstructive disease     Hyperlipidaemia LDL goal < 100      Hypertension      LBBB (left bundle branch block)      Need for SBE (subacute bacterial endocarditis) prophylaxis      Pulmonary hypertension (H)      Type 2 diabetes mellitus (H)           Past Surgical History:     Past Surgical History:   Procedure Laterality Date     MANDIBLE SURGERY  1958     OTHER SURGICAL  "HISTORY      cardiac cath 18: mild non-obstructive disease     TRANSCATHETER AORTIC VALVE IMPLANT ANESTHESIA N/A 2018    Procedure: TRANSCATHETER AORTIC VALVE IMPLANT ANESTHESIA;  ANESTHESIA NEEDED FOR TAVR PROCEDURE;  Surgeon: GENERIC ANESTHESIA PROVIDER;  Location:  OR,  26mm Edward Sabino 3 valve          Social History:     Social History     Socioeconomic History     Marital status:      Spouse name: Alyssa     Number of children: 3     Years of education: Not on file     Highest education level: Not on file   Occupational History     Not on file   Social Needs     Financial resource strain: Not on file     Food insecurity     Worry: Not on file     Inability: Not on file     Transportation needs     Medical: Not on file     Non-medical: Not on file   Tobacco Use     Smoking status: Former Smoker     Packs/day: 1.00     Years: 30.00     Pack years: 30.00     Types: Cigarettes     Start date:      Last attempt to quit: 1980     Years since quittin.9     Smokeless tobacco: Former User     Types: Chew     Quit date: 1983   Substance and Sexual Activity     Alcohol use: No     Comment: quit 30+ years ago. states, \"I'm an alcoholic\".     Drug use: No     Sexual activity: Not Currently     Partners: Female   Lifestyle     Physical activity     Days per week: Not on file     Minutes per session: Not on file     Stress: Not on file   Relationships     Social connections     Talks on phone: Not on file     Gets together: Not on file     Attends Orthodox service: Not on file     Active member of club or organization: Not on file     Attends meetings of clubs or organizations: Not on file     Relationship status: Not on file     Intimate partner violence     Fear of current or ex partner: Not on file     Emotionally abused: Not on file     Physically abused: Not on file     Forced sexual activity: Not on file   Other Topics Concern     Parent/sibling w/ CABG, MI or angioplasty before " 65F 55M? Not Asked   Social History Narrative     Not on file          Family History:   Family history reviewed with patient and noncontributory.         Allergies:    No Known Allergies          Medications:     Prior to Admission medications    Medication Sig Last Dose Taking? Auth Provider   ascorbic acid 500 MG TABS Take 500 mg by mouth every evening    Reported, Patient   aspirin 81 MG EC tablet Take 1 tablet (81 mg) by mouth daily   Lizz Loev APRN CNP   BIOTIN PO Take 5,000 mg by mouth 2 times daily    Reported, Patient   Cyanocobalamin (VITAMIN B-12 ER PO) Take by mouth daily   Reported, Patient   GLUCOSAMINE-CHONDROITIN PO Take 1 tablet by mouth 2 times daily    Reported, Patient   metFORMIN (GLUCOPHAGE) 1000 MG tablet Take 0.5 tablets (500 mg) by mouth 2 times daily (with meals)   Viet Bingham MD   Multiple Vitamins-Iron (MULTIVITAMIN/IRON PO) Take 1 tablet by mouth 2 times daily    Reported, Patient   omeprazole (PRILOSEC) 20 MG DR capsule Take 1 capsule (20 mg) by mouth daily   Viet Bingham MD   simvastatin (ZOCOR) 40 MG tablet Take 0.5 tablets (20 mg) by mouth At Bedtime   Viet Bingham MD          Review of Systems:   A Comprehensive greater than 10 system review of systems was carried out.  Pertinent positives and negatives are noted above.  Otherwise negative for contributory information.          Physical Exam:   Blood pressure 135/71, pulse 58, temperature 97.6  F (36.4  C), temperature source Oral, resp. rate 8, SpO2 99 %.    GENERAL: healthy, alert and no distress  EYES: Eyes grossly normal to inspection  HENT: ear canals- normal; TMs- normal; Nose- normal; Mouth- no ulcers, no lesions. ORAL MUCOSA: mildly dry  NECK: no tenderness, no adenopathy, no asymmetry, no masses, no stiffness; thyroid- normal to palpation  RESP: lungs clear to auscultation - no rales, no rhonchi, no wheezes  CV: regular rates and rhythm, normal S1 S2, no S3 or S4, no murmur, click or rub and peripheral  pulses strong. No carotid bruits appreciated.  ABDOMEN: soft, no tenderness, no  hepatosplenomegaly, no masses, normal bowel sounds  MS: extremities- no gross deformities noted, no peripheral edema  SKIN: no suspicious lesions, no rashes  NEURO: Normal strength and tone, sensory exam grossly normal, cranial nerves 2-12 intact, Romberg negative and proprioception normal  PSYCH: Alert and oriented times 3; coherent speech, normal rate and volume, able to articulate logical thoughts, able to abstract reason. Affect is normal.         Data:     EKG demonstrates:  rate of 54 bpm and sinus bradycardia with first-degree AV block, LBBB    Results for orders placed or performed during the hospital encounter of 08/17/20   CT Abdomen Pelvis w Contrast     Status: None    Narrative    CT ABDOMEN AND PELVIS WITH CONTRAST 8/17/2020 12:28 PM    CLINICAL HISTORY: Abdominal pain, acute, generalized.    TECHNIQUE: CT scan of the abdomen and pelvis was performed following  injection of IV contrast. Multiplanar reformats were obtained. Dose  reduction techniques were used.    CONTRAST: 89mL Isovue-370    COMPARISON: CT angiogram aorta 7/3/2018.    FINDINGS:   LOWER CHEST: No acute abnormality. Left base granuloma.    HEPATOBILIARY: Normal.    PANCREAS: Normal.    SPLEEN: Normal.    ADRENAL GLANDS: Normal.    KIDNEYS/BLADDER: No acute renal abnormality. Small right renal cysts  not requiring specific follow-up. No hydronephrosis. Mild wall  thickening of the bladder that is only mildly distended.    BOWEL: No acute abnormality. No obstruction. Normal appendix. No  abscess or free air.    PELVIC ORGANS: Normal.    ADDITIONAL FINDINGS: Diffuse vascular calcifications.    MUSCULOSKELETAL: Diffuse spine degenerative changes.      Impression    IMPRESSION:   1.  Mild wall thickening of the bladder may just relate to incomplete  distention. Correlate with urinalysis to assess for urinary tract  infection.  2.  No acute abnormality otherwise  seen.    SUSHIL COLEMAN MD   CT Head w/o Contrast     Status: None    Narrative    CT SCAN OF THE HEAD WITHOUT CONTRAST August 17, 2020 12:28 PM     HISTORY: Altered level of consciousness (LOC), altered mental status,  unexplained.    TECHNIQUE: Axial images of the head and coronal reformations without  IV contrast material. Radiation dose for this scan was reduced using  automated exposure control, adjustment of the mA and/or kV according  to patient size, or iterative reconstruction technique.    COMPARISON: Head CTA 12/21/2017.    FINDINGS: Moderate volume loss is present. Patchy and confluent white  matter hypoattenuation likely represents advanced chronic small vessel  ischemic change. The cerebral hemispheres, brainstem, and cerebellum  otherwise demonstrate normal morphology and attenuation. No evidence  of acute ischemia, hemorrhage, mass, mass effect or hydrocephalus. The  visualized calvarium, tympanic cavities, mastoid cavities, and  paranasal sinuses are unremarkable.      Impression    IMPRESSION: No acute intracranial abnormality.    RAFAL LATIF MD   CBC with platelets differential     Status: Abnormal   Result Value Ref Range    WBC 7.7 4.0 - 11.0 10e9/L    RBC Count 4.33 (L) 4.4 - 5.9 10e12/L    Hemoglobin 12.9 (L) 13.3 - 17.7 g/dL    Hematocrit 39.8 (L) 40.0 - 53.0 %    MCV 92 78 - 100 fl    MCH 29.8 26.5 - 33.0 pg    MCHC 32.4 31.5 - 36.5 g/dL    RDW 12.1 10.0 - 15.0 %    Platelet Count 148 (L) 150 - 450 10e9/L    Diff Method Automated Method     % Neutrophils 62.5 %    % Lymphocytes 23.3 %    % Monocytes 9.6 %    % Eosinophils 4.0 %    % Basophils 0.5 %    % Immature Granulocytes 0.1 %    Nucleated RBCs 0 0 /100    Absolute Neutrophil 4.8 1.6 - 8.3 10e9/L    Absolute Lymphocytes 1.8 0.8 - 5.3 10e9/L    Absolute Monocytes 0.7 0.0 - 1.3 10e9/L    Absolute Eosinophils 0.3 0.0 - 0.7 10e9/L    Absolute Basophils 0.0 0.0 - 0.2 10e9/L    Abs Immature Granulocytes 0.0 0 - 0.4 10e9/L    Absolute  Nucleated RBC 0.0    INR     Status: None   Result Value Ref Range    INR 1.02 0.86 - 1.14   Comprehensive metabolic panel     Status: Abnormal   Result Value Ref Range    Sodium 138 133 - 144 mmol/L    Potassium 4.1 3.4 - 5.3 mmol/L    Chloride 106 94 - 109 mmol/L    Carbon Dioxide 27 20 - 32 mmol/L    Anion Gap 5 3 - 14 mmol/L    Glucose 159 (H) 70 - 99 mg/dL    Urea Nitrogen 18 7 - 30 mg/dL    Creatinine 1.03 0.66 - 1.25 mg/dL    GFR Estimate 65 >60 mL/min/[1.73_m2]    GFR Estimate If Black 76 >60 mL/min/[1.73_m2]    Calcium 8.6 8.5 - 10.1 mg/dL    Bilirubin Total 0.4 0.2 - 1.3 mg/dL    Albumin 3.5 3.4 - 5.0 g/dL    Protein Total 6.9 6.8 - 8.8 g/dL    Alkaline Phosphatase 91 40 - 150 U/L    ALT 33 0 - 70 U/L    AST 33 0 - 45 U/L   Troponin I     Status: None   Result Value Ref Range    Troponin I ES <0.015 0.000 - 0.045 ug/L   UA with Microscopic reflex to Culture     Status: Abnormal    Specimen: Midstream Urine   Result Value Ref Range    Color Urine Yellow     Appearance Urine Clear     Glucose Urine Negative NEG^Negative mg/dL    Bilirubin Urine Negative NEG^Negative    Ketones Urine Negative NEG^Negative mg/dL    Specific Gravity Urine 1.010 1.003 - 1.035    Blood Urine Negative NEG^Negative    pH Urine 7.5 (H) 5.0 - 7.0 pH    Protein Albumin Urine 50 (A) NEG^Negative mg/dL    Urobilinogen mg/dL Normal 0.0 - 2.0 mg/dL    Nitrite Urine Negative NEG^Negative    Leukocyte Esterase Urine Negative NEG^Negative    Source Midstream Urine     WBC Urine 1 0 - 5 /HPF    RBC Urine 1 0 - 2 /HPF    Hyaline Casts 1 0 - 2 /LPF   Glucose by meter     Status: Abnormal   Result Value Ref Range    Glucose 162 (H) 70 - 99 mg/dL   ABO/Rh type and screen     Status: None   Result Value Ref Range    ABO A     RH(D) Pos     Antibody Screen Neg     Test Valid Only At [facility]        Specimen Expires 08/20/2020        Tina Saini PA-C

## 2020-08-18 ENCOUNTER — TELEPHONE (OUTPATIENT)
Dept: INTERNAL MEDICINE | Facility: CLINIC | Age: 85
End: 2020-08-18

## 2020-08-18 ENCOUNTER — APPOINTMENT (OUTPATIENT)
Dept: CARDIOLOGY | Facility: CLINIC | Age: 85
End: 2020-08-18
Attending: PHYSICIAN ASSISTANT
Payer: MEDICARE

## 2020-08-18 VITALS
WEIGHT: 173.5 LBS | DIASTOLIC BLOOD PRESSURE: 74 MMHG | TEMPERATURE: 97.5 F | SYSTOLIC BLOOD PRESSURE: 141 MMHG | OXYGEN SATURATION: 94 % | HEART RATE: 60 BPM | HEIGHT: 65 IN | RESPIRATION RATE: 16 BRPM | BODY MASS INDEX: 28.91 KG/M2

## 2020-08-18 LAB
ANION GAP SERPL CALCULATED.3IONS-SCNC: 2 MMOL/L (ref 3–14)
BASOPHILS # BLD AUTO: 0 10E9/L (ref 0–0.2)
BASOPHILS NFR BLD AUTO: 0.5 %
BUN SERPL-MCNC: 16 MG/DL (ref 7–30)
CALCIUM SERPL-MCNC: 8.4 MG/DL (ref 8.5–10.1)
CHLORIDE SERPL-SCNC: 110 MMOL/L (ref 94–109)
CO2 SERPL-SCNC: 29 MMOL/L (ref 20–32)
CREAT SERPL-MCNC: 1.01 MG/DL (ref 0.66–1.25)
DIFFERENTIAL METHOD BLD: ABNORMAL
EOSINOPHIL # BLD AUTO: 0.5 10E9/L (ref 0–0.7)
EOSINOPHIL NFR BLD AUTO: 6.4 %
ERYTHROCYTE [DISTWIDTH] IN BLOOD BY AUTOMATED COUNT: 12.3 % (ref 10–15)
GFR SERPL CREATININE-BSD FRML MDRD: 67 ML/MIN/{1.73_M2}
GLUCOSE SERPL-MCNC: 141 MG/DL (ref 70–99)
HCT VFR BLD AUTO: 40.1 % (ref 40–53)
HGB BLD-MCNC: 13.1 G/DL (ref 13.3–17.7)
IMM GRANULOCYTES # BLD: 0 10E9/L (ref 0–0.4)
IMM GRANULOCYTES NFR BLD: 0.2 %
LYMPHOCYTES # BLD AUTO: 1.4 10E9/L (ref 0.8–5.3)
LYMPHOCYTES NFR BLD AUTO: 17.5 %
MCH RBC QN AUTO: 30.5 PG (ref 26.5–33)
MCHC RBC AUTO-ENTMCNC: 32.7 G/DL (ref 31.5–36.5)
MCV RBC AUTO: 94 FL (ref 78–100)
MONOCYTES # BLD AUTO: 0.8 10E9/L (ref 0–1.3)
MONOCYTES NFR BLD AUTO: 10.4 %
NEUTROPHILS # BLD AUTO: 5.3 10E9/L (ref 1.6–8.3)
NEUTROPHILS NFR BLD AUTO: 65 %
NRBC # BLD AUTO: 0 10*3/UL
NRBC BLD AUTO-RTO: 0 /100
PLATELET # BLD AUTO: 137 10E9/L (ref 150–450)
POTASSIUM SERPL-SCNC: 4.4 MMOL/L (ref 3.4–5.3)
RBC # BLD AUTO: 4.29 10E12/L (ref 4.4–5.9)
SARS-COV-2 RNA SPEC QL NAA+PROBE: NOT DETECTED
SODIUM SERPL-SCNC: 141 MMOL/L (ref 133–144)
SPECIMEN SOURCE: NORMAL
WBC # BLD AUTO: 8.1 10E9/L (ref 4–11)

## 2020-08-18 PROCEDURE — 93270 REMOTE 30 DAY ECG REV/REPORT: CPT

## 2020-08-18 PROCEDURE — 99217 ZZC OBSERVATION CARE DISCHARGE: CPT | Performed by: PHYSICIAN ASSISTANT

## 2020-08-18 PROCEDURE — G0378 HOSPITAL OBSERVATION PER HR: HCPCS

## 2020-08-18 PROCEDURE — 96361 HYDRATE IV INFUSION ADD-ON: CPT

## 2020-08-18 PROCEDURE — 93306 TTE W/DOPPLER COMPLETE: CPT

## 2020-08-18 PROCEDURE — 25000132 ZZH RX MED GY IP 250 OP 250 PS 637: Mod: GY | Performed by: PHYSICIAN ASSISTANT

## 2020-08-18 PROCEDURE — 93306 TTE W/DOPPLER COMPLETE: CPT | Mod: 26 | Performed by: INTERNAL MEDICINE

## 2020-08-18 PROCEDURE — 80048 BASIC METABOLIC PNL TOTAL CA: CPT | Performed by: PHYSICIAN ASSISTANT

## 2020-08-18 PROCEDURE — 36415 COLL VENOUS BLD VENIPUNCTURE: CPT | Performed by: PHYSICIAN ASSISTANT

## 2020-08-18 PROCEDURE — 85025 COMPLETE CBC W/AUTO DIFF WBC: CPT | Performed by: PHYSICIAN ASSISTANT

## 2020-08-18 RX ADMIN — METFORMIN HYDROCHLORIDE 500 MG: 500 TABLET, FILM COATED ORAL at 08:50

## 2020-08-18 RX ADMIN — ASPIRIN 81 MG: 81 TABLET, COATED ORAL at 08:50

## 2020-08-18 NOTE — PLAN OF CARE
PRIMARY DIAGNOSIS: SYNCOPE  OUTPATIENT/OBSERVATION GOALS TO BE MET BEFORE DISCHARGE:  1. Orthostatic performed: Yes:          Lying Orthostatic BP: 152/58         Sitting Orthostatic BP: 140/129         Standing Orthostatic BP: 169/80     2. Diagnostic testing complete & at baseline neurologic testing: Yes    3. Cleared by consultants (if involved): No    4. Interpretation of cardiac rhythm per telemetry tech: Sinus gerber    5. Tolerating adequate PO diet and medications: Yes    6. Return to near baseline physical activity or neurologic status: Yes    Discharge Planner Nurse   Safe discharge environment identified: Yes  Barriers to discharge: Yes       Entered by: Taurus Serrano 08/18/2020 1:28 AM     Please review provider order for any additional goals.   Nurse to notify provider when observation goals have been met and patient is ready for discharge.

## 2020-08-18 NOTE — TELEPHONE ENCOUNTER
Patient was discharged from the hospital 8/18/2020 and was told to follow up in 7 days. No openings. Please advise. Ok to call and dennis [phone]

## 2020-08-18 NOTE — PLAN OF CARE
PRIMARY DIAGNOSIS: SYNCOPE/TIA  OUTPATIENT/OBSERVATION GOALS TO BE MET BEFORE DISCHARGE:  1. Orthostatic performed: Yes:          Lying Orthostatic BP: 152/58         Sitting Orthostatic BP: 140/129         Standing Orthostatic BP: 169/80     2. Diagnostic testing complete & at baseline neurologic testing: Yes    3. Cleared by consultants (if involved): No    4. Interpretation of cardiac rhythm per telemetry tech: SR    5. Tolerating adequate PO diet and medications: Yes    6. Return to near baseline physical activity or neurologic status: Yes    Discharge Planner Nurse   Safe discharge environment identified: Yes  Barriers to discharge: Yes       Entered by: Taurus Serrano 08/17/2020 9:31 PM     Please review provider order for any additional goals.   Nurse to notify provider when observation goals have been met and patient is ready for discharge.

## 2020-08-18 NOTE — PLAN OF CARE
Patient's After Visit Summary was reviewed with patient and/or spouse.   Patient verbalized understanding of After Visit Summary, recommended follow up and was given an opportunity to ask questions.   Discharge medications sent home with patient/family: Not applicable -no med changes  Discharged with spouse buddy  All belongings sent home with pt including heart monitor.       OBSERVATION patient END time: [id number]

## 2020-08-18 NOTE — PLAN OF CARE
PRIMARY DIAGNOSIS: SYNCOPE  OUTPATIENT/OBSERVATION GOALS TO BE MET BEFORE DISCHARGE:  1. Orthostatic performed: Yes:          Lying Orthostatic BP: 152/58         Sitting Orthostatic BP: 140/129         Standing Orthostatic BP: 169/80     2. Diagnostic testing complete & at baseline neurologic testing: Yes    3. Cleared by consultants (if involved): N/A    4. Interpretation of cardiac rhythm per telemetry tech: SR    5. Tolerating adequate PO diet and medications: Yes    6. Return to near baseline physical activity or neurologic status: Yes    Discharge Planner Nurse   Safe discharge environment identified: Yes  Barriers to discharge: No       Entered by: Taurus Serrano 08/18/2020 4:18 AM     Please review provider order for any additional goals.   Nurse to notify provider when observation goals have been met and patient is ready for discharge.

## 2020-08-18 NOTE — PLAN OF CARE
PRIMARY DIAGNOSIS: SYNCOPE/TIA  OUTPATIENT/OBSERVATION GOALS TO BE MET BEFORE DISCHARGE:  1. Orthostatic performed: Yes:          Lying Orthostatic BP: 152/58         Sitting Orthostatic BP: 140/129         Standing Orthostatic BP: 169/80     2. Diagnostic testing complete & at baseline neurologic testing: N/A    3. Cleared by consultants (if involved): N/A    4. Interpretation of cardiac rhythm per telemetry tech: *SB 1degree AVB    5. Tolerating adequate PO diet and medications: Yes    6. Return to near baseline physical activity or neurologic status: Yes    Discharge Planner Nurse   Safe discharge environment identified: Yes  Barriers to discharge: No       Entered by: Shavonne Ayala 08/17/2020 7:09 PM     Please review provider order for any additional goals.   Nurse to notify provider when observation goals have been met and patient is ready for discharge.    Pt is a/o, up with SBA. VSS.Denies pain.  BS was 123. Appetite is good.

## 2020-08-18 NOTE — PLAN OF CARE
PRIMARY DIAGNOSIS: SYNCOPE/TIA  OUTPATIENT/OBSERVATION GOALS TO BE MET BEFORE DISCHARGE:  1. Orthostatic performed: Yes:          Lying Orthostatic BP: 152/58         Sitting Orthostatic BP: 140/129         Standing Orthostatic BP: 169/80     2. Diagnostic testing complete & at baseline neurologic testing: Yes    3. Cleared by consultants (if involved): N/A    4. Interpretation of cardiac rhythm per telemetry tech: SR 1' AVB    5. Tolerating adequate PO diet and medications: Yes    6. Return to near baseline physical activity or neurologic status: Yes    Discharge Planner Nurse   Safe discharge environment identified: Yes  Barriers to discharge: No       Entered by: Shavonne Ayala 08/18/2020 9:06 AM     Please review provider order for any additional goals.   Nurse to notify provider when observation goals have been met and patient is ready for discharge.    Pt is a/o, up independent in room . VSS. Appetite good, voiding. Had bm. Denies pain or nausea. Plan to discharge home today

## 2020-08-19 ENCOUNTER — OFFICE VISIT (OUTPATIENT)
Dept: SURGERY | Facility: CLINIC | Age: 85
End: 2020-08-19
Attending: SURGERY
Payer: MEDICARE

## 2020-08-19 VITALS
WEIGHT: 173 LBS | DIASTOLIC BLOOD PRESSURE: 56 MMHG | HEIGHT: 65 IN | SYSTOLIC BLOOD PRESSURE: 112 MMHG | BODY MASS INDEX: 28.82 KG/M2 | OXYGEN SATURATION: 95 % | HEART RATE: 61 BPM | RESPIRATION RATE: 16 BRPM

## 2020-08-19 DIAGNOSIS — I65.23 BILATERAL CAROTID ARTERY OBSTRUCTION WITHOUT CEREBRAL INFARCTION: ICD-10-CM

## 2020-08-19 DIAGNOSIS — R55 SYNCOPE, UNSPECIFIED SYNCOPE TYPE: Primary | ICD-10-CM

## 2020-08-19 PROCEDURE — 99203 OFFICE O/P NEW LOW 30 MIN: CPT | Performed by: SURGERY

## 2020-08-19 ASSESSMENT — ENCOUNTER SYMPTOMS
GASTROINTESTINAL NEGATIVE: 1
SYNCOPE: 1
HEMATOLOGIC/LYMPHATIC NEGATIVE: 1
MUSCULOSKELETAL NEGATIVE: 1
SHORTNESS OF BREATH: 1
ENDOCRINE NEGATIVE: 1
ALLERGIC/IMMUNOLOGIC NEGATIVE: 1
PSYCHIATRIC NEGATIVE: 1
CONSTITUTIONAL NEGATIVE: 1
EYES NEGATIVE: 1

## 2020-08-19 ASSESSMENT — MIFFLIN-ST. JEOR: SCORE: 1391.6

## 2020-08-19 NOTE — PROGRESS NOTES
[facility] VASCULAR [facility] CENTER INITIAL VASCULAR SURGERY CONSULT    Impression:   1.  Syncopal event on 8/17/2020 of uncertain etiology.  I am confident that this is not related to his moderate left carotid stenosis.    2.  Asymptomatic 67% left carotid stenosis essentially unchanged from 2 years ago.    3.  Status post TAVR 2018.    Plan:   I had a lengthy discussion with Reid and his wife reviewing all the above.  We discussed the natural history of asymptomatic carotid disease.  ACAS data is not applicable in his case due to his severe cardiac comorbidities.  I consider his 67% left carotid stenosis to be asymptomatic.  Given his cardiac comorbidities and his age of 86 years I recommend continued observation.  We had a pleasant discussion and all of their questions were answered.  They verbalized full understanding to the above and are in agreement with this management plan.  He will continue his medical regimen which includes daily aspirin.  Vascular surgical follow-up will be with me in 1 year for a repeat left carotid ultrasound (he has minimal right-sided disease).      HPI:   Reid Jimenes is a frail appearing 86-year-old gentleman with significant cardiac comorbidities who has been followed for several years for an asymptomatic left carotid stenosis.  He had a syncopal event 2 days ago and was evaluated in the [facility] ER.  Recent carotid ultrasound on 7/28/2020 was suggestive of progressive left-sided carotid disease.  Carotid CTA from 8/14/2020 showed stable approximately 30% right carotid stenosis and stable 67% left carotid stenosis with both vertebral arteries widely patent.  All of this was reviewed at the ER visit 2 days ago.  No etiology for the syncopal episode was determined but it is not felt to be due to global cerebral hypoperfusion.  He already had a scheduled vascular surgical follow-up appointment with me.  He presents today with his wife to discuss all of the above.    Since  the event of 2 days ago there have been no additional episodes of syncope or lightheadedness.  He denies any neurologic focality.  Over the past year he denies stroke, symptoms of TIA, or amaurosis.      CURRENT MEDICATIONS  ascorbic acid 500 MG TABS, Take 500 mg by mouth every evening   aspirin 81 MG EC tablet, Take 1 tablet (81 mg) by mouth daily  BIOTIN PO, Take 5,000 mg by mouth 2 times daily   glucosamine-chondroitin 500-400 MG CAPS per capsule, Take 1 capsule by mouth 2 times daily  metFORMIN (GLUCOPHAGE) 1000 MG tablet, Take 0.5 tablets (500 mg) by mouth 2 times daily (with meals)  Multiple Vitamins-Iron (MULTIVITAMIN/IRON PO), Take 1 tablet by mouth 2 times daily   simvastatin (ZOCOR) 40 MG tablet, Take 0.5 tablets (20 mg) by mouth At Bedtime    No current facility-administered medications on file prior to visit.         PAST MEDICAL HISTORY  Past Medical History:   Diagnosis Date     Aortic valve stenosis, nonrheumatic     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: 26mm Edward Sabino 3 valve     Coronary artery disease     cardiac cath 7/24/18: mild non-obstructive disease     Hyperlipidaemia LDL goal < 100      Hypertension      LBBB (left bundle branch block)      Need for SBE (subacute bacterial endocarditis) prophylaxis      Pulmonary hypertension (H)      Type 2 diabetes mellitus (H)          PAST SURGICAL HISTORY:  Past Surgical History:   Procedure Laterality Date     MANDIBLE SURGERY  1958     OTHER SURGICAL HISTORY      cardiac cath 7/24/18: mild non-obstructive disease     TRANSCATHETER AORTIC VALVE IMPLANT ANESTHESIA N/A 8/28/2018    Procedure: TRANSCATHETER AORTIC VALVE IMPLANT ANESTHESIA;  ANESTHESIA NEEDED FOR TAVR PROCEDURE;  Surgeon: GENERIC ANESTHESIA PROVIDER;  Location: [facility],  26mm Edward Sabino 3 valve       ALLERGIES   No Known Allergies    FAMILY HISTORY  Family History   Problem Relation Age of Onset     Heart Disease Father      Alcohol/Drug Father      Myocardial Infarction Grandchild 18        Grandson      "Myocardial Infarction Other 18        Nephew      Cancer Mother 58        Stomach cancer     Alcohol/Drug Mother      Cancer Maternal Grandmother         stomach cancer     Alcohol/Drug Maternal Grandmother      Myocardial Infarction Sister 70        Name: Reena      Alcohol/Drug Sister      Cerebrovascular Disease Brother 65        Name: Miguelito     Alcohol/Drug Brother      Alcohol/Drug Maternal Grandfather      Alcohol/Drug Paternal Grandmother      Alcohol/Drug Paternal Grandfather        SOCIAL HISTORY  Social History     Tobacco Use     Smoking status: Former Smoker     Packs/day: 1.00     Years: 30.00     Pack years: 30.00     Types: Cigarettes     Start date:      Last attempt to quit: 1980     Years since quittin.0     Smokeless tobacco: Former User     Types: Chew     Quit date: 1983   Substance Use Topics     Alcohol use: No     Comment: quit 30+ years ago. states, \"I'm an alcoholic\".     Drug use: No       ROS:   Review of Systems   Constitution: Negative.   HENT: Positive for hearing loss.    Eyes: Negative.    Cardiovascular: Positive for syncope.   Respiratory: Positive for shortness of breath.    Endocrine: Negative.    Hematologic/Lymphatic: Negative.    Skin: Negative.    Musculoskeletal: Negative.    Gastrointestinal: Negative.    Genitourinary: Negative.    Psychiatric/Behavioral: Negative.    Allergic/Immunologic: Negative.          EXAM:  There were no vitals taken for this visit.  Physical Exam  Vitals signs reviewed.   Constitutional:       Appearance: Normal appearance.   HENT:      Head: Normocephalic and atraumatic.   Eyes:      General: No scleral icterus.     Extraocular Movements: Extraocular movements intact.      Pupils: Pupils are equal, round, and reactive to light.   Neck:      Musculoskeletal: Normal range of motion. No muscular tenderness.   Cardiovascular:      Rate and Rhythm: Normal rate and regular rhythm.      Pulses:           Radial pulses are 2+ on the " right side and 2+ on the left side.   Musculoskeletal: Normal range of motion.   Skin:     General: Skin is warm and dry.   Neurological:      General: No focal deficit present.      Mental Status: He is alert and oriented to person, place, and time. Mental status is at baseline.      Cranial Nerves: No cranial nerve deficit.   Psychiatric:         Mood and Affect: Mood normal.         Behavior: Behavior normal.         Thought Content: Thought content normal.         Judgment: Judgment normal.       Labs:  LIPID RESULTS:  Lab Results   Component Value Date    CHOL 98 07/10/2020    HDL 48 07/10/2020    LDL 23 07/10/2020    TRIG 136 07/10/2020    CHOLHDLRATIO 2.3 10/07/2015       CBC RESULTS:  Lab Results   Component Value Date    WBC 8.1 08/18/2020    RBC 4.29 (L) 08/18/2020    HGB 13.1 (L) 08/18/2020    HCT 40.1 08/18/2020    MCV 94 08/18/2020    MCH 30.5 08/18/2020    MCHC 32.7 08/18/2020    RDW 12.3 08/18/2020     (L) 08/18/2020       BMP RESULTS:  Lab Results   Component Value Date     08/18/2020    POTASSIUM 4.4 08/18/2020    CHLORIDE 110 (H) 08/18/2020    CO2 29 08/18/2020    ANIONGAP 2 (L) 08/18/2020     (H) 08/18/2020    BUN 16 08/18/2020    CR 1.01 08/18/2020    GFRESTIMATED 67 08/18/2020    GFRESTBLACK 77 08/18/2020    DEEPTI 8.4 (L) 08/18/2020        A1C RESULTS:  Lab Results   Component Value Date    A1C 6.4 (H) 07/10/2020         Imaging:  CTA  HEAD NECK WITH CONTRAST  8/14/2020 9:37 AM      HISTORY: History of bilateral carotid stenosis; Bilateral carotid  artery obstruction without cerebral infarction     TECHNIQUE:  Precontrast localizing scans were followed by CT  angiography with an injection of 70mL [id number]-[id number] IV contrast with  scans through the head and neck.  Images were transferred to a  separate 3-D workstation where multiplanar reformations and 3-D images  were created.  Estimates of carotid stenoses are made relative to the  distal internal carotid artery diameters except as  noted. Radiation  dose for this scan was reduced using automated exposure control,  adjustment of the mA and/or kV according to patient size, or iterative  reconstruction technique.     COMPARISON: None.     FINDINGS: Estimates of stenosis at the carotid bifurcations are  relative to the distal internal carotids.     ARCH: [ Normal Anatomy]     NECK CTA:  Right Carotid:  The right common carotid artery is normal.  Calcified  atherosclerotic plaque is seen at the right carotid bifurcation.  Stenosis is estimated at about 30%..  The right internal carotid  artery is negative.      Left Carotid:  The left common carotid artery is unremarkable.   Calcified and noncalcified atherosclerotic plaque is seen at the left  carotid bifurcation. There is a 67 % stenosis at the left carotid  bifurcation. This is unchanged since 12/21/2017.  The left internal  carotid is negative.       Vertebrals:  The vertebral arteries are normal.     HEAD CTA:  Anterior Circulation: No occluded vessels are seen. .     Posterior Circulation:  The basilar artery and its branches appear  normal.      MISC:: None                                                                      IMPRESSION:   1. 47 % stenosis right carotid bifurcation. On the prior study the  stenosis was calculated at 30%.  2. 67 % stenosis left carotid bifurcation. On the prior study, the  stenosis was calculated at 65%.  3. No high-grade intracranial vascular stenosis is identified.  4. Venous sinuses appear patent        ULTRASOUND CAROTID BILATERAL July 28, 2020 11:00 AM     INDICATION: Bilateral carotid artery occlusion.     COMPARISON: 8/10/2018.     FINDINGS:   Right side:   On the grayscale images, calcified plaque within the carotid bulb and  proximal internal carotid artery as well as external carotid artery  origin.  Spectral waveform analysis was performed. Peak systolic and diastolic  velocities in the right internal carotid artery are 143 cm/s. Per  sonographic  criteria, degree of stenosis in the right internal carotid  artery is 50-69%. This has increased from the previous exam when peak  systolic velocity was 120 cm/s.  Flow in the right vertebral artery is antegrade.      Left side:   On the grayscale images, calcified plaque involving the carotid bulb,  proximal internal carotid artery, and proximal external carotid  artery.  Spectral waveform analysis was performed. Peak systolic and diastolic   velocities in the left internal carotid artery are 335 cm/s. Per  sonographic criteria, degree of stenosis in the left internal carotid  artery is greater than 70%. This has increased from the previous exam  with peak systolic velocity was 221 cm/s.  Flow in the left vertebral artery is antegrade.                                                                       IMPRESSION:   1.  LEFT carotid artery: There is now severe, greater than 70%,  stenosis of the left carotid artery based on elevated peak systolic  velocity. This has worsened from the previous exam.  2.  RIGHT carotid artery: There is now moderate, 50-69%, stenosis of  the right carotid artery based on peak systolic velocity. This has  worsened from the previous exam.  3.  Bilateral vertebral artery flow is antegrade.     Degree of stenosis measured relative to the diameter of the normal  internal carotid artery per NASCET or NASCET type criteria     [Consider Follow Up: Interval worsening of carotid stenosis]     This report will be copied to the [facility] to ensure a  provider acknowledges the finding.      JOSÉ MIGUEL HARLEY MD      Total face-to-face time was 30 minutes, greater than 50% spent providing counseling and education.      Ez Sotelo MD

## 2020-08-19 NOTE — DISCHARGE SUMMARY
[facility]    Discharge Summary  Hospitalist    Date of Admission:  8/17/2020  Date of Discharge:  8/18/2020 10:52 AM  Discharging Provider: Cierra Guillen PA-C  Date of Service (when I saw the patient): 8/18/20    Discharge Diagnoses   Syncope   Atypical Unresponsive episode      History of Present Illness   Reid Jimenes is a 86 year old male with a PMH significant for severe aortic stenosis s/p TAVR (8/2018), known LBBB, mild nonobstructive CAD, hypertension, hyperlipidemia, DM2, and carotid artery disease, who presented to the Emergency Room after an episode of witnessed syncope.     Please see admitting H & P by Tina Saini PA-C on 8/17/2020 for full details of the encounter.     Hospital Course   Reid Jimenes was admitted on 8/17/2020.  The following problems were addressed during his hospitalization:    #Syncope:   - No seizure activity noticed and no prior h/o seizures.   - Mild bradycardia in the 50s and known LBBB on EKG. SR 1' AV block on telemetry   - Workup unremarkable for underlying infectious cause or electrolyte abnormality.   - CT of abdomen/pelvis showed incomplete distention of bladder with UA negative.   - He has known carotid artery disease that he is suppose to follow with vascular surgery for but not significant enough stenosis on recent CTA to likely cause syncope. Uncertain the exact cause of the episode and does not sound vasovagal.   - Orthostatics negative  - Echocardiogram, 8/18 largely unchanged 'left ventricular size, global systolic function are normal, estimated LVEF 55-60%. Grade II or moderate diastolic dysfunction. Right ventricular global function is normal. History of TAVR (8/28/2018) with a 26 mm Greene Sabino 3 valve bioprosthetic aortic valve in place. Normal function on Doppler interrogation. Vmax 2.8 m/s,  mean gradient 17 mmHg. DI 0.52. No valvular insufficiency or paravalvular leak.  - Discharged with ambulatory cardiac event monitor for  "possible arrhythmia, telemetry negative during Observation stay. He should follow up with Cardiology or PCP after ambulatory evaluation complete    #Mild nonobstructive CAD  #HTN  #HLD:   BP mildly elevated during stay, not currently on any antihypertensives and per patient he has issues with low blood pressures  - continue PTA ASA and Simvastatin    - he has been directed to monitor and record blood pressures follow up with primary care      #Severe aortic stenosis s/p TAVR:   Not acutely decompensated.   -  TTE updated 8/18, follow up as previously directed by Primary Cardiologist, Dr. Salazar, or sooner if concerns arise on ambulatory telemetry monitor     #DM2: initial blood glucose of 159, most recent HgbA1c of 6.4 on 7/10/20  - BG checks BID  - continue PTA Metformin      #Carotid artery disease: recently underwent bilateral carotid artery ultrasound on 7/28 and subsequently CTA of head/neck on 8/14 showing 47% stenosis right carotid bifurcation (prior study showed 30%) and 67% stenosis left carotid bifurcation (prior study showed 65%), and no high-grade intracranial vascular stenosis identified. Referred by PCP to vascular surgery for assessment.   - Keep scheduled appointment with vascular surgery on 8/19    Pending Results   None.    Code Status   Full Code       Primary Care Physician   Viet Bingham MD      INTERVAL HISTORY  No recurrence of syncopal episodes since admission. No presyncope, lightheadedness, dizziness. Vision at baseline. No dyspnea, angina. No headaches. Tolerating normal diet.     Wife, Alyssa, updated with plan of care.    BP (!) 141/74   Pulse 60   Temp 97.5  F (36.4  C) (Oral)   Resp 16   Ht 1.651 m (5' 5\")   Wt 78.7 kg (173 lb 8 oz)   SpO2 94%   BMI 28.87 kg/m      Constitutional: Awake, alert, no apparent distress  Respiratory:  Normal work of breathing. Lungs clear to auscultation bilaterally, no crackles or wheezing.  Cardiovascular: Regular rate and rhythm, normal S1 and " S2, systolic murmur appreciated.   GI: Bowel sounds present. soft, non-distended, non-tender.   Skin/Integument: Warm, dry. no peripheral edema.  Neuro: No focal deficits. Moving all extremities with normal strength. Coordination and sensation grossly intact. Speech clear.   Psych: Appropriate affect.        Discharge Disposition   Discharged to home  Condition at discharge: Good    Consultations This Hospital Stay   None    Time Spent on this Encounter   Cierra BAZAN PA-C, personally saw the patient today and spent greater than 30 minutes discharging this patient.    Discharge Orders      Reason for your hospital stay    You were admitted for concerns of syncope. No cause was found in the emergency room for the syncope. We monitored your heart overnight with no abnormal findings. You had an echocardiogram done that showed stable heart function. Given your negative work up we are comfortable sending you home with follow up with your primary care provider with outpatient cardiac monitor.     Follow-up and recommended labs and tests     Follow up with primary care provider, Viet Bingham MD, within 7 days for hospital follow- up and blood pressure recheck. Follow up as previously directed with your Heart Doctor, Dr. Salazar, in the clinic regarding your echocardiogram and cardiac monitoring; [phone] to schedule.     Activity    Your activity upon discharge: activity as tolerated     When to contact your care team    Call your primary care doctor if you have any of the following: temperature greater than 101 F, worsening shortness of breath, increased swelling, worsening pain, new or unrelenting diarrhea, or any other concerning symptoms. Call 911 or go to the emergency room if you need immediate assistance of if you have further episodes of passing out, chest pain.     Monitor and record    blood pressure daily. Bring this log of your blood pressures to primary care follow up.     Diet    Follow this diet  upon discharge: Regular     Discharge Medications   Discharge Medication List as of 8/18/2020 10:10 AM      CONTINUE these medications which have NOT CHANGED    Details   ascorbic acid 500 MG TABS Take 500 mg by mouth every evening , Historical      aspirin 81 MG EC tablet Take 1 tablet (81 mg) by mouth daily, Disp-30 tablet, OTC      BIOTIN PO Take 5,000 mg by mouth 2 times daily , Historical      glucosamine-chondroitin 500-400 MG CAPS per capsule Take 1 capsule by mouth 2 times daily, Historical      metFORMIN (GLUCOPHAGE) 1000 MG tablet Take 0.5 tablets (500 mg) by mouth 2 times daily (with meals), Disp-90 tablet,R-3, E-Prescribe      Multiple Vitamins-Iron (MULTIVITAMIN/IRON PO) Take 1 tablet by mouth 2 times daily , Historical      simvastatin (ZOCOR) 40 MG tablet Take 0.5 tablets (20 mg) by mouth At Bedtime, Disp-45 tablet,R-3, E-Prescribe           Allergies   No Known Allergies  Data   Most Recent 3 CBC's:  Recent Labs   Lab Test 08/18/20  0605 08/17/20  1106 07/10/20  1050   WBC 8.1 7.7 8.3   HGB 13.1* 12.9* 13.1*   MCV 94 92 93   * 148* 155      Most Recent 3 BMP's:  Recent Labs   Lab Test 08/18/20  0605 08/17/20  1106 07/10/20  1050    138 139   POTASSIUM 4.4 4.1 4.8   CHLORIDE 110* 106 106   CO2 29 27 28   BUN 16 18 17   CR 1.01 1.03 1.16   ANIONGAP 2* 5 5   DEEPTI 8.4* 8.6 9.0   * 159* 119*     Most Recent 2 LFT's:  Recent Labs   Lab Test 08/17/20  1106 07/10/20  1050   AST 33 21   ALT 33 32   ALKPHOS 91 83   BILITOTAL 0.4 0.4     Most Recent INR's and Anticoagulation Dosing History:  Anticoagulation Dose History     Recent Dosing and Labs Latest Ref Rng & Units 7/24/2018 8/17/2020    INR 0.86 - 1.14 1.01 1.02        Most Recent 3 Troponin's:  Recent Labs   Lab Test 08/17/20  1106   TROPI <0.015     Results for orders placed or performed during the hospital encounter of 08/17/20   CT Abdomen Pelvis w Contrast    Narrative    CT ABDOMEN AND PELVIS WITH CONTRAST 8/17/2020 12:28  PM    CLINICAL HISTORY: Abdominal pain, acute, generalized.    TECHNIQUE: CT scan of the abdomen and pelvis was performed following  injection of IV contrast. Multiplanar reformats were obtained. Dose  reduction techniques were used.    CONTRAST: 89mL [id number]    COMPARISON: CT angiogram aorta 7/3/2018.    FINDINGS:   LOWER CHEST: No acute abnormality. Left base granuloma.    HEPATOBILIARY: Normal.    PANCREAS: Normal.    SPLEEN: Normal.    ADRENAL GLANDS: Normal.    KIDNEYS/BLADDER: No acute renal abnormality. Small right renal cysts  not requiring specific follow-up. No hydronephrosis. Mild wall  thickening of the bladder that is only mildly distended.    BOWEL: No acute abnormality. No obstruction. Normal appendix. No  abscess or free air.    PELVIC ORGANS: Normal.    ADDITIONAL FINDINGS: Diffuse vascular calcifications.    MUSCULOSKELETAL: Diffuse spine degenerative changes.      Impression    IMPRESSION:   1.  Mild wall thickening of the bladder may just relate to incomplete  distention. Correlate with urinalysis to assess for urinary tract  infection.  2.  No acute abnormality otherwise seen.    SUSHIL COLEMAN MD   CT Head w/o Contrast    Narrative    CT SCAN OF THE HEAD WITHOUT CONTRAST August 17, 2020 12:28 PM     HISTORY: Altered level of consciousness (LOC), altered mental status,  unexplained.    TECHNIQUE: Axial images of the head and coronal reformations without  IV contrast material. Radiation dose for this scan was reduced using  automated exposure control, adjustment of the mA and/or kV according  to patient size, or iterative reconstruction technique.    COMPARISON: Head CTA 12/21/2017.    FINDINGS: Moderate volume loss is present. Patchy and confluent white  matter hypoattenuation likely represents advanced chronic small vessel  ischemic change. The cerebral hemispheres, brainstem, and cerebellum  otherwise demonstrate normal morphology and attenuation. No evidence  of acute ischemia, hemorrhage,  mass, mass effect or hydrocephalus. The  visualized calvarium, tympanic cavities, mastoid cavities, and  paranasal sinuses are unremarkable.      Impression    IMPRESSION: No acute intracranial abnormality.    RAFAL LATIF MD   Echocardiogram Complete    Narrative    [id number]  [id number]  [id number]  [id number]^[email or web address]^JOSÉ LUIS^ZORAIDA           [facility]  Echocardiography Laboratory  [address] [place] [address]        Name: SARAH BOYD  MRN: [id number]  : 1934  Study Date: 2020 07:29 AM  Age: 86 yrs  Gender: Male  Patient Location: [facility]  Reason For Study: Syncope  Ordering Physician: JOSÉ LUIS KNOTT  Referring Physician: Viet Bingham  Performed By: Iron Yanez RDCS     BSA: 1.9 m2  Height: 65 in  Weight: 173 lb  HR: 63  BP: 135/71 mmHg  _____________________________________________________________________________  __        Procedure  Complete Portable Echo Adult.  _____________________________________________________________________________  __        Interpretation Summary     Left ventricular size, global systolic function are normal, estimated LVEF 55-  60%. Grade II or moderate diastolic dysfunction.  Right ventricular global function is normal.  History of TAVR (2018) with a 26 mm Greene Sabino 3 valve bioprosthetic  aortic valve in place. Normal function on Doppler interrogation. Vmax 2.8 m/s,  mean gradient 17 mmHg. DI 0.52. No valvular insufficiency or paravalvular  leak.     This study was compared visually to a TTE from 2020. There has been no  significant change.  _____________________________________________________________________________  __        Left Ventricle  The left ventricle is normal in size. There is mild concentric left  ventricular hypertrophy. Left ventricular systolic function is normal. The  visual ejection fraction is estimated at 55-60%. Grade II or moderate  diastolic dysfunction. Septal motion is consistent with  conduction  abnormality.     Right Ventricle  The right ventricle is normal in structure, function and size.     Atria  The left atrium is mildly dilated. The right atrium is mildly dilated.     Mitral Valve  There is trace to mild mitral regurgitation.        Tricuspid Valve  The tricuspid valve is not well visualized, but is grossly normal. There is  trace tricuspid regurgitation. Right ventricular systolic pressure could not  be approximated due to inadequate tricuspid regurgitation.     Aortic Valve  There is a bioprosthetic aortic valve. History of TAVR (2018) with a 26  mm Greene Sabino 3 valve bioprosthetic aortic valve in place. Normal function  on Doppler interrogation. Vmax 2.8 m/s, mean gradient 17 mmHg. DI 0.52. No  valvular insufficiency or paravalvular leak.     Pulmonic Valve  The pulmonic valve is not well seen, but is grossly normal.     Vessels  The aortic root is normal size. Normal size ascending aorta. The inferior vena  cava was normal in size with preserved respiratory variability.     Pericardium  There is no pericardial effusion.     _____________________________________________________________________________  __  MMode/2D Measurements & Calculations  IVSd: 1.3 cm  LVIDd: 4.8 cm  LVIDs: 3.0 cm  LVPWd: 1.2 cm  FS: 38.0 %  LV mass(C)d: 240.3 grams  LV mass(C)dI: 129.2 grams/m2     Ao root diam: 3.6 cm  LA dimension: 3.8 cm  asc Aorta Diam: 3.4 cm  LA/Ao: 1.1  LVOT diam: 2.1 cm  LVOT area: 3.3 cm2  LA Volume (BP): 69.1 ml  LA Volume Index (BP): 37.2 ml/m2  RWT: 0.51        Doppler Measurements & Calculations  MV E max florentino: 108.7 cm/sec  MV A max florentino: 127.1 cm/sec  MV E/A: 0.85  MV max P.7 mmHg  MV mean PG: 3.8 mmHg  MV V2 VTI: 44.3 cm  MVA(VTI): 2.2 cm2     MV P1/2t max florentino: 120.7 cm/sec  MV P1/2t: 85.6 msec  MVA(P1/2t): 2.6 cm2  MV dec slope: 412.9 cm/sec2  MV dec time: 0.27 sec  Ao V2 max: 277.5 cm/sec  Ao max P.0 mmHg  Ao V2 mean: 193.1 cm/sec  Ao mean P.0 mmHg  Ao V2  VTI: 63.2 cm  HUNG(I,D): 1.6 cm2  HUNG(V,D): 1.7 cm2  LV V1 max P.2 mmHg  LV V1 max: 143.3 cm/sec  LV V1 VTI: 29.9 cm  CO(LVOT): 5.7 l/min  CI(LVOT): 3.1 l/min/m2  SV(LVOT): 98.7 ml  SI(LVOT): 53.1 ml/m2  PA acc time: 0.09 sec  AV Anson Ratio (DI): 0.52  HUNG Index (cm2/m2): 0.84  E/E' av.1  Lateral E/e': 21.7  Medial E/e': 18.5              _____________________________________________________________________________  __        Report approved by: Santiago Tiwari 2020 08:16 AM            Cierra Guillen PA-C  [facility]

## 2020-08-20 DIAGNOSIS — I65.22 LEFT CAROTID ARTERY STENOSIS: ICD-10-CM

## 2020-08-20 DIAGNOSIS — I65.23 BILATERAL CAROTID ARTERY OBSTRUCTION WITHOUT CEREBRAL INFARCTION: Primary | ICD-10-CM

## 2020-08-26 ENCOUNTER — OFFICE VISIT (OUTPATIENT)
Dept: INTERNAL MEDICINE | Facility: CLINIC | Age: 85
End: 2020-08-26
Payer: MEDICARE

## 2020-08-26 VITALS
DIASTOLIC BLOOD PRESSURE: 58 MMHG | RESPIRATION RATE: 16 BRPM | HEART RATE: 68 BPM | WEIGHT: 177 LBS | HEIGHT: 65 IN | TEMPERATURE: 98.6 F | SYSTOLIC BLOOD PRESSURE: 123 MMHG | BODY MASS INDEX: 29.49 KG/M2 | OXYGEN SATURATION: 95 %

## 2020-08-26 DIAGNOSIS — R55 SYNCOPE, UNSPECIFIED SYNCOPE TYPE: Primary | ICD-10-CM

## 2020-08-26 DIAGNOSIS — I10 ESSENTIAL HYPERTENSION WITH GOAL BLOOD PRESSURE LESS THAN 140/90: ICD-10-CM

## 2020-08-26 PROCEDURE — 99495 TRANSJ CARE MGMT MOD F2F 14D: CPT | Performed by: INTERNAL MEDICINE

## 2020-08-26 ASSESSMENT — MIFFLIN-ST. JEOR: SCORE: 1409.75

## 2020-08-26 NOTE — PROGRESS NOTES
Subjective     Reid Jimenes is a 86 year old male who presents to clinic today for the following health issues:    [facility]       Hospital Follow-up Visit:    Hospital/Nursing Home/IP Rehab Facility: [facility]  Date of Admission: 8-  Date of Discharge: 8-  Reason(s) for Admission: syncope      Was your hospitalization related to COVID-19? No   Problems taking medications regularly:  None  Medication changes since discharge: None  Problems adhering to non-medication therapy:  None    Summary of hospitalization:  [facility] discharge summary reviewed  Diagnostic Tests/Treatments reviewed.  Follow up needed: with Vascular Surgeon (mauri done last week)  Other Healthcare Providers Involved in Patient s Care:         Cardiology  Update since discharge: improved. He has had no further syncope or lightheadedness. No palpitations or weakness.Energy is good. Appetite is good. Balance is good.            Post Discharge Medication Reconciliation: discharge medications reconciled, continue medications without change.  Plan of care communicated with patient                  Review of Systems   Constitutional, HEENT, cardiovascular, pulmonary, GI, , musculoskeletal, neuro, skin, endocrine and psych systems are negative, except as otherwise noted.      Objective    There were no vitals taken for this visit.  There is no height or weight on file to calculate BMI.  Physical Exam   GENERAL: healthy, alert and no distress  NECK: no adenopathy, no asymmetry, masses, or scars and thyroid normal to palpation  RESP: lungs clear to auscultation - no rales, rhonchi or wheezes  CV: regular rates and rhythm, normal S1 S2, no S3 or S4, grade 2/6 systolic murmur heard best over the right upper sternal border , peripheral pulses strong and no peripheral edema  ABDOMEN: soft, nontender, no hepatosplenomegaly, no masses and bowel sounds normal  MS: no gross musculoskeletal defects noted, no edema  NEURO:  Normal strength and tone, mentation intact and speech normal  PSYCH: mentation appears normal, affect normal/bright          Assessment & Plan     Syncope, unspecified syncope type  Event recorder is pending. But is doing well. Suspect it was a vasovagal episode but cannot be sure    Essential hypertension with goal blood pressure less than 140/90  under good control         No follow-ups on file.    Hailey Walker MD  [facility]

## 2020-09-09 ENCOUNTER — DOCUMENTATION ONLY (OUTPATIENT)
Dept: CARDIOLOGY | Facility: CLINIC | Age: 85
End: 2020-09-09

## 2020-09-09 DIAGNOSIS — R55 SYNCOPE, UNSPECIFIED SYNCOPE TYPE: Primary | ICD-10-CM

## 2020-09-09 NOTE — PROGRESS NOTES
No event strips available to 30 day event monitor (14 days). [facility] has no strips. Called [facility] and they have no strips - [employer] staff will call the patient to see if he has not been near enough to a cell tower to transmit or if his machine is storing and not transmitting.

## 2020-09-10 NOTE — PROGRESS NOTES
Spoke with Patient who states he worn the monitor and took it off when he was suppose to, mailed it the same day.    Spoke with Gaby at [employer]. Monitor started 8- but appears never to of been activated.  Monitor received back on 9-8-2020.    Will review with Jose.

## 2020-09-10 NOTE — PROGRESS NOTES
Reviewed with Jose that monitor was empty upon return., No strips available.  Recommendation is that as long as Patient is feeling well may postpone visit . New Monitor to be started and then f/up visit.    Patient states he is doing well.     Spoke with Patient who agrees with plan. Orders re entered.

## 2020-09-29 ENCOUNTER — HOSPITAL ENCOUNTER (OUTPATIENT)
Dept: CARDIOLOGY | Facility: CLINIC | Age: 85
End: 2020-09-29
Attending: PHYSICIAN ASSISTANT
Payer: MEDICARE

## 2020-09-29 DIAGNOSIS — I25.10 CORONARY ARTERY DISEASE INVOLVING NATIVE CORONARY ARTERY OF NATIVE HEART WITHOUT ANGINA PECTORIS: ICD-10-CM

## 2020-09-29 DIAGNOSIS — R55 SYNCOPE, UNSPECIFIED SYNCOPE TYPE: ICD-10-CM

## 2020-09-29 DIAGNOSIS — E78.5 HYPERLIPIDEMIA WITH TARGET LDL LESS THAN 100: ICD-10-CM

## 2020-09-29 DIAGNOSIS — I10 ESSENTIAL HYPERTENSION WITH GOAL BLOOD PRESSURE LESS THAN 140/90: ICD-10-CM

## 2020-09-29 DIAGNOSIS — Z95.2 S/P TAVR (TRANSCATHETER AORTIC VALVE REPLACEMENT): ICD-10-CM

## 2020-09-29 PROCEDURE — 93321 DOPPLER ECHO F-UP/LMTD STD: CPT | Mod: 26 | Performed by: INTERNAL MEDICINE

## 2020-09-29 PROCEDURE — 0296T LEADLESS EKG MONITOR 3 TO 14 DAYS: CPT

## 2020-09-29 PROCEDURE — 93321 DOPPLER ECHO F-UP/LMTD STD: CPT

## 2020-09-29 PROCEDURE — 93325 DOPPLER ECHO COLOR FLOW MAPG: CPT | Mod: 26 | Performed by: INTERNAL MEDICINE

## 2020-09-29 PROCEDURE — 93308 TTE F-UP OR LMTD: CPT | Mod: 26 | Performed by: INTERNAL MEDICINE

## 2020-10-22 NOTE — PROGRESS NOTES
CARDIOLOGY VISIT    REASON FOR VISIT: TAVR    SUBJECTIVE:  86-year-old male seen for follow-up of TAVR.  He also has nonocclusive coronary disease, hypertension, dyslipidemia, type 2 diabetes, and carotid artery disease. Preop angiogram showed mild coronary disease.    August 2018 he underwent TAVR with a 26 mm Greene Sabino 3 valve.  He subsequently developed a left bundle branch block.     Echo January 2020 showed EF 55%, TAVR with mean 15 mmHg, V-max 2.7 m/s, DI 0.42    Echo August 2020 showed EF 60%, normal RV, TAVR with mean 17 mmHg, V-max 2.8 m/s, DI 0.52, no AI.      Overall has been doing well recently.  He has been less active than usual because of the pandemic.  He has mostly been staying at home.  He will go walking in stores and denies any chest pain or shortness of breath with lighter activity.  He has no lightheadedness, dizziness, or edema.  He had a syncopal episode in August, subsequent work-up including head imaging was unremarkable.    MEDICATIONS:  Current Outpatient Medications   Medication     ascorbic acid 500 MG TABS     aspirin 81 MG EC tablet     BIOTIN PO     glucosamine-chondroitin 500-400 MG CAPS per capsule     metFORMIN (GLUCOPHAGE) 1000 MG tablet     Multiple Vitamins-Iron (MULTIVITAMIN/IRON PO)     simvastatin (ZOCOR) 40 MG tablet     No current facility-administered medications for this visit.        ALLERGIES:  No Known Allergies    REVIEW OF SYSTEMS:  Constitutional:  No weight loss, fever, chills, weakness or fatigue.  HEENT:  Eyes:  No visual loss, blurred vision, double vision or yellow sclerae. No hearing loss, sneezing, congestion, runny nose or sore throat.  Skin:  No rash or itching.  Cardiovascular: per HPI  Respiratory: per HPI  GI:  No anorexia, nausea, vomiting or diarrhea. No abdominal pain or blood.  :  No dysurea, hematuria  Neurologic:  No headache, dizziness, syncope, paralysis, ataxia, numbness or tingling in the extremities. No change in bowel or bladder  "control.  Musculoskeletal:  No muscle, back pain, joint pain or stiffness.  Hematologic:  No anemia, bleeding or bruising.  Lymphatics:  No enlarged nodes. No history of splenectomy.  Psychiatric:  No history of depression or anxiety.  Endocrine:  No reports of sweating, cold or heat intolerance. No polyuria or polydipsia.  Allergies:  No history of asthma, hives, eczema or rhinitis.    PHYSICAL EXAM:  /70 (BP Location: Right arm, Patient Position: Sitting, Cuff Size: Adult Regular)   Pulse 68   Ht 1.651 m (5' 5\")   Wt 79.7 kg (175 lb 11.2 oz)   BMI 29.24 kg/m      Constitutional: awake, alert, no distress  Eyes: PERRL, sclera nonicteric  ENT: trachea midline  Respiratory: Lungs clear  Cardiovascular: Regular rate and rhythm, no murmurs, no lower extremity edema  GI: nondistended, nontender, bowel sounds present  Lymph/Hematologic: no lymphadenopathy  Skin: dry, no rash  Musculoskeletal: good muscle tone, strength 5/5 in upper and lower extremities  Neurologic: no focal deficits  Neuropsychiatric: appropriate affact    DATA:  Lab: August 2020: Potassium 4.4, creatinine 1.0  Recent Labs   Lab Test 07/10/20  1050 05/06/19  0819 10/07/15  0737 10/07/15  0737 04/17/15  0812   CHOL 98 98   < > 97 106   HDL 48 48   < > 43 49   LDL 23 35   < > 33 40   TRIG 136 76   < > 106 86   CHOLHDLRATIO  --   --   --  2.3 2.2    < > = values in this interval not displayed.       ASSESSMENT:  86-year-old male seen for follow-up of TAVR.  He is doing well, now 2 years out from his TAVR.  He has no concerning cardiac symptoms, no evidence of arrhythmia or heart failure.  Blood pressure and lipids are well controlled.    Follows with vascular surgery for his moderate left carotid stenosis.    RECOMMENDATIONS:  1.  Status post TAVR  -Repeat echo in 1 year  -Continue aspirin    2.  Dyslipidemia  -Lipids at goal, continue simvastatin    3.  Carotid artery disease  -Follows with vascular surgery    Follow-up in 9 months with VINOD, " repeat echo.    Eulalio Higginbotham MD  Cardiology - [facility]  Pager:  [phone]  Text Page  October 27, 2020

## 2020-10-27 ENCOUNTER — OFFICE VISIT (OUTPATIENT)
Dept: CARDIOLOGY | Facility: CLINIC | Age: 85
End: 2020-10-27
Payer: MEDICARE

## 2020-10-27 VITALS
HEART RATE: 68 BPM | DIASTOLIC BLOOD PRESSURE: 70 MMHG | BODY MASS INDEX: 29.27 KG/M2 | SYSTOLIC BLOOD PRESSURE: 122 MMHG | WEIGHT: 175.7 LBS | HEIGHT: 65 IN

## 2020-10-27 DIAGNOSIS — Z95.2 S/P TAVR (TRANSCATHETER AORTIC VALVE REPLACEMENT): Primary | ICD-10-CM

## 2020-10-27 PROCEDURE — 99214 OFFICE O/P EST MOD 30 MIN: CPT | Performed by: INTERNAL MEDICINE

## 2020-10-27 ASSESSMENT — MIFFLIN-ST. JEOR: SCORE: 1403.85

## 2020-10-27 NOTE — LETTER
10/27/2020    Viet Bingham MD, MD  [address] [facility] [address]  [facility] [address]    RE: Reid Baroncar       Dear Colleague,    I had the pleasure of seeing Reid METCALF Yudy in the [facility].    CARDIOLOGY VISIT    REASON FOR VISIT: TAVR    SUBJECTIVE:  86-year-old male seen for follow-up of TAVR.  He also has nonocclusive coronary disease, hypertension, dyslipidemia, type 2 diabetes, and carotid artery disease. Preop angiogram showed mild coronary disease.    August 2018 he underwent TAVR with a 26 mm Greene Sabino 3 valve.  He subsequently developed a left bundle branch block.     Echo January 2020 showed EF 55%, TAVR with mean 15 mmHg, V-max 2.7 m/s, DI 0.42    Echo August 2020 showed EF 60%, normal RV, TAVR with mean 17 mmHg, V-max 2.8 m/s, DI 0.52, no AI.      Overall has been doing well recently.  He has been less active than usual because of the pandemic.  He has mostly been staying at home.  He will go walking in stores and denies any chest pain or shortness of breath with lighter activity.  He has no lightheadedness, dizziness, or edema.  He had a syncopal episode in August, subsequent work-up including head imaging was unremarkable.    MEDICATIONS:  Current Outpatient Medications   Medication     ascorbic acid 500 MG TABS     aspirin 81 MG EC tablet     BIOTIN PO     glucosamine-chondroitin 500-400 MG CAPS per capsule     metFORMIN (GLUCOPHAGE) 1000 MG tablet     Multiple Vitamins-Iron (MULTIVITAMIN/IRON PO)     simvastatin (ZOCOR) 40 MG tablet     No current facility-administered medications for this visit.        ALLERGIES:  No Known Allergies    REVIEW OF SYSTEMS:  Constitutional:  No weight loss, fever, chills, weakness or fatigue.  HEENT:  Eyes:  No visual loss, blurred vision, double vision or yellow sclerae. No hearing loss, sneezing, congestion, runny nose or sore throat.  Skin:  No rash or itching.  Cardiovascular: per HPI  Respiratory: per HPI  GI:  No  "anorexia, nausea, vomiting or diarrhea. No abdominal pain or blood.  :  No dysurea, hematuria  Neurologic:  No headache, dizziness, syncope, paralysis, ataxia, numbness or tingling in the extremities. No change in bowel or bladder control.  Musculoskeletal:  No muscle, back pain, joint pain or stiffness.  Hematologic:  No anemia, bleeding or bruising.  Lymphatics:  No enlarged nodes. No history of splenectomy.  Psychiatric:  No history of depression or anxiety.  Endocrine:  No reports of sweating, cold or heat intolerance. No polyuria or polydipsia.  Allergies:  No history of asthma, hives, eczema or rhinitis.    PHYSICAL EXAM:  /70 (BP Location: Right arm, Patient Position: Sitting, Cuff Size: Adult Regular)   Pulse 68   Ht 1.651 m (5' 5\")   Wt 79.7 kg (175 lb 11.2 oz)   BMI 29.24 kg/m      Constitutional: awake, alert, no distress  Eyes: PERRL, sclera nonicteric  ENT: trachea midline  Respiratory: Lungs clear  Cardiovascular: Regular rate and rhythm, no murmurs, no lower extremity edema  GI: nondistended, nontender, bowel sounds present  Lymph/Hematologic: no lymphadenopathy  Skin: dry, no rash  Musculoskeletal: good muscle tone, strength 5/5 in upper and lower extremities  Neurologic: no focal deficits  Neuropsychiatric: appropriate affact    DATA:  Lab: August 2020: Potassium 4.4, creatinine 1.0  Recent Labs   Lab Test 07/10/20  1050 05/06/19  0819 10/07/15  0737 10/07/15  0737 04/17/15  0812   CHOL 98 98   < > 97 106   HDL 48 48   < > 43 49   LDL 23 35   < > 33 40   TRIG 136 76   < > 106 86   CHOLHDLRATIO  --   --   --  2.3 2.2    < > = values in this interval not displayed.       ASSESSMENT:  86-year-old male seen for follow-up of TAVR.  He is doing well, now 2 years out from his TAVR.  He has no concerning cardiac symptoms, no evidence of arrhythmia or heart failure.  Blood pressure and lipids are well controlled.    Follows with vascular surgery for his moderate left carotid " stenosis.    RECOMMENDATIONS:  1.  Status post TAVR  -Repeat echo in 1 year  -Continue aspirin    2.  Dyslipidemia  -Lipids at goal, continue simvastatin    3.  Carotid artery disease  -Follows with vascular surgery    Follow-up in 9 months with [facility], repeat echo.    Eulalio Higginbotham MD  Cardiology - [facility]  Pager:  [phone]  Text Page  October 27, 2020      Thank you for allowing me to participate in the care of your patient.    Sincerely,     Eulalio Higginbotham MD     [facility]

## 2021-01-10 ENCOUNTER — HOSPITAL ENCOUNTER (EMERGENCY)
Facility: CLINIC | Age: 86
Discharge: HOME | End: 2021-01-10
Attending: EMERGENCY MEDICINE | Admitting: EMERGENCY MEDICINE
Payer: MEDICARE

## 2021-01-10 ENCOUNTER — APPOINTMENT (OUTPATIENT)
Dept: CT IMAGING | Facility: CLINIC | Age: 86
End: 2021-01-10
Attending: EMERGENCY MEDICINE
Payer: MEDICARE

## 2021-01-10 VITALS
DIASTOLIC BLOOD PRESSURE: 76 MMHG | TEMPERATURE: 97.2 F | SYSTOLIC BLOOD PRESSURE: 159 MMHG | HEART RATE: 64 BPM | OXYGEN SATURATION: 98 % | RESPIRATION RATE: 18 BRPM

## 2021-01-10 DIAGNOSIS — S01.01XA LACERATION OF SCALP, INITIAL ENCOUNTER: ICD-10-CM
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
## 2021-01-10 LAB
ALBUMIN SERPL-MCNC: 3.6 G/DL (ref 3.4–5)
ALBUMIN UR-MCNC: 20 MG/DL
ALP SERPL-CCNC: 94 U/L (ref 40–150)
ALT SERPL W P-5'-P-CCNC: 22 U/L (ref 0–70)
ANION GAP SERPL CALCULATED.3IONS-SCNC: 4 MMOL/L (ref 3–14)
APPEARANCE UR: CLEAR
AST SERPL W P-5'-P-CCNC: 19 U/L (ref 0–45)
BASOPHILS # BLD AUTO: 0 10E9/L (ref 0–0.2)
BASOPHILS NFR BLD AUTO: 0.6 %
BILIRUB SERPL-MCNC: 0.3 MG/DL (ref 0.2–1.3)
BILIRUB UR QL STRIP: NEGATIVE
BUN SERPL-MCNC: 21 MG/DL (ref 7–30)
CALCIUM SERPL-MCNC: 8.8 MG/DL (ref 8.5–10.1)
CHLORIDE SERPL-SCNC: 106 MMOL/L (ref 94–109)
CO2 SERPL-SCNC: 29 MMOL/L (ref 20–32)
COLOR UR AUTO: YELLOW
CREAT SERPL-MCNC: 1.15 MG/DL (ref 0.66–1.25)
DIFFERENTIAL METHOD BLD: ABNORMAL
EOSINOPHIL # BLD AUTO: 0.5 10E9/L (ref 0–0.7)
EOSINOPHIL NFR BLD AUTO: 7.8 %
ERYTHROCYTE [DISTWIDTH] IN BLOOD BY AUTOMATED COUNT: 12.3 % (ref 10–15)
GFR SERPL CREATININE-BSD FRML MDRD: 57 ML/MIN/{1.73_M2}
GLUCOSE SERPL-MCNC: 196 MG/DL (ref 70–99)
GLUCOSE UR STRIP-MCNC: NEGATIVE MG/DL
HCT VFR BLD AUTO: 40.2 % (ref 40–53)
HGB BLD-MCNC: 13.2 G/DL (ref 13.3–17.7)
HGB UR QL STRIP: NEGATIVE
IMM GRANULOCYTES # BLD: 0 10E9/L (ref 0–0.4)
IMM GRANULOCYTES NFR BLD: 0.3 %
KETONES UR STRIP-MCNC: NEGATIVE MG/DL
LEUKOCYTE ESTERASE UR QL STRIP: NEGATIVE
LYMPHOCYTES # BLD AUTO: 2 10E9/L (ref 0.8–5.3)
LYMPHOCYTES NFR BLD AUTO: 28.7 %
MCH RBC QN AUTO: 30.4 PG (ref 26.5–33)
MCHC RBC AUTO-ENTMCNC: 32.8 G/DL (ref 31.5–36.5)
MCV RBC AUTO: 93 FL (ref 78–100)
MONOCYTES # BLD AUTO: 0.7 10E9/L (ref 0–1.3)
MONOCYTES NFR BLD AUTO: 10.6 %
MUCOUS THREADS #/AREA URNS LPF: PRESENT /LPF
NEUTROPHILS # BLD AUTO: 3.6 10E9/L (ref 1.6–8.3)
NEUTROPHILS NFR BLD AUTO: 52 %
NITRATE UR QL: NEGATIVE
NRBC # BLD AUTO: 0 10*3/UL
NRBC BLD AUTO-RTO: 0 /100
PH UR STRIP: 5 PH (ref 5–7)
PLATELET # BLD AUTO: 172 10E9/L (ref 150–450)
POTASSIUM SERPL-SCNC: 4.3 MMOL/L (ref 3.4–5.3)
PROT SERPL-MCNC: 7.2 G/DL (ref 6.8–8.8)
RBC # BLD AUTO: 4.34 10E12/L (ref 4.4–5.9)
RBC #/AREA URNS AUTO: 2 /HPF (ref 0–2)
SODIUM SERPL-SCNC: 139 MMOL/L (ref 133–144)
SOURCE: ABNORMAL
SP GR UR STRIP: 1.03 (ref 1–1.03)
TROPONIN I SERPL-MCNC: <0.015 UG/L (ref 0–0.04)
UROBILINOGEN UR STRIP-MCNC: NORMAL MG/DL (ref 0–2)
WBC # BLD AUTO: 7 10E9/L (ref 4–11)
WBC #/AREA URNS AUTO: 1 /HPF (ref 0–5)

## 2021-01-10 PROCEDURE — 99285 EMERGENCY DEPT VISIT HI MDM: CPT | Mod: 25

## 2021-01-10 PROCEDURE — 85025 COMPLETE CBC W/AUTO DIFF WBC: CPT | Performed by: EMERGENCY MEDICINE

## 2021-01-10 PROCEDURE — 93005 ELECTROCARDIOGRAM TRACING: CPT | Mod: 59

## 2021-01-10 PROCEDURE — 271N000002 HC RX 271: Performed by: EMERGENCY MEDICINE

## 2021-01-10 PROCEDURE — 81001 URINALYSIS AUTO W/SCOPE: CPT | Performed by: EMERGENCY MEDICINE

## 2021-01-10 PROCEDURE — 80053 COMPREHEN METABOLIC PANEL: CPT | Performed by: EMERGENCY MEDICINE

## 2021-01-10 PROCEDURE — 70450 CT HEAD/BRAIN W/O DYE: CPT | Mod: MG

## 2021-01-10 PROCEDURE — 250N000009 HC RX 250: Performed by: EMERGENCY MEDICINE

## 2021-01-10 PROCEDURE — 12002 RPR S/N/AX/GEN/TRNK2.6-7.5CM: CPT

## 2021-01-10 PROCEDURE — 250N000011 HC RX IP 250 OP 636: Performed by: EMERGENCY MEDICINE

## 2021-01-10 PROCEDURE — 84484 ASSAY OF TROPONIN QUANT: CPT | Performed by: EMERGENCY MEDICINE

## 2021-01-10 RX ORDER — METHYLCELLULOSE 4000CPS 30 %
POWDER (GRAM) MISCELLANEOUS ONCE
Status: COMPLETED | OUTPATIENT
Start: 2021-01-10 | End: 2021-01-10

## 2021-01-10 RX ADMIN — Medication 150 MG: at 17:16

## 2021-01-10 RX ADMIN — EPINEPHRINE BITARTRATE 3 ML: 1 POWDER at 17:16

## 2021-01-10 ASSESSMENT — ENCOUNTER SYMPTOMS: WOUND: 1

## 2021-01-10 NOTE — ED TRIAGE NOTES
Patient presents with laceration to back of head about 20 minutes PTA. He states he doesn't know what happened and why he fell. He denies any LOC, he is on 81 mg ASA.

## 2021-01-10 NOTE — ED PROVIDER NOTES
History   Chief Complaint:  Fall and Laceration     The patient was seen in room 23.    The history is provided by the patient.      Reid Jimenes is a 86 year old male with history of aortic valve stenosis, s/p TAVR, CAD, type II diabetes, hyperlipidemia, and hypertension, who presents with a laceration to his occipital scalp after a fall. Patient reports that he was sitting at his table when the phone rang in the other room. He then got up, believes he took about 5 steps, when he tripped and fell backwards, hitting the back of his head on the cabinets. He does not know if he lost consciousness or not, but states that his wife said he never did. He denies any other injuries. Patient notes he takes 81 mg Aspirin but does not take any other blood thinners.    Tdap per [facility]: 2010    Review of Systems   Skin: Positive for wound (occipital scalp).   Neurological:        (-) LOC   All other systems reviewed and are negative.      Allergies:  No Known Allergies    Medications:  Aspirin 81 mg  Glucosamine-chondroitin  Metformin  Zocor     Past Medical History:    Aortic valve stenosis  CAD  Hyperlipidemia  Hypertension  LBB  Type II diabetes    PVD  GERD     Past Surgical History:    Mandible surgery  Cardiac catheter placed  TARV implant     Family History:    Heart disease: Father  Alcohol/drug  Stomach cancer  MI: Sister  CVA    Social History:  Smoking status: Former - quit date: 1980  Alcohol use: Former  Drug use: Negative  Marital Status:   Presents to the ED alone.   PCP: Viet Bingham MD    Physical Exam     Patient Vitals for the past 24 hrs:   BP Temp Temp src Pulse Resp SpO2   01/10/21 1800 (!) 159/76 -- -- 64 -- 98 %   01/10/21 1745 (!) 180/70 -- -- 64 -- 98 %   01/10/21 1730 137/66 -- -- 63 -- 98 %   01/10/21 1715 (!) 144/69 -- -- -- -- --   01/10/21 1645 (!) 169/88 -- -- 64 -- 96 %   01/10/21 1630 (!) 158/85 -- -- 64 -- 96 %   01/10/21 1615 (!) 127/109 -- -- 61 -- 97 %   01/10/21 1600 -- -- -- --  -- 98 %   01/10/21 1557 -- -- -- 70 -- 95 %   01/10/21 1556 (!) 142/72 97.2  F (36.2  C) Temporal -- 18 --       Physical Exam  Constitutional: Alert  HENT:    Nose: Nose normal.    Mouth/Throat: Oropharynx is clear, mucous membranes are moist   Head: 7 cm curvilinear laceration to the occipital scalp.   Eyes: EOM are normal. Pupils are equal, round, and reactive to light.   CV: Regular rate and rhythm, no murmurs, rubs or gallops.  Resp: Clear lungs to auscultation, all lung fields. Normal respiratory effort.   GI: Soft, non-distended. There is no tenderness. No rebound or guarding.   MSK: Normal range of motion. No deformity.   Neurological:   A/Ox3  5/5 strength is symmetric to the upper and lower extremities;   Sensation intact to light touch throughout the upper and lower extremities;   Skin: Skin is warm and dry.    Emergency Department Course     ECG:  Indication: Fall  Time: 1624  Vent. Rate 62 bpm. MT interval 274. QRS duration 164. QT/QTc 454/460. P-R-T axis 61 -28 111. Sinus rhythm with 1st degree AV block. Left bundle branch block. No Sgarbossa criteria. Abnormal ECG. Read time: 1627    There are no significant changes when compared to an EKG done on 08/17/2020.    Imaging:  CT Head without contrast:   1.  No fracture and no intracranial hemorrhage.  2.  Moderate small vessel ischemic disease. As per radiology.    Laboratory:  CBC: WBC: 7.0, HGB: 13.2 (L), PLT: 172  CMP: Glucose: 196 (H), GFR: 57 (L), o/w WNL (Creatinine: 1.15)    Troponin (1616): <0.015     UA: Protein Albumin: 20, Mucous: Present, o/w Negative    Procedures    Laceration Repair        LACERATION:  A simple and superficial clean 7 cm laceration.      LOCATION:  Occipital scalp      FUNCTION:  Distally sensation and circulation are intact.      ANESTHESIA:  LET - Topical      PREPARATION:  Irrigation and Scrubbing with Normal Saline and Shur Clens      DEBRIDEMENT:  no debridement      CLOSURE:  Wound was closed with 12  Staples    Interventions:  1716 LET 3 mL topical  1716 Methylcellulose powder 150 mg topical     Emergency Department Course:    Reviewed:  1605 I reviewed the patient's nursing notes, vitals, past medical records, and Care Everywhere.     Assessments:  1610 I performed an exam of the patient as documented above.     1738 I rechecked the patient and discussed the results of his workup thus far. I repaired the patient's laceration as noted above.     Disposition:  The patient was discharged to home.     Impression & Plan   Medical Decision Makin year old male who comes in after fall. Patient cannot recall the events of the fall but wife states that he did not lose consciousness. No chest pain. Exam as above. Labs, EKG, and imaging obtained. EKG non-ischemic. Imaging negative. Labs negative. Laceration repaired as above. Patient expressed understanding and was in agreement with the plan and discharge instructions discussed which included reasons to return and follow-up.      Diagnosis:    ICD-10-CM    1. Fall, initial encounter  W19.XXXA    2. Laceration of scalp, initial encounter  S01.01XA        Disposition:  Discharged to home.    Scribe Disclosure:  I, Slime Valadez, am serving as a scribe on 1/10/2021 at 4:07 PM to personally document services performed by Robert Steele DO based on my observations and the provider's statements to me.      Robert Steele DO  01/10/21 [id number]

## 2021-01-10 NOTE — ED AVS SNAPSHOT
[facility]  [address]  [facility] [address]  Phone: [phone]  Fax: [phone]                                    Reid Jimenes   MRN: [id number]    Department: [facility]   Date of Visit: 1/10/2021           After Visit Summary Signature Page    I have received my discharge instructions, and my questions have been answered. I have discussed any challenges I see with this plan with the nurse or doctor.    ..........................................................................................................................................  Patient/Patient Representative Signature      ..........................................................................................................................................  Patient Representative Print Name and Relationship to Patient    ..................................................               ................................................  Date                                   Time    ..........................................................................................................................................  Reviewed by Signature/Title    ...................................................              ..............................................  Date                                               Time          22EPIC Rev 08/18

## 2021-01-11 LAB — INTERPRETATION ECG - MUSE: NORMAL

## 2021-01-11 NOTE — ED NOTES
This RN spoke to pt's wife (orlando, oked per pt) with update. [place] in parking lot and will pick pt up at front. Pt notified.

## 2021-01-12 DIAGNOSIS — E11.59 TYPE 2 DIABETES MELLITUS WITH OTHER CIRCULATORY COMPLICATIONS (H): Primary | ICD-10-CM

## 2021-01-12 DIAGNOSIS — E11.59 TYPE 2 DIABETES MELLITUS WITH OTHER CIRCULATORY COMPLICATIONS (H): ICD-10-CM

## 2021-01-12 LAB — HBA1C MFR BLD: 6.9 % (ref 0–5.6)

## 2021-01-12 PROCEDURE — 83036 HEMOGLOBIN GLYCOSYLATED A1C: CPT | Performed by: INTERNAL MEDICINE

## 2021-01-12 PROCEDURE — 36415 COLL VENOUS BLD VENIPUNCTURE: CPT | Performed by: INTERNAL MEDICINE

## 2021-01-12 PROCEDURE — 80061 LIPID PANEL: CPT | Performed by: INTERNAL MEDICINE

## 2021-01-12 PROCEDURE — 80053 COMPREHEN METABOLIC PANEL: CPT | Performed by: INTERNAL MEDICINE

## 2021-01-12 NOTE — PROGRESS NOTES
Patient needs lab orders, has appointment next week with Dr. Bingham for 6 month follow-up, has diabetes. Patient was also seen in ER on 1/10/21 following a fall, discharge summary does not recommend repeating any labs. Lab orders placed for A1c, CMP and Lipid panel for diabetes follow-up.

## 2021-01-13 LAB
ALBUMIN SERPL-MCNC: 3.5 G/DL (ref 3.4–5)
ALP SERPL-CCNC: 89 U/L (ref 40–150)
ALT SERPL W P-5'-P-CCNC: 23 U/L (ref 0–70)
ANION GAP SERPL CALCULATED.3IONS-SCNC: 4 MMOL/L (ref 3–14)
AST SERPL W P-5'-P-CCNC: 18 U/L (ref 0–45)
BILIRUB SERPL-MCNC: 0.5 MG/DL (ref 0.2–1.3)
BUN SERPL-MCNC: 21 MG/DL (ref 7–30)
CALCIUM SERPL-MCNC: 8.8 MG/DL (ref 8.5–10.1)
CHLORIDE SERPL-SCNC: 107 MMOL/L (ref 94–109)
CHOLEST SERPL-MCNC: 134 MG/DL
CO2 SERPL-SCNC: 29 MMOL/L (ref 20–32)
CREAT SERPL-MCNC: 1.1 MG/DL (ref 0.66–1.25)
GFR SERPL CREATININE-BSD FRML MDRD: 60 ML/MIN/{1.73_M2}
GLUCOSE SERPL-MCNC: 149 MG/DL (ref 70–99)
HDLC SERPL-MCNC: 45 MG/DL
LDLC SERPL CALC-MCNC: 66 MG/DL
NONHDLC SERPL-MCNC: 89 MG/DL
POTASSIUM SERPL-SCNC: 4.7 MMOL/L (ref 3.4–5.3)
PROT SERPL-MCNC: 7 G/DL (ref 6.8–8.8)
SODIUM SERPL-SCNC: 140 MMOL/L (ref 133–144)
TRIGL SERPL-MCNC: 115 MG/DL

## 2021-01-19 ENCOUNTER — OFFICE VISIT (OUTPATIENT)
Dept: INTERNAL MEDICINE | Facility: CLINIC | Age: 86
End: 2021-01-19
Payer: MEDICARE

## 2021-01-19 VITALS
TEMPERATURE: 97.7 F | DIASTOLIC BLOOD PRESSURE: 53 MMHG | HEART RATE: 72 BPM | WEIGHT: 175.1 LBS | BODY MASS INDEX: 29.14 KG/M2 | SYSTOLIC BLOOD PRESSURE: 100 MMHG | OXYGEN SATURATION: 97 %

## 2021-01-19 DIAGNOSIS — E78.5 HYPERLIPIDEMIA WITH TARGET LDL LESS THAN 100: ICD-10-CM
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
## 2021-01-19 DIAGNOSIS — I73.9 PERIPHERAL VASCULAR DISEASE (H): ICD-10-CM

## 2021-01-19 DIAGNOSIS — R80.9 TYPE 2 DIABETES MELLITUS WITH MICROALBUMINURIA, WITHOUT LONG-TERM CURRENT USE OF INSULIN (H): Primary | ICD-10-CM

## 2021-01-19 DIAGNOSIS — I25.10 CORONARY ARTERY DISEASE INVOLVING NATIVE CORONARY ARTERY OF NATIVE HEART WITHOUT ANGINA PECTORIS: ICD-10-CM

## 2021-01-19 DIAGNOSIS — Z95.2 S/P TAVR (TRANSCATHETER AORTIC VALVE REPLACEMENT): ICD-10-CM

## 2021-01-19 DIAGNOSIS — I65.23 BILATERAL CAROTID ARTERY OBSTRUCTION WITHOUT CEREBRAL INFARCTION: ICD-10-CM

## 2021-01-19 DIAGNOSIS — S01.01XS LACERATION OF SCALP, SEQUELA: ICD-10-CM

## 2021-01-19 DIAGNOSIS — E11.29 TYPE 2 DIABETES MELLITUS WITH MICROALBUMINURIA, WITHOUT LONG-TERM CURRENT USE OF INSULIN (H): Primary | ICD-10-CM

## 2021-01-19 PROCEDURE — 99214 OFFICE O/P EST MOD 30 MIN: CPT | Performed by: INTERNAL MEDICINE

## 2021-01-19 RX ORDER — SIMVASTATIN 40 MG
20 TABLET ORAL AT BEDTIME
Qty: 45 TABLET | Refills: 3 | Status: SHIPPED | OUTPATIENT
Start: 2021-01-19 | End: 2021-06-17

## 2021-01-19 NOTE — PATIENT INSTRUCTIONS
"Dr Sotelo (vascular surgeon--left carotid artery narrowing) saw you last August. He wanted to see you in a year, with a carotid artery ultrasound at that time.     Dr Higginbotham (cardiologist) saw you in October. He wanted you to see his nurse practitioner in nine months, which would be in around July, with a heart echocardiogram at that time.     Diabetes, LDL-Cholesterol, and blood pressure all look fine.     Be careful about changing positions from lying to sitting, and from sitting to standing.     See me in around July for diabetes check up. Try to schedule a \"lab only\" appointment before our visit. This could be a video visit.   "

## 2021-01-19 NOTE — PROGRESS NOTES
"  Assessment & Plan   (E11.29,  R80.9) Type 2 diabetes mellitus with microalbuminuria, without long-term current use of insulin (H)  (primary encounter diagnosis)  Comment: Recent A1c at target. Continue current measures.   Plan: metFORMIN (GLUCOPHAGE) 1000 MG tablet, blood         glucose (MIRA CONTOUR NEXT) test strip, blood         glucose monitoring (MIRA MICROLET) lancets,         **A1C FUTURE 6mo, Albumin Random Urine         Quantitative with Creat Ratio, TSH with free T4        reflex          (E78.5) Hyperlipidemia with target LDL less than 100  Comment: LDL at target. Continue current meds.   Plan: simvastatin (ZOCOR) 40 MG tablet, Comprehensive        metabolic panel, Lipid panel reflex to direct         LDL Fasting          (Z95.2) S/P TAVR (transcatheter aortic valve replacement)  (I73.9) Peripheral vascular disease (H)  Comment: F/u with cardiology as they advise.     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     (I25.10) Coronary artery disease involving native coronary artery of native heart without angina pectoris  Comment: No reported angina or CHF symptoms.   Plan: Continue current meds.     (W19.XXXS) Fall, sequela  (S01.01XS) Laceration of scalp, sequela  Comment: Laceration well-healed, staples removed.        BMI:   Estimated body mass index is 29.14 kg/m  as calculated from the following:    Height as of 10/27/20: 1.651 m (5' 5\").    Weight as of this encounter: 79.4 kg (175 lb 1.6 oz).         Patient Instructions   Dr Sotelo (vascular surgeon--left carotid artery narrowing) saw you last August. He wanted to see you in a year, with a carotid artery ultrasound at that time.     Dr Higginbotham (cardiologist) saw you in October. He wanted you to see his nurse practitioner in nine months, which would be in around July, with a heart echocardiogram at that time.     Diabetes, LDL-Cholesterol, and blood " "pressure all look fine.     Be careful about changing positions from lying to sitting, and from sitting to standing.     See me in around July for diabetes check up. Try to schedule a \"lab only\" appointment before our visit. This could be a video visit.       Return in about 6 months (around 7/19/2021) for video visit for diabetes checkup. .    Viet Bingham MD,   [facility] JAYLAN Rosenbaum is a 86 year old who presents to clinic today for the following health issues  accompanied by himself:    HPI       Diabetes Follow-up      How often are you checking your blood sugar? Not at all    What concerns do you have today about your diabetes? None     Do you have any of these symptoms? (Select all that apply)  No numbness or tingling in feet.  No redness, sores or blisters on feet.  No complaints of excessive thirst.  No reports of blurry vision.  No significant changes to weight.    Have you had a diabetic eye exam in the last 12 months? Yes, about a week ago. All was good.                Hyperlipidemia Follow-Up      Are you regularly taking any medication or supplement to lower your cholesterol?   Yes- SImvastatin    Are you having muscle aches or other side effects that you think could be caused by your cholesterol lowering medication?  No    Hypertension Follow-up      Do you check your blood pressure regularly outside of the clinic? No     Are you following a low salt diet? No    Are your blood pressures ever more than 140 on the top number (systolic) OR more   than 90 on the bottom number (diastolic), for example 140/90? Yes    BP Readings from Last 2 Encounters:   01/10/21 (!) 159/76   10/27/20 122/70     Hemoglobin A1C (%)   Date Value   01/12/2021 6.9 (H)   07/10/2020 6.4 (H)     LDL Cholesterol Calculated (mg/dL)   Date Value   01/12/2021 66   07/10/2020 23         How many servings of fruits and vegetables do you eat daily?  2-3    On average, how many sweetened beverages do " you drink each day (Examples: soda, juice, sweet tea, etc.  Do NOT count diet or artificially sweetened beverages)?   0-3 servings per day but it depends on the day.    How many days per week do you exercise enough to make your heart beat faster? 7    How many minutes a day do you exercise enough to make your heart beat faster? 10 - 19    How many days per week do you miss taking your medication? 0    ED/UC Followup:    Facility:  [facility]  Date of visit: 1/10/21  Reason for visit: Fall  Current Status: He feels better and needs staples removal from his head     The patient had a recent fall, which sounds more mechanical in nature. He describes tripping over carpeting or some other object in his home, striking his head and requiring 12 staples to be placed for a scalp laceration.     No other recent falls. He feels a bit dizzy at times. His BP appears low-normal without antihypertensive medications.     He has seen Dr Sotelo of vascular surgery in 8/2019, was noted to still have a 67 percent left carotid stenosis which Dr Sotelo feels is likely asymptomatic, and a lesser degree of right carotid stenosis.   He was asked to return in a year with carotid ultrasound.     He saw Dr Dale Higginbotham of cardiology on 10/27, and was asked to return to his [facility] provider in nine months with a repeat cardiac echo.     He has not been checking glucoses as he was out of strips and lancets. These are refilled.     His statin and metformin are refilled.     Past medical, family and social histories as well as medications reviewed and updated as needed.    Review of Systems   No dyspnea or cough. No chest discomfort or palpitations. No diarrhea, abdominal pain or rectal bleeding.   No acute problems with vision or speech, lateralizing weakness or paresthesias.    ROS: as above or negative for Respiratory, CV, GI, endocrine, neuro systems.     Vitals: /53 (BP Location: Right arm, Patient Position: Sitting, Cuff Size:  Adult Large)   Pulse 72   Temp 97.7  F (36.5  C) (Oral)   Wt 79.4 kg (175 lb 1.6 oz)   SpO2 97%   BMI 29.14 kg/m    BMI= Body mass index is 29.14 kg/m .     Physical Exam   GENERAL: healthy, alert and no distress  HENT: scalp laceration over vertex well-healed. 12 staples removed. Otherwise normal cephalic/atraumatic  RESP: lungs clear to auscultation - no rales, rhonchi or wheezes  CV: regular rate and rhythm, normal S1 S2, no S3 or S4, no murmur, click or rub, no peripheral edema and peripheral pulses strong  ABDOMEN: soft, nontender, no hepatosplenomegaly, no masses and bowel sounds normal  MS: no gross musculoskeletal defects noted, no edema  Diabetic foot exam: normal DP and PT pulses, no trophic changes or ulcerative lesions and normal sensory exam

## 2021-01-28 ENCOUNTER — TELEPHONE (OUTPATIENT)
Dept: INTERNAL MEDICINE | Facility: CLINIC | Age: 86
End: 2021-01-28

## 2021-01-28 NOTE — TELEPHONE ENCOUNTER
[facility] A1C and Creat to be faxed in order to renew Metformin.    Fax to [phone]  ATTN: Fernanda Anderson faxed.

## 2021-06-09 ENCOUNTER — APPOINTMENT (OUTPATIENT)
Dept: GENERAL RADIOLOGY | Facility: CLINIC | Age: 86
End: 2021-06-09
Attending: EMERGENCY MEDICINE
Payer: MEDICARE

## 2021-06-09 ENCOUNTER — APPOINTMENT (OUTPATIENT)
Dept: CARDIOLOGY | Facility: CLINIC | Age: 86
DRG: 243 | End: 2021-06-09
Attending: INTERNAL MEDICINE
Payer: MEDICARE

## 2021-06-09 ENCOUNTER — HOSPITAL ENCOUNTER (INPATIENT)
Facility: CLINIC | Age: 86
LOS: 3 days | Discharge: HOME | DRG: 243 | End: 2021-06-12
Attending: HOSPITALIST | Admitting: INTERNAL MEDICINE
Payer: MEDICARE

## 2021-06-09 ENCOUNTER — HOSPITAL ENCOUNTER (EMERGENCY)
Facility: CLINIC | Age: 86
Discharge: ANOTHER HOSPITAL | End: 2021-06-09
Attending: EMERGENCY MEDICINE | Admitting: INTERNAL MEDICINE
Payer: MEDICARE

## 2021-06-09 VITALS
OXYGEN SATURATION: 100 % | DIASTOLIC BLOOD PRESSURE: 60 MMHG | TEMPERATURE: 96.9 F | SYSTOLIC BLOOD PRESSURE: 131 MMHG | HEART RATE: 63 BPM | RESPIRATION RATE: 16 BRPM

## 2021-06-09 DIAGNOSIS — I44.7 LBBB (LEFT BUNDLE BRANCH BLOCK): ICD-10-CM

## 2021-06-09 DIAGNOSIS — I44.2 COMPLETE HEART BLOCK (H): Primary | ICD-10-CM

## 2021-06-09 DIAGNOSIS — E11.59 TYPE 2 DIABETES MELLITUS WITH OTHER CIRCULATORY COMPLICATIONS (H): ICD-10-CM

## 2021-06-09 DIAGNOSIS — T67.1XXA HEAT SYNCOPE, INITIAL ENCOUNTER: Primary | ICD-10-CM

## 2021-06-09 DIAGNOSIS — Z95.0 CARDIAC PACEMAKER IN SITU: ICD-10-CM

## 2021-06-09 DIAGNOSIS — I44.2 COMPLETE HEART BLOCK (H): ICD-10-CM

## 2021-06-09 DIAGNOSIS — I21.3 ST ELEVATION MYOCARDIAL INFARCTION (STEMI), UNSPECIFIED ARTERY (H): ICD-10-CM

## 2021-06-09 DIAGNOSIS — R00.1 SYMPTOMATIC BRADYCARDIA: ICD-10-CM

## 2021-06-09 LAB
ALBUMIN SERPL-MCNC: 3.2 G/DL (ref 3.4–5)
ALP SERPL-CCNC: 109 U/L (ref 40–150)
ALT SERPL W P-5'-P-CCNC: 36 U/L (ref 0–70)
ANION GAP SERPL CALCULATED.3IONS-SCNC: 8 MMOL/L (ref 3–14)
APTT PPP: 25 SEC (ref 22–37)
AST SERPL W P-5'-P-CCNC: 40 U/L (ref 0–45)
BASOPHILS # BLD AUTO: 0.1 10E9/L (ref 0–0.2)
BASOPHILS NFR BLD AUTO: 0.5 %
BILIRUB SERPL-MCNC: 0.4 MG/DL (ref 0.2–1.3)
BUN SERPL-MCNC: 19 MG/DL (ref 7–30)
CALCIUM SERPL-MCNC: 8.5 MG/DL (ref 8.5–10.1)
CHLORIDE SERPL-SCNC: 107 MMOL/L (ref 94–109)
CO2 BLDCOV-SCNC: 25 MMOL/L (ref 21–28)
CO2 SERPL-SCNC: 24 MMOL/L (ref 20–32)
CREAT BLD-MCNC: 1.2 MG/DL (ref 0.66–1.25)
CREAT SERPL-MCNC: 1.16 MG/DL (ref 0.66–1.25)
DIFFERENTIAL METHOD BLD: ABNORMAL
EOSINOPHIL # BLD AUTO: 0.3 10E9/L (ref 0–0.7)
EOSINOPHIL NFR BLD AUTO: 3.3 %
ERYTHROCYTE [DISTWIDTH] IN BLOOD BY AUTOMATED COUNT: 12.3 % (ref 10–15)
GFR SERPL CREATININE-BSD FRML MDRD: 56 ML/MIN/{1.73_M2}
GFR SERPL CREATININE-BSD FRML MDRD: 57 ML/MIN/{1.73_M2}
GLUCOSE BLDC GLUCOMTR-MCNC: 147 MG/DL (ref 70–99)
GLUCOSE BLDC GLUCOMTR-MCNC: 155 MG/DL (ref 70–99)
GLUCOSE BLDC GLUCOMTR-MCNC: 262 MG/DL (ref 70–99)
GLUCOSE SERPL-MCNC: 281 MG/DL (ref 70–99)
HBA1C MFR BLD: 7.1 % (ref 0–5.6)
HCT VFR BLD AUTO: 39.4 % (ref 40–53)
HGB BLD-MCNC: 12.8 G/DL (ref 13.3–17.7)
IMM GRANULOCYTES # BLD: 0 10E9/L (ref 0–0.4)
IMM GRANULOCYTES NFR BLD: 0.4 %
INR PPP: 1.05 (ref 0.86–1.14)
INTERPRETATION ECG - MUSE: NORMAL
INTERPRETATION ECG - MUSE: NORMAL
LABORATORY COMMENT REPORT: NORMAL
LACTATE BLD-SCNC: 3 MMOL/L (ref 0.7–2.1)
LYMPHOCYTES # BLD AUTO: 3.9 10E9/L (ref 0.8–5.3)
LYMPHOCYTES NFR BLD AUTO: 39 %
MCH RBC QN AUTO: 29.7 PG (ref 26.5–33)
MCHC RBC AUTO-ENTMCNC: 32.5 G/DL (ref 31.5–36.5)
MCV RBC AUTO: 91 FL (ref 78–100)
MONOCYTES # BLD AUTO: 1 10E9/L (ref 0–1.3)
MONOCYTES NFR BLD AUTO: 10.3 %
NEUTROPHILS # BLD AUTO: 4.7 10E9/L (ref 1.6–8.3)
NEUTROPHILS NFR BLD AUTO: 46.5 %
NRBC # BLD AUTO: 0 10*3/UL
NRBC BLD AUTO-RTO: 0 /100
PCO2 BLDV: 40 MM HG (ref 40–50)
PH BLDV: 7.4 PH (ref 7.32–7.43)
PLATELET # BLD AUTO: 180 10E9/L (ref 150–450)
PO2 BLDV: 47 MM HG (ref 25–47)
POTASSIUM SERPL-SCNC: 4.2 MMOL/L (ref 3.4–5.3)
POTASSIUM SERPL-SCNC: 4.4 MMOL/L (ref 3.4–5.3)
PROT SERPL-MCNC: 7 G/DL (ref 6.8–8.8)
RBC # BLD AUTO: 4.31 10E12/L (ref 4.4–5.9)
SAO2 % BLDV FROM PO2: 83 %
SARS-COV-2 RNA RESP QL NAA+PROBE: NEGATIVE
SODIUM SERPL-SCNC: 139 MMOL/L (ref 133–144)
SPECIMEN SOURCE: NORMAL
TROPONIN I BLD-MCNC: 0.04 UG/L (ref 0–0.08)
TROPONIN I SERPL-MCNC: 0.02 UG/L (ref 0–0.04)
WBC # BLD AUTO: 10.1 10E9/L (ref 4–11)

## 2021-06-09 PROCEDURE — 250N000011 HC RX IP 250 OP 636

## 2021-06-09 PROCEDURE — 99223 1ST HOSP IP/OBS HIGH 75: CPT | Mod: 25 | Performed by: INTERNAL MEDICINE

## 2021-06-09 PROCEDURE — 82565 ASSAY OF CREATININE: CPT | Mod: 91

## 2021-06-09 PROCEDURE — 999N000208 ECHOCARDIOGRAM COMPLETE

## 2021-06-09 PROCEDURE — 99223 1ST HOSP IP/OBS HIGH 75: CPT | Mod: AI | Performed by: PHYSICIAN ASSISTANT

## 2021-06-09 PROCEDURE — 83036 HEMOGLOBIN GLYCOSYLATED A1C: CPT | Performed by: PHYSICIAN ASSISTANT

## 2021-06-09 PROCEDURE — 84132 ASSAY OF SERUM POTASSIUM: CPT | Performed by: PHYSICIAN ASSISTANT

## 2021-06-09 PROCEDURE — 255N000002 HC RX 255 OP 636: Performed by: HOSPITALIST

## 2021-06-09 PROCEDURE — 87635 SARS-COV-2 COVID-19 AMP PRB: CPT | Performed by: INTERNAL MEDICINE

## 2021-06-09 PROCEDURE — 80053 COMPREHEN METABOLIC PANEL: CPT | Performed by: EMERGENCY MEDICINE

## 2021-06-09 PROCEDURE — 85610 PROTHROMBIN TIME: CPT | Performed by: EMERGENCY MEDICINE

## 2021-06-09 PROCEDURE — 250N000012 HC RX MED GY IP 250 OP 636 PS 637: Performed by: PHYSICIAN ASSISTANT

## 2021-06-09 PROCEDURE — 84484 ASSAY OF TROPONIN QUANT: CPT | Performed by: EMERGENCY MEDICINE

## 2021-06-09 PROCEDURE — C9803 HOPD COVID-19 SPEC COLLECT: HCPCS

## 2021-06-09 PROCEDURE — 96374 THER/PROPH/DIAG INJ IV PUSH: CPT | Mod: 59

## 2021-06-09 PROCEDURE — 93005 ELECTROCARDIOGRAM TRACING: CPT | Mod: 59,76

## 2021-06-09 PROCEDURE — 82803 BLOOD GASES ANY COMBINATION: CPT

## 2021-06-09 PROCEDURE — 36415 COLL VENOUS BLD VENIPUNCTURE: CPT | Performed by: PHYSICIAN ASSISTANT

## 2021-06-09 PROCEDURE — 93306 TTE W/DOPPLER COMPLETE: CPT | Mod: 26 | Performed by: INTERNAL MEDICINE

## 2021-06-09 PROCEDURE — 71045 X-RAY EXAM CHEST 1 VIEW: CPT

## 2021-06-09 PROCEDURE — C1730 CATH, EP, 19 OR FEW ELECT: HCPCS | Performed by: INTERNAL MEDICINE

## 2021-06-09 PROCEDURE — 93454 CORONARY ARTERY ANGIO S&I: CPT | Performed by: INTERNAL MEDICINE

## 2021-06-09 PROCEDURE — 85730 THROMBOPLASTIN TIME PARTIAL: CPT | Mod: GZ | Performed by: EMERGENCY MEDICINE

## 2021-06-09 PROCEDURE — 33210 INSERT ELECTRD/PM CATH SNGL: CPT | Mod: 59 | Performed by: INTERNAL MEDICINE

## 2021-06-09 PROCEDURE — 250N000011 HC RX IP 250 OP 636: Performed by: EMERGENCY MEDICINE

## 2021-06-09 PROCEDURE — C1769 GUIDE WIRE: HCPCS | Performed by: INTERNAL MEDICINE

## 2021-06-09 PROCEDURE — 83605 ASSAY OF LACTIC ACID: CPT

## 2021-06-09 PROCEDURE — C1887 CATHETER, GUIDING: HCPCS | Performed by: INTERNAL MEDICINE

## 2021-06-09 PROCEDURE — 93005 ELECTROCARDIOGRAM TRACING: CPT | Mod: 59

## 2021-06-09 PROCEDURE — 250N000011 HC RX IP 250 OP 636: Performed by: INTERNAL MEDICINE

## 2021-06-09 PROCEDURE — C1894 INTRO/SHEATH, NON-LASER: HCPCS | Performed by: INTERNAL MEDICINE

## 2021-06-09 PROCEDURE — 84484 ASSAY OF TROPONIN QUANT: CPT | Mod: 91

## 2021-06-09 PROCEDURE — 99285 EMERGENCY DEPT VISIT HI MDM: CPT | Mod: 25

## 2021-06-09 PROCEDURE — 85025 COMPLETE CBC W/AUTO DIFF WBC: CPT | Performed by: EMERGENCY MEDICINE

## 2021-06-09 PROCEDURE — 250N000009 HC RX 250: Performed by: INTERNAL MEDICINE

## 2021-06-09 PROCEDURE — 210N000002 HC R&B HEART CARE

## 2021-06-09 PROCEDURE — 999N001017 HC STATISTIC GLUCOSE BY METER IP

## 2021-06-09 PROCEDURE — 250N000013 HC RX MED GY IP 250 OP 250 PS 637: Performed by: PHYSICIAN ASSISTANT

## 2021-06-09 PROCEDURE — 272N000001 HC OR GENERAL SUPPLY STERILE: Performed by: INTERNAL MEDICINE

## 2021-06-09 RX ORDER — FENTANYL CITRATE-0.9 % NACL/PF 10 MCG/ML
PLASTIC BAG, INJECTION (ML) INTRAVENOUS
Status: DISCONTINUED
Start: 2021-06-09 | End: 2021-06-09 | Stop reason: WASHOUT

## 2021-06-09 RX ORDER — HYDROMORPHONE HCL IN WATER/PF 6 MG/30 ML
0.2 PATIENT CONTROLLED ANALGESIA SYRINGE INTRAVENOUS
Status: DISCONTINUED | OUTPATIENT
Start: 2021-06-09 | End: 2021-06-12 | Stop reason: HOSPADM

## 2021-06-09 RX ORDER — NITROGLYCERIN 5 MG/ML
VIAL (ML) INTRAVENOUS
Status: DISCONTINUED
Start: 2021-06-09 | End: 2021-06-09 | Stop reason: HOSPADM

## 2021-06-09 RX ORDER — ATROPINE SULFATE 0.1 MG/ML
INJECTION INTRAVENOUS
Status: DISCONTINUED
Start: 2021-06-09 | End: 2021-06-09 | Stop reason: WASHOUT

## 2021-06-09 RX ORDER — AMOXICILLIN 250 MG
1 CAPSULE ORAL 2 TIMES DAILY PRN
Status: DISCONTINUED | OUTPATIENT
Start: 2021-06-09 | End: 2021-06-12 | Stop reason: HOSPADM

## 2021-06-09 RX ORDER — NALOXONE HYDROCHLORIDE 0.4 MG/ML
0.4 INJECTION, SOLUTION INTRAMUSCULAR; INTRAVENOUS; SUBCUTANEOUS
Status: CANCELLED | OUTPATIENT
Start: 2021-06-09

## 2021-06-09 RX ORDER — HYDROCODONE BITARTRATE AND ACETAMINOPHEN 5; 325 MG/1; MG/1
1-2 TABLET ORAL EVERY 4 HOURS PRN
Status: DISCONTINUED | OUTPATIENT
Start: 2021-06-09 | End: 2021-06-10

## 2021-06-09 RX ORDER — ATROPINE SULFATE 0.1 MG/ML
0.5 INJECTION INTRAVENOUS
Status: CANCELLED | OUTPATIENT
Start: 2021-06-09 | End: 2021-06-09

## 2021-06-09 RX ORDER — FENTANYL CITRATE 50 UG/ML
25 INJECTION, SOLUTION INTRAMUSCULAR; INTRAVENOUS ONCE
Status: COMPLETED | OUTPATIENT
Start: 2021-06-09 | End: 2021-06-09

## 2021-06-09 RX ORDER — FENTANYL CITRATE 50 UG/ML
INJECTION, SOLUTION INTRAMUSCULAR; INTRAVENOUS
Status: COMPLETED
Start: 2021-06-09 | End: 2021-06-09

## 2021-06-09 RX ORDER — LIDOCAINE HYDROCHLORIDE 10 MG/ML
INJECTION, SOLUTION EPIDURAL; INFILTRATION; INTRACAUDAL; PERINEURAL
Status: DISCONTINUED
Start: 2021-06-09 | End: 2021-06-09 | Stop reason: HOSPADM

## 2021-06-09 RX ORDER — ASPIRIN 81 MG/1
81 TABLET ORAL DAILY
Status: DISCONTINUED | OUTPATIENT
Start: 2021-06-09 | End: 2021-06-12 | Stop reason: HOSPADM

## 2021-06-09 RX ORDER — VERAPAMIL HYDROCHLORIDE 2.5 MG/ML
INJECTION, SOLUTION INTRAVENOUS
Status: DISCONTINUED
Start: 2021-06-09 | End: 2021-06-09 | Stop reason: HOSPADM

## 2021-06-09 RX ORDER — ACETAMINOPHEN 325 MG/1
650 TABLET ORAL EVERY 4 HOURS PRN
Status: DISCONTINUED | OUTPATIENT
Start: 2021-06-09 | End: 2021-06-12 | Stop reason: HOSPADM

## 2021-06-09 RX ORDER — FENTANYL CITRATE 50 UG/ML
100 INJECTION, SOLUTION INTRAMUSCULAR; INTRAVENOUS ONCE
Status: DISCONTINUED | OUTPATIENT
Start: 2021-06-09 | End: 2021-06-09 | Stop reason: HOSPADM

## 2021-06-09 RX ORDER — LIDOCAINE 40 MG/G
CREAM TOPICAL
Status: DISCONTINUED | OUTPATIENT
Start: 2021-06-09 | End: 2021-06-10

## 2021-06-09 RX ORDER — NITROGLYCERIN 5 MG/ML
VIAL (ML) INTRAVENOUS
Status: DISCONTINUED | OUTPATIENT
Start: 2021-06-09 | End: 2021-06-09 | Stop reason: HOSPADM

## 2021-06-09 RX ORDER — SODIUM CHLORIDE 9 MG/ML
INJECTION, SOLUTION INTRAVENOUS CONTINUOUS
Status: CANCELLED | OUTPATIENT
Start: 2021-06-09

## 2021-06-09 RX ORDER — ONDANSETRON 2 MG/ML
4 INJECTION INTRAMUSCULAR; INTRAVENOUS EVERY 6 HOURS PRN
Status: DISCONTINUED | OUTPATIENT
Start: 2021-06-09 | End: 2021-06-12 | Stop reason: HOSPADM

## 2021-06-09 RX ORDER — FENTANYL CITRATE 50 UG/ML
25-50 INJECTION, SOLUTION INTRAMUSCULAR; INTRAVENOUS
Status: CANCELLED | OUTPATIENT
Start: 2021-06-09 | End: 2021-06-09

## 2021-06-09 RX ORDER — IOPAMIDOL 755 MG/ML
INJECTION, SOLUTION INTRAVASCULAR
Status: DISCONTINUED | OUTPATIENT
Start: 2021-06-09 | End: 2021-06-09 | Stop reason: HOSPADM

## 2021-06-09 RX ORDER — NALOXONE HYDROCHLORIDE 0.4 MG/ML
0.2 INJECTION, SOLUTION INTRAMUSCULAR; INTRAVENOUS; SUBCUTANEOUS
Status: DISCONTINUED | OUTPATIENT
Start: 2021-06-09 | End: 2021-06-12 | Stop reason: HOSPADM

## 2021-06-09 RX ORDER — DEXTROSE MONOHYDRATE 25 G/50ML
25-50 INJECTION, SOLUTION INTRAVENOUS
Status: DISCONTINUED | OUTPATIENT
Start: 2021-06-09 | End: 2021-06-12 | Stop reason: HOSPADM

## 2021-06-09 RX ORDER — NALOXONE HYDROCHLORIDE 0.4 MG/ML
0.2 INJECTION, SOLUTION INTRAMUSCULAR; INTRAVENOUS; SUBCUTANEOUS
Status: CANCELLED | OUTPATIENT
Start: 2021-06-09

## 2021-06-09 RX ORDER — HEPARIN SODIUM 1000 [USP'U]/ML
INJECTION, SOLUTION INTRAVENOUS; SUBCUTANEOUS
Status: DISCONTINUED
Start: 2021-06-09 | End: 2021-06-09 | Stop reason: HOSPADM

## 2021-06-09 RX ORDER — ACETAMINOPHEN 325 MG/1
650 TABLET ORAL EVERY 4 HOURS PRN
Status: CANCELLED | OUTPATIENT
Start: 2021-06-09

## 2021-06-09 RX ORDER — SODIUM CHLORIDE 9 MG/ML
INJECTION, SOLUTION INTRAVENOUS CONTINUOUS
Status: DISCONTINUED | OUTPATIENT
Start: 2021-06-09 | End: 2021-06-12

## 2021-06-09 RX ORDER — NALOXONE HYDROCHLORIDE 0.4 MG/ML
0.4 INJECTION, SOLUTION INTRAMUSCULAR; INTRAVENOUS; SUBCUTANEOUS
Status: DISCONTINUED | OUTPATIENT
Start: 2021-06-09 | End: 2021-06-12 | Stop reason: HOSPADM

## 2021-06-09 RX ORDER — BISACODYL 10 MG
10 SUPPOSITORY, RECTAL RECTAL DAILY PRN
Status: DISCONTINUED | OUTPATIENT
Start: 2021-06-09 | End: 2021-06-12 | Stop reason: HOSPADM

## 2021-06-09 RX ORDER — ONDANSETRON 4 MG/1
4 TABLET, ORALLY DISINTEGRATING ORAL EVERY 6 HOURS PRN
Status: DISCONTINUED | OUTPATIENT
Start: 2021-06-09 | End: 2021-06-12 | Stop reason: HOSPADM

## 2021-06-09 RX ORDER — SIMVASTATIN 20 MG
20 TABLET ORAL AT BEDTIME
Status: DISCONTINUED | OUTPATIENT
Start: 2021-06-09 | End: 2021-06-12 | Stop reason: HOSPADM

## 2021-06-09 RX ORDER — FENTANYL CITRATE 50 UG/ML
INJECTION, SOLUTION INTRAMUSCULAR; INTRAVENOUS
Status: DISCONTINUED
Start: 2021-06-09 | End: 2021-06-09

## 2021-06-09 RX ORDER — FLUMAZENIL 0.1 MG/ML
0.2 INJECTION, SOLUTION INTRAVENOUS
Status: CANCELLED | OUTPATIENT
Start: 2021-06-09 | End: 2021-06-09

## 2021-06-09 RX ORDER — NICOTINE POLACRILEX 4 MG
15-30 LOZENGE BUCCAL
Status: DISCONTINUED | OUTPATIENT
Start: 2021-06-09 | End: 2021-06-12 | Stop reason: HOSPADM

## 2021-06-09 RX ORDER — LIDOCAINE 40 MG/G
CREAM TOPICAL
Status: CANCELLED | OUTPATIENT
Start: 2021-06-09

## 2021-06-09 RX ORDER — AMOXICILLIN 250 MG
2 CAPSULE ORAL 2 TIMES DAILY PRN
Status: DISCONTINUED | OUTPATIENT
Start: 2021-06-09 | End: 2021-06-12 | Stop reason: HOSPADM

## 2021-06-09 RX ADMIN — SIMVASTATIN 20 MG: 20 TABLET, FILM COATED ORAL at 22:21

## 2021-06-09 RX ADMIN — ACETAMINOPHEN 650 MG: 325 TABLET, FILM COATED ORAL at 17:06

## 2021-06-09 RX ADMIN — INSULIN ASPART 1 UNITS: 100 INJECTION, SOLUTION INTRAVENOUS; SUBCUTANEOUS at 17:12

## 2021-06-09 RX ADMIN — FENTANYL CITRATE 25 MCG: 50 INJECTION, SOLUTION INTRAMUSCULAR; INTRAVENOUS at 11:28

## 2021-06-09 RX ADMIN — FENTANYL CITRATE 25 MCG: 50 INJECTION, SOLUTION INTRAMUSCULAR; INTRAVENOUS at 11:40

## 2021-06-09 RX ADMIN — HUMAN ALBUMIN MICROSPHERES AND PERFLUTREN 9 ML: 10; .22 INJECTION, SOLUTION INTRAVENOUS at 16:22

## 2021-06-09 RX ADMIN — ACETAMINOPHEN 650 MG: 325 TABLET, FILM COATED ORAL at 22:26

## 2021-06-09 ASSESSMENT — ENCOUNTER SYMPTOMS
ABDOMINAL PAIN: 0
SHORTNESS OF BREATH: 0

## 2021-06-09 NOTE — PROGRESS NOTES
Called by Dr. Shook with respect to patient with symptomatic bradycardia.  Patient did have loss of consciousnessr 10 to 15 minutes.  Concerned on rhythm strip alone had patient is ST elevation infarction.  However they have not been able to get a twelve-lead electrocardiogram because the patient is being paced with his old.  I told him to turn off his old for the time if necessary to get the EKG which is a short period of time because it is critical that we know whether the patient's had an infarct or not and that will determine whether we proceed to cardiac catheterization or not.

## 2021-06-09 NOTE — PROGRESS NOTES
Cardiology Interventional service.  I have spoken with the patient and his wife about transfer for PPM and spoke to the Hosptialist service.  has been notified about the patient and will evaluate once he arrives. Likely PPM tomorrow. The pacemaker is working well currently he is not pacing bu  Sensing appropriately  NSR LBBB.    Wselyoles

## 2021-06-09 NOTE — PRE-PROCEDURE
GENERAL PRE-PROCEDURE:   Procedure:  Temporary pacemaker coronary angiogram possible PCI  Date/Time:  6/9/2021 12:44 PM    Verbal consent obtained?: Yes    Written consent obtained?: No    Emergent situation    Risks and benefits: Risks, benefits and alternatives were discussed    Consent given by:  Patient  Patient states understanding of procedure being performed: Yes    Patient's understanding of procedure matches consent: Yes    Procedure consent matches procedure scheduled: Yes    Expected level of sedation:  Moderate  Appropriately NPO:  Yes  ASA Class:  Class 3- Severe systemic disease, definite functional limitations  Lungs:  Lungs clear with good breath sounds bilaterally  Heart:  Normal heart sounds and rate  History & Physical reviewed:  History and physical reviewed and no updates needed  I have examined the patient, reviewed the history, medications and pre procedural tests. I have explained to the patient the risks of death, MI, stroke, hematoma, possible urgent bypass surgery for failed PCI, use of stents, thienopyridine agents, possible peripheral vascular complications associated with temporary pacemaker,  coronary angiography, and possible percutaneous coronary intervention. The patient voiced understanding and wishes to proceed. The patient has a good right radial pulse, normal ulnar pulse and a normal Farhat's sign.

## 2021-06-09 NOTE — ED NOTES
Bed: ED02  Expected date: 6/9/21  Expected time: 10:58 AM  Means of arrival: Ambulance  Comments:  BV 87 bradycardiac

## 2021-06-09 NOTE — PHARMACY-ADMISSION MEDICATION HISTORY
Pharmacy Medication History  Admission medication history interview status for the 6/9/2021  admission is complete. See EPIC admission navigator for prior to admission medications     Location of Interview: Patient room  Medication history sources: Patient's family/friend (wife)    Significant changes made to the medication list:       In the past week, patient estimated taking medication this percent of the time: greater than 90%    Additional medication history information:        Medication reconciliation completed by provider prior to medication history? Yes    Time spent in this activity: 15min    Prior to Admission medications    Medication Sig Last Dose Taking? Auth Provider   ascorbic acid 500 MG TABS Take 500 mg by mouth every evening  6/8/2021 at Unknown time Yes Reported, Patient   aspirin 81 MG EC tablet Take 1 tablet (81 mg) by mouth daily 6/8/2021 at Unknown time Yes Lizz Love APRN CNP   BIOTIN PO Take 5,000 mg by mouth 2 times daily  6/8/2021 at Unknown time Yes Reported, Patient   glucosamine-chondroitin 500-400 MG CAPS per capsule Take 1 capsule by mouth 2 times daily 6/8/2021 at Unknown time Yes Unknown, Entered By History   metFORMIN (GLUCOPHAGE) 1000 MG tablet Take 0.5 tablets (500 mg) by mouth 2 times daily (with meals) 6/8/2021 at Unknown time Yes Viet Bingham MD   Multiple Vitamins-Iron (MULTIVITAMIN/IRON PO) Take 1 tablet by mouth 2 times daily  6/8/2021 at Unknown time Yes Reported, Patient   simvastatin (ZOCOR) 40 MG tablet Take 0.5 tablets (20 mg) by mouth At Bedtime 6/8/2021 at Unknown time Yes Viet Bingham MD   blood glucose (MIRA CONTOUR NEXT) test strip Use to test blood sugar one time daily or as directed.   Viet Bingham MD   blood glucose monitoring (MIRA MICROLET) lancets Use to test blood sugar 1 times daily or as directed.   Viet Bingham MD       The information provided in this note is only as accurate as the sources available at the time of update(s)

## 2021-06-09 NOTE — ED PROVIDER NOTES
History   Chief Complaint:  Bradycardia     Reid Jimenes is a 87 year old male with history of aortic valve stenosis s/p TAVR, CAD, Hypertension, Hyperlipidemia and DM type II who presents to the ER as red team activation for bradycardia.  EMS called by wife who noted episodes of decreased consciousness about 10-15 min prior to their arrival.  Initial vitals reveal HR in 20s-30s, for which atropine given.  No significant improvement.  Initial rhythm demonstrated junctional rhythm, with anterolateral TWI, although the appeared to degenerate into high degree AC block with non-perfusing sinus beats and no associated QRS complexes.  In conjunction with this, patient lost consciousness.  Transcutaneous pacing initiated by EMS with HR of 80 and at about 80 mA.  On arrival, he is awake, mentating appropriately and with adequate BP.  BS en route unremarkable.  Patient reports having been outside in the warm temperature early today, though otherwise denies any chest pain, chest pressure nor shortness of breath earlier today or in days prior.       Review of Systems   Respiratory: Negative for shortness of breath.    Cardiovascular: Positive for chest pain.   Gastrointestinal: Negative for abdominal pain.   Neurological: Positive for syncope.   All other systems reviewed and are negative.    Allergies:  The patient has no known allergies.       Medications:  Ascorbic acid  Aspirin  Biotin  Calcium  Glucosamine-Chondroitin  Lancets  Lisinopril  Metformin  Omeprazole  Simvastatin    Past Medical History:    Aortic valve stenosis  CORONARY ARTERY DISEASE  Hyperlipidemia  Hypertension  Left bundle branch block  Type 2 diabetes  Syncope   SBE prophylaxis      Past Surgical History:    Mandible surgery  Cardiac cath  Aortic valve implant     Family History:    Heart disease  Alcohol   Drug  Stomach cancer    Social History:  Patient presents via EMS  Wife called EMS    Physical Exam     Patient Vitals for the past 24 hrs:    BP Temp Temp src Pulse Resp SpO2   06/09/21 1335 131/60 -- -- 63 16 100 %   06/09/21 1316 129/59 96.9  F (36.1  C) Temporal 64 16 100 %   06/09/21 1242 -- -- -- -- 14 --   06/09/21 1230 -- -- -- -- 16 --   06/09/21 1221 -- -- -- -- 14 --   06/09/21 1210 -- -- -- -- 14 --   06/09/21 1130 97/59 -- -- -- 28 99 %   06/09/21 1125 (!) 114/24 -- -- 80 28 99 %   06/09/21 1120 90/50 -- -- 80 (!) 36 98 %   06/09/21 1115 120/87 -- -- 80 -- 99 %   06/09/21 1110 124/72 -- -- 80 23 100 %   06/09/21 1106 118/66 -- -- 80 -- 97 %   06/09/21 1105 118/66 -- -- 80 (!) 57 97 %       Physical Exam    General:   Well-nourished   Speaking in full sentences   Pale appearing   No diaphoresis  Eyes:   Conjunctiva without injection or scleral icterus  ENT:   Moist mucous membranes   Nares patent   Pinnae normal  Neck:   Full ROM   No stiffness appreciated  Resp:   Lungs CTAB   No crackles, wheezing or audible rubs   Good air movement  CV:    Pacemaker pads in place   Palpable 2+ radial pulse correlating with paced beat  GI:   BS present   Abdomen soft without distention   Non-tender to light and deep palpation   No guarding or rebound tenderness  Skin:   Warm, dry, well perfused   No rashes or open wounds on exposed skin  MSK:   Moves all extremities   No focal deformities or swelling  Neuro:   Alert   Answers questions appropriately   Moves all extremities equally   Gait stable  Psych:   Normal affect, normal mood        Emergency Department Course     ECG  ECG taken at 1136, ECG read at 1137  Non-conducting sinus beat. Abnormal ECG    Rate 107 bpm. IL interval 136 ms. QRS duration 20 ms. QT/QTc 304/411 ms. P-R-T axes * 0 236.     ECG  ECG taken at 1145, ECG read at 1147  Sinus rhythm with 1st degree AV block. Left bundle branch block. Abnormal ECG   Rate 77 bpm. IL interval 308 ms. QRS duration 164 ms. QT/QTc 450/509 ms. P-R-T axes 79 0 114.     Imaging:    XR Chest port, 1 view:  No significant interval change. Transcatheter aortic valve    replacement again noted. Lungs clear. No pneumothorax.     Cardiac catheterization:  Pending    Laboratory:    CBC: WBC: 10.1, HGB: 12.8 (L), PLT: 180  CMP: Glucose 281 (H), GFR: 56 (L), Albumin: 3.2 (L), o/w WNL (Creatinine: 1.16)  Troponin (Collected 1112): 0.04  INR: 1.05  PTT: 25  Creatinine:1.2, GFR 57 (L)  ISTAT gases lactate pretty POCT: pH: 7.40, PCO2: 40, PO2: 47, Bicarbonate: 25, O2 Sat: 83, Lactic acid: 3.0 (H)    Emergency Department Course:      Reviewed:  I reviewed the patient's nursing notes, vitals, past medical records, Care Everywhere.     Assessments/Consults:  ED Course as of Jun 09 1237   Wed Jun 09, 2021   1110 EMS arrived and care was initiated      1123 I consulted Dr. Rachel from cardiology      1127 I rechecked the patient, wife is present at bedside        1128 I consulted Dr. Alvarez from cardiology regarding plan of care for patient        1140 I consulted Dr. Alvarez      1145 I rechecked the patient, he has converted to sinus rhythm, and pacemaker able to be turned off        Interventions:  1128 Fentanyl 25 IV      Disposition:  The patient was sent to cath lab with Dr. Rachel    Impression & Plan     Medical Decision Making:  Reid Jimenes is an 80-year-old male with a PMH significant for aortic valve stenosis s/p TAVR, CAD, who presents to the ED via EMS as a red team activation for symptomatic bradycardia.  Patient arrives with transcutaneous pacing in place, resulting in a perfusing blood pressure, and adequate mentation.  Rhythm strips reviewed from EMS reveal evidence of high degree AV block, which also correlated with patient's episodes of decreased responsiveness prior to arrival.  On visualized rhythm strips, there did appear to be subtle ST segment elevation in the inferior leads, which did raise the possibility of underlying ischemic process resulting in AV node dysfunction and subsequent AV block however patient did not otherwise report symptoms of chest pain,  pressure, nor shortness of breath prior to the onset of his symptomatology.  During his ER course, the pacemaker was turned down, demonstrating nonconducted P waves, and was subsequently restarted.  Given need for pacemaker implantation, I did activate the cardiac Cath Lab.  I discussed the case with Dr. Rachel of interventional cardiology, who recommended I consult cardiology for evaluation in the ER.  Case subsequently discussed with Dr. Alvarez, who has evaluated the patient in the ED, and also discussed further with Dr. Rachel.  Will plan for urgent pacemaker implantation in the cath lab.  Just prior to transport to the cath lab, patient was noted to revert to a sinus rhythm, resulting in adequate perfusion.  Patient and wife were updated and are in agreement with plan of care.      Critical Care time was 40 minutes for this patient excluding procedures.    Diagnosis:    ICD-10-CM    3. Symptomatic bradycardia  R00.1    4. Complete heart block (H)  I44.2    5. LBBB (left bundle branch block)  I44.7 Case Request Cath Lab     *Cath without PCI  /temporary pacemaker       Scribe Disclosure:  I, Ed Higginbotham, am serving as a scribe at 11:09 AM on 6/9/2021 to document services personally performed by Alejo Garcia MD based on my observations and the provider's statements to me.        Alejo Garcia MD  06/09/21 3821

## 2021-06-09 NOTE — PROGRESS NOTES
Consult received. Dr. Rascon aware of the patient. Keep NPO at MN.  Echo today. Please call on call cardiology overnight if there are any overnight issues.

## 2021-06-09 NOTE — ED TRIAGE NOTES
"Pt's wife found patient \"going in and out of consciousness\" for approx 30 minutes. Called 911.   Prior to arrival patient received atropine 0.5mg with no response, versed 0.5mg given, patient paced at rate 80.    "

## 2021-06-09 NOTE — PLAN OF CARE
Admit from [facility] [place] for syncope, documented asystole in ER, went to cath lab at [facility] received a TPM in R femoral vein and had an angiogram through R radial site with no intervention needed. BP stable, HR in the 50's, mostly SR with BBB and 1* AVB. TPM rate set at 45, MA at 5. Occasional V pacing. Bedrest with TPM in place. Some back pain-tylenol given and repositioned. Blood sugar 155, sliding scale insulin in use. NPO at midnight for probable PPM placement.

## 2021-06-09 NOTE — PROGRESS NOTES
Cardiology consult dictated.  87-year-old patient presenting with multiple episodes of syncope.  Found to have high-grade conduction block with complete heart block.  Pulse rates in the 20s and even sometimes with no pulse at all.  Patient was started on ZOLL pacing.  Was some suggestion of ST elevation on rhythm strips when leadstwo,  leads III and aVF were selected.  EKG from the rate however showed a left bundle branch block and did not show ST elevation.  We did turn off the Zoloft to try and get an EKG but he had only P waves and lost consciousness.  Eventually he did start daniel again and this showed the left bundle branch block pattern without ST elevation.  Patient did go emergently to the Cath Lab for a temporary pacemaker placement to be transferred for definitive permanent pacemaker plantation.

## 2021-06-09 NOTE — CONSULTS
Consult Date: 06/09/2021    REFERRING PHYSICIAN:  Emergency room physician, Dr. Alejo Garcia.     Dear Doctors:    It was my pleasure to see your patient, Reid Jimenes.  In 2018, he underwent a TAVR procedure.  At that stage, he had mild coronary artery disease on his coronary angiogram.  By history, the patient was having episodes of loss of consciousness.  He presented with high-grade conduction system disease.  Heart rates were in the 20s, and he was losing consciousness.  Rhythm strips of high-grade conduction system disease.  The patient had to be placed on a Zoll to maintain rhythm.  The 12-lead electrocardiogram from the EMTs did not show evidence of an infarct, but then a rhythm strip in II, III, and aVF showed some ST elevation in the inferior leads, which raised the question of the possibility that this could be an inferior myocardial infarct.  We did turn off the Zoll and he lost consciousness immediately to try and get an EKG, which only showed non-conducted P waves.  Eventually, however, he did conduct again and this showed a left bundle branch block pattern.  The patient feels weak and unwell.  He is having some chest discomfort now.  It is unclear whether this was due to the Zoll or not, but he was having no chest discomfort earlier this morning.  Initial troponin is negative.    PAST MEDICAL HISTORY:    1.  A 26 mm Greene Sabino 3 TAVR valve placed in 08/28/2018 for severe symptomatic aortic stenosis.  2.  Mild nonobstructive coronary artery disease and coronary angiography on 07/24/2018.  3.  Hyperlipidemia.  4.  Left bundle branch block.  5.  Hypertension.  6.  Pulmonary hypertension.  6.  Type 2 diabetes mellitus.    PAST SURGICAL HISTORY:    1.  Mandible surgery.  2.  TAVR.    FAMILY HISTORY:  Nil.    SOCIAL HISTORY:  .  His wife is here with him.  He has 3 children.  Smoked with 30 pack-year history, stopped smoking in 1980.  Does not drink alcohol.    REVIEW OF SYSTEMS:     CONSTITUTIONAL:  Very tired and fatigued.    EYES:  Negative.   ENT:  Negative.  CARDIOVASCULAR:  As above.  RESPIRATORY:  As above.  GASTROINTESTINAL:  Nil.  GENITOURINARY:  Nil.   MUSCULOSKELETAL/NEUROLOGICAL:  Nil.  PSYCHIATRIC:  Nil.  ENDOCRINE:  Nil.   HEMATOLOGIC/LYMPHATIC:  Nil.   ALLERGY/IMMUNOLOGY:  Nil.    PHYSICAL EXAMINATION:    GENERAL:  He is a pale, sick-appearing patient with Zoll on board.   VITAL SIGNS:  Blood pressure is 114/59.  Pulse rate off the Zoll initially was no pulse rate.  Now, he is perfusing and his EKG shows a heart rate in the 90 beats per minute.  O2 sats are 99%.  HEAD, EYES, EARS, NECK, NOSE, AND THROAT:  Unremarkable.  Jugular venous pulse does not appear to be raised.  Carotids are normal with no bruits.   CHEST:  Examination of the chest reveals symmetrical expansion of the chest.  Chest is clear to percussion and auscultation.  HEART:  He has a 1/6 systolic ejection murmur heard in aortic area.  ABDOMEN:  Unremarkable with no hepatosplenomegaly, no masses or bruits.  EXTREMITIES:  Pedal pulses are trace.  No peripheral edema noted.  Extremities do feel cool.  NEUROLOGIC:  He moves all 4 limbs appropriately with no obvious sensory or motor loss, and he is fully oriented to time, place and person with a worried affect.     LABORATORY INVESTIGATIONS:  Sodium 139, potassium 4.2, BUN 19, creatinine 1.16, GFR 56.  Troponin 0.024.    IMPRESSION:  Symptomatic high-grade conduction system disease in the background of a left bundle branch block and previous TAVR (transcatheter aortic valve replacement).  The 12-lead electrocardiogram shows left bundle branch block pattern, so it is unclear whether this patient had an infarct or not.  However, it does appear that his EKG is not much different than it was in 01/2021.  I have contacted Dr. Rachel.  He, at the least, will need to have a temporary pacemaker placed to stabilize him.  I think it would be worthwhile doing a coronary  angiogram, because there is some question as to whether this patient had some EKG changes suggestive of infarction, but overall, I think the likelihood is that this patient is not having an infarct.  I discussed this with Dr. Rachel, and he may quickly look at the patient's coronary arteries at the same time, because there is some doubt about this.  The patient will be then transferred to [facility] for definitive pacemaker implantation.    Rishi Camacho MD, [facility]        D: 2021   T: 2021   MT: ANGELINE    Name:     SARAH BOYD  MRN:      [id number]        Account:      [id number]   :      1934           Consult Date: 2021     Document: [id number]

## 2021-06-09 NOTE — H&P
[facility]    History and Physical - Hospitalist Service       Date of Admission:  6/9/2021  PRIMARY CARE PROVIDER:    Viet Bingham    Assessment & Plan   Reid iJmenes is a 87 year old male admitted on 6/9/2021.    Past medical history significant for Mild nonobstructive CAD, HTN, HLP, Known LBBB, Pulmonary HTN, DM2, History of severe aortic stenosis s/p TAVR who was directly admitted to [facility] due to Symptomatic high-grade conduction system disease.      Patient presented to [facility] ED due to bradycardia.  Patient's wife had called EMS as patient was having decreased consciousness.  PEr EMT report patient was found to have HR in the 20-30's and he received atropine without improvement.  While monitored on telemetry rhythm went from junctional with anterolateral TWI and then degenerated to high degree AC block without perfusing sinus beats at which time he lost consciousness.  Transcutaneous pacing was initiated by EMS with HR of 80 and patient was awake.  There was concern that an EKG that was completed did have changes indicative of infarction.      Patient was evaluated by Cardiology in the ED.  He underwent an urgent temporary pacer implantation at which time an coronary angiogram was completed and appears unchanged from previous study.  He was then transferred to [facility] to undergo further cardiac assessment with permanent pacemaker implantation.      Symptomatic high-grade conduction system disease  S/p temporary pacer implantation  Known LBBB  - Cardiology consult requested.    - Telemetry.      Mild nonobstructive CAD  HTN  HLP  Pulmonary HTN  - Resumed on PTA Zocor 20 mg at bedtime and ASA 81 mg/d.    - Not currently on any antihypertensive agents.      DM2  Previous A1c of 6.9% from 1/2021.    - Hold PTA metformin 500 mg BID.    - Check A1c.    - Medium intensity sliding scale.    - Glucose checks QID and at 2AM.    - Hypoglycemic protocol.      History of  severe aortic stenosis s/p TAVR  No interventions.       Diet: NPO per Anesthesia Guidelines for Procedure/Surgery Except for: Meds  Low Saturated Fat Na <2400 mg  DVT Prophylaxis: Pneumatic Compression Devices  Johnson Catheter: not present  Code Status: Full Code       Disposition Plan   Expected discharge: 2 - 3 days, recommended to likely home  once cardiac assessment and interventions completed and safe dispo plan in place.  Entered: Eulalio Cole PA-C 06/09/2021, 3:46 PM     The patient's care was discussed with the Bedside Nurse and Patient.    The patient has been discussed with Dr. Hernandez, who agrees with the assessment and plan at this time. Dr. Hernandez will evaluate the patient independently.     Eulalio Cole PA-C  [facility]    ______________________________________________________________________    Chief Complaint   Direct admit due to Symptomatic high-grade conduction system disease    History is obtained from the patient and EMR.      History of Present Illness   Reid Jimenes is a 87 year old male with a past medical history significant for Mild nonobstructive CAD, HTN, HLP, Known LBBB, Pulmonary HTN, DM2, History of severe aortic stenosis s/p TAVR who was directly admitted to [facility] due to Symptomatic high-grade conduction system disease.      Patient presented to [facility] ED due to bradycardia.  Patient's wife had called EMS as patient was having decreased consciousness.  PEr EMT report patient was found to have HR in the 20-30's and he received atropine without improvement.  While monitored on telemetry rhythm went from junctional with anterolateral TWI and then degenerated to high degree AC block without perfusing sinus beats at which time he lost consciousness.  Transcutaneous pacing was initiated by EMS with HR of 80 and patient was awake.  There was concern that an EKG that was completed did have changes indicative of infarction.       Patient was evaluated by Cardiology in the ED.  He underwent an urgent temporary pacer implantation at which time an coronary angiogram was completed and appears unchanged from previous study.  He was then transferred to [facility] to undergo further cardiac assessment with permanent pacemaker implantation.      Patient was asleep in bed upon arrival.  He awakened easily.  Initially, we reviewed patient's past medical and surgical history as well as some of his home medications.  We reviewed events that led to patient's presentation to the emergency department and subsequent transfer to [facility].    Upon questioning, patient indicates that he has had some increasing fatigue and weakness over the last week or so.  He describes having worsening vision that is more blurry.  Patient began talking about left shoulder pain and an injury that occurred over 40 years ago with continued shoulder pain depending on how he is using the arm or laying on it.  He describes having some worsening shortness of breath that seems to be more of a gradual onset.  Patient has at least one episode of diarrhea once a day but stated that it is due to his morning coffee.  Patient has increased urination frequency and states he goes at least 10 times a day.  When asked if he has had any falls or injuries he indicated that about 6 months ago he fell over and struck his head otherwise has not had any recently.  Patient feels he bruises and bleeds quite easily.    When asked if he had any symptoms prior to passing out he said no.  He described sitting out in the sun and coming inside where he began feeling dizzy and had to sit down and then began acting weird.    Review of Systems    The 10 point Review of Systems is negative other than noted in the HPI.      Past Medical History    I have reviewed this patient's medical history and updated it with pertinent information if needed.   Past Medical History:   Diagnosis Date     Aortic valve  "stenosis, nonrheumatic     TAVR 18: 26mm Edward Sabino 3 valve     Coronary artery disease     cardiac cath 18: mild non-obstructive disease     Hyperlipidaemia LDL goal < 100      Hypertension      LBBB (left bundle branch block)      Need for SBE (subacute bacterial endocarditis) prophylaxis      Pulmonary hypertension (H)      Type 2 diabetes mellitus (H)    Mild nonobstructive CAD, HTN, HLP, Known LBBB, Pulmonary HTN, DM2, History of severe aortic stenosis s/p TAVR    Past Surgical History   I have reviewed this patient's surgical history and updated it with pertinent information if needed.  Past Surgical History:   Procedure Laterality Date     MANDIBLE SURGERY       OTHER SURGICAL HISTORY      cardiac cath 18: mild non-obstructive disease     TRANSCATHETER AORTIC VALVE IMPLANT ANESTHESIA N/A 2018    Procedure: TRANSCATHETER AORTIC VALVE IMPLANT ANESTHESIA;  ANESTHESIA NEEDED FOR TAVR PROCEDURE;  Surgeon: GENERIC ANESTHESIA PROVIDER;  Location:  OR,  26mm Edward Sabino 3 valve   Mandible surgery x2, TAVR.    Social History   I have reviewed this patient's social history and updated it with pertinent information if needed.  Patient resides in an apartment in [place], [place] with his wife.  He is a former smoker who quit in .  He has abstained form alcohol for over 30 years.  He does not use illicit drugs.  He does not use illicit drugs.    Social History     Tobacco Use     Smoking status: Former Smoker     Packs/day: 1.00     Years: 30.00     Pack years: 30.00     Types: Cigarettes     Start date:      Quit date: 1980     Years since quittin.8     Smokeless tobacco: Former User     Types: Chew     Quit date: 1983   Substance Use Topics     Alcohol use: No     Comment: quit 30+ years ago. states, \"I'm an alcoholic\".     Drug use: No        Family History   I have reviewed this patient's family history and updated it with pertinent information if needed.   Family " History   Problem Relation Age of Onset     Heart Disease Father      Alcohol/Drug Father      Myocardial Infarction Grandchild 18        Grandson     Myocardial Infarction Other 18        Nephew      Cancer Mother 58        Stomach cancer     Alcohol/Drug Mother      Cancer Maternal Grandmother         stomach cancer     Alcohol/Drug Maternal Grandmother      Myocardial Infarction Sister 70        Name: Reena      Alcohol/Drug Sister      Cerebrovascular Disease Brother 65        Name: Miguelito     Alcohol/Drug Brother      Alcohol/Drug Maternal Grandfather      Alcohol/Drug Paternal Grandmother      Alcohol/Drug Paternal Grandfather        Prior to Admission Medications   Prior to Admission Medications   Prescriptions Last Dose Informant Patient Reported? Taking?   BIOTIN PO  Self Yes No   Sig: Take 5,000 mg by mouth 2 times daily    Multiple Vitamins-Iron (MULTIVITAMIN/IRON PO)  Self Yes No   Sig: Take 1 tablet by mouth 2 times daily    ascorbic acid 500 MG TABS  Self Yes No   Sig: Take 500 mg by mouth every evening    aspirin 81 MG EC tablet   No No   Sig: Take 1 tablet (81 mg) by mouth daily   blood glucose (MIRA CONTOUR NEXT) test strip   No No   Sig: Use to test blood sugar one time daily or as directed.   blood glucose monitoring (MIRA MICROLET) lancets   No No   Sig: Use to test blood sugar 1 times daily or as directed.   glucosamine-chondroitin 500-400 MG CAPS per capsule   Yes No   Sig: Take 1 capsule by mouth 2 times daily   metFORMIN (GLUCOPHAGE) 1000 MG tablet   No No   Sig: Take 0.5 tablets (500 mg) by mouth 2 times daily (with meals)   simvastatin (ZOCOR) 40 MG tablet   No No   Sig: Take 0.5 tablets (20 mg) by mouth At Bedtime      Facility-Administered Medications: None     Allergies   No Known Allergies    Physical Exam       BP: (!) 150/68 Pulse: 66   Resp: 15 SpO2: 95 % O2 Device: None (Room air)      Constitutional: Awake, alert, cooperative, no apparent distress.    ENT: Normocephalic, without  obvious abnormality, atraumatic, oral pharynx with dry mucus membranes, tonsils without erythema or exudates.  Eyes pupils are equal, round and reactive to light; extra occular movements intact.  Normal sclera.    Neck: Supple, symmetrical, trachea midline, no adenopathy.  Pulmonary: No increased work of breathing, good air exchange, clear to auscultation bilaterally, no crackles or wheezing.  Cardiovascular: Regular rate and rhythm though bradycardic, normal S1 and S2, no S3 or S4, and 1/6 systolic murmur noted.  GI: Normal bowel sounds, soft, non-distended, non-tender.  Obese.  Skin/Integumen: Clear.  Neuro: CN II-XII grossly intact.  Upper extremities strength, coordination and sensation intact bilaterally.  Right wrist under precaution for recent angiogram and patient also had femoral assess for temporary pacer and limited lower extremity assessment.    Psych:  Alert and oriented x 3. Normal affect.  Extremities: No lower extremity edema noted, and calves are non-tender to palpation bilaterally. Dorsal pedal pulses palpable.      Data   Data reviewed today: I reviewed all medications, new labs and imaging results over the last 24 hours. I personally reviewed the EKG tracing showing 1st degree AV block with LBBB.    Recent Labs   Lab 06/09/21  1112 06/09/21  1110   WBC  --  10.1   HGB  --  12.8*   MCV  --  91   PLT  --  180   INR  --  1.05   NA  --  139   POTASSIUM  --  4.2   CHLORIDE  --  107   CO2  --  24   BUN  --  19   CR  --  1.16   ANIONGAP  --  8   DEEPTI  --  8.5   GLC  --  281*   ALBUMIN  --  3.2*   PROTTOTAL  --  7.0   BILITOTAL  --  0.4   ALKPHOS  --  109   ALT  --  36   AST  --  40   TROPI  --  0.024   TROPONIN 0.04  --      Recent Results (from the past 24 hour(s))   XR Chest Port 1 View    Narrative    CHEST ONE VIEW PORTABLE   6/9/2021 11:29 AM     HISTORY:  Bradycardia.    COMPARISON: 8/29/2018.      Impression    IMPRESSION: No significant interval change. Transcatheter aortic valve  replacement  again noted. Lungs clear. No pneumothorax.    MEME HERNANDEZ MD   Cardiac Catheterization    Narrative    1) Asystole in setting of high grade AV block underlying LBBB and previous   TAVR presentation with syncope. Sp placement of temporary pacemaker.    2) Nonobstructive CAD  Left dominant. No change since pre TAVR study    LMCA normal  LAD mild generalized atheromatous changes with no focal stenosis  CX large, dominant.  Mild atheromatous change with no significant focal   narrowing  RCA nondominant.  Mild atheromatous change with no significant narrowing.

## 2021-06-09 NOTE — PROGRESS NOTES
.pthandoff Pt had temporary pacer in rt femerol vein. Rate 45 ma 5 Pt in first degree av blockk bbb. Rate 68  59 pt alert wife is here. Pt will be  Transfer to [facility] for permanent pace maker.

## 2021-06-09 NOTE — PROCEDURES
[facility]    Procedure: *Cath without PCI  /temporary pacemaker    Date/Time: 6/9/2021 12:49 PM  Performed by: Jayesh Rachel MD  Authorized by: Jayesh Rachel MD     UNIVERSAL PROTOCOL   Site Marked: Yes  Prior Images Obtained and Reviewed:  Yes  Required items: Required blood products, implants, devices and special equipment available    Patient identity confirmed:  Verbally with patient  Patient was reevaluated immediately before administering moderate or deep sedation or anesthesia  Confirmation Checklist:  Patient's identity using two indicators  Time out: Immediately prior to the procedure a time out was called    Universal Protocol: the [facility] Universal Protocol was followed    Preparation: Patient was prepped and draped in usual sterile fashion           ANESTHESIA    Local Anesthetic: Lidocaine 1% without epinephrine      SEDATION    Patient Sedated: No    PROCEDURE Describe Procedure: Procedure  1) temporary transvenous pacemaker   2) CAG  Approach RFV  RTR  Complications none  Indication asystole high grade AV block with syncope  History of LBBB and TAVR / Transient ECG changes    Findings  Pacemaker settings  : full demand  Rate 45  5 MA  Stable pacing thresholds at 0.4 MA    Left dominant Generalized atheromatous changes with no focal stenosis WYATT 3 flow all vessels    Assess: He will need a permanent pacemaker, thus transfer to [facility]                 No need for coronary revascularization    Virginie      Length of time physician/provider present for 1:1 monitoring during sedation: 0

## 2021-06-09 NOTE — Clinical Note
Temporary pacemaker Rate= 45bpmPaced mA= 5Pacer wire was captured appropriately, Pacer is set and Pacing catheter was left in place

## 2021-06-10 DIAGNOSIS — I44.2 COMPLETE HEART BLOCK (H): Primary | ICD-10-CM

## 2021-06-10 LAB
ALBUMIN SERPL-MCNC: 2.9 G/DL (ref 3.4–5)
ALP SERPL-CCNC: 86 U/L (ref 40–150)
ALT SERPL W P-5'-P-CCNC: 31 U/L (ref 0–70)
ANION GAP SERPL CALCULATED.3IONS-SCNC: 1 MMOL/L (ref 3–14)
AST SERPL W P-5'-P-CCNC: 20 U/L (ref 0–45)
BASOPHILS # BLD AUTO: 0 10E9/L (ref 0–0.2)
BASOPHILS NFR BLD AUTO: 0.3 %
BILIRUB SERPL-MCNC: 0.3 MG/DL (ref 0.2–1.3)
BUN SERPL-MCNC: 17 MG/DL (ref 7–30)
CALCIUM SERPL-MCNC: 8.6 MG/DL (ref 8.5–10.1)
CHLORIDE SERPL-SCNC: 109 MMOL/L (ref 94–109)
CO2 SERPL-SCNC: 29 MMOL/L (ref 20–32)
CREAT SERPL-MCNC: 1.07 MG/DL (ref 0.66–1.25)
DIFFERENTIAL METHOD BLD: ABNORMAL
EOSINOPHIL # BLD AUTO: 0.3 10E9/L (ref 0–0.7)
EOSINOPHIL NFR BLD AUTO: 3.1 %
ERYTHROCYTE [DISTWIDTH] IN BLOOD BY AUTOMATED COUNT: 12.4 % (ref 10–15)
ERYTHROCYTE [DISTWIDTH] IN BLOOD BY AUTOMATED COUNT: 12.5 % (ref 10–15)
GFR SERPL CREATININE-BSD FRML MDRD: 62 ML/MIN/{1.73_M2}
GLUCOSE BLDC GLUCOMTR-MCNC: 127 MG/DL (ref 70–99)
GLUCOSE BLDC GLUCOMTR-MCNC: 162 MG/DL (ref 70–99)
GLUCOSE BLDC GLUCOMTR-MCNC: 177 MG/DL (ref 70–99)
GLUCOSE SERPL-MCNC: 123 MG/DL (ref 70–99)
HCT VFR BLD AUTO: 35.5 % (ref 40–53)
HCT VFR BLD AUTO: 36.2 % (ref 40–53)
HGB BLD-MCNC: 11.5 G/DL (ref 13.3–17.7)
HGB BLD-MCNC: 11.7 G/DL (ref 13.3–17.7)
IMM GRANULOCYTES # BLD: 0 10E9/L (ref 0–0.4)
IMM GRANULOCYTES NFR BLD: 0.1 %
INR PPP: 1.1 (ref 0.86–1.14)
LYMPHOCYTES # BLD AUTO: 1.7 10E9/L (ref 0.8–5.3)
LYMPHOCYTES NFR BLD AUTO: 19.6 %
MCH RBC QN AUTO: 29.2 PG (ref 26.5–33)
MCH RBC QN AUTO: 29.2 PG (ref 26.5–33)
MCHC RBC AUTO-ENTMCNC: 32.3 G/DL (ref 31.5–36.5)
MCHC RBC AUTO-ENTMCNC: 32.4 G/DL (ref 31.5–36.5)
MCV RBC AUTO: 90 FL (ref 78–100)
MCV RBC AUTO: 90 FL (ref 78–100)
MONOCYTES # BLD AUTO: 0.9 10E9/L (ref 0–1.3)
MONOCYTES NFR BLD AUTO: 10.3 %
NEUTROPHILS # BLD AUTO: 5.9 10E9/L (ref 1.6–8.3)
NEUTROPHILS NFR BLD AUTO: 66.6 %
NRBC # BLD AUTO: 0 10*3/UL
NRBC BLD AUTO-RTO: 0 /100
PLATELET # BLD AUTO: 159 10E9/L (ref 150–450)
PLATELET # BLD AUTO: 166 10E9/L (ref 150–450)
POTASSIUM SERPL-SCNC: 3.8 MMOL/L (ref 3.4–5.3)
PROT SERPL-MCNC: 6.1 G/DL (ref 6.8–8.8)
RBC # BLD AUTO: 3.94 10E12/L (ref 4.4–5.9)
RBC # BLD AUTO: 4.01 10E12/L (ref 4.4–5.9)
SODIUM SERPL-SCNC: 139 MMOL/L (ref 133–144)
WBC # BLD AUTO: 8.7 10E9/L (ref 4–11)
WBC # BLD AUTO: 8.8 10E9/L (ref 4–11)

## 2021-06-10 PROCEDURE — C1894 INTRO/SHEATH, NON-LASER: HCPCS | Performed by: INTERNAL MEDICINE

## 2021-06-10 PROCEDURE — 85025 COMPLETE CBC W/AUTO DIFF WBC: CPT | Performed by: PHYSICIAN ASSISTANT

## 2021-06-10 PROCEDURE — 36415 COLL VENOUS BLD VENIPUNCTURE: CPT | Performed by: PHYSICIAN ASSISTANT

## 2021-06-10 PROCEDURE — 80053 COMPREHEN METABOLIC PANEL: CPT | Performed by: PHYSICIAN ASSISTANT

## 2021-06-10 PROCEDURE — C1785 PMKR, DUAL, RATE-RESP: HCPCS | Performed by: INTERNAL MEDICINE

## 2021-06-10 PROCEDURE — 99233 SBSQ HOSP IP/OBS HIGH 50: CPT | Performed by: NURSE PRACTITIONER

## 2021-06-10 PROCEDURE — 250N000011 HC RX IP 250 OP 636: Performed by: NURSE PRACTITIONER

## 2021-06-10 PROCEDURE — 250N000013 HC RX MED GY IP 250 OP 250 PS 637: Performed by: HOSPITALIST

## 2021-06-10 PROCEDURE — 85027 COMPLETE CBC AUTOMATED: CPT | Performed by: PHYSICIAN ASSISTANT

## 2021-06-10 PROCEDURE — 99152 MOD SED SAME PHYS/QHP 5/>YRS: CPT | Performed by: INTERNAL MEDICINE

## 2021-06-10 PROCEDURE — 210N000001 HC R&B IMCU HEART CARE

## 2021-06-10 PROCEDURE — 999N001017 HC STATISTIC GLUCOSE BY METER IP

## 2021-06-10 PROCEDURE — 02H63JZ INSERTION OF PACEMAKER LEAD INTO RIGHT ATRIUM, PERCUTANEOUS APPROACH: ICD-10-PCS | Performed by: INTERNAL MEDICINE

## 2021-06-10 PROCEDURE — 250N000009 HC RX 250: Performed by: PHYSICIAN ASSISTANT

## 2021-06-10 PROCEDURE — 272N000001 HC OR GENERAL SUPPLY STERILE: Performed by: INTERNAL MEDICINE

## 2021-06-10 PROCEDURE — 250N000011 HC RX IP 250 OP 636: Performed by: INTERNAL MEDICINE

## 2021-06-10 PROCEDURE — 99232 SBSQ HOSP IP/OBS MODERATE 35: CPT | Performed by: HOSPITALIST

## 2021-06-10 PROCEDURE — 210N000002 HC R&B HEART CARE

## 2021-06-10 PROCEDURE — 02HK3JZ INSERTION OF PACEMAKER LEAD INTO RIGHT VENTRICLE, PERCUTANEOUS APPROACH: ICD-10-PCS | Performed by: INTERNAL MEDICINE

## 2021-06-10 PROCEDURE — 250N000013 HC RX MED GY IP 250 OP 250 PS 637: Performed by: PHYSICIAN ASSISTANT

## 2021-06-10 PROCEDURE — 33208 INSRT HEART PM ATRIAL & VENT: CPT | Mod: KX | Performed by: INTERNAL MEDICINE

## 2021-06-10 PROCEDURE — 99153 MOD SED SAME PHYS/QHP EA: CPT | Performed by: INTERNAL MEDICINE

## 2021-06-10 PROCEDURE — 258N000002 HC RX IP 258 OP 250: Performed by: PHYSICIAN ASSISTANT

## 2021-06-10 PROCEDURE — C1898 LEAD, PMKR, OTHER THAN TRANS: HCPCS | Performed by: INTERNAL MEDICINE

## 2021-06-10 PROCEDURE — 93005 ELECTROCARDIOGRAM TRACING: CPT

## 2021-06-10 PROCEDURE — 250N000013 HC RX MED GY IP 250 OP 250 PS 637: Performed by: NURSE PRACTITIONER

## 2021-06-10 PROCEDURE — 85610 PROTHROMBIN TIME: CPT | Performed by: PHYSICIAN ASSISTANT

## 2021-06-10 PROCEDURE — 99222 1ST HOSP IP/OBS MODERATE 55: CPT | Mod: 25 | Performed by: INTERNAL MEDICINE

## 2021-06-10 PROCEDURE — 99207 PR CDG-CODE CATEGORY CHANGED: CPT | Performed by: NURSE PRACTITIONER

## 2021-06-10 PROCEDURE — 0JH606Z INSERTION OF PACEMAKER, DUAL CHAMBER INTO CHEST SUBCUTANEOUS TISSUE AND FASCIA, OPEN APPROACH: ICD-10-PCS | Performed by: INTERNAL MEDICINE

## 2021-06-10 PROCEDURE — 250N000011 HC RX IP 250 OP 636: Performed by: PHYSICIAN ASSISTANT

## 2021-06-10 DEVICE — LEAD INGEVITY+ AF IS1 7840 4CM: Type: IMPLANTABLE DEVICE | Status: FUNCTIONAL

## 2021-06-10 DEVICE — LEAD INGEVITY+ AF IS1 7841 52CM: Type: IMPLANTABLE DEVICE | Status: FUNCTIONAL

## 2021-06-10 DEVICE — PACEMAKER ACCOLADE DR MRI: Type: IMPLANTABLE DEVICE | Status: FUNCTIONAL

## 2021-06-10 RX ORDER — HALOPERIDOL 5 MG/ML
2 INJECTION INTRAMUSCULAR EVERY 6 HOURS PRN
Status: DISCONTINUED | OUTPATIENT
Start: 2021-06-10 | End: 2021-06-12 | Stop reason: HOSPADM

## 2021-06-10 RX ORDER — QUETIAPINE FUMARATE 25 MG/1
25 TABLET, FILM COATED ORAL 2 TIMES DAILY PRN
Status: DISCONTINUED | OUTPATIENT
Start: 2021-06-10 | End: 2021-06-11

## 2021-06-10 RX ORDER — FENTANYL CITRATE 50 UG/ML
INJECTION, SOLUTION INTRAMUSCULAR; INTRAVENOUS
Status: DISCONTINUED | OUTPATIENT
Start: 2021-06-10 | End: 2021-06-10 | Stop reason: HOSPADM

## 2021-06-10 RX ORDER — HEPARIN SODIUM 200 [USP'U]/100ML
100-500 INJECTION, SOLUTION INTRAVENOUS CONTINUOUS PRN
Status: DISCONTINUED | OUTPATIENT
Start: 2021-06-10 | End: 2021-06-10 | Stop reason: HOSPADM

## 2021-06-10 RX ORDER — OLANZAPINE 10 MG/2ML
5 INJECTION, POWDER, FOR SOLUTION INTRAMUSCULAR DAILY PRN
Status: DISCONTINUED | OUTPATIENT
Start: 2021-06-10 | End: 2021-06-12 | Stop reason: HOSPADM

## 2021-06-10 RX ORDER — CEFAZOLIN SODIUM 2 G/100ML
2 INJECTION, SOLUTION INTRAVENOUS
Status: COMPLETED | OUTPATIENT
Start: 2021-06-10 | End: 2021-06-10

## 2021-06-10 RX ORDER — OLANZAPINE 5 MG/1
5 TABLET, ORALLY DISINTEGRATING ORAL 2 TIMES DAILY PRN
Status: DISCONTINUED | OUTPATIENT
Start: 2021-06-10 | End: 2021-06-12 | Stop reason: HOSPADM

## 2021-06-10 RX ORDER — DOBUTAMINE HYDROCHLORIDE 200 MG/100ML
5-40 INJECTION INTRAVENOUS CONTINUOUS PRN
Status: DISCONTINUED | OUTPATIENT
Start: 2021-06-10 | End: 2021-06-10 | Stop reason: HOSPADM

## 2021-06-10 RX ORDER — OXYCODONE AND ACETAMINOPHEN 5; 325 MG/1; MG/1
1 TABLET ORAL EVERY 4 HOURS PRN
Status: DISCONTINUED | OUTPATIENT
Start: 2021-06-10 | End: 2021-06-12 | Stop reason: HOSPADM

## 2021-06-10 RX ORDER — BUPIVACAINE HYDROCHLORIDE 2.5 MG/ML
INJECTION, SOLUTION EPIDURAL; INFILTRATION; INTRACAUDAL
Status: DISCONTINUED | OUTPATIENT
Start: 2021-06-10 | End: 2021-06-10 | Stop reason: HOSPADM

## 2021-06-10 RX ORDER — NITROGLYCERIN 0.4 MG/1
0.4 TABLET SUBLINGUAL EVERY 5 MIN PRN
Status: DISCONTINUED | OUTPATIENT
Start: 2021-06-10 | End: 2021-06-11

## 2021-06-10 RX ORDER — SODIUM CHLORIDE 450 MG/100ML
INJECTION, SOLUTION INTRAVENOUS CONTINUOUS
Status: DISCONTINUED | OUTPATIENT
Start: 2021-06-10 | End: 2021-06-10 | Stop reason: HOSPADM

## 2021-06-10 RX ORDER — CEFAZOLIN SODIUM 1 G/3ML
1 INJECTION, POWDER, FOR SOLUTION INTRAMUSCULAR; INTRAVENOUS
Status: DISCONTINUED | OUTPATIENT
Start: 2021-06-10 | End: 2021-06-10 | Stop reason: HOSPADM

## 2021-06-10 RX ORDER — HALOPERIDOL 5 MG/ML
3 INJECTION INTRAMUSCULAR ONCE
Status: COMPLETED | OUTPATIENT
Start: 2021-06-10 | End: 2021-06-10

## 2021-06-10 RX ORDER — LIDOCAINE 40 MG/G
CREAM TOPICAL
Status: DISCONTINUED | OUTPATIENT
Start: 2021-06-10 | End: 2021-06-10

## 2021-06-10 RX ORDER — HEPARIN SODIUM 200 [USP'U]/100ML
100-600 INJECTION, SOLUTION INTRAVENOUS CONTINUOUS PRN
Status: DISCONTINUED | OUTPATIENT
Start: 2021-06-10 | End: 2021-06-10 | Stop reason: HOSPADM

## 2021-06-10 RX ORDER — OLANZAPINE 5 MG/1
5 TABLET, ORALLY DISINTEGRATING ORAL ONCE
Status: COMPLETED | OUTPATIENT
Start: 2021-06-10 | End: 2021-06-10

## 2021-06-10 RX ORDER — CEFAZOLIN SODIUM 1 G/3ML
1 INJECTION, POWDER, FOR SOLUTION INTRAMUSCULAR; INTRAVENOUS EVERY 8 HOURS
Status: COMPLETED | OUTPATIENT
Start: 2021-06-10 | End: 2021-06-11

## 2021-06-10 RX ORDER — LIDOCAINE 40 MG/G
CREAM TOPICAL
Status: DISCONTINUED | OUTPATIENT
Start: 2021-06-10 | End: 2021-06-10 | Stop reason: HOSPADM

## 2021-06-10 RX ADMIN — HALOPERIDOL LACTATE 3 MG: 5 INJECTION, SOLUTION INTRAMUSCULAR at 23:11

## 2021-06-10 RX ADMIN — CEFAZOLIN 1 G: 1 INJECTION, POWDER, FOR SOLUTION INTRAMUSCULAR; INTRAVENOUS at 20:47

## 2021-06-10 RX ADMIN — ASPIRIN 81 MG: 81 TABLET, COATED ORAL at 10:02

## 2021-06-10 RX ADMIN — SODIUM CHLORIDE: 4.5 INJECTION, SOLUTION INTRAVENOUS at 09:59

## 2021-06-10 RX ADMIN — HALOPERIDOL LACTATE 2 MG: 5 INJECTION, SOLUTION INTRAMUSCULAR at 22:23

## 2021-06-10 RX ADMIN — QUETIAPINE FUMARATE 25 MG: 25 TABLET ORAL at 18:12

## 2021-06-10 RX ADMIN — OLANZAPINE 5 MG: 5 TABLET, ORALLY DISINTEGRATING ORAL at 19:30

## 2021-06-10 RX ADMIN — OLANZAPINE 5 MG: 5 TABLET, ORALLY DISINTEGRATING ORAL at 16:15

## 2021-06-10 RX ADMIN — CEFAZOLIN SODIUM 2 G: 2 INJECTION, SOLUTION INTRAVENOUS at 11:03

## 2021-06-10 NOTE — PROGRESS NOTES
"Notified that wife Alyssa was in the room and had some questions re: PPM implant for Robi. Transferred into room and was able to speak with Alyssa. Reviewed his CHB and h/o syncope, \"decreased LOC\" and need for PPM. Reviewed procedure and follow-up care/expectations.    Noted that it would not likely help his long-standing memory issues dramatically, but would improve intermittent brain perfusion and she could possibly notice some difference.    Voiced understanding. Robi is going to down for PPM now. She would like to be there with Device teaching is done tomorrow (will be here ~930).    Marleny Burrell, EP PA  "

## 2021-06-10 NOTE — PROVIDER NOTIFICATION
MD Notification    Notified Person: MD    Notified Person Name: Marleny Burrell PA    Notification Date/Time:1110    Notification Interaction: MD web    Purpose of Notification: Wife is at bedside and if you are available would like to speak with you. Has questions regarding pace maker.     Orders Received:    Comments:

## 2021-06-10 NOTE — PROGRESS NOTES
[facility]    Hospitalist Progress Note      Assessment & Plan   Reid Jimenes is a 87 year old male who was admitted on 6/9/2021. Past medical history significant for Mild nonobstructive CAD, HTN, HLP, Known LBBB, Pulmonary HTN, DM2, History of severe aortic stenosis s/p TAVR who was directly admitted to [facility] due to Symptomatic high-grade conduction system disease.       Patient presented to [facility] ED due to bradycardia.  Patient's wife had called EMS as patient was having decreased consciousness.  PEr EMT report patient was found to have HR in the 20-30's and he received atropine without improvement.  While monitored on telemetry rhythm went from junctional with anterolateral TWI and then degenerated to high degree AC block without perfusing sinus beats at which time he lost consciousness. Transcutaneous pacing was initiated by EMS with HR of 80 and patient was awake. EKG was concerning for possible MI       Patient was evaluated by Cardiology in the ED.  He underwent an urgent temporary pacer implantation at which time an coronary angiogram was completed and appears unchanged from previous study.  He was then transferred to [facility] for permanent pacemaker placement. Post procedure he was noted to be a little delirious    Symptomatic high-grade conduction system disease  S/p temporary pacer implantation 6/9 s/p permanent pacemaker placement 6/10  Known LBBB  Complete heart block requiring pacing and temp pacemaker placement.  - Cardiology appreciated, had permanent pacer placed on 6/10  - Telemetry  - CXR in AM  - interrogation in AM  - shoulder precautions  - if stable, then discharge 6/11 likely    Post procedure delirium  Trying to get up, a little confused about where he is and why. Mostly redirectable  - Continue to reorient  - PRN zyprexa and seroquel available  - sitter while monitoring for shoulder precautions     Mild nonobstructive CAD  HTN  HLP  Pulmonary  HTN  6/9 Echo: hx TAVR, LVEF 60-65%, normal wall motion.  6/9 cardiac cath: nonobstructive CAD left dominant---no change since TAVR work-up July 2018  - Resumed on PTA Zocor 20 mg at bedtime and ASA 81 mg/d.    - Not currently on any antihypertensive agents.    - Sees Dr. Higginbotham as OP (last 10/2020) with plans to see VINOD 7/2021 and repeat echo in 10/2021.  As had echo this admission, will plan moving echo 6/2022 with Dr. Higginbotham appt     DM2  Previous A1c of 6.9% from 1/2021.  A1C on admit 6/9 is 7.1  - Hold PTA metformin 500 mg BID.    - Medium intensity sliding scale.    - Glucose checks QID and at 2AM.    - Hypoglycemic protocol.       History of severe aortic stenosis s/p TAVR  No interventions.      DVT Prophylaxis: Pneumatic Compression Devices  Code Status: Full Code  Expected discharge: Tomorrow, recommended to prior living arrangement once pacemaker looks OK on morning check    Ping Barksdale, DO  Text Page (7am - 6pm)    Interval History   Patient seen and examined. He denies pain. Keeps trying to move his arm. Wife is at bedside. Reviewed plan of care. He is a little agitated this afternoon after wife left. Trying to get up. Mostly redirectable.    -Data reviewed today: I reviewed all new labs and imaging results over the last 24 hours. I personally reviewed EKG atrial paced    Physical Exam   Temp: 98.1  F (36.7  C) Temp src: Oral BP: (!) 157/68 Pulse: 60   Resp: 17 SpO2: 98 % O2 Device: None (Room air)    Vitals:    06/10/21 0500   Weight: 78.9 kg (174 lb)     Vital Signs with Ranges  Temp:  [98.1  F (36.7  C)-98.3  F (36.8  C)] 98.1  F (36.7  C)  Pulse:  [50-60] 60  Resp:  [6-18] 17  BP: (107-162)/() 157/68  SpO2:  [92 %-100 %] 98 %  I/O last 3 completed shifts:  In: 90 [I.V.:90]  Out: 700 [Urine:700]    Constitutional: Awake, alert, cooperative, no apparent distress  Respiratory: Clear to auscultation bilaterally, no crackles or wheezing  Cardiovascular: Regular rate and rhythm, normal S1 and  S2, and +systolic murmur  GI: Normal bowel sounds, soft, non-distended, non-tender  Skin/Integumen: No rashes, no cyanosis, no edema  Other: Left chest wall pacemaker, no hematoma.    Medications     sodium chloride 75 mL/hr at 06/09/21 1548       aspirin  81 mg Oral Daily     ceFAZolin  1 g Intravenous Q8H     insulin aspart  1-7 Units Subcutaneous TID AC     insulin aspart  1-5 Units Subcutaneous At Bedtime     simvastatin  20 mg Oral At Bedtime     sodium chloride (PF)  3 mL Intracatheter Q8H     sodium chloride (PF)  3 mL Intracatheter Q8H       Data   Recent Labs   Lab 06/10/21  1106 06/10/21  0532 06/09/21  1652 06/09/21  1112 06/09/21  1110   WBC 8.7 8.8  --   --  10.1   HGB 11.7* 11.5*  --   --  12.8*   MCV 90 90  --   --  91    166  --   --  180   INR 1.10  --   --   --  1.05   NA  --  139  --   --  139   POTASSIUM  --  3.8 4.4  --  4.2   CHLORIDE  --  109  --   --  107   CO2  --  29  --   --  24   BUN  --  17  --   --  19   CR  --  1.07  --   --  1.16   ANIONGAP  --  1*  --   --  8   DEEPTI  --  8.6  --   --  8.5   GLC  --  123*  --   --  281*   ALBUMIN  --  2.9*  --   --  3.2*   PROTTOTAL  --  6.1*  --   --  7.0   BILITOTAL  --  0.3  --   --  0.4   ALKPHOS  --  86  --   --  109   ALT  --  31  --   --  36   AST  --  20  --   --  40   TROPI  --   --   --   --  0.024   TROPONIN  --   --   --  0.04  --        Recent Results (from the past 24 hour(s))   EP Device    Narrative    PROCEDURES PERFORMED:  1. Conscious sedation.  2. Cardiac fluoroscopy.  3. Dual-chamber permanent pacemaker implantation.  4. Temporary pacing wire removal.    INDICATION: Symptomatic bradycardia secondary to complete AV block.    HPI: 87-year-old gentleman with a history of diabetes, hyperlipidemia and   severe aortic stenosis (previous TAVR), who presented with syncope and was   found to have complete AV block.  He underwent temporary pacing wire. No   reversible causes were identified, and he was referred for permanent    pacemaker implantation.    Risks and benefits of the procedure were reviewed including but not   limited to arrhythmia, pain, infection, bleeding, skin damage from ionized   radiation, kidney damage or allergic reactions to conscious dye, injury to   the neighboring vascular structures, need for emergent cardiopulmonary   resuscitation, heart attack, stroke or death. Alternatives were discussed   including doing nothing. All questions were answered to the patient's   satisfaction. Informed consent was obtained and patient wished to proceed.    FLUOROSCOPY DATA:  Fluoro time: 0.9 minutes.  Radiation dose (AK): 1.51 mGy.  Dose-area product (DAP): 0.166 Gy.cm2.    DESCRIPTION OF PROCEDURE: After written informed consent was obtained,   patient was brought to the EP lab. An intravenous infusion of prophylactic   antibiotic was begun. The chest was sterilely prepped from the level of   iliac crest to level of the chin with antibiotic soap and disinfectant,   draped appropriately. Following infiltration with 1% lidocaine, an   incision was made parallel to and 1 cm beneath the clavicle and extended   across the deltopectoral groove. Using blunt dissection, the excision was   extended down the anterior pectoral fascia. Using blunt and sharp   dissection, a pocket was developed parallel to the fascia and extended   inferiorly.    Two pocket punctures via fluoroscopy guidance and modified Seldinger   technique was used to attain access into the left subclavian vein. A 6   Mongolian sheath was inserted over the wire. The right ventricular lead was   advanced under fluoroscopy and a secure location found. The lead was   fixated.  Stimulation and sensing thresholds were found to be   satisfactory. No diaphragmatic stimulation was noticed with high output   pacing. The lead was sutured to the underlying pectoral muscle with   interrupted 0 silk suture over a plastic collar. A 6 Mongolian sheath was   inserted over the wire. The  right atrial lead was advanced under   fluoroscopy and secure location found. The lead was fixated. Stimulation   and sensing thresholds were found to be satisfactory. No diaphragmatic   stimulation was noted with high output pacing. The lead was sutured to the   underlying pectoral muscle with interrupted 0 silk suture over a plastic   collar.    After irrigation with saline solution, the pocket was inspected, no   bleeding was seen. The generator was connected to the leads and inserted   into the pocket. The wound was closed with two layers of subcutaneous   sutures.    After pacemaker implantation, the temporary pacing wire was easily   retracted under fluoroscopy guidance.    PACEMAKER GENERATOR:  [employer], model L331, serial #[id number].    LEAD INFORMATION:  Right atrial lead: [employer], model 7840, serial #[id number].  Right ventricular lead: [employer], model 7841, serial #[id number].    MEASURED DATA:  Right atrial lead: Sensing 2.2 mV, threshold 1.4 volts at 0.4 msec,   impedance 752 ohms.  Right ventricular lead: Sensing 15.1 mV, threshold 1.0 volt at 0.4 msec,   impedance 829 ohms.    IMPRESSION:  1. Successful dual chamber pacemaker implantation in left infraclavicular   region above the pectoral fascia.  2. Bradycardia pacing set to DDD .  3.  Successful temporary pacing wire removal.    RECOMMENDATIONS:  1. Avoid vascular access to the left jugular and subclavian veins.  2. Keep wound clean, dry and covered for seven days.  3. PA and lateral chest x-ray to rule out dislodgement.  4. Device interrogation prior to discharge.  5. Left arm in sling overnight and then off in the morning.  6. May start oral medications. Avoid IV or subcutaneous heparin for at   least two weeks. If heparin must be used, please contract the procedural   team to discuss.  7. Wound care as follows:  a) Do not get incision wet for three days.  b) Leave the Steri-Strips in place, allow to come off naturally.  If they   do not come off in two weeks, you may remove them.  c) Check incision daily and call if they notice one of the following:   redness, drainage (pus or blood), swelling, warmth or if you develop   fever.    If you have questions regarding wound care, please contact our office.     Magdiel Silver MD

## 2021-06-10 NOTE — PROVIDER NOTIFICATION
MD Notification    Notified Person: MD    Notified Person Name: Dr. Barksdale     Notification Date/Time: 6/10/21 9813    Notification Interaction: MD web paged    Purpose of Notification: patient is still restless despite oral zyprexa given. can we get anything else?  maybe Seroquel? or something for tonight?    Orders Received:    Comments:

## 2021-06-10 NOTE — PROGRESS NOTES
SPIRITUAL HEALTH SERVICES  SPIRITUAL ASSESSMENT Progress Note  [facility]     REFERRAL SOURCE: Pt Request    I attempted to visit Robi a few times today but he was unavailable as he was receiving cares and then was also in a procedure.     PLAN: I will refer to unit  to visit Robi tomorrow.     Khadra Ludwig  Associate    Phone: [phone]  Pager: [phone]

## 2021-06-10 NOTE — PROGRESS NOTES
Reviewed with toni Farmer seen and examined.  87 year-old white male, noticed to have decreased consciousness by his wife at home. Brought to the ER of [facility], found to have complete AV block. Received transvenous temporary pacing. Now has intermittent AV conduction.  History of TAVR for severe aortic stenosis in 2018, followed by new LBBB.  Mild CAD per cath in 2018. Normal EF.  History of pulmonary hypertension, type II diabetes.  Agree for permanent pacemaker implantation today.  Ok to continue current medications.

## 2021-06-10 NOTE — PLAN OF CARE
A/Ox4. VSS. Denies pain and SOB. Lung sounds are clear. Murmur present. Patient had PPM implanted after returning from cath lab, had increased confusion and agitation. Sitter at bedside. And PRN oral zyprexa and seroquel given. Tele A and AV paced. Code 21 1917 called d/t increased confusion and hallucinations and patient trying to leave room and walk into other patients room.

## 2021-06-10 NOTE — CONSULTS
"[facility]  Cardiology onsultation     Date of Admission:  6/9/2021  Date of Consult (When I saw the patient): 06/10/21    Assessment & Plan   Reid Jimenes is a 87 year old male who was admitted on 6/9/2021 from [facility], where he presented with decreased level of consciousness. Initial rates were noted to be in 20-30s and transcutaneous pacer pads placed and atropine given. At [facility], had a cath showing no lesions requiring intervention (d/t EKG changes) and had temp pacer placed via RFV. We were asked to see the patient for pacemaker placement.    1. 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 No Lyme's exposure    - At [facility], Zoll was turned off and had immediate LOC. EKG showed non-conducted P waves, followed by conduction with LBBB. Temp pacer placed and transferred to [facility] for PPM implantation    - Was in ER 1/2021 after a 'fall' for which the notes indicate he had gotten up quickly after sitting at the table.  Negative w/u at that time and notes indicate wife said he did not have LOC. Interestingly, he tells us that he was \"sitting in the car\" ~4 months ago and \"passed out\" causing his wife to pull over and call 911. I think he's referring to the same episode he was seen in the ER for 1/2021 but details are different.    - No other dizziness/lightheadedness.     PLAN:   1. Dual chamber PPM implantation with removal of temp pacer in R FV. We discussed the risks, benefits and indications of pacemaker implantation, including but not limited to use of conscious sedation, discomfort, peripheral vessel injury, pneumothorax, cardiac puncture and/or tamponade, device malfunction and/or recall, and infection requiring explantation of the device and long term antibiotics. We also briefly discussed follow up expectations and restrictions following the procedure (he's R handed). The patient voiced " "understanding and is willing to proceed.  A consent form was signed   2. Likely home tomorrow after CXR    2. H/o AoS, minimal CAD; EF 60% -   - Had 26 mm Greene Sabino TAVR valve placed 8/28/2018   - Cath prior showed minimal CAD  - Echo this admission showed normal EF and valve function  - Cath this admission showed minimal dz    - PTA on ASA 81 and simvastatin 20 mg daily. These have been continued     PLAN:   1. Continue home meds   2. Sees Dr. Higginbotham as OP (last 10/2020) with plans to see VINOD 7/2021 and repeat echo in 10/2021.  As had echo this admission, will plan moving echo 6/2022 with Dr. Higginbotham appt          Farhana Burrell PA-C    Code Status    Full Code    Reason for Consult   Reason for consult: We were asked by Hospitalist to evaluate for advanced heart block    Primary Care Physician   Viet Bingham MD    Chief Complaint   Advanced HB; syncope    History is obtained from the patient and EPIC chart.      History of Present Illness   Reid Jimenes is a 87 year old male who presents with decreased level of consciousness a/w HRs in 20-30s. EMS administered atropine without improvement noted by report. Tele went from junctional with Anterolateral TWI and degenerated to high degree AV block without perfusing sinus beats. Transcunateous pacing started with HR 80 bpm.  Had temp pacer placed along with coronary angiogram showing no culprit lesions. Transferred to [facility] for PPM implantation..    Notes h/o \"blacking out\" ~4 m ago while in the car. Says he was w/u in hospital with no etiology found but only notes we see from ER were 1/2021 with a fall after standing.     Of note, had TAVR placed 8/2018 and noted to have LBBB following. He did have a 6 day monitor placed 10/2019 showing SB with IVCD. No prolonged bradyarrhythmias or syncope.    No c/o CP, edema.     Past Medical History   I have reviewed this patient's medical history and updated it with pertinent information if needed. "   Past Medical History:   Diagnosis Date     Aortic valve stenosis, nonrheumatic     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: 26mm Edward Sabino 3 valve     Coronary artery disease     cardiac cath 7/24/18: mild non-obstructive disease     Hyperlipidaemia LDL goal < 100      Hypertension      LBBB (left bundle branch block)      Need for SBE (subacute bacterial endocarditis) prophylaxis      Pulmonary hypertension (H)      Type 2 diabetes mellitus (H)        Past Surgical History   I have reviewed this patient's surgical history and updated it with pertinent information if needed.  Past Surgical History:   Procedure Laterality Date     MANDIBLE SURGERY  1958     OTHER SURGICAL HISTORY      cardiac cath 7/24/18: mild non-obstructive disease     TRANSCATHETER AORTIC VALVE IMPLANT ANESTHESIA N/A 8/28/2018    Procedure: TRANSCATHETER AORTIC VALVE IMPLANT ANESTHESIA;  ANESTHESIA NEEDED FOR TAVR PROCEDURE;  Surgeon: GENERIC ANESTHESIA PROVIDER;  Location:  OR,  26mm Edward Sabino 3 valve       Prior to Admission Medications   Prior to Admission Medications   Prescriptions Last Dose Informant Patient Reported? Taking?   BIOTIN PO 6/8/2021 at Unknown time Self Yes Yes   Sig: Take 5,000 mg by mouth 2 times daily    Multiple Vitamins-Iron (MULTIVITAMIN/IRON PO) 6/8/2021 at Unknown time Self Yes Yes   Sig: Take 1 tablet by mouth 2 times daily    ascorbic acid 500 MG TABS 6/8/2021 at Unknown time Self Yes Yes   Sig: Take 500 mg by mouth every evening    aspirin 81 MG EC tablet 6/8/2021 at Unknown time  No Yes   Sig: Take 1 tablet (81 mg) by mouth daily   blood glucose (MIRA CONTOUR NEXT) test strip   No No   Sig: Use to test blood sugar one time daily or as directed.   blood glucose monitoring (MIRA MICROLET) lancets   No No   Sig: Use to test blood sugar 1 times daily or as directed.   glucosamine-chondroitin 500-400 MG CAPS per capsule 6/8/2021 at Unknown time  Yes Yes   Sig: Take 1 capsule by mouth 2 times daily   metFORMIN (GLUCOPHAGE) 1000 MG  tablet 6/8/2021 at Unknown time  No Yes   Sig: Take 0.5 tablets (500 mg) by mouth 2 times daily (with meals)   simvastatin (ZOCOR) 40 MG tablet 6/8/2021 at Unknown time  No Yes   Sig: Take 0.5 tablets (20 mg) by mouth At Bedtime      Facility-Administered Medications: None         Medications     sodium chloride 75 mL/hr at 06/09/21 1548       aspirin  81 mg Oral Daily     insulin aspart  1-7 Units Subcutaneous TID AC     insulin aspart  1-5 Units Subcutaneous At Bedtime     simvastatin  20 mg Oral At Bedtime     sodium chloride (PF)  3 mL Intracatheter Q8H       Allergies   No Known Allergies    Social History   I have updated and reviewed the following Social History Narrative:   Social History     Social History Narrative     Not on file      Lives independently with wife in apt building  No smoking since 1980  No alcohol x 30 years      Family History   I have reviewed this patient's family history and updated it with pertinent information if needed.   Family History   Problem Relation Age of Onset     Heart Disease Father      Alcohol/Drug Father      Myocardial Infarction Grandchild 18        Grandson     Myocardial Infarction Other 18        Nephew      Cancer Mother 58        Stomach cancer     Alcohol/Drug Mother      Cancer Maternal Grandmother         stomach cancer     Alcohol/Drug Maternal Grandmother      Myocardial Infarction Sister 70        Name: Reena      Alcohol/Drug Sister      Cerebrovascular Disease Brother 65        Name: Miguelito     Alcohol/Drug Brother      Alcohol/Drug Maternal Grandfather      Alcohol/Drug Paternal Grandmother      Alcohol/Drug Paternal Grandfather        Review of Systems   The 5 point Review of Systems is negative other than noted in the HPI or here.    Physical Exam   Temp: 98.3  F (36.8  C)   BP: (!) 148/59 Pulse: 50   Resp: 8 SpO2: 96 % O2 Device: None (Room air)    Vital Signs with Ranges  Temp:  [96.9  F (36.1  C)-98.3  F (36.8  C)] 98.3  F (36.8  C)  Pulse:   [50-80] 50  Resp:  [8-57] 8  BP: ()/(24-89) 148/59  SpO2:  [92 %-100 %] 96 %  174 lbs 0 oz    Telemetry:  Currently intrinsic SR. Occasional  with temp pacer    Constitutional: awake, alert, cooperative, no apparent distress, and appears stated age  Eyes: Sclera clear; lids/lashes wnl  ENT: Atraumatic, normocephalic  Respiratory: CTA B (anterior only)  Cardiovascular: Regular. 2/6 ARON  GI: BS present  Skin: Scattered bruises  Musculoskeletal: Temp pacer placed at R groin. Bandage c/d. No edema  Neurologic: Awake, alert.    Data   I personally reviewed the EKG tracing showing SR 1 AVB. LBBB HR 77 bpm (2021 @ 1145). Complete Heart Block (2021 @ 1136).  Results for orders placed or performed during the hospital encounter of 21 (from the past 24 hour(s))   Echocardiogram Complete    Narrative    [id number]  [id number]  [id number]^FREDRICK^SHARLA     [facility]  Echocardiography Laboratory  [address] [place] [address]     Name: SARAH BOYD  MRN: [id number]  : 1934  Study Date: 2021 04:01 PM  Age: 87 yrs  Gender: Male  Patient Location: [facility]  Reason For Study: AV block, unspecified  Ordering Physician: SHARLA ALCALA  Referring Physician: Viet Bingham  Performed By: Markus Meraz RDCS     BSA: 1.9 m2  Height: 65 in  Weight: 175 lb  HR: 61  BP: 150/68 mmHg  ______________________________________________________________________________  Procedure  Complete Portable Echo Adult. Optison (NDC #5688-1221) given intravenously.  ______________________________________________________________________________  Interpretation Summary     1. Status post transcatheter aortic valve replacement with a 26-mm Greene  Sabino 3 valve, 2018.  2. The valve is well-seated. No abnormal regurgitation. Mean systolic gradient  12-14 mmHg.  3. Normal left ventricular size and systolic function. LVEF 60-65%.  4. Normal left ventricular wall motion.  5. Normal right  ventricular size and systolic function.     No significant changes compared to previous study dated 9/29/2020.     ______________________________________________________________________________  Left Ventricle  The left ventricle is normal in size. There is mild concentric left  ventricular hypertrophy. Left ventricular systolic function is normal. The  visual ejection fraction is estimated at 60-65%. Grade I or early diastolic  dysfunction. Normal left ventricular wall motion.     Right Ventricle  The right ventricle is normal in size and function.     Atria  Normal left atrial size. Right atrial size is normal. There is no color  Doppler evidence of an atrial shunt.     Mitral Valve  The mitral valve leaflets appear normal. There is no evidence of stenosis,  fluttering, or prolapse. There is trace mitral regurgitation.     Tricuspid Valve  Normal tricuspid valve. There is trace tricuspid regurgitation. Right  ventricular systolic pressure could not be approximated due to inadequate  tricuspid regurgitation.     Aortic Valve  There is a bioprosthetic aortic valve. The prosthetic aortic valve is well-  seated. This degree of valvular regurgitation is within normal limits. The  mean AoV pressure gradient is 14.3 mmHg.     Pulmonic Valve  Normal pulmonic valve. This degree of valvular regurgitation is within normal  limits.     Vessels  The aortic root is normal size. Normal size ascending aorta. The inferior vena  cava was normal in size with preserved respiratory variability.     Pericardium  There is no pericardial effusion.     Rhythm  Sinus rhythm was noted.  ______________________________________________________________________________  MMode/2D Measurements & Calculations  IVSd: 1.3 cm     LVIDd: 3.9 cm  LVIDs: 3.0 cm  LVPWd: 0.95 cm  FS: 22.5 %  LV mass(C)d: 143.3 grams  LV mass(C)dI: 76.7 grams/m2  LA dimension: 3.8 cm  asc Aorta Diam: 3.3 cm  LVOT diam: 2.2 cm  LVOT area: 3.8 cm2  LA Volume (BP): 66.8 ml  LA  Volume Index (BP): 35.7 ml/m2  RWT: 0.49     Doppler Measurements & Calculations  MV E max anson: 97.5 cm/sec  MV A max anson: 131.0 cm/sec  MV E/A: 0.74  MV dec slope: 337.7 cm/sec2  MV dec time: 0.29 sec  Ao V2 max: 260.6 cm/sec  Ao max P.0 mmHg  Ao V2 mean: 175.8 cm/sec  Ao mean P.3 mmHg  Ao V2 VTI: 57.6 cm  HUNG(I,D): 1.7 cm2  HUNG(V,D): 1.8 cm2  LV V1 max P.8 mmHg  LV V1 max: 120.4 cm/sec  LV V1 VTI: 26.2 cm  SV(LVOT): 99.3 ml  SI(LVOT): 53.1 ml/m2  AV Anson Ratio (DI): 0.46  HUNG Index (cm2/m2): 0.92  E/E' av.8  Lateral E/e': 18.7  Medial E/e': 16.9     ______________________________________________________________________________  Report approved by: Dr Nohemi Leslie 2021 05:16 PM         Hemoglobin A1c   Result Value Ref Range    Hemoglobin A1C 7.1 (H) 0 - 5.6 %   Potassium   Result Value Ref Range    Potassium 4.4 3.4 - 5.3 mmol/L   Glucose by meter   Result Value Ref Range    Glucose 155 (H) 70 - 99 mg/dL   Glucose by meter   Result Value Ref Range    Glucose 147 (H) 70 - 99 mg/dL   Glucose by meter   Result Value Ref Range    Glucose 177 (H) 70 - 99 mg/dL   Comprehensive metabolic panel   Result Value Ref Range    Sodium 139 133 - 144 mmol/L    Potassium 3.8 3.4 - 5.3 mmol/L    Chloride 109 94 - 109 mmol/L    Carbon Dioxide 29 20 - 32 mmol/L    Anion Gap 1 (L) 3 - 14 mmol/L    Glucose 123 (H) 70 - 99 mg/dL    Urea Nitrogen 17 7 - 30 mg/dL    Creatinine 1.07 0.66 - 1.25 mg/dL    GFR Estimate 62 >60 mL/min/[1.73_m2]    GFR Estimate If Black 72 >60 mL/min/[1.73_m2]    Calcium 8.6 8.5 - 10.1 mg/dL    Bilirubin Total 0.3 0.2 - 1.3 mg/dL    Albumin 2.9 (L) 3.4 - 5.0 g/dL    Protein Total 6.1 (L) 6.8 - 8.8 g/dL    Alkaline Phosphatase 86 40 - 150 U/L    ALT 31 0 - 70 U/L    AST 20 0 - 45 U/L   CBC with platelets differential   Result Value Ref Range    WBC 8.8 4.0 - 11.0 10e9/L    RBC Count 3.94 (L) 4.4 - 5.9 10e12/L    Hemoglobin 11.5 (L) 13.3 - 17.7 g/dL    Hematocrit 35.5 (L) 40.0 - 53.0 %     MCV 90 78 - 100 fl    MCH 29.2 26.5 - 33.0 pg    MCHC 32.4 31.5 - 36.5 g/dL    RDW 12.4 10.0 - 15.0 %    Platelet Count 166 150 - 450 10e9/L    Diff Method Automated Method     % Neutrophils 66.6 %    % Lymphocytes 19.6 %    % Monocytes 10.3 %    % Eosinophils 3.1 %    % Basophils 0.3 %    % Immature Granulocytes 0.1 %    Nucleated RBCs 0 0 /100    Absolute Neutrophil 5.9 1.6 - 8.3 10e9/L    Absolute Lymphocytes 1.7 0.8 - 5.3 10e9/L    Absolute Monocytes 0.9 0.0 - 1.3 10e9/L    Absolute Eosinophils 0.3 0.0 - 0.7 10e9/L    Absolute Basophils 0.0 0.0 - 0.2 10e9/L    Abs Immature Granulocytes 0.0 0 - 0.4 10e9/L    Absolute Nucleated RBC 0.0

## 2021-06-10 NOTE — PRE-PROCEDURE
GENERAL PRE-PROCEDURE:   Procedure:  PPM implant  Date/Time:  6/10/2021 11:07 AM    Written consent obtained?: Yes    Risks and benefits: Risks, benefits and alternatives were discussed    Consent given by:  Patient  Patient states understanding of procedure being performed: Yes    Patient's understanding of procedure matches consent: Yes    Procedure consent matches procedure scheduled: Yes    Expected level of sedation:  Moderate  Appropriately NPO:  Yes  ASA Class:  Class 4- Severe systemic disease, acute unstable problems  Mallampati  :  Grade 2- soft palate, base of uvula, tonsillar pillars, and portion of posterior pharyngeal wall visible  Lungs:  Lungs clear with good breath sounds bilaterally  Heart:  Normal heart sounds and rate  History & Physical reviewed:  History and physical reviewed and no updates needed  Statement of review:  I have reviewed the lab findings, diagnostic data, medications, and the plan for sedation

## 2021-06-11 ENCOUNTER — DOCUMENTATION ONLY (OUTPATIENT)
Dept: CARDIOLOGY | Facility: CLINIC | Age: 86
End: 2021-06-11

## 2021-06-11 ENCOUNTER — APPOINTMENT (OUTPATIENT)
Dept: GENERAL RADIOLOGY | Facility: CLINIC | Age: 86
DRG: 243 | End: 2021-06-11
Attending: INTERNAL MEDICINE
Payer: MEDICARE

## 2021-06-11 LAB
ANION GAP SERPL CALCULATED.3IONS-SCNC: 7 MMOL/L (ref 3–14)
BUN SERPL-MCNC: 15 MG/DL (ref 7–30)
CALCIUM SERPL-MCNC: 8.8 MG/DL (ref 8.5–10.1)
CHLORIDE SERPL-SCNC: 109 MMOL/L (ref 94–109)
CO2 SERPL-SCNC: 28 MMOL/L (ref 20–32)
CREAT SERPL-MCNC: 1.02 MG/DL (ref 0.66–1.25)
ERYTHROCYTE [DISTWIDTH] IN BLOOD BY AUTOMATED COUNT: 12.3 % (ref 10–15)
GFR SERPL CREATININE-BSD FRML MDRD: 66 ML/MIN/{1.73_M2}
GLUCOSE BLDC GLUCOMTR-MCNC: 136 MG/DL (ref 70–99)
GLUCOSE BLDC GLUCOMTR-MCNC: 146 MG/DL (ref 70–99)
GLUCOSE BLDC GLUCOMTR-MCNC: 209 MG/DL (ref 70–99)
GLUCOSE SERPL-MCNC: 149 MG/DL (ref 70–99)
HCT VFR BLD AUTO: 40.1 % (ref 40–53)
HGB BLD-MCNC: 13.1 G/DL (ref 13.3–17.7)
MCH RBC QN AUTO: 29 PG (ref 26.5–33)
MCHC RBC AUTO-ENTMCNC: 32.7 G/DL (ref 31.5–36.5)
MCV RBC AUTO: 89 FL (ref 78–100)
PLATELET # BLD AUTO: 171 10E9/L (ref 150–450)
POTASSIUM SERPL-SCNC: 3.9 MMOL/L (ref 3.4–5.3)
RBC # BLD AUTO: 4.52 10E12/L (ref 4.4–5.9)
SODIUM SERPL-SCNC: 144 MMOL/L (ref 133–144)
WBC # BLD AUTO: 10.9 10E9/L (ref 4–11)

## 2021-06-11 PROCEDURE — 250N000013 HC RX MED GY IP 250 OP 250 PS 637: Performed by: PHYSICIAN ASSISTANT

## 2021-06-11 PROCEDURE — 250N000011 HC RX IP 250 OP 636: Performed by: INTERNAL MEDICINE

## 2021-06-11 PROCEDURE — 99233 SBSQ HOSP IP/OBS HIGH 50: CPT | Performed by: HOSPITALIST

## 2021-06-11 PROCEDURE — 258N000003 HC RX IP 258 OP 636: Performed by: PHYSICIAN ASSISTANT

## 2021-06-11 PROCEDURE — 85027 COMPLETE CBC AUTOMATED: CPT | Performed by: HOSPITALIST

## 2021-06-11 PROCEDURE — 210N000001 HC R&B IMCU HEART CARE

## 2021-06-11 PROCEDURE — 36415 COLL VENOUS BLD VENIPUNCTURE: CPT | Performed by: HOSPITALIST

## 2021-06-11 PROCEDURE — 99232 SBSQ HOSP IP/OBS MODERATE 35: CPT | Performed by: INTERNAL MEDICINE

## 2021-06-11 PROCEDURE — 999N000065 XR CHEST 2 VW

## 2021-06-11 PROCEDURE — 80048 BASIC METABOLIC PNL TOTAL CA: CPT | Performed by: HOSPITALIST

## 2021-06-11 PROCEDURE — 999N001017 HC STATISTIC GLUCOSE BY METER IP

## 2021-06-11 PROCEDURE — 93005 ELECTROCARDIOGRAM TRACING: CPT

## 2021-06-11 RX ORDER — ACETAMINOPHEN 325 MG/1
650 TABLET ORAL EVERY 4 HOURS PRN
COMMUNITY
Start: 2021-06-11 | End: 2021-07-21

## 2021-06-11 RX ORDER — DIPHENHYDRAMINE HYDROCHLORIDE 25 MG/1
5 TABLET ORAL 2 TIMES DAILY
Start: 2021-06-11 | End: 2022-10-19

## 2021-06-11 RX ORDER — QUETIAPINE FUMARATE 25 MG/1
50 TABLET, FILM COATED ORAL 2 TIMES DAILY PRN
Status: DISCONTINUED | OUTPATIENT
Start: 2021-06-11 | End: 2021-06-12 | Stop reason: HOSPADM

## 2021-06-11 RX ADMIN — SIMVASTATIN 20 MG: 20 TABLET, FILM COATED ORAL at 21:42

## 2021-06-11 RX ADMIN — CEFAZOLIN 1 G: 1 INJECTION, POWDER, FOR SOLUTION INTRAMUSCULAR; INTRAVENOUS at 04:21

## 2021-06-11 RX ADMIN — ASPIRIN 81 MG: 81 TABLET, COATED ORAL at 08:14

## 2021-06-11 RX ADMIN — ACETAMINOPHEN 650 MG: 325 TABLET, FILM COATED ORAL at 08:14

## 2021-06-11 RX ADMIN — SODIUM CHLORIDE: 9 INJECTION, SOLUTION INTRAVENOUS at 12:00

## 2021-06-11 RX ADMIN — INSULIN ASPART 1 UNITS: 100 INJECTION, SOLUTION INTRAVENOUS; SUBCUTANEOUS at 08:14

## 2021-06-11 RX ADMIN — CEFAZOLIN 1 G: 1 INJECTION, POWDER, FOR SOLUTION INTRAMUSCULAR; INTRAVENOUS at 10:37
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
## 2021-06-11 NOTE — PLAN OF CARE
VSS on RA.  Tele: SR 1AVB.  Back pain managed with tylenol.  Disoriented to situation.  PPM, R groin and radial sites CDI.  Ambulated in halls x2.  NS at 75.  Daughter and wife present at bedside.  Call light within reach.  Will continue to monitor.

## 2021-06-11 NOTE — PROGRESS NOTES
SPIRITUAL HEALTH SERVICES Progress Note  [facility] CCU    Attempted two visits to pt and when I arrived at his room, he was sleeping both times.    SHS remains available.      Lisa Barron  Chaplain Resident

## 2021-06-11 NOTE — PROGRESS NOTES
"[facility]    EP Progress Note    Date of Service (when I saw the patient): 06/11/2021     Assessment & Plan   Robi is an 87 year old male who was admitted on 6/9/2021 from [facility], where he presented with decreased level of consciousness and HR in the 20s-30s. Transcutaneous pacer and atropine given. 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 Temp pacer was placed via RFV and transferred to [facility] 6/9. Dual chamber pacemaker placed on 06/10/2021.    1. 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 No Lyme's exposure     - At [facility], Zoll was turned off and had immediate LOC. EKG showed non-conducted P waves, followed by conduction with LBBB. Temp pacer placed and transferred to [facility] for PPM implantation 6/9     - Notes he was in ER 1/2021 after a 'fall' for which the notes indicate he had gotten up quickly after sitting at the table. Negative w/u at that time and notes indicate wife said he did not have LOC. Interestingly, he tells us that he was \"sitting in the car\" ~4 months ago and \"passed out\" causing his wife to pull over and call 911. I think he's referring to the same episode he was seen in the ER for 1/2021 but details are different - wife expresses concern for long-standing memory issues.     - Now s/p [employer] dual chamber pacemaker implantation in left infraclavicular region on 6/10/2021. Programmed DDD 60/130    - CXR pending  - Device interrogation today shows stable lead testing with normal device function. 16% AP and 2%  in DDD 60/130. One episode of SVT at 160 bpm for 2 minutes around 22:30. Episode coincided with episode of post-procedural delirium and agitation.   - Device teaching will be done later today once wife, Alyssa, arrives.      PLAN:   1. " Will discharge home today pending CXR is w/o signs of PTX.   2. Follow-up to be made with device.    2. H/o AoS, minimal CAD; EF 60% -   - Had 26 mm Greene Sabino TAVR valve placed 8/28/2018   - Cath prior showed minimal CAD  - Echo this admission showed normal EF and valve function  - Cath this admission showed minimal dz     - PTA on ASA 81 and simvastatin 20 mg daily. These have been continued                  PLAN:               1. Continue home meds               2. Sees Dr. Higginbotham as OP (last 10/2020) with plans to see VINOD 7/2021 (Lizz Serrano, L[place])  with repeat echo.  I have cancelled echo as had this admission. Lizz can arrange follow-up with Dr. Higginbotham.    3. Hypertension  - Hypertensive during admission.  - Not currently only anti-hypertensive meds.  - Reviewed past office visits 10/2020 and 1/2021 and systolic BPs sit in 100-120s with suspected orthostatic hypotension event occurring in January.     PLAN:   1. Monitor BPs outpatient. No new medications at this time given they're under good control in clinic setting.    ANGELINE Gonzalez-S  Physician Attestation   I, Farhana Burrell, was present with the medical/VINOD student who participated in the service and in the documentation of the note.  I have verified the history and personally performed the physical exam and medical decision making.  I agree with the assessment and plan of care as documented in the note.      I personally reviewed vital signs, medications, labs, imaging and examined patient.    Key findings: minimal ecchymosis to inferior aspect of PPM implant. Pressure dressing removed.     Farhana Burrell PA-C  Date of Service (when I saw the patient): 06/11/21      Interval History   S/p dual chamber pacemaker implantation.     Episode of agitation last night likely secondary to postoperative delirium - received IV haldol and initiated 4-point soft restraints. Episode of agitation coincides with episode of SVT on  device interrogation.     Does not report any soreness at implantation site. Mild swelling and ecchymosis present. Has not experienced any SOB.     Physical Exam   Temp: 99.2  F (37.3  C) Temp src: Axillary BP: (!) 174/71 Pulse: 80   Resp: 17 SpO2: 95 % O2 Device: None (Room air)    Vitals:    06/10/21 0500   Weight: 78.9 kg (174 lb)     Vital Signs with Ranges  Temp:  [98.1  F (36.7  C)-99.2  F (37.3  C)] 99.2  F (37.3  C)  Pulse:  [] 80  Resp:  [6-18] 17  BP: (130-179)/() 174/71  SpO2:  [93 %-100 %] 95 %  I/O last 3 completed shifts:  In: -   Out: 500 [Urine:500]    Telemetry: Currently intrinsic SR with LBBB and 1st degree AV block.    Constitutional: awake but appears tired. Answers questions appropriately  Eyes: sclera clear, lids/lashes wnl  ENT: normocepalic, without obvious abnormality, atramatic  Respiratory: No increased work of breathing, good air exchange, clear to auscultation bilaterally, no crackles or wheezing (anterior only)  Cardiovascular: regular rate and rhythm, normal S1 and S2. 2/6 ARON  Skin: Mild inferior ecchymosis and swelling present in left infraclavicular region    Medications     sodium chloride 75 mL/hr at 06/09/21 1548       aspirin  81 mg Oral Daily     ceFAZolin  1 g Intravenous Q8H     insulin aspart  1-7 Units Subcutaneous TID AC     insulin aspart  1-5 Units Subcutaneous At Bedtime     simvastatin  20 mg Oral At Bedtime     sodium chloride (PF)  3 mL Intracatheter Q8H     sodium chloride (PF)  3 mL Intracatheter Q8H       Data   I personally reviewed the EKG tracing showing sinus rhythm with LBBB and 1st degree AV block   Results for orders placed or performed during the hospital encounter of 06/09/21 (from the past 24 hour(s))   Glucose by meter   Result Value Ref Range    Glucose 127 (H) 70 - 99 mg/dL   CBC with platelets   Result Value Ref Range    WBC 8.7 4.0 - 11.0 10e9/L    RBC Count 4.01 (L) 4.4 - 5.9 10e12/L    Hemoglobin 11.7 (L) 13.3 - 17.7 g/dL     Hematocrit 36.2 (L) 40.0 - 53.0 %    MCV 90 78 - 100 fl    MCH 29.2 26.5 - 33.0 pg    MCHC 32.3 31.5 - 36.5 g/dL    RDW 12.5 10.0 - 15.0 %    Platelet Count 159 150 - 450 10e9/L   INR   Result Value Ref Range    INR 1.10 0.86 - 1.14   EP Device    Narrative    PROCEDURES PERFORMED:  1. Conscious sedation.  2. Cardiac fluoroscopy.  3. Dual-chamber permanent pacemaker implantation.  4. Temporary pacing wire removal.    INDICATION: Symptomatic bradycardia secondary to complete AV block.    HPI: 87-year-old gentleman with a history of diabetes, hyperlipidemia and   severe aortic stenosis (previous TAVR), who presented with syncope and was   found to have complete AV block.  He underwent temporary pacing wire. No   reversible causes were identified, and he was referred for permanent   pacemaker implantation.    Risks and benefits of the procedure were reviewed including but not   limited to arrhythmia, pain, infection, bleeding, skin damage from ionized   radiation, kidney damage or allergic reactions to conscious dye, injury to   the neighboring vascular structures, need for emergent cardiopulmonary   resuscitation, heart attack, stroke or death. Alternatives were discussed   including doing nothing. All questions were answered to the patient's   satisfaction. Informed consent was obtained and patient wished to proceed.    FLUOROSCOPY DATA:  Fluoro time: 0.9 minutes.  Radiation dose (AK): 1.51 mGy.  Dose-area product (DAP): 0.166 Gy.cm2.    DESCRIPTION OF PROCEDURE: After written informed consent was obtained,   patient was brought to the EP lab. An intravenous infusion of prophylactic   antibiotic was begun. The chest was sterilely prepped from the level of   iliac crest to level of the chin with antibiotic soap and disinfectant,   draped appropriately. Following infiltration with 1% lidocaine, an   incision was made parallel to and 1 cm beneath the clavicle and extended   across the deltopectoral groove. Using blunt  dissection, the excision was   extended down the anterior pectoral fascia. Using blunt and sharp   dissection, a pocket was developed parallel to the fascia and extended   inferiorly.    Two pocket punctures via fluoroscopy guidance and modified Seldinger   technique was used to attain access into the left subclavian vein. A 6   Citizen of the [place] sheath was inserted over the wire. The right ventricular lead was   advanced under fluoroscopy and a secure location found. The lead was   fixated.  Stimulation and sensing thresholds were found to be   satisfactory. No diaphragmatic stimulation was noticed with high output   pacing. The lead was sutured to the underlying pectoral muscle with   interrupted 0 silk suture over a plastic collar. A 6 Citizen of the [place] sheath was   inserted over the wire. The right atrial lead was advanced under   fluoroscopy and secure location found. The lead was fixated. Stimulation   and sensing thresholds were found to be satisfactory. No diaphragmatic   stimulation was noted with high output pacing. The lead was sutured to the   underlying pectoral muscle with interrupted 0 silk suture over a plastic   collar.    After irrigation with saline solution, the pocket was inspected, no   bleeding was seen. The generator was connected to the leads and inserted   into the pocket. The wound was closed with two layers of subcutaneous   sutures.    After pacemaker implantation, the temporary pacing wire was easily   retracted under fluoroscopy guidance.    PACEMAKER GENERATOR:  [employer], model L331, serial #[id number].    LEAD INFORMATION:  Right atrial lead: [employer], model 7840, serial #[id number].  Right ventricular lead: [employer], model 7841, serial #[id number].    MEASURED DATA:  Right atrial lead: Sensing 2.2 mV, threshold 1.4 volts at 0.4 msec,   impedance 752 ohms.  Right ventricular lead: Sensing 15.1 mV, threshold 1.0 volt at 0.4 msec,   impedance 829 ohms.    IMPRESSION:  1. Successful dual  chamber pacemaker implantation in left infraclavicular   region above the pectoral fascia.  2. Bradycardia pacing set to DDD .  3.  Successful temporary pacing wire removal.    RECOMMENDATIONS:  1. Avoid vascular access to the left jugular and subclavian veins.  2. Keep wound clean, dry and covered for seven days.  3. PA and lateral chest x-ray to rule out dislodgement.  4. Device interrogation prior to discharge.  5. Left arm in sling overnight and then off in the morning.  6. May start oral medications. Avoid IV or subcutaneous heparin for at   least two weeks. If heparin must be used, please contract the procedural   team to discuss.  7. Wound care as follows:  a) Do not get incision wet for three days.  b) Leave the Steri-Strips in place, allow to come off naturally. If they   do not come off in two weeks, you may remove them.  c) Check incision daily and call if they notice one of the following:   redness, drainage (pus or blood), swelling, warmth or if you develop   fever.    If you have questions regarding wound care, please contact our office.     Magdiel Silver MD   EKG 12-lead, tracing only   Result Value Ref Range    Interpretation ECG Click View Image link to view waveform and result    Glucose by meter   Result Value Ref Range    Glucose 162 (H) 70 - 99 mg/dL   Basic metabolic panel   Result Value Ref Range    Sodium 144 133 - 144 mmol/L    Potassium 3.9 3.4 - 5.3 mmol/L    Chloride 109 94 - 109 mmol/L    Carbon Dioxide 28 20 - 32 mmol/L    Anion Gap 7 3 - 14 mmol/L    Glucose 149 (H) 70 - 99 mg/dL    Urea Nitrogen 15 7 - 30 mg/dL    Creatinine 1.02 0.66 - 1.25 mg/dL    GFR Estimate 66 >60 mL/min/[1.73_m2]    GFR Estimate If Black 76 >60 mL/min/[1.73_m2]    Calcium 8.8 8.5 - 10.1 mg/dL   CBC with platelets   Result Value Ref Range    WBC 10.9 4.0 - 11.0 10e9/L    RBC Count 4.52 4.4 - 5.9 10e12/L    Hemoglobin 13.1 (L) 13.3 - 17.7 g/dL    Hematocrit 40.1 40.0 - 53.0 %    MCV 89 78 - 100 fl    MCH  29.0 26.5 - 33.0 pg    MCHC 32.7 31.5 - 36.5 g/dL    RDW 12.3 10.0 - 15.0 %    Platelet Count 171 150 - 450 10e9/L

## 2021-06-11 NOTE — PLAN OF CARE
..Neuro- Disoriented to situation, place, time  Tele/Cardiac- PPM on 6/10  Resp- RA  Activity- Bed Rest, Restraint   Pain- Denies  Drips- None  Drains/Tubes- PIV Lt forearm, Piv Rt AC  Skin- WDL  GI/- External Althea Wick   Aggression Color- Yellow   COVID status- 6/9 Neg  Plan- discharge 1-2 days depending on cardiac and Neuro status.   Misc- PPM  On 6/10, 4 point soft Restraints

## 2021-06-11 NOTE — PROGRESS NOTES
"Reviewed with toni Farmer seen and examined.  Post pacemaker implantation. No complications.  Aware of disorientation.    87 year-old white male, noticed to have \"decreased consciousness\" by his wife at home. Brought to the ER of [facility], found to have complete AV block. Received transvenous temporary pacing.   History of TAVR for severe aortic stenosis in 2018, followed by new LBBB.  Mild CAD per cath in 2018. Normal EF.  History of pulmonary hypertension, type II diabetes.    Ok to continue current medications.  "

## 2021-06-11 NOTE — DISCHARGE INSTRUCTIONS
Discharge Instructions for Pacemaker Implantation  You have had a procedure to insert a pacemaker. Once inside your body, this small electronic device helps keep your heart from beating too slowly. A pacemaker can t fix existing heart problems. But it can help you feel better and have more energy. As you recover, follow all of the instructions you are given, including those below.  Activity    Follow the instructions you are given about limiting your activity.    Do not raise your arm on the incision side above shoulder level for 3 weeks. This gives the device lead wires time to attach securely inside your heart.    Ask your doctor when you can expect to return to work.    You can still exercise. It s good for your body and your heart. Talk with your doctor about an exercise plan.  Other Precautions    Follow your doctor's directions carefully for wound care. You may remove the outer dressing in 3 - 4 days. Leave the steri-strips in place; these will be removed at your 1 week follow-up. Never put any creams, lotions, or products like peroxide on an incision unless your doctor tells you to.     You may shower once the outer dressing has been removed.     Before you receive any treatment, tell all health care providers (including your dentist) that you have a pacemaker.    You will be given an ID card that contains information about your pacemaker. Always carry this card with you. You can show this card if your pacemaker sets off a metal detector. You should also show it to avoid screening with a hand-held security wand.    Keep your cell phone away from your pacemaker. Don t carry the phone in your shirt pocket, even when it s turned off.    Avoid strong magnets. Examples are those used in MRIs or in hand-held security wands.    Avoid strong electrical fields. Examples are those made by radio transmitting towers,  ham  radios, and heavy-duty electrical equipment.    Avoid leaning over the open martínez of a running car.  A running engine creates an electrical field. Most household and yard appliances will not cause any problems. If you use any large power tools, such as an industrial , talk with your doctor.   Follow-Up    Follow up in the device clinic as scheduled. A device nurse will reach out to you to a schedule a follow up appointment one week following pacemaker placement.     Make regular follow-up appointments with your device clinic. They will check the pacemaker to make sure it s working properly.  When to Call Your Device Clinic [phone]  Call your doctor immediately if you have any of the following:    Dizziness    Chest pain    Lack of energy    Fainting spells    Shortness of breath    Pain around your pacemaker    Fever above 100.4 F (38 C) or other signs of infection (redness, swelling, drainage, or warmth at the incision site)     5675-1444 [employer]. [address] [place] [address]. All rights reserved. This information is not intended as a substitute for professional medical care. Always follow your healthcare professional's instructions.    [facility] in [place]: [phone]  Device Clinic (Monday to Friday, 8am-4pm): [phone]  *The device clinic is closed on weekends and holidays.  Any calls received during this time will be answered on the next business  day. For any urgent questions after hours, please call the main clinic number and you will be put in contact with the cardiologist on call.        Cardiac Angiogram Discharge Instructions - Radial    After you go home:      Have an adult stay with you until tomorrow.    Drink extra fluids for 2 days.    You may resume your normal diet.    No smoking       For 24 hours - due to the sedation you received:    Relax and take it easy.    Do NOT make any important or legal decisions.    Do NOT drive or operate machines at home or at work.    Do NOT drink alcohol.    Care of Wrist Puncture Site:      For  the first 24 hrs - check the puncture site every 1-2 hours while awake.    It is normal to have soreness at the puncture site and mild tingling in your hand for up to 3 days.    Remove the bandaid after 24 hours. If there is minor oozing, apply another bandaid and remove it after 12 hours.    You may shower today.  Do NOT take a bath, or use a hot tub or pool for at least 3 days. Do NOT scrub the site. Do not use lotion or powder near the puncture site.           Activity:        For 2 days:     do not use your hand or arm to support your weight (such as rising from a chair)     do not bend your wrist (such as lifting a garage door).    do not lift more than 5 pounds or exercise your arm (such as tennis, golf or bowling).    Do NOT do any heavy activity such as exercise, lifting, or straining.     Bleeding:      If you start bleeding from the site in your wrist, sit down and press firmly on/above the site for 10 minutes.     Once bleeding stops, keep arm still for 2 hours.     Call [facility] as soon as you can.       Call 911 right away if you have heavy bleeding or bleeding that does not stop.      Medicines:      If you are taking an antiplatelet medication such as Plavix, Brilinta or Effient, do not stop taking it until you talk to your cardiologist.        If you are on Metformin (Glucophage), do not restart it until you have blood tests (within 2 to 3 days after discharge).  After you have your blood drawn, you may restart the Metformin.     Take your medications, including blood thinners, unless your provider tells you not to.      If you take Coumadin (Warfarin), have your INR checked by your provider in  3-5 days. Call your clinic to schedule this.    If you have stopped any medicines, check with your provider about when to restart them.    Follow Up Appointments:      Follow up with [facility] Nurse Practitioner at [facility] of patient preference in 7-10 days.    Call the clinic if:      You have a  large or growing hard lump around the site.    The site is red, swollen, hot or tender.    Blood or fluid is draining from the site.    You have chills or a fever greater than 101 F (38 C).    Your arm feels numb, cool or changes color.    You have hives, a rash or unusual itching.    Any questions or concerns.          [facility] [place]:    [phone] [facility] (7 days a week)

## 2021-06-11 NOTE — PROGRESS NOTES
Device Discharge  Dressing:  Clean, dry, and intact. Pressing dressing removed by Marleny Burrell this morning   Chest XR reviewed:  Yes  Pneumo present?:  No  Lead Measurements per Device Rep:  WNL    Education Provided:  Avoid raising left arm above the level of the shoulder for 3 weeks.  Do not shower until outer occlusive dressing has been removed.  Remove outer dressing in 3 - 4 days. Can be removed on Sunday 6/13.   Leave steri-strips in place, these will be removed at the 1 week check.   Call Device Clinic with increased swelling, drainage, or fever > 101 degrees.     Follow-up with the Device Clinic appointment scheduled for Friday June 18th at 9:45AM. [place] location works best for patient so delayed 6-8 week check scheduled for Wednesday September 1st at 11:30AM     Patient spouse, Cristina, and daughter had many questions. Reviewed all discharge instructions, and answered all questions. Walked through how to send a manual transmission with Awareness Card Latitude Monitor. Reassured spouse that the device team is here to help the patient and to provide education as the the patient heals from pacemaker placement. Reviewed plan of care regarding pacemaker follow up and when to call the clinic. Clinic cards given, and written pamphlet regarding pacemaker implantation education also given. Encouraged patient/spouse to call with any questions or concerns.

## 2021-06-11 NOTE — PROGRESS NOTES
[facility]    Hospitalist Progress Note      Assessment & Plan   Reid Jimenes is a 87 year old male who was admitted on 6/9/2021. Past medical history significant for Mild nonobstructive CAD, HTN, HLP, Known LBBB, Pulmonary HTN, DM2, History of severe aortic stenosis s/p TAVR who was directly admitted to [facility] due to Symptomatic high-grade conduction system disease.       Patient presented to [facility] ED due to bradycardia.  Patient's wife had called EMS as patient was having decreased consciousness.  PEr EMT report patient was found to have HR in the 20-30's and he received atropine without improvement.  While monitored on telemetry rhythm went from junctional with anterolateral TWI and then degenerated to high degree AC block without perfusing sinus beats at which time he lost consciousness. Transcutaneous pacing was initiated by EMS with HR of 80 and patient was awake. EKG was concerning for possible MI       Patient was evaluated by Cardiology in the ED.  He underwent an urgent temporary pacer implantation at which time an coronary angiogram was completed and appears unchanged from previous study.  He was then transferred to [facility] for permanent pacemaker placement. Post procedure he was combative due to delirium and required multiple medications to help him calm down. He was sedated majority of the day on 6/11 and it was recommended he stay additional night as he did not yet seem safe to discharge home with his elderly wife    Symptomatic high-grade conduction system disease  S/p temporary pacer implantation 6/9 s/p permanent pacemaker placement 6/10  Known LBBB  Complete heart block requiring pacing and temp pacemaker placement.  - Cardiology appreciated, had permanent pacer placed on 6/10. CXR 6/11 without PTX and interrogation showed 16% AP and 2%   - shoulder precautions  - teaching with wife and patient performed on 6/11    Post procedure delirium  Baseline  mild cognitive impairment  Trying to get up, a little confused about where he is and why. Mostly redirectable. Then later in evening was not redirectable and required zyprexa, haldol, seroquel and restraints. Has some baseline mild cognitive impairment (knowing names of people, places, things), but worse without someone familiar around  -  Wife to stay at bedside overnight  - continue IVF to wash out chemical restraints from overnight as his mentation is slowly clearing this afternoon     Mild nonobstructive CAD  HTN  HLP  Pulmonary HTN  6/9 Echo: hx TAVR, LVEF 60-65%, normal wall motion.  6/9 cardiac cath: nonobstructive CAD left dominant---no change since TAVR work-up July 2018  - Resumed on PTA Zocor 20 mg at bedtime and ASA 81 mg/d.    - Not currently on any antihypertensive agents.    - Sees Dr. Higignbotham as OP (last 10/2020) with plans to see [facility] 7/2021 and repeat echo in 10/2021.  As had echo this admission, will plan moving echo 6/2022 with Dr. Higginbotham appt     DM2  Previous A1c of 6.9% from 1/2021.  A1C on admit 6/9 is 7.1  - Hold PTA metformin 500 mg BID.    - Medium intensity sliding scale.       History of severe aortic stenosis s/p TAVR  No interventions.      DVT Prophylaxis: Pneumatic Compression Devices  Code Status: Full Code  Expected discharge: Tomorrow, recommended to prior living arrangement once no longer as sedated, can demonstrate up to bathroom safely (to ensure safe discharge home with wife)    Ping Barksdale, DO  Text Page (7am - 6pm)    Interval History   Patient seen and examined. He reports some discomfort in area of PPM, but tolerable. Sedated almost all morning, starting to clear by early afternoon (2pm), able to participate in neuro exam. Answered numerous questions from wife and daughter.  Spent >35 minutes reviewing overnight events, speaking with nursing and updating plan of care and explaining results to patient, wife and daughter    -Data reviewed today: I reviewed all new labs  and imaging results over the last 24 hours. I personally reviewed no new imaging or EKGs    Physical Exam   Temp: 97.9  F (36.6  C) Temp src: Oral BP: (!) 143/89 Pulse: 83   Resp: 20 SpO2: 100 % O2 Device: None (Room air)    Vitals:    06/10/21 0500   Weight: 78.9 kg (174 lb)     Vital Signs with Ranges  Temp:  [97.9  F (36.6  C)-99.2  F (37.3  C)] 97.9  F (36.6  C)  Pulse:  [] 83  Resp:  [16-20] 20  BP: (139-179)/(69-91) 143/89  SpO2:  [93 %-100 %] 100 %  I/O last 3 completed shifts:  In: 580 [P.O.:480; I.V.:100]  Out: 500 [Urine:500]    Constitutional: Awake, alert, cooperative, no apparent distress  Respiratory: Clear to auscultation bilaterally, no crackles or wheezing  Cardiovascular: Regular rate and rhythm, normal S1 and S2, and +systolic murmur  GI: Normal bowel sounds, soft, non-distended, non-tender  Skin/Integumen: No rashes, no cyanosis, no edema  Other: Left chest wall pacemaker, no hematoma.  Neuro: able to follow commands, CN 2-12 intact. Finger to nose and heel to shin intact. Sensation intact. Did not observe gait. Oriented to person, place (hospital--but thought we were in [place]), year (not month), able to describe president, but not by name. Not oriented to situation.    Medications     sodium chloride 75 mL/hr at 06/11/21 1200       aspirin  81 mg Oral Daily     insulin aspart  1-7 Units Subcutaneous TID AC     insulin aspart  1-5 Units Subcutaneous At Bedtime     simvastatin  20 mg Oral At Bedtime     sodium chloride (PF)  3 mL Intracatheter Q8H       Data   Recent Labs   Lab 06/11/21  0529 06/10/21  1106 06/10/21  0532 06/09/21  1652 06/09/21  1112 06/09/21  1110   WBC 10.9 8.7 8.8  --   --  10.1   HGB 13.1* 11.7* 11.5*  --   --  12.8*   MCV 89 90 90  --   --  91    159 166  --   --  180   INR  --  1.10  --   --   --  1.05     --  139  --   --  139   POTASSIUM 3.9  --  3.8 4.4  --  4.2   CHLORIDE 109  --  109  --   --  107   CO2 28  --  29  --   --  24   BUN 15  --  17   --   --  19   CR 1.02  --  1.07  --   --  1.16   ANIONGAP 7  --  1*  --   --  8   DEEPTI 8.8  --  8.6  --   --  8.5   *  --  123*  --   --  281*   ALBUMIN  --   --  2.9*  --   --  3.2*   PROTTOTAL  --   --  6.1*  --   --  7.0   BILITOTAL  --   --  0.3  --   --  0.4   ALKPHOS  --   --  86  --   --  109   ALT  --   --  31  --   --  36   AST  --   --  20  --   --  40   TROPI  --   --   --   --   --  0.024   TROPONIN  --   --   --   --  0.04  --        Recent Results (from the past 24 hour(s))   X-ray Chest 2 vws*    Narrative    CHEST TWO VIEWS June 11, 2021 9:47 AM     HISTORY: Implantable cardiac device placement, evaluate for  pneumothorax.    COMPARISON: None.    FINDINGS: Cardiac leads noted that project over the cardiac  silhouette. No pneumothorax. There are no acute infiltrates. The  cardiac silhouette is stable. Pulmonary vasculature is unremarkable.      Impression    IMPRESSION: No acute disease.     KAMLA OCONNOR MD

## 2021-06-12 VITALS
TEMPERATURE: 98 F | SYSTOLIC BLOOD PRESSURE: 146 MMHG | RESPIRATION RATE: 20 BRPM | HEART RATE: 77 BPM | DIASTOLIC BLOOD PRESSURE: 72 MMHG | BODY MASS INDEX: 28.12 KG/M2 | OXYGEN SATURATION: 96 % | WEIGHT: 169 LBS

## 2021-06-12 LAB
ANION GAP SERPL CALCULATED.3IONS-SCNC: 5 MMOL/L (ref 3–14)
BUN SERPL-MCNC: 24 MG/DL (ref 7–30)
CALCIUM SERPL-MCNC: 8.9 MG/DL (ref 8.5–10.1)
CHLORIDE SERPL-SCNC: 108 MMOL/L (ref 94–109)
CO2 SERPL-SCNC: 27 MMOL/L (ref 20–32)
CREAT SERPL-MCNC: 1.18 MG/DL (ref 0.66–1.25)
GFR SERPL CREATININE-BSD FRML MDRD: 55 ML/MIN/{1.73_M2}
GLUCOSE BLDC GLUCOMTR-MCNC: 141 MG/DL (ref 70–99)
GLUCOSE BLDC GLUCOMTR-MCNC: 156 MG/DL (ref 70–99)
GLUCOSE SERPL-MCNC: 149 MG/DL (ref 70–99)
POTASSIUM SERPL-SCNC: 3.8 MMOL/L (ref 3.4–5.3)
SODIUM SERPL-SCNC: 140 MMOL/L (ref 133–144)

## 2021-06-12 PROCEDURE — 999N001017 HC STATISTIC GLUCOSE BY METER IP

## 2021-06-12 PROCEDURE — 80048 BASIC METABOLIC PNL TOTAL CA: CPT | Performed by: HOSPITALIST

## 2021-06-12 PROCEDURE — 36415 COLL VENOUS BLD VENIPUNCTURE: CPT | Performed by: HOSPITALIST

## 2021-06-12 PROCEDURE — 99239 HOSP IP/OBS DSCHRG MGMT >30: CPT | Performed by: HOSPITALIST

## 2021-06-12 NOTE — PLAN OF CARE
Patient discharged to home at 0937 via wheelchair accompanied by nursing assistant and wife. RN explained to patient discharge instructions. Patient and wife verbalized discharge instructions and are in agreement with follow up plans at this time.

## 2021-06-12 NOTE — PLAN OF CARE
Pt A and and Ox4, wife present at bedside overnight. Neuro checks unchanged. Forgetful/disoriented to situation. Pt cooperative and mentation much improved. Right groin C/D/I, right radial site C/D/I. Pacer dressing intact and arm sling on. Plan for early discharge today.

## 2021-06-12 NOTE — DISCHARGE SUMMARY
[facility]    Discharge Summary  Hospitalist    Date of Admission:  6/9/2021  Date of Discharge:  6/12/2021  Discharging Provider: Ping Barksdale DO  Date of Service (when I saw the patient): 06/12/21    Discharge Diagnoses   Symptomatic high-grade conduction system disease  S/p temporary pacer implantation 6/9   s/p permanent pacemaker placement 6/10  Known LBBB  Post procedure delirium  Baseline mild cognitive impairment  Mild nonobstructive CAD  HTN  HLP  Pulmonary HTN  DM2  History of severe aortic stenosis s/p TAVR    History of Present Illness   Reid Jimenes is a 87 year old male who was admitted on 6/9/2021. Past medical history significant for Mild nonobstructive CAD, HTN, HLP, Known LBBB, Pulmonary HTN, DM2, History of severe aortic stenosis s/p TAVR who was directly admitted to [facility] due to Symptomatic high-grade conduction system disease.       Patient presented to [facility] ED due to bradycardia.  Patient's wife had called EMS as patient was having decreased consciousness.  PEr EMT report patient was found to have HR in the 20-30's and he received atropine without improvement.  While monitored on telemetry rhythm went from junctional with anterolateral TWI and then degenerated to high degree AC block without perfusing sinus beats at which time he lost consciousness. Transcutaneous pacing was initiated by EMS with HR of 80 and patient was awake. EKG was concerning for possible MI       Patient was evaluated by Cardiology in the ED.  He underwent an urgent temporary pacer implantation at which time an coronary angiogram was completed and appears unchanged from previous study.  He was then transferred to [facility] for permanent pacemaker placement. Post procedure he was combative due to delirium and required multiple medications to help him calm down. He was sedated majority of the day on 6/11 and it was recommended he stay additional night as he did not yet seem  safe to discharge home with his elderly wife    Hospital Course   Reid Jimenes was admitted on 6/9/2021.  The following problems were addressed during his hospitalization:    Symptomatic high-grade conduction system disease  S/p temporary pacer implantation 6/9 s/p permanent pacemaker placement 6/10  Known LBBB  Complete heart block requiring pacing and temp pacemaker placement.  - Cardiology appreciated, had permanent pacer placed on 6/10. CXR 6/11 without PTX and interrogation showed 16% AP and 2%   - shoulder precautions  - teaching with wife and patient performed on 6/11  - stable for discharge to home on 6/12  - device check on 6/18  - follow up with PCP in one week  - Follow up with cardiology at end of July (with Lizz Love) as previously scheduled     Post procedure delirium-resolved  Baseline mild cognitive impairment  Confused and tried to get up after his procedure, initially redirectable. Then later in evening was not redirectable and required zyprexa, haldol, seroquel and restraints. Has some baseline mild cognitive impairment (knowing names of people, places, things), but worse without someone familiar around. He was quite lethargic after all of the chemical restraints on 6/11 and discharge was held until he was back to his baseline.  - Resolved, back to his baseline 6/12 AM (wife stayed overnight)  - recommend outpatient cognitive evaluation if ongoing concerns about his cognition after he has recovered from his hospitalization     Mild nonobstructive CAD  HTN  HLP  Pulmonary HTN  6/9 Echo: hx TAVR, LVEF 60-65%, normal wall motion.  6/9 cardiac cath: nonobstructive CAD left dominant---no change since TAVR work-up July 2018  - Resumed on PTA Zocor 20 mg at bedtime and ASA 81 mg/d.    - Not currently on any antihypertensive agents.    - Sees Dr. Higginbotham as OP (last 10/2020) with plans to see VINOD 7/2021.  As had echo this admission, will plan echo for 6/2022 with Dr. Higginbotham  appt     DM2  Previous A1c of 6.9% from 1/2021.  A1C on admit 6/9 is 7.1  - Hold PTA metformin 500 mg BID resumed on discharge     History of severe aortic stenosis s/p TAVR  No interventions.      Ping Barksdale,     Significant Results and Procedures   6/9: temporary pacer implantation   6/10: permanent pacemaker placement    Pending Results   NA    Code Status   Full Code       Primary Care Physician   Viet Bingham MD    Physical Exam   Temp: 98  F (36.7  C) Temp src: Oral BP: (!) 146/72 Pulse: 77   Resp: 20 SpO2: 96 % O2 Device: None (Room air)    Vitals:    06/10/21 0500 06/12/21 0539   Weight: 78.9 kg (174 lb) 76.7 kg (169 lb)     Vital Signs with Ranges  Temp:  [97.6  F (36.4  C)-98.1  F (36.7  C)] 98  F (36.7  C)  Pulse:  [77-85] 77  Resp:  [18-20] 20  BP: (143-156)/(67-91) 146/72  SpO2:  [96 %-100 %] 96 %  I/O last 3 completed shifts:  In: 800 [P.O.:700; I.V.:100]  Out: 500 [Urine:500]    Patient seen and examined on day of discharge. Wife at bedside (she spent the night). He apparently slept very well, while she slept poorly due to beeping in the hospital. No chest pain, shortness of breath, nausea, vomiting. Has some left shoulder discomfort (Chronic), but denies acute pain at pacemaker site. Much more oriented 6/12 and speech back at baseline. Stable for discharge to home with wife.    Constitutional: Awake, alert, cooperative, no apparent distress.  Eyes: Conjunctiva and pupils examined and normal.  HEENT: Moist mucous membranes, normal dentition.  Respiratory: Clear to auscultation bilaterally, no crackles or wheezing.  Cardiovascular: Regular rate and rhythm, normal S1 and S2, and +systolic murmur  GI: Soft, non-distended, non-tender, normal bowel sounds.  Lymph/Hematologic: No anterior cervical or supraclavicular adenopathy.  Skin: No rashes, no cyanosis, no edema.  Musculoskeletal: No joint swelling, erythema or tenderness.  Neurologic: Cranial nerves 2-12 intact, normal strength and  sensation.  Psychiatric: Alert, oriented to person, place and time, no obvious anxiety or depression. Not oriented to name of president (baseline)    Discharge Disposition   Discharged to home  Condition at discharge: Stable    Consultations This Hospital Stay   CARDIOLOGY IP CONSULT  ELECTROPHYSIOLOGY IP CONSULT    Time Spent on this Encounter   IPing DO, personally saw the patient today and spent greater than 30 minutes discharging this patient.    Discharge Orders      Reason for your hospital stay    Complete heart block requiring pacemaker placement. Discharge delayed due to delirium and unsafe to return home.     Follow-up and recommended labs and tests     Follow up with primary care provider, Viet Bingham MD, within 7 days for hospital follow- up.  No follow up labs or test are needed. Consider outpatient cognitive evaluation once recovered from hospitalization    Follow up with cardiology, see VINOD in July 2021 and have repeat echocardiogram in June 2022 with Dr Higginbotham.     Activity    Your activity upon discharge: activity as tolerated, shoulder restrictions due to pacemaker for 3 weeks, you can practice your golf putting during this time. Wait until you follow up with your cardiologist at the end of July to discuss real Golfing.     Full Code     Diet    Follow this diet upon discharge:  Follow your previous diet     Discharge Medications   Current Discharge Medication List      START taking these medications    Details   acetaminophen (TYLENOL) 325 MG tablet Take 2 tablets (650 mg) by mouth every 4 hours as needed for mild pain  Qty:      Associated Diagnoses: Cardiac pacemaker in situ         CONTINUE these medications which have CHANGED    Details   biotin 5 MG CAPS Take 1 capsule (5 mg) by mouth 2 times daily    Associated Diagnoses: Type 2 diabetes mellitus with other circulatory complications (H)         CONTINUE these medications which have NOT CHANGED    Details   ascorbic acid  500 MG TABS Take 500 mg by mouth every evening       aspirin 81 MG EC tablet Take 1 tablet (81 mg) by mouth daily  Qty: 30 tablet    Associated Diagnoses: Severe aortic stenosis      glucosamine-chondroitin 500-400 MG CAPS per capsule Take 1 capsule by mouth 2 times daily      metFORMIN (GLUCOPHAGE) 1000 MG tablet Take 0.5 tablets (500 mg) by mouth 2 times daily (with meals)  Qty: 90 tablet, Refills: 3    Associated Diagnoses: Type 2 diabetes mellitus with microalbuminuria, without long-term current use of insulin (H)      Multiple Vitamins-Iron (MULTIVITAMIN/IRON PO) Take 1 tablet by mouth 2 times daily       simvastatin (ZOCOR) 40 MG tablet Take 0.5 tablets (20 mg) by mouth At Bedtime  Qty: 45 tablet, Refills: 3    Associated Diagnoses: Hyperlipidemia with target LDL less than 100      blood glucose (MIRA CONTOUR NEXT) test strip Use to test blood sugar one time daily or as directed.  Qty: 100 strip, Refills: 11    Associated Diagnoses: Type 2 diabetes mellitus with microalbuminuria, without long-term current use of insulin (H)      blood glucose monitoring (MIRA MICROLET) lancets Use to test blood sugar 1 times daily or as directed.  Qty: 100 each, Refills: 5    Associated Diagnoses: Type 2 diabetes mellitus with microalbuminuria, without long-term current use of insulin (H)           Allergies   Allergies   Allergen Reactions     Fentanyl Other (See Comments)     Confusion and agitation after PPM     Versed [Midazolam] Other (See Comments)     Confusion and agitation post PPM     Data   Most Recent 3 CBC's:  Recent Labs   Lab Test 06/11/21  0529 06/10/21  1106 06/10/21  0532   WBC 10.9 8.7 8.8   HGB 13.1* 11.7* 11.5*   MCV 89 90 90    159 166      Most Recent 3 BMP's:  Recent Labs   Lab Test 06/12/21  0530 06/11/21  0529 06/10/21  0532    144 139   POTASSIUM 3.8 3.9 3.8   CHLORIDE 108 109 109   CO2 27 28 29   BUN 24 15 17   CR 1.18 1.02 1.07   ANIONGAP 5 7 1*   DEEPTI 8.9 8.8 8.6   * 149* 123*      Most Recent 2 LFT's:  Recent Labs   Lab Test 06/10/21  0532 06/09/21  1110   AST 20 40   ALT 31 36   ALKPHOS 86 109   BILITOTAL 0.3 0.4     Most Recent INR's and Anticoagulation Dosing History:  Anticoagulation Dose History     Recent Dosing and Labs Latest Ref Rng & Units 7/24/2018 8/17/2020 6/9/2021 6/10/2021    INR 0.86 - 1.14 1.01 1.02 1.05 1.10        Most Recent 3 Troponin's:  Recent Labs   Lab Test 06/09/21  1112 06/09/21  1110 01/10/21  1616 08/17/20  1106   TROPI  --  0.024 <0.015 <0.015   TROPONIN 0.04  --   --   --      Most Recent Cholesterol Panel:  Recent Labs   Lab Test 01/12/21  0915   CHOL 134   LDL 66   HDL 45   TRIG 115     Most Recent 6 Bacteria Isolates From Any Culture (See EPIC Reports for Culture Details):No lab results found.  Most Recent TSH, T4 and A1c Labs:  Recent Labs   Lab Test 06/09/21  1652 07/10/20  1050 07/10/20  1050   TSH  --   --  0.99   A1C 7.1*   < > 6.4*    < > = values in this interval not displayed.     Results for orders placed or performed during the hospital encounter of 06/09/21   X-ray Chest 2 vws*    Narrative    CHEST TWO VIEWS June 11, 2021 9:47 AM     HISTORY: Implantable cardiac device placement, evaluate for  pneumothorax.    COMPARISON: None.    FINDINGS: Cardiac leads noted that project over the cardiac  silhouette. No pneumothorax. There are no acute infiltrates. The  cardiac silhouette is stable. Pulmonary vasculature is unremarkable.      Impression    IMPRESSION: No acute disease.     KAMLA OCONNOR MD   EP Device    Narrative    PROCEDURES PERFORMED:  1. Conscious sedation.  2. Cardiac fluoroscopy.  3. Dual-chamber permanent pacemaker implantation.  4. Temporary pacing wire removal.    INDICATION: Symptomatic bradycardia secondary to complete AV block.    HPI: 87-year-old gentleman with a history of diabetes, hyperlipidemia and   severe aortic stenosis (previous TAVR), who presented with syncope and was   found to have complete AV block.  He underwent  temporary pacing wire. No   reversible causes were identified, and he was referred for permanent   pacemaker implantation.    Risks and benefits of the procedure were reviewed including but not   limited to arrhythmia, pain, infection, bleeding, skin damage from ionized   radiation, kidney damage or allergic reactions to conscious dye, injury to   the neighboring vascular structures, need for emergent cardiopulmonary   resuscitation, heart attack, stroke or death. Alternatives were discussed   including doing nothing. All questions were answered to the patient's   satisfaction. Informed consent was obtained and patient wished to proceed.    FLUOROSCOPY DATA:  Fluoro time: 0.9 minutes.  Radiation dose (AK): 1.51 mGy.  Dose-area product (DAP): 0.166 Gy.cm2.    DESCRIPTION OF PROCEDURE: After written informed consent was obtained,   patient was brought to the EP lab. An intravenous infusion of prophylactic   antibiotic was begun. The chest was sterilely prepped from the level of   iliac crest to level of the chin with antibiotic soap and disinfectant,   draped appropriately. Following infiltration with 1% lidocaine, an   incision was made parallel to and 1 cm beneath the clavicle and extended   across the deltopectoral groove. Using blunt dissection, the excision was   extended down the anterior pectoral fascia. Using blunt and sharp   dissection, a pocket was developed parallel to the fascia and extended   inferiorly.    Two pocket punctures via fluoroscopy guidance and modified Seldinger   technique was used to attain access into the left subclavian vein. A 6   Monegasque sheath was inserted over the wire. The right ventricular lead was   advanced under fluoroscopy and a secure location found. The lead was   fixated.  Stimulation and sensing thresholds were found to be   satisfactory. No diaphragmatic stimulation was noticed with high output   pacing. The lead was sutured to the underlying pectoral muscle with    interrupted 0 silk suture over a plastic collar. A 6 [place] citizen sheath was   inserted over the wire. The right atrial lead was advanced under   fluoroscopy and secure location found. The lead was fixated. Stimulation   and sensing thresholds were found to be satisfactory. No diaphragmatic   stimulation was noted with high output pacing. The lead was sutured to the   underlying pectoral muscle with interrupted 0 silk suture over a plastic   collar.    After irrigation with saline solution, the pocket was inspected, no   bleeding was seen. The generator was connected to the leads and inserted   into the pocket. The wound was closed with two layers of subcutaneous   sutures.    After pacemaker implantation, the temporary pacing wire was easily   retracted under fluoroscopy guidance.    PACEMAKER GENERATOR:  [employer], model L331, serial #[id number].    LEAD INFORMATION:  Right atrial lead: [employer], model 7840, serial #[id number].  Right ventricular lead: [employer], model 7841, serial #[id number].    MEASURED DATA:  Right atrial lead: Sensing 2.2 mV, threshold 1.4 volts at 0.4 msec,   impedance 752 ohms.  Right ventricular lead: Sensing 15.1 mV, threshold 1.0 volt at 0.4 msec,   impedance 829 ohms.    IMPRESSION:  1. Successful dual chamber pacemaker implantation in left infraclavicular   region above the pectoral fascia.  2. Bradycardia pacing set to DDD .  3.  Successful temporary pacing wire removal.    RECOMMENDATIONS:  1. Avoid vascular access to the left jugular and subclavian veins.  2. Keep wound clean, dry and covered for seven days.  3. PA and lateral chest x-ray to rule out dislodgement.  4. Device interrogation prior to discharge.  5. Left arm in sling overnight and then off in the morning.  6. May start oral medications. Avoid IV or subcutaneous heparin for at   least two weeks. If heparin must be used, please contract the procedural   team to discuss.  7. Wound care as follows:  a) Do  not get incision wet for three days.  b) Leave the Steri-Strips in place, allow to come off naturally. If they   do not come off in two weeks, you may remove them.  c) Check incision daily and call if they notice one of the following:   redness, drainage (pus or blood), swelling, warmth or if you develop   fever.    If you have questions regarding wound care, please contact our office.     Magdiel Silver MD   Echocardiogram Complete    Narrative    [id number]  [facility]  [id number]  [id number]^FREDRICK^SHARLA     [facility]  Echocardiography Laboratory  [address] [place] [id number]     Name: SARAH BOYD  MRN: [id number]  : 1934  Study Date: 2021 04:01 PM  Age: 87 yrs  Gender: Male  Patient Location: [facility]  Reason For Study: AV block, unspecified  Ordering Physician: SHARLA ALCALA  Referring Physician: Viet Bingham  Performed By: Markus Meraz RDCS     BSA: 1.9 m2  Height: 65 in  Weight: 175 lb  HR: 61  BP: 150/68 mmHg  ______________________________________________________________________________  Procedure  Complete Portable Echo Adult. Optison (NDC #2423-4263) given intravenously.  ______________________________________________________________________________  Interpretation Summary     1. Status post transcatheter aortic valve replacement with a 26-mm Greene  Sabino 3 valve, 2018.  2. The valve is well-seated. No abnormal regurgitation. Mean systolic gradient  12-14 mmHg.  3. Normal left ventricular size and systolic function. LVEF 60-65%.  4. Normal left ventricular wall motion.  5. Normal right ventricular size and systolic function.     No significant changes compared to previous study dated 2020.     ______________________________________________________________________________  Left Ventricle  The left ventricle is normal in size. There is mild concentric left  ventricular hypertrophy. Left ventricular systolic function is normal. The  visual ejection  fraction is estimated at 60-65%. Grade I or early diastolic  dysfunction. Normal left ventricular wall motion.     Right Ventricle  The right ventricle is normal in size and function.     Atria  Normal left atrial size. Right atrial size is normal. There is no color  Doppler evidence of an atrial shunt.     Mitral Valve  The mitral valve leaflets appear normal. There is no evidence of stenosis,  fluttering, or prolapse. There is trace mitral regurgitation.     Tricuspid Valve  Normal tricuspid valve. There is trace tricuspid regurgitation. Right  ventricular systolic pressure could not be approximated due to inadequate  tricuspid regurgitation.     Aortic Valve  There is a bioprosthetic aortic valve. The prosthetic aortic valve is well-  seated. This degree of valvular regurgitation is within normal limits. The  mean AoV pressure gradient is 14.3 mmHg.     Pulmonic Valve  Normal pulmonic valve. This degree of valvular regurgitation is within normal  limits.     Vessels  The aortic root is normal size. Normal size ascending aorta. The inferior vena  cava was normal in size with preserved respiratory variability.     Pericardium  There is no pericardial effusion.     Rhythm  Sinus rhythm was noted.  ______________________________________________________________________________  MMode/2D Measurements & Calculations  IVSd: 1.3 cm     LVIDd: 3.9 cm  LVIDs: 3.0 cm  LVPWd: 0.95 cm  FS: 22.5 %  LV mass(C)d: 143.3 grams  LV mass(C)dI: 76.7 grams/m2  LA dimension: 3.8 cm  asc Aorta Diam: 3.3 cm  LVOT diam: 2.2 cm  LVOT area: 3.8 cm2  LA Volume (BP): 66.8 ml  LA Volume Index (BP): 35.7 ml/m2  RWT: 0.49     Doppler Measurements & Calculations  MV E max florentino: 97.5 cm/sec  MV A max florentino: 131.0 cm/sec  MV E/A: 0.74  MV dec slope: 337.7 cm/sec2  MV dec time: 0.29 sec  Ao V2 max: 260.6 cm/sec  Ao max P.0 mmHg  Ao V2 mean: 175.8 cm/sec  Ao mean P.3 mmHg  Ao V2 VTI: 57.6 cm  HUNG(I,D): 1.7 cm2  HUNG(V,D): 1.8 cm2  LV V1 max PG:  5.8 mmHg  LV V1 max: 120.4 cm/sec  LV V1 VTI: 26.2 cm  SV(LVOT): 99.3 ml  SI(LVOT): 53.1 ml/m2  AV Anson Ratio (DI): 0.46  HUNG Index (cm2/m2): 0.92  E/E' av.8  Lateral E/e': 18.7  Medial E/e': 16.9     ______________________________________________________________________________  Report approved by: Dr Nohemi Leslie 2021 05:16 PM

## 2021-06-14 ENCOUNTER — TELEPHONE (OUTPATIENT)
Dept: INTERNAL MEDICINE | Facility: CLINIC | Age: 86
End: 2021-06-14

## 2021-06-14 ENCOUNTER — TELEPHONE (OUTPATIENT)
Dept: CARDIOLOGY | Facility: CLINIC | Age: 86
End: 2021-06-14

## 2021-06-14 DIAGNOSIS — Z71.89 OTHER SPECIFIED COUNSELING: ICD-10-CM

## 2021-06-14 LAB — INTERPRETATION ECG - MUSE: NORMAL

## 2021-06-14 NOTE — TELEPHONE ENCOUNTER
Patient was evaluated by cardiology at [facility] for witnessed syncopal episode-upon EMS arrival pt found to have EKG changes followed with episodes of asystole-high degree HB-rates 20-30's. PMH: severe aortic stenosis with TAVR, LBBB, DM2, HTN, episodes of syncope in past of unknown etiology. 6/10/21: Emergent temporary pacer placed and coronary angiogram showed nonobstructive CAD. Transferred to [facility] for permanent PPM which was implanted on 6/10/21. Called patient to discuss any post hospital d/c questions and confirm f/u appts. Patient denied any SOB, chest pain, fever or light headedness. RRA  cardiac cath site and left anterior chest sites are without bleeding, swelling, redness or tenderness. RN confirmed with patient that he has a PPM check scheduled on 6/18/21 at 0945 in [place] and an OV scheduled on 7/26/21 at 0900 with JAI Lizz Love at our [place] Office. Patient advised to call clinic with any cardiac related questions or concerns prior to this jai't. Patient verbalized understanding and agreed with plan. ASUNCION Baker RN.

## 2021-06-15 ENCOUNTER — PATIENT OUTREACH (OUTPATIENT)
Dept: CARE COORDINATION | Facility: CLINIC | Age: 86
End: 2021-06-15

## 2021-06-15 LAB — INTERPRETATION ECG - MUSE: NORMAL

## 2021-06-15 NOTE — PROGRESS NOTES
Clinic Care Coordination Contact  Community Health Worker Initial Outreach    CHW Initial Information Gathering:  Referral Source: IP Report  Preferred Hospital: [facility]  [phone]  Current living arrangement:: I live in a private home with spouse  Type of residence:: Apartment  Community Resources: Other (see comment)  Supplies used at home:: Diabetic Supplies  Equipment Currently Used at Home: none  Informal Support system:: Spouse, Other  No PCP office visit in Past Year: No  Transportation means:: Family       Patient accepts CC: Patient declined CC.

## 2021-06-17 ENCOUNTER — OFFICE VISIT (OUTPATIENT)
Dept: INTERNAL MEDICINE | Facility: CLINIC | Age: 86
End: 2021-06-17
Payer: MEDICARE

## 2021-06-17 VITALS
BODY MASS INDEX: 29.16 KG/M2 | DIASTOLIC BLOOD PRESSURE: 60 MMHG | TEMPERATURE: 98.2 F | SYSTOLIC BLOOD PRESSURE: 112 MMHG | OXYGEN SATURATION: 97 % | HEIGHT: 65 IN | WEIGHT: 175 LBS | HEART RATE: 90 BPM | RESPIRATION RATE: 16 BRPM

## 2021-06-17 DIAGNOSIS — I44.2 COMPLETE HEART BLOCK (H): Primary | ICD-10-CM

## 2021-06-17 DIAGNOSIS — E78.5 HYPERLIPIDEMIA WITH TARGET LDL LESS THAN 100: ICD-10-CM

## 2021-06-17 DIAGNOSIS — R80.9 TYPE 2 DIABETES MELLITUS WITH MICROALBUMINURIA, WITHOUT LONG-TERM CURRENT USE OF INSULIN (H): ICD-10-CM

## 2021-06-17 DIAGNOSIS — Z95.0 CARDIAC PACEMAKER IN SITU: ICD-10-CM

## 2021-06-17 DIAGNOSIS — G31.84 MILD COGNITIVE IMPAIRMENT: ICD-10-CM

## 2021-06-17 DIAGNOSIS — I10 ESSENTIAL HYPERTENSION WITH GOAL BLOOD PRESSURE LESS THAN 140/90: ICD-10-CM

## 2021-06-17 DIAGNOSIS — E11.29 TYPE 2 DIABETES MELLITUS WITH MICROALBUMINURIA, WITHOUT LONG-TERM CURRENT USE OF INSULIN (H): ICD-10-CM

## 2021-06-17 PROCEDURE — 99495 TRANSJ CARE MGMT MOD F2F 14D: CPT | Performed by: INTERNAL MEDICINE

## 2021-06-17 RX ORDER — SIMVASTATIN 40 MG
20 TABLET ORAL AT BEDTIME
Qty: 45 TABLET | Refills: 3 | Status: SHIPPED | OUTPATIENT
Start: 2021-06-17 | End: 2021-07-02

## 2021-06-17 ASSESSMENT — MIFFLIN-ST. JEOR: SCORE: 1395.67

## 2021-06-17 NOTE — PATIENT INSTRUCTIONS
"Everything looks fine.    [facility]     See [facility] about \"mild cognitive impairment\".    See me on 7/21/2021 for your Annual Wellness visit, sooner if problems.     "

## 2021-06-17 NOTE — PROGRESS NOTES
"    Assessment & Plan     (I44.2) Complete heart block (H)  (primary encounter diagnosis)  (Z95.0) Cardiac pacemaker in situ  Comment: Stable. F/u with cardiology as they advise.     (G31.84) Mild cognitive impairment  Comment: Offered options of Memory Clinic or Neurology consult--referred for Neurology consult.   Plan: NEUROLOGY ADULT REFERRAL          (E11.29,  R80.9) Type 2 diabetes mellitus with microalbuminuria, without long-term current use of insulin (H)  Comment: Most recent A1c well controlled at 7.1 on 6/9. Continue current meds.     (I10) Essential hypertension with goal blood pressure less than 140/90  Comment: BP at target. Continue current meds.    (E78.5) Hyperlipidemia with target LDL less than 100  Comment: Simvastatin dose adjusted.  Plan: DISCONTINUED: simvastatin (ZOCOR) 40 MG tablet               Patient Instructions   Everything looks fine.    [facility]     See [facility] about \"mild cognitive impairment\".    See me on 7/21/2021 for your Annual Wellness visit, sooner if problems.         No follow-ups on file.    Viet Bingham MD,   [facility]    Time face to face with patient and wife: 25 minutes (1423---1448)  Time spent in record review and charting on day of appt: >10 min      Subjective   Robi is a 87 year old who presents for the following health issues accompanied by his spouse : Hospital follow up. Patient's spouse has questions regarding: a cognitive evaluation, memory concerns, restless legs and the use of Biotin and Tylenol. Patient is not checking blood sugars.    [facility]       Hospital Follow-up Visit:    Hospital/Nursing Home/IP Rehab Facility: [facility]  Date of Admission: 6/9/2021  Date of Discharge: 6/12/2021  Reason(s) for Admission: complete heart block      Was your hospitalization related to COVID-19? No   Problems taking medications regularly: "  None  Medication changes since discharge: None  Problems adhering to non-medication therapy:  None    Summary of hospitalization:  [facility] discharge summary reviewed  Diagnostic Tests/Treatments reviewed.  Follow up needed: echocardiogram prior to cardiology office f/uy in June 2022.  Other Healthcare Providers Involved in Patient s Care:         Specialist appointment - Cardiology  Update since discharge: improved.     Post Discharge Medication Reconciliation: discharge medications reconciled and changed, per note/orders.  Plan of care communicated with patient and wife        We addressed a number of questions and concerns from the patient and his wife.  She has noted some chronic decline in his memory and cognition and is interested in referral for a memory clinic and/or cognitive evaluation.  He has had some difficulty with restless legs.  She has some questions about his use of Tylenol and Biotene.    We reviewed his recent hospitalization with complete heart block requiring temporary and subsequent permanent pacemaker placement.  The patient was advised to follow-up with his primary care provider within 1 week and with the advanced care practitioner in cardiology at the end of July.    The patient had some post procedure delirium that required medications including Zyprexa, Haldol, Seroquel, as well as restraints.  His confusion gradually improved but he had some lethargy that took a day or 2 to resolve.  He was able to be discharged to home on June 12, having noted to be a back to his baseline cognition and behavior.    Past medical, family and social histories as well as medications reviewed and updated as needed.    Review of Systems   REVIEW OF SYSTEMS: The following systems have been completely reviewed and are negative except as noted above:   Constitutional, respiratory, cardiovascular, musculoskeletal, psychiatric, and neurologic systems.        Objective    /60   Pulse 90   Temp  "98.2  F (36.8  C) (Oral)   Resp 16   Ht 1.651 m (5' 5\")   Wt 79.4 kg (175 lb)   SpO2 97%   BMI 29.12 kg/m    Body mass index is 29.12 kg/m .  Physical Exam   GENERAL: healthy, alert and no distress  RESP: lungs clear to auscultation - no rales, rhonchi or wheezes  CV: regular rate and rhythm, normal S1 S2, no S3 or S4, no murmur, click or rub, no peripheral edema and peripheral pulses strong  MS: no gross musculoskeletal defects noted, no edema  NEURO: Normal strength and tone, mentation at baseline and speech normal  PSYCH: affect normal/bright and mentation at baseline.       Lab Results   Component Value Date    A1C 7.1 06/09/2021    A1C 6.9 01/12/2021    A1C 6.4 07/10/2020    A1C 6.4 08/21/2019    A1C 6.5 05/06/2019     LDL Cholesterol Calculated   Date Value Ref Range Status   01/12/2021 66 <100 mg/dL Final     Comment:     Desirable:       <100 mg/dl             "

## 2021-06-18 ENCOUNTER — TELEPHONE (OUTPATIENT)
Dept: CARDIOLOGY | Facility: CLINIC | Age: 86
End: 2021-06-18

## 2021-06-18 ENCOUNTER — ANCILLARY PROCEDURE (OUTPATIENT)
Dept: CARDIOLOGY | Facility: CLINIC | Age: 86
End: 2021-06-18
Attending: INTERNAL MEDICINE
Payer: MEDICARE

## 2021-06-18 DIAGNOSIS — Z95.0 CARDIAC PACEMAKER IN SITU: Primary | ICD-10-CM

## 2021-06-18 DIAGNOSIS — I44.2 COMPLETE HEART BLOCK (H): ICD-10-CM

## 2021-06-18 PROCEDURE — 93280 PM DEVICE PROGR EVAL DUAL: CPT | Performed by: INTERNAL MEDICINE

## 2021-06-18 NOTE — TELEPHONE ENCOUNTER
Patient seen in device clinic today for 7 day check s/p Pacemaker implant. RV Threshold was 1V@0.4ms at time of implant (6/10/21). Today RV threshold was 3.3V@0.4ms and 1.9V@1ms. RV impedance was 829 ohms at implant and is 561 ohms today. (see lead trends below)    RV amplitude is programmed to auto 5V@0.4ms with this setting battery is estimated at 10.5 years.     Patient paces in the RA 7% of the time and RV <1% of the time.     Patient is scheduled for 6 week device check on 9/1/2021. Will forward message to Dr Silver for review and recommendations.

## 2021-06-22 ENCOUNTER — MEDICAL CORRESPONDENCE (OUTPATIENT)
Dept: HEALTH INFORMATION MANAGEMENT | Facility: CLINIC | Age: 86
End: 2021-06-22

## 2021-06-22 ENCOUNTER — TRANSFERRED RECORDS (OUTPATIENT)
Dept: HEALTH INFORMATION MANAGEMENT | Facility: CLINIC | Age: 86
End: 2021-06-22

## 2021-06-23 LAB
MDC_IDC_LEAD_IMPLANT_DT: NORMAL
MDC_IDC_LEAD_IMPLANT_DT: NORMAL
MDC_IDC_LEAD_LOCATION: NORMAL
MDC_IDC_LEAD_LOCATION: NORMAL
MDC_IDC_LEAD_LOCATION_DETAIL_1: NORMAL
MDC_IDC_LEAD_LOCATION_DETAIL_1: NORMAL
MDC_IDC_LEAD_MFG: NORMAL
MDC_IDC_LEAD_MFG: NORMAL
MDC_IDC_LEAD_MODEL: NORMAL
MDC_IDC_LEAD_MODEL: NORMAL
MDC_IDC_LEAD_POLARITY_TYPE: NORMAL
MDC_IDC_LEAD_POLARITY_TYPE: NORMAL
MDC_IDC_LEAD_SERIAL: NORMAL
MDC_IDC_LEAD_SERIAL: NORMAL
MDC_IDC_MSMT_BATTERY_STATUS: NORMAL
MDC_IDC_MSMT_LEADCHNL_RA_IMPEDANCE_VALUE: 744 OHM
MDC_IDC_MSMT_LEADCHNL_RA_PACING_THRESHOLD_AMPLITUDE: 1.2 V
MDC_IDC_MSMT_LEADCHNL_RA_PACING_THRESHOLD_PULSEWIDTH: 0.4 MS
MDC_IDC_MSMT_LEADCHNL_RA_SENSING_INTR_AMPL: 4.3 MV
MDC_IDC_MSMT_LEADCHNL_RV_IMPEDANCE_VALUE: 561 OHM
MDC_IDC_MSMT_LEADCHNL_RV_PACING_THRESHOLD_AMPLITUDE: 1.9 V
MDC_IDC_MSMT_LEADCHNL_RV_PACING_THRESHOLD_PULSEWIDTH: 1 MS
MDC_IDC_MSMT_LEADCHNL_RV_SENSING_INTR_AMPL: 19.2 MV
MDC_IDC_PG_IMPLANT_DTM: NORMAL
MDC_IDC_PG_MFG: NORMAL
MDC_IDC_PG_MODEL: NORMAL
MDC_IDC_PG_SERIAL: NORMAL
MDC_IDC_PG_TYPE: NORMAL
MDC_IDC_SESS_CLINIC_NAME: NORMAL
MDC_IDC_SESS_DTM: NORMAL
MDC_IDC_SESS_TYPE: NORMAL
MDC_IDC_SET_BRADY_AT_MODE_SWITCH_MODE: NORMAL
MDC_IDC_SET_BRADY_AT_MODE_SWITCH_RATE: 170 {BEATS}/MIN
MDC_IDC_SET_BRADY_HYSTRATE: NORMAL
MDC_IDC_SET_BRADY_LOWRATE: 60 {BEATS}/MIN
MDC_IDC_SET_BRADY_MAX_SENSOR_RATE: 130 {BEATS}/MIN
MDC_IDC_SET_BRADY_MAX_TRACKING_RATE: 130 {BEATS}/MIN
MDC_IDC_SET_BRADY_MODE: NORMAL
MDC_IDC_SET_BRADY_PAV_DELAY_HIGH: 150 MS
MDC_IDC_SET_BRADY_PAV_DELAY_LOW: 200 MS
MDC_IDC_SET_BRADY_SAV_DELAY_HIGH: 150 MS
MDC_IDC_SET_BRADY_SAV_DELAY_LOW: 200 MS
MDC_IDC_SET_LEADCHNL_RA_PACING_AMPLITUDE: 3.5 V
MDC_IDC_SET_LEADCHNL_RA_PACING_CAPTURE_MODE: NORMAL
MDC_IDC_SET_LEADCHNL_RA_PACING_POLARITY: NORMAL
MDC_IDC_SET_LEADCHNL_RA_PACING_PULSEWIDTH: 0.4 MS
MDC_IDC_SET_LEADCHNL_RA_SENSING_ADAPTATION_MODE: NORMAL
MDC_IDC_SET_LEADCHNL_RA_SENSING_POLARITY: NORMAL
MDC_IDC_SET_LEADCHNL_RA_SENSING_SENSITIVITY: 0.25 MV
MDC_IDC_SET_LEADCHNL_RV_PACING_AMPLITUDE: 5 V
MDC_IDC_SET_LEADCHNL_RV_PACING_CAPTURE_MODE: NORMAL
MDC_IDC_SET_LEADCHNL_RV_PACING_POLARITY: NORMAL
MDC_IDC_SET_LEADCHNL_RV_PACING_PULSEWIDTH: 0.4 MS
MDC_IDC_SET_LEADCHNL_RV_SENSING_ADAPTATION_MODE: NORMAL
MDC_IDC_SET_LEADCHNL_RV_SENSING_POLARITY: NORMAL
MDC_IDC_SET_LEADCHNL_RV_SENSING_SENSITIVITY: 1.5 MV
MDC_IDC_SET_ZONE_DETECTION_INTERVAL: 375 MS
MDC_IDC_SET_ZONE_TYPE: NORMAL
MDC_IDC_SET_ZONE_VENDOR_TYPE: NORMAL
MDC_IDC_STAT_EPISODE_RECENT_COUNT: 0
MDC_IDC_STAT_EPISODE_RECENT_COUNT_DTM_END: NORMAL
MDC_IDC_STAT_EPISODE_RECENT_COUNT_DTM_START: NORMAL
MDC_IDC_STAT_EPISODE_TOTAL_COUNT: 0
MDC_IDC_STAT_EPISODE_TOTAL_COUNT_DTM_END: NORMAL
MDC_IDC_STAT_EPISODE_TYPE: NORMAL
MDC_IDC_STAT_EPISODE_TYPE: NORMAL
MDC_IDC_STAT_EPISODE_VENDOR_TYPE: NORMAL
MDC_IDC_STAT_EPISODE_VENDOR_TYPE: NORMAL

## 2021-06-23 NOTE — TELEPHONE ENCOUNTER
Spoke with patient regarding Dr Silver's recommendations of a Chest Xray. Patient in agreement. Transferred patient to scheduling.

## 2021-06-24 ENCOUNTER — HOSPITAL ENCOUNTER (OUTPATIENT)
Dept: GENERAL RADIOLOGY | Facility: CLINIC | Age: 86
End: 2021-06-24
Attending: INTERNAL MEDICINE
Payer: MEDICARE

## 2021-06-24 ENCOUNTER — HOSPITAL ENCOUNTER (OUTPATIENT)
Dept: LAB | Facility: CLINIC | Age: 86
End: 2021-06-24
Attending: INTERNAL MEDICINE
Payer: MEDICARE

## 2021-06-24 DIAGNOSIS — Z95.0 CARDIAC PACEMAKER IN SITU: ICD-10-CM

## 2021-06-24 LAB
FOLATE SERPL-MCNC: 65 NG/ML
VIT B12 SERPL-MCNC: 1447 PG/ML (ref 193–986)

## 2021-06-24 PROCEDURE — 71046 X-RAY EXAM CHEST 2 VIEWS: CPT

## 2021-06-24 PROCEDURE — 82607 VITAMIN B-12: CPT | Performed by: INTERNAL MEDICINE

## 2021-06-24 PROCEDURE — 83090 ASSAY OF HOMOCYSTEINE: CPT | Performed by: INTERNAL MEDICINE

## 2021-06-24 PROCEDURE — 83921 ORGANIC ACID SINGLE QUANT: CPT | Performed by: INTERNAL MEDICINE

## 2021-06-24 PROCEDURE — 82746 ASSAY OF FOLIC ACID SERUM: CPT | Performed by: INTERNAL MEDICINE

## 2021-06-25 LAB — HCYS SERPL-SCNC: 12.3 UMOL/L (ref 4–12)

## 2021-06-30 DIAGNOSIS — E78.5 HYPERLIPIDEMIA WITH TARGET LDL LESS THAN 100: ICD-10-CM

## 2021-06-30 NOTE — TELEPHONE ENCOUNTER
[facility]. Patient has been off medication for 13 days. Please send to a new pharmacy

## 2021-07-01 LAB — METHYLMALONATE SERPL-SCNC: 0.71 UMOL/L (ref 0–0.4)

## 2021-07-02 RX ORDER — SIMVASTATIN 40 MG
20 TABLET ORAL AT BEDTIME
Qty: 45 TABLET | Refills: 1 | Status: SHIPPED | OUTPATIENT
Start: 2021-07-02 | End: 2022-01-21

## 2021-07-02 NOTE — TELEPHONE ENCOUNTER
Pending Prescriptions:                       Disp   Refills    simvastatin (ZOCOR) 40 MG tablet          45 tab*1            Sig: Take 0.5 tablets (20 mg) by mouth At Bedtime    Prescription approved per [facility] Refill Protocol.

## 2021-07-19 ENCOUNTER — LAB (OUTPATIENT)
Dept: LAB | Facility: CLINIC | Age: 86
End: 2021-07-19
Payer: MEDICARE

## 2021-07-19 DIAGNOSIS — R80.9 TYPE 2 DIABETES MELLITUS WITH MICROALBUMINURIA, WITHOUT LONG-TERM CURRENT USE OF INSULIN (H): ICD-10-CM

## 2021-07-19 DIAGNOSIS — E11.29 TYPE 2 DIABETES MELLITUS WITH MICROALBUMINURIA, WITHOUT LONG-TERM CURRENT USE OF INSULIN (H): ICD-10-CM

## 2021-07-19 DIAGNOSIS — E78.5 HYPERLIPIDEMIA WITH TARGET LDL LESS THAN 100: ICD-10-CM

## 2021-07-19 LAB
ALBUMIN SERPL-MCNC: 3.7 G/DL (ref 3.4–5)
ALP SERPL-CCNC: 112 U/L (ref 40–150)
ALT SERPL W P-5'-P-CCNC: 27 U/L (ref 0–70)
ANION GAP SERPL CALCULATED.3IONS-SCNC: 4 MMOL/L (ref 3–14)
AST SERPL W P-5'-P-CCNC: 15 U/L (ref 0–45)
BILIRUB SERPL-MCNC: 0.4 MG/DL (ref 0.2–1.3)
BUN SERPL-MCNC: 19 MG/DL (ref 7–30)
CALCIUM SERPL-MCNC: 9 MG/DL (ref 8.5–10.1)
CHLORIDE BLD-SCNC: 104 MMOL/L (ref 94–109)
CHOLEST SERPL-MCNC: 131 MG/DL
CO2 SERPL-SCNC: 29 MMOL/L (ref 20–32)
CREAT SERPL-MCNC: 1.12 MG/DL (ref 0.66–1.25)
CREAT UR-MCNC: 26 MG/DL
FASTING STATUS PATIENT QL REPORTED: YES
GFR SERPL CREATININE-BSD FRML MDRD: 59 ML/MIN/1.73M2
GLUCOSE BLD-MCNC: 149 MG/DL (ref 70–99)
HBA1C MFR BLD: 7.2 % (ref 0–5.6)
HDLC SERPL-MCNC: 53 MG/DL
LDLC SERPL CALC-MCNC: 53 MG/DL
MICROALBUMIN UR-MCNC: 10 MG/DL
MICROALBUMIN/CREAT UR: 38.46 MG/G CR (ref 0–17)
NONHDLC SERPL-MCNC: 78 MG/DL
POTASSIUM BLD-SCNC: 4.3 MMOL/L (ref 3.4–5.3)
PROT SERPL-MCNC: 7.3 G/DL (ref 6.8–8.8)
SODIUM SERPL-SCNC: 137 MMOL/L (ref 133–144)
TRIGL SERPL-MCNC: 127 MG/DL
TSH SERPL DL<=0.005 MIU/L-ACNC: 1.22 MU/L (ref 0.4–4)

## 2021-07-19 PROCEDURE — 83036 HEMOGLOBIN GLYCOSYLATED A1C: CPT

## 2021-07-19 PROCEDURE — 80053 COMPREHEN METABOLIC PANEL: CPT

## 2021-07-19 PROCEDURE — 80061 LIPID PANEL: CPT

## 2021-07-19 PROCEDURE — 36415 COLL VENOUS BLD VENIPUNCTURE: CPT

## 2021-07-19 PROCEDURE — 82043 UR ALBUMIN QUANTITATIVE: CPT

## 2021-07-19 PROCEDURE — 84443 ASSAY THYROID STIM HORMONE: CPT

## 2021-07-20 ENCOUNTER — DOCUMENTATION ONLY (OUTPATIENT)
Dept: OTHER | Facility: CLINIC | Age: 86
End: 2021-07-20

## 2021-07-21 ENCOUNTER — OFFICE VISIT (OUTPATIENT)
Dept: INTERNAL MEDICINE | Facility: CLINIC | Age: 86
End: 2021-07-21
Payer: MEDICARE

## 2021-07-21 VITALS
BODY MASS INDEX: 28.99 KG/M2 | TEMPERATURE: 97.9 F | RESPIRATION RATE: 16 BRPM | HEIGHT: 65 IN | HEART RATE: 76 BPM | SYSTOLIC BLOOD PRESSURE: 126 MMHG | WEIGHT: 174 LBS | DIASTOLIC BLOOD PRESSURE: 62 MMHG | OXYGEN SATURATION: 96 %

## 2021-07-21 DIAGNOSIS — I25.10 CORONARY ARTERY DISEASE INVOLVING NATIVE CORONARY ARTERY OF NATIVE HEART WITHOUT ANGINA PECTORIS: ICD-10-CM

## 2021-07-21 DIAGNOSIS — I44.2 COMPLETE HEART BLOCK (H): ICD-10-CM

## 2021-07-21 DIAGNOSIS — Z00.00 ENCOUNTER FOR SUBSEQUENT ANNUAL WELLNESS VISIT (AWV) IN MEDICARE PATIENT: Primary | ICD-10-CM

## 2021-07-21 DIAGNOSIS — Z00.00 ENCOUNTER FOR MEDICARE ANNUAL WELLNESS EXAM: ICD-10-CM

## 2021-07-21 DIAGNOSIS — I10 ESSENTIAL HYPERTENSION WITH GOAL BLOOD PRESSURE LESS THAN 140/90: ICD-10-CM

## 2021-07-21 DIAGNOSIS — R80.9 TYPE 2 DIABETES MELLITUS WITH MICROALBUMINURIA, WITHOUT LONG-TERM CURRENT USE OF INSULIN (H): ICD-10-CM

## 2021-07-21 DIAGNOSIS — E11.29 TYPE 2 DIABETES MELLITUS WITH MICROALBUMINURIA, WITHOUT LONG-TERM CURRENT USE OF INSULIN (H): ICD-10-CM

## 2021-07-21 DIAGNOSIS — I73.9 PERIPHERAL VASCULAR DISEASE (H): ICD-10-CM

## 2021-07-21 DIAGNOSIS — E78.5 HYPERLIPIDEMIA WITH TARGET LDL LESS THAN 100: ICD-10-CM

## 2021-07-21 DIAGNOSIS — G31.84 MILD COGNITIVE IMPAIRMENT: ICD-10-CM

## 2021-07-21 DIAGNOSIS — Z95.2 S/P TAVR (TRANSCATHETER AORTIC VALVE REPLACEMENT): ICD-10-CM

## 2021-07-21 DIAGNOSIS — Z95.0 CARDIAC PACEMAKER IN SITU: ICD-10-CM

## 2021-07-21 PROCEDURE — 99214 OFFICE O/P EST MOD 30 MIN: CPT | Mod: 25 | Performed by: INTERNAL MEDICINE

## 2021-07-21 PROCEDURE — G0439 PPPS, SUBSEQ VISIT: HCPCS | Performed by: INTERNAL MEDICINE

## 2021-07-21 SDOH — ECONOMIC STABILITY: FOOD INSECURITY: WITHIN THE PAST 12 MONTHS, YOU WORRIED THAT YOUR FOOD WOULD RUN OUT BEFORE YOU GOT MONEY TO BUY MORE.: NEVER TRUE

## 2021-07-21 SDOH — HEALTH STABILITY: PHYSICAL HEALTH: ON AVERAGE, HOW MANY DAYS PER WEEK DO YOU ENGAGE IN MODERATE TO STRENUOUS EXERCISE (LIKE A BRISK WALK)?: 0 DAYS

## 2021-07-21 SDOH — ECONOMIC STABILITY: TRANSPORTATION INSECURITY
IN THE PAST 12 MONTHS, HAS THE LACK OF TRANSPORTATION KEPT YOU FROM MEDICAL APPOINTMENTS OR FROM GETTING MEDICATIONS?: NO

## 2021-07-21 SDOH — ECONOMIC STABILITY: INCOME INSECURITY: IN THE LAST 12 MONTHS, WAS THERE A TIME WHEN YOU WERE NOT ABLE TO PAY THE MORTGAGE OR RENT ON TIME?: NO

## 2021-07-21 SDOH — HEALTH STABILITY: PHYSICAL HEALTH: ON AVERAGE, HOW MANY MINUTES DO YOU ENGAGE IN EXERCISE AT THIS LEVEL?: 0 MIN

## 2021-07-21 SDOH — ECONOMIC STABILITY: TRANSPORTATION INSECURITY
IN THE PAST 12 MONTHS, HAS LACK OF TRANSPORTATION KEPT YOU FROM MEETINGS, WORK, OR FROM GETTING THINGS NEEDED FOR DAILY LIVING?: NO

## 2021-07-21 SDOH — ECONOMIC STABILITY: FOOD INSECURITY: WITHIN THE PAST 12 MONTHS, THE FOOD YOU BOUGHT JUST DIDN'T LAST AND YOU DIDN'T HAVE MONEY TO GET MORE.: NEVER TRUE

## 2021-07-21 ASSESSMENT — SOCIAL DETERMINANTS OF HEALTH (SDOH)
WITHIN THE LAST YEAR, HAVE YOU BEEN KICKED, HIT, SLAPPED, OR OTHERWISE PHYSICALLY HURT BY YOUR PARTNER OR EX-PARTNER?: NO
WITHIN THE LAST YEAR, HAVE TO BEEN RAPED OR FORCED TO HAVE ANY KIND OF SEXUAL ACTIVITY BY YOUR PARTNER OR EX-PARTNER?: NO
WITHIN THE LAST YEAR, HAVE YOU BEEN HUMILIATED OR EMOTIONALLY ABUSED IN OTHER WAYS BY YOUR PARTNER OR EX-PARTNER?: NO
WITHIN THE LAST YEAR, HAVE YOU BEEN AFRAID OF YOUR PARTNER OR EX-PARTNER?: NO
HOW HARD IS IT FOR YOU TO PAY FOR THE VERY BASICS LIKE FOOD, HOUSING, MEDICAL CARE, AND HEATING?: NOT HARD AT ALL

## 2021-07-21 ASSESSMENT — LIFESTYLE VARIABLES
HOW OFTEN DO YOU HAVE SIX OR MORE DRINKS ON ONE OCCASION: NEVER
HOW OFTEN DO YOU HAVE A DRINK CONTAINING ALCOHOL: NEVER

## 2021-07-21 ASSESSMENT — ACTIVITIES OF DAILY LIVING (ADL): CURRENT_FUNCTION: TRANSPORTATION REQUIRES ASSISTANCE

## 2021-07-21 ASSESSMENT — MIFFLIN-ST. JEOR: SCORE: 1391.14

## 2021-07-21 NOTE — PATIENT INSTRUCTIONS
Everything looks fine today.     Refills of medication have been faxed to your pharmacy.     Recent lab results look fine.   Ask Dr Aparicio about lab test results (Vitamin B12, methylmalonic acid, Homocysteine). May also ask him about memory test results and restless leg symptoms.     See me in six months (January 2022) for a diabetes check, with labs a few days in advance if possible.   Patient Education   Personalized Prevention Plan  You are due for the preventive services outlined below.  Your care team is available to assist you in scheduling these services.  If you have already completed any of these items, please share that information with your care team to update in your medical record.  Health Maintenance Due   Topic Date Due     Zoster (Shingles) Vaccine (2 of 3) 12/05/2012     Eye Exam  09/25/2017     Diptheria Tetanus Pertussis (DTAP/TDAP/TD) Vaccine (4 - Td or Tdap) 05/17/2020       Preventing Falls at Home  A person can fall for many reasons. Older adults may fall because reaction time slows down as we age. Your muscles and joints may get stiff, weak, or less flexible because of illness, medicines, or a physical condition.   Other health problems that make falls more likely include:     Arthritis    Dizziness or lightheadedness when you stand up (orthostatic hypotension)    History of a stroke    Dizziness    Anemia    Certain medicines taken for mental illness or to control blood pressure.    Problems with balance or gait    Bladder or urinary problems    History of falling    Changes in vision (vision impairment)    Changes in thinking skills and memory (cognitive impairment)  Injuries from a fall can include serious injuries such as broken bones, dislocated joints, internal bleeding and cuts. Injuries like these can limit your independence.   Prevention tips  To help prevent falls and fall-related injuries, follow the tips below.    Floors  To make floors safer:     Put nonskid pads under area  rugs.    Remove small rugs.    Replace worn floor coverings.    Tack carpets firmly to each step on carpeted stairs. Put nonskid strips on the edges of uncarpeted stairs.    Keep floors and stairs free of clutter and cords.    Arrange furniture so there are clear pathways.    Clean up any spills right away.  Bathrooms    To make bathrooms safer:     Install grab bars in the tub or shower.    Apply nonskid strips or put a nonskid rubber mat in the tub or shower.    Sit on a bath chair to bathe.    Use bathmats with nonskid backing.  Lighting  To improve visibility in your home:      Keep a flashlight in each room. Or put a lamp next to the bed within easy reach.    Put nightlights in the bedrooms, hallways, kitchen, and bathrooms.    Make sure all stairways have good lighting.    Take your time when going up and down stairs.    Put handrails on both sides of stairs and in walkways for more support. To prevent injury to your wrist or arm, don t use handrails to pull yourself up.    Install grab bars to pull yourself up.    Move or rearrange items that you use often. This will make them easier to find or reach.    Look at your home to find any safety hazards. Especially look at doorways, walkways, and the driveway. Remove or repair any safety problems that you find.  Other changes to make    Look around to find any safety hazards. Look closely at doorways, walkways, and the driveway. Remove or repair any safety problems that you find.    Wear shoes that fit well.    Take your time when going up and down stairs.    Put handrails on both sides of stairs and in walkways for more support. To prevent injury to your wrist or arm, don t use handrails to pull yourself up.    Install grab bars wherever needed to pull yourself up.    Arrange items that you use often. This will make them easier to find or reach.    Eli last reviewed this educational content on 3/1/2020    6924-6633 [employer]. All rights  reserved. This information is not intended as a substitute for professional medical care. Always follow your healthcare professional's instructions.

## 2021-07-21 NOTE — PROGRESS NOTES
"SUBJECTIVE:   Reid Jimenes is a 87 year old male who presents for Preventive Visit.      Patient has been advised of split billing requirements and indicates understanding: Yes   Are you in the first 12 months of your Medicare coverage?  No    Healthy Habits:    In general, how would you rate your overall health?  Good    Frequency of exercise:  None    Duration of exercise:  Less than 15 minutes    Do you usually eat at least 4 servings of fruit and vegetables a day, include whole grains    & fiber and avoid regularly eating high fat or \"junk\" foods?  Yes    Taking medications regularly:  Yes    Barriers to taking medications:  None    Medication side effects:  None    Ability to successfully perform activities of daily living:  Transportation requires assistance    Home Safety:  No safety concerns identified    Hearing impairment symptoms: wears hearing assisted devices.    In the past 6 months, have you been bothered by leaking of urine?  No (frequency)    In general, how would you rate your overall mental or emotional health?  Good      PHQ-2 Total Score:    Additional concerns today:  Yes             Do you feel safe in your environment? Yes    Have you ever done Advance Care Planning? (For example, a Health Directive, POLST, or a discussion with a medical provider or your loved ones about your wishes): Yes, advance care planning is on file.       Fall risk  Fallen 2 or more times in the past year?: Yes  Any fall with injury in the past year?: Yes  Timed Up and Go Test (>13.5 is fall risk; contact physician) : 6    Cognitive Screening   1) Repeat 3 items (Leader, Season, Table)    2) Clock draw: NORMAL  3) 3 item recall: Recalls NO objects   Results: 0 items recalled: PROBABLE COGNITIVE IMPAIRMENT, **INFORM PROVIDER**    Mini-CogTM Copyright ARSEN Askew. Licensed by the author for use in [facility] [employer]; reprinted with permission ([email or web address]@.[email or web address]). All rights reserved.      Do you have sleep apnea, " "excessive snoring or daytime drowsiness?: no    Reviewed and updated as needed this visit by clinical staff  Tobacco  Allergies  Meds   Med Hx  Surg Hx  Fam Hx  Soc Hx        Reviewed and updated as needed this visit by Provider                Social History     Tobacco Use     Smoking status: Former Smoker     Packs/day: 1.00     Years: 30.00     Pack years: 30.00     Types: Cigarettes     Start date:      Quit date: 1980     Years since quittin.9     Smokeless tobacco: Former User     Types: Chew     Quit date: 1983   Substance Use Topics     Alcohol use: No     Comment: quit 30+ years ago. states, \"I'm an alcoholic\".     If you drink alcohol do you typically have >3 drinks per day or >7 drinks per week? No    No flowsheet data found.      PROBLEMS TO ADD ON...In addition to an Annual Wellness Exam, we addressed diabetes mellitus, hyperlipidemia, hypertension, mild cognitive impairment, cardiovascular disease.     He has seen Dr Aparciio of neurology for cognitive impairment, and cardiology for dysrhythmia and peripheral vascular disease.     Reviewed recent labs.   A1c at target.   LDL-Cholesterol at target.     Blood pressure appears satisfactorily controlled.        Current providers sharing in care for this patient include:   Patient Care Team:  Viet Bingham MD as PCP - General (Internal Medicine)  Viet Bingham MD as Assigned PCP  Des Marks MD as MD (Vascular and Interventional Radiology)  Mary Faulkner, NATALIE as Specialty Care Coordinator (Radiology)  Eulalio Higginbotham MD as Assigned Heart and Vascular Provider    The following health maintenance items are reviewed in Epic and correct as of today:  Health Maintenance Due   Topic Date Due     ZOSTER IMMUNIZATION (2 of 3) 2012     EYE EXAM  2017     DTAP/TDAP/TD IMMUNIZATION (4 - Td or Tdap) 2020     MEDICARE ANNUAL WELLNESS VISIT  07/10/2021     Pneumonia Vaccine:Patient has received both pneumonia " "vaccinations.      ROS: The patient wears hearing aids.  Occasional heartburn.  He notes urinary frequency.  Chronic low back pain without radicular symptoms, also left shoulder pain.  He wonders whether he might have restless legs.  He has recently had some itching and rash on his scrotal skin for which he is applied baby oil or lotion with some benefit.  Review of Systems  REVIEW OF SYSTEMS: The following systems have been completely reviewed and are negative except as noted above:   Constitutional, HEENT, respiratory, cardiovascular, gastrointestinal, genitourinary, musculoskeletal, dermatologic, hematologic, endocrine, psychiatric, and neurologic systems.      OBJECTIVE:   /62   Pulse 76   Temp 97.9  F (36.6  C) (Oral)   Resp 16   Ht 1.651 m (5' 5\")   Wt 78.9 kg (174 lb)   SpO2 96%   BMI 28.96 kg/m   Estimated body mass index is 28.96 kg/m  as calculated from the following:    Height as of this encounter: 1.651 m (5' 5\").    Weight as of this encounter: 78.9 kg (174 lb).     Physical Exam  GENERAL: healthy, alert and no distress  EYES: Eyes grossly normal to inspection, PERRL and conjunctivae and sclerae normal  HENT: ear canals and TM's normal, nose and mouth without ulcers or lesions  NECK: no adenopathy, no asymmetry, masses, or scars and thyroid normal to palpation  RESP: lungs clear to auscultation - no rales, rhonchi or wheezes  CV: regular rate and rhythm, normal S1 S2, no S3 or S4, no murmur, click or rub, no peripheral edema and peripheral pulses strong  ABDOMEN: soft, nontender, no hepatosplenomegaly, no masses and bowel sounds normal  MS: no gross musculoskeletal defects noted, no edema  SKIN: scrotal and proximal thigh erythema noted. Otherwise unremarkable.   NEURO: Normal strength and tone, mentation intact and speech normal  PSYCH: mentation appears normal, affect normal/bright    Diagnostic Test Results:  Labs reviewed in Epic    ASSESSMENT / PLAN:   (Z00.00) Encounter for subsequent " "annual wellness visit (AWV) in Medicare patient  (primary encounter diagnosis)  Comment: Stable health. See epic orders.       (E11.29,  R80.9) Type 2 diabetes mellitus with microalbuminuria, without long-term current use of insulin (H)  Comment: A1c at target. Continue current measures.   Plan: OFFICE/OUTPT VISIT,ESTLEVL III          (E78.5) Hyperlipidemia with target LDL less than 100  Comment: LDL at target. Continue current meds.   Plan: OFFICE/OUTPT VISIT,EST,LEVL III          (I10) Essential hypertension with goal blood pressure less than 140/90  Comment: BP at target. Continue current meds.    (G31.84) Mild cognitive impairment  Comment: Continue to follow with Neurology.   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  There is no to be about now       Patient has been advised of split billing requirements and indicates understanding: Yes  COUNSELING:  Reviewed preventive health counseling, as reflected in patient instructions    Estimated body mass index is 28.96 kg/m  as calculated from the following:    Height as of this encounter: 1.651 m (5' 5\").    Weight as of this encounter: 78.9 kg (174 lb).        He reports that he quit smoking about 40 years ago. His smoking use included cigarettes. He started smoking about 65 years ago. He has a 30.00 pack-year smoking history. He quit smokeless tobacco use about 38 years ago.  His smokeless tobacco use included chew.      Appropriate preventive services were discussed with this patient, including applicable screening as appropriate for cardiovascular disease, diabetes, osteopenia/osteoporosis, and glaucoma.  As appropriate for age/renetta, " discussed screening for colorectal cancer, prostate cancer, breast cancer, and cervical cancer. Checklist reviewing preventive services available has been given to the patient.    Reviewed patients plan of care and provided an AVS. The Basic Care Plan (routine screening as documented in Health Maintenance) for Reid meets the Care Plan requirement. This Care Plan has been established and reviewed with the Patient.    Counseling Resources:  ATP IV Guidelines  Pooled Cohorts Equation Calculator  Breast Cancer Risk Calculator  Breast Cancer: Medication to Reduce Risk  FRAX Risk Assessment  ICSI Preventive Guidelines  Dietary Guidelines for Americans, 2010  [employer] MyPlate  ASA Prophylaxis  Lung CA Screening    Viet Bingham MD,   [facility]    Identified Health Risks:    He is at risk for falling and has been provided with information to reduce the risk of falling at home.

## 2021-07-26 ENCOUNTER — OFFICE VISIT (OUTPATIENT)
Dept: CARDIOLOGY | Facility: CLINIC | Age: 86
End: 2021-07-26
Attending: INTERNAL MEDICINE
Payer: MEDICARE

## 2021-07-26 VITALS
BODY MASS INDEX: 28.76 KG/M2 | SYSTOLIC BLOOD PRESSURE: 128 MMHG | DIASTOLIC BLOOD PRESSURE: 64 MMHG | HEIGHT: 65 IN | WEIGHT: 172.6 LBS | HEART RATE: 72 BPM

## 2021-07-26 DIAGNOSIS — I35.0 SEVERE AORTIC STENOSIS: ICD-10-CM

## 2021-07-26 DIAGNOSIS — I44.2 CHB (COMPLETE HEART BLOCK) (H): Primary | ICD-10-CM

## 2021-07-26 DIAGNOSIS — E78.5 HYPERLIPIDEMIA WITH TARGET LDL LESS THAN 100: ICD-10-CM

## 2021-07-26 DIAGNOSIS — Z95.2 S/P TAVR (TRANSCATHETER AORTIC VALVE REPLACEMENT): ICD-10-CM

## 2021-07-26 DIAGNOSIS — Z95.0 CARDIAC PACEMAKER IN SITU: ICD-10-CM

## 2021-07-26 DIAGNOSIS — I10 ESSENTIAL HYPERTENSION WITH GOAL BLOOD PRESSURE LESS THAN 140/90: ICD-10-CM

## 2021-07-26 PROCEDURE — 99214 OFFICE O/P EST MOD 30 MIN: CPT | Performed by: NURSE PRACTITIONER

## 2021-07-26 ASSESSMENT — MIFFLIN-ST. JEOR: SCORE: 1384.79

## 2021-07-26 NOTE — PROGRESS NOTES
Cardiology Clinic Progress Note  Reid Jimenes MRN# [id number]   YOB: 1934 Age: 87 year old     Reason For Visit:  Hospital follow up    Primary Cardiologist:   Dr. Higginbotham          History of Presenting Illness:      Reid Jimenes is a pleasant 87 year old patient who carries a past medical history significant for mild nonobstructive coronary artery disease, hypertension, hyperlipidemia, left bundle branch block, pulmonary hypertension, type 2 diabetes, severe aortic stenosis status post TAVR using a 26 mm Greene S3 valve (8/2018)    On 6/9/2021, he was admitted to [facility] with symptomatic bradycardia.  Hospital work-up showed initial EKG showing junctional rhythm progressing to high degree AV block and loss of consciousness.  An urgent temporary pacemaker was placed and he was transferred to [facility] where he subsequently underwent a coronary angiogram that showed nonobstructive coronary artery disease, followed by permanent pacemaker implant.  A follow-up echocardiogram showed a well-seated bioprosthetic TAVR valve with a mean AOV pressure gradient of 12 to 14 mmHg, normal ejection fraction estimated at 60 to 65%, and normal RV size and function.no significant changes when compared to previous study on 9/29/2020.  The rest of his hospitalization was uncomplicated with the exception of delirium and combativeness which resolved the following day.  He returns to the office today accompanied by his wife for hospital follow-up.     He is feeling well on a cardiac standpoint with the exception of exertional shortness of breath, unchanged from baseline. He denies chest pain, PND, orthopnea, presyncope, syncope, edema, heart racing, or palpitations. Left pacemaker site has completely healed.     Recent device interrogation showed 7% atrial pacing and less than 1% atrial pacing, underlying rhythm is sinus rhythm in the 60s with first-degree AV block.  No atrial arrhythmias noted.  1 episode  of SVT noted during his hospitalization.    Blood pressure is well controlled at 128/64  Last BMP showed a sodium 137, potassium 4.3, BUN 19, creatinine 1.12, GFR 59  Hemoglobin 13.1 platelet 171.  Last lipid panel was excellent with a total cholesterol of 131, HDL 53, LDL 53, triglycerides 127  BMI 28.72    He does not engage in any routine exercise with the exception of minimal walking down the hallway of his living facility.  He is hopeful to get in a few rounds of golf after 8 weeks of arm precautions post pacemaker implant.  Follows a heart healthy, low-sodium, diabetic diet  Remains compliant with all medications.                   Assessment and Plan:     1.  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 Recent device interrogation showed 7% atrial pacing and less than 1% atrial pacing, underlying rhythm is sinus rhythm in the 60s with first-degree AV block.  No atrial arrhythmias noted.  1 episode of SVT noted during his hospitalization.  Pacer implant site well-healed.  Follow-up with device clinic as scheduled.     2.  Severe aortic stenosis -status post TAVR using a 26 mm Greene S3 valve (8/2018). A follow-up echocardiogram showed a well-seated bioprosthetic TAVR valve with a mean AOV pressure gradient of 12 to 14 mmHg, normal ejection fraction estimated at 60 to 65%, and normal RV size and function.no significant changes when compared to previous study on 9/29/2020.  Continue on current medical therapy    3.  Hypertension -well controlled  4.  Hyperlipidemia. -LDL at goal on low-dose statin.           Thank you for allowing me to participate in this delightful patient's care. I have recommended he follow up in 6 months with Dr. Higginbotham.       Lizz Love, SARY CNP         Review of Systems:     Review of Systems:  Skin:  Negative     Eyes:  Positive for glasses  ENT:  Positive for hearing loss  Respiratory:  Positive for dyspnea on exertion  Cardiovascular:     Positive for;fatigue  Gastroenterology: Negative    Genitourinary:  Positive for nocturia;urinary frequency  Musculoskeletal:  Negative    Neurologic:  Negative    Psychiatric:  Negative    Heme/Lymph/Imm:  Positive for allergies  Endocrine:  Positive for diabetes              Physical Exam:     GEN:  In general, this is a well nourished elderly male in no acute distress.  HEENT:  Pupils equal, round. Sclerae nonicteric. Clear oropharynx. Mucous membranes moist.  NECK: Supple, no masses appreciated. Trachea midline.  No JVD   C/V:  Regular rate and rhythm, systolic murmur, rub or gallop. No S3 or RV heave.   RESP: Respirations are unlabored. No use of accessory muscles. Clear to auscultation bilaterally without wheezing, rales, or rhonchi.  GI: Abdomen soft, nontender, nondistended. No HSM appreciated.   EXTREM: No LE edema. No cyanosis or clubbing.  NEURO: Alert and oriented, cooperative. Gait stable. No obvious focal deficits.   PSYCH: Normal affect.  SKIN: Warm and dry. No rashes or petechiae appreciated.          Past Medical History:     Past Medical History:   Diagnosis Date     Aortic valve stenosis, nonrheumatic     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: 26mm Edward Sabino 3 valve     Coronary artery disease     cardiac cath 7/24/18: mild non-obstructive disease     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    DDD PM 6/10/2021     Hyperlipidaemia LDL goal < 100      Hypertension      Need for SBE (subacute bacterial endocarditis) prophylaxis      Pulmonary hypertension (H)      Type 2 diabetes mellitus (H)               Past Surgical History:     Past Surgical History:   Procedure Laterality Date     CV HEART CATHETERIZATION WITH POSSIBLE INTERVENTION N/A 6/9/2021    Procedure: coronary angiogram;  Surgeon: Jayesh Rachel MD;  Location:  HEART CARDIAC CATH LAB     CV TEMPORARY PACEMAKER INSERTION N/A 6/9/2021    Procedure: Temporary Pacemaker Insertion;  Surgeon: Jayesh Rachel MD;  Location:  HEART CARDIAC  CATH LAB     EP PACEMAKER N/A 6/10/2021    Procedure: EP Pacemaker;  Surgeon: Magdiel Silver MD;  Location:  HEART CARDIAC CATH LAB     MANDIBLE SURGERY  1958     OTHER SURGICAL HISTORY      cardiac cath 7/24/18: mild non-obstructive disease     TRANSCATHETER AORTIC VALVE IMPLANT ANESTHESIA N/A 8/28/2018    Procedure: TRANSCATHETER AORTIC VALVE IMPLANT ANESTHESIA;  ANESTHESIA NEEDED FOR TAVR PROCEDURE;  Surgeon: GENERIC ANESTHESIA PROVIDER;  Location:  OR,  26mm Edward Sabino 3 valve              Allergies:   Fentanyl and Versed [midazolam]       Data:   All laboratory data reviewed:    LAST CHOLESTEROL:  Lab Results   Component Value Date    CHOL 131 07/19/2021    CHOL 134 01/12/2021     Lab Results   Component Value Date    HDL 53 07/19/2021    HDL 45 01/12/2021     Lab Results   Component Value Date    LDL 53 07/19/2021    LDL 66 01/12/2021     Lab Results   Component Value Date    TRIG 127 07/19/2021    TRIG 115 01/12/2021     Lab Results   Component Value Date    CHOLHDLRATIO 2.3 10/07/2015       LAST BMP:  Lab Results   Component Value Date     07/19/2021     06/12/2021      Lab Results   Component Value Date    POTASSIUM 4.3 07/19/2021    POTASSIUM 3.8 06/12/2021     Lab Results   Component Value Date    CHLORIDE 104 07/19/2021    CHLORIDE 108 06/12/2021     Lab Results   Component Value Date    DEEPTI 9.0 07/19/2021    DEEPTI 8.9 06/12/2021     Lab Results   Component Value Date    CO2 29 07/19/2021    CO2 27 06/12/2021     Lab Results   Component Value Date    BUN 19 07/19/2021    BUN 24 06/12/2021     Lab Results   Component Value Date    CR 1.12 07/19/2021    CR 1.18 06/12/2021     Lab Results   Component Value Date     07/19/2021     06/12/2021       LAST CBC:  Lab Results   Component Value Date    WBC 10.9 06/11/2021     Lab Results   Component Value Date    RBC 4.52 06/11/2021     Lab Results   Component Value Date    HGB 13.1 06/11/2021     Lab Results   Component Value  Date    HCT 40.1 06/11/2021     Lab Results   Component Value Date    MCV 89 06/11/2021     Lab Results   Component Value Date    MCH 29.0 06/11/2021     Lab Results   Component Value Date    MCHC 32.7 06/11/2021     Lab Results   Component Value Date    RDW 12.3 06/11/2021     Lab Results   Component Value Date     06/11/2021

## 2021-07-26 NOTE — PATIENT INSTRUCTIONS
Thanks for participating in a office visit with the [facility] clinic today.    Doing well on a cardiac standpoint  Left sided pacemaker site healing well.   Blood pressures well controlled   - Reviewed results of recent echocardiogram, labs, and medications  - encourage more walking, heart healthy diet.       Follow up in 6 months with Dr. Higginbotham    Please call my nurse at [phone] with any questions or concerns.    Scheduling phone number: [phone]  Reminder: Please bring in all current medications, over the counter supplements and vitamin bottles to your next appointment.

## 2021-07-26 NOTE — LETTER
7/26/2021    Viet Bingham MD, MD  [address] [facility] [address]  [facility] [address]    RE: Reid Jimenes       Dear Colleague,    I had the pleasure of seeing Reid Jimenes in the [facility].    Cardiology Clinic Progress Note  Reid Jimenes MRN# [id number]   YOB: 1934 Age: 87 year old     Reason For Visit:  Hospital follow up    Primary Cardiologist:   Dr. Higginbotham          History of Presenting Illness:      Reid Jimenes is a pleasant 87 year old patient who carries a past medical history significant for mild nonobstructive coronary artery disease, hypertension, hyperlipidemia, left bundle branch block, pulmonary hypertension, type 2 diabetes, severe aortic stenosis status post TAVR using a 26 mm Greene S3 valve (8/2018)    On 6/9/2021, he was admitted to [facility] with symptomatic bradycardia.  Hospital work-up showed initial EKG showing junctional rhythm progressing to high degree AV block and loss of consciousness.  An urgent temporary pacemaker was placed and he was transferred to [facility] where he subsequently underwent a coronary angiogram that showed nonobstructive coronary artery disease, followed by permanent pacemaker implant.  A follow-up echocardiogram showed a well-seated bioprosthetic TAVR valve with a mean AOV pressure gradient of 12 to 14 mmHg, normal ejection fraction estimated at 60 to 65%, and normal RV size and function.no significant changes when compared to previous study on 9/29/2020.  The rest of his hospitalization was uncomplicated with the exception of delirium and combativeness which resolved the following day.  He returns to the office today accompanied by his wife for hospital follow-up.     He is feeling well on a cardiac standpoint with the exception of exertional shortness of breath, unchanged from baseline. He denies chest pain, PND, orthopnea, presyncope, syncope, edema, heart racing, or  palpitations. Left pacemaker site has completely healed.     Recent device interrogation showed 7% atrial pacing and less than 1% atrial pacing, underlying rhythm is sinus rhythm in the 60s with first-degree AV block.  No atrial arrhythmias noted.  1 episode of SVT noted during his hospitalization.    Blood pressure is well controlled at 128/64  Last BMP showed a sodium 137, potassium 4.3, BUN 19, creatinine 1.12, GFR 59  Hemoglobin 13.1 platelet 171.  Last lipid panel was excellent with a total cholesterol of 131, HDL 53, LDL 53, triglycerides 127  BMI 28.72    He does not engage in any routine exercise with the exception of minimal walking down the hallway of his living facility.  He is hopeful to get in a few rounds of golf after 8 weeks of arm precautions post pacemaker implant.  Follows a heart healthy, low-sodium, diabetic diet  Remains compliant with all medications.                   Assessment and Plan:     1.  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 Recent device interrogation showed 7% atrial pacing and less than 1% atrial pacing, underlying rhythm is sinus rhythm in the 60s with first-degree AV block.  No atrial arrhythmias noted.  1 episode of SVT noted during his hospitalization.  Pacer implant site well-healed.  Follow-up with device clinic as scheduled.     2.  Severe aortic stenosis -status post TAVR using a 26 mm Greene S3 valve (8/2018). A follow-up echocardiogram showed a well-seated bioprosthetic TAVR valve with a mean AOV pressure gradient of 12 to 14 mmHg, normal ejection fraction estimated at 60 to 65%, and normal RV size and function.no significant changes when compared to previous study on 9/29/2020.  Continue on current medical therapy    3.  Hypertension -well controlled  4.  Hyperlipidemia. -LDL at goal on low-dose statin.           Thank you for allowing me to participate in this delightful patient's care. I have recommended he follow up in 6  months with Dr. Higginbotham.       Lizz Love, APRN CNP         Review of Systems:     Review of Systems:  Skin:  Negative     Eyes:  Positive for glasses  ENT:  Positive for hearing loss  Respiratory:  Positive for dyspnea on exertion  Cardiovascular:    Positive for;fatigue  Gastroenterology: Negative    Genitourinary:  Positive for nocturia;urinary frequency  Musculoskeletal:  Negative    Neurologic:  Negative    Psychiatric:  Negative    Heme/Lymph/Imm:  Positive for allergies  Endocrine:  Positive for diabetes              Physical Exam:     GEN:  In general, this is a well nourished elderly male in no acute distress.  HEENT:  Pupils equal, round. Sclerae nonicteric. Clear oropharynx. Mucous membranes moist.  NECK: Supple, no masses appreciated. Trachea midline.  No JVD   C/V:  Regular rate and rhythm, systolic murmur, rub or gallop. No S3 or RV heave.   RESP: Respirations are unlabored. No use of accessory muscles. Clear to auscultation bilaterally without wheezing, rales, or rhonchi.  GI: Abdomen soft, nontender, nondistended. No HSM appreciated.   EXTREM: No LE edema. No cyanosis or clubbing.  NEURO: Alert and oriented, cooperative. Gait stable. No obvious focal deficits.   PSYCH: Normal affect.  SKIN: Warm and dry. No rashes or petechiae appreciated.          Past Medical History:     Past Medical History:   Diagnosis Date     Aortic valve stenosis, nonrheumatic     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: 26mm Edward Sabino 3 valve     Coronary artery disease     cardiac cath 7/24/18: mild non-obstructive disease     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    DDD PM 6/10/2021     Hyperlipidaemia LDL goal < 100      Hypertension      Need for SBE (subacute bacterial endocarditis) prophylaxis      Pulmonary hypertension (H)      Type 2 diabetes mellitus (H)               Past Surgical History:     Past Surgical History:   Procedure Laterality Date     CV HEART CATHETERIZATION WITH POSSIBLE INTERVENTION N/A 6/9/2021     Procedure: coronary angiogram;  Surgeon: Jayesh Rachel MD;  Location:  HEART CARDIAC CATH LAB     CV TEMPORARY PACEMAKER INSERTION N/A 6/9/2021    Procedure: Temporary Pacemaker Insertion;  Surgeon: Jayesh Rachel MD;  Location:  HEART CARDIAC CATH LAB     EP PACEMAKER N/A 6/10/2021    Procedure: EP Pacemaker;  Surgeon: Magdiel Silver MD;  Location:  HEART CARDIAC CATH LAB     MANDIBLE SURGERY  1958     OTHER SURGICAL HISTORY      cardiac cath 7/24/18: mild non-obstructive disease     TRANSCATHETER AORTIC VALVE IMPLANT ANESTHESIA N/A 8/28/2018    Procedure: TRANSCATHETER AORTIC VALVE IMPLANT ANESTHESIA;  ANESTHESIA NEEDED FOR TAVR PROCEDURE;  Surgeon: GENERIC ANESTHESIA PROVIDER;  Location:  OR,  26mm Edward Sabino 3 valve              Allergies:   Fentanyl and Versed [midazolam]       Data:   All laboratory data reviewed:    LAST CHOLESTEROL:  Lab Results   Component Value Date    CHOL 131 07/19/2021    CHOL 134 01/12/2021     Lab Results   Component Value Date    HDL 53 07/19/2021    HDL 45 01/12/2021     Lab Results   Component Value Date    LDL 53 07/19/2021    LDL 66 01/12/2021     Lab Results   Component Value Date    TRIG 127 07/19/2021    TRIG 115 01/12/2021     Lab Results   Component Value Date    CHOLHDLRATIO 2.3 10/07/2015       LAST BMP:  Lab Results   Component Value Date     07/19/2021     06/12/2021      Lab Results   Component Value Date    POTASSIUM 4.3 07/19/2021    POTASSIUM 3.8 06/12/2021     Lab Results   Component Value Date    CHLORIDE 104 07/19/2021    CHLORIDE 108 06/12/2021     Lab Results   Component Value Date    DEEPTI 9.0 07/19/2021    DEEPTI 8.9 06/12/2021     Lab Results   Component Value Date    CO2 29 07/19/2021    CO2 27 06/12/2021     Lab Results   Component Value Date    BUN 19 07/19/2021    BUN 24 06/12/2021     Lab Results   Component Value Date    CR 1.12 07/19/2021    CR 1.18 06/12/2021     Lab Results   Component Value Date      07/19/2021     06/12/2021       LAST CBC:  Lab Results   Component Value Date    WBC 10.9 06/11/2021     Lab Results   Component Value Date    RBC 4.52 06/11/2021     Lab Results   Component Value Date    HGB 13.1 06/11/2021     Lab Results   Component Value Date    HCT 40.1 06/11/2021     Lab Results   Component Value Date    MCV 89 06/11/2021     Lab Results   Component Value Date    MCH 29.0 06/11/2021     Lab Results   Component Value Date    MCHC 32.7 06/11/2021     Lab Results   Component Value Date    RDW 12.3 06/11/2021     Lab Results   Component Value Date     06/11/2021               Thank you for allowing me to participate in the care of your patient.      Sincerely,     SARY [facility]  cc:   Eulalio Higginbotham MD  [facility]  [address] [place] [address]

## 2021-08-04 ENCOUNTER — HOSPITAL ENCOUNTER (OUTPATIENT)
Dept: ULTRASOUND IMAGING | Facility: CLINIC | Age: 86
Discharge: HOME | End: 2021-08-04
Attending: SURGERY | Admitting: SURGERY
Payer: MEDICARE

## 2021-08-04 DIAGNOSIS — I65.22 LEFT CAROTID ARTERY STENOSIS: ICD-10-CM

## 2021-08-04 PROCEDURE — 93882 EXTRACRANIAL UNI/LTD STUDY: CPT | Mod: LT

## 2021-08-10 ENCOUNTER — VIRTUAL VISIT (OUTPATIENT)
Dept: OTHER | Facility: CLINIC | Age: 86
End: 2021-08-10
Attending: SURGERY
Payer: MEDICARE

## 2021-08-10 DIAGNOSIS — I65.22 LEFT CAROTID ARTERY STENOSIS: Primary | ICD-10-CM

## 2021-08-10 DIAGNOSIS — Z95.2 HISTORY OF TRANSCATHETER AORTIC VALVE REPLACEMENT (TAVR): ICD-10-CM

## 2021-08-10 DIAGNOSIS — Z95.0 CARDIAC PACEMAKER IN SITU: ICD-10-CM

## 2021-08-10 PROCEDURE — 99442 PR PHYSICIAN TELEPHONE EVALUATION 11-20 MIN: CPT | Performed by: SURGERY

## 2021-08-10 NOTE — PROGRESS NOTES
Robi is a 87 year old who is being evaluated via a billable telephone visit.      What phone number would you like to be contacted at? [phone]  How would you like to obtain your AVS? Mary Chawla MA

## 2021-08-10 NOTE — PROGRESS NOTES
Reid Jimenes is an 87-year-old gentleman with significant cardiac comorbidities who I follow for a moderate asymptomatic left carotid stenosis.  He is status post TAVR in 2018.  Two months ago he had placement of a pacemaker for complete heart block.  He had annual left carotid ultrasound performed last week.  I am conducting a telephone interview with him to discuss those results.  His right side was not checked this year due to minimal less than 30% disease.  Over the past year he denies stroke, symptoms of TIA, or amaurosis.  At today's visit he was without complaints.    Imaging:  BILATERAL CAROTID ULTRASOUND   8/4/2021 11:13 AM      HISTORY: History of left carotid artery stenosis.     COMPARISON: July 28, 2020     LEFT CAROTID FINDINGS: There is moderate mixed atherosclerotic plaque  at the carotid bifurcation extending into the proximal internal  carotid artery.  Left ICA PSV:  273 cm/sec.  Left ICA EDV:  36 cm/sec.  Left ICA/CCA PSV Ratio:  3.69    These indicate  50 - 69%  diameter stenosis of the left ICA.    Left Vertebral: Antegrade flow.   Left ECA: Antegrade flow.      Causes of Decreased Accuracy:   None.                                                                       IMPRESSION:    50-69% diameter stenosis of the left ICA relative to the distal ICA  diameter.     JOSE LUIS GONSALEZ MD          ASSESSMENT:  Frail 87-year-old gentleman with significant cardiac comorbidities and an asymptomatic approximately 65% left carotid stenosis.  Presently no indication for surgical intervention.    RECOMMENDATION:  I reviewed all the above with Robi.  I strongly recommend continued observation.  He will continue his medical regimen including daily aspirin.  Vascular surgical follow-up will be in 1 year for repeat left carotid ultrasound.    Total length of this telephone conversation was 12 minutes.    Lewis Sotelo MD

## 2021-08-18 DIAGNOSIS — I65.22 LEFT CAROTID ARTERY STENOSIS: Primary | ICD-10-CM

## 2021-08-31 ENCOUNTER — TRANSFERRED RECORDS (OUTPATIENT)
Dept: HEALTH INFORMATION MANAGEMENT | Facility: CLINIC | Age: 86
End: 2021-08-31

## 2021-09-01 ENCOUNTER — ANCILLARY PROCEDURE (OUTPATIENT)
Dept: CARDIOLOGY | Facility: CLINIC | Age: 86
End: 2021-09-01
Attending: INTERNAL MEDICINE
Payer: MEDICARE

## 2021-09-01 DIAGNOSIS — I44.2 COMPLETE HEART BLOCK (H): ICD-10-CM

## 2021-09-01 PROCEDURE — 93280 PM DEVICE PROGR EVAL DUAL: CPT | Performed by: INTERNAL MEDICINE

## 2021-09-04 ENCOUNTER — HEALTH MAINTENANCE LETTER (OUTPATIENT)
Age: 86
End: 2021-09-04

## 2021-09-14 LAB
MDC_IDC_LEAD_IMPLANT_DT: NORMAL
MDC_IDC_LEAD_IMPLANT_DT: NORMAL
MDC_IDC_LEAD_LOCATION: NORMAL
MDC_IDC_LEAD_LOCATION: NORMAL
MDC_IDC_LEAD_LOCATION_DETAIL_1: NORMAL
MDC_IDC_LEAD_LOCATION_DETAIL_1: NORMAL
MDC_IDC_LEAD_MFG: NORMAL
MDC_IDC_LEAD_MFG: NORMAL
MDC_IDC_LEAD_MODEL: NORMAL
MDC_IDC_LEAD_MODEL: NORMAL
MDC_IDC_LEAD_POLARITY_TYPE: NORMAL
MDC_IDC_LEAD_POLARITY_TYPE: NORMAL
MDC_IDC_LEAD_SERIAL: NORMAL
MDC_IDC_LEAD_SERIAL: NORMAL
MDC_IDC_MSMT_BATTERY_STATUS: NORMAL
MDC_IDC_MSMT_LEADCHNL_RA_IMPEDANCE_VALUE: 765 OHM
MDC_IDC_MSMT_LEADCHNL_RA_PACING_THRESHOLD_AMPLITUDE: 1 V
MDC_IDC_MSMT_LEADCHNL_RA_PACING_THRESHOLD_PULSEWIDTH: 0.4 MS
MDC_IDC_MSMT_LEADCHNL_RA_SENSING_INTR_AMPL: 5.6 MV
MDC_IDC_MSMT_LEADCHNL_RV_IMPEDANCE_VALUE: 652 OHM
MDC_IDC_MSMT_LEADCHNL_RV_PACING_THRESHOLD_AMPLITUDE: 0.8 V
MDC_IDC_MSMT_LEADCHNL_RV_PACING_THRESHOLD_PULSEWIDTH: 0.4 MS
MDC_IDC_MSMT_LEADCHNL_RV_SENSING_INTR_AMPL: 20.9 MV
MDC_IDC_PG_IMPLANT_DTM: NORMAL
MDC_IDC_PG_MFG: NORMAL
MDC_IDC_PG_MODEL: NORMAL
MDC_IDC_PG_SERIAL: NORMAL
MDC_IDC_PG_TYPE: NORMAL
MDC_IDC_SESS_CLINIC_NAME: NORMAL
MDC_IDC_SESS_DTM: NORMAL
MDC_IDC_SESS_TYPE: NORMAL
MDC_IDC_SET_BRADY_AT_MODE_SWITCH_MODE: NORMAL
MDC_IDC_SET_BRADY_AT_MODE_SWITCH_RATE: 170 {BEATS}/MIN
MDC_IDC_SET_BRADY_HYSTRATE: NORMAL
MDC_IDC_SET_BRADY_LOWRATE: 60 {BEATS}/MIN
MDC_IDC_SET_BRADY_MAX_SENSOR_RATE: 130 {BEATS}/MIN
MDC_IDC_SET_BRADY_MAX_TRACKING_RATE: 130 {BEATS}/MIN
MDC_IDC_SET_BRADY_MODE: NORMAL
MDC_IDC_SET_BRADY_PAV_DELAY_HIGH: 150 MS
MDC_IDC_SET_BRADY_PAV_DELAY_LOW: 200 MS
MDC_IDC_SET_BRADY_SAV_DELAY_HIGH: 150 MS
MDC_IDC_SET_BRADY_SAV_DELAY_LOW: 200 MS
MDC_IDC_SET_LEADCHNL_RA_PACING_AMPLITUDE: 2 V
MDC_IDC_SET_LEADCHNL_RA_PACING_CAPTURE_MODE: NORMAL
MDC_IDC_SET_LEADCHNL_RA_PACING_POLARITY: NORMAL
MDC_IDC_SET_LEADCHNL_RA_PACING_PULSEWIDTH: 0.4 MS
MDC_IDC_SET_LEADCHNL_RA_SENSING_ADAPTATION_MODE: NORMAL
MDC_IDC_SET_LEADCHNL_RA_SENSING_POLARITY: NORMAL
MDC_IDC_SET_LEADCHNL_RA_SENSING_SENSITIVITY: 0.25 MV
MDC_IDC_SET_LEADCHNL_RV_PACING_AMPLITUDE: 1.5 V
MDC_IDC_SET_LEADCHNL_RV_PACING_CAPTURE_MODE: NORMAL
MDC_IDC_SET_LEADCHNL_RV_PACING_POLARITY: NORMAL
MDC_IDC_SET_LEADCHNL_RV_PACING_PULSEWIDTH: 0.4 MS
MDC_IDC_SET_LEADCHNL_RV_SENSING_ADAPTATION_MODE: NORMAL
MDC_IDC_SET_LEADCHNL_RV_SENSING_POLARITY: NORMAL
MDC_IDC_SET_LEADCHNL_RV_SENSING_SENSITIVITY: 1.5 MV
MDC_IDC_SET_ZONE_DETECTION_INTERVAL: 375 MS
MDC_IDC_SET_ZONE_TYPE: NORMAL
MDC_IDC_SET_ZONE_VENDOR_TYPE: NORMAL
MDC_IDC_STAT_EPISODE_RECENT_COUNT: 0
MDC_IDC_STAT_EPISODE_RECENT_COUNT_DTM_END: NORMAL
MDC_IDC_STAT_EPISODE_RECENT_COUNT_DTM_START: NORMAL
MDC_IDC_STAT_EPISODE_TOTAL_COUNT: 0
MDC_IDC_STAT_EPISODE_TOTAL_COUNT_DTM_END: NORMAL
MDC_IDC_STAT_EPISODE_TYPE: NORMAL
MDC_IDC_STAT_EPISODE_TYPE: NORMAL
MDC_IDC_STAT_EPISODE_VENDOR_TYPE: NORMAL
MDC_IDC_STAT_EPISODE_VENDOR_TYPE: NORMAL

## 2021-12-07 ENCOUNTER — ANCILLARY PROCEDURE (OUTPATIENT)
Dept: CARDIOLOGY | Facility: CLINIC | Age: 86
End: 2021-12-07
Attending: INTERNAL MEDICINE
Payer: MEDICARE

## 2021-12-07 DIAGNOSIS — I44.2 COMPLETE HEART BLOCK (H): ICD-10-CM

## 2021-12-07 LAB
MDC_IDC_EPISODE_DTM: NORMAL
MDC_IDC_EPISODE_DTM: NORMAL
MDC_IDC_EPISODE_ID: NORMAL
MDC_IDC_EPISODE_ID: NORMAL
MDC_IDC_EPISODE_TYPE: NORMAL
MDC_IDC_EPISODE_TYPE: NORMAL
MDC_IDC_LEAD_IMPLANT_DT: NORMAL
MDC_IDC_LEAD_IMPLANT_DT: NORMAL
MDC_IDC_LEAD_LOCATION: NORMAL
MDC_IDC_LEAD_LOCATION: NORMAL
MDC_IDC_LEAD_LOCATION_DETAIL_1: NORMAL
MDC_IDC_LEAD_LOCATION_DETAIL_1: NORMAL
MDC_IDC_LEAD_MFG: NORMAL
MDC_IDC_LEAD_MFG: NORMAL
MDC_IDC_LEAD_MODEL: NORMAL
MDC_IDC_LEAD_MODEL: NORMAL
MDC_IDC_LEAD_POLARITY_TYPE: NORMAL
MDC_IDC_LEAD_POLARITY_TYPE: NORMAL
MDC_IDC_LEAD_SERIAL: NORMAL
MDC_IDC_LEAD_SERIAL: NORMAL
MDC_IDC_MSMT_BATTERY_DTM: NORMAL
MDC_IDC_MSMT_BATTERY_REMAINING_LONGEVITY: 102 MO
MDC_IDC_MSMT_BATTERY_REMAINING_PERCENTAGE: 100 %
MDC_IDC_MSMT_BATTERY_STATUS: NORMAL
MDC_IDC_MSMT_LEADCHNL_RA_IMPEDANCE_VALUE: 739 OHM
MDC_IDC_MSMT_LEADCHNL_RA_PACING_THRESHOLD_AMPLITUDE: 0.5 V
MDC_IDC_MSMT_LEADCHNL_RA_PACING_THRESHOLD_PULSEWIDTH: 0.4 MS
MDC_IDC_MSMT_LEADCHNL_RV_IMPEDANCE_VALUE: 690 OHM
MDC_IDC_MSMT_LEADCHNL_RV_PACING_THRESHOLD_AMPLITUDE: 0.8 V
MDC_IDC_MSMT_LEADCHNL_RV_PACING_THRESHOLD_PULSEWIDTH: 0.4 MS
MDC_IDC_PG_IMPLANT_DTM: NORMAL
MDC_IDC_PG_MFG: NORMAL
MDC_IDC_PG_MODEL: NORMAL
MDC_IDC_PG_SERIAL: NORMAL
MDC_IDC_PG_TYPE: NORMAL
MDC_IDC_SESS_CLINIC_NAME: NORMAL
MDC_IDC_SESS_DTM: NORMAL
MDC_IDC_SESS_TYPE: NORMAL
MDC_IDC_SET_BRADY_AT_MODE_SWITCH_MODE: NORMAL
MDC_IDC_SET_BRADY_AT_MODE_SWITCH_RATE: 170 {BEATS}/MIN
MDC_IDC_SET_BRADY_LOWRATE: 60 {BEATS}/MIN
MDC_IDC_SET_BRADY_MAX_SENSOR_RATE: 130 {BEATS}/MIN
MDC_IDC_SET_BRADY_MAX_TRACKING_RATE: 130 {BEATS}/MIN
MDC_IDC_SET_BRADY_MODE: NORMAL
MDC_IDC_SET_BRADY_PAV_DELAY_HIGH: 150 MS
MDC_IDC_SET_BRADY_PAV_DELAY_LOW: 200 MS
MDC_IDC_SET_BRADY_SAV_DELAY_HIGH: 150 MS
MDC_IDC_SET_BRADY_SAV_DELAY_LOW: 200 MS
MDC_IDC_SET_LEADCHNL_RA_PACING_AMPLITUDE: 2 V
MDC_IDC_SET_LEADCHNL_RA_PACING_CAPTURE_MODE: NORMAL
MDC_IDC_SET_LEADCHNL_RA_PACING_POLARITY: NORMAL
MDC_IDC_SET_LEADCHNL_RA_PACING_PULSEWIDTH: 0.4 MS
MDC_IDC_SET_LEADCHNL_RA_SENSING_ADAPTATION_MODE: NORMAL
MDC_IDC_SET_LEADCHNL_RA_SENSING_POLARITY: NORMAL
MDC_IDC_SET_LEADCHNL_RA_SENSING_SENSITIVITY: 0.25 MV
MDC_IDC_SET_LEADCHNL_RV_PACING_AMPLITUDE: 1.4 V
MDC_IDC_SET_LEADCHNL_RV_PACING_CAPTURE_MODE: NORMAL
MDC_IDC_SET_LEADCHNL_RV_PACING_POLARITY: NORMAL
MDC_IDC_SET_LEADCHNL_RV_PACING_PULSEWIDTH: 0.4 MS
MDC_IDC_SET_LEADCHNL_RV_SENSING_ADAPTATION_MODE: NORMAL
MDC_IDC_SET_LEADCHNL_RV_SENSING_POLARITY: NORMAL
MDC_IDC_SET_LEADCHNL_RV_SENSING_SENSITIVITY: 1.5 MV
MDC_IDC_SET_ZONE_DETECTION_INTERVAL: 375 MS
MDC_IDC_SET_ZONE_TYPE: NORMAL
MDC_IDC_SET_ZONE_VENDOR_TYPE: NORMAL
MDC_IDC_STAT_AT_BURDEN_PERCENT: 0 %
MDC_IDC_STAT_AT_DTM_END: NORMAL
MDC_IDC_STAT_AT_DTM_START: NORMAL
MDC_IDC_STAT_BRADY_DTM_END: NORMAL
MDC_IDC_STAT_BRADY_DTM_START: NORMAL
MDC_IDC_STAT_BRADY_RA_PERCENT_PACED: 30 %
MDC_IDC_STAT_BRADY_RV_PERCENT_PACED: 30 %
MDC_IDC_STAT_EPISODE_RECENT_COUNT: 0
MDC_IDC_STAT_EPISODE_RECENT_COUNT_DTM_END: NORMAL
MDC_IDC_STAT_EPISODE_RECENT_COUNT_DTM_START: NORMAL
MDC_IDC_STAT_EPISODE_TYPE: NORMAL
MDC_IDC_STAT_EPISODE_VENDOR_TYPE: NORMAL

## 2021-12-07 PROCEDURE — 93294 REM INTERROG EVL PM/LDLS PM: CPT | Performed by: INTERNAL MEDICINE

## 2021-12-07 PROCEDURE — 93296 REM INTERROG EVL PM/IDS: CPT | Performed by: INTERNAL MEDICINE

## 2021-12-27 ENCOUNTER — TELEPHONE (OUTPATIENT)
Dept: CARDIOLOGY | Facility: CLINIC | Age: 86
End: 2021-12-27
Payer: MEDICARE

## 2021-12-27 NOTE — TELEPHONE ENCOUNTER
Wife calling and asking about travelling with [facility]. They are taking a bus trip to [place] and will be gone for the month of February. Recommended she take latitude monitor with her. They do not need to set it up on the bus ride but should set it up once they get to [place].

## 2022-01-14 ENCOUNTER — LAB (OUTPATIENT)
Dept: LAB | Facility: CLINIC | Age: 87
End: 2022-01-14
Payer: MEDICARE

## 2022-01-14 DIAGNOSIS — R80.9 TYPE 2 DIABETES MELLITUS WITH MICROALBUMINURIA, WITHOUT LONG-TERM CURRENT USE OF INSULIN (H): ICD-10-CM

## 2022-01-14 DIAGNOSIS — E11.29 TYPE 2 DIABETES MELLITUS WITH MICROALBUMINURIA, WITHOUT LONG-TERM CURRENT USE OF INSULIN (H): ICD-10-CM

## 2022-01-14 DIAGNOSIS — E78.5 HYPERLIPIDEMIA WITH TARGET LDL LESS THAN 100: ICD-10-CM

## 2022-01-14 LAB
ALBUMIN SERPL-MCNC: 3.6 G/DL (ref 3.4–5)
ALP SERPL-CCNC: 79 U/L (ref 40–150)
ALT SERPL W P-5'-P-CCNC: 27 U/L (ref 0–70)
ANION GAP SERPL CALCULATED.3IONS-SCNC: 4 MMOL/L (ref 3–14)
AST SERPL W P-5'-P-CCNC: 18 U/L (ref 0–45)
BILIRUB SERPL-MCNC: 0.4 MG/DL (ref 0.2–1.3)
BUN SERPL-MCNC: 20 MG/DL (ref 7–30)
CALCIUM SERPL-MCNC: 9.2 MG/DL (ref 8.5–10.1)
CHLORIDE BLD-SCNC: 107 MMOL/L (ref 94–109)
CHOLEST SERPL-MCNC: 119 MG/DL
CO2 SERPL-SCNC: 28 MMOL/L (ref 20–32)
CREAT SERPL-MCNC: 1.17 MG/DL (ref 0.66–1.25)
FASTING STATUS PATIENT QL REPORTED: YES
GFR SERPL CREATININE-BSD FRML MDRD: 60 ML/MIN/1.73M2
GLUCOSE BLD-MCNC: 153 MG/DL (ref 70–99)
HBA1C MFR BLD: 7.2 % (ref 0–5.6)
HDLC SERPL-MCNC: 50 MG/DL
LDLC SERPL CALC-MCNC: 45 MG/DL
NONHDLC SERPL-MCNC: 69 MG/DL
POTASSIUM BLD-SCNC: 4.4 MMOL/L (ref 3.4–5.3)
PROT SERPL-MCNC: 6.9 G/DL (ref 6.8–8.8)
SODIUM SERPL-SCNC: 139 MMOL/L (ref 133–144)
TRIGL SERPL-MCNC: 120 MG/DL

## 2022-01-14 PROCEDURE — 80061 LIPID PANEL: CPT

## 2022-01-14 PROCEDURE — 83036 HEMOGLOBIN GLYCOSYLATED A1C: CPT

## 2022-01-14 PROCEDURE — 80053 COMPREHEN METABOLIC PANEL: CPT

## 2022-01-14 PROCEDURE — 36415 COLL VENOUS BLD VENIPUNCTURE: CPT

## 2022-01-17 ENCOUNTER — TRANSFERRED RECORDS (OUTPATIENT)
Dept: MULTI SPECIALTY CLINIC | Facility: CLINIC | Age: 87
End: 2022-01-17
Payer: MEDICARE

## 2022-01-17 LAB — RETINOPATHY: NEGATIVE

## 2022-01-21 ENCOUNTER — OFFICE VISIT (OUTPATIENT)
Dept: INTERNAL MEDICINE | Facility: CLINIC | Age: 87
End: 2022-01-21
Payer: MEDICARE

## 2022-01-21 VITALS
BODY MASS INDEX: 28.99 KG/M2 | DIASTOLIC BLOOD PRESSURE: 62 MMHG | RESPIRATION RATE: 16 BRPM | SYSTOLIC BLOOD PRESSURE: 128 MMHG | OXYGEN SATURATION: 98 % | HEIGHT: 65 IN | HEART RATE: 79 BPM | WEIGHT: 174 LBS | TEMPERATURE: 97.7 F

## 2022-01-21 DIAGNOSIS — M54.42 MIDLINE LOW BACK PAIN WITH LEFT-SIDED SCIATICA, UNSPECIFIED CHRONICITY: ICD-10-CM

## 2022-01-21 DIAGNOSIS — I44.2 CHB (COMPLETE HEART BLOCK) (H): ICD-10-CM

## 2022-01-21 DIAGNOSIS — E11.29 TYPE 2 DIABETES MELLITUS WITH MICROALBUMINURIA, WITHOUT LONG-TERM CURRENT USE OF INSULIN (H): Primary | ICD-10-CM

## 2022-01-21 DIAGNOSIS — R80.9 TYPE 2 DIABETES MELLITUS WITH MICROALBUMINURIA, WITHOUT LONG-TERM CURRENT USE OF INSULIN (H): Primary | ICD-10-CM

## 2022-01-21 DIAGNOSIS — I73.9 PERIPHERAL VASCULAR DISEASE (H): ICD-10-CM

## 2022-01-21 DIAGNOSIS — Z95.0 CARDIAC PACEMAKER IN SITU: ICD-10-CM

## 2022-01-21 DIAGNOSIS — E78.5 HYPERLIPIDEMIA WITH TARGET LDL LESS THAN 100: ICD-10-CM

## 2022-01-21 PROCEDURE — 99207 PR FOOT EXAM NO CHARGE: CPT | Performed by: INTERNAL MEDICINE

## 2022-01-21 PROCEDURE — 99214 OFFICE O/P EST MOD 30 MIN: CPT | Performed by: INTERNAL MEDICINE

## 2022-01-21 RX ORDER — SIMVASTATIN 40 MG
20 TABLET ORAL AT BEDTIME
Qty: 45 TABLET | Refills: 3 | Status: SHIPPED | OUTPATIENT
Start: 2022-01-21 | End: 2022-07-19

## 2022-01-21 ASSESSMENT — ENCOUNTER SYMPTOMS
FEVER: 0
HEMATOCHEZIA: 0
NERVOUS/ANXIOUS: 0
FREQUENCY: 1
CONSTIPATION: 0
ABDOMINAL PAIN: 0
JOINT SWELLING: 0
WEAKNESS: 1
HEARTBURN: 0
SORE THROAT: 0
DIZZINESS: 0
SHORTNESS OF BREATH: 1
HEADACHES: 0
COUGH: 0
MYALGIAS: 1
EYE PAIN: 0
PALPITATIONS: 0
PARESTHESIAS: 0
DYSURIA: 0
DIARRHEA: 0
ARTHRALGIAS: 1
HEMATURIA: 0
CHILLS: 0
NAUSEA: 0

## 2022-01-21 ASSESSMENT — ACTIVITIES OF DAILY LIVING (ADL): CURRENT_FUNCTION: NO ASSISTANCE NEEDED

## 2022-01-21 ASSESSMENT — MIFFLIN-ST. JEOR: SCORE: 1391.14

## 2022-01-21 NOTE — PROGRESS NOTES
"  Assessment & Plan   (E11.29,  R80.9) Type 2 diabetes mellitus with microalbuminuria, without long-term current use of insulin (H)  (primary encounter diagnosis)  Comment: A1c at target. Continue current measures.   Plan: metFORMIN (GLUCOPHAGE) 1000 MG tablet, FOOT         EXAM          (E78.5) Hyperlipidemia with target LDL less than 100  Comment: LDL at target. Continue current meds.   Plan: simvastatin (ZOCOR) 40 MG tablet          (I73.9) Peripheral vascular disease (H)  Comment: Following with Dr oStelo of vascular surgery for history of carotid artery disease.     (I44.2) CHB (complete heart block) (H)  (Z95.0) Cardiac pacemaker in situ  Comment: F/u with cardiology as they advise.    (M54.42) Midline low back pain with left-sided sciatica, unspecified chronicity  Comment: Offered referral to nonsurgical spine surgery.   Plan: Spine  Referral             BMI:   Estimated body mass index is 28.96 kg/m  as calculated from the following:    Height as of this encounter: 1.651 m (5' 5\").    Weight as of this encounter: 78.9 kg (174 lb).       Patient Instructions         See me back for your Annual Wellness Visit in late July or later.       No follow-ups on file.    Viet Bingham MD,   [facility] JAYLAN Rosenbaum is a 87 year old who presents for the following health issues  accompanied by his spouse.    HPI     Diabetes Follow-up    How often are you checking your blood sugar? A few times a month  What time of day are you checking your blood sugars (select all that apply)?  Before and after meals  Have you had any blood sugars above 200?  No  Have you had any blood sugars below 70?  No    What symptoms do you notice when your blood sugar is low?  None    What concerns do you have today about your diabetes? None     Do you have any of these symptoms? (Select all that apply)  No numbness or tingling in feet.  No redness, sores or blisters on feet.  No complaints of excessive " thirst.  No reports of blurry vision.  No significant changes to weight.    Have you had a diabetic eye exam in the last 12 months? Yes- Date of last eye exam: 1/10/2021,  Location: [facility]        BP Readings from Last 2 Encounters:   01/21/22 128/62   07/26/21 128/64     Hemoglobin A1C POCT (%)   Date Value   06/09/2021 7.1 (H)   01/12/2021 6.9 (H)     Hemoglobin A1C (%)   Date Value   01/14/2022 7.2 (H)   07/19/2021 7.2 (H)     LDL Cholesterol Calculated (mg/dL)   Date Value   01/14/2022 45   07/19/2021 53   01/12/2021 66   07/10/2020 23         Hyperlipidemia Follow-Up      Are you regularly taking any medication or supplement to lower your cholesterol?   Yes- Zocor    Are you having muscle aches or other side effects that you think could be caused by your cholesterol lowering medication?  No    Hypertension Follow-up      Do you check your blood pressure regularly outside of the clinic? Yes     Are you following a low salt diet? Yes    Are your blood pressures ever more than 140 on the top number (systolic) OR more   than 90 on the bottom number (diastolic), for example 140/90? No      How many servings of fruits and vegetables do you eat daily?  2-3    On average, how many sweetened beverages do you drink each day (Examples: soda, juice, sweet tea, etc.  Do NOT count diet or artificially sweetened beverages)?   0    How many days per week do you exercise enough to make your heart beat faster? 3 or less    How many minutes a day do you exercise enough to make your heart beat faster? 9 or less    How many days per week do you miss taking your medication? 0      We reviewed results of recent labs.  LDL-Cholesterol and A1c at target.   BP satisfactorily controlled on current meds.     He has been bothered by low back pain. Pain in midline, with no radicular pain, numbness or weakness. Pain is worst in the morning after awakening.   We discussed options including Physical Therapy or non-surgical spine clinic  "referral.   He is about to travel to a warmer climate in a week.     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    Past medical, family and social histories as well as medications reviewed and updated as needed.    Review of Systems   Constitutional: Negative for chills and fever.   HENT: Positive for ear pain and hearing loss. Negative for congestion and sore throat.    Eyes: Negative for pain and visual disturbance.   Respiratory: Positive for shortness of breath. Negative for cough.    Cardiovascular: Negative for chest pain, palpitations and peripheral edema.   Gastrointestinal: Negative for abdominal pain, constipation, diarrhea, heartburn, hematochezia and nausea.   Genitourinary: Positive for frequency. Negative for dysuria, genital sores, hematuria, impotence, penile discharge and urgency.   Musculoskeletal: Positive for arthralgias and myalgias. Negative for joint swelling.   Skin: Negative for rash.   Neurological: Positive for weakness. Negative for dizziness, headaches and paresthesias.   Psychiatric/Behavioral: Negative for mood changes. The patient is not nervous/anxious.           Objective    /62   Pulse 79   Temp 97.7  F (36.5  C) (Tympanic)   Resp 16   Ht 1.651 m (5' 5\")   Wt 78.9 kg (174 lb)   SpO2 98%   BMI 28.96 kg/m    Body mass index is 28.96 kg/m .  Physical Exam   GENERAL: healthy, alert and no distress  RESP: lungs clear to auscultation - no rales, rhonchi or wheezes  CV: regular rate and rhythm, normal S1 S2, no S3 or S4, no murmur, click or rub, no peripheral edema and peripheral pulses strong  MS: no gross musculoskeletal defects noted, no edema  NEURO: Normal strength and tone, mentation intact and speech normal  Diabetic foot exam: normal DP and PT pulses, no trophic changes or ulcerative lesions and normal sensory exam          "

## 2022-01-21 NOTE — PROGRESS NOTES
"SUBJECTIVE:   Reid Jimenes is a 87 year old male who presents for Preventive Visit.      Patient has been advised of split billing requirements and indicates understanding: Yes  Are you in the first 12 months of your Medicare coverage?  No    Healthy Habits:     In general, how would you rate your overall health?  Good    Frequency of exercise:  2-3 days/week    Duration of exercise:  15-30 minutes    Do you usually eat at least 4 servings of fruit and vegetables a day, include whole grains    & fiber and avoid regularly eating high fat or \"junk\" foods?  Yes    Taking medications regularly:  Yes    Medication side effects:  None    Ability to successfully perform activities of daily living:  No assistance needed    Home Safety:  No safety concerns identified    Hearing Impairment:  Difficulty following a conversation in a noisy restaurant or crowded room, feel that people are mumbling or not speaking clearly, difficulty following dialogue in the theater, difficult to understand a speaker at a public meeting or [employer] service, need to ask people to speak up or repeat themselves, find that men's voices are easier to understand than woman's, difficulty understanding soft or whispered speech and difficulty understanding speech on the telephone    In the past 6 months, have you been bothered by leaking of urine?  No    In general, how would you rate your overall mental or emotional health?  Good      PHQ-2 Total Score: 0    Additional concerns today:  Yes    Do you feel safe in your environment? No    Have you ever done Advance Care Planning? (For example, a Health Directive, POLST, or a discussion with a medical provider or your loved ones about your wishes): Yes, advance care planning is on file.       Fall risk  Fallen 2 or more times in the past year?: No  Any fall with injury in the past year?: Yes  Timed Up and Go Test (>13.5 is fall risk; contact physician) : 5    Cognitive Screening   1) Repeat 3 items " "(Leader, Season, Table)    2) Clock draw: NORMAL  3) 3 item recall: Recalls NO objects   Results: 0 items recalled: PROBABLE COGNITIVE IMPAIRMENT, **INFORM PROVIDER**    Mini-CogTM Copyright ARSEN Askew. Licensed by the author for use in [facility]; reprinted with permission (candelario@[facility]). All rights reserved.      Do you have sleep apnea, excessive snoring or daytime drowsiness?: no    Reviewed and updated as needed this visit by clinical staff  Tobacco  Allergies  Meds   Med Hx  Surg Hx  Fam Hx  Soc Hx       Reviewed and updated as needed this visit by Provider               Social History     Tobacco Use     Smoking status: Former Smoker     Packs/day: 1.00     Years: 30.00     Pack years: 30.00     Types: Cigarettes     Start date:      Quit date: 1980     Years since quittin.4     Smokeless tobacco: Former User     Types: Chew     Quit date: 1983   Substance Use Topics     Alcohol use: No     Comment: quit 30+ years ago. states, \"I'm an alcoholic\".     If you drink alcohol do you typically have >3 drinks per day or >7 drinks per week? No    Alcohol Use 2022   Prescreen: >3 drinks/day or >7 drinks/week? No   Prescreen: >3 drinks/day or >7 drinks/week? -       {Outside tests to abstract? :656255}    {additional problems to add (Optional):653741}    Current providers sharing in care for this patient include: {Rooming staff:  Please update Care Team in Rooming Activity, refresh this note and then delete this statement}  Patient Care Team:  Viet Bingham MD as PCP - General (Internal Medicine)  Viet Bingham MD as Assigned PCP  Des Marks MD as MD (Vascular and Interventional Radiology)  Mary Faulkner, RN as Specialty Care Coordinator (Radiology)  Ez Sotelo MD as Assigned Heart and Vascular Provider  Eulalio Higginbotham MD as MD (Cardiovascular Disease)    The following health maintenance items are reviewed in Epic and correct as of today:  Health " "Maintenance Due   Topic Date Due     ZOSTER IMMUNIZATION (2 of 3) 2012     EYE EXAM  2017     DTAP/TDAP/TD IMMUNIZATION (4 - Td or Tdap) 2020     COVID-19 Vaccine (3 - Booster for Moderna series) 07/15/2021     INFLUENZA VACCINE (1) 2021     DIABETIC FOOT EXAM  2022     {Chronicprobdata (optional):419995}  {Decision Support (Optional):833652}        Review of Systems   Constitutional: Negative for chills and fever.   HENT: Positive for ear pain and hearing loss. Negative for congestion and sore throat.    Eyes: Negative for pain and visual disturbance.   Respiratory: Positive for shortness of breath. Negative for cough.    Cardiovascular: Negative for chest pain, palpitations and peripheral edema.   Gastrointestinal: Negative for abdominal pain, constipation, diarrhea, heartburn, hematochezia and nausea.   Genitourinary: Positive for frequency. Negative for dysuria, genital sores, hematuria, impotence, penile discharge and urgency.   Musculoskeletal: Positive for arthralgias and myalgias. Negative for joint swelling.   Skin: Negative for rash.   Neurological: Positive for weakness. Negative for dizziness, headaches and paresthesias.   Psychiatric/Behavioral: Negative for mood changes. The patient is not nervous/anxious.      {ROS COMP (Optional):179367}    OBJECTIVE:   /62   Pulse 79   Temp 97.7  F (36.5  C) (Tympanic)   Resp 16   Ht 1.651 m (5' 5\")   Wt 78.9 kg (174 lb)   SpO2 98%   BMI 28.96 kg/m   Estimated body mass index is 28.96 kg/m  as calculated from the following:    Height as of this encounter: 1.651 m (5' 5\").    Weight as of this encounter: 78.9 kg (174 lb).  Physical Exam  {Exam (Optional) :424898}    {Diagnostic Test Results (Optional):605845::\"Diagnostic Test Results:\",\"Labs reviewed in Epic\"}    ASSESSMENT / PLAN:   {Diag Picklist:221608}    {Patient advised of split billing (Optional):365862}    COUNSELING:  {Medicare Counselin}    Estimated body " "mass index is 28.96 kg/m  as calculated from the following:    Height as of this encounter: 1.651 m (5' 5\").    Weight as of this encounter: 78.9 kg (174 lb).    {Weight Management Plan (ACO) Complete if BMI is abnormal-  Ages 18-64  BMI >24.9.  Age 65+ with BMI <23 or >30 (Optional):431492}    He reports that he quit smoking about 41 years ago. His smoking use included cigarettes. He started smoking about 66 years ago. He has a 30.00 pack-year smoking history. He quit smokeless tobacco use about 39 years ago.  His smokeless tobacco use included chew.      Appropriate preventive services were discussed with this patient, including applicable screening as appropriate for cardiovascular disease, diabetes, osteopenia/osteoporosis, and glaucoma.  As appropriate for age/gender, discussed screening for colorectal cancer, prostate cancer, breast cancer, and cervical cancer. Checklist reviewing preventive services available has been given to the patient.    Reviewed patients plan of care and provided an AVS. The {CarePlan:722713} for Reid meets the Care Plan requirement. This Care Plan has been established and reviewed with the {PATIENT, FAMILY MEMBER, CAREGIVER:522263}.    Counseling Resources:  ATP IV Guidelines  Pooled Cohorts Equation Calculator  Breast Cancer Risk Calculator  Breast Cancer: Medication to Reduce Risk  FRAX Risk Assessment  ICSI Preventive Guidelines  Dietary Guidelines for Americans, 2010  USDA's MyPlate  ASA Prophylaxis  Lung CA Screening    Viet Bingham MD, MD  [facility]    Identified Health Risks:  "

## 2022-03-08 ENCOUNTER — ANCILLARY PROCEDURE (OUTPATIENT)
Dept: CARDIOLOGY | Facility: CLINIC | Age: 87
End: 2022-03-08
Attending: INTERNAL MEDICINE
Payer: MEDICARE

## 2022-03-08 DIAGNOSIS — Z95.0 CARDIAC PACEMAKER IN SITU: ICD-10-CM

## 2022-03-08 PROCEDURE — 93294 REM INTERROG EVL PM/LDLS PM: CPT | Performed by: INTERNAL MEDICINE

## 2022-03-08 PROCEDURE — 93296 REM INTERROG EVL PM/IDS: CPT | Performed by: INTERNAL MEDICINE

## 2022-03-09 NOTE — PROGRESS NOTES
CARDIOLOGY VISIT    REASON FOR VISIT: TAVR, pacemaker    SUBJECTIVE:  88-year-old male seen for TAVR and pacemaker.  He also has nonocclusive coronary disease, hypertension, dyslipidemia, type 2 diabetes, and carotid artery disease.      August 2018 he underwent TAVR with a 26 mm Greene Sabino 3 valve. Preop angiogram showed mild coronary disease. He subsequently developed a left bundle branch block.      Echo January 2020 showed EF 55%, TAVR with mean 15 mmHg, V-max 2.7 m/s, DI 0.42     Echo August 2020 showed EF 60%, normal RV, TAVR with mean 17 mmHg, V-max 2.8 m/s, DI 0.52, no AI.      June 2021 he presented with syncope and high-grade heart block.  He underwent dual-chamber pacemaker implantation ([employer] model L331).    Echo June 2021 showed EF 60%, TAVR with mean 14 mmHg, V-max 2.6 m/s, DI 0.46.    Device check March 2022 showed 27% atrial paced, 33% ventricular paced, no arrhythmias, battery 8 years.    Overall has been doing fairly well.  He admits to some exertional dyspnea, [place].  He denies chest pain, lightheadedness, dizziness, or lower extremity edema.    MEDICATIONS:  Current Outpatient Medications   Medication     ascorbic acid 500 MG TABS     aspirin 81 MG EC tablet     biotin 5 MG CAPS     blood glucose (MIRA CONTOUR NEXT) test strip     blood glucose monitoring (MIRA MICROLET) lancets     glucosamine-chondroitin 500-400 MG CAPS per capsule     metFORMIN (GLUCOPHAGE) 1000 MG tablet     Multiple Vitamins-Iron (MULTIVITAMIN/IRON PO)     simvastatin (ZOCOR) 40 MG tablet     No current facility-administered medications for this visit.       ALLERGIES:  Allergies   Allergen Reactions     Fentanyl Other (See Comments)     Confusion and agitation after PPM     Versed [Midazolam] Other (See Comments)     Confusion and agitation post PPM       REVIEW OF SYSTEMS:  Constitutional:  No weight loss, fever, chills  HEENT:  Eyes:  No visual loss,  "blurred vision, double vision or yellow sclerae. No hearing loss, sneezing, congestion, runny nose or sore throat.  Skin:  No rash or itching.  Cardiovascular: per HPI  Respiratory: per HPI  GI:  No anorexia, nausea, vomiting or diarrhea. No abdominal pain or blood.  :  No dysurea, hematuria  Neurologic:  No headache, paralysis, ataxia, numbness or tingling in the extremities. No change in bowel or bladder control.  Musculoskeletal:  No muscle pain  Hematologic:  No bleeding or bruising.  Lymphatics:  No enlarged nodes. No history of splenectomy.  Endocrine:  No reports of sweating, cold or heat intolerance. No polyuria or polydipsia.  Allergies:  No history of asthma, hives, eczema or rhinitis.    PHYSICAL EXAM:  /70 (BP Location: Right arm, Patient Position: Sitting, Cuff Size: Adult Regular)   Pulse 68   Ht 1.651 m (5' 5\")   Wt 81 kg (178 lb 9.6 oz)   SpO2 98%   BMI 29.72 kg/m      Constitutional: awake, alert, no distress  Eyes: PERRL, sclera nonicteric  ENT: trachea midline  Respiratory: Lungs clear  Cardiovascular: Regular rate and rhythm, soft 2/6 systolic murmur at the sternal border, no edema  GI: nondistended, nontender, bowel sounds present  Lymph/Hematologic: no lymphadenopathy  Skin: dry, no rash  Musculoskeletal: good muscle tone, strength 5/5 in upper and lower extremities  Neurologic: no focal deficits  Neuropsychiatric: appropriate affact    DATA:  Lab: January 2022: Potassium 4.4, creatinine 1.2  Recent Labs   Lab Test 01/14/22  0840 07/19/21  0758 04/07/16  0802 10/07/15  0737 04/17/15  0812   CHOL 119 131   < > 97 106   HDL 50 53   < > 43 49   LDL 45 53   < > 33 40   TRIG 120 127   < > 106 86   CHOLHDLRATIO  --   --   --  2.3 2.2    < > = values in this interval not displayed.     ASSESSMENT:  88-year-old male seen for TAVR and pacemaker.  He appears euvolemic on exam.  Pacemaker is functioning normally.  It is unclear if he has any cardiac cause for his exertional dyspnea.  BNP and " echo will be checked.    RECOMMENDATIONS:  1.  Status post TAVR  -Echocardiogram    2.  Pacemaker  -Continue routine device checks    3.  Exertional dyspnea  -NT proBNP    Follow-up in 6 months with VINOD.    Eulalio Higginbotham MD  Cardiology - [facility]  Pager:  [phone]  Text Page  March 11, 2022

## 2022-03-11 ENCOUNTER — OFFICE VISIT (OUTPATIENT)
Dept: CARDIOLOGY | Facility: CLINIC | Age: 87
End: 2022-03-11
Attending: NURSE PRACTITIONER
Payer: MEDICARE

## 2022-03-11 ENCOUNTER — LAB (OUTPATIENT)
Dept: LAB | Facility: CLINIC | Age: 87
End: 2022-03-11
Payer: MEDICARE

## 2022-03-11 VITALS
HEART RATE: 68 BPM | WEIGHT: 178.6 LBS | HEIGHT: 65 IN | BODY MASS INDEX: 29.76 KG/M2 | OXYGEN SATURATION: 98 % | DIASTOLIC BLOOD PRESSURE: 70 MMHG | SYSTOLIC BLOOD PRESSURE: 124 MMHG

## 2022-03-11 DIAGNOSIS — Z95.2 S/P TAVR (TRANSCATHETER AORTIC VALVE REPLACEMENT): ICD-10-CM

## 2022-03-11 DIAGNOSIS — R06.09 DYSPNEA ON EXERTION: ICD-10-CM

## 2022-03-11 DIAGNOSIS — I35.0 SEVERE AORTIC STENOSIS: ICD-10-CM

## 2022-03-11 DIAGNOSIS — I44.2 CHB (COMPLETE HEART BLOCK) (H): ICD-10-CM

## 2022-03-11 DIAGNOSIS — R06.09 DYSPNEA ON EXERTION: Primary | ICD-10-CM

## 2022-03-11 LAB — NT-PROBNP SERPL-MCNC: 859 PG/ML (ref 0–450)

## 2022-03-11 PROCEDURE — 99214 OFFICE O/P EST MOD 30 MIN: CPT | Performed by: INTERNAL MEDICINE

## 2022-03-11 PROCEDURE — 36415 COLL VENOUS BLD VENIPUNCTURE: CPT | Performed by: INTERNAL MEDICINE

## 2022-03-11 PROCEDURE — 83880 ASSAY OF NATRIURETIC PEPTIDE: CPT | Performed by: INTERNAL MEDICINE

## 2022-03-11 NOTE — PATIENT INSTRUCTIONS
Please keep your medications the same.    Your recent pacemaker check showed that everything is working normally, there is 8 years remaining on the battery.    Echocardiogram  Will schedule an echocardiogram, or ultrasound of the heart.  This looks at the size and function of the heart and valves.  It generally takes about 20-30 minutes.  This will tell if there is any reduced heart function or problem with any of the valves.

## 2022-03-11 NOTE — LETTER
3/11/2022    Viet Bingham MD, MD  [address] [facility] [address]  [facility] [place] [address]    RE: Reid Baroncar       Dear Colleague,     I had the pleasure of seeing Reid METCALF Yudy in the [facility].  CARDIOLOGY VISIT    REASON FOR VISIT: TAVR, pacemaker    SUBJECTIVE:  88-year-old male seen for TAVR and pacemaker.  He also has nonocclusive coronary disease, hypertension, dyslipidemia, type 2 diabetes, and carotid artery disease.      August 2018 he underwent TAVR with a 26 mm Greene Sabino 3 valve. Preop angiogram showed mild coronary disease. He subsequently developed a left bundle branch block.      Echo January 2020 showed EF 55%, TAVR with mean 15 mmHg, V-max 2.7 m/s, DI 0.42     Echo August 2020 showed EF 60%, normal RV, TAVR with mean 17 mmHg, V-max 2.8 m/s, DI 0.52, no AI.      June 2021 he presented with syncope and high-grade heart block.  He underwent dual-chamber pacemaker implantation ([employer] model L331).    Echo June 2021 showed EF 60%, TAVR with mean 14 mmHg, V-max 2.6 m/s, DI 0.46.    Device check March 2022 showed 27% atrial paced, 33% ventricular paced, no arrhythmias, battery 8 years.    Overall has been doing fairly well.  He admits to some exertional dyspnea, [place].  He denies chest pain, lightheadedness, dizziness, or lower extremity edema.    MEDICATIONS:  Current Outpatient Medications   Medication     ascorbic acid 500 MG TABS     aspirin 81 MG EC tablet     biotin 5 MG CAPS     blood glucose (MIRA CONTOUR NEXT) test strip     blood glucose monitoring (MIRA MICROLET) lancets     glucosamine-chondroitin 500-400 MG CAPS per capsule     metFORMIN (GLUCOPHAGE) 1000 MG tablet     Multiple Vitamins-Iron (MULTIVITAMIN/IRON PO)     simvastatin (ZOCOR) 40 MG tablet     No current facility-administered medications for this visit.       ALLERGIES:  Allergies   Allergen Reactions     Fentanyl Other (See Comments)      "Confusion and agitation after PPM     Versed [Midazolam] Other (See Comments)     Confusion and agitation post PPM       REVIEW OF SYSTEMS:  Constitutional:  No weight loss, fever, chills  HEENT:  Eyes:  No visual loss, blurred vision, double vision or yellow sclerae. No hearing loss, sneezing, congestion, runny nose or sore throat.  Skin:  No rash or itching.  Cardiovascular: per HPI  Respiratory: per HPI  GI:  No anorexia, nausea, vomiting or diarrhea. No abdominal pain or blood.  :  No dysurea, hematuria  Neurologic:  No headache, paralysis, ataxia, numbness or tingling in the extremities. No change in bowel or bladder control.  Musculoskeletal:  No muscle pain  Hematologic:  No bleeding or bruising.  Lymphatics:  No enlarged nodes. No history of splenectomy.  Endocrine:  No reports of sweating, cold or heat intolerance. No polyuria or polydipsia.  Allergies:  No history of asthma, hives, eczema or rhinitis.    PHYSICAL EXAM:  /70 (BP Location: Right arm, Patient Position: Sitting, Cuff Size: Adult Regular)   Pulse 68   Ht 1.651 m (5' 5\")   Wt 81 kg (178 lb 9.6 oz)   SpO2 98%   BMI 29.72 kg/m      Constitutional: awake, alert, no distress  Eyes: PERRL, sclera nonicteric  ENT: trachea midline  Respiratory: Lungs clear  Cardiovascular: Regular rate and rhythm, soft 2/6 systolic murmur at the sternal border, no edema  GI: nondistended, nontender, bowel sounds present  Lymph/Hematologic: no lymphadenopathy  Skin: dry, no rash  Musculoskeletal: good muscle tone, strength 5/5 in upper and lower extremities  Neurologic: no focal deficits  Neuropsychiatric: appropriate affact    DATA:  Lab: January 2022: Potassium 4.4, creatinine 1.2  Recent Labs   Lab Test 01/14/22  0840 07/19/21  0758 04/07/16  0802 10/07/15  0737 04/17/15  0812   CHOL 119 131   < > 97 106   HDL 50 53   < > 43 49   LDL 45 53   < > 33 40   TRIG 120 127   < > 106 86   CHOLHDLRATIO  --   --   --  2.3 2.2    < > = values in this interval not " displayed.     ASSESSMENT:  88-year-old male seen for TAVR and pacemaker.  He appears euvolemic on exam.  Pacemaker is functioning normally.  It is unclear if he has any cardiac cause for his exertional dyspnea.  BNP and echo will be checked.    RECOMMENDATIONS:  1.  Status post TAVR  -Echocardiogram    2.  Pacemaker  -Continue routine device checks    3.  Exertional dyspnea  -NT proBNP    Follow-up in 6 months with VINOD.    Eulalio Higginbotham MD  Cardiology - [facility]  Pager:  [phone]  Text Page  March 11, 2022    Thank you for allowing me to participate in the care of your patient.      Sincerely,   Eulalio Higginbotham MD   [facility]      cc: Lizz Love, APRN CNP  [address] [place] [address]

## 2022-03-14 ENCOUNTER — OFFICE VISIT (OUTPATIENT)
Dept: ORTHOPEDICS | Facility: CLINIC | Age: 87
End: 2022-03-14
Attending: INTERNAL MEDICINE
Payer: MEDICARE

## 2022-03-14 VITALS
BODY MASS INDEX: 29.66 KG/M2 | WEIGHT: 178 LBS | HEART RATE: 72 BPM | OXYGEN SATURATION: 94 % | SYSTOLIC BLOOD PRESSURE: 127 MMHG | DIASTOLIC BLOOD PRESSURE: 74 MMHG | HEIGHT: 65 IN

## 2022-03-14 DIAGNOSIS — M48.07 LUMBOSACRAL SPINAL STENOSIS: ICD-10-CM

## 2022-03-14 DIAGNOSIS — M51.379 DDD (DEGENERATIVE DISC DISEASE), LUMBOSACRAL: ICD-10-CM

## 2022-03-14 DIAGNOSIS — M47.817 FACET ARTHROPATHY, LUMBOSACRAL: Primary | ICD-10-CM

## 2022-03-14 DIAGNOSIS — M54.42 MIDLINE LOW BACK PAIN WITH LEFT-SIDED SCIATICA, UNSPECIFIED CHRONICITY: ICD-10-CM

## 2022-03-14 DIAGNOSIS — M41.9 SCOLIOSIS OF LUMBAR SPINE, UNSPECIFIED SCOLIOSIS TYPE: ICD-10-CM

## 2022-03-14 LAB
MDC_IDC_EPISODE_DTM: NORMAL
MDC_IDC_EPISODE_ID: NORMAL
MDC_IDC_EPISODE_TYPE: NORMAL
MDC_IDC_LEAD_IMPLANT_DT: NORMAL
MDC_IDC_LEAD_IMPLANT_DT: NORMAL
MDC_IDC_LEAD_LOCATION: NORMAL
MDC_IDC_LEAD_LOCATION: NORMAL
MDC_IDC_LEAD_LOCATION_DETAIL_1: NORMAL
MDC_IDC_LEAD_LOCATION_DETAIL_1: NORMAL
MDC_IDC_LEAD_MFG: NORMAL
MDC_IDC_LEAD_MFG: NORMAL
MDC_IDC_LEAD_MODEL: NORMAL
MDC_IDC_LEAD_MODEL: NORMAL
MDC_IDC_LEAD_POLARITY_TYPE: NORMAL
MDC_IDC_LEAD_POLARITY_TYPE: NORMAL
MDC_IDC_LEAD_SERIAL: NORMAL
MDC_IDC_LEAD_SERIAL: NORMAL
MDC_IDC_MSMT_BATTERY_DTM: NORMAL
MDC_IDC_MSMT_BATTERY_REMAINING_LONGEVITY: 102 MO
MDC_IDC_MSMT_BATTERY_REMAINING_PERCENTAGE: 100 %
MDC_IDC_MSMT_BATTERY_STATUS: NORMAL
MDC_IDC_MSMT_LEADCHNL_RA_IMPEDANCE_VALUE: 719 OHM
MDC_IDC_MSMT_LEADCHNL_RA_PACING_THRESHOLD_AMPLITUDE: 0.5 V
MDC_IDC_MSMT_LEADCHNL_RA_PACING_THRESHOLD_PULSEWIDTH: 0.4 MS
MDC_IDC_MSMT_LEADCHNL_RV_IMPEDANCE_VALUE: 673 OHM
MDC_IDC_MSMT_LEADCHNL_RV_PACING_THRESHOLD_AMPLITUDE: 0.8 V
MDC_IDC_MSMT_LEADCHNL_RV_PACING_THRESHOLD_PULSEWIDTH: 0.4 MS
MDC_IDC_PG_IMPLANT_DTM: NORMAL
MDC_IDC_PG_MFG: NORMAL
MDC_IDC_PG_MODEL: NORMAL
MDC_IDC_PG_SERIAL: NORMAL
MDC_IDC_PG_TYPE: NORMAL
MDC_IDC_SESS_CLINIC_NAME: NORMAL
MDC_IDC_SESS_DTM: NORMAL
MDC_IDC_SESS_TYPE: NORMAL
MDC_IDC_SET_BRADY_AT_MODE_SWITCH_MODE: NORMAL
MDC_IDC_SET_BRADY_AT_MODE_SWITCH_RATE: 170 {BEATS}/MIN
MDC_IDC_SET_BRADY_LOWRATE: 60 {BEATS}/MIN
MDC_IDC_SET_BRADY_MAX_SENSOR_RATE: 130 {BEATS}/MIN
MDC_IDC_SET_BRADY_MAX_TRACKING_RATE: 130 {BEATS}/MIN
MDC_IDC_SET_BRADY_MODE: NORMAL
MDC_IDC_SET_BRADY_PAV_DELAY_HIGH: 150 MS
MDC_IDC_SET_BRADY_PAV_DELAY_LOW: 200 MS
MDC_IDC_SET_BRADY_SAV_DELAY_HIGH: 150 MS
MDC_IDC_SET_BRADY_SAV_DELAY_LOW: 200 MS
MDC_IDC_SET_LEADCHNL_RA_PACING_AMPLITUDE: 2 V
MDC_IDC_SET_LEADCHNL_RA_PACING_CAPTURE_MODE: NORMAL
MDC_IDC_SET_LEADCHNL_RA_PACING_POLARITY: NORMAL
MDC_IDC_SET_LEADCHNL_RA_PACING_PULSEWIDTH: 0.4 MS
MDC_IDC_SET_LEADCHNL_RA_SENSING_ADAPTATION_MODE: NORMAL
MDC_IDC_SET_LEADCHNL_RA_SENSING_POLARITY: NORMAL
MDC_IDC_SET_LEADCHNL_RA_SENSING_SENSITIVITY: 0.25 MV
MDC_IDC_SET_LEADCHNL_RV_PACING_AMPLITUDE: 1.3 V
MDC_IDC_SET_LEADCHNL_RV_PACING_CAPTURE_MODE: NORMAL
MDC_IDC_SET_LEADCHNL_RV_PACING_POLARITY: NORMAL
MDC_IDC_SET_LEADCHNL_RV_PACING_PULSEWIDTH: 0.4 MS
MDC_IDC_SET_LEADCHNL_RV_SENSING_ADAPTATION_MODE: NORMAL
MDC_IDC_SET_LEADCHNL_RV_SENSING_POLARITY: NORMAL
MDC_IDC_SET_LEADCHNL_RV_SENSING_SENSITIVITY: 1.5 MV
MDC_IDC_SET_ZONE_DETECTION_INTERVAL: 375 MS
MDC_IDC_SET_ZONE_TYPE: NORMAL
MDC_IDC_SET_ZONE_VENDOR_TYPE: NORMAL
MDC_IDC_STAT_AT_BURDEN_PERCENT: 0 %
MDC_IDC_STAT_AT_DTM_END: NORMAL
MDC_IDC_STAT_AT_DTM_START: NORMAL
MDC_IDC_STAT_BRADY_DTM_END: NORMAL
MDC_IDC_STAT_BRADY_DTM_START: NORMAL
MDC_IDC_STAT_BRADY_RA_PERCENT_PACED: 27 %
MDC_IDC_STAT_BRADY_RV_PERCENT_PACED: 33 %
MDC_IDC_STAT_EPISODE_RECENT_COUNT: 0
MDC_IDC_STAT_EPISODE_RECENT_COUNT_DTM_END: NORMAL
MDC_IDC_STAT_EPISODE_RECENT_COUNT_DTM_START: NORMAL
MDC_IDC_STAT_EPISODE_TYPE: NORMAL
MDC_IDC_STAT_EPISODE_VENDOR_TYPE: NORMAL

## 2022-03-14 PROCEDURE — 99204 OFFICE O/P NEW MOD 45 MIN: CPT | Performed by: PHYSICAL MEDICINE & REHABILITATION

## 2022-03-14 ASSESSMENT — PAIN SCALES - GENERAL: PAINLEVEL: MILD PAIN (2)

## 2022-03-14 NOTE — PATIENT INSTRUCTIONS
Please schedule a CT.  The [facility] Imaging Scheduling team will call you to schedule your imaging exam in the next 0-3 days.  You can also call them directly at [facility] [phone] ([address], [address], [place], [place]) and [facility] [phone] ([address], [place], [place]) to schedule your appointment.    Please schedule a follow-up appointment after your CT lumbar spine.  You can schedule this at the , or you can call [phone].

## 2022-03-14 NOTE — LETTER
"    3/14/2022         RE: Reid Jimenes  [id number] [address]  [place] [address]        Dear Colleague,    Thank you for referring your patient, Reid Jimenes, to the [facility]. Please see a copy of my visit note below.    PHYSICAL MEDICINE & REHABILITATION / MEDICAL SPINE        Date:  Mar 14, 2022    Name:  Reid Jimenes  YOB: 1934  MRN:  [id number]          REASON FOR CONSULTATION:  Midline low back pain with left-sided sciatica.        REFERRING PROVIDER:  Viet Bingham MD        CHART REVIEW:  Reviewed 01/21/2022 note from Viet Bingham MD (internal medicine).  Mr. Reid Jimenes was having midline low back pain without radicular pain.  He denied numbness and weakness.  Pain was worst in the morning after waking.  Referral to medical spine was placed.        HISTORY OF PRESENT ILLNESS:  Mr. Reid Jimenes is an 88-year-old, right-hand-dominant, male.  He is accompanied today's appointment by his wife, Ms. Alyssa Jimenes.    Mr. Reid Jimenes has had midline low back pain for the past 3 years.  Pain is without radiation.  Pain is intermittent.  Midline low back pain is described as \"pain.\"  Midline low back pain develops with walking and is worsened by walking.  Midline low back pain also develops and worsens with prolonged standing.  Midline low back pain is currently rated as 2/10 in severity.  Midline low back pain varies from 0/10 to 7/10.  Low back pain has been worsening over the past 3 years.    Mr. Reid Jimenes states bone was harvested from his left ilium approximately 60 years ago to repair a fractured jaw.  Mr. Reid Jimenes denies any other injuries or surgeries of his low back, pelvis, hips, thighs, knees, legs, ankles, feet, toes.    Mr. Reid Jimenes is not taking any pain medications.  He has not tried acupuncture, chiropractic treatments, injections, massage, physical therapy.    Mr. Reid Jimenes notes generalized " weakness in his bilateral lower extremities.  He has not had any give way episodes of his bilateral lower extremities, but he feels that his bilateral lower extremities could give way.  Mr. Reid Jimenes denies any tripping, stumbling, falling.  Mr. Reid Jimenes states that when he walks in the hallway he walks close to the railings for him grab onto if need be.  Mr. Reid Jimenes denies any numbness, tingling, pins-and-needles.    Mr. Reid Jimenes states that he is able to walk about 50 feet before his back pain intensifies to the point where he needs to take a break.  He notes improvement in his symptoms with leaning on a shopping cart.  Mr. Reid Jimenes denies that his bilateral lower extremities become weak and tired with walking.    Mr. Reid Jimenes denies any personal or family history of autoimmune diseases, rheumatologic diseases, gout, pseudogout.  He denies any personal or family history of neurologic diseases.  Mr. Reid Jimenes denies any personal or family history of inflammatory bowel diseases.    Mr. Reid Jimenes has a pacemaker.        REVIEW OF SYSTEMS:  Review of Systems     Constitutional:  Positive for weight loss and weight gain. Negative for fever and fatigue.   HENT:  Positive for trouble swallowing. Negative for tinnitus and hoarse voice.    Eyes:  Negative for pain, eye pain and decreased vision.   Respiratory:   Positive for shortness of breath. Negative for cough and wheezing.    Cardiovascular:  Negative for chest pain, palpitations and leg swelling.   Gastrointestinal:  Negative for heartburn, nausea, vomiting, abdominal pain, diarrhea, constipation, blood in stool and bowel incontinence.   Genitourinary:  Negative for bladder incontinence, dysuria, hematuria and difficulty urinating.   Musculoskeletal:  Positive for myalgias and arthralgias.   Skin:  Negative for itching, poor wound healing and poor wound healing.   Neurological:  Negative for dizziness,  seizures, loss of consciousness, headaches and difficulty walking.   Endo/Heme:  Positive for bruises/bleeds easily. Negative for anemia and swollen glands.   Psychiatric/Behavioral:  Negative for depression.          ALLERGIES:  Allergies   Allergen Reactions     Fentanyl Other (See Comments)     Confusion and agitation after PPM     Versed [Midazolam] Other (See Comments)     Confusion and agitation post PPM         MEDICATIONS:  Current Outpatient Medications   Medication Sig Dispense Refill     ascorbic acid 500 MG TABS Take 500 mg by mouth Takes off and on       aspirin 81 MG EC tablet Take 1 tablet (81 mg) by mouth daily 30 tablet      biotin 5 MG CAPS Take 1 capsule (5 mg) by mouth 2 times daily (Patient taking differently: Take 5 mg by mouth daily Takes off and on)       blood glucose (MIRA CONTOUR NEXT) test strip Use to test blood sugar one time daily or as directed. 100 strip 11     blood glucose monitoring (MIRA MICROLET) lancets Use to test blood sugar 1 times daily or as directed. 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     Coronary artery disease     cardiac cath 7/24/18: mild non-obstructive disease     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    DDD PM 6/10/2021     Hyperlipidaemia LDL goal < 100      Hypertension      Need for SBE (subacute bacterial endocarditis) prophylaxis      Pulmonary hypertension (H)      Type 2 diabetes mellitus (H)          PAST SURGICAL  HISTORY:  Past Surgical History:   Procedure Laterality Date     CV HEART CATHETERIZATION WITH POSSIBLE INTERVENTION N/A 2021    Procedure: coronary angiogram;  Surgeon: Jayesh Rachel MD;  Location:  HEART CARDIAC CATH LAB     CV TEMPORARY PACEMAKER INSERTION N/A 2021    Procedure: Temporary Pacemaker Insertion;  Surgeon: Jayesh Rachel MD;  Location:  HEART CARDIAC CATH LAB     EP PACEMAKER N/A 6/10/2021    Procedure: EP Pacemaker;  Surgeon: Magdiel Silver MD;  Location:  HEART CARDIAC CATH LAB     MANDIBLE SURGERY       OTHER SURGICAL HISTORY      cardiac cath 18: mild non-obstructive disease     TRANSCATHETER AORTIC VALVE IMPLANT ANESTHESIA N/A 2018    Procedure: TRANSCATHETER AORTIC VALVE IMPLANT ANESTHESIA;  ANESTHESIA NEEDED FOR TAVR PROCEDURE;  Surgeon: GENERIC ANESTHESIA PROVIDER;  Location:  OR,  26mm Edward Sabino 3 valve         FAMILY HISTORY:  Family History   Problem Relation Age of Onset     Heart Disease Father          about age 49     Alcohol/Drug Father      Cancer Mother 58         in her 50's, had throat and stomach cancer     Alcohol/Drug Mother      Myocardial Infarction Sister 70        Two heart attacks     Alcohol/Drug Sister      Cerebrovascular Disease Brother 65         age 65 of stroke     Alcohol/Drug Brother      Cancer Maternal Grandmother         stomach cancer     Alcohol/Drug Maternal Grandmother      Alcohol/Drug Maternal Grandfather      Alcohol/Drug Paternal Grandmother      Alcohol/Drug Paternal Grandfather      Myocardial Infarction Grandchild 18        Grandson     Myocardial Infarction Other 18        Nephew          SOCIAL HISTORY:  Social History     Socioeconomic History     Marital status:      Spouse name: Alyssa     Number of children: 3     Years of education: Not on file     Highest education level: Not on file   Occupational History     Not on file   Tobacco Use     Smoking status: Former Smoker  "    Packs/day: 1.00     Years: 30.00     Pack years: 30.00     Types: Cigarettes     Start date:      Quit date: 1980     Years since quittin.5     Smokeless tobacco: Former User     Types: Chew     Quit date: 1983   Vaping Use     Vaping Use: Never used   Substance and Sexual Activity     Alcohol use: No     Comment: quit 30+ years ago. states, \"I'm an alcoholic\".     Drug use: No     Sexual activity: Not Currently     Partners: Female   Other Topics Concern     Parent/sibling w/ CABG, MI or angioplasty before 65F 55M? Not Asked   Social History Narrative    , two children in [facility] ([place] and [place]), one child  at age 18.    Five grandchildren.     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     Social Determinants of Health     Financial Resource Strain: Low Risk      Difficulty of Paying Living Expenses: Not hard at all   Food Insecurity: No Food Insecurity     Worried About Running Out of Food in the Last Year: Never true     Ran Out of Food in the Last Year: Never true   Transportation Needs: No Transportation Needs     Lack of Transportation (Medical): No     Lack of Transportation (Non-Medical): No   Physical Activity: Inactive     Days of Exercise per Week: 0 days     Minutes of Exercise per Session: 0 min   Stress: Not on file   Social Connections: Not on file   Intimate Partner Violence: Not At Risk     Fear of Current or Ex-Partner: No     Emotionally Abused: No     Physically Abused: No     Sexually Abused: No   Housing Stability: Low Risk      Unable to Pay for Housing in the Last Year: No     Number of Places Lived in the Last Year: 1     Unstable Housing in the Last Year: No         PHYSICAL EXAMINATION:  Vitals:    22 1501   BP: 127/74   Pulse: 72   SpO2: 94%   Weight: 80.7 kg (178 lb)   Height: 1.651 m (5' 5\")       GENERAL:  No acute distress.  Pleasant and cooperative.   PSYCH:  Normal mood and affect.  HEAD:  " Normocephalic.  SPEECH:  No dysarthria.  EYES:  No scleral icterus.  Wearing glasses.  EARS: Wearing bilateral hearing aids.  Hearing is intact to loud spoken voice.  NOSE/MOUTH:  Wearing a face mask.  LUNGS:  No respiratory distress.  No increased work of breathing.  VASCULAR/PULSES:  Posterior tibial:  Right 2+.  Left 2+.  Dorsalis pedis:  Right 2+.  Left 2+.  LOWER EXTREMITIES: No clubbing, cyanosis, or edema bilaterally.    BALANCE AND GAIT: Patient ambulates with a mild to moderate forward trunk lean.  He has a reciprocal gait pattern.  He is able to heel walk and toe walk without difficulty.  He has slight difficulty tandem walking.  Double leg squat is performed with a quadriceps dominant pattern.    INSPECTION:  There is no erythema, ecchymosis, deformity, asymmetry, or abnormality of the low back.    LUMBOPELVIC PALPATION:  Lumbar Spinous Processes: Not tender.  Lumbar Paraspinals:  Right not tender.  Left not tender.  Posterior Superior Iliac Spine:  Right not tender.  Left not tender.  Iliac Crest:  Right not tender.  Left not tender.  Sacroiliac Joint:  Right not tender.  Left not tender.  Hip External Rotators:  Right not tender.  Left not tender.  Ischial Tuberosity:  Right not tender.  Left not tender.  Greater Trochanter:  Right not tender.  Left not tender.  Coccyx:  not tender.    THORACOLUMBAR RANGE OF MOTION:  Forward flexion (85 ): Moderately reduced range of motion, does not cause pain, does not cause radiating pain.  Extension (30 ): Severely reduced range of motion, does not cause pain, does not cause radiating pain.  Lateral bending right (30 ): Moderately to severely reduced range of motion, causes slight increase in low back pain, does not cause radiating pain.  Lateral bending left (30 ):  Moderately to severely reduced range of motion, causes slight increase in low back pain, does not cause radiating pain.  Twisting right with extension:  Moderately to severely reduced range of motion,  causes slight increase in low back pain, does not cause radiating pain.  Twisting left with extension:  Moderately to severely reduced range of motion, causes slight increase in low back pain, does not cause radiating pain.  Sacroiliac joint dysfunction:  Right -.  Left -.    HIP RANGE OF MOTION:  Flexion (110 ):  Right 110 .  Left 110 .  Extension (30 ):  Right 20 .  Left 20 .  External rotation (45 ):  Right 40 .  Left 40 .  Internal rotation (35 ):  Right 15 .  Left 15 .    KNEE RANGE OF MOTION:  Extension (0 ):  Right 0 .  Left 0 .  Flexion (135 ):  Right 135 .  Left 135 .    STRENGTH:  Hip flexion:  Right 5/5.  Left 5/5.  Hip abduction:  Right 5/5.  Left 5/5.  Hip external rotation:  Right 5/5.  Left 5/5.  Hip extension:  Right 5/5.  Left 5/5.  Knee extension:  Right 5/5.  Left 5/5.  Knee flexion:  Right 5/5.  Left 5/5.  Ankle dorsiflexion:  Right 5/5.  Left 5/5.  Great toe dorsiflexion:  Right 5/5.  Left 5/5.  Ankle plantar flexion:  Right 5/5.  Left 5/5.    SENSATION:  Intact to light touch along right L2, L3, L4, L5, S1, S2.  Intact to light touch along left L2, L3, L4, L5, S1, S2.    REFLEXES:  Patellar (L2-L4):  Right 2+.  Left 2+.  Medial hamstrings (L5-S1):  Right 2+.  Left 2+.  Achilles (S1-S2):  Right 2+.  Left 2+.  Babinski:  Right downgoing.  Left downgoing.  Forced ankle dorsiflexion (clonus):  Right 0 beats.  Left 0 beats.    LUMBOPELVIC SPECIAL TESTS:  Log roll:  Right - but restricted range of motion.  Left - but restricted range of motion.  Straight leg raise:  Right -.  Left -.  Crossover straight leg raise:  Right -.  Left -.  Resisted straight leg raise:  Right -.  Left -.  LUIS:  Right -.  Left -.  FADIR:  Right -.  Left -.  Hip scour:  Right -.  Left -.  Lateral sacral compression:  Right -.  Left -.  Clamshell:  Right -.  Left -.  Ely s:  Right +.  Left +.  Yeoman s:  Right -.  Left -.  Distraction:  Right -.  Left -.  Sacral thrust:  Right -.  Left -.  Noble compression:  Right -.  Left  -.        IMAGING:  There is no dedicated low back imaging available.        ASSESSMENT/PLAN:  Mr. Reid Jimenes is an 88-year-old, right-hand-dominant, male.  He has lumbar scoliosis convex left, lumbosacral facet arthropathy, and lumbosacral degenerative disc disease.  Current symptoms do represent some lumbosacral facet arthropathy, but there is concern for lumbosacral spinal stenosis being the major problem.  Discussed diagnoses, pathophysiologies, and treatment options with Mr. and Ms. Jimenes.  Discussed the options of doing nothing/living with it, physical therapy, chiropractic care, oral medications such as gabapentin and pregabalin, lumbosacral epidural corticosteroid injection, referral to neurosurgery.  Mr. Reid Jimenes will be set up for CT lumbar spine.  He is to follow-up in this clinic after the CT lumbar spine.        Jayesh Barker MD

## 2022-03-14 NOTE — PROGRESS NOTES
"PHYSICAL MEDICINE & REHABILITATION / MEDICAL SPINE        Date:  Mar 14, 2022    Name:  Reid Jimenes  YOB: 1934  MRN:  [id number]          REASON FOR CONSULTATION:  Midline low back pain with left-sided sciatica.        REFERRING PROVIDER:  Viet Bingham MD        CHART REVIEW:  Reviewed 01/21/2022 note from Viet Bingham MD (internal medicine).  Mr. Reid Jimenes was having midline low back pain without radicular pain.  He denied numbness and weakness.  Pain was worst in the morning after waking.  Referral to medical spine was placed.        HISTORY OF PRESENT ILLNESS:  Mr. Reid Jimenes is an 88-year-old, right-hand-dominant, male.  He is accompanied today's appointment by his wife, Ms. Alyssa Jimenes.    Mr. Reid Jimenes has had midline low back pain for the past 3 years.  Pain is without radiation.  Pain is intermittent.  Midline low back pain is described as \"pain.\"  Midline low back pain develops with walking and is worsened by walking.  Midline low back pain also develops and worsens with prolonged standing.  Midline low back pain is currently rated as 2/10 in severity.  Midline low back pain varies from 0/10 to 7/10.  Low back pain has been worsening over the past 3 years.    Mr. Reid Jimenes states bone was harvested from his left ilium approximately 60 years ago to repair a fractured jaw.  Mr. Reid Jimenes denies any other injuries or surgeries of his low back, pelvis, hips, thighs, knees, legs, ankles, feet, toes.    Mr. Reid Jimenes is not taking any pain medications.  He has not tried acupuncture, chiropractic treatments, injections, massage, physical therapy.    Mr. Reid Jimenes notes generalized weakness in his bilateral lower extremities.  He has not had any give way episodes of his bilateral lower extremities, but he feels that his bilateral lower extremities could give way.  Mr. Reid Jimenes denies any tripping, stumbling, falling.  Mr. Reid METCALF" Yudy states that when he walks in the hallway he walks close to the railings for him grab onto if need be.  Mr. Reid Jimenes denies any numbness, tingling, pins-and-needles.    Mr. Reid Jimenes states that he is able to walk about 50 feet before his back pain intensifies to the point where he needs to take a break.  He notes improvement in his symptoms with leaning on a shopping cart.  Mr. Reid Jimenes denies that his bilateral lower extremities become weak and tired with walking.    Mr. Reid Jimenes denies any personal or family history of autoimmune diseases, rheumatologic diseases, gout, pseudogout.  He denies any personal or family history of neurologic diseases.  Mr. Reid Jimenes denies any personal or family history of inflammatory bowel diseases.    Mr. Reid Jimenes has a pacemaker.        REVIEW OF SYSTEMS:  Review of Systems     Constitutional:  Positive for weight loss and weight gain. Negative for fever and fatigue.   HENT:  Positive for trouble swallowing. Negative for tinnitus and hoarse voice.    Eyes:  Negative for pain, eye pain and decreased vision.   Respiratory:   Positive for shortness of breath. Negative for cough and wheezing.    Cardiovascular:  Negative for chest pain, palpitations and leg swelling.   Gastrointestinal:  Negative for heartburn, nausea, vomiting, abdominal pain, diarrhea, constipation, blood in stool and bowel incontinence.   Genitourinary:  Negative for bladder incontinence, dysuria, hematuria and difficulty urinating.   Musculoskeletal:  Positive for myalgias and arthralgias.   Skin:  Negative for itching, poor wound healing and poor wound healing.   Neurological:  Negative for dizziness, seizures, loss of consciousness, headaches and difficulty walking.   Endo/Heme:  Positive for bruises/bleeds easily. Negative for anemia and swollen glands.   Psychiatric/Behavioral:  Negative for depression.          ALLERGIES:  Allergies   Allergen Reactions      Fentanyl Other (See Comments)     Confusion and agitation after PPM     Versed [Midazolam] Other (See Comments)     Confusion and agitation post PPM         MEDICATIONS:  Current Outpatient Medications   Medication Sig Dispense Refill     ascorbic acid 500 MG TABS Take 500 mg by mouth Takes off and on       aspirin 81 MG EC tablet Take 1 tablet (81 mg) by mouth daily 30 tablet      biotin 5 MG CAPS Take 1 capsule (5 mg) by mouth 2 times daily (Patient taking differently: Take 5 mg by mouth daily Takes off and on)       blood glucose (MIRA CONTOUR NEXT) test strip Use to test blood sugar one time daily or as directed. 100 strip 11     blood glucose monitoring (MIRA MICROLET) lancets Use to test blood sugar 1 times daily or as directed. 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     Coronary artery disease     cardiac cath 7/24/18: mild non-obstructive disease     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    DDD PM 6/10/2021     Hyperlipidaemia LDL goal < 100      Hypertension      Need for SBE (subacute bacterial endocarditis) prophylaxis      Pulmonary hypertension (H)      Type 2 diabetes mellitus (H)          PAST SURGICAL HISTORY:  Past Surgical History:   Procedure Laterality Date     CV HEART CATHETERIZATION WITH POSSIBLE INTERVENTION N/A 6/9/2021    Procedure: coronary angiogram;  Surgeon: Jayesh Rachel MD;  Location:  [facility]     CV TEMPORARY PACEMAKER INSERTION  N/A 2021    Procedure: Temporary Pacemaker Insertion;  Surgeon: Jayesh Rachel MD;  Location:  [facility]     EP PACEMAKER N/A 6/10/2021    Procedure: EP Pacemaker;  Surgeon: Magdiel Silver MD;  Location:  [facility]     MANDIBLE SURGERY       OTHER SURGICAL HISTORY      cardiac cath 18: mild non-obstructive disease     TRANSCATHETER AORTIC VALVE IMPLANT ANESTHESIA N/A 2018    Procedure: TRANSCATHETER AORTIC VALVE IMPLANT ANESTHESIA;  ANESTHESIA NEEDED FOR TAVR PROCEDURE;  Surgeon: GENERIC ANESTHESIA PROVIDER;  Location:  OR,  26mm Edward Sabino 3 valve         FAMILY HISTORY:  Family History   Problem Relation Age of Onset     Heart Disease Father          about age 49     Alcohol/Drug Father      Cancer Mother 58         in her 50's, had throat and stomach cancer     Alcohol/Drug Mother      Myocardial Infarction Sister 70        Two heart attacks     Alcohol/Drug Sister      Cerebrovascular Disease Brother 65         age 65 of stroke     Alcohol/Drug Brother      Cancer Maternal Grandmother         stomach cancer     Alcohol/Drug Maternal Grandmother      Alcohol/Drug Maternal Grandfather      Alcohol/Drug Paternal Grandmother      Alcohol/Drug Paternal Grandfather      Myocardial Infarction Grandchild 18        Grandson     Myocardial Infarction Other 18        Nephew          SOCIAL HISTORY:  Social History     Socioeconomic History     Marital status:      Spouse name: Alyssa     Number of children: 3     Years of education: Not on file     Highest education level: Not on file   Occupational History     Not on file   Tobacco Use     Smoking status: Former Smoker     Packs/day: 1.00     Years: 30.00     Pack years: 30.00     Types: Cigarettes     Start date:      Quit date: 1980     Years since quittin.5     Smokeless tobacco: Former User     Types: Chew     Quit date: 1983   Vaping Use     Vaping Use: Never used  "  Substance and Sexual Activity     Alcohol use: No     Comment: quit 30+ years ago. states, \"I'm an alcoholic\".     Drug use: No     Sexual activity: Not Currently     Partners: Female   Other Topics Concern     Parent/sibling w/ CABG, MI or angioplasty before 65F 55M? Not Asked   Social History Narrative    , two children in [facility]), one child  at age 18.    Five grandchildren.     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     Social Determinants of Health     Financial Resource Strain: Low Risk      Difficulty of Paying Living Expenses: Not hard at all   Food Insecurity: No Food Insecurity     Worried About Running Out of Food in the Last Year: Never true     Ran Out of Food in the Last Year: Never true   Transportation Needs: No Transportation Needs     Lack of Transportation (Medical): No     Lack of Transportation (Non-Medical): No   Physical Activity: Inactive     Days of Exercise per Week: 0 days     Minutes of Exercise per Session: 0 min   Stress: Not on file   Social Connections: Not on file   Intimate Partner Violence: Not At Risk     Fear of Current or Ex-Partner: No     Emotionally Abused: No     Physically Abused: No     Sexually Abused: No   Housing Stability: Low Risk      Unable to Pay for Housing in the Last Year: No     Number of Places Lived in the Last Year: 1     Unstable Housing in the Last Year: No         PHYSICAL EXAMINATION:  Vitals:    22 1501   BP: 127/74   Pulse: 72   SpO2: 94%   Weight: 80.7 kg (178 lb)   Height: 1.651 m (5' 5\")       GENERAL:  No acute distress.  Pleasant and cooperative.   PSYCH:  Normal mood and affect.  HEAD:  Normocephalic.  SPEECH:  No dysarthria.  EYES:  No scleral icterus.  Wearing glasses.  EARS: Wearing bilateral hearing aids.  Hearing is intact to loud spoken voice.  NOSE/MOUTH:  Wearing a face mask.  LUNGS:  No respiratory distress.  No increased work of " breathing.  VASCULAR/PULSES:  Posterior tibial:  Right 2+.  Left 2+.  Dorsalis pedis:  Right 2+.  Left 2+.  LOWER EXTREMITIES: No clubbing, cyanosis, or edema bilaterally.    BALANCE AND GAIT: Patient ambulates with a mild to moderate forward trunk lean.  He has a reciprocal gait pattern.  He is able to heel walk and toe walk without difficulty.  He has slight difficulty tandem walking.  Double leg squat is performed with a quadriceps dominant pattern.    INSPECTION:  There is no erythema, ecchymosis, deformity, asymmetry, or abnormality of the low back.    LUMBOPELVIC PALPATION:  Lumbar Spinous Processes: Not tender.  Lumbar Paraspinals:  Right not tender.  Left not tender.  Posterior Superior Iliac Spine:  Right not tender.  Left not tender.  Iliac Crest:  Right not tender.  Left not tender.  Sacroiliac Joint:  Right not tender.  Left not tender.  Hip External Rotators:  Right not tender.  Left not tender.  Ischial Tuberosity:  Right not tender.  Left not tender.  Greater Trochanter:  Right not tender.  Left not tender.  Coccyx:  not tender.    THORACOLUMBAR RANGE OF MOTION:  Forward flexion (85 ): Moderately reduced range of motion, does not cause pain, does not cause radiating pain.  Extension (30 ): Severely reduced range of motion, does not cause pain, does not cause radiating pain.  Lateral bending right (30 ): Moderately to severely reduced range of motion, causes slight increase in low back pain, does not cause radiating pain.  Lateral bending left (30 ):  Moderately to severely reduced range of motion, causes slight increase in low back pain, does not cause radiating pain.  Twisting right with extension:  Moderately to severely reduced range of motion, causes slight increase in low back pain, does not cause radiating pain.  Twisting left with extension:  Moderately to severely reduced range of motion, causes slight increase in low back pain, does not cause radiating pain.  Sacroiliac joint dysfunction:   Right -.  Left -.    HIP RANGE OF MOTION:  Flexion (110 ):  Right 110 .  Left 110 .  Extension (30 ):  Right 20 .  Left 20 .  External rotation (45 ):  Right 40 .  Left 40 .  Internal rotation (35 ):  Right 15 .  Left 15 .    KNEE RANGE OF MOTION:  Extension (0 ):  Right 0 .  Left 0 .  Flexion (135 ):  Right 135 .  Left 135 .    STRENGTH:  Hip flexion:  Right 5/5.  Left 5/5.  Hip abduction:  Right 5/5.  Left 5/5.  Hip external rotation:  Right 5/5.  Left 5/5.  Hip extension:  Right 5/5.  Left 5/5.  Knee extension:  Right 5/5.  Left 5/5.  Knee flexion:  Right 5/5.  Left 5/5.  Ankle dorsiflexion:  Right 5/5.  Left 5/5.  Great toe dorsiflexion:  Right 5/5.  Left 5/5.  Ankle plantar flexion:  Right 5/5.  Left 5/5.    SENSATION:  Intact to light touch along right L2, L3, L4, L5, S1, S2.  Intact to light touch along left L2, L3, L4, L5, S1, S2.    REFLEXES:  Patellar (L2-L4):  Right 2+.  Left 2+.  Medial hamstrings (L5-S1):  Right 2+.  Left 2+.  Achilles (S1-S2):  Right 2+.  Left 2+.  Babinski:  Right downgoing.  Left downgoing.  Forced ankle dorsiflexion (clonus):  Right 0 beats.  Left 0 beats.    LUMBOPELVIC SPECIAL TESTS:  Log roll:  Right - but restricted range of motion.  Left - but restricted range of motion.  Straight leg raise:  Right -.  Left -.  Crossover straight leg raise:  Right -.  Left -.  Resisted straight leg raise:  Right -.  Left -.  LUIS:  Right -.  Left -.  FADIR:  Right -.  Left -.  Hip scour:  Right -.  Left -.  Lateral sacral compression:  Right -.  Left -.  Clamshell:  Right -.  Left -.  Ely s:  Right +.  Left +.  Yeoman s:  Right -.  Left -.  Distraction:  Right -.  Left -.  Sacral thrust:  Right -.  Left -.  Noble compression:  Right -.  Left -.        IMAGING:  There is no dedicated low back imaging available.        ASSESSMENT/PLAN:  Mr. Reid Jimenes is an 88-year-old, right-hand-dominant, male.  He has lumbar scoliosis convex left, lumbosacral facet arthropathy, and lumbosacral  degenerative disc disease.  Current symptoms do represent some lumbosacral facet arthropathy, but there is concern for lumbosacral spinal stenosis being the major problem.  Discussed diagnoses, pathophysiologies, and treatment options with Mr. and Ms. Jimenes.  Discussed the options of doing nothing/living with it, physical therapy, chiropractic care, oral medications such as gabapentin and pregabalin, lumbosacral epidural corticosteroid injection, referral to neurosurgery.  Mr. Reid Jimenes will be set up for CT lumbar spine.  He is to follow-up in this clinic after the CT lumbar spine.        Jayesh Barker MD

## 2022-03-17 ENCOUNTER — E-VISIT (OUTPATIENT)
Dept: INTERNAL MEDICINE | Facility: CLINIC | Age: 87
End: 2022-03-17
Payer: MEDICARE

## 2022-03-17 DIAGNOSIS — G31.84 MILD COGNITIVE IMPAIRMENT: Primary | ICD-10-CM

## 2022-03-17 PROCEDURE — 99421 OL DIG E/M SVC 5-10 MIN: CPT | Performed by: INTERNAL MEDICINE

## 2022-03-21 ENCOUNTER — HOSPITAL ENCOUNTER (OUTPATIENT)
Dept: CT IMAGING | Facility: CLINIC | Age: 87
Discharge: HOME | End: 2022-03-21
Attending: PHYSICAL MEDICINE & REHABILITATION | Admitting: PHYSICAL MEDICINE & REHABILITATION
Payer: MEDICARE

## 2022-03-21 DIAGNOSIS — M47.817 FACET ARTHROPATHY, LUMBOSACRAL: ICD-10-CM

## 2022-03-21 DIAGNOSIS — M51.379 DDD (DEGENERATIVE DISC DISEASE), LUMBOSACRAL: ICD-10-CM

## 2022-03-21 DIAGNOSIS — M48.07 LUMBOSACRAL SPINAL STENOSIS: ICD-10-CM

## 2022-03-21 DIAGNOSIS — M41.9 SCOLIOSIS OF LUMBAR SPINE, UNSPECIFIED SCOLIOSIS TYPE: ICD-10-CM

## 2022-03-21 PROCEDURE — 72131 CT LUMBAR SPINE W/O DYE: CPT

## 2022-03-21 ASSESSMENT — ENCOUNTER SYMPTOMS
LOSS OF CONSCIOUSNESS: 0
WEIGHT LOSS: 1
DIARRHEA: 0
INSOMNIA: 0
BLOOD IN STOOL: 0
DEPRESSION: 0
NERVOUS/ANXIOUS: 0
SWOLLEN GLANDS: 0
HEMATURIA: 0
HEARTBURN: 0
HEADACHES: 0
PALPITATIONS: 0
EYE PAIN: 0
SHORTNESS OF BREATH: 1
SEIZURES: 0
LEG SWELLING: 0
ARTHRALGIAS: 1
HOARSE VOICE: 0
MYALGIAS: 1
COUGH: 0
FEVER: 0
BRUISES/BLEEDS EASILY: 1
CONSTIPATION: 0
WHEEZING: 0
BOWEL INCONTINENCE: 0
DIFFICULTY URINATING: 0
WEIGHT GAIN: 1
VOMITING: 0
TROUBLE SWALLOWING: 1
ABDOMINAL PAIN: 0
DYSURIA: 0
DIZZINESS: 0
FATIGUE: 0
NAUSEA: 0
POOR WOUND HEALING: 0

## 2022-03-22 ENCOUNTER — OFFICE VISIT (OUTPATIENT)
Dept: ORTHOPEDICS | Facility: CLINIC | Age: 87
End: 2022-03-22
Payer: MEDICARE

## 2022-03-22 VITALS
SYSTOLIC BLOOD PRESSURE: 139 MMHG | WEIGHT: 178 LBS | DIASTOLIC BLOOD PRESSURE: 65 MMHG | HEART RATE: 67 BPM | HEIGHT: 65 IN | OXYGEN SATURATION: 96 % | BODY MASS INDEX: 29.66 KG/M2

## 2022-03-22 DIAGNOSIS — M41.9 SCOLIOSIS OF LUMBAR SPINE, UNSPECIFIED SCOLIOSIS TYPE: ICD-10-CM

## 2022-03-22 DIAGNOSIS — M47.817 FACET ARTHROPATHY, LUMBOSACRAL: Primary | ICD-10-CM

## 2022-03-22 DIAGNOSIS — M51.379 DDD (DEGENERATIVE DISC DISEASE), LUMBOSACRAL: ICD-10-CM

## 2022-03-22 DIAGNOSIS — M48.07 LUMBOSACRAL SPINAL STENOSIS: ICD-10-CM

## 2022-03-22 PROCEDURE — 99214 OFFICE O/P EST MOD 30 MIN: CPT | Performed by: PHYSICAL MEDICINE & REHABILITATION

## 2022-03-22 ASSESSMENT — PAIN SCALES - GENERAL: PAINLEVEL: MODERATE PAIN (4)

## 2022-03-22 NOTE — LETTER
3/22/2022         RE: Reid Jimenes  [id number]  [place] [address]        Dear Colleague,    Thank you for referring your patient, Reid Jimenes, to the [facility]. Please see a copy of my visit note below.                                                             PHYSICAL MEDICINE & REHABILITATION / MEDICAL SPINE        Date:  Mar 22, 2022    Name:  Reid Jimenes  YOB: 1934  MRN:  [id number]          CHART REVIEW:  Reviewed 01/21/2022 note from Viet Bingham MD (internal medicine).  Mr. Reid Jimenes was having midline low back pain without radicular pain.  He denied numbness and weakness.  Pain was worst in the morning after waking.  Referral to medical spine was placed.        CHIEF COMPLAINT:  Low back pain.        HISTORY OF PRESENT ILLNESS:  Mr. Reid Jimenes is an 88-year-old, right-hand-dominant, male.  He is accompanied today's appointment by his wife, Ms. Alyssa Jimenes.    Mr. Reid Jimenes stated bone was harvested from his left ilium approximately 60 years ago to repair a fractured jaw.  Mr. Reid Jimenes denied any other injuries or surgeries of his low back, pelvis, hips, thighs, knees, legs, ankles, feet, toes.     Mr. Reid Jimenes denied any personal or family history of autoimmune diseases, rheumatologic diseases, gout, pseudogout.  He denied any personal or family history of neurologic diseases.  Mr. Reid Jimenes denied any personal or family history of inflammatory bowel diseases.     Mr. Reid Jimenes has a pacemaker.    Mr. Reid Jimenes was initially seen in this clinic on 03/14/2022.  He returns to clinic today, 03/23/2022.  Mr. Reid Jimenes continues to note midline low back pain.  Midline low back pain is currently rated as 4/10.  Midline low back pain is not present at rest or with sitting.  Midline low back pain develops just with standing and walking.  Mr. Reid Jimenes does note  that his bilateral lower extremities become weak and tired after walking 50 to 75 feet.  He does note some improvement in his symptoms with leaning forward on a shopping cart.        ALLERGIES:  Allergies   Allergen Reactions     Fentanyl Other (See Comments)     Confusion and agitation after PPM     Versed [Midazolam] Other (See Comments)     Confusion and agitation post PPM         MEDICATIONS:  Current Outpatient Medications   Medication Sig Dispense Refill     ascorbic acid 500 MG TABS Take 500 mg by mouth Takes off and on       aspirin 81 MG EC tablet Take 1 tablet (81 mg) by mouth daily 30 tablet      biotin 5 MG CAPS Take 1 capsule (5 mg) by mouth 2 times daily (Patient taking differently: Take 5 mg by mouth daily Takes off and on)       blood glucose (MIRA CONTOUR NEXT) test strip Use to test blood sugar one time daily or as directed. 100 strip 11     blood glucose monitoring (MIRA MICROLET) lancets Use to test blood sugar 1 times daily or as directed. 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     Coronary artery disease     cardiac cath 7/24/18: mild non-obstructive disease     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    DDD PM 6/10/2021     Hyperlipidaemia LDL goal < 100      Hypertension      Need for SBE (subacute bacterial endocarditis) prophylaxis      Pulmonary hypertension (H)      Type 2 diabetes mellitus (H)          PAST SURGICAL HISTORY:  Past Surgical History:   Procedure  Laterality Date     CV HEART CATHETERIZATION WITH POSSIBLE INTERVENTION N/A 2021    Procedure: coronary angiogram;  Surgeon: Jayesh Rachel MD;  Location:  HEART CARDIAC CATH LAB     CV TEMPORARY PACEMAKER INSERTION N/A 2021    Procedure: Temporary Pacemaker Insertion;  Surgeon: Jayesh Rachel MD;  Location:  HEART CARDIAC CATH LAB     EP PACEMAKER N/A 6/10/2021    Procedure: EP Pacemaker;  Surgeon: Magdiel Silver MD;  Location:  HEART CARDIAC CATH LAB     MANDIBLE SURGERY       OTHER SURGICAL HISTORY      cardiac cath 18: mild non-obstructive disease     TRANSCATHETER AORTIC VALVE IMPLANT ANESTHESIA N/A 2018    Procedure: TRANSCATHETER AORTIC VALVE IMPLANT ANESTHESIA;  ANESTHESIA NEEDED FOR TAVR PROCEDURE;  Surgeon: GENERIC ANESTHESIA PROVIDER;  Location:  OR,  26mm Edward Sabino 3 valve         FAMILY HISTORY:  Family History   Problem Relation Age of Onset     Heart Disease Father          about age 49     Alcohol/Drug Father      Cancer Mother 58         in her 50's, had throat and stomach cancer     Alcohol/Drug Mother      Myocardial Infarction Sister 70        Two heart attacks     Alcohol/Drug Sister      Cerebrovascular Disease Brother 65         age 65 of stroke     Alcohol/Drug Brother      Cancer Maternal Grandmother         stomach cancer     Alcohol/Drug Maternal Grandmother      Alcohol/Drug Maternal Grandfather      Alcohol/Drug Paternal Grandmother      Alcohol/Drug Paternal Grandfather      Myocardial Infarction Grandchild 18        Grandson     Myocardial Infarction Other 18        Nephew          SOCIAL HISTORY:  Social History     Socioeconomic History     Marital status:      Spouse name: Alyssa     Number of children: 3     Years of education: Not on file     Highest education level: Not on file   Occupational History     Not on file   Tobacco Use     Smoking status: Former Smoker     Packs/day: 1.00     Years: 30.00     Pack  "years: 30.00     Types: Cigarettes     Start date:      Quit date: 1980     Years since quittin.5     Smokeless tobacco: Former User     Types: Chew     Quit date: 1983   Vaping Use     Vaping Use: Never used   Substance and Sexual Activity     Alcohol use: No     Comment: quit 30+ years ago. states, \"I'm an alcoholic\".     Drug use: No     Sexual activity: Not Currently     Partners: Female   Other Topics Concern     Parent/sibling w/ CABG, MI or angioplasty before 65F 55M? Not Asked   Social History Narrative    , two children in [facility] ([place] and [place]), one child  at age 18.    Five grandchildren.     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     Social Determinants of Health     Financial Resource Strain: Low Risk      Difficulty of Paying Living Expenses: Not hard at all   Food Insecurity: No Food Insecurity     Worried About Running Out of Food in the Last Year: Never true     Ran Out of Food in the Last Year: Never true   Transportation Needs: No Transportation Needs     Lack of Transportation (Medical): No     Lack of Transportation (Non-Medical): No   Physical Activity: Inactive     Days of Exercise per Week: 0 days     Minutes of Exercise per Session: 0 min   Stress: Not on file   Social Connections: Not on file   Intimate Partner Violence: Not At Risk     Fear of Current or Ex-Partner: No     Emotionally Abused: No     Physically Abused: No     Sexually Abused: No   Housing Stability: Low Risk      Unable to Pay for Housing in the Last Year: No     Number of Places Lived in the Last Year: 1     Unstable Housing in the Last Year: No         PHYSICAL EXAMINATION:  Vitals:    22 1503   BP: 139/65   Pulse: 67   SpO2: 96%   Weight: 80.7 kg (178 lb)   Height: 1.651 m (5' 5\")       GENERAL:  No acute distress.  Pleasant and cooperative.   PSYCH:  Normal mood and affect.  HEAD:  Normocephalic.  SPEECH:  No dysarthria.  EYES:  No scleral " icterus.  Wearing glasses.  EARS: Wearing hearing aids.  Hearing is intact to loud spoken voice.  NOSE/MOUTH:  Wearing a face mask.  LUNGS:  No respiratory distress.  No increased work of breathing.    BALANCE AND GAIT: The patient ambulates with mild to moderate forward trunk lean.  He has a reciprocal gait pattern with a shortened stride length bilaterally and a decreased pace.  He is able to heel walk and toe walk without difficulty.  He has slight difficulty tandem walking.    THORACOLUMBAR RANGE OF MOTION:  Forward flexion (85 ): Moderately reduced range of motion, does not cause pain, does not cause radiating pain.  Extension (30 ): Severely reduced range of motion, increases low back pain, does not cause radiating pain.  Twisting right with extension: Moderately to severely reduced range of motion, increases in low back pain, does not cause radiating pain.  Twisting left with extension: Moderately to severely reduced range of motion, increases low back pain, does not cause radiating pain.        IMAGING:  CT LUMBAR SPINE WITHOUT CONTRAST  3/21/2022 3:02 PM      HISTORY: Facet arthropathy, lumbosacral. Lumbosacral spinal stenosis. DDD (degenerative disc disease), lumbosacral. Scoliosis of lumbar spine, unspecified scoliosis type.     TECHNIQUE: Axial images of the lumbar spine were obtained without intravenous contrast. Multiplanar reformations were performed. Radiation dose for this scan was reduced using automated exposure control, adjustment of the mA and/or kV according to patient size, or iterative reconstruction technique.     COMPARISON: None.     FINDINGS:  Alignment is significant for levoconvex scoliosis and multilevel grade 1 spondylolisthesis. Chronic-appearing L4 compression deformity is present with large inferior endplate Schmorl's node and moderate height loss. Multiple other smaller Schmorl's nodes are present. No evidence of recent fracture. Severe asymmetrical degenerative disc disease is  present at L3-L4, L4-L5, and L5-S1. Multilevel facet arthropathy is present which is severe on the left at L4-L5 and L5-S1. Disc bulging contributes to mild to moderate spinal canal stenosis at L2-L3 and L3-L4. No high-grade spinal canal stenoses. Mild foraminal stenoses at multiple levels. Moderate foraminal stenosis on the right at L2-L3. Severe foraminal stenosis on the right at L3-L4. Severe foraminal stenosis on the left at L4-L5. Mild to moderate foraminal stenosis on the right at L4-L5. Severe foraminal stenosis on the left L5-S1.     Extensive scattered vascular calcifications with aortic irregularity. Lateral sacroiliac joint degenerative change.    IMPRESSION:  Severe asymmetrical degenerative change with scoliosis and multiple stenoses as detailed.          ASSESSMENT/PLAN:  Mr. Reid Jimenes is an 88-year-old, right-hand-dominant, male.  He has lumbar scoliosis convex left, lumbosacral facet arthropathy, lumbosacral degenerative disc disease, and lumbosacral spinal stenosis.  Lumbosacral facet arthropathy and lumbosacral spinal stenosis are giving Mr. Reid Jimenes the most pain.  Discussed diagnoses, pathophysiologies, and treatment options with Mr. and Ms. Jimenes.  Discussed the options of doing nothing/living with it, physical therapy, chiropractic care, oral medications such as gabapentin and pregabalin, lumbosacral epidural corticosteroid injection, lumbosacral facet joint medial branch blocks with following radiofrequency ablations, referral to spine surgery.  Mr. Reid Jimenes is being referred to physical therapy.  He is to follow-up in this clinic in 2 months.        Total Time on encounter:  33 minutes were spent on one more or more of the following:  discussion with patient, history, exam, coordinating care, treatment goals, record review, documenting clinical information, and/or data review.      Jayesh Barker MD

## 2022-03-22 NOTE — PROGRESS NOTES
PHYSICAL MEDICINE & REHABILITATION / MEDICAL SPINE        Date:  Mar 22, 2022    Name:  Reid Jimenes  YOB: 1934  MRN:  [id number]          CHART REVIEW:  Reviewed 01/21/2022 note from Viet Bingham MD (internal medicine).  Mr. Reid Jimenes was having midline low back pain without radicular pain.  He denied numbness and weakness.  Pain was worst in the morning after waking.  Referral to medical spine was placed.        CHIEF COMPLAINT:  Low back pain.        HISTORY OF PRESENT ILLNESS:  Mr. Reid Jimenes is an 88-year-old, right-hand-dominant, male.  He is accompanied today's appointment by his wife, Ms. Alyssa Jimenes.    Mr. Reid Jimenes stated bone was harvested from his left ilium approximately 60 years ago to repair a fractured jaw.  Mr. Reid Jimenes denied any other injuries or surgeries of his low back, pelvis, hips, thighs, knees, legs, ankles, feet, toes.     Mr. Reid Jimenes denied any personal or family history of autoimmune diseases, rheumatologic diseases, gout, pseudogout.  He denied any personal or family history of neurologic diseases.  Mr. Reid Jimenes denied any personal or family history of inflammatory bowel diseases.     Mr. Reid Jimenes has a pacemaker.    Mr. Reid Jimnees was initially seen in this clinic on 03/14/2022.  He returns to clinic today, 03/23/2022.  Mr. Reid Jimenes continues to note midline low back pain.  Midline low back pain is currently rated as 4/10.  Midline low back pain is not present at rest or with sitting.  Midline low back pain develops just with standing and walking.  Mr. Reid Jimenes does note that his bilateral lower extremities become weak and tired after walking 50 to 75 feet.  He does note some improvement in his symptoms with leaning forward on a shopping cart.        ALLERGIES:  Allergies   Allergen Reactions     Fentanyl Other (See Comments)      Confusion and agitation after PPM     Versed [Midazolam] Other (See Comments)     Confusion and agitation post PPM         MEDICATIONS:  Current Outpatient Medications   Medication Sig Dispense Refill     ascorbic acid 500 MG TABS Take 500 mg by mouth Takes off and on       aspirin 81 MG EC tablet Take 1 tablet (81 mg) by mouth daily 30 tablet      biotin 5 MG CAPS Take 1 capsule (5 mg) by mouth 2 times daily (Patient taking differently: Take 5 mg by mouth daily Takes off and on)       blood glucose (MIRA CONTOUR NEXT) test strip Use to test blood sugar one time daily or as directed. 100 strip 11     blood glucose monitoring (MIRA MICROLET) lancets Use to test blood sugar 1 times daily or as directed. 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     Coronary artery disease     cardiac cath 7/24/18: mild non-obstructive disease     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    DDD PM 6/10/2021     Hyperlipidaemia LDL goal < 100      Hypertension      Need for SBE (subacute bacterial endocarditis) prophylaxis      Pulmonary hypertension (H)      Type 2 diabetes mellitus (H)          PAST SURGICAL HISTORY:  Past Surgical History:   Procedure Laterality Date     CV HEART CATHETERIZATION WITH POSSIBLE INTERVENTION N/A 6/9/2021    Procedure: coronary angiogram;  Surgeon: Jayesh Rachel MD;  Location:  HEART CARDIAC CATH LAB     CV TEMPORARY PACEMAKER INSERTION N/A 6/9/2021    Procedure: Temporary  Pacemaker Insertion;  Surgeon: Jayesh Rachel MD;  Location:  HEART CARDIAC CATH LAB     EP PACEMAKER N/A 6/10/2021    Procedure: EP Pacemaker;  Surgeon: Magdiel Silver MD;  Location:  HEART CARDIAC CATH LAB     MANDIBLE SURGERY       OTHER SURGICAL HISTORY      cardiac cath 18: mild non-obstructive disease     TRANSCATHETER AORTIC VALVE IMPLANT ANESTHESIA N/A 2018    Procedure: TRANSCATHETER AORTIC VALVE IMPLANT ANESTHESIA;  ANESTHESIA NEEDED FOR TAVR PROCEDURE;  Surgeon: GENERIC ANESTHESIA PROVIDER;  Location:  OR,  26mm Edward Sabino 3 valve         FAMILY HISTORY:  Family History   Problem Relation Age of Onset     Heart Disease Father          about age 49     Alcohol/Drug Father      Cancer Mother 58         in her 50's, had throat and stomach cancer     Alcohol/Drug Mother      Myocardial Infarction Sister 70        Two heart attacks     Alcohol/Drug Sister      Cerebrovascular Disease Brother 65         age 65 of stroke     Alcohol/Drug Brother      Cancer Maternal Grandmother         stomach cancer     Alcohol/Drug Maternal Grandmother      Alcohol/Drug Maternal Grandfather      Alcohol/Drug Paternal Grandmother      Alcohol/Drug Paternal Grandfather      Myocardial Infarction Grandchild 18        Grandson     Myocardial Infarction Other 18        Nephew          SOCIAL HISTORY:  Social History     Socioeconomic History     Marital status:      Spouse name: Alyssa     Number of children: 3     Years of education: Not on file     Highest education level: Not on file   Occupational History     Not on file   Tobacco Use     Smoking status: Former Smoker     Packs/day: 1.00     Years: 30.00     Pack years: 30.00     Types: Cigarettes     Start date:      Quit date: 1980     Years since quittin.5     Smokeless tobacco: Former User     Types: Chew     Quit date: 1983   Vaping Use     Vaping Use: Never used   Substance and Sexual Activity      "Alcohol use: No     Comment: quit 30+ years ago. states, \"I'm an alcoholic\".     Drug use: No     Sexual activity: Not Currently     Partners: Female   Other Topics Concern     Parent/sibling w/ CABG, MI or angioplasty before 65F 55M? Not Asked   Social History Narrative    , two children in [facility] ([place] and [place]), one child  at age 18.    Five grandchildren.     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     Social Determinants of Health     Financial Resource Strain: Low Risk      Difficulty of Paying Living Expenses: Not hard at all   Food Insecurity: No Food Insecurity     Worried About Running Out of Food in the Last Year: Never true     Ran Out of Food in the Last Year: Never true   Transportation Needs: No Transportation Needs     Lack of Transportation (Medical): No     Lack of Transportation (Non-Medical): No   Physical Activity: Inactive     Days of Exercise per Week: 0 days     Minutes of Exercise per Session: 0 min   Stress: Not on file   Social Connections: Not on file   Intimate Partner Violence: Not At Risk     Fear of Current or Ex-Partner: No     Emotionally Abused: No     Physically Abused: No     Sexually Abused: No   Housing Stability: Low Risk      Unable to Pay for Housing in the Last Year: No     Number of Places Lived in the Last Year: 1     Unstable Housing in the Last Year: No         PHYSICAL EXAMINATION:  Vitals:    22 1503   BP: 139/65   Pulse: 67   SpO2: 96%   Weight: 80.7 kg (178 lb)   Height: 1.651 m (5' 5\")       GENERAL:  No acute distress.  Pleasant and cooperative.   PSYCH:  Normal mood and affect.  HEAD:  Normocephalic.  SPEECH:  No dysarthria.  EYES:  No scleral icterus.  Wearing glasses.  EARS: Wearing hearing aids.  Hearing is intact to loud spoken voice.  NOSE/MOUTH:  Wearing a face mask.  LUNGS:  No respiratory distress.  No increased work of breathing.    BALANCE AND GAIT: The patient ambulates with mild to moderate " forward trunk lean.  He has a reciprocal gait pattern with a shortened stride length bilaterally and a decreased pace.  He is able to heel walk and toe walk without difficulty.  He has slight difficulty tandem walking.    THORACOLUMBAR RANGE OF MOTION:  Forward flexion (85 ): Moderately reduced range of motion, does not cause pain, does not cause radiating pain.  Extension (30 ): Severely reduced range of motion, increases low back pain, does not cause radiating pain.  Twisting right with extension: Moderately to severely reduced range of motion, increases in low back pain, does not cause radiating pain.  Twisting left with extension: Moderately to severely reduced range of motion, increases low back pain, does not cause radiating pain.        IMAGING:  CT LUMBAR SPINE WITHOUT CONTRAST  3/21/2022 3:02 PM      HISTORY: Facet arthropathy, lumbosacral. Lumbosacral spinal stenosis. DDD (degenerative disc disease), lumbosacral. Scoliosis of lumbar spine, unspecified scoliosis type.     TECHNIQUE: Axial images of the lumbar spine were obtained without intravenous contrast. Multiplanar reformations were performed. Radiation dose for this scan was reduced using automated exposure control, adjustment of the mA and/or kV according to patient size, or iterative reconstruction technique.     COMPARISON: None.     FINDINGS:  Alignment is significant for levoconvex scoliosis and multilevel grade 1 spondylolisthesis. Chronic-appearing L4 compression deformity is present with large inferior endplate Schmorl's node and moderate height loss. Multiple other smaller Schmorl's nodes are present. No evidence of recent fracture. Severe asymmetrical degenerative disc disease is present at L3-L4, L4-L5, and L5-S1. Multilevel facet arthropathy is present which is severe on the left at L4-L5 and L5-S1. Disc bulging contributes to mild to moderate spinal canal stenosis at L2-L3 and L3-L4. No high-grade spinal canal stenoses. Mild foraminal  stenoses at multiple levels. Moderate foraminal stenosis on the right at L2-L3. Severe foraminal stenosis on the right at L3-L4. Severe foraminal stenosis on the left at L4-L5. Mild to moderate foraminal stenosis on the right at L4-L5. Severe foraminal stenosis on the left L5-S1.     Extensive scattered vascular calcifications with aortic irregularity. Lateral sacroiliac joint degenerative change.    IMPRESSION:  Severe asymmetrical degenerative change with scoliosis and multiple stenoses as detailed.          ASSESSMENT/PLAN:  Mr. Reid Jimenes is an 88-year-old, right-hand-dominant, male.  He has lumbar scoliosis convex left, lumbosacral facet arthropathy, lumbosacral degenerative disc disease, and lumbosacral spinal stenosis.  Lumbosacral facet arthropathy and lumbosacral spinal stenosis are giving Mr. Reid Jimenes the most pain.  Discussed diagnoses, pathophysiologies, and treatment options with  and Ms. Jimenes.  Discussed the options of doing nothing/living with it, physical therapy, chiropractic care, oral medications such as gabapentin and pregabalin, lumbosacral epidural corticosteroid injection, lumbosacral facet joint medial branch blocks with following radiofrequency ablations, referral to spine surgery.  Mr. Reid Jimenes is being referred to physical therapy.  He is to follow-up in this clinic in 2 months.        Total Time on encounter:  33 minutes were spent on one more or more of the following:  discussion with patient, history, exam, coordinating care, treatment goals, record review, documenting clinical information, and/or data review.      Jayesh Barker MD

## 2022-03-22 NOTE — PATIENT INSTRUCTIONS
Please schedule a follow-up appointment in 2 months.  You can schedule this at the , or you can call [phone].    Please schedule an appointment with Physical Therapy.

## 2022-03-29 ENCOUNTER — THERAPY VISIT (OUTPATIENT)
Dept: PHYSICAL THERAPY | Facility: CLINIC | Age: 87
End: 2022-03-29
Payer: MEDICARE

## 2022-03-29 DIAGNOSIS — M41.9 SCOLIOSIS OF LUMBAR SPINE, UNSPECIFIED SCOLIOSIS TYPE: ICD-10-CM

## 2022-03-29 DIAGNOSIS — M54.50 RIGHT-SIDED LOW BACK PAIN WITHOUT SCIATICA: ICD-10-CM

## 2022-03-29 DIAGNOSIS — M47.817 FACET ARTHROPATHY, LUMBOSACRAL: ICD-10-CM

## 2022-03-29 DIAGNOSIS — M51.379 DDD (DEGENERATIVE DISC DISEASE), LUMBOSACRAL: ICD-10-CM

## 2022-03-29 DIAGNOSIS — M48.07 LUMBOSACRAL SPINAL STENOSIS: ICD-10-CM

## 2022-03-29 PROCEDURE — 97161 PT EVAL LOW COMPLEX 20 MIN: CPT | Mod: GP | Performed by: PHYSICAL THERAPIST

## 2022-03-29 PROCEDURE — 97112 NEUROMUSCULAR REEDUCATION: CPT | Mod: GP | Performed by: PHYSICAL THERAPIST

## 2022-03-29 NOTE — PROGRESS NOTES
[facility]    OUTPATIENT Physical Therapy ORTHOPEDIC EVALUATION  PLAN OF TREATMENT FOR OUTPATIENT REHABILITATION  (COMPLETE FOR INITIAL CLAIMS ONLY)  Patient's Last Name, First Name, M.I.  YOB: 1934  YudyReid  DIXON    Provider s Name:  [facility]   Medical Record No.  [id number]   Start of Care Date:  03/29/22   Onset Date:   03/23/22   Type:     _X__PT   ___OT Medical Diagnosis:    Encounter Diagnoses   Name Primary?     Facet arthropathy, lumbosacral      Lumbosacral spinal stenosis      Scoliosis of lumbar spine, unspecified scoliosis type      DDD (degenerative disc disease), lumbosacral         Treatment Diagnosis:  low back pain        Goals:     03/29/22 0500   Body Part   Goals listed below are for lumbar   Goal #1   Goal #1 ambulation   Previous Functional Level No restrictions   Current Functional Level Miles patient can walk   Performance Level <1/4, 4/10   STG Target Performance Miles patient will be able to  walk   Performance Level 1/4+, <2/10   Rationale for safe community ambulation;to promote a healthy and active lifestyle   Due Date 04/19/22    LTG Target Performance Miles patient will be able to  walk   Performance Level 3/4, painfree   Rationale for safe community ambulation;to promote a healthy and active lifestyle   Due Date 05/10/22       Therapy Frequency:  1 x week  Predicted Duration of Therapy Intervention:  6 weeks    Crissy Melendez, PT                 I CERTIFY THE NEED FOR THESE SERVICES FURNISHED UNDER        THIS PLAN OF TREATMENT AND WHILE UNDER MY CARE     (Physician attestation of this document indicates review and certification of the therapy plan).                       Certification Date From:  03/29/22   Certification Date To:  06/27/22    Referring Provider:  Jayesh Barker    Initial Assessment        See Epic Evaluation SOC  Date: 03/29/22

## 2022-03-29 NOTE — PROGRESS NOTES
Psychiatric    OUTPATIENT Physical Therapy ORTHOPEDIC EVALUATION  PLAN OF TREATMENT FOR OUTPATIENT REHABILITATION  (COMPLETE FOR INITIAL CLAIMS ONLY)  Patient's Last Name, First Name, M.I.  YOB: 1934  YudyReid  DIXON    Provider s Name:  Psychiatric   Medical Record No.  [id number]   Start of Care Date:  03/29/22   Onset Date:   03/23/22   Type:     _X__PT   ___OT Medical Diagnosis:    Encounter Diagnoses   Name Primary?     Facet arthropathy, lumbosacral      Lumbosacral spinal stenosis      Scoliosis of lumbar spine, unspecified scoliosis type      DDD (degenerative disc disease), lumbosacral         Treatment Diagnosis:  low back pain        Goals:     03/29/22 0500   Body Part   Goals listed below are for lumbar   Goal #1   Goal #1 ambulation   Previous Functional Level No restrictions   Current Functional Level Miles patient can walk   Performance Level <1/4, 4/10   STG Target Performance Miles patient will be able to  walk   Performance Level 1/4+, <2/10   Rationale for safe community ambulation;to promote a healthy and active lifestyle   Due Date 04/19/22    LTG Target Performance Miles patient will be able to  walk   Performance Level 3/4, painfree   Rationale for safe community ambulation;to promote a healthy and active lifestyle   Due Date 05/10/22       Therapy Frequency:  1 x week  Predicted Duration of Therapy Intervention:  6 weeks    Crissy Melendez, PT                 I CERTIFY THE NEED FOR THESE SERVICES FURNISHED UNDER        THIS PLAN OF TREATMENT AND WHILE UNDER MY CARE     (Physician attestation of this document indicates review and certification of the therapy plan).                       Certification Date From:  03/29/22   Certification Date To:  06/27/22    Referring Provider:  Jayesh Barker    Initial Assessment        See Epic Evaluation SOC  Date: 03/29/22

## 2022-03-29 NOTE — PROGRESS NOTES
[facility] Initial Evaluation -- Lumbar    Date: March 29, 2022  Reid Jimenes is a 88 year old male with a lumbar condition.   Referral: Dr. Barker  Work mechanical stresses:  retired  Employment status:  retired  Leisure mechanical stresses:   Functional disability score (ARIANA/STarT Back):  See flow sheet  VAS score (0-10): 4/10  Patient goals/expectations:  Hopefully the pain in my back I can get rid of    HISTORY:  Answers for HPI/ROS submitted by the patient on 3/23/2022  When problem began:: 1/1/2115  How problem occurred:: unknown  Number scale: 4/10  General health as reported by patient: good  Please check all that apply to your current or past medical history: none, other  Other Med Hx Detail: stenosis in the lumbar region  Surgeries: none  Medications you are currently taking: other  Other Meds Detail: diabetes  What are your primary job tasks: prolonged sitting    Present symptoms: central, right sided low back pain  Pain quality (sharp/shooting/stabbing/aching/burning/cramping):  aching   Paresthesia (yes/no):  no    Present since (onset date): 2015, recently seen by Dr. Barker March 2022 w/referral to PT.     Symptoms (improving/unchanging/worsening):  worsening.     Symptoms commenced as a result of: no apparent reason   Condition occurred in the following environment:   home     Symptoms at onset (back/thigh/leg): back  Constant symptoms (back/thigh/leg):   Intermittent symptoms (back/thigh/leg): back    Symptoms are made worse with the following: Always Standing, Always Walking, Sometimes Lying, Time of day - Always PM and Always On the move   Symptoms are made better with the following: Always Sitting and Always When still    Disturbed sleep (yes/no):  sometimes Sleeping postures (prone/sup/side R/L): R side    Previous episodes (0/1-5/6-10/11+): chronic Year of first episode: 2015    Previous history: slow progression of increasing back pain  Previous treatments: none        Specific Questions:  Cough/Sneeze/Strain (pos/neg): neg  Bowel/Bladder (normal/abnormal): normal--has some urge incontinence  Gait (normal/abnormal): walks with trunk flexion  Medications (nil/NSAIDS/analg/steroids/anticoag/other):  Other - diabetic meds  Medical allergies:  See chart  General health (excellent/good/fair/poor):  good  Pertinent medical history:  Diabetes  Imaging (None/Xray/MRI/Other):  IMPRESSION:  Severe asymmetrical degenerative change with scoliosis  and multiple stenoses as detailed  Recent or major surgery (yes/no):  no  Night pain (yes/no): no  Accidents (yes/no): no  Unexplained weight loss (yes/no): none  Barriers at home: none  Other red flags: none    EXAMINATION    Posture:   Sitting (good/fair/poor): fair  Standing (good/fair/poor):fair to poor, trunk flexion  Lordosis (red/acc/normal): red  Correction of posture (better/worse/no effect): no effect    Lateral Shift (right/left/nil): nil  Relevant (yes/no):  no  Other Observations: lumbar convexity R    Neurological:    Motor deficit:  intact  Reflexes:  Not tested  Sensory deficit:  intact  Dural signs:  Not tested    Movement Loss:   Rickey Mod Min Nil Pain   Flexion   x     Extension x x   erp   Side Gliding R  x      Side Gliding L  x        Test Movements:   During: produces, abolishes, increases, decreases, no effect, centralizing, peripheralizing   After: better, worse, no better, no worse, no effect, centralized, peripheralized    Pretest symptoms standing: central/right LBP   Symptoms During Symptoms After ROM increased ROM decreased No Effect   FIS        Rep FIS        EIS Produces    No Worse         Rep EIS Produces    No Worse    x       Pretest symptoms lying:     Symptoms During Symptoms After ROM increased ROM decreased No Effect   BENNETT        Rep BENNETT        EIL        Rep EIL          If required, pretest symptoms:    Symptoms During Symptoms After ROM increased ROM decreased No Effect   SGIS - R        Rep SGIS - R         SGIS - L        Rep SGIS - L          Static Tests:  Sitting slouched:     Sitting erect:    Standing slouched:   Standing erect:    Lying prone in extension:   Long sitting:      Other Tests:     Provisional Classification:  Derangement - Bilateral, symmetrical, symptoms above knee and Asymmetrical, unilateral, symptoms above knee    Principle of Management:  Education:  Posture, therapeutic dose of exercise, traffic light   Equipment provided:  Has lumbar roll  Mechanical therapy (Y/N):  Y   Extension principle:  Rep EIS x 10/2 hrs  Lateral Principle:    Flexion principle:    Other:      ASSESSMENT/PLAN:    Patient is a 88 year old male with lumbar complaints.    Patient has the following significant findings with corresponding treatment plan.                Diagnosis 1:  Mechanical low back pain  Pain -  manual therapy, self management, education, directional preference exercise and home program  Decreased ROM/flexibility - manual therapy and therapeutic exercise  Decreased joint mobility - manual therapy and therapeutic exercise  Decreased function - therapeutic activities  Impaired posture - neuro re-education    Therapy Evaluation Codes:   1) History comprised of:   Personal factors that impact the plan of care:      None.    Comorbidity factors that impact the plan of care are:      Diabetes.     Medications impacting care: diabetic meds.  2) Examination of Body Systems comprised of:   Body structures and functions that impact the plan of care:      Lumbar spine.   Activity limitations that impact the plan of care are:      Standing, Walking and Sleeping.  3) Clinical presentation characteristics are:   Stable/Uncomplicated.  4) Decision-Making    Low complexity using standardized patient assessment instrument and/or measureable assessment of functional outcome.  Cumulative Therapy Evaluation is: Low complexity.    Previous and current functional limitations:  (See Goal Flow Sheet for this information)     Short term and Long term goals: (See Goal Flow Sheet for this information)     Communication ability:  Patient appears to be able to clearly communicate and understand verbal and written communication and follow directions correctly.  Treatment Explanation - The following has been discussed with the patient:   RX ordered/plan of care  Anticipated outcomes  Possible risks and side effects  This patient would benefit from PT intervention to resume normal activities.   Rehab potential is good.    Frequency:  1 X week, once daily  Duration:  for 6 weeks  Discharge Plan:  Achieve all LTG.  Independent in home treatment program.  Reach maximal therapeutic benefit.    Please refer to the daily flowsheet for treatment today, total treatment time and time spent performing 1:1 timed codes.

## 2022-04-05 ENCOUNTER — THERAPY VISIT (OUTPATIENT)
Dept: PHYSICAL THERAPY | Facility: CLINIC | Age: 87
End: 2022-04-05
Payer: MEDICARE

## 2022-04-05 DIAGNOSIS — M54.50 RIGHT-SIDED LOW BACK PAIN WITHOUT SCIATICA: ICD-10-CM

## 2022-04-05 PROCEDURE — 97110 THERAPEUTIC EXERCISES: CPT | Mod: GP | Performed by: PHYSICAL THERAPIST

## 2022-04-05 PROCEDURE — 97112 NEUROMUSCULAR REEDUCATION: CPT | Mod: GP | Performed by: PHYSICAL THERAPIST

## 2022-04-05 NOTE — TELEPHONE ENCOUNTER
Please contact patient and advise him that I've placed an order for Neurology to contact him to help schedule Neurocognitive testing.

## 2022-04-06 ENCOUNTER — TELEPHONE (OUTPATIENT)
Dept: INTERNAL MEDICINE | Facility: CLINIC | Age: 87
End: 2022-04-06
Payer: MEDICARE

## 2022-04-06 NOTE — TELEPHONE ENCOUNTER
Neurology referral placed by Dr. Bingham yesterday for cognitive concerns. Patient's wife informed referral was placed. She states that she contacted [facility] and was told they did not have referral on file. Referral printed and faxed to [facility] - Fax: [phone].    Indira Mena RN  [facility]

## 2022-04-19 ENCOUNTER — THERAPY VISIT (OUTPATIENT)
Dept: PHYSICAL THERAPY | Facility: CLINIC | Age: 87
End: 2022-04-19
Payer: MEDICARE

## 2022-04-19 DIAGNOSIS — M54.50 RIGHT-SIDED LOW BACK PAIN WITHOUT SCIATICA: Primary | ICD-10-CM

## 2022-04-19 PROCEDURE — 97110 THERAPEUTIC EXERCISES: CPT | Mod: GP | Performed by: PHYSICAL THERAPIST

## 2022-05-03 ENCOUNTER — THERAPY VISIT (OUTPATIENT)
Dept: PHYSICAL THERAPY | Facility: CLINIC | Age: 87
End: 2022-05-03
Payer: MEDICARE

## 2022-05-03 DIAGNOSIS — M54.50 RIGHT-SIDED LOW BACK PAIN WITHOUT SCIATICA: Primary | ICD-10-CM

## 2022-05-03 PROCEDURE — 97112 NEUROMUSCULAR REEDUCATION: CPT | Mod: GP | Performed by: PHYSICAL THERAPIST

## 2022-05-03 PROCEDURE — 97110 THERAPEUTIC EXERCISES: CPT | Mod: GP | Performed by: PHYSICAL THERAPIST

## 2022-05-17 ENCOUNTER — THERAPY VISIT (OUTPATIENT)
Dept: PHYSICAL THERAPY | Facility: CLINIC | Age: 87
End: 2022-05-17
Payer: MEDICARE

## 2022-05-17 DIAGNOSIS — M54.50 RIGHT-SIDED LOW BACK PAIN WITHOUT SCIATICA: Primary | ICD-10-CM

## 2022-05-17 PROCEDURE — 97110 THERAPEUTIC EXERCISES: CPT | Mod: GP | Performed by: PHYSICAL THERAPIST

## 2022-05-24 ENCOUNTER — OFFICE VISIT (OUTPATIENT)
Dept: ORTHOPEDICS | Facility: CLINIC | Age: 87
End: 2022-05-24
Payer: MEDICARE

## 2022-05-24 VITALS
SYSTOLIC BLOOD PRESSURE: 137 MMHG | DIASTOLIC BLOOD PRESSURE: 65 MMHG | HEIGHT: 65 IN | BODY MASS INDEX: 29.66 KG/M2 | WEIGHT: 178 LBS | OXYGEN SATURATION: 96 % | HEART RATE: 64 BPM

## 2022-05-24 DIAGNOSIS — M47.817 FACET ARTHROPATHY, LUMBOSACRAL: ICD-10-CM

## 2022-05-24 DIAGNOSIS — M48.07 LUMBOSACRAL SPINAL STENOSIS: Primary | ICD-10-CM

## 2022-05-24 DIAGNOSIS — M41.9 SCOLIOSIS OF LUMBAR SPINE, UNSPECIFIED SCOLIOSIS TYPE: ICD-10-CM

## 2022-05-24 DIAGNOSIS — M51.379 DDD (DEGENERATIVE DISC DISEASE), LUMBOSACRAL: ICD-10-CM

## 2022-05-24 PROCEDURE — 99213 OFFICE O/P EST LOW 20 MIN: CPT | Performed by: PHYSICAL MEDICINE & REHABILITATION

## 2022-05-24 ASSESSMENT — PAIN SCALES - GENERAL: PAINLEVEL: MILD PAIN (2)

## 2022-05-24 NOTE — PROGRESS NOTES
PHYSICAL MEDICINE & REHABILITATION / MEDICAL SPINE        Date:  May 24, 2022    Name:  Reid Jimenes  YOB: 1934  MRN:  [id number]          CHART REVIEW:  Reviewed 01/21/2022 note from Viet Bingham MD (internal medicine).  Mr. Reid Jimenes was having midline low back pain without radicular pain.  He denied numbness and weakness.  Pain was worst in the morning after waking.  Referral to medical spine was placed.        CHIEF COMPLAINT:  Low back pain and bilateral lower extremity pain.        HISTORY OF PRESENT ILLNESS:  Mr. Reid Jimenes is an 88-year-old, right-hand-dominant, male.     Mr. Reid Jimenes stated bone was harvested from his left ilium approximately 60 years ago to repair a fractured jaw.  Mr. Reid Jimenes denied any other injuries or surgeries of his low back, pelvis, hips, thighs, knees, legs, ankles, feet, toes.     Mr. Reid Jimenes denied any personal or family history of autoimmune diseases, rheumatologic diseases, gout, pseudogout.  He denied any personal or family history of neurologic diseases.  Mr. Reid Jimenes denied any personal or family history of inflammatory bowel diseases.     Mr. Reid Jimenes has a pacemaker.    Mr. Reid Jimenes was last seen in this clinic on 03/22/2022.  He returns to clinic today, 5/24/2022.  Mr. Reid Jimenes is accompanied to today's appointment by his wife, Ms. Alyssa Jimenes.  They have 2 daughters.    Mr. Reid Jimenes reports that he is feeling much better.  He is not currently having any low back pain.  Mr. Reid Jimenes denies any low back pain at other periods of time.  Mr. Reid Jimenes notes a little bit of generalized weakness in his bilateral lower extremities.  He notes his bilateral thighs can become somewhat weak and tired with walking.  In terms of exercise and activity, Mr. Reid Jimenes has been walking 1 mile daily.    Mr. Reid Jimenes just finished with physical therapy.    Reid Jimenes found physical therapy beneficial.  As he has just finished physical therapy, Mr. Reid Jimenes has not started the home exercise program yet.  However, Mr. Reid Jimenes has been walking 1 mile daily.    Mr. Reid Jimenes denies any way episodes of his lower extremities.  He denies any tripping, stumbling, falling.  Mr. Reid Jimenes is not using any assistive devices for ambulation.  Mr. Reid Jimenes denies any numbness, tingling, pins-and-needles.  He denies any saddle anesthesia, bowel incontinence, bladder incontinence.        ALLERGIES:  Allergies   Allergen Reactions     Fentanyl Other (See Comments)     Confusion and agitation after PPM     Versed [Midazolam] Other (See Comments)     Confusion and agitation post PPM         MEDICATIONS:  Current Outpatient Medications   Medication Sig Dispense Refill     ascorbic acid 500 MG TABS Take 500 mg by mouth Takes off and on       aspirin 81 MG EC tablet Take 1 tablet (81 mg) by mouth daily 30 tablet      biotin 5 MG CAPS Take 1 capsule (5 mg) by mouth 2 times daily (Patient taking differently: Take 5 mg by mouth daily Takes off and on)       blood glucose (MIRA CONTOUR NEXT) test strip Use to test blood sugar one time daily or as directed. 100 strip 11     blood glucose monitoring (MIRA MICROLET) lancets Use to test blood sugar 1 times daily or as directed. 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     Coronary  artery disease     cardiac cath 18: mild non-obstructive disease     High degree atrioventricular block 2021    DDD PM 6/10/2021     Hyperlipidaemia LDL goal < 100      Hypertension      Need for SBE (subacute bacterial endocarditis) prophylaxis      Pulmonary hypertension (H)      Type 2 diabetes mellitus (H)          PAST SURGICAL HISTORY:  Past Surgical History:   Procedure Laterality Date     CV HEART CATHETERIZATION WITH POSSIBLE INTERVENTION N/A 2021    Procedure: coronary angiogram;  Surgeon: Jayesh Rachel MD;  Location:  HEART CARDIAC CATH LAB     CV TEMPORARY PACEMAKER INSERTION N/A 2021    Procedure: Temporary Pacemaker Insertion;  Surgeon: Jayesh Rachel MD;  Location:  HEART CARDIAC CATH LAB     EP PACEMAKER N/A 6/10/2021    Procedure: EP Pacemaker;  Surgeon: Magdile Silver MD;  Location: [facility] CARDIAC C[facility] LAB     MANDIBLE SURGERY       OTHER SURGICAL HISTORY      cardiac cath 18: mild non-obstructive disease     TRANSCATHETER AORTIC VALVE IMPLANT ANESTHESIA N/A 2018    Procedure: TRANSCATHETER AORTIC VALVE IMPLANT ANESTHESIA;  ANESTHESIA NEEDED FOR TAVR PROCEDURE;  Surgeon: GENERIC ANESTHESIA PROVIDER;  Location:  OR,  26mm Edward Sabino 3 valve         FAMILY HISTORY:  Family History   Problem Relation Age of Onset     Heart Disease Father          about age 49     Alcohol/Drug Father      Cancer Mother 58         in her 50's, had throat and stomach cancer     Alcohol/Drug Mother      Myocardial Infarction Sister 70        Two heart attacks     Alcohol/Drug Sister      Cerebrovascular Disease Brother 65         age 65 of stroke     Alcohol/Drug Brother      Cancer Maternal Grandmother         stomach cancer     Alcohol/Drug Maternal Grandmother      Alcohol/Drug Maternal Grandfather      Alcohol/Drug Paternal Grandmother      Alcohol/Drug Paternal Grandfather      Myocardial Infarction Grandchild 18        Grandson      "Myocardial Infarction Other 18        Nephew          SOCIAL HISTORY:  Social History     Socioeconomic History     Marital status:      Spouse name: Alyssa     Number of children: 3     Years of education: Not on file     Highest education level: Not on file   Occupational History     Not on file   Tobacco Use     Smoking status: Former Smoker     Packs/day: 1.00     Years: 30.00     Pack years: 30.00     Types: Cigarettes     Start date:      Quit date: 1980     Years since quittin.7     Smokeless tobacco: Former User     Types: Chew     Quit date: 1983   Vaping Use     Vaping Use: Never used   Substance and Sexual Activity     Alcohol use: No     Comment: quit 30+ years ago. states, \"I'm an alcoholic\".     Drug use: No     Sexual activity: Not Currently     Partners: Female   Other Topics Concern     Parent/sibling w/ CABG, MI or angioplasty before 65F 55M? Not Asked   Social History Narrative    , two children in [facility] ([place]), one child  at age 18.    Five grandchildren.     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     Social Determinants of Health     Financial Resource Strain: Low Risk      Difficulty of Paying Living Expenses: Not hard at all   Food Insecurity: No Food Insecurity     Worried About Running Out of Food in the Last Year: Never true     Ran Out of Food in the Last Year: Never true   Transportation Needs: No Transportation Needs     Lack of Transportation (Medical): No     Lack of Transportation (Non-Medical): No   Physical Activity: Inactive     Days of Exercise per Week: 0 days     Minutes of Exercise per Session: 0 min   Stress: Not on file   Social Connections: Not on file   Intimate Partner Violence: Not At Risk     Fear of Current or Ex-Partner: No     Emotionally Abused: No     Physically Abused: No     Sexually Abused: No   Housing Stability: Low Risk      Unable to Pay for Housing in the Last Year: No " "    Number of Places Lived in the Last Year: 1     Unstable Housing in the Last Year: No         PHYSICAL EXAMINATION:  Vitals:    05/24/22 1420   BP: 137/65   Pulse: 64   SpO2: 96%   Weight: 80.7 kg (178 lb)   Height: 1.651 m (5' 5\")       GENERAL:  No acute distress.  Pleasant and cooperative.   PSYCH:  Normal mood and affect.  HEAD:  Normocephalic.  SPEECH:  No dysarthria.  EYES:  No scleral icterus.  Wearing glasses.  EARS:  Hearing is intact to spoken voice.  NOSE/MOUTH:  Wearing a face mask.  LUNGS:  No respiratory distress.  No increased work of breathing.    BALANCE AND GAIT: Patient ambulates with mild to moderate forward trunk lean.  He has a reciprocal gait pattern with slight right antalgia.  He is able to hold heel walk and toe walk without difficulty.  He has slight difficulty tandem walking.  Double leg squat is performed with quadriceps dominant pattern.        IMAGING:  CT LUMBAR SPINE WITHOUT CONTRAST  3/21/2022 3:02 PM      HISTORY: Facet arthropathy, lumbosacral. Lumbosacral spinal stenosis. DDD (degenerative disc disease), lumbosacral. Scoliosis of lumbar spine, unspecified scoliosis type.     TECHNIQUE: Axial images of the lumbar spine were obtained without intravenous contrast. Multiplanar reformations were performed. Radiation dose for this scan was reduced using automated exposure control, adjustment of the mA and/or kV according to patient size, or iterative reconstruction technique.     COMPARISON: None.     FINDINGS:  Alignment is significant for levoconvex scoliosis and multilevel grade 1 spondylolisthesis. Chronic-appearing L4 compression deformity is present with large inferior endplate Schmorl's node and moderate height loss. Multiple other smaller Schmorl's nodes are present. No evidence of recent fracture. Severe asymmetrical degenerative disc disease is present at L3-L4, L4-L5, and L5-S1. Multilevel facet arthropathy is present which is severe on the left at L4-L5 and L5-S1. Disc " bulging contributes to mild to moderate spinal canal stenosis at L2-L3 and L3-L4. No high-grade spinal canal stenoses. Mild foraminal stenoses at multiple levels. Moderate foraminal stenosis on the right at L2-L3. Severe foraminal stenosis on the right at L3-L4. Severe foraminal stenosis on the left at L4-L5. Mild to moderate foraminal stenosis on the right at L4-L5. Severe foraminal stenosis on the left L5-S1.     Extensive scattered vascular calcifications with aortic irregularity. Lateral sacroiliac joint degenerative change.     IMPRESSION:  Severe asymmetrical degenerative change with scoliosis and multiple stenoses as detailed.         ASSESSMENT/PLAN:  Mr. Reid Jimenes is an 88-year-old, right-hand-dominant, male.  He has lumbar scoliosis convex left, lumbosacral facet arthropathy, lumbosacral degenerative disc disease, lumbosacral spinal stenosis.  Mr. Reid Jimenes has noted significant improvement in symptoms after working with physical therapy.  Discussed diagnoses, pathophysiologies, and treatment options with MrGui and Ms. Jimenes.  Discussed the options of doing nothing/living with it, continuing with the home exercise program from physical therapy, chiropractic care, oral medications such as gabapentin and pregabalin, lumbosacral epidural corticosteroid injection, lumbosacral facet joint medial branch/dorsal ramus blocks.  Mr. Reid Jimenes is feeling quite well at this time.  He will continue with the home exercise program from physical therapy.  He will continue with his personal exercise regimen.  Mr. Reid Jimenes is to follow-up in this clinic on an as-needed basis.        Total Time on encounter:  28 minutes were spent on one more or more of the following:  discussion with patient, history, exam, coordinating care, treatment goals, record review, documenting clinical information, and/or data review.      Jayesh Barker MD

## 2022-05-24 NOTE — PATIENT INSTRUCTIONS
Please schedule a follow-up appointment as needed.  You can schedule this at the , or you can call [phone].

## 2022-05-24 NOTE — LETTER
5/24/2022         RE: Reid Jimenes  [address]  [place] [address]        Dear Colleague,    Thank you for referring your patient, Reid Jimenes, to the [facility]. Please see a copy of my visit note below.    PHYSICAL MEDICINE & REHABILITATION / MEDICAL SPINE        Date:  May 24, 2022    Name:  Reid Jimenes  YOB: 1934  MRN:  [id number]          CHART REVIEW:  Reviewed 01/21/2022 note from Viet Bingham MD (internal medicine).  Mr. Reid Jimenes was having midline low back pain without radicular pain.  He denied numbness and weakness.  Pain was worst in the morning after waking.  Referral to medical spine was placed.        CHIEF COMPLAINT:  Low back pain and bilateral lower extremity pain.        HISTORY OF PRESENT ILLNESS:  Mr. Reid Jimenes is an 88-year-old, right-hand-dominant, male.     Mr. Reid Jimenes stated bone was harvested from his left ilium approximately 60 years ago to repair a fractured jaw.  Mr. Reid Jimenes denied any other injuries or surgeries of his low back, pelvis, hips, thighs, knees, legs, ankles, feet, toes.     Mr. Reid Jimenes denied any personal or family history of autoimmune diseases, rheumatologic diseases, gout, pseudogout.  He denied any personal or family history of neurologic diseases.  Mr. Reid Jimnees denied any personal or family history of inflammatory bowel diseases.     Mr. Reid Jimenes has a pacemaker.    Mr. Reid Jimenes was last seen in this clinic on 03/22/2022.  He returns to clinic today, 5/24/2022.  Mr. Reid Jimenes is accompanied to today's appointment by his wife, Ms. Alyssa Jimenes.  They have 2 daughters.    Mr. Reid Jimenes reports that he is feeling much better.  He is not currently having any low back pain.  Mr. Reid Jimenes denies any low back pain at other periods of time.  Mr. Reid Jimenes notes a little bit of generalized weakness in his  bilateral lower extremities.  He notes his bilateral thighs can become somewhat weak and tired with walking.  In terms of exercise and activity, Mr. Reid Jimenes has been walking 1 mile daily.    Mr. Reid Jimenes just finished with physical therapy.  Mr. Reid Jimenes found physical therapy beneficial.  As he has just finished physical therapy, Mr. Reid Jimenes has not started the home exercise program yet.  However, Mr. Reid Jimenes has been walking 1 mile daily.    Mr. Reid Jimenes denies any way episodes of his lower extremities.  He denies any tripping, stumbling, falling.  Mr. Reid Jimenes is not using any assistive devices for ambulation.  Mr. Reid Jimenes denies any numbness, tingling, pins-and-needles.  He denies any saddle anesthesia, bowel incontinence, bladder incontinence.        ALLERGIES:  Allergies   Allergen Reactions     Fentanyl Other (See Comments)     Confusion and agitation after PPM     Versed [Midazolam] Other (See Comments)     Confusion and agitation post PPM         MEDICATIONS:  Current Outpatient Medications   Medication Sig Dispense Refill     ascorbic acid 500 MG TABS Take 500 mg by mouth Takes off and on       aspirin 81 MG EC tablet Take 1 tablet (81 mg) by mouth daily 30 tablet      biotin 5 MG CAPS Take 1 capsule (5 mg) by mouth 2 times daily (Patient taking differently: Take 5 mg by mouth daily Takes off and on)       blood glucose (MIRA CONTOUR NEXT) test strip Use to test blood sugar one time daily or as directed. 100 strip 11     blood glucose monitoring (MIRA MICROLET) lancets Use to test blood sugar 1 times daily or as directed. 100 each 5     glucosamine-chondroitin 500-400 MG CAPS per capsule Take 1 capsule by mouth Takes off and on       metFORMIN (GLUCOPHAGE) 1000 MG tablet Take 0.5 tablets (500 mg) by mouth 2 times daily (with meals) 90 tablet 3     Multiple Vitamins-Iron (MULTIVITAMIN/IRON PO) Take 1 tablet by mouth Takes off and on        simvastatin (ZOCOR) 40 MG tablet Take 0.5 tablets (20 mg) by mouth At Bedtime 45 tablet 3         PAST MEDICAL HISTORY:  Past Medical History:   Diagnosis Date     Aortic valve stenosis, nonrheumatic     TAVR 18: 26mm Edward Sabino 3 valve     Coronary artery disease     cardiac cath 18: mild non-obstructive disease     High degree atrioventricular block 2021    DDD PM 6/10/2021     Hyperlipidaemia LDL goal < 100      Hypertension      Need for SBE (subacute bacterial endocarditis) prophylaxis      Pulmonary hypertension (H)      Type 2 diabetes mellitus (H)          PAST SURGICAL HISTORY:  Past Surgical History:   Procedure Laterality Date     CV HEART CATHETERIZATION WITH POSSIBLE INTERVENTION N/A 2021    Procedure: coronary angiogram;  Surgeon: Jayesh Rachel MD;  Location:  HEART CARDIAC CATH LAB     CV TEMPORARY PACEMAKER INSERTION N/A 2021    Procedure: Temporary Pacemaker Insertion;  Surgeon: Jayesh Rachel MD;  Location: [facility] CARDIAC CATH LAB     EP PACEMAKER N/A 6/10/2021    Procedure: EP Pacemaker;  Surgeon: Magdiel Silver MD;  Location: [facility] CARDIAC CATH LAB     MANDIBLE SURGERY       OTHER SURGICAL HISTORY      cardiac cath 18: mild non-obstructive disease     TRANSCATHETER AORTIC VALVE IMPLANT ANESTHESIA N/A 2018    Procedure: TRANSCATHETER AORTIC VALVE IMPLANT ANESTHESIA;  ANESTHESIA NEEDED FOR TAVR PROCEDURE;  Surgeon: GENERIC ANESTHESIA PROVIDER;  Location: [facility],  26mm Edward Sabino 3 valve         FAMILY HISTORY:  Family History   Problem Relation Age of Onset     Heart Disease Father          about age 49     Alcohol/Drug Father      Cancer Mother 58         in her 50's, had throat and stomach cancer     Alcohol/Drug Mother      Myocardial Infarction Sister 70        Two heart attacks     Alcohol/Drug Sister      Cerebrovascular Disease Brother 65         age 65 of stroke     Alcohol/Drug Brother      Cancer Maternal  "Grandmother         stomach cancer     Alcohol/Drug Maternal Grandmother      Alcohol/Drug Maternal Grandfather      Alcohol/Drug Paternal Grandmother      Alcohol/Drug Paternal Grandfather      Myocardial Infarction Grandchild 18        Grandson     Myocardial Infarction Other 18        Nephew          SOCIAL HISTORY:  Social History     Socioeconomic History     Marital status:      Spouse name: Alyssa     Number of children: 3     Years of education: Not on file     Highest education level: Not on file   Occupational History     Not on file   Tobacco Use     Smoking status: Former Smoker     Packs/day: 1.00     Years: 30.00     Pack years: 30.00     Types: Cigarettes     Start date:      Quit date: 1980     Years since quittin.7     Smokeless tobacco: Former User     Types: Chew     Quit date: 1983   Vaping Use     Vaping Use: Never used   Substance and Sexual Activity     Alcohol use: No     Comment: quit 30+ years ago. states, \"I'm an alcoholic\".     Drug use: No     Sexual activity: Not Currently     Partners: Female   Other Topics Concern     Parent/sibling w/ CABG, MI or angioplasty before 65F 55M? Not Asked   Social History Narrative    , two children in [facility] ([place] and [place]), one child  at age 18.    Five grandchildren.     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     Social Determinants of Health     Financial Resource Strain: Low Risk      Difficulty of Paying Living Expenses: Not hard at all   Food Insecurity: No Food Insecurity     Worried About Running Out of Food in the Last Year: Never true     Ran Out of Food in the Last Year: Never true   Transportation Needs: No Transportation Needs     Lack of Transportation (Medical): No     Lack of Transportation (Non-Medical): No   Physical Activity: Inactive     Days of Exercise per Week: 0 days     Minutes of Exercise per Session: 0 min   Stress: Not on file   Social Connections: " "Not on file   Intimate Partner Violence: Not At Risk     Fear of Current or Ex-Partner: No     Emotionally Abused: No     Physically Abused: No     Sexually Abused: No   Housing Stability: Low Risk      Unable to Pay for Housing in the Last Year: No     Number of Places Lived in the Last Year: 1     Unstable Housing in the Last Year: No         PHYSICAL EXAMINATION:  Vitals:    05/24/22 1420   BP: 137/65   Pulse: 64   SpO2: 96%   Weight: 80.7 kg (178 lb)   Height: 1.651 m (5' 5\")       GENERAL:  No acute distress.  Pleasant and cooperative.   PSYCH:  Normal mood and affect.  HEAD:  Normocephalic.  SPEECH:  No dysarthria.  EYES:  No scleral icterus.  Wearing glasses.  EARS:  Hearing is intact to spoken voice.  NOSE/MOUTH:  Wearing a face mask.  LUNGS:  No respiratory distress.  No increased work of breathing.    BALANCE AND GAIT: Patient ambulates with mild to moderate forward trunk lean.  He has a reciprocal gait pattern with slight right antalgia.  He is able to hold heel walk and toe walk without difficulty.  He has slight difficulty tandem walking.  Double leg squat is performed with quadriceps dominant pattern.        IMAGING:  CT LUMBAR SPINE WITHOUT CONTRAST  3/21/2022 3:02 PM      HISTORY: Facet arthropathy, lumbosacral. Lumbosacral spinal stenosis. DDD (degenerative disc disease), lumbosacral. Scoliosis of lumbar spine, unspecified scoliosis type.     TECHNIQUE: Axial images of the lumbar spine were obtained without intravenous contrast. Multiplanar reformations were performed. Radiation dose for this scan was reduced using automated exposure control, adjustment of the mA and/or kV according to patient size, or iterative reconstruction technique.     COMPARISON: None.     FINDINGS:  Alignment is significant for levoconvex scoliosis and multilevel grade 1 spondylolisthesis. Chronic-appearing L4 compression deformity is present with large inferior endplate Schmorl's node and moderate height loss. Multiple " other smaller Schmorl's nodes are present. No evidence of recent fracture. Severe asymmetrical degenerative disc disease is present at L3-L4, L4-L5, and L5-S1. Multilevel facet arthropathy is present which is severe on the left at L4-L5 and L5-S1. Disc bulging contributes to mild to moderate spinal canal stenosis at L2-L3 and L3-L4. No high-grade spinal canal stenoses. Mild foraminal stenoses at multiple levels. Moderate foraminal stenosis on the right at L2-L3. Severe foraminal stenosis on the right at L3-L4. Severe foraminal stenosis on the left at L4-L5. Mild to moderate foraminal stenosis on the right at L4-L5. Severe foraminal stenosis on the left L5-S1.     Extensive scattered vascular calcifications with aortic irregularity. Lateral sacroiliac joint degenerative change.     IMPRESSION:  Severe asymmetrical degenerative change with scoliosis and multiple stenoses as detailed.         ASSESSMENT/PLAN:  Mr. Reid Jimenes is an 88-year-old, right-hand-dominant, male.  He has lumbar scoliosis convex left, lumbosacral facet arthropathy, lumbosacral degenerative disc disease, lumbosacral spinal stenosis.  Mr. Reid Jimenes has noted significant improvement in symptoms after working with physical therapy.  Discussed diagnoses, pathophysiologies, and treatment options with Mr. and Ms. Jimenes.  Discussed the options of doing nothing/living with it, continuing with the home exercise program from physical therapy, chiropractic care, oral medications such as gabapentin and pregabalin, lumbosacral epidural corticosteroid injection, lumbosacral facet joint medial branch/dorsal ramus blocks.  Mr. Reid Jimenes is feeling quite well at this time.  He will continue with the home exercise program from physical therapy.  He will continue with his personal exercise regimen.  Mr. Reid Jimenes is to follow-up in this clinic on an as-needed basis.        Total Time on encounter:  28 minutes were spent on one more or  more of the following:  discussion with patient, history, exam, coordinating care, treatment goals, record review, documenting clinical information, and/or data review.      Jayesh Barker MD

## 2022-06-09 ENCOUNTER — HOSPITAL ENCOUNTER (OUTPATIENT)
Dept: CARDIOLOGY | Facility: CLINIC | Age: 87
Discharge: HOME | End: 2022-06-09
Attending: INTERNAL MEDICINE | Admitting: INTERNAL MEDICINE
Payer: MEDICARE

## 2022-06-09 DIAGNOSIS — Z95.2 S/P TAVR (TRANSCATHETER AORTIC VALVE REPLACEMENT): ICD-10-CM

## 2022-06-09 DIAGNOSIS — R06.09 DYSPNEA ON EXERTION: ICD-10-CM

## 2022-06-09 PROCEDURE — 93306 TTE W/DOPPLER COMPLETE: CPT | Mod: 26 | Performed by: INTERNAL MEDICINE

## 2022-06-09 PROCEDURE — 93306 TTE W/DOPPLER COMPLETE: CPT

## 2022-06-14 ENCOUNTER — ANCILLARY PROCEDURE (OUTPATIENT)
Dept: CARDIOLOGY | Facility: CLINIC | Age: 87
End: 2022-06-14
Attending: INTERNAL MEDICINE
Payer: MEDICARE

## 2022-06-14 DIAGNOSIS — I44.2 CHB (COMPLETE HEART BLOCK) (H): ICD-10-CM

## 2022-06-14 DIAGNOSIS — Z95.0 CARDIAC PACEMAKER IN SITU: ICD-10-CM

## 2022-06-14 DIAGNOSIS — Z95.0 CARDIAC PACEMAKER IN SITU: Primary | ICD-10-CM

## 2022-06-14 LAB
MDC_IDC_EPISODE_DTM: NORMAL
MDC_IDC_EPISODE_ID: NORMAL
MDC_IDC_EPISODE_TYPE: NORMAL
MDC_IDC_LEAD_IMPLANT_DT: NORMAL
MDC_IDC_LEAD_IMPLANT_DT: NORMAL
MDC_IDC_LEAD_LOCATION: NORMAL
MDC_IDC_LEAD_LOCATION: NORMAL
MDC_IDC_LEAD_LOCATION_DETAIL_1: NORMAL
MDC_IDC_LEAD_LOCATION_DETAIL_1: NORMAL
MDC_IDC_LEAD_MFG: NORMAL
MDC_IDC_LEAD_MFG: NORMAL
MDC_IDC_LEAD_MODEL: NORMAL
MDC_IDC_LEAD_MODEL: NORMAL
MDC_IDC_LEAD_POLARITY_TYPE: NORMAL
MDC_IDC_LEAD_POLARITY_TYPE: NORMAL
MDC_IDC_LEAD_SERIAL: NORMAL
MDC_IDC_LEAD_SERIAL: NORMAL
MDC_IDC_MSMT_BATTERY_DTM: NORMAL
MDC_IDC_MSMT_BATTERY_REMAINING_LONGEVITY: 96 MO
MDC_IDC_MSMT_BATTERY_REMAINING_PERCENTAGE: 100 %
MDC_IDC_MSMT_BATTERY_STATUS: NORMAL
MDC_IDC_MSMT_LEADCHNL_RA_IMPEDANCE_VALUE: 742 OHM
MDC_IDC_MSMT_LEADCHNL_RA_PACING_THRESHOLD_AMPLITUDE: 0.5 V
MDC_IDC_MSMT_LEADCHNL_RA_PACING_THRESHOLD_PULSEWIDTH: 0.4 MS
MDC_IDC_MSMT_LEADCHNL_RV_IMPEDANCE_VALUE: 677 OHM
MDC_IDC_MSMT_LEADCHNL_RV_PACING_THRESHOLD_AMPLITUDE: 0.9 V
MDC_IDC_MSMT_LEADCHNL_RV_PACING_THRESHOLD_PULSEWIDTH: 0.4 MS
MDC_IDC_PG_IMPLANT_DTM: NORMAL
MDC_IDC_PG_MFG: NORMAL
MDC_IDC_PG_MODEL: NORMAL
MDC_IDC_PG_SERIAL: NORMAL
MDC_IDC_PG_TYPE: NORMAL
MDC_IDC_SESS_CLINIC_NAME: NORMAL
MDC_IDC_SESS_DTM: NORMAL
MDC_IDC_SESS_TYPE: NORMAL
MDC_IDC_SET_BRADY_AT_MODE_SWITCH_MODE: NORMAL
MDC_IDC_SET_BRADY_AT_MODE_SWITCH_RATE: 170 {BEATS}/MIN
MDC_IDC_SET_BRADY_LOWRATE: 60 {BEATS}/MIN
MDC_IDC_SET_BRADY_MAX_SENSOR_RATE: 130 {BEATS}/MIN
MDC_IDC_SET_BRADY_MAX_TRACKING_RATE: 130 {BEATS}/MIN
MDC_IDC_SET_BRADY_MODE: NORMAL
MDC_IDC_SET_BRADY_PAV_DELAY_HIGH: 150 MS
MDC_IDC_SET_BRADY_PAV_DELAY_LOW: 200 MS
MDC_IDC_SET_BRADY_SAV_DELAY_HIGH: 150 MS
MDC_IDC_SET_BRADY_SAV_DELAY_LOW: 200 MS
MDC_IDC_SET_LEADCHNL_RA_PACING_AMPLITUDE: 2 V
MDC_IDC_SET_LEADCHNL_RA_PACING_CAPTURE_MODE: NORMAL
MDC_IDC_SET_LEADCHNL_RA_PACING_POLARITY: NORMAL
MDC_IDC_SET_LEADCHNL_RA_PACING_PULSEWIDTH: 0.4 MS
MDC_IDC_SET_LEADCHNL_RA_SENSING_ADAPTATION_MODE: NORMAL
MDC_IDC_SET_LEADCHNL_RA_SENSING_POLARITY: NORMAL
MDC_IDC_SET_LEADCHNL_RA_SENSING_SENSITIVITY: 0.25 MV
MDC_IDC_SET_LEADCHNL_RV_PACING_AMPLITUDE: 1.4 V
MDC_IDC_SET_LEADCHNL_RV_PACING_CAPTURE_MODE: NORMAL
MDC_IDC_SET_LEADCHNL_RV_PACING_POLARITY: NORMAL
MDC_IDC_SET_LEADCHNL_RV_PACING_PULSEWIDTH: 0.4 MS
MDC_IDC_SET_LEADCHNL_RV_SENSING_ADAPTATION_MODE: NORMAL
MDC_IDC_SET_LEADCHNL_RV_SENSING_POLARITY: NORMAL
MDC_IDC_SET_LEADCHNL_RV_SENSING_SENSITIVITY: 1.5 MV
MDC_IDC_SET_ZONE_DETECTION_INTERVAL: 375 MS
MDC_IDC_SET_ZONE_TYPE: NORMAL
MDC_IDC_SET_ZONE_VENDOR_TYPE: NORMAL
MDC_IDC_STAT_AT_BURDEN_PERCENT: 0 %
MDC_IDC_STAT_AT_DTM_END: NORMAL
MDC_IDC_STAT_AT_DTM_START: NORMAL
MDC_IDC_STAT_BRADY_DTM_END: NORMAL
MDC_IDC_STAT_BRADY_DTM_START: NORMAL
MDC_IDC_STAT_BRADY_RA_PERCENT_PACED: 28 %
MDC_IDC_STAT_BRADY_RV_PERCENT_PACED: 36 %
MDC_IDC_STAT_EPISODE_RECENT_COUNT: 0
MDC_IDC_STAT_EPISODE_RECENT_COUNT_DTM_END: NORMAL
MDC_IDC_STAT_EPISODE_RECENT_COUNT_DTM_START: NORMAL
MDC_IDC_STAT_EPISODE_TYPE: NORMAL
MDC_IDC_STAT_EPISODE_VENDOR_TYPE: NORMAL

## 2022-06-14 PROCEDURE — 93294 REM INTERROG EVL PM/LDLS PM: CPT | Performed by: INTERNAL MEDICINE

## 2022-06-14 PROCEDURE — 93296 REM INTERROG EVL PM/IDS: CPT | Performed by: INTERNAL MEDICINE

## 2022-06-20 ENCOUNTER — TELEPHONE (OUTPATIENT)
Dept: CARDIOLOGY | Facility: CLINIC | Age: 87
End: 2022-06-20
Payer: MEDICARE

## 2022-06-20 DIAGNOSIS — I44.2 CHB (COMPLETE HEART BLOCK) (H): ICD-10-CM

## 2022-06-20 DIAGNOSIS — Z95.0 CARDIAC PACEMAKER IN SITU: Primary | ICD-10-CM

## 2022-06-20 NOTE — TELEPHONE ENCOUNTER
[facility] Call Center    Phone Message    May a detailed message be left on voicemail: yes     Reason for Call: Other: Per pt wife calling to schedule Device check and TAVR with Lizz on the same day.     Action Taken: Message routed to:  Other: Cardiology    Travel Screening: Not Applicable

## 2022-06-20 NOTE — TELEPHONE ENCOUNTER
Received below message from call center.  Message sent to scheduling requesting they reach out to patient to coordinate visit with Lizz and device check.     NATALIE Smith

## 2022-07-19 ENCOUNTER — OFFICE VISIT (OUTPATIENT)
Dept: INTERNAL MEDICINE | Facility: CLINIC | Age: 87
End: 2022-07-19
Payer: MEDICARE

## 2022-07-19 VITALS
SYSTOLIC BLOOD PRESSURE: 132 MMHG | WEIGHT: 173 LBS | OXYGEN SATURATION: 97 % | DIASTOLIC BLOOD PRESSURE: 64 MMHG | TEMPERATURE: 98.2 F | BODY MASS INDEX: 30.65 KG/M2 | HEART RATE: 84 BPM | HEIGHT: 63 IN | RESPIRATION RATE: 18 BRPM

## 2022-07-19 DIAGNOSIS — Z95.2 S/P TAVR (TRANSCATHETER AORTIC VALVE REPLACEMENT): ICD-10-CM

## 2022-07-19 DIAGNOSIS — F10.21 ALCOHOL DEPENDENCE IN REMISSION (H): ICD-10-CM

## 2022-07-19 DIAGNOSIS — E11.29 TYPE 2 DIABETES MELLITUS WITH MICROALBUMINURIA, WITHOUT LONG-TERM CURRENT USE OF INSULIN (H): ICD-10-CM

## 2022-07-19 DIAGNOSIS — M48.07 LUMBOSACRAL SPINAL STENOSIS: ICD-10-CM

## 2022-07-19 DIAGNOSIS — Z00.00 ENCOUNTER FOR MEDICARE ANNUAL WELLNESS EXAM: Primary | ICD-10-CM

## 2022-07-19 DIAGNOSIS — Z23 NEED FOR DIPHTHERIA-TETANUS-PERTUSSIS (TDAP) VACCINE: ICD-10-CM

## 2022-07-19 DIAGNOSIS — R80.9 TYPE 2 DIABETES MELLITUS WITH MICROALBUMINURIA, WITHOUT LONG-TERM CURRENT USE OF INSULIN (H): ICD-10-CM

## 2022-07-19 DIAGNOSIS — E78.5 HYPERLIPIDEMIA WITH TARGET LDL LESS THAN 100: ICD-10-CM

## 2022-07-19 LAB — HBA1C MFR BLD: 7.1 % (ref 0–5.6)

## 2022-07-19 PROCEDURE — 84443 ASSAY THYROID STIM HORMONE: CPT | Performed by: INTERNAL MEDICINE

## 2022-07-19 PROCEDURE — 99207 PR FOOT EXAM NO CHARGE: CPT | Mod: 25 | Performed by: INTERNAL MEDICINE

## 2022-07-19 PROCEDURE — 90715 TDAP VACCINE 7 YRS/> IM: CPT | Performed by: INTERNAL MEDICINE

## 2022-07-19 PROCEDURE — 83036 HEMOGLOBIN GLYCOSYLATED A1C: CPT | Performed by: INTERNAL MEDICINE

## 2022-07-19 PROCEDURE — 82043 UR ALBUMIN QUANTITATIVE: CPT | Performed by: INTERNAL MEDICINE

## 2022-07-19 PROCEDURE — 36415 COLL VENOUS BLD VENIPUNCTURE: CPT | Performed by: INTERNAL MEDICINE

## 2022-07-19 PROCEDURE — G0439 PPPS, SUBSEQ VISIT: HCPCS | Performed by: INTERNAL MEDICINE

## 2022-07-19 PROCEDURE — 80053 COMPREHEN METABOLIC PANEL: CPT | Performed by: INTERNAL MEDICINE

## 2022-07-19 PROCEDURE — 99214 OFFICE O/P EST MOD 30 MIN: CPT | Mod: 25 | Performed by: INTERNAL MEDICINE

## 2022-07-19 PROCEDURE — 90471 IMMUNIZATION ADMIN: CPT | Performed by: INTERNAL MEDICINE

## 2022-07-19 RX ORDER — METFORMIN HCL 500 MG
500 TABLET, EXTENDED RELEASE 24 HR ORAL 2 TIMES DAILY WITH MEALS
Qty: 180 TABLET | Refills: 3 | Status: SHIPPED | OUTPATIENT
Start: 2022-07-19 | End: 2022-07-19

## 2022-07-19 RX ORDER — SIMVASTATIN 20 MG
20 TABLET ORAL AT BEDTIME
Qty: 90 TABLET | Refills: 1 | Status: SHIPPED | OUTPATIENT
Start: 2022-07-19 | End: 2023-08-07

## 2022-07-19 RX ORDER — METFORMIN HCL 500 MG
500 TABLET, EXTENDED RELEASE 24 HR ORAL 2 TIMES DAILY WITH MEALS
Qty: 180 TABLET | Refills: 3 | Status: SHIPPED | OUTPATIENT
Start: 2022-07-19 | End: 2022-08-10

## 2022-07-19 RX ORDER — SIMVASTATIN 20 MG
20 TABLET ORAL AT BEDTIME
Qty: 90 TABLET | Refills: 1 | Status: SHIPPED | OUTPATIENT
Start: 2022-07-19 | End: 2022-07-19

## 2022-07-19 SDOH — ECONOMIC STABILITY: FOOD INSECURITY: WITHIN THE PAST 12 MONTHS, THE FOOD YOU BOUGHT JUST DIDN'T LAST AND YOU DIDN'T HAVE MONEY TO GET MORE.: NEVER TRUE

## 2022-07-19 SDOH — HEALTH STABILITY: PHYSICAL HEALTH: ON AVERAGE, HOW MANY MINUTES DO YOU ENGAGE IN EXERCISE AT THIS LEVEL?: 0 MIN

## 2022-07-19 SDOH — ECONOMIC STABILITY: FOOD INSECURITY: WITHIN THE PAST 12 MONTHS, YOU WORRIED THAT YOUR FOOD WOULD RUN OUT BEFORE YOU GOT MONEY TO BUY MORE.: NEVER TRUE

## 2022-07-19 SDOH — ECONOMIC STABILITY: INCOME INSECURITY: IN THE LAST 12 MONTHS, WAS THERE A TIME WHEN YOU WERE NOT ABLE TO PAY THE MORTGAGE OR RENT ON TIME?: NO

## 2022-07-19 SDOH — HEALTH STABILITY: PHYSICAL HEALTH: ON AVERAGE, HOW MANY DAYS PER WEEK DO YOU ENGAGE IN MODERATE TO STRENUOUS EXERCISE (LIKE A BRISK WALK)?: 0 DAYS

## 2022-07-19 ASSESSMENT — ENCOUNTER SYMPTOMS
PARESTHESIAS: 0
NUMBNESS: 0
FREQUENCY: 0
HEMATOCHEZIA: 0
BACK PAIN: 1
PALPITATIONS: 0
FEVER: 0
UNEXPECTED WEIGHT CHANGE: 0
DIZZINESS: 0
LIGHT-HEADEDNESS: 0
SHORTNESS OF BREATH: 0
CONSTIPATION: 0
CHILLS: 0
DIFFICULTY URINATING: 0
CHEST TIGHTNESS: 0
SPEECH DIFFICULTY: 0
HEARTBURN: 0
DIARRHEA: 0

## 2022-07-19 ASSESSMENT — SOCIAL DETERMINANTS OF HEALTH (SDOH)
IN A TYPICAL WEEK, HOW MANY TIMES DO YOU TALK ON THE PHONE WITH FAMILY, FRIENDS, OR NEIGHBORS?: TWICE A WEEK
HOW OFTEN DO YOU ATTENT MEETINGS OF THE CLUB OR ORGANIZATION YOU BELONG TO?: NEVER
HOW OFTEN DO YOU ATTEND CHURCH OR RELIGIOUS SERVICES?: MORE THAN 4 TIMES PER YEAR
WITHIN THE LAST YEAR, HAVE YOU BEEN HUMILIATED OR EMOTIONALLY ABUSED IN OTHER WAYS BY YOUR PARTNER OR EX-PARTNER?: NO
HOW OFTEN DO YOU GET TOGETHER WITH FRIENDS OR RELATIVES?: TWICE A WEEK
HOW HARD IS IT FOR YOU TO PAY FOR THE VERY BASICS LIKE FOOD, HOUSING, MEDICAL CARE, AND HEATING?: NOT HARD AT ALL
DO YOU BELONG TO ANY CLUBS OR ORGANIZATIONS SUCH AS CHURCH GROUPS UNIONS, FRATERNAL OR ATHLETIC GROUPS, OR SCHOOL GROUPS?: YES
WITHIN THE LAST YEAR, HAVE TO BEEN RAPED OR FORCED TO HAVE ANY KIND OF SEXUAL ACTIVITY BY YOUR PARTNER OR EX-PARTNER?: NO
WITHIN THE LAST YEAR, HAVE YOU BEEN AFRAID OF YOUR PARTNER OR EX-PARTNER?: NO
WITHIN THE LAST YEAR, HAVE YOU BEEN KICKED, HIT, SLAPPED, OR OTHERWISE PHYSICALLY HURT BY YOUR PARTNER OR EX-PARTNER?: NO

## 2022-07-19 ASSESSMENT — ACTIVITIES OF DAILY LIVING (ADL): CURRENT_FUNCTION: NO ASSISTANCE NEEDED

## 2022-07-19 NOTE — PATIENT INSTRUCTIONS
Patient Education   Personalized Prevention Plan  You are due for the preventive services outlined below.  Your care team is available to assist you in scheduling these services.  If you have already completed any of these items, please share that information with your care team to update in your medical record.  Health Maintenance Due   Topic Date Due    Zoster (Shingles) Vaccine (2 of 3) 12/05/2012    Diptheria Tetanus Pertussis (DTAP/TDAP/TD) Vaccine (4 - Td or Tdap) 05/17/2020    ANNUAL REVIEW OF HM ORDERS  06/17/2022    A1C Lab  07/14/2022    Kidney Microalbumin Urine Test  07/19/2022          [facility]. We will update your A1C today and update your tetanus shot. Lab results will be available on [employer]. You could get the shingles vaccine (Shingrix) at your local pharmacy as this is much cheaper than in the clinic. Go ahead and make an appt with cardiology for September of 2022. See me back in 6 months (~January of 2023) for a diabetes and cholesterol check. Things look good!     Dr Bingham has sent refills of metformin XR (extended release) 500 mg to take one tab twice daily, [facility]. He also sent refills of simvastatin.   If they give you 40 mg tabs, take one-half tablet daily, but if they give you 20 mg tablets, take a full tablet daily.     Stop by the lab ([address]) before going home.     See me in January 2023 for a diabetes visit, and in July 2023 for another Annual Wellness Visit.

## 2022-07-19 NOTE — PROGRESS NOTES
"SUBJECTIVE:     SUBJECTIVE:   Reid Jimenes is a 88 year old male who presents for Preventive Visit.      Patient has been advised of split billing requirements and indicates understanding: Yes  Are you in the first 12 months of your Medicare coverage?  No    Healthy Habits:     In general, how would you rate your overall health?  Good    Frequency of exercise:  None    Duration of exercise:  Other    Do you usually eat at least 4 servings of fruit and vegetables a day, include whole grains    & fiber and avoid regularly eating high fat or \"junk\" foods?  Yes    Taking medications regularly:  No    Barriers to taking medications:  None    Medication side effects:  None    Ability to successfully perform activities of daily living:  No assistance needed    Home Safety:  No safety concerns identified    Hearing impairment symptoms: wears hearing asisted devices.    In the past 6 months, have you been bothered by leaking of urine?  No    In general, how would you rate your overall mental or emotional health?  Good      PHQ-2 Total Score: 2    Additional concerns today:  No     Do you feel safe in your environment? Yes    Have you ever done Advance Care Planning? (For example, a Health Directive, POLST, or a discussion with a medical provider or your loved ones about your wishes): Yes, advance care planning is on file.       Fall risk  Fallen 2 or more times in the past year?: No  Any fall with injury in the past year?: No    Cognitive Screening   1) Repeat 3 items (Leader, Season, Table)    2) Clock draw: NORMAL  3) 3 item recall: Recalls 2 objects   Results: NORMAL clock, 1-2 items recalled: COGNITIVE IMPAIRMENT LESS LIKELY    Mini-CogTM Adilene Askew. Licensed by the author for use in [facility]; reprinted with permission ([email or web address]). All rights reserved.      Do you have sleep apnea, excessive snoring or daytime drowsiness?: no    Reviewed and updated as needed this visit by clinical staff   " "Tobacco  Allergies  Meds   Med Hx  Surg Hx  Fam Hx  Soc Hx          Reviewed and updated as needed this visit by Provider                   Social History     Tobacco Use     Smoking status: Former Smoker     Packs/day: 1.00     Years: 30.00     Pack years: 30.00     Types: Cigarettes     Start date:      Quit date: 1980     Years since quittin.9     Smokeless tobacco: Former User     Types: Chew     Quit date: 1983   Substance Use Topics     Alcohol use: No     Comment: quit 30+ years ago. states, \"I'm an alcoholic\".     If you drink alcohol do you typically have >3 drinks per day or >7 drinks per week? No    Alcohol Use 2022   Prescreen: >3 drinks/day or >7 drinks/week? No   Prescreen: >3 drinks/day or >7 drinks/week? -       Current providers sharing in care for this patient include:   Patient Care Team:  Viet Bingham MD as PCP - General (Internal Medicine)  Viet Bingham MD as Assigned PCP  Des Marks MD as MD (Vascular and Interventional Radiology)  Mary Faulkner RN as Specialty Care Coordinator (Radiology)  Eulalio Higginbotham MD as MD (Cardiovascular Disease)  Eulalio Higginbotham MD as Assigned Heart and Vascular Provider  Jayesh Barker MD as Assigned Neuroscience Provider    The following health maintenance items are reviewed in Epic and correct as of today:  Health Maintenance Due   Topic Date Due     ZOSTER IMMUNIZATION (2 of 3) 2012     DTAP/TDAP/TD IMMUNIZATION (4 - Td or Tdap) 2020     ANNUAL REVIEW OF HM ORDERS  2022     A1C  2022     MICROALBUMIN  2022         In addition to an Annual Wellness Exam, we addressed diabetes mellitus, hyperlipidemia, hypertension.     The patient is tolerating his current medications without any adverse effects.  Glucoses have been well controlled.   BP and LDL-Cholesterol have been at target.   He follows with cardiology s/p TAVR.    Past medical, family and social histories as " "well as medications reviewed and updated as needed.    Review of Systems   Constitutional: Negative for chills, fever and unexpected weight change.   Respiratory: Negative for chest tightness and shortness of breath.    Cardiovascular: Negative for chest pain, palpitations and peripheral edema.   Gastrointestinal: Negative for constipation, diarrhea, heartburn and hematochezia.   Endocrine: Negative for polyuria.   Genitourinary: Negative for difficulty urinating, frequency and urgency.   Musculoskeletal: Positive for back pain.   Neurological: Negative for dizziness, speech difficulty, light-headedness, numbness and paresthesias.       OBJECTIVE:   /64   Pulse 84   Temp 98.2  F (36.8  C) (Oral)   Resp 18   Ht 1.588 m (5' 2.5\")   Wt 78.5 kg (173 lb)   SpO2 97%   BMI 31.14 kg/m   Estimated body mass index is 31.14 kg/m  as calculated from the following:    Height as of this encounter: 1.588 m (5' 2.5\").    Weight as of this encounter: 78.5 kg (173 lb).     Physical Exam  Constitutional:       General: He is not in acute distress.     Appearance: Normal appearance. He is not ill-appearing.   HENT:      Head: Normocephalic and atraumatic.      Right Ear: Tympanic membrane, ear canal and external ear normal.      Left Ear: Tympanic membrane, ear canal and external ear normal.   Eyes:      Extraocular Movements: Extraocular movements intact.      Conjunctiva/sclera: Conjunctivae normal.   Cardiovascular:      Rate and Rhythm: Normal rate and regular rhythm.      Pulses: Normal pulses.      Heart sounds: Normal heart sounds.   Pulmonary:      Effort: Pulmonary effort is normal.      Breath sounds: Normal breath sounds.   Abdominal:      General: Abdomen is flat. Bowel sounds are normal.      Palpations: Abdomen is soft.   Skin:     General: Skin is warm and dry.   Neurological:      Mental Status: He is alert and oriented to person, place, and time.   Psychiatric:         Mood and Affect: Mood normal.         " Behavior: Behavior normal.       Diabetic foot exam: normal DP and PT pulses, no trophic changes or ulcerative lesions and normal sensory exam    Diagnostic Test Results:  Labs reviewed in Epic    ASSESSMENT / PLAN:   (Z00.00) Encounter for Medicare annual wellness exam  (primary encounter diagnosis)  Plan: REVIEW OF HEALTH MAINTENANCE PROTOCOL ORDERS            (E11.29,  R80.9) Type 2 diabetes mellitus with microalbuminuria, without long-term current use of insulin (H)  Comment: Eye exam was in Fall of 2021. Due for annual exam this Fall. Diabetic foot exam last done in January of 2022. Normal exam. Last A1C in January of 2022 was 7.2%. Will update A1C today.   Plan: REVIEW OF HEALTH MAINTENANCE PROTOCOL ORDERS,         HEMOGLOBIN A1C, Albumin Random Urine         Quantitative with Creat Ratio, Comprehensive         metabolic panel (BMP + Alb, Alk Phos, ALT, AST,        Total. Bili, TP), TSH with free T4 reflex,         metFORMIN (GLUCOPHAGE XR) 500 MG 24 hr tablet,         FOOT EXAM, OFFICE/OUTPT VISIT,EST,LEVL III,         DISCONTINUED: metFORMIN (GLUCOPHAGE) 1000 MG         tablet, DISCONTINUED: metFORMIN (GLUCOPHAGE)         1000 MG tablet, DISCONTINUED: metFORMIN         (GLUCOPHAGE XR) 500 MG 24 hr tablet            (E78.5) Hyperlipidemia with target LDL less than 100  Comment: LDL in January of 2022 was at goal at 45. Continue current medication regimen.  Plan: simvastatin (ZOCOR) 20 MG tablet            (Z95.2) S/P TAVR (transcatheter aortic valve replacement)  Comment: Follows with Dr. Higginbotham. Status post TAVR in 2018. Dual chamber pacemaker placed in 2021. At his visit in March of 2022, he c/o exertional dyspnea. Today pt denies any SOB at rest but notes he has very mild MAGUIRE at baseline. His exertional dyspnea has not worsened over the past 6 months. He is due for follow-up with VINOD ~09/2022.  Plan: Continue Aspirin 81mg daily. F/u with cardiology as planned.    (M48.07) Lumbosacral spinal  "stenosis  Comment: Seen by Ortho for low back pain in May of 2022. Completed PT and participates in exercises at home. Pain is controlled and he denies any use of pain medications.   Plan: Continue current plan.    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     Patient has been advised of split billing requirements and indicates understanding: Yes    COUNSELING:  Reviewed preventive health counseling, as reflected in patient instructions       Regular exercise       Healthy diet/nutrition    Estimated body mass index is 31.14 kg/m  as calculated from the following:    Height as of this encounter: 1.588 m (5' 2.5\").    Weight as of this encounter: 78.5 kg (173 lb).    Weight management plan: Discussed healthy diet and exercise guidelines    He reports that he quit smoking about 41 years ago. His smoking use included cigarettes. He started smoking about 66 years ago. He has a 30.00 pack-year smoking history. He quit smokeless tobacco use about 39 years ago.  His smokeless tobacco use included chew.      Appropriate preventive services were discussed with this patient, including applicable screening as appropriate for cardiovascular disease, diabetes, osteopenia/osteoporosis, and glaucoma.  As appropriate for age/gender, discussed screening for colorectal cancer, prostate cancer, breast cancer, and cervical cancer. Checklist reviewing preventive services available has been given to the patient.    Reviewed patients plan of care and provided an AVS. The Basic Care Plan (routine screening as documented in Health Maintenance) for Reid meets the Care Plan requirement. This Care Plan has been established and reviewed with the Patient and spouse.    Counseling Resources:  ATP IV Guidelines  Pooled Cohorts Equation Calculator  Breast Cancer Risk Calculator  Breast " Cancer: Medication to Reduce Risk  FRAX Risk Assessment  ICSI Preventive Guidelines  Dietary Guidelines for Americans, 2010  USDA's MyPlate  ASA Prophylaxis  Lung CA Screening    Dasha Landers NP  [facility]    Viet Bingham MD  [facility]      Patient Instructions     [facility]. We will update your A1C today and update your tetanus shot. Lab results will be available on [employer]. You could get the shingles vaccine (Shingrix) at your local pharmacy as this is much cheaper than in the clinic. Go ahead and make an appt with cardiology for September of 2022. See me back in 6 months (~January of 2023) for a diabetes and cholesterol check. Things look good!     Dr Bingham has sent refills of metformin XR (extended release) 500 mg to take one tab twice daily, [facility] He also sent refills of simvastatin.   If they give you 40 mg tabs, take one-half tablet daily, but if they give you 20 mg tablets, take a full tablet daily.     Stop by the lab ([address]) before going home.     See me in January 2023 for a diabetes visit, and in July 2023 for another Annual Wellness Visit.

## 2022-07-19 NOTE — NURSING NOTE
Prior to immunization administration, verified patients identity using patient s name and date of birth. Please see Immunization Activity for additional information.     Screening Questionnaire for Adult Immunization    Are you sick today?   No   Do you have allergies to medications, food, a vaccine component or latex?   No   Have you ever had a serious reaction after receiving a vaccination?   No   Do you have a long-term health problem with heart, lung, kidney, or metabolic disease (e.g., diabetes), asthma, a blood disorder, no spleen, complement component deficiency, a cochlear implant, or a spinal fluid leak?  Are you on long-term aspirin therapy?   No   Do you have cancer, leukemia, HIV/AIDS, or any other immune system problem?   No   Do you have a parent, brother, or sister with an immune system problem?   No   In the past 3 months, have you taken medications that affect  your immune system, such as prednisone, other steroids, or anticancer drugs; drugs for the treatment of rheumatoid arthritis, Crohn s disease, or psoriasis; or have you had radiation treatments?   No   Have you had a seizure, or a brain or other nervous system problem?   No   During the past year, have you received a transfusion of blood or blood    products, or been given immune (gamma) globulin or antiviral drug?   No   For women: Are you pregnant or is there a chance you could become       pregnant during the next month?   No   Have you received any vaccinations in the past 4 weeks?   No     Immunization questionnaire answers were all negative.        Per orders of Dr. Bingham, injection of Tdap given by Jeanette Marie CMA. Patient instructed to remain in clinic for 15 minutes afterwards, and to report any adverse reaction to me immediately.       Screening performed by Jeanette Marie CMA on 7/19/2022 at 11:12 AM.

## 2022-07-20 LAB
ALBUMIN SERPL-MCNC: 3.9 G/DL (ref 3.4–5)
ALP SERPL-CCNC: 96 U/L (ref 40–150)
ALT SERPL W P-5'-P-CCNC: 25 U/L (ref 0–70)
ANION GAP SERPL CALCULATED.3IONS-SCNC: 5 MMOL/L (ref 3–14)
AST SERPL W P-5'-P-CCNC: 19 U/L (ref 0–45)
BILIRUB SERPL-MCNC: 0.4 MG/DL (ref 0.2–1.3)
BUN SERPL-MCNC: 16 MG/DL (ref 7–30)
CALCIUM SERPL-MCNC: 9.7 MG/DL (ref 8.5–10.1)
CHLORIDE BLD-SCNC: 106 MMOL/L (ref 94–109)
CO2 SERPL-SCNC: 28 MMOL/L (ref 20–32)
CREAT SERPL-MCNC: 1.22 MG/DL (ref 0.66–1.25)
CREAT UR-MCNC: 146 MG/DL
GFR SERPL CREATININE-BSD FRML MDRD: 57 ML/MIN/1.73M2
GLUCOSE BLD-MCNC: 138 MG/DL (ref 70–99)
MICROALBUMIN UR-MCNC: 68 MG/L
MICROALBUMIN/CREAT UR: 46.58 MG/G CR (ref 0–17)
POTASSIUM BLD-SCNC: 4.9 MMOL/L (ref 3.4–5.3)
PROT SERPL-MCNC: 7.6 G/DL (ref 6.8–8.8)
SODIUM SERPL-SCNC: 139 MMOL/L (ref 133–144)

## 2022-07-21 LAB — TSH SERPL DL<=0.005 MIU/L-ACNC: 1.24 MU/L (ref 0.4–4)

## 2022-07-26 ENCOUNTER — TELEPHONE (OUTPATIENT)
Dept: OTHER | Facility: CLINIC | Age: 87
End: 2022-07-26

## 2022-07-26 NOTE — TELEPHONE ENCOUNTER
Patient is due for follow up in August 2022 with Dr. Sotelo.      Due to his absence, please offer to schedule patient with Keke Mckeon NP at a time when another surgeon is in clinic.  Schedule imaging as ordered per Dr. Sotelo prior to appointment.      If patient would prefer to schedule with Dr. Sotelo upon his return, please document reason and route back to writer.      If patient declines scheduling all together or would like to call back to schedule, please document appropriately and route back to writer.  Advise patient they will not receive any further calls or reminder letters to schedule.    Sanjana Montoya, BRANTN, RN-[facility] [place]

## 2022-07-28 NOTE — TELEPHONE ENCOUNTER
Pt states he had a recent imaging  Done at [facility] and was advised Carotid vein has not changed.     Pt alvaro does not see the need to get ultrasound done or follow up with Vascular Surgery at this time .     Pt declined to make appointments.

## 2022-07-29 NOTE — TELEPHONE ENCOUNTER
Follow up order discontinued as patient declined scheduling and any future appts at this time.  BRANT CottonN, RN-[facility] [place]

## 2022-08-06 ENCOUNTER — HEALTH MAINTENANCE LETTER (OUTPATIENT)
Age: 87
End: 2022-08-06

## 2022-08-10 DIAGNOSIS — R80.9 TYPE 2 DIABETES MELLITUS WITH MICROALBUMINURIA, WITHOUT LONG-TERM CURRENT USE OF INSULIN (H): ICD-10-CM

## 2022-08-10 DIAGNOSIS — E11.29 TYPE 2 DIABETES MELLITUS WITH MICROALBUMINURIA, WITHOUT LONG-TERM CURRENT USE OF INSULIN (H): ICD-10-CM

## 2022-08-10 RX ORDER — METFORMIN HCL 500 MG
500 TABLET, EXTENDED RELEASE 24 HR ORAL 2 TIMES DAILY WITH MEALS
Qty: 180 TABLET | Refills: 3 | Status: SHIPPED | OUTPATIENT
Start: 2022-08-10 | End: 2023-10-16

## 2022-08-10 NOTE — TELEPHONE ENCOUNTER
metFORMIN (GLUCOPHAGE XR) 500 MG 24 hr tablet 180 tablet 3 7/19/2022  No   Sig - Route: Take 1 tablet (500 mg) by mouth 2 times daily (with meals) - Oral   Sent to pharmacy as: metFORMIN HCl  MG Oral Tablet Extended Release 24 Hour (GLUCOPHAGE XR)   Class: E-Prescribe   Notes to Pharmacy: Disregard Rx for Metformin 1000 mg tabs.   Order: [id number]   E-Prescribing Status: Receipt confirmed by pharmacy (7/19/2022 12:19 PM CDT)     Medication refilled 7/19/22 per above. Sent to [facility] pharmacy.     Contacted pt's wife (consent to communicate verified). Pt's wife states that pt is out of medication, they [facility] not [facility]. Pt's wife would like to have Rx transferred to [facility] pharmacy.    Contacted [facility] pharmacy and cancelled Rx. Will send Rx to [facility] pharmacy per provider's written order above.    Barbara QUEEN RN

## 2022-08-10 NOTE — TELEPHONE ENCOUNTER
Patient spouse calls to request Metformin refill for patient. She originally requested a paper script but  eventually agreed to have us send it electronically to [facility] in [place].     Patient is currently out of this medication     1.9

## 2022-09-07 ENCOUNTER — TELEPHONE (OUTPATIENT)
Dept: INTERNAL MEDICINE | Facility: CLINIC | Age: 87
End: 2022-09-07

## 2022-09-07 NOTE — TELEPHONE ENCOUNTER
Fax received from [facility] Lab Notes needed 09/06/22 for review and signature.  Put in Dr. Bingham's yellow folder.

## 2022-10-10 ENCOUNTER — OFFICE VISIT (OUTPATIENT)
Dept: URGENT CARE | Facility: URGENT CARE | Age: 87
End: 2022-10-10
Payer: MEDICARE

## 2022-10-10 ENCOUNTER — ANCILLARY PROCEDURE (OUTPATIENT)
Dept: GENERAL RADIOLOGY | Facility: CLINIC | Age: 87
End: 2022-10-10
Attending: FAMILY MEDICINE
Payer: MEDICARE

## 2022-10-10 VITALS — SYSTOLIC BLOOD PRESSURE: 121 MMHG | HEART RATE: 69 BPM | TEMPERATURE: 98.1 F | DIASTOLIC BLOOD PRESSURE: 69 MMHG

## 2022-10-10 DIAGNOSIS — M25.559 HIP PAIN: Primary | ICD-10-CM

## 2022-10-10 PROCEDURE — 73502 X-RAY EXAM HIP UNI 2-3 VIEWS: CPT | Mod: TC | Performed by: RADIOLOGY

## 2022-10-10 PROCEDURE — 99213 OFFICE O/P EST LOW 20 MIN: CPT | Performed by: FAMILY MEDICINE

## 2022-10-10 RX ORDER — HYDROCODONE BITARTRATE AND ACETAMINOPHEN 5; 325 MG/1; MG/1
1 TABLET ORAL EVERY 6 HOURS PRN
Qty: 8 TABLET | Refills: 0 | Status: SHIPPED | OUTPATIENT
Start: 2022-10-10 | End: 2022-10-12

## 2022-10-10 NOTE — PROGRESS NOTES
SUBJECTIVE:  Chief Complaint   Patient presents with     Urgent Care     Present for lower back pain and hips    .ident who presents with a chief complaint of  right hip pain.  Symptoms began this am ago , are moderate andstill present.  Context:Injury: no       Past Medical History:   Diagnosis Date     Aortic valve stenosis, nonrheumatic     TAVR 18: 26mm Edward Sabino 3 valve     Coronary artery disease     cardiac cath 18: mild non-obstructive disease     High degree atrioventricular block 2021    DDD PM 6/10/2021     Hyperlipidaemia LDL goal < 100      Hypertension      Need for SBE (subacute bacterial endocarditis) prophylaxis      Pulmonary hypertension (H)      Type 2 diabetes mellitus (H)        Past Surgical History:   Procedure Laterality Date     CV HEART CATHETERIZATION WITH POSSIBLE INTERVENTION N/A 2021    Procedure: coronary angiogram;  Surgeon: Jayesh Rachel MD;  Location: [facility] CARDIAC CATH LAB     CV TEMPORARY PACEMAKER INSERTION N/A 2021    Procedure: Temporary Pacemaker Insertion;  Surgeon: Jayesh Rachel MD;  Location: [facility] CARDIAC CATH LAB     EP PACEMAKER N/A 6/10/2021    Procedure: EP Pacemaker;  Surgeon: Magdiel Silver MD;  Location: [facility] CARDIAC CATH LAB     MANDIBLE SURGERY       OTHER SURGICAL HISTORY      cardiac cath 18: mild non-obstructive disease     TRANSCATHETER AORTIC VALVE IMPLANT ANESTHESIA N/A 2018    Procedure: TRANSCATHETER AORTIC VALVE IMPLANT ANESTHESIA;  ANESTHESIA NEEDED FOR TAVR PROCEDURE;  Surgeon: GENERIC ANESTHESIA PROVIDER;  Location:  OR,  26mm Edward Sabino 3 valve       Family History   Problem Relation Age of Onset     Cancer Mother 58         in her 50's, had throat and stomach cancer     Alcohol/Drug Mother      Heart Disease Father          about age 49     Alcohol/Drug Father      Myocardial Infarction Sister 70        Two heart attacks     Alcohol/Drug Sister       "Cerebrovascular Disease Brother 65         age 65 of stroke     Alcohol/Drug Brother      Cancer Maternal Grandmother         stomach cancer     Alcohol/Drug Maternal Grandmother      Alcohol/Drug Maternal Grandfather      Alcohol/Drug Paternal Grandmother      Alcohol/Drug Paternal Grandfather      Myocardial Infarction Grandchild 18        Grandson     Myocardial Infarction Other 18        Nephew        Social History     Tobacco Use     Smoking status: Former     Packs/day: 1.00     Years: 30.00     Pack years: 30.00     Types: Cigarettes     Start date:      Quit date: 1980     Years since quittin.1     Smokeless tobacco: Former     Types: Chew     Quit date: 1983   Substance Use Topics     Alcohol use: No     Comment: quit 30+ years ago. states, \"I'm an alcoholic\".       ROS:Review of systems negative except as stated below    EXAM: /69   Pulse 69   Temp 98.1  F (36.7  C) (Tympanic)   Exam:hip  tenderness to palpation and pain with rom  GENERAL APPEARANCE: healthy, alert and no distress  EXTREMITIES: peripheral pulses normal  SKIN: no suspicious lesions or rashes  NEURO: Normal strength and tone, sensory exam grossly normal, mentation intact and speech normal    Xray without acute findings, no fx read by Paul Barrera D.O.    ASSESSMENT:     ICD-10-CM    1. Hip pain  M25.559 XR Hip Right 2-3 Views     HYDROcodone-acetaminophen (NORCO) 5-325 MG tablet        Rest/ice    "

## 2022-10-12 ENCOUNTER — OFFICE VISIT (OUTPATIENT)
Dept: INTERNAL MEDICINE | Facility: CLINIC | Age: 87
End: 2022-10-12
Payer: MEDICARE

## 2022-10-12 ENCOUNTER — OFFICE VISIT (OUTPATIENT)
Dept: CARDIOLOGY | Facility: CLINIC | Age: 87
End: 2022-10-12
Attending: INTERNAL MEDICINE
Payer: MEDICARE

## 2022-10-12 VITALS
OXYGEN SATURATION: 96 % | TEMPERATURE: 98.1 F | WEIGHT: 180 LBS | HEIGHT: 63 IN | BODY MASS INDEX: 31.89 KG/M2 | RESPIRATION RATE: 18 BRPM | DIASTOLIC BLOOD PRESSURE: 62 MMHG | HEART RATE: 77 BPM | SYSTOLIC BLOOD PRESSURE: 128 MMHG

## 2022-10-12 VITALS
OXYGEN SATURATION: 98 % | WEIGHT: 178.7 LBS | BODY MASS INDEX: 31.66 KG/M2 | DIASTOLIC BLOOD PRESSURE: 70 MMHG | SYSTOLIC BLOOD PRESSURE: 128 MMHG | HEART RATE: 61 BPM | HEIGHT: 63 IN

## 2022-10-12 DIAGNOSIS — E78.5 HYPERLIPIDEMIA WITH TARGET LDL LESS THAN 100: ICD-10-CM

## 2022-10-12 DIAGNOSIS — E11.29 TYPE 2 DIABETES MELLITUS WITH MICROALBUMINURIA, WITHOUT LONG-TERM CURRENT USE OF INSULIN (H): ICD-10-CM

## 2022-10-12 DIAGNOSIS — I10 ESSENTIAL HYPERTENSION WITH GOAL BLOOD PRESSURE LESS THAN 140/90: ICD-10-CM

## 2022-10-12 DIAGNOSIS — E11.59 TYPE 2 DIABETES MELLITUS WITH OTHER CIRCULATORY COMPLICATIONS (H): ICD-10-CM

## 2022-10-12 DIAGNOSIS — Z95.0 CARDIAC PACEMAKER IN SITU: ICD-10-CM

## 2022-10-12 DIAGNOSIS — M48.07 LUMBOSACRAL SPINAL STENOSIS: ICD-10-CM

## 2022-10-12 DIAGNOSIS — I44.2 CHB (COMPLETE HEART BLOCK) (H): ICD-10-CM

## 2022-10-12 DIAGNOSIS — I44.2 COMPLETE HEART BLOCK (H): ICD-10-CM

## 2022-10-12 DIAGNOSIS — R80.9 TYPE 2 DIABETES MELLITUS WITH MICROALBUMINURIA, WITHOUT LONG-TERM CURRENT USE OF INSULIN (H): ICD-10-CM

## 2022-10-12 DIAGNOSIS — I35.0 SEVERE AORTIC STENOSIS: Primary | ICD-10-CM

## 2022-10-12 DIAGNOSIS — Z95.2 S/P TAVR (TRANSCATHETER AORTIC VALVE REPLACEMENT): ICD-10-CM

## 2022-10-12 DIAGNOSIS — M54.50 MIDLINE LOW BACK PAIN WITHOUT SCIATICA, UNSPECIFIED CHRONICITY: Primary | ICD-10-CM

## 2022-10-12 PROCEDURE — 99214 OFFICE O/P EST MOD 30 MIN: CPT | Performed by: NURSE PRACTITIONER

## 2022-10-12 PROCEDURE — 99214 OFFICE O/P EST MOD 30 MIN: CPT | Performed by: INTERNAL MEDICINE

## 2022-10-12 PROCEDURE — 93280 PM DEVICE PROGR EVAL DUAL: CPT | Performed by: INTERNAL MEDICINE

## 2022-10-12 NOTE — PATIENT INSTRUCTIONS
Thanks for participating in a office visit with the [facility] Heart clinic today.    Reviewed results of echocardiogram. Mildly higher pressures than last year.   Follow up echocardiogram in June.   Monitor for worsening shortness of breath, lightheadedness, or dizziness.   Blood pressures well controlled   Continue on current medical therapy     Follow up in 1 year with VINOD    Please call my nurse at  [phone] with any questions or concerns.    Scheduling phone number: [phone]  Reminder: Please bring in all current medications, over the counter supplements and vitamin bottles to your next appointment.

## 2022-10-12 NOTE — PATIENT INSTRUCTIONS
Someone should be contacting you to help get you in to see the Spine specialist again ASAP.    Okay to take hydrocodone tablets, maybe one at bedtime when not able to sleep. Otherwise try to avoid and instead:  Use ice, plain Tylenol. (Each hydrocodone tablet contains a regular strength Tylenol).     Try to schedule an appointment with me in January 2023 for next diabetes checkup. Okay to schedule a lab appointment a few days before the office appointment.    Contraindicated

## 2022-10-12 NOTE — LETTER
10/12/2022    Viet Bingham MD, MD  [address] [facility] [address]  [facility] [address]    RE: Reid Jimenes       Dear Colleague,     I had the pleasure of seeing Reid Jimenes in the [facility].  Cardiology Clinic Progress Note  Reid Jimenes MRN# [id number]   YOB: 1934 Age: 88 year old     Reason For Visit:  6-month follow-up   Primary Cardiologist:   Dr. 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  He returns to the office today for a 6-month follow-up.    He is feeling well on a cardiac standpoint, denies chest pain, shortness of breath, PND, orthopnea, presyncope, syncope, edema, heart racing, or palpitations.  His primary complaint is severe low back pain.  He was seen by his primary care physician this morning and referred to a spinal specialist for further management.    Echocardiogram on 6/9/2022 showed a well-seated bioprosthetic aortic valve without regurgitation.  Mean gradient 20 mmHg, increased from 14mmHg 1 year ago, mild LVH, normal RV size and function, and no other significant valvular disease.    Device interrogation today showed 29% atrial pacing, 34% ventricular pacing, no significant atrial or ventricular arrhythmias noted.    Blood pressure is well controlled at 128/70, on no antihypertensive therapy.  Lipid panel on 1/14/2022 showed a total cholesterol of 119, HDL 50, LDL 45, triglycerides 120, on low-dose simvastatin  BMI 32.16  A1c 7.1, on metformin    Activity is quite limited due to back pain  Remains compliant with all medications.                   Assessment and Plan:     1.  Severe aortic  stenosis -status post TAVR using a 26 mm Greene S3 valve in 2018. Echocardiogram on 6/9/2022 showed a well-seated bioprosthetic aortic valve without regurgitation.  Mean gradient 20 mmHg, increased from 14mmHg 1 year ago, mild LVH, normal RV size and function, and no other significant valvular disease.  He does not endorse any symptoms of shortness of breath, lightheadedness, or syncope.  Follow-up echocardiogram in June.    2.  Complete heart block/pacemaker implant - Device interrogation today showed 29% atrial pacing, 34% ventricular pacing, no significant atrial or ventricular arrhythmias noted.  Continue with routine device checks    3.  Hypertension -well controlled on no antihypertensive therapy  4.  Hyperlipidemia -LDL at goal, continue low-dose simvastatin  5.  Type 2 diabetes -A1c 7.1, on metformin.  Follows with PCP for management           Thank you for allowing me to participate in this delightful patient's care. I have recommended he follow up in June with [facility] with echo prior.       Lizz Love, APRN CNP         Review of Systems:     Review of Systems:  Skin:  Negative     Eyes:  Positive for glasses  ENT:  Positive for hearing loss  Respiratory:  Positive for shortness of breath;dyspnea on exertion  Cardiovascular:  Negative    Gastroenterology: Negative    Genitourinary:       Musculoskeletal:       Neurologic:       Psychiatric:       Heme/Lymph/Imm:       Endocrine:                   Physical Exam:     GEN:  In general, this is a overweight elderly male in no acute distress.  HEENT:  Pupils equal, round. Sclerae nonicteric. Clear oropharynx. Mucous membranes moist.  NECK: Supple, no masses appreciated. Trachea midline.  No JVD   C/V:  Regular rate and rhythm, systolic murmur, rub or gallop. No S3 or RV heave.   RESP: Respirations are unlabored. No use of accessory muscles. Clear to auscultation bilaterally without wheezing, rales, or rhonchi.  GI: Abdomen soft, nontender, nondistended. No HSM  appreciated.   EXTREM: No LE edema. No cyanosis or clubbing.  NEURO: Alert and oriented, cooperative. No obvious focal deficits.   PSYCH: Normal affect.  SKIN: Warm and dry. No rashes or petechiae appreciated.          Past Medical History:     Past Medical History:   Diagnosis Date     Aortic valve stenosis, nonrheumatic     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: 26mm Edward Sabino 3 valve     Coronary artery disease     cardiac cath 7/24/18: mild non-obstructive disease     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    DDD PM 6/10/2021     Hyperlipidaemia LDL goal < 100      Hypertension      Need for SBE (subacute bacterial endocarditis) prophylaxis      Pulmonary hypertension (H)      Type 2 diabetes mellitus (H)               Past Surgical History:     Past Surgical History:   Procedure Laterality Date     CV HEART CATHETERIZATION WITH POSSIBLE INTERVENTION N/A 6/9/2021    Procedure: coronary angiogram;  Surgeon: Jayesh Rachel MD;  Location:  HEART CARDIAC CATH LAB     CV TEMPORARY PACEMAKER INSERTION N/A 6/9/2021    Procedure: Temporary Pacemaker Insertion;  Surgeon: Jayesh Rachel MD;  Location:  HEART CARDIAC CATH LAB     EP PACEMAKER N/A 6/10/2021    Procedure: EP Pacemaker;  Surgeon: Magdiel Silver MD;  Location:  HEART CARDIAC CATH LAB     MANDIBLE SURGERY  1958     OTHER SURGICAL HISTORY      cardiac cath 7/24/18: mild non-obstructive disease     TRANSCATHETER AORTIC VALVE IMPLANT ANESTHESIA N/A 8/28/2018    Procedure: TRANSCATHETER AORTIC VALVE IMPLANT ANESTHESIA;  ANESTHESIA NEEDED FOR TAVR PROCEDURE;  Surgeon: GENERIC ANESTHESIA PROVIDER;  Location: [facility],  26mm Edward Sabino 3 valve              Allergies:   Fentanyl and Versed [midazolam]       Data:   All laboratory data reviewed:    LAST CHOLESTEROL:  Lab Results   Component Value Date    CHOL 119 01/14/2022    CHOL 134 01/12/2021     Lab Results   Component Value Date    HDL 50 01/14/2022    HDL 45 01/12/2021     Lab Results   Component  Value Date    LDL 45 01/14/2022    LDL 66 01/12/2021     Lab Results   Component Value Date    TRIG 120 01/14/2022    TRIG 115 01/12/2021     Lab Results   Component Value Date    CHOLHDLRATIO 2.3 10/07/2015       LAST BMP:  Lab Results   Component Value Date     07/19/2022     06/12/2021      Lab Results   Component Value Date    POTASSIUM 4.9 07/19/2022    POTASSIUM 3.8 06/12/2021     Lab Results   Component Value Date    CHLORIDE 106 07/19/2022    CHLORIDE 108 06/12/2021     Lab Results   Component Value Date    DEEPTI 9.7 07/19/2022    DEEPTI 8.9 06/12/2021     Lab Results   Component Value Date    CO2 28 07/19/2022    CO2 27 06/12/2021     Lab Results   Component Value Date    BUN 16 07/19/2022    BUN 24 06/12/2021     Lab Results   Component Value Date    CR 1.22 07/19/2022    CR 1.18 06/12/2021     Lab Results   Component Value Date     07/19/2022     06/12/2021       LAST CBC:  Lab Results   Component Value Date    WBC 10.9 06/11/2021     Lab Results   Component Value Date    RBC 4.52 06/11/2021     Lab Results   Component Value Date    HGB 13.1 06/11/2021     Lab Results   Component Value Date    HCT 40.1 06/11/2021     Lab Results   Component Value Date    MCV 89 06/11/2021     Lab Results   Component Value Date    MCH 29.0 06/11/2021     Lab Results   Component Value Date    MCHC 32.7 06/11/2021     Lab Results   Component Value Date    RDW 12.3 06/11/2021     Lab Results   Component Value Date     06/11/2021       Thank you for allowing me to participate in the care of your patient.      Sincerely,     Lizz Love, SARY [facility]  cc:   Eulalio Higginbotham MD  [facility]  [address] [place] [address]

## 2022-10-12 NOTE — PROGRESS NOTES
Cardiology Clinic Progress Note  Reid Jimenes MRN# [id number]   YOB: 1934 Age: 88 year old     Reason For Visit:  6-month follow-up   Primary Cardiologist:   Dr. 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  He returns to the office today for a 6-month follow-up.    He is feeling well on a cardiac standpoint, denies chest pain, shortness of breath, PND, orthopnea, presyncope, syncope, edema, heart racing, or palpitations.  His primary complaint is severe low back pain.  He was seen by his primary care physician this morning and referred to a spinal specialist for further management.    Echocardiogram on 6/9/2022 showed a well-seated bioprosthetic aortic valve without regurgitation.  Mean gradient 20 mmHg, increased from 14mmHg 1 year ago, mild LVH, normal RV size and function, and no other significant valvular disease.    Device interrogation today showed 29% atrial pacing, 34% ventricular pacing, no significant atrial or ventricular arrhythmias noted.    Blood pressure is well controlled at 128/70, on no antihypertensive therapy.  Lipid panel on 1/14/2022 showed a total cholesterol of 119, HDL 50, LDL 45, triglycerides 120, on low-dose simvastatin  BMI 32.16  A1c 7.1, on metformin    Activity is quite limited due to back pain  Remains compliant with all medications.                   Assessment and Plan:     1.  Severe aortic stenosis -status post TAVR using a 26 mm Greene S3 valve in 2018. Echocardiogram on 6/9/2022 showed a well-seated bioprosthetic aortic valve without regurgitation.  Mean gradient 20 mmHg, increased from 14mmHg 1 year  ago, mild LVH, normal RV size and function, and no other significant valvular disease.  He does not endorse any symptoms of shortness of breath, lightheadedness, or syncope.  Follow-up echocardiogram in June.    2.  Complete heart block/pacemaker implant - Device interrogation today showed 29% atrial pacing, 34% ventricular pacing, no significant atrial or ventricular arrhythmias noted.  Continue with routine device checks    3.  Hypertension -well controlled on no antihypertensive therapy  4.  Hyperlipidemia -LDL at goal, continue low-dose simvastatin  5.  Type 2 diabetes -A1c 7.1, on metformin.  Follows with PCP for management           Thank you for allowing me to participate in this delightful patient's care. I have recommended he follow up in June with [facility] with echo prior.       Lizz Love, APRN CNP         Review of Systems:     Review of Systems:  Skin:  Negative     Eyes:  Positive for glasses  ENT:  Positive for hearing loss  Respiratory:  Positive for shortness of breath;dyspnea on exertion  Cardiovascular:  Negative    Gastroenterology: Negative    Genitourinary:       Musculoskeletal:       Neurologic:       Psychiatric:       Heme/Lymph/Imm:       Endocrine:                   Physical Exam:     GEN:  In general, this is a overweight elderly male in no acute distress.  HEENT:  Pupils equal, round. Sclerae nonicteric. Clear oropharynx. Mucous membranes moist.  NECK: Supple, no masses appreciated. Trachea midline.  No JVD   C/V:  Regular rate and rhythm, systolic murmur, rub or gallop. No S3 or RV heave.   RESP: Respirations are unlabored. No use of accessory muscles. Clear to auscultation bilaterally without wheezing, rales, or rhonchi.  GI: Abdomen soft, nontender, nondistended. No HSM appreciated.   EXTREM: No LE edema. No cyanosis or clubbing.  NEURO: Alert and oriented, cooperative. No obvious focal deficits.   PSYCH: Normal affect.  SKIN: Warm and dry. No rashes or petechiae appreciated.           Past Medical History:     Past Medical History:   Diagnosis Date     Aortic valve stenosis, nonrheumatic     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: 26mm Edward Sabino 3 valve     Coronary artery disease     cardiac cath 7/24/18: mild non-obstructive disease     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    DDD PM 6/10/2021     Hyperlipidaemia LDL goal < 100      Hypertension      Need for SBE (subacute bacterial endocarditis) prophylaxis      Pulmonary hypertension (H)      Type 2 diabetes mellitus (H)               Past Surgical History:     Past Surgical History:   Procedure Laterality Date     CV HEART CATHETERIZATION WITH POSSIBLE INTERVENTION N/A 6/9/2021    Procedure: coronary angiogram;  Surgeon: Jayesh Rachel MD;  Location:  HEART CARDIAC CATH LAB     CV TEMPORARY PACEMAKER INSERTION N/A 6/9/2021    Procedure: Temporary Pacemaker Insertion;  Surgeon: Jayesh Rachel MD;  Location:  HEART CARDIAC CATH LAB     EP PACEMAKER N/A 6/10/2021    Procedure: EP Pacemaker;  Surgeon: Magdiel Silver MD;  Location:  HEART CARDIAC CATH LAB     MANDIBLE SURGERY  1958     OTHER SURGICAL HISTORY      cardiac cath 7/24/18: mild non-obstructive disease     TRANSCATHETER AORTIC VALVE IMPLANT ANESTHESIA N/A 8/28/2018    Procedure: TRANSCATHETER AORTIC VALVE IMPLANT ANESTHESIA;  ANESTHESIA NEEDED FOR TAVR PROCEDURE;  Surgeon: GENERIC ANESTHESIA PROVIDER;  Location:  OR,  26mm Edward Sabino 3 valve              Allergies:   Fentanyl and Versed [midazolam]       Data:   All laboratory data reviewed:    LAST CHOLESTEROL:  Lab Results   Component Value Date    CHOL 119 01/14/2022    CHOL 134 01/12/2021     Lab Results   Component Value Date    HDL 50 01/14/2022    HDL 45 01/12/2021     Lab Results   Component Value Date    LDL 45 01/14/2022    LDL 66 01/12/2021     Lab Results   Component Value Date    TRIG 120 01/14/2022    TRIG 115 01/12/2021     Lab Results   Component Value Date    CHOLHDLRATIO 2.3 10/07/2015       LAST  BMP:  Lab Results   Component Value Date     07/19/2022     06/12/2021      Lab Results   Component Value Date    POTASSIUM 4.9 07/19/2022    POTASSIUM 3.8 06/12/2021     Lab Results   Component Value Date    CHLORIDE 106 07/19/2022    CHLORIDE 108 06/12/2021     Lab Results   Component Value Date    DEEPTI 9.7 07/19/2022    DEEPTI 8.9 06/12/2021     Lab Results   Component Value Date    CO2 28 07/19/2022    CO2 27 06/12/2021     Lab Results   Component Value Date    BUN 16 07/19/2022    BUN 24 06/12/2021     Lab Results   Component Value Date    CR 1.22 07/19/2022    CR 1.18 06/12/2021     Lab Results   Component Value Date     07/19/2022     06/12/2021       LAST CBC:  Lab Results   Component Value Date    WBC 10.9 06/11/2021     Lab Results   Component Value Date    RBC 4.52 06/11/2021     Lab Results   Component Value Date    HGB 13.1 06/11/2021     Lab Results   Component Value Date    HCT 40.1 06/11/2021     Lab Results   Component Value Date    MCV 89 06/11/2021     Lab Results   Component Value Date    MCH 29.0 06/11/2021     Lab Results   Component Value Date    MCHC 32.7 06/11/2021     Lab Results   Component Value Date    RDW 12.3 06/11/2021     Lab Results   Component Value Date     06/11/2021

## 2022-10-14 ENCOUNTER — OFFICE VISIT (OUTPATIENT)
Dept: PALLIATIVE MEDICINE | Facility: CLINIC | Age: 87
End: 2022-10-14
Payer: MEDICARE

## 2022-10-14 VITALS — DIASTOLIC BLOOD PRESSURE: 74 MMHG | OXYGEN SATURATION: 95 % | SYSTOLIC BLOOD PRESSURE: 168 MMHG | HEART RATE: 69 BPM

## 2022-10-14 DIAGNOSIS — M48.07 LUMBOSACRAL SPINAL STENOSIS: ICD-10-CM

## 2022-10-14 DIAGNOSIS — M47.817 LUMBOSACRAL SPONDYLOSIS WITHOUT MYELOPATHY: Primary | ICD-10-CM

## 2022-10-14 PROCEDURE — 99202 OFFICE O/P NEW SF 15 MIN: CPT | Performed by: PHYSICAL MEDICINE & REHABILITATION

## 2022-10-14 ASSESSMENT — PAIN SCALES - GENERAL: PAINLEVEL: EXTREME PAIN (8)

## 2022-10-14 NOTE — PATIENT INSTRUCTIONS
You can increase Tylenol. You can use up to 1,000 mg at a time every 6-8 hours as needed. Not exceeding 3,000 mg per day.   Order placed for lumbar facet joint injections. You will be called to schedule.  Order placed to start physical therapy after you get the injections done.     ----------------------------------------------------------------  [facility] Number:  [phone]   Call with any questions about your care and for scheduling assistance.   Calls are returned Monday through Friday between 8 AM and 4:30 PM. We usually get back to you within 2 business days depending on the issue/request.    If we are prescribing your medications:  For opioid medication refills, call the clinic or send a [employer] message 7 days in advance.  Please include:  Name of requested medication  Name of the pharmacy.  For non-opioid medications, call your pharmacy directly to request a refill. Please allow 3-4 days to be processed.   Per [place] State Law:  All controlled substance prescriptions must be filled within 30 days of being written.    For those controlled substances allowing refills, pickup must occur within 30 days of last fill.      We believe regular attendance is key to your success in our program!    Any time you are unable to keep your appointment we ask that you call us at least 24 hours in advance to cancel.This will allow us to offer the appointment time to another patient.   Multiple missed appointments may lead to dismissal from the clinic.

## 2022-10-14 NOTE — PROGRESS NOTES
Patient presents to the clinic today for a follow up with Lluvia Gomes MD regarding Pain Management.      PEG Score 10/14/2022   PEG Total Score 9           UDS/CSA-NA      Medications-Oxycodone     QUESTIONS:    Dashaines Farley  [facility] Visit Facilitator    [facility] Medical Spine Consultation    Date of visit: 10/14/2022    Assessment:  Reid Jimenes is a 88 year old male who presents for med spine referral due to low back pain without radiculopathy. He has a history of lumbar spondylosis and lumbar  spinal stenosis. He was last seen in this clinic on 5/24/22. At that time his back pain had been improving with physical therapy. He now reports significant worsening of his pain for the last few months. His pain has not changed in location or character but has worsened in intensity. His pain is present when sitting, standing, and walking. Facet loading is + and his pain is likely secondary to facet arthropathy. He would benefit from physical therapy and intervention. I discussed interventions with patient and his wife and they prefer facet injections instead of MBB/RFA because they are looking for quicker relief without multiple procedures.    Plan:  The following recommendations were given to the patient. Diagnosis, treatment options, risks, benefits, and alternatives were discussed, and all questions were answered. The patient expressed understanding of the plan for management.     1. Therapies:  Physical therapy.  2. Medication Management:   Tylenol scheduled TID 1000 mg   3. Procedures recommended: Lumbar Facet Injections  4. Follow up: At scheduled procedure    Reason for consultation:    Primary Care Provider is Viet Bingham.    Reid Jimenes is a 88 year old male with a history of lumbar spondylosis and spinal stenosis who I was asked to see in consultation by Viet Bingham  for evaluation of low back pain.    Consultation and Evaluation for: low back pain.    Review of  Electronic Chart: Today I have also reviewed available medical information in the patient's medical record at [place] ([facility]), including relevant provider notes, laboratory work, and imaging.     Chief Complaint:    Chief Complaint   Patient presents with     Pain       History:  Reid Jimenes is a 88 year old male who presents for med spine referral due to low back pain without radiculopathy. He was last seen in this clinic on 5/24/22. At that time his back pain had been improving with physical therapy. He now reports significant worsening of his pain for the last few months.    Onset: Years ago, he does not recall  Location/Radiation: mid low back, radiating laterally to the hip  Quality: dull ache  Severity/Intensity:  8-10  Aggravating factors include: standing/walking  Relieving factors include: laying down    Medications:       Current pain medications:       Tylenol 500 mg 1-2 x daily             Previous pain medications:       Hydromorphone- didn't use much    Past Pain Treatments:  PT: Yes   TENs Unit: no  Injections: no  Surgeries related to pain: no  Alternative Therapies:    Chiropractic: no   Acupuncture: no    Diagnostic tests:  CT LUMBAR SPINE WITHOUT CONTRAST  3/21/2022 3:02 PM      HISTORY: Facet arthropathy, lumbosacral. Lumbosacral spinal stenosis. DDD (degenerative disc disease), lumbosacral. Scoliosis of lumbar spine, unspecified scoliosis type.     TECHNIQUE: Axial images of the lumbar spine were obtained without intravenous contrast. Multiplanar reformations were performed. Radiation dose for this scan was reduced using automated exposure control, adjustment of the mA and/or kV according to patient size, or iterative reconstruction technique.     COMPARISON: None.     FINDINGS:  Alignment is significant for levoconvex scoliosis and multilevel grade 1 spondylolisthesis. Chronic-appearing L4 compression deformity is present with large inferior endplate Schmorl's node and moderate height  loss. Multiple other smaller Schmorl's nodes are present. No evidence of recent fracture. Severe asymmetrical degenerative disc disease is present at L3-L4, L4-L5, and L5-S1. Multilevel facet arthropathy is present which is severe on the left at L4-L5 and L5-S1. Disc bulging contributes to mild to moderate spinal canal stenosis at L2-L3 and L3-L4. No high-grade spinal canal stenoses. Mild foraminal stenoses at multiple levels. Moderate foraminal stenosis on the right at L2-L3. Severe foraminal stenosis on the right at L3-L4. Severe foraminal stenosis on the left at L4-L5. Mild to moderate foraminal stenosis on the right at L4-L5. Severe foraminal stenosis on the left L5-S1.     Extensive scattered vascular calcifications with aortic irregularity. Lateral sacroiliac joint degenerative change.     IMPRESSION:  Severe asymmetrical degenerative change with scoliosis and multiple stenoses as detailed.     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    DDD PM 6/10/2021     Hyperlipidaemia LDL goal < 100      Hypertension      Need for SBE (subacute bacterial endocarditis) prophylaxis      Pulmonary hypertension (H)      Type 2 diabetes mellitus (H)        Past Surgical History:  Past Surgical History:   Procedure Laterality Date     CV HEART CATHETERIZATION WITH POSSIBLE INTERVENTION N/A 6/9/2021    Procedure: coronary angiogram;  Surgeon: Jayesh aRchel MD;  Location:  HEART CARDIAC CATH LAB     CV TEMPORARY PACEMAKER INSERTION N/A 6/9/2021    Procedure: Temporary Pacemaker Insertion;  Surgeon: Jayesh Rachel MD;  Location: [facility] CARDIAC CATH LAB     EP PACEMAKER N/A 6/10/2021    Procedure: EP Pacemaker;  Surgeon: Magdiel Silver MD;  Location: [facility] CARDIAC  CATH LAB     MANDIBLE SURGERY       OTHER SURGICAL HISTORY      cardiac cath 18: mild non-obstructive disease     TRANSCATHETER AORTIC VALVE IMPLANT ANESTHESIA N/A 2018    Procedure: TRANSCATHETER AORTIC VALVE IMPLANT ANESTHESIA;  ANESTHESIA NEEDED FOR TAVR PROCEDURE;  Surgeon: GENERIC ANESTHESIA PROVIDER;  Location:  OR,  26mm Edward Sabino 3 valve       Medications:  Current Outpatient Medications   Medication Sig Dispense Refill     ascorbic acid 500 MG TABS Take 500 mg by mouth Takes off and on       aspirin 81 MG EC tablet Take 1 tablet (81 mg) by mouth daily 30 tablet      biotin 5 MG CAPS Take 1 capsule (5 mg) by mouth 2 times daily (Patient taking differently: Take 5 mg by mouth daily)       blood glucose (MIRA CONTOUR NEXT) test strip Use to test blood sugar one time daily or as directed. 100 strip 11     blood glucose monitoring (MIRA MICROLET) lancets Use to test blood sugar 1 times daily or as directed. 100 each 5     glucosamine-chondroitin 500-400 MG CAPS per capsule Take 1 capsule by mouth Takes off and on       metFORMIN (GLUCOPHAGE XR) 500 MG 24 hr tablet Take 1 tablet (500 mg) by mouth 2 times daily (with meals) 180 tablet 3     Multiple Vitamins-Iron (MULTIVITAMIN/IRON PO) Take 1 tablet by mouth Takes off and on       simvastatin (ZOCOR) 20 MG tablet Take 1 tablet (20 mg) by mouth At Bedtime 90 tablet 1       Allergies:     Allergies   Allergen Reactions     Fentanyl Other (See Comments)     Confusion and agitation after PPM     Versed [Midazolam] Other (See Comments)     Confusion and agitation post PPM       Family history:  Family History   Problem Relation Age of Onset     Cancer Mother 58         in her 50's, had throat and stomach cancer     Alcohol/Drug Mother      Heart Disease Father          about age 49     Alcohol/Drug Father      Myocardial Infarction Sister 70        Two heart attacks     Alcohol/Drug Sister      Cerebrovascular Disease Brother 65          age 65 of stroke     Alcohol/Drug Brother      Cancer Maternal Grandmother         stomach cancer     Alcohol/Drug Maternal Grandmother      Alcohol/Drug Maternal Grandfather      Alcohol/Drug Paternal Grandmother      Alcohol/Drug Paternal Grandfather      Myocardial Infarction Grandchild 18        Grandson     Myocardial Infarction Other 18        Nephew        Social History:  Home situation: lives with wife  Tobacco use:  Former  Drug use: denies  Alcohol use: Former- Alcohol use disorder       Review of Systems:    POSTIVE IN BOLD  GENERAL: fever/chills, fatigue, general unwell feeling, weight gain/loss.  HEAD/EYES:  headache, dizziness, or vision changes.    EARS/NOSE/THROAT:  Nosebleeds, hearing loss, sinus infection, earache, tinnitus.  IMMUNE:  Allergies, cancer, immune deficiency, or infections.  SKIN:  Urticaria, rash, hives  HEME/Lymphatic:   anemia, easy bruising, easy bleeding.  RESPIRATORY:  cough, wheezing, or shortness of breath  CARDIOVASCULAR/Circulation:  Extremity edema, syncope, hypertension, tachycardia, or angina.  GASTROINTESTINAL:  abdominal pain, nausea/emesis, diarrhea, constipation,  hematochezia, or melena.  ENDOCRINE:  Diabetes, steroid use,  thyroid disease or osteoporosis.  MUSCULOSKELETAL: neck pain, back pain, arthralgia, arthritis, or gout.  GENITOURINARY:  frequency, urgency, dysuria, difficulty voiding, hematuria or incontinence.  NEUROLOGIC:  weakness, numbness, paresthesias, seizure, tremor, stroke or memory loss.  PSYCHIATRIC:  depression, anxiety, stress, suicidal thoughts or mood swings.     Physical Exam:  Vitals:    10/14/22 1258   BP: (!) 168/74   Pulse: 69   SpO2: 95%     Exam:  Constitutional: Well developed, well nourished, appears stated age.  HEENT: Head atraumatic, normocephalic. Eyes without conjunctival injection or jaundice.   Cardiovascular: Acyanotic  Respiratory: comfortable on room air  Skin: No rash, lesions of exposed skin.   Psychiatric/mental  status: Alert, without lethargy or stupor. Speech fluent. Appropriate affect. Mood normal. Able to follow commands without difficulty.     Musculoskeletal exam:  Gait/Station/Posture: antalgic    Lumbar spine:   Rotation/ext to right: painful   Rotation/ext to left: painful  Myofascial tenderness:  At lower thoracic paravertebral muscles    Neurologic exam:  CN:  Cranial nerves 2-12 are grossly intact  Motor Strength:  5/5 symmetric  LE strength    Sensory:  (lower extremities):   Light touch: normal      The patient's assessment and plan was discussed with my attending physician Dr. Gomes.    Fidel Elliott DO  Pain medicine fellow      Lluvia Gomes MD  [facility] Pain Management       BILLING TIME DOCUMENTATION:   The total TIME spent on this patient on the date of the encounter/appointment was 15 minutes.      TOTAL TIME includes:   Time spent preparing to see the patient (reviewing records and tests)  Time spent face to face (or over the phone) with the patient   Time spent ordering tests, medications, procedures and referrals   Time spent Referring and communicating with other healthcare professionals  Time spent documenting clinical information in Epic

## 2022-10-14 NOTE — PROCEDURES
[facility] Medical Spine Consultation    Date of visit: 10/14/2022    Assessment:  Reid Jimenes is a 88 year old male with a past medical history significant for *** who presents with complaints of:    1. ***  2. ***      Plan:  The following recommendations were given to the patient. Diagnosis, treatment options, risks, benefits, and alternatives were discussed, and all questions were answered. The patient expressed understanding of the plan for management.     1. Therapies:  ***  2. Self Care Recommendations: ***  3. Diagnostic Studies: ***  4. Medication Management:      5. Procedures recommended: ***  6. ***  7. Follow up: ***    Reason for consultation:    Primary Care Provider is Viet Bingham.    Reid Jimenes is a 88 year old male with a history of *** who I was asked to see in consultation by Viet Bingham  for evaluation of ***     Consultation and Evaluation for: ***    Review of Electronic Chart: Today I have also reviewed available medical information in the patient's medical record at [place] ([facility]), including relevant provider notes, laboratory work, and imaging.     Chief Complaint:    Chief Complaint   Patient presents with     Pain       History:  Reid Jimenes is a 88 year old male who presents for initial evaluation of chief pain complaint of ***.         Onset: ***  Location/Radiation: ***  Quality: ***  Severity/Intensity: ***/10 at worst, ***/10 at best, ***/10 on average  Aggravating factors include: ***  Relieving factors include: ***  Red Flags: ***The patient denies bowel or bladder incontinence, parasthesias, weakness, saddle anesthesia, unintentional weight loss, or fever/chills/sweats.       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    DDD PM 6/10/2021     Hyperlipidaemia LDL goal < 100      Hypertension      Need for SBE (subacute bacterial endocarditis) prophylaxis      Pulmonary hypertension (H)      Type 2 diabetes mellitus (H)        Past Surgical History:  Past Surgical History:   Procedure Laterality Date     CV HEART CATHETERIZATION WITH POSSIBLE INTERVENTION N/A 6/9/2021    Procedure: coronary angiogram;  Surgeon: Jayesh Rachel MD;  Location:  HEART CARDIAC CATH LAB     CV TEMPORARY PACEMAKER INSERTION N/A 6/9/2021    Procedure: Temporary Pacemaker Insertion;  Surgeon: Jayesh Rachel MD;  Location:  HEART CARDIAC CATH LAB     EP PACEMAKER N/A 6/10/2021    Procedure: EP Pacemaker;  Surgeon: Magdiel Silver MD;  Location:  HEART CARDIAC CATH LAB     MANDIBLE SURGERY  1958     OTHER SURGICAL HISTORY      cardiac cath 7/24/18: mild non-obstructive disease     TRANSCATHETER AORTIC VALVE IMPLANT ANESTHESIA N/A 8/28/2018    Procedure: TRANSCATHETER AORTIC VALVE IMPLANT ANESTHESIA;  ANESTHESIA NEEDED FOR TAVR PROCEDURE;  Surgeon: GENERIC ANESTHESIA PROVIDER;  Location:  OR,  26mm Edward Sabino 3 valve       Medications:  Current Outpatient Medications   Medication Sig Dispense Refill     ascorbic acid 500 MG TABS Take 500 mg by mouth Takes off and on       aspirin 81 MG EC tablet Take 1 tablet (81 mg) by mouth daily 30 tablet      biotin 5 MG CAPS Take 1 capsule (5 mg) by mouth 2 times daily (Patient taking differently: Take 5 mg by mouth daily)       blood glucose (MIRA CONTOUR NEXT) test  strip Use to test blood sugar one time daily or as directed. 100 strip 11     blood glucose monitoring (MIRA MICROLET) lancets Use to test blood sugar 1 times daily or as directed. 100 each 5     glucosamine-chondroitin 500-400 MG CAPS per capsule Take 1 capsule by mouth Takes off and on       metFORMIN (GLUCOPHAGE XR) 500 MG 24 hr tablet Take 1 tablet (500 mg) by mouth 2 times daily (with meals) 180 tablet 3     Multiple Vitamins-Iron (MULTIVITAMIN/IRON PO) Take 1 tablet by mouth Takes off and on       simvastatin (ZOCOR) 20 MG tablet Take 1 tablet (20 mg) by mouth At Bedtime 90 tablet 1       Allergies:     Allergies   Allergen Reactions     Fentanyl Other (See Comments)     Confusion and agitation after PPM     Versed [Midazolam] Other (See Comments)     Confusion and agitation post PPM       Family history:  Family History   Problem Relation Age of Onset     Cancer Mother 58         in her 50's, had throat and stomach cancer     Alcohol/Drug Mother      Heart Disease Father          about age 49     Alcohol/Drug Father      Myocardial Infarction Sister 70        Two heart attacks     Alcohol/Drug Sister      Cerebrovascular Disease Brother 65         age 65 of stroke     Alcohol/Drug Brother      Cancer Maternal Grandmother         stomach cancer     Alcohol/Drug Maternal Grandmother      Alcohol/Drug Maternal Grandfather      Alcohol/Drug Paternal Grandmother      Alcohol/Drug Paternal Grandfather      Myocardial Infarction Grandchild 18        Grandson     Myocardial Infarction Other 18        Nephew        Social History:  Home situation: ***  Occupation/Schooling: ***  Tobacco use: ***  Drug use: ***  Alcohol use: ***       Review of Systems:    POSTIVE IN BOLD  GENERAL: fever/chills, fatigue, general unwell feeling, weight gain/loss.  HEAD/EYES:  headache, dizziness, or vision changes.    EARS/NOSE/THROAT:  Nosebleeds, hearing loss, sinus infection, earache, tinnitus.  IMMUNE:  Allergies,  cancer, immune deficiency, or infections.  SKIN:  Urticaria, rash, hives  HEME/Lymphatic:   anemia, easy bruising, easy bleeding.  RESPIRATORY:  cough, wheezing, or shortness of breath  CARDIOVASCULAR/Circulation:  Extremity edema, syncope, hypertension, tachycardia, or angina.  GASTROINTESTINAL:  abdominal pain, nausea/emesis, diarrhea, constipation,  hematochezia, or melena.  ENDOCRINE:  Diabetes, steroid use,  thyroid disease or osteoporosis.  MUSCULOSKELETAL: neck pain, back pain, arthralgia, arthritis, or gout.  GENITOURINARY:  frequency, urgency, dysuria, difficulty voiding, hematuria or incontinence.  NEUROLOGIC:  weakness, numbness, paresthesias, seizure, tremor, stroke or memory loss.  PSYCHIATRIC:  depression, anxiety, stress, suicidal thoughts or mood swings.     Physical Exam:  There were no vitals filed for this visit.  Exam:  Constitutional: Well developed, well nourished, appears stated age.  HEENT: Head atraumatic, normocephalic. Eyes without conjunctival injection or jaundice. Neck supple. No obvious neck masses.  Cardiovascular: Regular rate/rhythm  Respiratory: Lungs clear to auscultation bilaterally, no wheezing/rales/rhonchi.   Gastrointestinal: Abdomen soft, non-tender,  Skin: No rash, lesions of exposed skin.   Extremities: Peripheral pulses intact. No clubbing, cyanosis, or edema.  Psychiatric/mental status: Alert, without lethargy or stupor. Speech fluent. Appropriate affect. Mood normal. Able to follow commands without difficulty.     Musculoskeletal exam:  Gait/Station/Posture: ***  Normal stance, arm swing, and stride; no antalgia or Trendelenburg  Normal bulk and tone. Unremarkable spinal curvature. ASIS heights even. ***    Cervical spine:  Range of motion within normal limits ***  Myofascial tenderness: ***  No focal spinous process tenderness.   Spurling's negative bilaterally.      Lumbar spine:  Range of motion within normal limits ***   Rotation/ext to right: {PAINFUL/PAIN  FREE:246876}   Rotation/ext to left: {PAINFUL/PAIN FREE:008490}  Myofascial tenderness:  ***  Focal tenderness: No SI joint, gluteal, piriformis, GT, or IT tenderness  SLR: ***  Jorge's maneuver: ***    Hip exam:   normal internal and external range of motion bilaterally. LUIS negative. Scour negative.     Shoulder exam: ***  No shoulder girdle wasting or scapular dyskinesis. No palmar muscle wasting. No tenderness of bicipital groove, AC, GH, or SC joints. Shoulder range of motion within normal limits. Lopez', Neers, and empty-can are negative.     Neurologic exam:  CN:  Cranial nerves 2-12 are grossly intact  Motor Strength:  5/5 symmetric UE and LE strength, except for ***               Shoulder shrug (C4)      R: ***/5 L: ***/5  Shoulder abduction (deltoid C5,6)    R: ***/5 L: ***/5  Elbow flexion (bicepts C5,6)      R: ***/5 L: ***/5  Elbow extension (tricepts C7)     R: ***/5 L: ***/5  Finger abduction (C8)      R: ***/5 L: ***/5  Thumb flexion (C8)       R: ***/5 L: ***/5        Hip flexion (Iliosoas L1,2,3)     R: ***/5 L: ***/5  Knee extension (quadricepts L2,3,4)    R: ***/5 L: ***/5  Ankle dorsiflexion (tibialis anterior L4,5)   R: ***/5 L: ***/5    Ankle plantarflexion (gastrocnemius, soleus S1-2)  R: ***/5 L: ***/5  Big toe extension (ext. Garcia lungus L5,S1)   R: ***/5 L: ***/5   Hip extension (gluteus maxiumus L5, S1,2)    R: ***/5 L: ***/5  Hip abduction (gluteus medius L4,5, S1)   R: ***/5 L: ***/5  Knee flexion (hamstrings L5, S1-2)    R: ***/5 L: ***/5  Ankle eversion (peronei L5, S1)    R: ***/5 L: ***/5    Reflexes:     Biceps C5:     R:  ***/4 L: ***/4   Brachioradialis  C6: R:  ***/4 L: ***/4   Triceps C7:  R:  ***/4 L: ***/4   Patella L4:  R:  ***/4 L: ***/4   Achilles S1:  R:  ***/4 L: ***/4    Sensory:  (upper and lower extremities):   Light touch: normal ***   Allodynia: absent ***   Hyperalgesia: absent ***    Lluvia Gomes MD  [facility] Pain Management       BILLING TIME  DOCUMENTATION:   The total TIME spent on this patient on the date of the encounter/appointment was *** minutes.      TOTAL TIME includes:   Time spent preparing to see the patient (reviewing records and tests) - *** min  Time spent face to face (or over the phone) with the patient - *** min  Time spent ordering tests, medications, procedures and referrals - *** min  Time spent Referring and communicating with other healthcare professionals - *** min  Time spent documenting clinical information in Epic - *** min

## 2022-10-16 ENCOUNTER — HEALTH MAINTENANCE LETTER (OUTPATIENT)
Age: 87
End: 2022-10-16

## 2022-10-17 ENCOUNTER — TELEPHONE (OUTPATIENT)
Dept: PALLIATIVE MEDICINE | Facility: CLINIC | Age: 87
End: 2022-10-17

## 2022-10-17 NOTE — TELEPHONE ENCOUNTER
Screening Questions for Radiology Injections:    Injection to be done at which interventional clinic site? [facility]    Procedure ordered by Dr. Gomes     Procedure ordered? Bilateral L4-5 and L5-S1 facet joint injections     Transforaminal Cervical MARVEL - Send to [facility] ([facility]) - No [facility] providers perform this procedure    What insurance would patient like us to bill for this procedure? Medicare and BCBS     Worker's comp or MVA (motor vehicle accident) -Any injection DO NOT SCHEDULE and route to Amanda Pena.      [employer] insurance - For SI joint injections, DO NOT SCHEDULE and route to Phoebe Mervin.       ALL BCBS, [employer] and [employer] - DO NOT SCHEDULE and route to Phoebe Jeffries      MEDICA- facet joint injections, route to Phoebe Vazquezy    Is an  needed? No     Patient has a  home? (Review Grid) YES: Informed     Any chance of pregnancy? Not Applicable   If YES, do NOT schedule and route to RN pool  - Dr. Vegas route to Barbara Hoffman and PM&R Nurse  [[id number]]      Is patient actively being treated for cancer or immunocompromised? No  If YES, do NOT schedule and route to RN pool/ Dr. Vegas's Team    Does the patient have a bleeding or clotting disorder? No     If YES, okay to schedule AND route to RN nurse pool/ Dr. Vegas's Team     (For any patients with platelet count <100, RN must forward to provider)    Is patient taking any Blood Thinners OR Antiplatelet medication?  No   If hold needed, do NOT schedule, route to RN pool/ Dr. Vegas's Team    Examples:   o Blood Thinners: (Coumadin, Warfarin, Jantoven, Pradaxa, Xarelto, Eliquis, Edoxaban, Enoxaparin, Lovenox, Heparin, Arixtra, Fondaparinux or Fragmin)  o Antiplatelet Medications: (Plavix, Brilinta or Effient)     Is patient taking any aspirin products (includes Excedrin and Fiorinal)? Yes - Pt takes 81 mg daily; instructed to hold 0 day(s) prior to procedure.      If more than 325mg/day, OK to schedule;  Instruct Pt to decrease to less than 325 mg for 7 days AND route to RN pool/ Dr. Vegas's Team     For CERVICAL procedures, hold all aspirin products for 6 days.     Tell Pt that if aspirin product is not held for 6 days, the procedure WILL BE cancelled.     Any allergies to contrast dye, iodine, shellfish, or numbing and steroid medications? No    If YES, schedule and add allergy information to appointment notes AND route to the RN pool/ Dr. Vegas's Team    If MARVEL and Contrast Dye / Iodine Allergy? DO NOT SCHEDULE, route to RN pool/ Dr. Vegas's Team    Allergies: Fentanyl and Versed [midazolam]     Does patient have an active infection or treated for one within the past week? No    Is patient currently taking any antibiotics or steroid medications?  No     For patients on chronic, preventative, or prophylactic antibiotics, procedures may be scheduled.     For patients on antibiotics for active or recent infection, schedule 4 days after completed.    For patients on steroid medications, schedule 4 days after completed.     Has the patient had a flu shot or any other vaccinations within the past 7 days? No, but he is schedule to have a flu shot and a booster on Friday 10/21/22.  If yes, explain that for the vaccine to work best they need to:       wait 1 week before and 1 week after getting any Vaccine    wait 1 week before and 2 weeks after getting Covid Vaccine #2 or BOOSTER    If patient has concerns about the timing, send to RN pool/ Dr. Vegas's Team    Does patient have an MRI/CT?  Not Applicable Include Date and Check Procedure Scheduling Grid to see if required.    Was the MRI/CT done within the last 3 years?  NA     If no route to RN Pool/ Dr. Meyers Team    If yes, where was the MRI/CT done?      Refer to PACS Transmissions list for approved external locations and route to RN Pool High Priority/ Dr. Meyers Team    If MRI was not done at approved external location do NOT schedule and route to RN pool/  Dr. Vegas's Team      If patient has an imaging disc, the injection MAY be scheduled but patient must bring disc to appt or appt will be cancelled.    Is patient able to transfer to a procedure table with minimal or no assistance? Yes, but might need moderate assistance     If no, do NOT schedule and route to RN Pool/ Dr. Vegas's Team    Procedure Specific Instructions:    If celiac plexus block, informed patient NPO for 6 hours and that it is okay to take medications with sips of water, especially blood pressure medications Not Applicable         If this is for a cervical procedure, informed patient that aspirin needs to be held for 6 days.   Not Applicable      Sedation, If Sedation is ordered for any procedure, patient must be NPO for 6 hours prior to procedure Not Applicable      If IV needed:    Do not schedule procedures requiring IV placement in the first appointment of the day or first appointment after lunch. Do NOT schedule at 0745, 0815 or 1245.       Instructed patient to arrive 30 minutes early for IV start if required. (Check Procedure Scheduling Grid)  Not Applicable    Reminders:    If you are started on any steroids or antibiotics between now and your appointment, you must contact us because the procedure may need to be cancelled.  Yes      As a reminder, receiving steroids can decrease your body's ability to fight infection.   Would you still like to move forward with scheduling the injection?  Yes      IV Sedation is not provided for procedures. If oral anti-anxiety medication is needed, the patient should request this from their referring provider.      Instruct patient to arrive as directed prior to the scheduled appointment time:  If IV needed 30 minutes before appointment time       For patients 85 or older we recommend having an adult stay w/ them for the remainder of the day.       If the patient is Diabetic, remind them to bring their glucometer.      Does the patient have any questions?   NO  Nahomy Che  [facility]

## 2022-10-19 ENCOUNTER — HOSPITAL ENCOUNTER (OUTPATIENT)
Facility: CLINIC | Age: 87
Setting detail: OBSERVATION
Discharge: NURSING, REHAB OR LONG-TERM CARE | End: 2022-10-21
Attending: NURSE PRACTITIONER | Admitting: HOSPITALIST
Payer: MEDICARE

## 2022-10-19 ENCOUNTER — TELEPHONE (OUTPATIENT)
Dept: INTERNAL MEDICINE | Facility: CLINIC | Age: 87
End: 2022-10-19

## 2022-10-19 ENCOUNTER — APPOINTMENT (OUTPATIENT)
Dept: CT IMAGING | Facility: CLINIC | Age: 87
End: 2022-10-19
Attending: NURSE PRACTITIONER
Payer: MEDICARE

## 2022-10-19 DIAGNOSIS — M48.07 LUMBOSACRAL SPINAL STENOSIS: ICD-10-CM

## 2022-10-19 DIAGNOSIS — R41.82 ALTERED MENTAL STATUS: ICD-10-CM

## 2022-10-19 DIAGNOSIS — Z71.89 ENCOUNTER FOR COORDINATION OF COMPLEX CARE: Primary | ICD-10-CM

## 2022-10-19 DIAGNOSIS — I63.9 CEREBELLAR INFARCT (H): ICD-10-CM

## 2022-10-19 DIAGNOSIS — E11.59 TYPE 2 DIABETES MELLITUS WITH OTHER CIRCULATORY COMPLICATIONS (H): Primary | ICD-10-CM

## 2022-10-19 DIAGNOSIS — K59.00 CONSTIPATION, UNSPECIFIED CONSTIPATION TYPE: ICD-10-CM

## 2022-10-19 DIAGNOSIS — G47.00 INSOMNIA, UNSPECIFIED TYPE: ICD-10-CM

## 2022-10-19 DIAGNOSIS — M54.50 BILATERAL LOW BACK PAIN WITHOUT SCIATICA: ICD-10-CM

## 2022-10-19 DIAGNOSIS — M54.50 ACUTE RIGHT-SIDED LOW BACK PAIN WITHOUT SCIATICA: ICD-10-CM

## 2022-10-19 LAB
ALBUMIN SERPL BCG-MCNC: 3.9 G/DL (ref 3.5–5.2)
ALBUMIN UR-MCNC: 10 MG/DL
ALP SERPL-CCNC: 106 U/L (ref 40–129)
ALT SERPL W P-5'-P-CCNC: 28 U/L (ref 10–50)
AMMONIA PLAS-SCNC: 12 UMOL/L (ref 16–60)
ANION GAP SERPL CALCULATED.3IONS-SCNC: 11 MMOL/L (ref 7–15)
APAP SERPL-MCNC: <5 UG/ML (ref 10–30)
APPEARANCE UR: CLEAR
AST SERPL W P-5'-P-CCNC: 26 U/L (ref 10–50)
BASOPHILS # BLD AUTO: 0.1 10E3/UL (ref 0–0.2)
BASOPHILS NFR BLD AUTO: 1 %
BILIRUB SERPL-MCNC: 0.5 MG/DL
BILIRUB UR QL STRIP: NEGATIVE
BUN SERPL-MCNC: 28.6 MG/DL (ref 8–23)
CALCIUM SERPL-MCNC: 9.6 MG/DL (ref 8.8–10.2)
CANNABINOIDS UR QL SCN: NORMAL
CHLORIDE SERPL-SCNC: 101 MMOL/L (ref 98–107)
COLOR UR AUTO: ABNORMAL
CREAT SERPL-MCNC: 1.26 MG/DL (ref 0.67–1.17)
CREAT UR-MCNC: 69 MG/DL
DEPRECATED HCO3 PLAS-SCNC: 27 MMOL/L (ref 22–29)
EOSINOPHIL # BLD AUTO: 0.3 10E3/UL (ref 0–0.7)
EOSINOPHIL NFR BLD AUTO: 3 %
ERYTHROCYTE [DISTWIDTH] IN BLOOD BY AUTOMATED COUNT: 12.6 % (ref 10–15)
ETHANOL SERPL-MCNC: <0.01 G/DL
GFR SERPL CREATININE-BSD FRML MDRD: 55 ML/MIN/1.73M2
GLUCOSE BLDC GLUCOMTR-MCNC: 315 MG/DL (ref 70–99)
GLUCOSE SERPL-MCNC: 165 MG/DL (ref 70–99)
GLUCOSE UR STRIP-MCNC: NEGATIVE MG/DL
HCT VFR BLD AUTO: 42.2 % (ref 40–53)
HGB BLD-MCNC: 13.4 G/DL (ref 13.3–17.7)
HGB UR QL STRIP: NEGATIVE
HYALINE CASTS: 1 /LPF
IMM GRANULOCYTES # BLD: 0 10E3/UL
IMM GRANULOCYTES NFR BLD: 0 %
KETONES UR STRIP-MCNC: NEGATIVE MG/DL
LACTATE SERPL-SCNC: 1.1 MMOL/L (ref 0.7–2)
LEUKOCYTE ESTERASE UR QL STRIP: NEGATIVE
LYMPHOCYTES # BLD AUTO: 2.1 10E3/UL (ref 0.8–5.3)
LYMPHOCYTES NFR BLD AUTO: 27 %
MCH RBC QN AUTO: 29.1 PG (ref 26.5–33)
MCHC RBC AUTO-ENTMCNC: 31.8 G/DL (ref 31.5–36.5)
MCV RBC AUTO: 92 FL (ref 78–100)
MONOCYTES # BLD AUTO: 0.8 10E3/UL (ref 0–1.3)
MONOCYTES NFR BLD AUTO: 11 %
MUCOUS THREADS #/AREA URNS LPF: PRESENT /LPF
NEUTROPHILS # BLD AUTO: 4.5 10E3/UL (ref 1.6–8.3)
NEUTROPHILS NFR BLD AUTO: 58 %
NITRATE UR QL: NEGATIVE
NRBC # BLD AUTO: 0 10E3/UL
NRBC BLD AUTO-RTO: 0 /100
PH UR STRIP: 5 [PH] (ref 5–7)
PLATELET # BLD AUTO: 159 10E3/UL (ref 150–450)
POTASSIUM SERPL-SCNC: 4.3 MMOL/L (ref 3.4–5.3)
PROT SERPL-MCNC: 7.2 G/DL (ref 6.4–8.3)
RBC # BLD AUTO: 4.61 10E6/UL (ref 4.4–5.9)
RBC URINE: <1 /HPF
SARS-COV-2 RNA RESP QL NAA+PROBE: NEGATIVE
SODIUM SERPL-SCNC: 139 MMOL/L (ref 136–145)
SP GR UR STRIP: 1.02 (ref 1–1.03)
SQUAMOUS EPITHELIAL: <1 /HPF
TROPONIN T SERPL HS-MCNC: 34 NG/L
TSH SERPL DL<=0.005 MIU/L-ACNC: 1.03 UIU/ML (ref 0.3–4.2)
UROBILINOGEN UR STRIP-MCNC: NORMAL MG/DL
WBC # BLD AUTO: 7.8 10E3/UL (ref 4–11)
WBC URINE: <1 /HPF

## 2022-10-19 PROCEDURE — C9803 HOPD COVID-19 SPEC COLLECT: HCPCS

## 2022-10-19 PROCEDURE — 80053 COMPREHEN METABOLIC PANEL: CPT | Performed by: NURSE PRACTITIONER

## 2022-10-19 PROCEDURE — U0003 INFECTIOUS AGENT DETECTION BY NUCLEIC ACID (DNA OR RNA); SEVERE ACUTE RESPIRATORY SYNDROME CORONAVIRUS 2 (SARS-COV-2) (CORONAVIRUS DISEASE [COVID-19]), AMPLIFIED PROBE TECHNIQUE, MAKING USE OF HIGH THROUGHPUT TECHNOLOGIES AS DESCRIBED BY CMS-2020-01-R: HCPCS | Performed by: NURSE PRACTITIONER

## 2022-10-19 PROCEDURE — 83605 ASSAY OF LACTIC ACID: CPT | Performed by: PHYSICIAN ASSISTANT

## 2022-10-19 PROCEDURE — 84484 ASSAY OF TROPONIN QUANT: CPT | Performed by: NURSE PRACTITIONER

## 2022-10-19 PROCEDURE — 93005 ELECTROCARDIOGRAM TRACING: CPT

## 2022-10-19 PROCEDURE — 82140 ASSAY OF AMMONIA: CPT | Performed by: NURSE PRACTITIONER

## 2022-10-19 PROCEDURE — 80307 DRUG TEST PRSMV CHEM ANLYZR: CPT | Performed by: PHYSICIAN ASSISTANT

## 2022-10-19 PROCEDURE — 82077 ASSAY SPEC XCP UR&BREATH IA: CPT | Performed by: NURSE PRACTITIONER

## 2022-10-19 PROCEDURE — G0378 HOSPITAL OBSERVATION PER HR: HCPCS

## 2022-10-19 PROCEDURE — 36415 COLL VENOUS BLD VENIPUNCTURE: CPT | Performed by: PHYSICIAN ASSISTANT

## 2022-10-19 PROCEDURE — 99285 EMERGENCY DEPT VISIT HI MDM: CPT | Mod: 25

## 2022-10-19 PROCEDURE — 84443 ASSAY THYROID STIM HORMONE: CPT | Performed by: NURSE PRACTITIONER

## 2022-10-19 PROCEDURE — 93005 ELECTROCARDIOGRAM TRACING: CPT | Mod: RTG

## 2022-10-19 PROCEDURE — 250N000013 HC RX MED GY IP 250 OP 250 PS 637: Performed by: NURSE PRACTITIONER

## 2022-10-19 PROCEDURE — 99220 PR INITIAL OBSERVATION CARE,LEVEL III: CPT | Performed by: PHYSICIAN ASSISTANT

## 2022-10-19 PROCEDURE — 85048 AUTOMATED LEUKOCYTE COUNT: CPT | Performed by: NURSE PRACTITIONER

## 2022-10-19 PROCEDURE — 80143 DRUG ASSAY ACETAMINOPHEN: CPT | Performed by: NURSE PRACTITIONER

## 2022-10-19 PROCEDURE — G1010 CDSM STANSON: HCPCS

## 2022-10-19 PROCEDURE — 250N000013 HC RX MED GY IP 250 OP 250 PS 637: Performed by: PHYSICIAN ASSISTANT

## 2022-10-19 PROCEDURE — 81001 URINALYSIS AUTO W/SCOPE: CPT | Mod: XU | Performed by: NURSE PRACTITIONER

## 2022-10-19 PROCEDURE — 36415 COLL VENOUS BLD VENIPUNCTURE: CPT | Performed by: NURSE PRACTITIONER

## 2022-10-19 PROCEDURE — 82962 GLUCOSE BLOOD TEST: CPT

## 2022-10-19 RX ORDER — METHOCARBAMOL 500 MG/1
500 TABLET, FILM COATED ORAL 4 TIMES DAILY
Status: DISCONTINUED | OUTPATIENT
Start: 2022-10-20 | End: 2022-10-21 | Stop reason: HOSPADM

## 2022-10-19 RX ORDER — ONDANSETRON 4 MG/1
4 TABLET, ORALLY DISINTEGRATING ORAL EVERY 6 HOURS PRN
Status: DISCONTINUED | OUTPATIENT
Start: 2022-10-19 | End: 2022-10-21 | Stop reason: HOSPADM

## 2022-10-19 RX ORDER — AMOXICILLIN 250 MG
1 CAPSULE ORAL 2 TIMES DAILY PRN
Status: DISCONTINUED | OUTPATIENT
Start: 2022-10-19 | End: 2022-10-21 | Stop reason: HOSPADM

## 2022-10-19 RX ORDER — NALOXONE HYDROCHLORIDE 0.4 MG/ML
0.4 INJECTION, SOLUTION INTRAMUSCULAR; INTRAVENOUS; SUBCUTANEOUS
Status: DISCONTINUED | OUTPATIENT
Start: 2022-10-19 | End: 2022-10-21 | Stop reason: HOSPADM

## 2022-10-19 RX ORDER — NALOXONE HYDROCHLORIDE 0.4 MG/ML
0.2 INJECTION, SOLUTION INTRAMUSCULAR; INTRAVENOUS; SUBCUTANEOUS
Status: DISCONTINUED | OUTPATIENT
Start: 2022-10-19 | End: 2022-10-21 | Stop reason: HOSPADM

## 2022-10-19 RX ORDER — LIDOCAINE 4 G/G
1-3 PATCH TOPICAL
Status: DISCONTINUED | OUTPATIENT
Start: 2022-10-19 | End: 2022-10-21 | Stop reason: HOSPADM

## 2022-10-19 RX ORDER — AMOXICILLIN 250 MG
2 CAPSULE ORAL 2 TIMES DAILY PRN
Status: DISCONTINUED | OUTPATIENT
Start: 2022-10-19 | End: 2022-10-21 | Stop reason: HOSPADM

## 2022-10-19 RX ORDER — BENZTROPINE MESYLATE 0.5 MG/1
1 TABLET ORAL 3 TIMES DAILY PRN
Status: DISCONTINUED | OUTPATIENT
Start: 2022-10-19 | End: 2022-10-21 | Stop reason: HOSPADM

## 2022-10-19 RX ORDER — LIDOCAINE 4 G/G
1 PATCH TOPICAL ONCE
Status: COMPLETED | OUTPATIENT
Start: 2022-10-19 | End: 2022-10-20

## 2022-10-19 RX ORDER — ONDANSETRON 2 MG/ML
4 INJECTION INTRAMUSCULAR; INTRAVENOUS EVERY 6 HOURS PRN
Status: DISCONTINUED | OUTPATIENT
Start: 2022-10-19 | End: 2022-10-21 | Stop reason: HOSPADM

## 2022-10-19 RX ORDER — ACETAMINOPHEN 325 MG/1
975 TABLET ORAL 3 TIMES DAILY
Status: DISCONTINUED | OUTPATIENT
Start: 2022-10-20 | End: 2022-10-21 | Stop reason: HOSPADM

## 2022-10-19 RX ORDER — LIDOCAINE 40 MG/G
CREAM TOPICAL
Status: DISCONTINUED | OUTPATIENT
Start: 2022-10-19 | End: 2022-10-21 | Stop reason: HOSPADM

## 2022-10-19 RX ADMIN — LIDOCAINE 1 PATCH: 246 PATCH TOPICAL at 18:41

## 2022-10-19 RX ADMIN — LIDOCAINE 1 PATCH: 246 PATCH TOPICAL at 23:06

## 2022-10-19 ASSESSMENT — ACTIVITIES OF DAILY LIVING (ADL)
ADLS_ACUITY_SCORE: 35
ADLS_ACUITY_SCORE: 34
ADLS_ACUITY_SCORE: 35
ADLS_ACUITY_SCORE: 35

## 2022-10-19 ASSESSMENT — ENCOUNTER SYMPTOMS
BACK PAIN: 1
FEVER: 0
CONFUSION: 1

## 2022-10-19 NOTE — ED TRIAGE NOTES
Patient c/o lower back pain. Has hx of arthritis. Has been taking tylenol for pain. Was seen in urgent care last week and prescribed Oxycodone. Wife did not want patient to get addicted so she threw half the pills away. Wanting pain relief today. Has scheduled injection in November but unable to wait that long. Denies numbness/tingling in legs. No new trauma to back at all. ABCs intact .

## 2022-10-19 NOTE — H&P
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    Patient was discussed with Noemy, who was provider in ED. in the ED he was afebrile with heart rate of 74, pressure 164/81 and breathing comfortably on room air without hypoxia.  Lab work remarkable for creatinine 1.26, BUN 28.6, normal electrolytes, ammonia level of 12, normal LFTs, TSH 1.03, CBC unremarkable and CT head shows evidence of small right cerebellar infarcts that are presumed to be chronic (aren't seen in CT head from 8/2020).  Alcohol level was normal and Tylenol level was less than 5.  Lidocaine patch was placed and observation admission was requested as his wife cannot deal with the pain he is having nor his altered mental status.    #Intractable back pain  -History of lumbar spondylosis and lumbar spinal stenosis  -Reports significant worsening of low back pain for the past few months  -CT scan from March show severe asymmetrical degenerative change with scoliosis and multiple stenotic areas  -He was evaluated by Dr. Gomes who is a pain specialist with [facility] spine who felt symptoms were most consistent with worsening facet disease with recommendation for lumbar facet injections.  -It was recommended he take Tylenol 1000 mg 3 times daily (was taking 6 times daily) and was given a prescription for oxycodone but his wife threw away half the pills due to his history of alcoholism.  -On my exam I do not see a reason to reimage him at this time  -ED says he has a  bilateral L4-L5 and L5-S1 facet joint injection scheduled for 11/9 (I can not find record of this)   -Recommend seeing if MARVEL can be done tomorrow and if so what levels the original order were meant for  -Plan for scheduled Tylenol, scheduled Robaxin, lidocaine patches, Voltaren gel and as needed oxycodone  -PT and social work consults  -Given altered mental status will avoid steroids for now      #Altered mental status suspect metabolic encephalopathy related insomnia/pain  -Wife reports altered mental status such as taking off his ring and trying to eat it and taking any medicines he can find including her medicines in the cabinet  -Reports difficulty sleeping most nights due to pain  -CT head negative, electrolyte panel and LFTs normal  -Waiting for UA  -Unable to do MRI brain due to pacemaker but CT scan does show evidence of cerebellar infarcts that are new since imaging done in 2020  -Symptoms seem more consistent with encephalopathy than stroke related though  -Plan to add on urine tox screen  -Bladder scan for retention  -We will give trazodone tonight for bed and instruct nurses not to wake up overnight  -Seroquel PRN    #Type 2 diabetes  -A1c 7.1 in July and glucose today 165  -Hold metformin  -Insulin sliding scale ordered    #HTN  -Not on medication    #HLP  -Continue simvastatin    #Evidence of cerebellar infarcts  -No history of stroke per wife but does have known internal carotid disease on the left side that they may consider having reimaging of in the future    #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  History is limited by patient as he is confused.  Most of his history is provided by his wife.  She reports that he has had continued low back pain that is worsened recently.  There does not seem to be any radicular symptoms with this.  He continues to ambulate around the house but it is more difficult for him and his wife reports that he is shuffling.  He was seen by a pain medicine doctor who is planning for MARVEL injection at the beginning of November and he was instructed to take Tylenol and oxycodone for pain.  His wife was concerned about his history of alcoholism so he only took 4 of the oxycodone tablets and has been taking 6000 mg of Tylenol for a few days.  His wife also also reports that for the past few days he has had increasing confusion.  For example he tried to eat his own ring and then has been taking what ever medicines he can find in the house.  She reports poor sleep hygiene due to pain.  No alcohol use or illegal drug use.  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    DDD PM 6/10/2021     Hyperlipidaemia LDL goal < 100      Hypertension      Need for SBE (subacute bacterial endocarditis) prophylaxis      Pulmonary hypertension (H)      Type 2 diabetes mellitus (H)                Past Surgical History:     Past Surgical History:   Procedure Laterality Date     CV HEART CATHETERIZATION WITH POSSIBLE INTERVENTION N/A 6/9/2021    Procedure: coronary angiogram;  Surgeon: Jayesh Rachel MD;  Location: [facility] CARDIAC CATH LAB     CV TEMPORARY PACEMAKER INSERTION N/A 6/9/2021    Procedure: Temporary Pacemaker Insertion;  Surgeon: Virginie  "Jayesh Mckenna MD;  Location:  HEART CARDIAC CATH LAB     EP PACEMAKER N/A 6/10/2021    Procedure: EP Pacemaker;  Surgeon: Magdiel Silver MD;  Location:  HEART CARDIAC CATH LAB     MANDIBLE SURGERY       OTHER SURGICAL HISTORY      cardiac cath 18: mild non-obstructive disease     TRANSCATHETER AORTIC VALVE IMPLANT ANESTHESIA N/A 2018    Procedure: TRANSCATHETER AORTIC VALVE IMPLANT ANESTHESIA;  ANESTHESIA NEEDED FOR TAVR PROCEDURE;  Surgeon: GENERIC ANESTHESIA PROVIDER;  Location:  OR,  26mm Edward Sabino 3 valve               Social History:     Social History     Socioeconomic History     Marital status:      Spouse name: Alyssa     Number of children: 3     Years of education: Not on file     Highest education level: Not on file   Occupational History     Not on file   Tobacco Use     Smoking status: Former     Packs/day: 1.00     Years: 30.00     Pack years: 30.00     Types: Cigarettes     Start date:      Quit date: 1980     Years since quittin.1     Smokeless tobacco: Former     Types: Chew     Quit date: 1983   Vaping Use     Vaping Use: Never used   Substance and Sexual Activity     Alcohol use: No     Comment: quit 30+ years ago. states, \"I'm an alcoholic\".     Drug use: No     Sexual activity: Not Currently     Partners: Female   Other Topics Concern     Parent/sibling w/ CABG, MI or angioplasty before 65F 55M? Not Asked   Social History Narrative    , two children in [facility] ([place]), one child  at age 18.    Five grandchildren.     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        Not currently participating in much physical activity d/t chronic left hip pain.       Social Determinants of Health     Financial Resource Strain: Low Risk      Difficulty of Paying Living Expenses: Not hard at all   Food Insecurity: No Food Insecurity     Worried About Running Out of Food in the Last Year: Never true     " Ran Out of Food in the Last Year: Never true   Transportation Needs: No Transportation Needs     Lack of Transportation (Medical): No     Lack of Transportation (Non-Medical): No   Physical Activity: Inactive     Days of Exercise per Week: 0 days     Minutes of Exercise per Session: 0 min   Stress: No Stress Concern Present     Feeling of Stress : Not at all   Social Connections: Socially Integrated     Frequency of Communication with Friends and Family: Twice a week     Frequency of Social Gatherings with Friends and Family: Twice a week     Attends Congregational Services: More than 4 times per year     Active Member of Clubs or Organizations: Yes     Attends Club or Organization Meetings: Never     Marital Status:    Intimate Partner Violence: Not At Risk     Fear of Current or Ex-Partner: No     Emotionally Abused: No     Physically Abused: No     Sexually Abused: No   Housing Stability: Low Risk      Unable to Pay for Housing in the Last Year: No     Number of Places Lived in the Last Year: 1     Unstable Housing in the Last Year: No               Family History:     Family History   Problem Relation Age of Onset     Cancer Mother 58         in her 50's, had throat and stomach cancer     Alcohol/Drug Mother      Heart Disease Father          about age 49     Alcohol/Drug Father      Myocardial Infarction Sister 70        Two heart attacks     Alcohol/Drug Sister      Cerebrovascular Disease Brother 65         age 65 of stroke     Alcohol/Drug Brother      Cancer Maternal Grandmother         stomach cancer     Alcohol/Drug Maternal Grandmother      Alcohol/Drug Maternal Grandfather      Alcohol/Drug Paternal Grandmother      Alcohol/Drug Paternal Grandfather      Myocardial Infarction Grandchild 18        Grandson     Myocardial Infarction Other 18        Nephew               Allergies:      Allergies   Allergen Reactions     Fentanyl Other (See Comments)     Confusion and agitation after PPM      Versed [Midazolam] Other (See Comments)     Confusion and agitation post PPM               Medications:     Prior to Admission medications    Medication Sig Last Dose Taking? Auth Provider Long Term End Date   ascorbic acid 500 MG TABS Take 500 mg by mouth Takes off and on   Reported, Patient     aspirin 81 MG EC tablet Take 1 tablet (81 mg) by mouth daily   Lizz Love APRN CNP     biotin 5 MG CAPS Take 1 capsule (5 mg) by mouth 2 times daily  Patient taking differently: Take 5 mg by mouth daily   Ping Barksdale,      blood glucose (MIRA CONTOUR NEXT) test strip Use to test blood sugar one time daily or as directed.   Viet Bingham MD     blood glucose monitoring (MIRA MICROLET) lancets Use to test blood sugar 1 times daily or as directed.   Viet Bingham MD     glucosamine-chondroitin 500-400 MG CAPS per capsule Take 1 capsule by mouth Takes off and on   Unknown, Entered By History     metFORMIN (GLUCOPHAGE XR) 500 MG 24 hr tablet Take 1 tablet (500 mg) by mouth 2 times daily (with meals)   Viet Bingham MD Yes    Multiple Vitamins-Iron (MULTIVITAMIN/IRON PO) Take 1 tablet by mouth Takes off and on   Reported, Patient     simvastatin (ZOCOR) 20 MG tablet Take 1 tablet (20 mg) by mouth At Bedtime   Viet Bingham MD Yes               Review of Systems:   A Comprehensive greater than 10 system review of systems was carried out.  Pertinent positives and negatives are noted above.  Otherwise negative for contributory information.            Physical Exam:   Blood pressure (!) 145/90, pulse 81, temperature 98  F (36.7  C), temperature source Temporal, resp. rate 12, SpO2 97 %.  Exam:  GENERAL:  Comfortable.  PSYCH: pleasant, orientated to person but not place or time, No acute distress.  HEENT:  PERRLA. Normal conjunctiva, normal hearing, nasal mucosa and Oropharynx are normal.  NECK:  Supple, no neck vein distention, adenopathy or bruits, normal thyroid.  HEART:  Normal S1, S2 with  murmur, no pericardial rub, gallops or S3 or S4.  LUNGS:  Clear to auscultation, normal Respiratory effort. No wheezing, rales or ronchi.  ABDOMEN:  Soft, no hepatosplenomegaly, normal bowel sounds. Non-tender, non distended.   EXTREMITIES:  No pedal edema, +2 pulses bilateral and equal.  I am able to move his legs around in bed without any complaints of back pain.  He has full strength in the lower extremities  SKIN:  Dry to touch, No rash, wound or ulcerations.  NEUROLOGIC:  CN 2-12 intact, BL 5/5 symmetric upper and lower extremity strength, sensation is intact with no focal deficits.               Data:     Recent Labs   Lab 10/19/22  1643   WBC 7.8   HGB 13.4   HCT 42.2   MCV 92        Recent Labs   Lab 10/19/22  1643      POTASSIUM 4.3   CHLORIDE 101   CO2 27   ANIONGAP 11   *   BUN 28.6*   CR 1.26*   GFRESTIMATED 55*   DEEPTI 9.6   PROTTOTAL 7.2   ALBUMIN 3.9   BILITOTAL 0.5   ALKPHOS 106   AST 26   ALT 28     Recent Labs   Lab 10/19/22  1643   TSH 1.03     Recent Labs   Lab 10/19/22  1844   COLOR Light Yellow   APPEARANCE Clear   URINEGLC Negative   URINEBILI Negative   URINEKETONE Negative   SG 1.019   UBLD Negative   URINEPH 5.0   PROTEIN 10*   NITRITE Negative   LEUKEST Negative   RBCU <1   WBCU <1         Recent Results (from the past 24 hour(s))   Head CT w/o contrast    Narrative    EXAM: CT HEAD W/O CONTRAST  LOCATION: [facility]  DATE/TIME: 10/19/2022 5:38 PM    INDICATION: Confusion.    COMPARISON: None.    TECHNIQUE: Routine CT Head without IV contrast. Multiplanar reformats. Dose reduction techniques were used.    FINDINGS: No evidence of hemorrhage. Moderate generalized parenchymal volume loss is present with confluent areas of white matter hypoattenuation which likely represent chronic small vessel ischemic change. Small right cerebellar infarct/infarcts,   presumably chronic. No acute osseous abnormality.      Impression    IMPRESSION:  1.  No evidence of  hemorrhage.  2.  Small right cerebellar infarcts, presumably chronic.  3.  Chronic changes as detailed.             Barbara Hammer PA-C

## 2022-10-19 NOTE — TELEPHONE ENCOUNTER
Patient's wife calls, they are in the ER right now - advised by nurse this morning to take patient in. However they have been waiting 3 hours and still have not been called back. She is very frustrated that she is not able to leave him alone at all during the day and they are running out of groceries. They do not have any family in town to support them, but she has been trying to reach out to them for help. She is not sure if they can both make it until patient's injection that's scheduled next month - him due to pain, her due to struggles to care for him and herself. Offered to place Care Coordination referral for services in the home and care giver support and she agrees. Referral placed.     Also advised her to remain in the ER with patient and to alert the desk if his behavior is escalating to ensure they are aware of the change.    Indira Mena RN  [facility]

## 2022-10-19 NOTE — ED PROVIDER NOTES
"  History   Chief Complaint:  Back Pain       The history is provided by the spouse.      Reid Jimenes is a 88 year old male with history of TAVR (2018), pacemaker (2021), hyperlipidemia, DM 2, hypertension, and chronic low back pain who presents with back pain. Patient's wife reports that the patient has been experiencing severe middle/ low back pain. She notes onset of pain about January of this year and was seen at [facility] 3/14/22 with CT imaging as below.  She notes that he is seen by Dr. Gomes at a pain and palliative care clinic and his primary care doctor for his pain. She adds that he was prescribed Oxycodone, 8 pills, (10/10/22) of which she only allowed him to take 4 pills due to fear of addiction (history of alcohol dependence). She reports that the patient has been instead taking 6,000mg of Tylenol per day since Thursday of last week. She also adds that the patient has been increasingly confused and doing things that do not make sense. She states that \"he took his ring off and tried to eat it\", \"he will take any medication that he finds\", and this morning he woke up before her and moved around the furniture in the house. She reports that the increased confusion and irregular behavior started about 5 days ago and has increased. The patient's wife notes that she takes blood pressure, and diabetes medication and levothyroxine and he may have gotten into her medications today as she found a can of soda in her drug cabinet.  The patient denies fever, chest pain, shortness of breath, abdominal pain, dysuria, inability to urinate or loss of control of bowel or bladder, lower extremity weakness.  Of note, the patient has appointment 11/9/22 for bilateral L4-5 and L5-S1 facet joint injections.  The patient was noted to have a 50-69% stenosis of the left ICA on ultrasound 8/21 and declined repeat imaging in 7/22.  He is followed by Dr. Sotelo, vascular surgery.    CT Lumbar Spine Without Contrast at [facility] on 3/21/22  IMPRESSION:  Severe asymmetrical degenerative change with scoliosis and multiple stenoses as detailed.   FINDINGS:  Alignment is significant for levoconvex scoliosis and multilevel grade 1 spondylolisthesis. Chronic-appearing L4 compression deformity is present with large inferior endplate Schmorl's node and  moderate height loss. Multiple other smaller Schmorl's nodes are present. No evidence of recent fracture. Severe asymmetrical degenerative disc disease is present at L3-L4, L4-L5, and L5-S1. Multilevel facet arthropathy is present which is severe on the left at L4-L5 and L5-S1. Disc bulging contributes to mild to moderate spinal canal stenosis at L2-L3 and L3-L4. No high-grade spinal canal stenoses. Mild foraminal stenoses at multiple levels. Moderate foraminal stenosis on the right at L2-L3. Severe foraminal stenosis on  the right at L3-L4. Severe foraminal stenosis on the left at L4-L5. Mild to moderate foraminal stenosis on the right at L4-L5. Severe foraminal stenosis on the left L5-S1.    BILATERAL CAROTID ULTRASOUND   8/4/2021 11:13 AM                                             IMPRESSION:    50-69% diameter stenosis of the left ICA relative to the distal ICA diameter.    Review of Systems   Constitutional: Negative for fever.   Musculoskeletal: Positive for back pain.   Psychiatric/Behavioral: Positive for confusion.   All other systems reviewed and are negative.    Allergies:  Fentanyl  Versed [Midazolam]    Medications:  Aspirin 81mg   Glucosamine- chondroitin   Metformin   Simvastatin     Past Medical History:     Hyperlipidemia   DM type 2  Peripheral vascular disease  Hypertension   Bilateral carotid artery obstruction   Acute diffuse otitis externa of both ears   Pseudoaneurysm of femoral artery   CAD   Aortic valve stenosis   Left bundle branch block   Syncope   STEMI   Complete heart block   Cardiac pacemaker in situ   Facet arthroplasty  DDD  Lumbosacral  spine stenosis   Right-sided low back pain without sciatica  Alcohol dependence in remission    Occlusion of carotid artery without cerebral infarction     Past Surgical History:    CV heart catheterization   CV temporary pacemaker insertion   Mandible surgery     Family History:    Father- hear disease, alcohol/ drug   Mother - throat and stomach cancer, alcohol/ drug     Social History:  Presents with wife   Presents via private vehicle   PCP: Viet Bingham     Physical Exam     Patient Vitals for the past 24 hrs:   BP Temp Temp src Pulse Resp SpO2   10/19/22 1640 (!) 145/90 -- -- 81 12 --   10/19/22 1630 112/49 -- -- 78 -- 97 %   10/19/22 1620 133/55 -- -- -- -- 97 %   10/19/22 1032 (!) 164/81 98  F (36.7  C) Temporal 74 18 97 %   10/19/22 1029 -- 97.3  F (36.3  C) -- -- -- --       Physical Exam  Nursing notes reviewed. Vitals reviewed.  General: Alert. Well kept.  Eyes:  Conjunctiva non-injected, non-icteric.  Neck/Throat: Moist mucous membranes. Normal voice.  Cardiac: Regular rhythm. Normal heart sounds.  Pulmonary: Clear and equal breath sounds bilaterally.   Abdomen: soft and non-tender  Musculoskeletal:  No midline tenderness to the spine.  Pain over the lumbar spine L4/5 with movement.  Normal movement at the hips/knee/ankles.   Skin:  Warm and dry without rashes.  Psych:  Normal affect.Good eye contact.  Neurological:  Mental: alert and oriented x person and year and at hospital, confused about city, date, year.  Follows commands, no aphasia or dysarthria.   Cranial Nerves:  CN II: visual acuity - Visual fields intact  CN III, IV, VI: EOM intact, pupils equal and reactive  CN V: facial sensation nl  CN VII: face symmetric, no facial droop  CN VIII: hearing normal  CN IX: palate elevation symmetric, uvula at midline  CN XI SCM normal, shoulder shrug nl  CN XII: tongue midline  Motor: Strength:  5/5 strength at the hips/knees/ankles. Normal tone. No abnormal movements. No pronator drift b/l.  Sensation:  Normal sensation throughout the legs.   Gait:    Antalgic gait.     Emergency Department Course   ECG  ECG results from 10/19/22   EKG 12 lead     Value    Systolic Blood Pressure     Diastolic Blood Pressure     Ventricular Rate 70    Atrial Rate 70    NV Interval 326    QRS Duration 162        QTc 464    P Axis 73    R AXIS 0    T Axis 150    Interpretation ECG      Sinus rhythm with sinus arrhythmia with 1st degree A-V block  Left bundle branch block  Abnormal ECG  When compared with ECG of 11-JUN-2021 07:47,  QRS axis Shifted right  T wave inversion more evident in Lateral leads         Imaging:  Head CT w/o contrast   Final Result   IMPRESSION:   1.  No evidence of hemorrhage.   2.  Small right cerebellar infarcts, presumably chronic.   3.  Chronic changes as detailed.         Report per radiology    Laboratory:  Labs Ordered and Resulted from Time of ED Arrival to Time of ED Departure   COMPREHENSIVE METABOLIC PANEL - Abnormal       Result Value    Sodium 139      Potassium 4.3      Chloride 101      Carbon Dioxide (CO2) 27      Anion Gap 11      Urea Nitrogen 28.6 (*)     Creatinine 1.26 (*)     Calcium 9.6      Glucose 165 (*)     Alkaline Phosphatase 106      AST 26      ALT 28      Protein Total 7.2      Albumin 3.9      Bilirubin Total 0.5      GFR Estimate 55 (*)    ACETAMINOPHEN LEVEL - Abnormal    Acetaminophen <5.0 (*)    AMMONIA - Abnormal    Ammonia 12 (*)    ETHYL ALCOHOL LEVEL - Abnormal    Alcohol ethyl <0.01 (*)    TSH WITH FREE T4 REFLEX - Normal    TSH 1.03     CBC WITH PLATELETS AND DIFFERENTIAL    WBC Count 7.8      RBC Count 4.61      Hemoglobin 13.4      Hematocrit 42.2      MCV 92      MCH 29.1      MCHC 31.8      RDW 12.6      Platelet Count 159      % Neutrophils 58      % Lymphocytes 27      % Monocytes 11      % Eosinophils 3      % Basophils 1      % Immature Granulocytes 0      NRBCs per 100 WBC 0      Absolute Neutrophils 4.5      Absolute Lymphocytes 2.1      Absolute  Monocytes 0.8      Absolute Eosinophils 0.3      Absolute Basophils 0.1      Absolute Immature Granulocytes 0.0      Absolute NRBCs 0.0     COVID-19 VIRUS (CORONAVIRUS) BY PCR   TROPONIN T, HIGH SENSITIVITY   ROUTINE UA WITH MICROSCOPIC REFLEX TO CULTURE        Emergency Department Course:  Reviewed:  I reviewed nursing notes, vitals, past medical history and Care Everywhere    Assessments:  1618 I obtained history and examined the patient as noted above.   1800 I rechecked the patient and explained findings.  The patient's wife states she does not feel comfortable taking the patient home due to his abnormal behaviors and intractable pain.    Consults:  1815 I spoke with Larisa Hammer who accepts for Dr. Myers.     Interventions:  Medications   Lidocaine (LIDOCARE) 4 % Patch 1 patch (has no administration in time range)     Disposition:  The patient was admitted to the hospital under the care of Dr. Myers/Lairsa Hammer.  I will sign my patient out to my colleague Dr. Doan, pending Tylenol levels.    Impression & Plan   Medical Decision Making:  Reid Jimenes is a 88 year old male with history of TAVR (2018), pacemaker (2021), hyperlipidemia, DM 2, hypertension, and chronic low back pain who presents with back pain and confusion.  The patient notes no recent falls or injuries but worsening of his low back pain. No indication for repeat imaging of the lumbar spine. I doubt epidural abscess or lesion with no fevers and normal lower extremity neurologic exam.  He had been prescribed oxycodone which was discontinued due to concern for history of alcohol dependence.  He has been taking greater than recommended acetaminophen dosing.  Acetaminophen level returns negative.  Alcohol level negative.  TSH obtained as patient may have gotten into his wife's medications and this does return normal. Patient's wife notes significant altered mental status over the last 5 to 7 days.  CT today shows small right cerebellar  infarcts, presumably chronic, with no areas of stroke noted on previous head CT 1/10/2021.  Patient has no focal weakness, but does have confusion to time, place, and situation.  Patient denies chest pain or shortness of breath and EKG today shows T wave inversion more evident in the lateral leads without acute ischemia.  Troponin pending.  Pending acetaminophen levels, patient was treated with a Lidoderm patch and appears comfortable while laying in bed.  The patient's wife does not feel safe having him return home and is requesting admission for PT evaluation and possible rehab stay.  I spoke with Larisa Hammer, Hospitalist, who graciously excepts for observation admission.  We will sign the patient out to my colleague , pending bed placement.    Diagnosis:    ICD-10-CM    1. Altered mental status  R41.82       2. Bilateral low back pain without sciatica  M54.50       3. Cerebellar infarct (H)  I63.9     Small right cerebellar infarct/infarcts, presumably chronic.        Scribe Disclosure:  I, Zari Hill, am serving as a scribe at 4:18 PM on 10/19/2022 to document services personally performed by Noemy Bueno CNP based on my observations and the provider's statements to me.            [place], Noemy, CNP  10/19/22 3105

## 2022-10-19 NOTE — TELEPHONE ENCOUNTER
Patient's wife calling.  Patient having extreme low back pain and has been acting irrational for the last 5-7 days.    Reports patient has tried to eat his wedding ring, attempted to take his Amox that he has at home for dental procedures (wife thinks patient thinks this medication can help his pain), tried to make coffee this morning and wife found water all over, and he's re-arranging everything - hiding medications.  Patient is not making any sense when he talks.    Was given Norco via urgent care provider 10/10/22 but patient won't take it due to history of chemical dependency and does not want to get addicted to medication.  Takes Tylenol PM ES 2 tabs TID for his back pain.    Has an appointment 11/9/22 for Bilateral L4-5 and L5-S1 facet joint injections but wife cannot wait that long.  She is stressed out and reports its difficult to constantly watch patient.  She will call the pain clinic asap and inform them what has taken place in the last 5-7 days.    What can she do in the mean time?  Should she take patient to the ER? Or have pain clinic give her guidance?    Patient had an appointment with pain clinic 10/14/22.  Last office visit 10/12/22 with primary care provider.    Please advise, thanks.

## 2022-10-20 ENCOUNTER — APPOINTMENT (OUTPATIENT)
Dept: PHYSICAL THERAPY | Facility: CLINIC | Age: 87
End: 2022-10-20
Attending: PHYSICIAN ASSISTANT
Payer: MEDICARE

## 2022-10-20 ENCOUNTER — PATIENT OUTREACH (OUTPATIENT)
Dept: CARE COORDINATION | Facility: CLINIC | Age: 87
End: 2022-10-20

## 2022-10-20 LAB
ANION GAP SERPL CALCULATED.3IONS-SCNC: 9 MMOL/L (ref 7–15)
ATRIAL RATE - MUSE: 70 BPM
BUN SERPL-MCNC: 27.3 MG/DL (ref 8–23)
CALCIUM SERPL-MCNC: 9.3 MG/DL (ref 8.8–10.2)
CHLORIDE SERPL-SCNC: 105 MMOL/L (ref 98–107)
CREAT SERPL-MCNC: 1.31 MG/DL (ref 0.67–1.17)
DEPRECATED HCO3 PLAS-SCNC: 27 MMOL/L (ref 22–29)
DIASTOLIC BLOOD PRESSURE - MUSE: NORMAL MMHG
ERYTHROCYTE [DISTWIDTH] IN BLOOD BY AUTOMATED COUNT: 12.6 % (ref 10–15)
GFR SERPL CREATININE-BSD FRML MDRD: 52 ML/MIN/1.73M2
GLUCOSE BLDC GLUCOMTR-MCNC: 188 MG/DL (ref 70–99)
GLUCOSE BLDC GLUCOMTR-MCNC: 274 MG/DL (ref 70–99)
GLUCOSE BLDC GLUCOMTR-MCNC: 295 MG/DL (ref 70–99)
GLUCOSE BLDC GLUCOMTR-MCNC: 305 MG/DL (ref 70–99)
GLUCOSE SERPL-MCNC: 209 MG/DL (ref 70–99)
HCT VFR BLD AUTO: 41.5 % (ref 40–53)
HGB BLD-MCNC: 13.2 G/DL (ref 13.3–17.7)
INTERPRETATION ECG - MUSE: NORMAL
MCH RBC QN AUTO: 29 PG (ref 26.5–33)
MCHC RBC AUTO-ENTMCNC: 31.8 G/DL (ref 31.5–36.5)
MCV RBC AUTO: 91 FL (ref 78–100)
P AXIS - MUSE: 73 DEGREES
PLATELET # BLD AUTO: 161 10E3/UL (ref 150–450)
POTASSIUM SERPL-SCNC: 4.8 MMOL/L (ref 3.4–5.3)
PR INTERVAL - MUSE: 326 MS
QRS DURATION - MUSE: 162 MS
QT - MUSE: 430 MS
QTC - MUSE: 464 MS
R AXIS - MUSE: 0 DEGREES
RBC # BLD AUTO: 4.55 10E6/UL (ref 4.4–5.9)
SODIUM SERPL-SCNC: 141 MMOL/L (ref 136–145)
SYSTOLIC BLOOD PRESSURE - MUSE: NORMAL MMHG
T AXIS - MUSE: 150 DEGREES
VENTRICULAR RATE- MUSE: 70 BPM
WBC # BLD AUTO: 10.3 10E3/UL (ref 4–11)

## 2022-10-20 PROCEDURE — G0378 HOSPITAL OBSERVATION PER HR: HCPCS

## 2022-10-20 PROCEDURE — 99226 PR SUBSEQUENT OBSERVATION CARE,LEVEL III: CPT | Performed by: HOSPITALIST

## 2022-10-20 PROCEDURE — 250N000013 HC RX MED GY IP 250 OP 250 PS 637: Performed by: STUDENT IN AN ORGANIZED HEALTH CARE EDUCATION/TRAINING PROGRAM

## 2022-10-20 PROCEDURE — 82962 GLUCOSE BLOOD TEST: CPT | Mod: 91

## 2022-10-20 PROCEDURE — 250N000013 HC RX MED GY IP 250 OP 250 PS 637: Performed by: PHYSICIAN ASSISTANT

## 2022-10-20 PROCEDURE — 80048 BASIC METABOLIC PNL TOTAL CA: CPT | Performed by: PHYSICIAN ASSISTANT

## 2022-10-20 PROCEDURE — 250N000013 HC RX MED GY IP 250 OP 250 PS 637: Performed by: HOSPITALIST

## 2022-10-20 PROCEDURE — 97161 PT EVAL LOW COMPLEX 20 MIN: CPT | Mod: GP

## 2022-10-20 PROCEDURE — 36415 COLL VENOUS BLD VENIPUNCTURE: CPT | Performed by: PHYSICIAN ASSISTANT

## 2022-10-20 PROCEDURE — 85027 COMPLETE CBC AUTOMATED: CPT | Performed by: PHYSICIAN ASSISTANT

## 2022-10-20 PROCEDURE — 250N000012 HC RX MED GY IP 250 OP 636 PS 637: Performed by: HOSPITALIST

## 2022-10-20 PROCEDURE — 96372 THER/PROPH/DIAG INJ SC/IM: CPT | Performed by: HOSPITALIST

## 2022-10-20 PROCEDURE — 97530 THERAPEUTIC ACTIVITIES: CPT | Mod: GP

## 2022-10-20 RX ORDER — METFORMIN HCL 500 MG
500 TABLET, EXTENDED RELEASE 24 HR ORAL DAILY
Status: DISCONTINUED | OUTPATIENT
Start: 2022-10-20 | End: 2022-10-21 | Stop reason: HOSPADM

## 2022-10-20 RX ORDER — METHYLPREDNISOLONE 4 MG/1
4 TABLET ORAL AT BEDTIME
Status: DISCONTINUED | OUTPATIENT
Start: 2022-10-22 | End: 2022-10-21 | Stop reason: HOSPADM

## 2022-10-20 RX ORDER — METHYLPREDNISOLONE 4 MG/1
4 TABLET ORAL ONCE
Status: COMPLETED | OUTPATIENT
Start: 2022-10-20 | End: 2022-10-20

## 2022-10-20 RX ORDER — DEXTROSE MONOHYDRATE 25 G/50ML
25-50 INJECTION, SOLUTION INTRAVENOUS
Status: DISCONTINUED | OUTPATIENT
Start: 2022-10-20 | End: 2022-10-21 | Stop reason: HOSPADM

## 2022-10-20 RX ORDER — METHYLPREDNISOLONE 4 MG/1
4 TABLET ORAL
Status: DISCONTINUED | OUTPATIENT
Start: 2022-10-21 | End: 2022-10-21 | Stop reason: HOSPADM

## 2022-10-20 RX ORDER — METHYLPREDNISOLONE 4 MG/1
8 TABLET ORAL AT BEDTIME
Status: DISCONTINUED | OUTPATIENT
Start: 2022-10-20 | End: 2022-10-21 | Stop reason: HOSPADM

## 2022-10-20 RX ORDER — HALOPERIDOL 1 MG/1
2 TABLET ORAL EVERY 6 HOURS PRN
Status: DISCONTINUED | OUTPATIENT
Start: 2022-10-20 | End: 2022-10-21 | Stop reason: HOSPADM

## 2022-10-20 RX ORDER — NICOTINE POLACRILEX 4 MG
15-30 LOZENGE BUCCAL
Status: DISCONTINUED | OUTPATIENT
Start: 2022-10-20 | End: 2022-10-21 | Stop reason: HOSPADM

## 2022-10-20 RX ORDER — METHYLPREDNISOLONE 4 MG/1
8 TABLET ORAL ONCE
Status: COMPLETED | OUTPATIENT
Start: 2022-10-20 | End: 2022-10-20

## 2022-10-20 RX ADMIN — TRAZODONE HYDROCHLORIDE 25 MG: 50 TABLET ORAL at 00:26

## 2022-10-20 RX ADMIN — METHOCARBAMOL 500 MG: 500 TABLET ORAL at 12:20

## 2022-10-20 RX ADMIN — ACETAMINOPHEN 975 MG: 325 TABLET, FILM COATED ORAL at 07:43

## 2022-10-20 RX ADMIN — OXYCODONE HYDROCHLORIDE 2.5 MG: 5 TABLET ORAL at 01:13

## 2022-10-20 RX ADMIN — HALOPERIDOL 2 MG: 1 TABLET ORAL at 02:15

## 2022-10-20 RX ADMIN — DICLOFENAC SODIUM 4 G: 10 GEL TOPICAL at 17:04

## 2022-10-20 RX ADMIN — METFORMIN HYDROCHLORIDE 500 MG: 500 TABLET, EXTENDED RELEASE ORAL at 12:26

## 2022-10-20 RX ADMIN — METHYLPREDNISOLONE 8 MG: 4 TABLET ORAL at 14:07

## 2022-10-20 RX ADMIN — QUETIAPINE FUMARATE 12.5 MG: 25 TABLET ORAL at 01:05

## 2022-10-20 RX ADMIN — INSULIN ASPART 1 UNITS: 100 INJECTION, SOLUTION INTRAVENOUS; SUBCUTANEOUS at 18:24

## 2022-10-20 RX ADMIN — LIDOCAINE 1 PATCH: 246 PATCH TOPICAL at 20:59

## 2022-10-20 RX ADMIN — DICLOFENAC SODIUM 4 G: 10 GEL TOPICAL at 12:20

## 2022-10-20 RX ADMIN — Medication 1 MG: at 21:02

## 2022-10-20 RX ADMIN — ACETAMINOPHEN 975 MG: 325 TABLET, FILM COATED ORAL at 20:58

## 2022-10-20 RX ADMIN — METHYLPREDNISOLONE 4 MG: 4 TABLET ORAL at 14:06

## 2022-10-20 RX ADMIN — METHOCARBAMOL 500 MG: 500 TABLET ORAL at 07:44

## 2022-10-20 RX ADMIN — INSULIN ASPART 4 UNITS: 100 INJECTION, SOLUTION INTRAVENOUS; SUBCUTANEOUS at 14:05

## 2022-10-20 RX ADMIN — Medication 1 MG: at 01:05

## 2022-10-20 RX ADMIN — TRAZODONE HYDROCHLORIDE 25 MG: 50 TABLET ORAL at 21:02

## 2022-10-20 RX ADMIN — DICLOFENAC SODIUM 4 G: 10 GEL TOPICAL at 20:58

## 2022-10-20 RX ADMIN — METHOCARBAMOL 500 MG: 500 TABLET ORAL at 20:58

## 2022-10-20 RX ADMIN — ACETAMINOPHEN 975 MG: 325 TABLET, FILM COATED ORAL at 14:07

## 2022-10-20 RX ADMIN — METHYLPREDNISOLONE 8 MG: 4 TABLET ORAL at 21:02

## 2022-10-20 RX ADMIN — METHOCARBAMOL 500 MG: 500 TABLET ORAL at 17:04

## 2022-10-20 RX ADMIN — OXYCODONE HYDROCHLORIDE 2.5 MG: 5 TABLET ORAL at 07:44

## 2022-10-20 ASSESSMENT — ACTIVITIES OF DAILY LIVING (ADL)
ADLS_ACUITY_SCORE: 36
ADLS_ACUITY_SCORE: 37
ADLS_ACUITY_SCORE: 35
ADLS_ACUITY_SCORE: 35
ADLS_ACUITY_SCORE: 34
ADLS_ACUITY_SCORE: 35
ADLS_ACUITY_SCORE: 35
ADLS_ACUITY_SCORE: 34
ADLS_ACUITY_SCORE: 36
ADLS_ACUITY_SCORE: 34
ADLS_ACUITY_SCORE: 35
ADLS_ACUITY_SCORE: 34

## 2022-10-20 NOTE — PROGRESS NOTES
10/20/22 1626   Appointment Info   Signing Clinician's Name / Credentials (PT) Merle Fairbanks DPT   Living Environment   People in Home spouse   Current Living Arrangements condominium   Home Accessibility no concerns   Transportation Anticipated family or friend will provide   Living Environment Comments Pt lives w/ his wife in a condo, part of [facility], they do not receive any services   Self-Care   Usual Activity Tolerance moderate   Current Activity Tolerance fair   Equipment Currently Used at Home none   Fall history within last six months no   Activity/Exercise/Self-Care Comment Pt furniture surfs at baseline, uses railings in hallway   General Information   Onset of Illness/Injury or Date of Surgery 10/19/22   Referring Physician Barbara Hammer PA-C   Patient/Family Therapy Goals Statement (PT) To get stronger   Pertinent History of Current Problem (include personal factors and/or comorbidities that impact the POC) Per chart: Reid Jimenes is a 88 year old male with a PMH significant for mild cognitive impairment, nonobstructive CAD, pacemaker placement in 2021 due to high-grade conduction system disease, type 2 diabetes, history of severe aortic stenosis s/p TAVR, HTN, HLP, pulmonary hypertension who presents with intractable back pain and altered mental status.   Existing Precautions/Restrictions fall   General Observations Pt supine in bed upon therapist arrival, agreeable to PT   Cognition   Affect/Mental Status (Cognition) confused   Orientation Status (Cognition) oriented to;person   Follows Commands (Cognition) follows one-step commands;75-90% accuracy;repetition of directions required;verbal cues/prompting required   Pain Assessment   Patient Currently in Pain No   Integumentary/Edema   Integumentary/Edema no deficits were identifed   Posture    Posture Forward head position;Protracted shoulders;Kyphosis   Range of Motion (ROM)   Range of Motion ROM is WFL   Strength (Manual Muscle Testing)    Strength (Manual Muscle Testing) Deficits observed during functional mobility   Bed Mobility   Comment, (Bed Mobility) Supine to sit SBA   Transfers   Comment, (Transfers) Sit to stand Min A w/ FWW   Gait/Stairs (Locomotion)   Comment, (Gait/Stairs) Pt amb w/ FWW and CGA   Balance   Balance Comments Fair seated and standing   Sensory Examination   Sensory Perception patient reports no sensory changes   Clinical Impression   Criteria for Skilled Therapeutic Intervention Yes, treatment indicated   PT Diagnosis (PT) Impaired functional mobility and gait   Influenced by the following impairments Pain, weakness, decreased activity tolerance, impaired balance   Functional limitations due to impairments Limited functional mobility requiring AD and assist   Clinical Presentation (PT Evaluation Complexity) Stable/Uncomplicated   Clinical Presentation Rationale Based on PMH, current stauts, and social support   Clinical Decision Making (Complexity) low complexity   Planned Therapy Interventions (PT) balance training;bed mobility training;gait training;patient/family education;strengthening;transfer training;progressive activity/exercise   Anticipated Equipment Needs at Discharge (PT) walker, standard   Risk & Benefits of therapy have been explained evaluation/treatment results reviewed;care plan/treatment goals reviewed;risks/benefits reviewed;current/potential barriers reviewed;participants voiced agreement with care plan;participants included;patient;spouse/significant other   PT Total Evaluation Time   PT Eval, Low Complexity Minutes (12380) 10   Physical Therapy Goals   PT Frequency 5x/week   PT Predicted Duration/Target Date for Goal Attainment 10/27/22   PT Goals Bed Mobility;Transfers;Gait   PT: Bed Mobility Modified independent;Supine to/from sit   PT: Transfers Supervision/stand-by assist;Sit to/from stand;Assistive device   PT: Gait Supervision/stand-by assist;Assistive device;50 feet   Interventions    Interventions Quick Adds Gait Training;Therapeutic Activity   Therapeutic Activity   Therapeutic Activities: dynamic activities to improve functional performance Minutes (52867) 11   Symptoms Noted During/After Treatment None   Treatment Detail/Skilled Intervention Pt performed sit <> stand from toilet, pt able to void. Pt able to don underwear standing w/ FWW and CGA. Pt sat EOB after amb, pt able to scoot higher in bed w/ cues. Pt returned to supine w/ SBA. Discussion w/ pt and wife on d/c recommendation and educated on TCU. Encouraged pt to ambulate w/ nursing and to use AD for mobility. Pt supine in bed at end of session, bed alarm on, wife at side, all needs w/in reach, RN and provider updated.   Gait Training   Gait Training Minutes (83989) 5   Symptoms Noted During/After Treatment (Gait Training) fatigue   Treatment Detail/Skilled Intervention Pt amb 190' w/ FWW and CGA, pt demos fair speed and stability, forward posture, able to partially correct w/ cues from therapist and wife, quickly reverts back. Pt's wife reports pt taking larger steps than normal and not shuffling his feet when using FWW, pt demos good step length.   PT Discharge Planning   PT Plan Progress transfers and amb w/ FWW   PT Discharge Recommendation (DC Rec) Transitional Care Facility;home with assist;home with home care physical therapy   PT Rationale for DC Rec Pt is below baseline, currently Ax1 for mobility. Recommend TCU to increase strength and functional mobility. Pt lives w/ his wife who cannot provide assistance. In order to return home, pt would require 24/7 SBA for bed mob, Min A for transfers, CGA for amb, home PT,  transport for long distance.   PT Brief overview of current status SBA for bed mob, Min A for transfers, CGA for amb   Total Session Time   Timed Code Treatment Minutes 16   Total Session Time (sum of timed and untimed services) 26

## 2022-10-20 NOTE — ED NOTES
[facility]  ED Nurse Handoff Report    Reid Jimenes is a 88 year old male   ED Chief complaint: Back Pain  . ED Diagnosis:   Final diagnoses:   Altered mental status   Bilateral low back pain without sciatica   Cerebellar infarct (H) - Small right cerebellar infarct/infarcts, presumably chronic.     Allergies:   Allergies   Allergen Reactions     Fentanyl Other (See Comments)     Confusion and agitation after PPM     Versed [Midazolam] Other (See Comments)     Confusion and agitation post PPM       Code Status: Full Code  Activity level - Baseline/Home:  Independent. Activity Level - Current:   Stand by Assist. Lift room needed: No. Bariatric: No   Needed: No   Isolation: No. Infection: Not Applicable.     Vital Signs:   Vitals:    10/19/22 1830 10/19/22 1845 10/19/22 1900 10/19/22 1915   BP: (!) 137/99 (!) 200/93 (!) 169/95 (!) 169/84   Pulse: 74 83 78 87   Resp:  21 16 24   Temp:       TempSrc:       SpO2: 96% 98% 98% 97%       Cardiac Rhythm:  ,      Pain level:    Patient confused: Yes. Patient Falls Risk: Yes.   Elimination Status: Has voided   Patient Report - Initial Complaint: Back Pain.   Focused Assessment:   Respiratory (Adult) Airway WDL: WDL   Musculoskeletal (Adult) Musculoskeletal WDL: .WDL except  Additional Documentation: Back Pain Assessment (Group)   Back Pain Assessment Back Pain Location: lumbar  Description/Character: chronic; constant   Neuro Cognitive (Adult) Cognitive/Neuro/Behavioral WDL: .WDL except  Level of Consciousness: confused  Orientation: disoriented to; place; time  Speech: clear; spontaneous  Tests Performed:   Labs Ordered and Resulted from Time of ED Arrival to Time of ED Departure   COMPREHENSIVE METABOLIC PANEL - Abnormal       Result Value    Sodium 139      Potassium 4.3      Chloride 101      Carbon Dioxide (CO2) 27      Anion Gap 11      Urea Nitrogen 28.6 (*)     Creatinine 1.26 (*)     Calcium 9.6      Glucose 165 (*)     Alkaline  Phosphatase 106      AST 26      ALT 28      Protein Total 7.2      Albumin 3.9      Bilirubin Total 0.5      GFR Estimate 55 (*)    ACETAMINOPHEN LEVEL - Abnormal    Acetaminophen <5.0 (*)    AMMONIA - Abnormal    Ammonia 12 (*)    ETHYL ALCOHOL LEVEL - Abnormal    Alcohol ethyl <0.01 (*)    TROPONIN T, HIGH SENSITIVITY - Abnormal    Troponin T, High Sensitivity 34 (*)    ROUTINE UA WITH MICROSCOPIC REFLEX TO CULTURE - Abnormal    Color Urine Light Yellow      Appearance Urine Clear      Glucose Urine Negative      Bilirubin Urine Negative      Ketones Urine Negative      Specific Gravity Urine 1.019      Blood Urine Negative      pH Urine 5.0      Protein Albumin Urine 10 (*)     Urobilinogen Urine Normal      Nitrite Urine Negative      Leukocyte Esterase Urine Negative      Mucus Urine Present (*)     RBC Urine <1      WBC Urine <1      Squamous Epithelials Urine <1      Hyaline Casts Urine 1     COVID-19 VIRUS (CORONAVIRUS) BY PCR - Normal    SARS CoV2 PCR Negative     TSH WITH FREE T4 REFLEX - Normal    TSH 1.03     CBC WITH PLATELETS AND DIFFERENTIAL    WBC Count 7.8      RBC Count 4.61      Hemoglobin 13.4      Hematocrit 42.2      MCV 92      MCH 29.1      MCHC 31.8      RDW 12.6      Platelet Count 159      % Neutrophils 58      % Lymphocytes 27      % Monocytes 11      % Eosinophils 3      % Basophils 1      % Immature Granulocytes 0      NRBCs per 100 WBC 0      Absolute Neutrophils 4.5      Absolute Lymphocytes 2.1      Absolute Monocytes 0.8      Absolute Eosinophils 0.3      Absolute Basophils 0.1      Absolute Immature Granulocytes 0.0      Absolute NRBCs 0.0       Abnormal Results:   Head CT w/o contrast   Final Result   IMPRESSION:   1.  No evidence of hemorrhage.   2.  Small right cerebellar infarcts, presumably chronic.   3.  Chronic changes as detailed.             Treatments provided: See MAR  Family Comments: Wife at bedside  OBS brochure/video discussed/provided to patient:  Yes  ED  Medications:   Medications   Lidocaine (LIDOCARE) 4 % Patch 1 patch (1 patch Transdermal Patch/Med Applied 10/19/22 8201)     Drips infusing:  No  For the majority of the shift, the patient's behavior Green. Interventions performed were N/A.    Sepsis treatment initiated: No     Patient tested for COVID 19 prior to admission: YES    ED Nurse Name/Phone Number: Montserrat Mendez RN,   7:18 PM    RECEIVING UNIT ED HANDOFF REVIEW    Above ED Nurse Handoff Report was reviewed: Yes  Reviewed by: Zaida Cisse RN on October 19, 2022 at 9:49 PM

## 2022-10-20 NOTE — PHARMACY-ADMISSION MEDICATION HISTORY
Admission medication history interview status for this patient is complete. See [facility] admission navigator for allergy information, prior to admission medications and immunization status.     Medication history interview done, indicate source(s): Patient  Medication history resources (including written lists, pill bottles, clinic record):[employer]  Pharmacy: [facility], [place]    Changes made to PTA medication list:  Added: n/a  Changed: metformin  bid --> 500 qday  Reported as Not Taking: simvastatin (ran out 2 months ago)  Removed: biotin    Actions taken by pharmacist (provider contacted, etc):None     Additional medication history information:None    Medication reconciliation/reorder completed by provider prior to medication history?  N   (Y/N)     Prior to Admission medications    Medication Sig Last Dose Taking? Auth Provider Long Term End Date   ascorbic acid 500 MG TABS Take 500 mg by mouth Takes off and on 10/19/2022 at am Yes Reported, Patient     aspirin 81 MG EC tablet Take 1 tablet (81 mg) by mouth daily 10/19/2022 at am Yes Lizz Love APRN CNP     glucosamine-chondroitin 500-400 MG CAPS per capsule Take 1 capsule by mouth Takes off and on 10/19/2022 at am Yes Unknown, Entered By History     metFORMIN (GLUCOPHAGE XR) 500 MG 24 hr tablet Take 1 tablet (500 mg) by mouth 2 times daily (with meals)  Patient taking differently: Take 500 mg by mouth daily 10/19/2022 Yes Viet Bingham MD Yes    Multiple Vitamins-Iron (MULTIVITAMIN/IRON PO) Take 1 tablet by mouth Takes off and on 10/19/2022 at am Yes Reported, Patient     simvastatin (ZOCOR) 20 MG tablet Take 1 tablet (20 mg) by mouth At Bedtime More than a month at ran out 2 months ago Yes Viet Bingham MD Yes    blood glucose (MIRA CONTOUR NEXT) test strip Use to test blood sugar one time daily or as directed.   Viet Bingham MD     blood glucose monitoring (MIRA MICROLET) lancets Use to test blood sugar 1 times daily or as directed.    Viet Bingham MD

## 2022-10-20 NOTE — CONSULTS
Care Management Initial Consult    General Information  Assessment completed with: Spouse or significant other, Patient, Wife Alyssa [phone].  Type of CM/SW Visit: Offer D/C Planning    Primary Care Provider verified and updated as needed: Yes   Readmission within the last 30 days:     Reason for Consult: discharge planning  Advance Care Planning: Advance Care Planning Reviewed: present on chart        Communication Assessment  Patient's communication style: spoken language (English or Bilingual)    Hearing Difficulty or Deaf: yes      Cognitive  Cognitive/Neuro/Behavioral: .WDL except, orientation  Level of Consciousness: confused  Arousal Level: opens eyes spontaneously  Orientation: disoriented to, place, time, situation  Mood/Behavior: cooperative  Best Language: 0 - No aphasia  Speech: spontaneous    Living Environment:   People in home: facility resident, spouse     Current living Arrangements: independent living facility      Able to return to prior arrangements: yes     Family/Social Support:  Care provided by: self  Provides care for: no one, unable/limited ability to care for self  Marital Status:   Facility resident(s)/Staff          Description of Support System: Supportive, Involved       Current Resources:   Patient receiving home care services: No  Equipment currently used at home: None    Employment/Financial:  Employment Status: retired     Financial Concerns: insurance, none   Referral to Financial Worker: No     Lifestyle & Psychosocial Needs:  Social Determinants of Health     Tobacco Use: Medium Risk     Smoking Tobacco Use: Former     Smokeless Tobacco Use: Former     Passive Exposure: Not on file   Alcohol Use: Not At Risk     Frequency of Alcohol Consumption: Never     Average Number of Drinks: Not on file     Frequency of Binge Drinking: Never   Financial Resource Strain: Low Risk      Difficulty of Paying Living Expenses: Not hard at all   Food Insecurity: No Food Insecurity      "Worried About Running Out of Food in the Last Year: Never true     Ran Out of Food in the Last Year: Never true   Transportation Needs: No Transportation Needs     Lack of Transportation (Medical): No     Lack of Transportation (Non-Medical): No   Physical Activity: Inactive     Days of Exercise per Week: 0 days     Minutes of Exercise per Session: 0 min   Stress: No Stress Concern Present     Feeling of Stress : Not at all   Social Connections: Socially Integrated     Frequency of Communication with Friends and Family: Twice a week     Frequency of Social Gatherings with Friends and Family: Twice a week     Attends Islam Services: More than 4 times per year     Active Member of Clubs or Organizations: Yes     Attends Club or Organization Meetings: Never     Marital Status:    Intimate Partner Violence: Not At Risk     Fear of Current or Ex-Partner: No     Emotionally Abused: No     Physically Abused: No     Sexually Abused: No   Depression: Not at risk     PHQ-2 Score: 2   Housing Stability: Low Risk      Unable to Pay for Housing in the Last Year: No     Number of Places Lived in the Last Year: 1     Unstable Housing in the Last Year: No     Functional Status:  Prior to admission patient needed assistance: Patient was admitted under observation status for back pain. Patient has a Hx of dementia. SW met with pt briefly. Updated that SW would call wife to complete assessment.     Wife reports that pt and wife reside at [facility]. They do not receive any outside services. Pt typically is able to ambulate independently, sometimes \"furniture surfing\". Pt is not currently open to any HC or outside services. SW reviewed HORTA letter with wife.     PT eval pending.     Mental Health Status:  Mental Health Status: No Current Concerns       Chemical Dependency Status:  Chemical Dependency Status: No Current Concerns           Values/Beliefs:  Spiritual, Cultural Beliefs, Islam Practices, Values that " affect care: Yes           Additional Information:  Plan: Pending PT eval. Wife hopeful pt can return back to ILF at discharge. SW will continue to follow.     MIKE Basilio, [facility]   Inpatient Care Coordination  [facility]   [phone]

## 2022-10-20 NOTE — PLAN OF CARE
"    PRIMARY DIAGNOSIS: ALTERED MENTAL STATUS/BACK PAIN  OUTPATIENT/OBSERVATION GOALS TO BE MET BEFORE DISCHARGE:  ADLs back to baseline: No, continues to be weak     Activity and level of assistance: Ax2 for increased safety, moved Ax1 pivot overnight per staff     Pain status: Improved-controlled with oral pain medications.     Return to near baseline physical activity: No          Discharge Planner Nurse   Safe discharge environment identified: lives indep with wife  Barriers to discharge: Not once medically cleared.          Entered by: paula Olivares RN    /74 (BP Location: Right arm)   Pulse 82   Temp 98.6  F (37  C) (Axillary)   Resp 18   Ht 1.651 m (5' 5\")   Wt 77.4 kg (170 lb 9.6 oz)   SpO2 94%   BMI 28.39 kg/m      Vitals stable. Pt alert to self. Very pleasantly confused. Reports low and mid back pain with movement, scheduled and PRN oxy given. No aggressive behavior noted this am. Has a sitter for safety. Pt calm and cooperative. Plan for MARVEL today and PT/SW to see patient. Will continue to provide supportive cares.     Please review provider order for any additional goals.   Nurse to notify provider when observation goals have been met and patient is ready for discharge.  "

## 2022-10-20 NOTE — PLAN OF CARE
PRIMARY DIAGNOSIS: ALTERED MENTAL STATUS/BACK PAIN  OUTPATIENT/OBSERVATION GOALS TO BE MET BEFORE DISCHARGE:  ADLs back to baseline: No    Activity and level of assistance: A2    Pain status: Improved-controlled with oral pain medications.    Return to near baseline physical activity: No     Discharge Planner Nurse   Safe discharge environment identified: No  Barriers to discharge: Yes       Entered by: Rody Young RN 10/20/2022    A&O to self only, confused. VSS. Pt up w/ A2. iPad monitoring. [place]. Pt given bedtime trazodone. Pt restless, swinging feet out of bed and attempting to exit and is setting off bed alarm regularly. Not redirectable. [id number]: Pt given oxycodone for back pain. Pt also given prn melatonin and seroquel for sleeping aid and agitation. [id number]: Medication interventions at this time ineffective. Attempted to distract pt by offering food, toileting, word search, and brought pt up to charting area. Pt still not redirectable and insistent on going home, pt confused on situation. MD paged, PO haldol order placed and given. Pt became aggressive, swung at staff member. Staff were able to get pt back to bed around 2:30 AM. Pt fell asleep around 3:15-3:30 AM. Pt has been asleep since.   Please review provider order for any additional goals.   Nurse to notify provider when observation goals have been met and patient is ready for discharge.

## 2022-10-20 NOTE — PLAN OF CARE
ROOM # 230    Living Situation (if not independent, order SW consult):  Facility name: Apartment,  Home w/ wife  : [facility]ivid[facility] (dtr)    Activity level at baseline: Ind  Activity level on admit: Ax1-2 gb    Who will be transporting you at discharge: Natividad (dtr) or Lizz (dtr)    Patient registered to observation; given Patient Bill of Rights; given the opportunity to ask questions about observation status and their plan of care.  Patient has been oriented to the observation room, bathroom and call light is in place.    Discussed discharge goals and expectations with patient/family.

## 2022-10-20 NOTE — PLAN OF CARE
"   PRIMARY DIAGNOSIS: ALTERED MENTAL STATUS/BACK PAIN  OUTPATIENT/OBSERVATION GOALS TO BE MET BEFORE DISCHARGE:  ADLs back to baseline: Ax1, moving well     Activity and level of assistance: Ax1     Pain status: Improved-controlled with oral pain medications.     Return to near baseline physical activity: No          Discharge Planner Nurse   Safe discharge environment identified: lives indep with wife  Barriers to discharge: safe discharge plan          Entered by: paula Olivares RN     /61 (BP Location: Right arm)   Pulse 70   Temp 98  F (36.7  C) (Axillary)   Resp 18   Ht 1.651 m (5' 5\")   Wt 77.4 kg (170 lb 9.6 oz)   SpO2 95%   BMI 28.39 kg/m      Vitals stable. Pt alert to self. Very pleasantly confused. Reports low and mid back pain with movement, scheduled medication given. Has a sitter for safety. Pt continues to be calm and cooperative. Plan for MARVEL today and PT/SW to see patient. Will continue to provide supportive cares.      Please review provider order for any additional goals.   Nurse to notify provider when observation goals have been met and patient is ready for discharge.            "

## 2022-10-20 NOTE — PLAN OF CARE
PRIMARY DIAGNOSIS: ALTERED MENTAL STATUS/BACK PAIN  OUTPATIENT/OBSERVATION GOALS TO BE MET BEFORE DISCHARGE:  1. ADLs back to baseline: No    2. Activity and level of assistance: A2    3. Pain status: Improved-controlled with oral pain medications.    4. Return to near baseline physical activity: No     Discharge Planner Nurse   Safe discharge environment identified: No  Barriers to discharge: Yes       Entered by: Rody Young RN 10/20/2022    A&O to self only, confused. VSS. Pt up w/ A2. iPad monitoring. [place]. Pt given bedtime trazodone. Pt restless, swinging feet out of bed and attempting to exit and is setting off bed alarm regularly. Not redirectable. [id number]: Pt given oxycodone for back pain. Pt also given prn melatonin and seroquel for sleeping aid and agitation. [id number]: Medication interventions at this time ineffective. Attempted to distract pt by offering food, toileting, word search, and brought pt up to charting area. Pt still not redirectable and insistent on going home, pt confused on situation. MD paged, PO haldol order placed and given. Pt became aggressive, swung at staff member. Staff were able to get pt back to bed around 2:30 AM. Pt fell asleep around 3:15-3:30 AM. Pt has been asleep since.   Please review provider order for any additional goals.   Nurse to notify provider when observation goals have been met and patient is ready for discharge.

## 2022-10-20 NOTE — PLAN OF CARE
"PRIMARY DIAGNOSIS: ALTERED MENTAL STATUS/BACK PAIN  OUTPATIENT/OBSERVATION GOALS TO BE MET BEFORE DISCHARGE:  ADLs back to baseline: Ax1, moving well     Activity and level of assistance: Ax1     Pain status: Improved-controlled with oral pain medications.     Return to near baseline physical activity: No          Discharge Planner Nurse   Safe discharge environment identified: lives indep with wife  Barriers to discharge: safe discharge plan          Entered by: paula Olivares RN     /57 (BP Location: Right arm)   Pulse 76   Temp 98.3  F (36.8  C) (Axillary)   Resp 16   Ht 1.651 m (5' 5\")   Wt 77.4 kg (170 lb 9.6 oz)   SpO2 93%   BMI 28.39 kg/m         Vitals stable. Pt alert to self. Very pleasantly confused. Reports low and mid back pain with movement, scheduled medication given. Pt continues to be calm and cooperative, sitter removed at 1300. Plan for medrol dose pack with outpatient MARVEL. PT recommending TCU. SW consulted for safe discharge plan. Will continue to provide supportive cares.      Please review provider order for any additional goals.   Nurse to notify provider when observation goals have been met and patient is ready for discharge.              "

## 2022-10-20 NOTE — PROGRESS NOTES
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    Intractable back pain     - history of lumbar spondylosis and lumbar spinal stenosis    - last imaging in March showed severe asymmetrical degenerative change with scoliosis and multiple stenotic areas    - was evaluated by Dr. Gomes who is a pain specialist with [facility] spine who felt symptoms were most consistent with worsening facet disease with recommendation for lumbar facet injections    - previously was given oxy as outpt, but patient has history of addiction, so patient switched to Tylenol (was taking 1000mg 6 times per day)    - per there ED provider he has a bilateral L4-L5 and L5-S1 facet joint injection scheduled for 11/9    - will order MARVEL here    - pain control with tylenol, robaxin, PRN low dose Oxy    - patient was able to ambulate with a walker this am. Wife states at home he has been wall/furniture walking    - will have PT assess after MARVEL    Altered mental status suspect metabolic encephalopathy related insomnia/pain    - wife reports altered mental status such as taking off his ring and trying to eat it and taking any medicines he can find including her medicines in the cabinet    - reports difficulty sleeping most nights due to pain    - CT head negative, electrolyte panel and LFTs normal    - UA does not appear infected    - unable to do MRI brain due to pacemaker but CT scan does show evidence of cerebellar infarcts that are new since imaging done in 2020    - patient is improved this am    DM    -  "HbA1c was 7.1 in July    - cont metformin    - ISS    HTN    - not on meds    - monitor    HLP    - on simvastatin    History of cerebellar infarcts    - no history of stroke per wife but does have known internal carotid disease on the left side    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    Morales Myers MD  Hospitalist Service  [facility]  Securely message with the Vocera Web Console (learn more here)  Text page via [employer] Paging/Directory         Clinically Significant Risk Factors Present on Admission                      # Overweight: Estimated body mass index is 28.39 kg/m  as calculated from the following:    Height as of this encounter: 1.651 m (5' 5\").    Weight as of this encounter: 77.4 kg (170 lb 9.6 oz).         ___________________________________________________________________    Interval History   Patient walking in the halls. States back pain is still present but is better than yesterday. Can tell me he is in a hospital. He knows his name and his wife's name. No other complaints    Data reviewed today: I reviewed all medications, new labs and imaging results over the last 24 hours. I personally reviewed no images or EKG's today.    Physical Exam   Vital Signs: Temp: 98.6  F (37  C) Temp src: Axillary BP: 110/74 Pulse: 82   Resp: 18 SpO2: 94 % O2 Device: None (Room air)    Weight: 170 lbs 9.6 oz  Constitutional: awake, alert, cooperative, no apparent " distress, and appears stated age  Eyes: Lids and lashes normal, pupils equal, round and reactive to light, extra ocular muscles intact, sclera clear, conjunctiva normal  ENT: Normocephalic, without obvious abnormality, atraumatic, sinuses nontender on palpation, external ears without lesions, oral pharynx with moist mucous membranes, tonsils without erythema or exudates, gums normal and good dentition.  Respiratory: No increased work of breathing, good air exchange, clear to auscultation bilaterally, no crackles or wheezing  Cardiovascular: Normal apical impulse, regular rate and rhythm, normal S1 and S2, no S3 or S4, and no murmur noted  GI: No scars, normal bowel sounds, soft, non-distended, non-tender, no masses palpated, no hepatosplenomegally  Skin: no bruising or bleeding    Data   Recent Labs   Lab 10/20/22  0702 10/19/22  2248 10/19/22  1643   WBC 10.3  --  7.8   HGB 13.2*  --  13.4   MCV 91  --  92     --  159     --  139   POTASSIUM 4.8  --  4.3   CHLORIDE 105  --  101   CO2 27  --  27   BUN 27.3*  --  28.6*   CR 1.31*  --  1.26*   ANIONGAP 9  --  11   DEEPTI 9.3  --  9.6   * 315* 165*   ALBUMIN  --   --  3.9   PROTTOTAL  --   --  7.2   BILITOTAL  --   --  0.5   ALKPHOS  --   --  106   ALT  --   --  28   AST  --   --  26     Recent Results (from the past 24 hour(s))   Head CT w/o contrast    Narrative    EXAM: CT HEAD W/O CONTRAST  LOCATION: [facility]  DATE/TIME: 10/19/2022 5:38 PM    INDICATION: Confusion.    COMPARISON: None.    TECHNIQUE: Routine CT Head without IV contrast. Multiplanar reformats. Dose reduction techniques were used.    FINDINGS: No evidence of hemorrhage. Moderate generalized parenchymal volume loss is present with confluent areas of white matter hypoattenuation which likely represent chronic small vessel ischemic change. Small right cerebellar infarct/infarcts,   presumably chronic. No acute osseous abnormality.      Impression     IMPRESSION:  1.  No evidence of hemorrhage.  2.  Small right cerebellar infarcts, presumably chronic.  3.  Chronic changes as detailed.

## 2022-10-20 NOTE — PROGRESS NOTES
Clinic Care Coordination Contact    Situation: Patient chart reviewed by care coordinator.    Background: Primary Care Coordination referral placed yesterday 10/19/22 while patient was in the ED, for in home support and care giver resources.    Assessment: Patient is currently admitted to [facility].    Plan/Recommendations: Per standard workflow Primary Care Coordination referral closed. Chart routed to IP Care Coordination. A new Primary Care Coordination referral can be placed upon discharge.     Magy Liz RN Care Coordinator  [facility]  Email: [email or web address]  Phone: [phone]

## 2022-10-20 NOTE — PHARMACY-CONSULT NOTE
Pharmacy Delirium Chart Review    Upon chart review, no medications current and prior to admission are noted for possible patient delirium: Will continue to monitor and assess as medications are ordered.

## 2022-10-20 NOTE — PLAN OF CARE
PRIMARY DIAGNOSIS: AMS / BACK PAIN  OUTPATIENT/OBSERVATION GOALS TO BE MET BEFORE DISCHARGE:  1. Pain Status: Denies pain at rest.     2. Return to near baseline physical activity: No    3. Cleared for discharge by consultants (if involved): No    Discharge Planner Nurse   Safe discharge environment identified: No  Barriers to discharge: Yes       Entered by: Zaida Cisse RN 10/19/2022 10:55 PM      Patient is Disorientated to, Time, Place and Situation. Assist x2 with Gait Belt.  Pt is a Regular diet.  Denying pain at rest.  Patient is Saline locked. Plan is pain management and PT/SW consults. Do not wake up overnight per MD notes. Continue to monitor.     Please review provider order for any additional goals.   Nurse to notify provider when observation goals have been met and patient is ready for discharge.

## 2022-10-20 NOTE — PROVIDER NOTIFICATION
Paged MD:    Pt pleasantly confused, but very insistent on wanting to leave. Pt setting bed alarm frequently. All PRNs have been given. Now at the charting station in a wheel chair, but still not redirectable. Is there anything else to give?

## 2022-10-21 ENCOUNTER — APPOINTMENT (OUTPATIENT)
Dept: PHYSICAL THERAPY | Facility: CLINIC | Age: 87
End: 2022-10-21
Payer: MEDICARE

## 2022-10-21 VITALS
HEIGHT: 65 IN | DIASTOLIC BLOOD PRESSURE: 66 MMHG | TEMPERATURE: 97.6 F | SYSTOLIC BLOOD PRESSURE: 147 MMHG | BODY MASS INDEX: 28.42 KG/M2 | HEART RATE: 72 BPM | RESPIRATION RATE: 18 BRPM | WEIGHT: 170.6 LBS | OXYGEN SATURATION: 94 %

## 2022-10-21 LAB
ATRIAL RATE - MUSE: 65 BPM
DIASTOLIC BLOOD PRESSURE - MUSE: NORMAL MMHG
GLUCOSE BLDC GLUCOMTR-MCNC: 222 MG/DL (ref 70–99)
GLUCOSE BLDC GLUCOMTR-MCNC: 224 MG/DL (ref 70–99)
GLUCOSE BLDC GLUCOMTR-MCNC: 232 MG/DL (ref 70–99)
GLUCOSE BLDC GLUCOMTR-MCNC: 297 MG/DL (ref 70–99)
INTERPRETATION ECG - MUSE: NORMAL
MDC_IDC_EPISODE_DTM: NORMAL
MDC_IDC_EPISODE_ID: NORMAL
MDC_IDC_EPISODE_TYPE: NORMAL
MDC_IDC_LEAD_IMPLANT_DT: NORMAL
MDC_IDC_LEAD_IMPLANT_DT: NORMAL
MDC_IDC_LEAD_LOCATION: NORMAL
MDC_IDC_LEAD_LOCATION: NORMAL
MDC_IDC_LEAD_LOCATION_DETAIL_1: NORMAL
MDC_IDC_LEAD_LOCATION_DETAIL_1: NORMAL
MDC_IDC_LEAD_MFG: NORMAL
MDC_IDC_LEAD_MFG: NORMAL
MDC_IDC_LEAD_MODEL: NORMAL
MDC_IDC_LEAD_MODEL: NORMAL
MDC_IDC_LEAD_POLARITY_TYPE: NORMAL
MDC_IDC_LEAD_POLARITY_TYPE: NORMAL
MDC_IDC_LEAD_SERIAL: NORMAL
MDC_IDC_LEAD_SERIAL: NORMAL
MDC_IDC_MSMT_BATTERY_DTM: NORMAL
MDC_IDC_MSMT_BATTERY_REMAINING_LONGEVITY: 90 MO
MDC_IDC_MSMT_BATTERY_REMAINING_PERCENTAGE: 100 %
MDC_IDC_MSMT_BATTERY_STATUS: NORMAL
MDC_IDC_MSMT_LEADCHNL_RA_IMPEDANCE_VALUE: 746 OHM
MDC_IDC_MSMT_LEADCHNL_RA_PACING_THRESHOLD_AMPLITUDE: 0.9 V
MDC_IDC_MSMT_LEADCHNL_RA_PACING_THRESHOLD_PULSEWIDTH: 0.4 MS
MDC_IDC_MSMT_LEADCHNL_RV_IMPEDANCE_VALUE: 656 OHM
MDC_IDC_MSMT_LEADCHNL_RV_PACING_THRESHOLD_AMPLITUDE: 0.8 V
MDC_IDC_MSMT_LEADCHNL_RV_PACING_THRESHOLD_PULSEWIDTH: 0.4 MS
MDC_IDC_PG_IMPLANT_DTM: NORMAL
MDC_IDC_PG_MFG: NORMAL
MDC_IDC_PG_MODEL: NORMAL
MDC_IDC_PG_SERIAL: NORMAL
MDC_IDC_PG_TYPE: NORMAL
MDC_IDC_SESS_CLINIC_NAME: NORMAL
MDC_IDC_SESS_DTM: NORMAL
MDC_IDC_SESS_TYPE: NORMAL
MDC_IDC_SET_BRADY_AT_MODE_SWITCH_MODE: NORMAL
MDC_IDC_SET_BRADY_AT_MODE_SWITCH_RATE: 170 {BEATS}/MIN
MDC_IDC_SET_BRADY_LOWRATE: 60 {BEATS}/MIN
MDC_IDC_SET_BRADY_MAX_SENSOR_RATE: 130 {BEATS}/MIN
MDC_IDC_SET_BRADY_MAX_TRACKING_RATE: 130 {BEATS}/MIN
MDC_IDC_SET_BRADY_MODE: NORMAL
MDC_IDC_SET_BRADY_PAV_DELAY_HIGH: 150 MS
MDC_IDC_SET_BRADY_PAV_DELAY_LOW: 200 MS
MDC_IDC_SET_BRADY_SAV_DELAY_HIGH: 150 MS
MDC_IDC_SET_BRADY_SAV_DELAY_LOW: 200 MS
MDC_IDC_SET_LEADCHNL_RA_PACING_AMPLITUDE: 2 V
MDC_IDC_SET_LEADCHNL_RA_PACING_CAPTURE_MODE: NORMAL
MDC_IDC_SET_LEADCHNL_RA_PACING_POLARITY: NORMAL
MDC_IDC_SET_LEADCHNL_RA_PACING_PULSEWIDTH: 0.4 MS
MDC_IDC_SET_LEADCHNL_RA_SENSING_ADAPTATION_MODE: NORMAL
MDC_IDC_SET_LEADCHNL_RA_SENSING_POLARITY: NORMAL
MDC_IDC_SET_LEADCHNL_RA_SENSING_SENSITIVITY: 0.25 MV
MDC_IDC_SET_LEADCHNL_RV_PACING_AMPLITUDE: 1.4 V
MDC_IDC_SET_LEADCHNL_RV_PACING_CAPTURE_MODE: NORMAL
MDC_IDC_SET_LEADCHNL_RV_PACING_POLARITY: NORMAL
MDC_IDC_SET_LEADCHNL_RV_PACING_PULSEWIDTH: 0.4 MS
MDC_IDC_SET_LEADCHNL_RV_SENSING_ADAPTATION_MODE: NORMAL
MDC_IDC_SET_LEADCHNL_RV_SENSING_POLARITY: NORMAL
MDC_IDC_SET_LEADCHNL_RV_SENSING_SENSITIVITY: 1.5 MV
MDC_IDC_SET_ZONE_DETECTION_INTERVAL: 375 MS
MDC_IDC_SET_ZONE_TYPE: NORMAL
MDC_IDC_SET_ZONE_VENDOR_TYPE: NORMAL
MDC_IDC_STAT_AT_BURDEN_PERCENT: 0 %
MDC_IDC_STAT_AT_DTM_END: NORMAL
MDC_IDC_STAT_AT_DTM_START: NORMAL
MDC_IDC_STAT_BRADY_DTM_END: NORMAL
MDC_IDC_STAT_BRADY_DTM_START: NORMAL
MDC_IDC_STAT_BRADY_RA_PERCENT_PACED: 29 %
MDC_IDC_STAT_BRADY_RV_PERCENT_PACED: 34 %
MDC_IDC_STAT_EPISODE_RECENT_COUNT: 0
MDC_IDC_STAT_EPISODE_RECENT_COUNT_DTM_END: NORMAL
MDC_IDC_STAT_EPISODE_RECENT_COUNT_DTM_START: NORMAL
MDC_IDC_STAT_EPISODE_TYPE: NORMAL
MDC_IDC_STAT_EPISODE_VENDOR_TYPE: NORMAL
P AXIS - MUSE: 65 DEGREES
PR INTERVAL - MUSE: 304 MS
QRS DURATION - MUSE: 160 MS
QT - MUSE: 460 MS
QTC - MUSE: 478 MS
R AXIS - MUSE: -16 DEGREES
SYSTOLIC BLOOD PRESSURE - MUSE: NORMAL MMHG
T AXIS - MUSE: 141 DEGREES
VENTRICULAR RATE- MUSE: 65 BPM

## 2022-10-21 PROCEDURE — 250N000012 HC RX MED GY IP 250 OP 636 PS 637: Performed by: HOSPITALIST

## 2022-10-21 PROCEDURE — 250N000013 HC RX MED GY IP 250 OP 250 PS 637: Performed by: HOSPITALIST

## 2022-10-21 PROCEDURE — 99207 PR NO BILLABLE SERVICE THIS VISIT: CPT | Performed by: HOSPITALIST

## 2022-10-21 PROCEDURE — 250N000013 HC RX MED GY IP 250 OP 250 PS 637: Performed by: PHYSICIAN ASSISTANT

## 2022-10-21 PROCEDURE — 97530 THERAPEUTIC ACTIVITIES: CPT | Mod: GP

## 2022-10-21 PROCEDURE — 99217 PR OBSERVATION CARE DISCHARGE: CPT | Performed by: HOSPITALIST

## 2022-10-21 PROCEDURE — 93010 ELECTROCARDIOGRAM REPORT: CPT | Performed by: INTERNAL MEDICINE

## 2022-10-21 PROCEDURE — 96372 THER/PROPH/DIAG INJ SC/IM: CPT

## 2022-10-21 PROCEDURE — G0378 HOSPITAL OBSERVATION PER HR: HCPCS

## 2022-10-21 PROCEDURE — 82962 GLUCOSE BLOOD TEST: CPT

## 2022-10-21 RX ORDER — ACETAMINOPHEN 325 MG/1
975 TABLET ORAL 3 TIMES DAILY
DISCHARGE
Start: 2022-10-21 | End: 2022-10-24

## 2022-10-21 RX ORDER — METHYLPREDNISOLONE 4 MG/1
4 TABLET ORAL
DISCHARGE
Start: 2022-10-21 | End: 2022-11-01

## 2022-10-21 RX ORDER — LIDOCAINE 4 G/G
1-3 PATCH TOPICAL EVERY 24 HOURS
Status: ON HOLD | DISCHARGE
Start: 2022-10-21 | End: 2022-11-15

## 2022-10-21 RX ORDER — METHYLPREDNISOLONE 8 MG/1
8 TABLET ORAL AT BEDTIME
DISCHARGE
Start: 2022-10-21 | End: 2022-11-01

## 2022-10-21 RX ORDER — METHOCARBAMOL 500 MG/1
500 TABLET, FILM COATED ORAL 4 TIMES DAILY PRN
Status: ON HOLD | DISCHARGE
Start: 2022-10-21 | End: 2022-11-15

## 2022-10-21 RX ORDER — METHYLPREDNISOLONE 4 MG/1
4 TABLET ORAL AT BEDTIME
DISCHARGE
Start: 2022-10-22 | End: 2022-11-01

## 2022-10-21 RX ORDER — METHYLPREDNISOLONE 4 MG/1
4 TABLET ORAL
DISCHARGE
Start: 2022-10-22 | End: 2022-11-01

## 2022-10-21 RX ORDER — AMOXICILLIN 250 MG
1 CAPSULE ORAL 2 TIMES DAILY PRN
DISCHARGE
Start: 2022-10-21 | End: 2024-01-09

## 2022-10-21 RX ORDER — TRAZODONE HYDROCHLORIDE 50 MG/1
25 TABLET, FILM COATED ORAL
Qty: 10 TABLET | Refills: 0 | Status: SHIPPED | OUTPATIENT
Start: 2022-10-21 | End: 2022-11-18

## 2022-10-21 RX ADMIN — DICLOFENAC SODIUM 4 G: 10 GEL TOPICAL at 08:02

## 2022-10-21 RX ADMIN — METHOCARBAMOL 500 MG: 500 TABLET ORAL at 11:20

## 2022-10-21 RX ADMIN — METHOCARBAMOL 500 MG: 500 TABLET ORAL at 16:49

## 2022-10-21 RX ADMIN — METHYLPREDNISOLONE 4 MG: 4 TABLET ORAL at 16:50

## 2022-10-21 RX ADMIN — METHYLPREDNISOLONE 4 MG: 4 TABLET ORAL at 06:53

## 2022-10-21 RX ADMIN — INSULIN ASPART 2 UNITS: 100 INJECTION, SOLUTION INTRAVENOUS; SUBCUTANEOUS at 07:58

## 2022-10-21 RX ADMIN — ACETAMINOPHEN 975 MG: 325 TABLET, FILM COATED ORAL at 08:02

## 2022-10-21 RX ADMIN — METHYLPREDNISOLONE 4 MG: 4 TABLET ORAL at 11:20

## 2022-10-21 RX ADMIN — INSULIN ASPART 4 UNITS: 100 INJECTION, SOLUTION INTRAVENOUS; SUBCUTANEOUS at 11:20

## 2022-10-21 RX ADMIN — METFORMIN HYDROCHLORIDE 500 MG: 500 TABLET, EXTENDED RELEASE ORAL at 08:02

## 2022-10-21 RX ADMIN — INSULIN ASPART 2 UNITS: 100 INJECTION, SOLUTION INTRAVENOUS; SUBCUTANEOUS at 16:53

## 2022-10-21 RX ADMIN — DICLOFENAC SODIUM 4 G: 10 GEL TOPICAL at 11:24

## 2022-10-21 RX ADMIN — METHOCARBAMOL 500 MG: 500 TABLET ORAL at 08:02

## 2022-10-21 ASSESSMENT — ACTIVITIES OF DAILY LIVING (ADL)
ADLS_ACUITY_SCORE: 37

## 2022-10-21 NOTE — PLAN OF CARE
PRIMARY DIAGNOSIS: AMS AND BACK PAIN   OUTPATIENT/OBSERVATION GOALS TO BE MET BEFORE DISCHARGE:  1. ADLs back to baseline: Yes    2. Activity and level of assistance: x1 assist    3. Pain status: Pain free.    4. Return to near baseline physical activity: Yes     Discharge Planner Nurse   Safe discharge environment identified: No  Barriers to discharge: Yes       Entered by: Yun Kline RN 10/21/2022 12:16 PM     Please review provider order for any additional goals. Nurse to notify provider when observation goals have been met and patient is ready for discharge.    Alert and oriented to self, time, and place. Intermittently confused. Denies pain. Up with 1 assist, walker, gait belt. Right IV site saline locked. Glucose checks ACHS.

## 2022-10-21 NOTE — DISCHARGE SUMMARY
[facility]  Hospitalist Discharge Summary      Date of Admission:  10/19/2022  Date of Discharge:  10/21/2022  Discharging Provider: Morales Myers MD  Discharge Service: Hospitalist Service    Discharge Diagnoses   Intractable back pain due to severe asymmetrical degenerative change     Follow-ups Needed After Discharge   Follow-up Appointments     Follow Up and recommended labs and tests      Follow up with penitentiary physician.  The following labs/tests are   recommended: none.  Follow up with primary care provider in 1-2  weeks.  The following   labs/tests are recommended: none.           Unresulted Labs Ordered in the Past 30 Days of this Admission     No orders found from 9/19/2022 to 10/20/2022.      These results will be followed up by NA    Discharge Disposition   Discharged to short-term care facility  Condition at discharge: Stable    Hospital Course    Reid Jimenes is a 88 year old male who presents with altered mental status and intractable back pain.  History is limited by patient as he is confused.  Most of his history is provided by his wife.  She reports that he has had continued low back pain that is worsened recently.  There does not seem to be any radicular symptoms with this.  He continues to ambulate around the house but it is more difficult for him and his wife reports that he is shuffling.  He was seen by a pain medicine doctor who is planning for MARVEL injection at the beginning of November and he was instructed to take Tylenol and oxycodone for pain.  His wife was concerned about his history of alcoholism so he only took 4 of the oxycodone tablets and has been taking 6000 mg of Tylenol for a few days.  His wife also also reports that for the past few days he has had increasing confusion.  For example he tried to eat his own ring and then has been taking what ever medicines he can find in the house.  She reports poor sleep hygiene due to pain.  No alcohol use or  illegal drug use.  He has no other complaints for me at this time       Intractable back pain     - history of lumbar spondylosis and lumbar spinal stenosis    - last imaging in March showed severe asymmetrical degenerative change with scoliosis and multiple stenotic areas    - was evaluated by Dr. Gomes who is a pain specialist with [facility] spine who felt symptoms were most consistent with worsening facet disease with recommendation for lumbar facet injections    - previously was given oxy as outpt, but patient has history of addiction, so patient switched to Tylenol (was taking 1000mg 6 times per day)    - per there ED provider he has a bilateral L4-L5 and L5-S1 facet joint injection scheduled for 11/9    - will order MARVEL here    - pain control with tylenol, robaxin, PRN low dose Oxy    - patient was able to ambulate with a walker this am. Wife states at home he has been wall/furniture walking    - unable to do MARVEL yesterday and IR prefers conservative t(x) first as he has a lt of degenerative arthritis on imaging    - started medrol dose pack on 10/20    - see by PT: doing well ambulating with walker. Still recommends TCU     Altered mental status suspect metabolic encephalopathy related insomnia/pain    - wife reports altered mental status such as taking off his ring and trying to eat it and taking any medicines he can find including her medicines in the cabinet    - reports difficulty sleeping most nights due to pain    - CT head negative, electrolyte panel and LFTs normal    - UA does not appear infected    - unable to do MRI brain due to pacemaker but CT scan does show evidence of cerebellar infarcts that are new since imaging done in 2020    - patient is improved this am     DM    - HbA1c was 7.1 in July    - cont metformin    - ISS     HTN    - not on meds    - monitor     HLP    - on simvastatin     History of cerebellar infarcts    - no history of stroke per wife but does have known internal  carotid disease on the left side     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  Please see my progress note.  Apparently a TCU bed has been found.  He will discharge.  I did speak with his wife.    Consultations This Hospital Stay   PHARMACY IP CONSULT  PHYSICAL THERAPY ADULT IP CONSULT  CARE MANAGEMENT / SOCIAL WORK IP CONSULT  PHYSICAL THERAPY ADULT IP CONSULT  OCCUPATIONAL THERAPY ADULT IP CONSULT    Code Status   Full Code    Time Spent on this Encounter   I, Morales Myers MD, personally saw the patient today and spent greater than 30 minutes discharging this patient.       Morales Myers MD  [facility] OBSERVATION DEPT  [address] [place] [address]  Phone: [phone]  ______________________________________________________________________    Physical Exam   Vital Signs: Temp: 97.3  F (36.3  C) Temp src: Oral BP: (!) 152/108 Pulse: 70   Resp: 18 SpO2: 97 % O2 Device: None (Room air)    Weight: 170 lbs 9.6 oz  Please see progress note     Primary Care Physician   Viet Bingham MD    Discharge Orders      Primary Care - Care Coordination Referral      General info for SNF    Length of Stay Estimate: Short Term Care: Estimated # of Days <30  Condition at Discharge: Stable  Level of care:skilled   Rehabilitation Potential: Fair  Admission H&P remains valid and up-to-date: Yes  Recent Chemotherapy: N/A  Use Nursing Home Standing Orders: Yes     Mantoux instructions    Give two-step Mantoux (PPD) Per Facility Policy Yes     Follow Up and recommended labs and tests    Follow up with long term physician.  The following labs/tests are recommended: none.  Follow up with primary care provider in 1-2  weeks.  The following labs/tests are recommended: none.     Reason for your hospital stay    Intractable back pain due to severe asymmetrical degenerative  change with scoliosis and multiple stenotic areas. Confusion likely due to pain, lack of sleep     Glucose monitor nursing POCT    Before meals and at bedtime     Activity - Up with nursing assistance     Full Code     Physical Therapy Adult Consult    Evaluate and treat as clinically indicated.    Reason:  weakness, lumbar back pain     Occupational Therapy Adult Consult    Evaluate and treat as clinically indicated.    Reason:  weakness, lumbar back pain     Fall precautions     Diet    Follow this diet upon discharge: Orders Placed This Encounter      Moderate Consistent Carb (60 g CHO per Meal) Diet       Significant Results and Procedures   Most Recent 3 CBC's:Recent Labs   Lab Test 10/20/22  0702 10/19/22  1643 06/11/21  0529   WBC 10.3 7.8 10.9   HGB 13.2* 13.4 13.1*   MCV 91 92 89    159 171     Most Recent 3 BMP's:Recent Labs   Lab Test 10/21/22  1120 10/21/22  0757 10/21/22  0222 10/20/22  1222 10/20/22  0702 10/19/22  2248 10/19/22  1643 07/19/22  1121   0000   NA  --   --   --   --  141  --  139 139  --    POTASSIUM  --   --   --   --  4.8  --  4.3 4.9  --    CHLORIDE  --   --   --   --  105  --  101 106  --    CO2  --   --   --   --  27  --  27 28  --    BUN  --   --   --   --  27.3*  --  28.6* 16  --    CR  --   --   --   --  1.31*  --  1.26* 1.22  --    ANIONGAP  --   --   --   --  9  --  11 5  --    DEEPTI  --   --   --   --  9.3  --  9.6 9.7  --    * 224* 222*   < > 209*   < > 165* 138*   < >    < > = values in this interval not displayed.     Most Recent 2 LFT's:Recent Labs   Lab Test 10/19/22  1643 07/19/22  1121   AST 26 19   ALT 28 25   ALKPHOS 106 96   BILITOTAL 0.5 0.4   ,   Results for orders placed or performed during the hospital encounter of 10/19/22   Head CT w/o contrast    Narrative    EXAM: CT HEAD W/O CONTRAST  LOCATION: [facility]  DATE/TIME: 10/19/2022 5:38 PM    INDICATION: Confusion.    COMPARISON: None.    TECHNIQUE: Routine CT Head without IV  contrast. Multiplanar reformats. Dose reduction techniques were used.    FINDINGS: No evidence of hemorrhage. Moderate generalized parenchymal volume loss is present with confluent areas of white matter hypoattenuation which likely represent chronic small vessel ischemic change. Small right cerebellar infarct/infarcts,   presumably chronic. No acute osseous abnormality.      Impression    IMPRESSION:  1.  No evidence of hemorrhage.  2.  Small right cerebellar infarcts, presumably chronic.  3.  Chronic changes as detailed.           Discharge Medications   Current Discharge Medication List      START taking these medications    Details   acetaminophen (TYLENOL) 325 MG tablet Take 3 tablets (975 mg) by mouth 3 times daily    Associated Diagnoses: Acute right-sided low back pain without sciatica; Lumbosacral spinal stenosis      diclofenac (VOLTAREN) 1 % topical gel Apply 2-4 g topically 4 times daily    Associated Diagnoses: Acute right-sided low back pain without sciatica; Lumbosacral spinal stenosis      !! insulin aspart (NOVOLOG PEN) 100 UNIT/ML pen Inject 1-7 Units Subcutaneous 3 times daily (before meals) Correction Scale - MEDIUM INSULIN RESISTANCE DOSING     Do Not give Correction Insulin if Pre-Meal BG less than 140.   For Pre-Meal  - 189 give 1 unit.   For Pre-Meal  - 239 give 2 units.   For Pre-Meal  - 289 give 3 units.   For Pre-Meal  - 339 give 4 units.   For Pre-Meal - 399 give 5 units.   For Pre-Meal -449 give 6 units  For Pre-Meal BG greater than or equal to 450 give 7 units.   To be given with prandial insulin, and based on pre-meal blood glucose.    Notify provider if glucose greater than or equal to 350 mg/dL after administration of correction dose.  If given at mealtime, administer within 30 minutes of start of meal.  Qty: 15 mL    Associated Diagnoses: Type 2 diabetes mellitus with other circulatory complications (H)      !! insulin aspart (NOVOLOG PEN) 100  UNIT/ML pen Inject 1-5 Units Subcutaneous At Bedtime MEDIUM INSULIN RESISTANCE DOSING    Do Not give Bedtime Correction Insulin if BG less than  200.   For  - 249 give 1 units.   For  - 299 give 2 units.   For  - 349 give 3 units.   For  -399 give 4 units.   For BG greater than or equal to 400 give 5 units.  Notify provider if glucose greater than or equal to 350 mg/dL after administration of correction dose.  If given at mealtime, administer within 30 minutes of start of meal.  Qty: 15 mL    Associated Diagnoses: Type 2 diabetes mellitus with other circulatory complications (H)      Lidocaine (LIDOCARE) 4 % Patch Place 1-3 patches onto the skin every 24 hours To prevent lidocaine toxicity, patient should be patch free for 12 hrs daily.    Associated Diagnoses: Acute right-sided low back pain without sciatica; Lumbosacral spinal stenosis      methocarbamol (ROBAXIN) 500 MG tablet Take 1 tablet (500 mg) by mouth 4 times daily as needed for muscle spasms    Associated Diagnoses: Acute right-sided low back pain without sciatica; Lumbosacral spinal stenosis      !! methylPREDNISolone (MEDROL) 4 MG tablet Take 1 tablet (4 mg) by mouth every morning (before breakfast)    Associated Diagnoses: Acute right-sided low back pain without sciatica; Lumbosacral spinal stenosis      !! methylPREDNISolone (MEDROL) 4 MG tablet Take 1 tablet (4 mg) by mouth daily (with lunch)    Associated Diagnoses: Acute right-sided low back pain without sciatica; Lumbosacral spinal stenosis      !! methylPREDNISolone (MEDROL) 4 MG tablet Take 1 tablet (4 mg) by mouth daily (with dinner)    Associated Diagnoses: Acute right-sided low back pain without sciatica; Lumbosacral spinal stenosis      !! methylPREDNISolone (MEDROL) 4 MG tablet Take 1 tablet (4 mg) by mouth At Bedtime    Associated Diagnoses: Acute right-sided low back pain without sciatica; Lumbosacral spinal stenosis      !! methylPREDNISolone (MEDROL) 8 MG tablet  Take 1 tablet (8 mg) by mouth At Bedtime    Associated Diagnoses: Acute right-sided low back pain without sciatica; Lumbosacral spinal stenosis      senna-docusate (SENOKOT-S/PERICOLACE) 8.6-50 MG tablet Take 1 tablet by mouth 2 times daily as needed for constipation    Associated Diagnoses: Constipation, unspecified constipation type      traZODone (DESYREL) 50 MG tablet Take 0.5 tablets (25 mg) by mouth nightly as needed for sleep  Qty: 10 tablet, Refills: 0    Associated Diagnoses: Insomnia, unspecified type       !! - Potential duplicate medications found. Please discuss with provider.      CONTINUE these medications which have NOT CHANGED    Details   ascorbic acid 500 MG TABS Take 500 mg by mouth Takes off and on      aspirin 81 MG EC tablet Take 1 tablet (81 mg) by mouth daily  Qty: 30 tablet    Associated Diagnoses: Severe aortic stenosis      glucosamine-chondroitin 500-400 MG CAPS per capsule Take 1 capsule by mouth Takes off and on      metFORMIN (GLUCOPHAGE XR) 500 MG 24 hr tablet Take 1 tablet (500 mg) by mouth 2 times daily (with meals)  Qty: 180 tablet, Refills: 3    Associated Diagnoses: Type 2 diabetes mellitus with microalbuminuria, without long-term current use of insulin (H)      Multiple Vitamins-Iron (MULTIVITAMIN/IRON PO) Take 1 tablet by mouth Takes off and on      simvastatin (ZOCOR) 20 MG tablet Take 1 tablet (20 mg) by mouth At Bedtime  Qty: 90 tablet, Refills: 1    Associated Diagnoses: Hyperlipidemia with target LDL less than 100      blood glucose (MIRA CONTOUR NEXT) test strip Use to test blood sugar one time daily or as directed.  Qty: 100 strip, Refills: 11    Associated Diagnoses: Type 2 diabetes mellitus with microalbuminuria, without long-term current use of insulin (H)      blood glucose monitoring (MIRA MICROLET) lancets Use to test blood sugar 1 times daily or as directed.  Qty: 100 each, Refills: 5    Associated Diagnoses: Type 2 diabetes mellitus with microalbuminuria,  without long-term current use of insulin (H)           Allergies   Allergies   Allergen Reactions     Fentanyl Other (See Comments)     Confusion and agitation after PPM     Versed [Midazolam] Other (See Comments)     Confusion and agitation post PPM

## 2022-10-21 NOTE — PROGRESS NOTES
Care Management Follow Up      Expected Discharge Date: 10/22/2022     Concerns to be Addressed: Discharge planning      Patient plan of care discussed at interdisciplinary rounds: Yes    Anticipated Discharge Disposition: ILF vs TCU    Referrals Placed by CM/SW:  None at this time  Private pay costs discussed: Not applicable    Additional Information:  SW updated by PT this morning that pt can return to ILF if wife able to provide supervision for mobility. If not, pt will need TCU.  left for wife Alyssa, requesting return call to discuss. SW will continue to follow.     1050: TCU referrals sent    1310: [facility] has clinically accepted patient, bed available today. [facility] transport arranged for 1730. Facility updated. SW will continue to follow. [id number]. Updated pt on discharge plans, wife updated via phone.     MIKE Basilio, [facility]   Inpatient Care Coordination  [facility]   [phone]

## 2022-10-21 NOTE — PLAN OF CARE
PRIMARY DIAGNOSIS: AMS AND BACK PAIN   OUTPATIENT/OBSERVATION GOALS TO BE MET BEFORE DISCHARGE:  ADLs back to baseline: Yes    Activity and level of assistance: x1 assist    Pain status: Pain free.    Return to near baseline physical activity: Yes     Discharge Planner Nurse   Safe discharge environment identified: No  Barriers to discharge: Yes       Entered by: Yun Kline RN 10/21/2022 9:56 AM     Please review provider order for any additional goals. Nurse to notify provider when observation goals have been met and patient is ready for discharge.    Alert and oriented to self, time, and place. Up with 1 assist, walker, gait belt. Right IV site saline locked. Glucose checks ACHS.

## 2022-10-21 NOTE — PLAN OF CARE
PRIMARY DIAGNOSIS: AMS AND BACK PAIN   OUTPATIENT/OBSERVATION GOALS TO BE MET BEFORE DISCHARGE:  1. ADLs back to baseline: Yes    2. Activity and level of assistance: x1 assist    3. Pain status: Pain free.    4. Return to near baseline physical activity: Yes     Discharge Planner Nurse   Safe discharge environment identified: No  Barriers to discharge: Yes       Entered by: Yun Kline RN 10/21/2022 4:09 PM     Please review provider order for any additional goals. Nurse to notify provider when observation goals have been met and patient is ready for discharge.    Alert and oriented to self, time, and place. Intermittently confused. Denies pain. Up with 1 assist, walker, gait belt. Right IV site saline locked. Glucose checks ACHS. Patient will be discharging @ 1730.

## 2022-10-21 NOTE — PLAN OF CARE
Patient's After Visit Summary was reviewed with patient     Patient verbalized understanding of After Visit Summary, recommended follow up and was given an opportunity to ask questions.     Discharge medications sent home with patient/family: NO    Discharged with [facility] WHEELCHAIR TRANSPORT

## 2022-10-21 NOTE — PROGRESS NOTES
[facility]    Medicine Progress Note - Hospitalist Service    Date of Admission:  10/19/2022    Assessment & Plan   Reid Jimenes is a 88 year old male with a PMH significant for mild cognitive impairment, nonobstructive CAD, pacemaker placement in 2021 due to high-grade conduction system disease, type 2 diabetes, history of severe aortic stenosis s/p TAVR, HTN, HLP, pulmonary hypertension who presents with intractable back pain and altered mental status.    Intractable back pain     - history of lumbar spondylosis and lumbar spinal stenosis    - last imaging in March showed severe asymmetrical degenerative change with scoliosis and multiple stenotic areas    - was evaluated by Dr. Gomes who is a pain specialist with [facility] spine who felt symptoms were most consistent with worsening facet disease with recommendation for lumbar facet injections    - previously was given oxy as outpt, but patient has history of addiction, so patient switched to Tylenol (was taking 1000mg 6 times per day)    - per there ED provider he has a bilateral L4-L5 and L5-S1 facet joint injection scheduled for 11/9    - will order MARVEL here    - pain control with tylenol, robaxin, PRN low dose Oxy    - patient was able to ambulate with a walker this am. Wife states at home he has been wall/furniture walking    - unable to do MARVEL yesterday and IR prefers conservative t(x) first as he has a lt of degenerative arthritis on imaging    - started medrol dose pack on 10/20    - see by PT: doing well ambulating with walker. Still recommends TCU    Altered mental status suspect metabolic encephalopathy related insomnia/pain    - wife reports altered mental status such as taking off his ring and trying to eat it and taking any medicines he can find including her medicines in the cabinet    - reports difficulty sleeping most nights due to pain    - CT head negative, electrolyte panel and LFTs normal    - UA does not  "appear infected    - unable to do MRI brain due to pacemaker but CT scan does show evidence of cerebellar infarcts that are new since imaging done in 2020    - patient is improved this am    DM    - HbA1c was 7.1 in July    - cont metformin    - ISS    HTN    - not on meds    - monitor    HLP    - on simvastatin    History of cerebellar infarcts    - no history of stroke per wife but does have known internal carotid disease on the left side    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    Morales Myers MD  Hospitalist Service  [facility]  Securely message with the Vocera Web Console (learn more here)  Text page via [employer] Paging/Directory         Clinically Significant Risk Factors Present on Admission                      # Overweight: Estimated body mass index is 28.39 kg/m  as calculated from the following:    Height as of this encounter: 1.651 m (5' 5\").    Weight as of this encounter: 77.4 kg (170 lb 9.6 oz).         ___________________________________________________________________    Interval History   Patient is in bed. About to eat breakfast. He feels much better. Still has some pain. No other new complaints    Data reviewed today: I reviewed all medications, new labs and imaging results over the last 24 hours. I personally reviewed no images or EKG's today.    Physical Exam   Vital Signs: Temp: 97.1  F (36.2  C) Temp src: Oral BP: (!) " 158/70 Pulse: 71   Resp: 18 SpO2: 94 % O2 Device: None (Room air)    Weight: 170 lbs 9.6 oz  Constitutional: awake, alert, cooperative, no apparent distress, and appears stated age  Eyes: Lids and lashes normal, pupils equal, round and reactive to light, extra ocular muscles intact, sclera clear, conjunctiva normal  ENT: Normocephalic, without obvious abnormality, atraumatic, sinuses nontender on palpation, external ears without lesions, oral pharynx with moist mucous membranes, tonsils without erythema or exudates, gums normal and good dentition.  Respiratory: No increased work of breathing, good air exchange, clear to auscultation bilaterally, no crackles or wheezing  Cardiovascular: Normal apical impulse, regular rate and rhythm, normal S1 and S2, no S3 or S4, and no murmur noted  GI: No scars, normal bowel sounds, soft, non-distended, non-tender, no masses palpated, no hepatosplenomegally  Skin: no bruising or bleeding    Data   Recent Labs   Lab 10/21/22  0757 10/21/22  0222 10/20/22  2112 10/20/22  1222 10/20/22  0702 10/19/22  2248 10/19/22  1643   WBC  --   --   --   --  10.3  --  7.8   HGB  --   --   --   --  13.2*  --  13.4   MCV  --   --   --   --  91  --  92   PLT  --   --   --   --  161  --  159   NA  --   --   --   --  141  --  139   POTASSIUM  --   --   --   --  4.8  --  4.3   CHLORIDE  --   --   --   --  105  --  101   CO2  --   --   --   --  27  --  27   BUN  --   --   --   --  27.3*  --  28.6*   CR  --   --   --   --  1.31*  --  1.26*   ANIONGAP  --   --   --   --  9  --  11   DEEPTI  --   --   --   --  9.3  --  9.6   * 222* 305*   < > 209*   < > 165*   ALBUMIN  --   --   --   --   --   --  3.9   PROTTOTAL  --   --   --   --   --   --  7.2   BILITOTAL  --   --   --   --   --   --  0.5   ALKPHOS  --   --   --   --   --   --  106   ALT  --   --   --   --   --   --  28   AST  --   --   --   --   --   --  26    < > = values in this interval not displayed.     No results found for this or any  previous visit (from the past 24 hour(s)).

## 2022-10-21 NOTE — PLAN OF CARE
"PRIMARY DIAGNOSIS: BACK PAIN/ALTERED MENTAL STATUS  OUTPATIENT/OBSERVATION GOALS TO BE MET BEFORE DISCHARGE:  1. ADLs back to baseline: No, Ax1 w/ walker & GB, baseline furniture surfs per PT    2. Activity and level of assistance: Ax1 w/ walker & GB    3. Pain status: Improved-controlled with oral pain medications.    4. Return to near baseline physical activity: No     Discharge Planner Nurse   Safe discharge environment identified: No, PT recommending TCU, SW involved  Barriers to discharge: Yes, Safe discharge plan       Entered by: Juliette Gunter RN 10/20/2022 10:39 PM     Please review provider order for any additional goals.   Nurse to notify provider when observation goals have been met and patient is ready for discharge.    Pt is alert to self only, pleasantly confused & cooperative. Monitored via Ipad w/ sitter for safety. Denies nausea, dizziness, SOB & CP. Pain controlled w/ tylenol, Voltaren gel & lidocaine patch. Up Ax1 w/ walker & gb. BG checked, 305, 3 units corrected dose given, recheck & 2 am. Plan for medrol dose pack w/ outpatient [facility]. SW involved for safe discharge planning. Resting between cares, bed alarm on. Continue POC.    /53 (BP Location: Right arm)   Pulse 81   Temp 97.6  F (36.4  C) (Oral)   Resp 16   Ht 1.651 m (5' 5\")   Wt 77.4 kg (170 lb 9.6 oz)   SpO2 96%   BMI 28.39 kg/m      "

## 2022-10-21 NOTE — PLAN OF CARE
"PRIMARY DIAGNOSIS: BACK PAIN/ALTERED MENTAL STATUS  OUTPATIENT/OBSERVATION GOALS TO BE MET BEFORE DISCHARGE:  1. ADLs back to baseline: No, Ax1 w/ walker & GB, baseline furniture surfs per PT    2. Activity and level of assistance: Ax1 w/ walker & GB    3. Pain status: Improved-controlled with oral pain medications.    4. Return to near baseline physical activity: No     Discharge Planner Nurse   Safe discharge environment identified: No, PT recommending TCU, SW involved  Barriers to discharge: Yes, Safe discharge plan       Entered by: Juliette Gunter RN 10/21/2022 12:31 AM     Please review provider order for any additional goals.   Nurse to notify provider when observation goals have been met and patient is ready for discharge.    Pt is alert to self only, pleasantly confused & cooperative. Monitored via Ipad w/ sitter for safety. Denies nausea, dizziness, SOB & CP. Pain controlled w/ tylenol, Voltaren gel & lidocaine patch. Up Ax1 w/ walker & gb. BG checked, 305, 3 units corrected dose given, recheck & 2 am. Plan for medrol dose pack w/ outpatient [facility]. SW involved for safe discharge planning. Resting between cares, bed alarm on. Continue POC.    BP (!) 157/79 (BP Location: Right arm)   Pulse 74   Temp 97.3  F (36.3  C) (Oral)   Resp 18   Ht 1.651 m (5' 5\")   Wt 77.4 kg (170 lb 9.6 oz)   SpO2 94%   BMI 28.39 kg/m        "

## 2022-10-21 NOTE — PLAN OF CARE
"PRIMARY DIAGNOSIS: BACK PAIN/ALTERED MENTAL STATUS  OUTPATIENT/OBSERVATION GOALS TO BE MET BEFORE DISCHARGE:  1. ADLs back to baseline: No, Ax1 w/ walker & GB, baseline furniture surfs per PT    2. Activity and level of assistance: Ax1 w/ walker & GB    3. Pain status: Improved-controlled with oral pain medications.    4. Return to near baseline physical activity: No     Discharge Planner Nurse   Safe discharge environment identified: No, PT recommending TCU, SW involved  Barriers to discharge: Yes, Safe discharge plan       Entered by: Juliette Gunter RN 10/21/2022 6:04 AM     Please review provider order for any additional goals.   Nurse to notify provider when observation goals have been met and patient is ready for discharge.    Pt is alert to self only, pleasantly confused & cooperative. Monitored via Ipad w/ sitter for safety. Denies nausea, dizziness, SOB & CP. Pain controlled w/ tylenol, Voltaren gel & lidocaine patch. Up Ax1 w/ walker & gb. Bedtime BG checked, 305, 3 units corrected dose given, rechecked and went down to 222 @ 2am. Plan for medrol dose pack w/ outpatient [facility]. EKG done this morning. SW involved for safe discharge planning. Resting between cares, bed alarm on. Continue POC.    BP (!) 158/70 (BP Location: Right arm)   Pulse 71   Temp 97.1  F (36.2  C) (Oral)   Resp 18   Ht 1.651 m (5' 5\")   Wt 77.4 kg (170 lb 9.6 oz)   SpO2 94%   BMI 28.39 kg/m        "

## 2022-10-22 ENCOUNTER — PATIENT OUTREACH (OUTPATIENT)
Dept: CARE COORDINATION | Facility: CLINIC | Age: 87
End: 2022-10-22

## 2022-10-22 NOTE — PROGRESS NOTES
[facility]    Background: Transitional Care Management program auto-identified and prompting a chart review by [facility] [place] team.    Assessment: Upon chart review, [facility] Team member will cancel/close this episode of Transitional Care Management program due to reason below:    Patient discharged to TCU/ARU/LTACH and is established within [facility] Primary [facility]. Referral created for Primary Care-Care Coordination program.    Plan: Transitional Care Management episode closed per reason above.      Laura Jennings RN  [facility], [facility]    *Connected Care Resource Team does NOT follow patient ongoing. Referrals are identified based on internal discharge reports and the outreach is to ensure patient has an understanding of their discharge instructions.

## 2022-10-24 ENCOUNTER — TRANSITIONAL CARE UNIT VISIT (OUTPATIENT)
Dept: GERIATRICS | Facility: CLINIC | Age: 87
End: 2022-10-24
Payer: MEDICARE

## 2022-10-24 ENCOUNTER — PATIENT OUTREACH (OUTPATIENT)
Dept: CARE COORDINATION | Facility: CLINIC | Age: 87
End: 2022-10-24

## 2022-10-24 ENCOUNTER — LAB REQUISITION (OUTPATIENT)
Dept: LAB | Facility: CLINIC | Age: 87
End: 2022-10-24
Payer: MEDICARE

## 2022-10-24 VITALS
RESPIRATION RATE: 16 BRPM | TEMPERATURE: 98.1 F | HEIGHT: 65 IN | SYSTOLIC BLOOD PRESSURE: 129 MMHG | WEIGHT: 165.4 LBS | BODY MASS INDEX: 27.56 KG/M2 | OXYGEN SATURATION: 95 % | HEART RATE: 80 BPM | DIASTOLIC BLOOD PRESSURE: 74 MMHG

## 2022-10-24 DIAGNOSIS — G47.00 INSOMNIA, UNSPECIFIED TYPE: ICD-10-CM

## 2022-10-24 DIAGNOSIS — E11.59 TYPE 2 DIABETES MELLITUS WITH OTHER CIRCULATORY COMPLICATION, WITHOUT LONG-TERM CURRENT USE OF INSULIN (H): ICD-10-CM

## 2022-10-24 DIAGNOSIS — Z86.73 HISTORY OF CVA (CEREBROVASCULAR ACCIDENT): ICD-10-CM

## 2022-10-24 DIAGNOSIS — G93.40 ACUTE ENCEPHALOPATHY: ICD-10-CM

## 2022-10-24 DIAGNOSIS — I10 ESSENTIAL HYPERTENSION: ICD-10-CM

## 2022-10-24 DIAGNOSIS — M54.50 ACUTE RIGHT-SIDED LOW BACK PAIN WITHOUT SCIATICA: ICD-10-CM

## 2022-10-24 DIAGNOSIS — Z95.0 CARDIAC PACEMAKER IN SITU: ICD-10-CM

## 2022-10-24 DIAGNOSIS — M51.379 DDD (DEGENERATIVE DISC DISEASE), LUMBOSACRAL: ICD-10-CM

## 2022-10-24 DIAGNOSIS — E78.5 DYSLIPIDEMIA: ICD-10-CM

## 2022-10-24 DIAGNOSIS — F10.21 ALCOHOL DEPENDENCE IN REMISSION (H): ICD-10-CM

## 2022-10-24 DIAGNOSIS — M47.817 FACET ARTHROPATHY, LUMBOSACRAL: ICD-10-CM

## 2022-10-24 DIAGNOSIS — I25.10 CORONARY ARTERY DISEASE INVOLVING NATIVE CORONARY ARTERY OF NATIVE HEART WITHOUT ANGINA PECTORIS: ICD-10-CM

## 2022-10-24 DIAGNOSIS — N18.2 CKD (CHRONIC KIDNEY DISEASE) STAGE 2, GFR 60-89 ML/MIN: ICD-10-CM

## 2022-10-24 DIAGNOSIS — I65.22 CAROTID ARTERY CALCIFICATION, LEFT: ICD-10-CM

## 2022-10-24 DIAGNOSIS — M41.9 SCOLIOSIS OF LUMBAR SPINE, UNSPECIFIED SCOLIOSIS TYPE: ICD-10-CM

## 2022-10-24 DIAGNOSIS — Z95.2 S/P TAVR (TRANSCATHETER AORTIC VALVE REPLACEMENT): ICD-10-CM

## 2022-10-24 DIAGNOSIS — M48.07 LUMBOSACRAL SPINAL STENOSIS: ICD-10-CM

## 2022-10-24 DIAGNOSIS — R53.81 PHYSICAL DECONDITIONING: ICD-10-CM

## 2022-10-24 DIAGNOSIS — I27.20 PULMONARY HYPERTENSION (H): ICD-10-CM

## 2022-10-24 DIAGNOSIS — M54.50 ACUTE LOW BACK PAIN WITHOUT SCIATICA, UNSPECIFIED BACK PAIN LATERALITY: Primary | ICD-10-CM

## 2022-10-24 DIAGNOSIS — U07.1 COVID-19: ICD-10-CM

## 2022-10-24 PROCEDURE — U0005 INFEC AGEN DETEC AMPLI PROBE: HCPCS | Performed by: NURSE PRACTITIONER

## 2022-10-24 PROCEDURE — 99310 SBSQ NF CARE HIGH MDM 45: CPT | Performed by: NURSE PRACTITIONER

## 2022-10-24 RX ORDER — ACETAMINOPHEN 325 MG/1
975 TABLET ORAL 3 TIMES DAILY
Start: 2022-10-24 | End: 2022-12-12

## 2022-10-24 NOTE — PATIENT INSTRUCTIONS
Claudio Jimenes  2/14/1934  1) Discontinue aspart at bedtime  2) Can you please check that the dosing for medrol dose pack is correct?   SARY Mejia CNP on 10/24/2022 at 12:56 PM

## 2022-10-24 NOTE — PROGRESS NOTES
Clinic Care Coordination Contact  Care Team Conversations    Situation: RN CC following patient through TCU care progression.     Background: Patient was hospitalized at [facility] 10/19 - 10/21/22 for Intractable back pain due to severe asymmetrical degenerative change.     Assessment: Patient was discharged to [facility] TCU on 10/21/22.    Plan/Recommendations: RN CC will send TCU Care Team Care Management hand in communication and request involvement in discharge planning and coordination of care.     Magy Liz RN Care Coordinator  [facility]  Email: [email or web address]  Phone: [phone]

## 2022-10-24 NOTE — LETTER
10/24/2022        RE: Reid Jimenes  [id number]  [place] [address]        No notes on file      Sincerely,        Dinora Cavazos, APRN CNP      
immune

## 2022-10-24 NOTE — PROGRESS NOTES
[facility] GERIATRICS    PRIMARY CARE PROVIDER AND CLINIC:  Viet Bingham MD, MD, [address] / [facility] [address]  Chief Complaint   Patient presents with     Hospital F/U      [facility] Medical Record Number:  [id number]  Place of Service where encounter took place:  [facility]  ([facility]) [[id number]]    Reid Jimenes  is a 88 year old  (2/14/1934), admitted to the above facility from  [facility]. Hospital stay 10/19/22 through 10/21/22..   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 He presented to the ED for evaluation of acute back pain and confusion. EKG revealed sinus rhythm with sinus arrhythmia with first-degree AV block, left bundle branch block.  Head CT showed no evidence of hemorrhage, small right cerebellar infarcts, presumably chronic. Laboratory evaluation significant for creatinine 1.26.  At home he was taking higher than recommended acetaminophen dosing, but acetaminophen level was negative.  Alcohol level negative. UA negative for infection. He was started on pain management for back pain with tylenol, robaxin, voltaren gel, lidocaine patch, PRN oxycodone. Was started on medrol dose pack on 10/20. Unable to do [facility] inpatient. Follow up outpatient with Dr. Gomes.  With altered mental status suspected to be secondary to insomnia and pain, this improved while inpatient. He was also started on trazodone inpatient for sleep. Discharged to [facility] for physical rehabilitation and medical management.     Reid was seen today sitting up in the recliner.  Nursing staff tells me that he  "has been confused since admission, and continues to not sleep well.  He tells me that he continues to have back pain, but it is \"slightly better than last spring.\"  He is not able to rate his pain today.  He is oriented to person, situation, time, not oriented to place.  He denies shortness of breath, chest pain, dizziness/lightheadedness.  He tells me his last bowel movement was today.  Also reports that his appetite is not good.  He denies dysuria/trouble voiding.  We discussed what to expect while in the TCU today.  He tells me that he lives with his wife in a senior apartment.    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  FAMILY HISTORY: family history includes Alcohol/Drug in his brother, father, maternal grandfather, maternal grandmother, mother, paternal grandfather, paternal grandmother, and sister; Cancer in his maternal grandmother; Cancer (age of onset: 58) in his " mother; Cerebrovascular Disease (age of onset: 65) in his brother; Heart Disease in his father; Myocardial Infarction (age of onset: 18) in his grandchild and another family member; Myocardial Infarction (age of onset: 70) in his sister.  SOCIAL HISTORY:   reports that he quit smoking about 42 years ago. His smoking use included cigarettes. He started smoking about 66 years ago. He has a 30.00 pack-year smoking history. He quit smokeless tobacco use about 39 years ago.  His smokeless tobacco use included chew. He reports that he does not drink alcohol and does not use drugs.  Patient's living condition: lives with spouse    Post Discharge Medication Reconciliation Status:   MED REC REQUIRED{  Post Medication Reconciliation Status:  Discharge medications reconciled and changed, see notes/orders         Current Outpatient Medications   Medication Sig     acetaminophen (TYLENOL) 325 MG tablet Take 3 tablets (975 mg) by mouth 3 times daily MAX dose of tylenol in 24 hours is 3000 mg     ascorbic acid 500 MG TABS Take 500 mg by mouth Takes off and on     aspirin 81 MG EC tablet Take 1 tablet (81 mg) by mouth daily     diclofenac (VOLTAREN) 1 % topical gel Apply 2-4 g topically 4 times daily     insulin aspart (NOVOLOG PEN) 100 UNIT/ML pen Inject 1-7 Units Subcutaneous 3 times daily (before meals) Correction Scale - MEDIUM INSULIN RESISTANCE DOSING     Do Not give Correction Insulin if Pre-Meal BG less than 140.   For Pre-Meal  - 189 give 1 unit.   For Pre-Meal  - 239 give 2 units.   For Pre-Meal  - 289 give 3 units.   For Pre-Meal  - 339 give 4 units.   For Pre-Meal - 399 give 5 units.   For Pre-Meal -449 give 6 units  For Pre-Meal BG greater than or equal to 450 give 7 units.   To be given with prandial insulin, and based on pre-meal blood glucose.    Notify provider if glucose greater than or equal to 350 mg/dL after administration of correction dose.  If given at mealtime, administer  within 30 minutes of start of meal.     Lidocaine (LIDOCARE) 4 % Patch Place 1-3 patches onto the skin every 24 hours To prevent lidocaine toxicity, patient should be patch free for 12 hrs daily.     metFORMIN (GLUCOPHAGE XR) 500 MG 24 hr tablet Take 1 tablet (500 mg) by mouth 2 times daily (with meals) (Patient taking differently: Take 500 mg by mouth daily)     methocarbamol (ROBAXIN) 500 MG tablet Take 1 tablet (500 mg) by mouth 4 times daily as needed for muscle spasms     Multiple Vitamins-Iron (MULTIVITAMIN/IRON PO) Take 1 tablet by mouth Takes off and on     senna-docusate (SENOKOT-S/PERICOLACE) 8.6-50 MG tablet Take 1 tablet by mouth 2 times daily as needed for constipation     simvastatin (ZOCOR) 20 MG tablet Take 1 tablet (20 mg) by mouth At Bedtime     traZODone (DESYREL) 50 MG tablet Take 0.5 tablets (25 mg) by mouth nightly as needed for sleep     blood glucose (MIRA CONTOUR NEXT) test strip Use to test blood sugar one time daily or as directed.     blood glucose monitoring (MIRA MICROLET) lancets Use to test blood sugar 1 times daily or as directed.     glucosamine-chondroitin 500-400 MG CAPS per capsule Take 1 capsule by mouth Takes off and on (Patient not taking: Reported on 10/24/2022)     methylPREDNISolone (MEDROL) 4 MG tablet Take 1 tablet (4 mg) by mouth every morning (before breakfast)     methylPREDNISolone (MEDROL) 4 MG tablet Take 1 tablet (4 mg) by mouth daily (with lunch)     methylPREDNISolone (MEDROL) 4 MG tablet Take 1 tablet (4 mg) by mouth daily (with dinner)     methylPREDNISolone (MEDROL) 4 MG tablet Take 1 tablet (4 mg) by mouth At Bedtime     methylPREDNISolone (MEDROL) 8 MG tablet Take 1 tablet (8 mg) by mouth At Bedtime     No current facility-administered medications for this visit.       ROS:  10 point ROS of systems including Constitutional, Eyes, Respiratory, Cardiovascular, Gastroenterology, Genitourinary, Integumentary, Musculoskeletal, Psychiatric were all negative  "except for pertinent positives noted in my HPI.    Vitals:  /74   Pulse 80   Temp 98.1  F (36.7  C)   Resp 16   Ht 1.651 m (5' 5\")   Wt 75 kg (165 lb 6.4 oz)   SpO2 95%   BMI 27.52 kg/m    Exam:  GENERAL APPEARANCE:  Alert, in NAD  HEENT: normocephalic, moist mucous membranes, nose without drainage or crusting  RESP:  respiratory effort normal, no respiratory distress, Lung sounds clear, patient is on RA  CV: auscultation of heart done, rate and rhythm regular.    ABDOMEN: + bowel sounds, soft, nontender, no grimacing or guarding with palpation.  M/S: no lower extremity edema  SKIN:  Inspection and palpation of skin and subcutaneous tissue: skin warm, dry without rashes  NEURO: cranial nerves 2-12 grossly intact and at patient's baseline; moves extremities freely  PSYCH: oriented x person, time and situation- not place, insight and judgement impaired, memory impaired, affect and mood normal      Lab/Diagnostic data:  Labs done in SNF are in [facility]. Please refer to them using [employer] Everywhere. and Recent labs in [facility] reviewed by me today.     ASSESSMENT/PLAN:  (M54.50) Acute low back pain without sciatica, unspecified back pain laterality  (primary encounter diagnosis)  (M47.817) Facet arthropathy, lumbosacral  (M48.07) Lumbosacral spinal stenosis  (M41.9) Scoliosis of lumbar spine, unspecified scoliosis type  (M51.37) DDD (degenerative disc disease), lumbosacral  Comment: follows with Dr. Gomes pain specialist with [facility]  -patient with history of alcohol abuse, so avoid narcotics  -Unable to complete [facility] inpatient, recommended conservative management prior to injections due to DJD  -unable to rate his pain today, but is ongoing  Plan: Continue pain management with Tylenol 975 mg 3 times daily, Voltaren gel 4 times daily, lidocaine patch, methocarbamol 500 mg 4 times daily as needed.  Continue Medrol Dosepak.  Therapy as below.  Follow-up with Dr. Gomes " outpatient.    (G93.40) Acute encephalopathy  Comment: reportedly improved prior to hospital discharge, however per nursing patient has been wandering and confused in the TCU  -oriented to person, situation and time, not place  -suspect delirium is contributing, also wonder if baseline cognitive impairment  -head CT as below showed chronic stroke, unable to obtain MRI due to pacemaker  -infectious work up negative  Plan: OCCUPATIONAL THERAPY to complete cognitive testing for safe discharge planning. Nursing to assist with cares, meals, medication assistance, activities.    (E11.59) Type 2 diabetes mellitus with other circulatory complication, without long-term current use of insulin (H)  Comment: chronic  -hemoglobin a1c 7.1 on 7/19/22  -BS in TCU , suspect elevated BS secondary to medrol pack  Plan: Continue metformin  mg BID, novolog sliding scale with meals. Discontinue novolog sliding scale at bedtime. Continue QID BS. Continue diabetic diet.    (Z86.73) History of CVA (cerebrovascular accident)  Comment: head CT from 10/19/22 showed small right cerebellar infarcts, presumably chronic, these are new since CT head completed in 2021  Plan: Continue baby aspirin, simvastatin 20 mg at bedtime    (I25.10) Coronary artery disease involving native coronary artery of native heart without angina pectoris  (I27.20) Pulmonary hypertension (H)  (I10) Essential hypertension  (E78.5) Dyslipidemia  Comment: chronic, with mild nonobstructive CAD per coronary angiogram 6/2021  -not on antihypertensive therapy and BP is controlled 102/68, 129/74, 100/66; , 80, 78  Plan: Continue baby aspirin, simvastatin 20 mg p.o. at bedtime.  VS per policy. Adjust medications as needed. Follow-up with cardiology per recommendations.    (Z95.0) Cardiac pacemaker in situ  Comment: placed due to complete heart block  Plan: Continue routine device checks and follow up with cardiology per recommendations    (Z95.2) S/P TAVR  (transcatheter aortic valve replacement)  Comment: in 2018  Plan: Follow up with cardiology per recommendations    (G47.00) Insomnia, unspecified type  Comment: started on trazodone inpatient for insomnia, patient continues to have trouble sleeping in TCU  Plan: Continue trazodone 25 mg at bedtime PRN for sleep. Noted initial PRN orders for non antipsychotic psychotropic medications are limited to 14 days. Start new psychotropic medication trazodone x 14 days for occasional insomnia. Nsg to update provider 5 days prior to order expiration for renewal. Monitor symptoms.    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 Avoid nephrotoxins. Renally dose medications as indicated.    (I65.22) Carotid artery calcification, left  Comment: chronic  -Carotid US 8/2021 shows 50-69% diameter stenosis of the left ICA relative to the distal ICA diameter.  Plan: Follow up outpatient.     (F10.21) Alcohol dependence in remission (H)  Comment: chronic, noted  Plan: Continue to encourage cessation.    (R53.81) Physical deconditioning  Comment: Acute, secondary to recent hospitalization, medical conditions as above  Plan: Encourage participation in physical therapy/occupational therapy for strengthening and deconditioning. Discharge planning per their recommendation. Social work to assist with d/c planning.        Disclaimer: This note may contain text created using speech-recognition software and may contain unintended word substitutions.       Total time spent with patient visit at the skilled nursing facility was 37 minutes including patient visit and review of past records. Greater than 50% of total time spent with counseling and coordinating care with facility staff regarding admission orders, medication orders, plan of care as described above. Counseling patient about current medications, plan of care and what to expect at TCU.  .     Electronically signed  by:  SARY Mejia CNP

## 2022-10-25 LAB — SARS-COV-2 RNA RESP QL NAA+PROBE: NEGATIVE

## 2022-10-26 ENCOUNTER — PATIENT OUTREACH (OUTPATIENT)
Dept: CARE COORDINATION | Facility: CLINIC | Age: 87
End: 2022-10-26

## 2022-10-26 NOTE — PROGRESS NOTES
Clinic Care Coordination Contact  TCU Care Conference    Care Coordinator attended the Care Conference   on phone on 10/26/22.    Plan:   Wife and two daughters present for care conference.   Wife goes by Sharon.     Patient is ambulating >200 feet with 2 wheeled walker with contact guard assist.     OT performed cognitive 11/30 SLUMS, recommendation will be 1:1 assist and 24 hour supervision. Grooming assist, dressing and tolieting is min to moderate assistance. Transfers for min assistance.     Would like to work for patient to become more independent. But they are projecting patient will need 1:1 assistance due to cognitive ability. OT will perform CPT cognitive testing, for more in depth assessment soon.     Patient will need walker upon TCU discharge, $38 dollars through [facility] or NP at TCU can write order to see if insurance will cover it. Family requests a 4 wheeled walker. PT/OT does not feel that will be a save choice as patient is not cognitively able to engage the breaks. Family/patient verbalized understanding.     Upon discharge HHC SN, HHA, OT and PT will be ordered for patient to continue rebuilding strength. TCU SW reviewed home care services and how long that lasts.     Wife is working with Lilia Lo from [facility], would recommend patient discharge to Memory Care, for secure environment. Lilia is present over the phone. As per wife, patient is wandering and can not be left alone in apartment due to cognitive impairment and safety concerns.     Anticipated discharge is in one week.     Patient enrolled in Ambulatory Care Coordination No

## 2022-10-26 NOTE — PROGRESS NOTES
[place] [facility]  INITIAL VISIT NOTE  October 27, 2022    PRIMARY CARE PROVIDER AND CLINIC:  Viet Bingham [address] [place] [id number] / [facility] [id number]    CHIEF COMPLAINT:  Hospital follow-up/Initial visit    HPI:    Reid Jimenes is a 88 year old  (2/14/1934) male who was seen at [facility] TCU on October 27, 2022 for an initial visit.     Medical history is notable for nonobstructive CAD, complete AV block, s/p pacemaker, severe aortic stenosis, s/p TAVR, LV diastolic dysfunction, pulmonary hypertension, essential hypertension, dyslipidemia, DM type II, CKD, lumbar spondylosis, and alcohol dependence in remission.    Summary of hospital course:  Patient was hospitalized at [facility] from October 19 through October 21, 2022 for intractable back pain and confusion.  EKG showed normal sinus rhythm with sinus arrhythmia.  CT head demonstrated small right cerebellar infarcts, presumably chronic, no evidence of hemorrhage, and chronic changes.  Blood work was significant for creatinine of 1.26, TSH of 1.03, high-sensitivity troponin I of 34, lactic acid of 1.1, and normal CBC.  UA was unremarkable.  Screening for COVID-19 was negative.  Ethanol level was less than 0.01 and acetaminophen was less than 5.  He was a started on Medrol Dosepak on October 20 for back pain.  His confusion was attributed to metabolic encephalopathy and improved.  TCU was recommended per therapies.    Patient is admitted to this facility for medical management, nursing care, and rehab.     Of note, history was obtained from patient, facility RN, and extensive review of the chart.    Today's visit:H  Patient was seen in his room, while sitting in a recliner.  He appears comfortable although he complains of low back pain, rating at 9 out of 10 in severity.  He has some memory impairment.  He had a bowel movement last night.  There is no report of fever, chills, chest pain, palpitation, dyspnea, nausea,  vomiting, abdominal pain, or urinary symptoms.      CODE STATUS:   CPR/Full code     PAST MEDICAL HISTORY:   Nonobstructive CAD, per angiogram in June 2021  Complete AV block, s/p permanent pacemaker in June 2021  Severe aortic stenosis, s/p TAVR in August 2018  Grade 1 LV diastolic dysfunction  Pulmonary hypertension  Hypertension  Dyslipidemia  DM type II  Chronic small right cerebellar infarcts, per CT head on October 19, 2022  Left ICA stenosis (67%), per CT angio in August 2020  CKD, stage IIIa, baseline creatinine 1.1-1.3  GERD  Lumbar spondylosis  Alcohol dependence in remission    Past Medical History:   Diagnosis Date     Aortic valve stenosis, nonrheumatic     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: 26mm Edward Sabino 3 valve     Coronary artery disease     cardiac cath 7/24/18: mild non-obstructive disease     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    DDD PM 6/10/2021     Hyperlipidaemia LDL goal < 100      Hypertension      Need for SBE (subacute bacterial endocarditis) prophylaxis      Pulmonary hypertension (H)      Type 2 diabetes mellitus (H)        PAST SURGICAL HISTORY:   Past Surgical History:   Procedure Laterality Date     CV HEART CATHETERIZATION WITH POSSIBLE INTERVENTION N/A 6/9/2021    Procedure: coronary angiogram;  Surgeon: Jayesh Rachel MD;  Location:  HEART CARDIAC CATH LAB     CV TEMPORARY PACEMAKER INSERTION N/A 6/9/2021    Procedure: Temporary Pacemaker Insertion;  Surgeon: Jayesh Rachel MD;  Location:  HEART CARDIAC CATH LAB     EP PACEMAKER N/A 6/10/2021    Procedure: EP Pacemaker;  Surgeon: Magdiel Silver MD;  Location: [facility] CARDIAC CATH LAB     MANDIBLE SURGERY  1958     OTHER SURGICAL HISTORY      cardiac cath 7/24/18: mild non-obstructive disease     TRANSCATHETER AORTIC VALVE IMPLANT ANESTHESIA N/A 8/28/2018    Procedure: TRANSCATHETER AORTIC VALVE IMPLANT ANESTHESIA;  ANESTHESIA NEEDED FOR TAVR PROCEDURE;  Surgeon: GENERIC ANESTHESIA PROVIDER;  Location:  OR,   "26mm Edshonna Sabino 3 valve       FAMILY HISTORY:   Family History   Problem Relation Age of Onset     Cancer Mother 58         in her 50's, had throat and stomach cancer     Alcohol/Drug Mother      Heart Disease Father          about age 49     Alcohol/Drug Father      Myocardial Infarction Sister 70        Two heart attacks     Alcohol/Drug Sister      Cerebrovascular Disease Brother 65         age 65 of stroke     Alcohol/Drug Brother      Cancer Maternal Grandmother         stomach cancer     Alcohol/Drug Maternal Grandmother      Alcohol/Drug Maternal Grandfather      Alcohol/Drug Paternal Grandmother      Alcohol/Drug Paternal Grandfather      Myocardial Infarction Grandchild 18        Grandson     Myocardial Infarction Other 18        Nephew        SOCIAL HISTORY:  Social History     Tobacco Use     Smoking status: Former     Packs/day: 1.00     Years: 30.00     Pack years: 30.00     Types: Cigarettes     Start date:      Quit date: 1980     Years since quittin.1     Smokeless tobacco: Former     Types: Chew     Quit date: 1983   Substance Use Topics     Alcohol use: No     Comment: quit 30+ years ago. states, \"I'm an alcoholic\".       MEDICATIONS:  Current Outpatient Medications   Medication Sig Dispense Refill     acetaminophen (TYLENOL) 325 MG tablet Take 3 tablets (975 mg) by mouth 3 times daily MAX dose of tylenol in 24 hours is 3000 mg       ascorbic acid 500 MG TABS Take 500 mg by mouth Takes off and on       aspirin 81 MG EC tablet Take 1 tablet (81 mg) by mouth daily 30 tablet      blood glucose (MIRA CONTOUR NEXT) test strip Use to test blood sugar one time daily or as directed. 100 strip 11     blood glucose monitoring (MIRA MICROLET) lancets Use to test blood sugar 1 times daily or as directed. 100 each 5     diclofenac (VOLTAREN) 1 % topical gel Apply 2-4 g topically 4 times daily       glucosamine-chondroitin 500-400 MG CAPS per capsule Take 1 capsule by mouth " Takes off and on (Patient not taking: Reported on 10/24/2022)       insulin aspart (NOVOLOG PEN) 100 UNIT/ML pen Inject 1-7 Units Subcutaneous 3 times daily (before meals) Correction Scale - MEDIUM INSULIN RESISTANCE DOSING     Do Not give Correction Insulin if Pre-Meal BG less than 140.   For Pre-Meal  - 189 give 1 unit.   For Pre-Meal  - 239 give 2 units.   For Pre-Meal  - 289 give 3 units.   For Pre-Meal  - 339 give 4 units.   For Pre-Meal - 399 give 5 units.   For Pre-Meal -449 give 6 units  For Pre-Meal BG greater than or equal to 450 give 7 units.   To be given with prandial insulin, and based on pre-meal blood glucose.    Notify provider if glucose greater than or equal to 350 mg/dL after administration of correction dose.  If given at mealtime, administer within 30 minutes of start of meal. 15 mL      Lidocaine (LIDOCARE) 4 % Patch Place 1-3 patches onto the skin every 24 hours To prevent lidocaine toxicity, patient should be patch free for 12 hrs daily.       metFORMIN (GLUCOPHAGE XR) 500 MG 24 hr tablet Take 1 tablet (500 mg) by mouth 2 times daily (with meals) (Patient taking differently: Take 500 mg by mouth daily) 180 tablet 3     methocarbamol (ROBAXIN) 500 MG tablet Take 1 tablet (500 mg) by mouth 4 times daily as needed for muscle spasms       methylPREDNISolone (MEDROL) 4 MG tablet Take 1 tablet (4 mg) by mouth every morning (before breakfast)       methylPREDNISolone (MEDROL) 4 MG tablet Take 1 tablet (4 mg) by mouth daily (with lunch)       methylPREDNISolone (MEDROL) 4 MG tablet Take 1 tablet (4 mg) by mouth daily (with dinner)       methylPREDNISolone (MEDROL) 4 MG tablet Take 1 tablet (4 mg) by mouth At Bedtime       methylPREDNISolone (MEDROL) 8 MG tablet Take 1 tablet (8 mg) by mouth At Bedtime       Multiple Vitamins-Iron (MULTIVITAMIN/IRON PO) Take 1 tablet by mouth Takes off and on       senna-docusate (SENOKOT-S/PERICOLACE) 8.6-50 MG tablet Take 1  "tablet by mouth 2 times daily as needed for constipation       simvastatin (ZOCOR) 20 MG tablet Take 1 tablet (20 mg) by mouth At Bedtime 90 tablet 1     traZODone (DESYREL) 50 MG tablet Take 0.5 tablets (25 mg) by mouth nightly as needed for sleep 10 tablet 0       Post Medication Reconciliation Status: medication reconcilation previously completed during another office visit      ALLERGIES:  Allergies   Allergen Reactions     Fentanyl Other (See Comments)     Confusion and agitation after PPM     Versed [Midazolam] Other (See Comments)     Confusion and agitation post PPM       ROS:  10 point ROS was attempted but was limited, given patient's underlying cognitive impairment. It was reviewed as much as possible as outlined in HPI.    PHYSICAL EXAM:  Vital signs were reviewed in the chart.  Vital Signs: /67   Pulse 63   Temp 97.2  F (36.2  C)   Resp 16   Ht 1.651 m (5' 5\")   Wt 75.6 kg (166 lb 9.6 oz)   SpO2 96%   BMI 27.72 kg/m    General: Comfortable and in no acute distress  HEENT: No conjunctival pallor, no scleral icterus or injection, moist oral mucosa  Cardiovascular: Normal S1, S2, RRR  Respiratory: Lungs clear to auscultation bilaterally  GI: Abdomen soft, non-tender, non-distended, +BS  Extremities: No LE edema  Neuro: CX II-XII grossly intact; ROM in all four extremities grossly intact  Psych: Alert and oriented almost x2; normal affect  Skin: No acute rash    LABORATORY/IMAGING DATA:  All relevant labs and imaging data in [facility] and/or Care Everywhere were personally reviewed today.      Most Recent 3 CBC's:Recent Labs   Lab Test 10/20/22  0702 10/19/22  1643 06/11/21  0529   WBC 10.3 7.8 10.9   HGB 13.2* 13.4 13.1*   MCV 91 92 89    159 171     Most Recent 3 BMP's:Recent Labs   Lab Test 10/21/22  1656 10/21/22  1120 10/21/22  0757 10/20/22  1222 10/20/22  0702 10/19/22  2248 10/19/22  1643 07/19/22  1121   0000   NA  --   --   --   --  141  --  139 139  --    POTASSIUM  --   --   --   " --  4.8  --  4.3 4.9  --    CHLORIDE  --   --   --   --  105  --  101 106  --    CO2  --   --   --   --  27  --  27 28  --    BUN  --   --   --   --  27.3*  --  28.6* 16  --    CR  --   --   --   --  1.31*  --  1.26* 1.22  --    ANIONGAP  --   --   --   --  9  --  11 5  --    DEEPTI  --   --   --   --  9.3  --  9.6 9.7  --    * 297* 224*   < > 209*   < > 165* 138*   < >    < > = values in this interval not displayed.     Most Recent 2 LFT's:Recent Labs   Lab Test 10/19/22  1643 07/19/22  1121   AST 26 19   ALT 28 25   ALKPHOS 106 96   BILITOTAL 0.5 0.4         ASSESSMENT/PLAN:  Acute low back pain,   Severe degenerative disease of lumbar spine with a scoliosis and multiple stenoses,  Physical deconditioning.  Started on Medrol Dosepak in the hospital.  Patient continues to have low back pain.  Plan:  Complete Medrol Dosepak course  Continue pain management with lidocaine patch, Voltaren gel, and scheduled acetaminophen as ordered  PRN methocarbamol for muscle spasms  Mobilize with PT/OT  Consider MARVEL if conservative management fails    Acute encephalopathy,  Delirium.  No evidence for infection.  Delirium/acute encephalopathy has improved.  Suspect underlying cognitive impairment.  On today's examination, he is oriented almost x2.  Plan:  Monitor mental status  Formal cognitive evaluation per OT to rule out baseline cognitive impairment and for safe discharge planning    Nonobstructive CAD, per angiogram in June 2021,  Complete AV block, s/p permanent pacemaker in June 2021,  Severe aortic stenosis, s/p TAVR in August 2018,  Grade 1 LV diastolic dysfunction,  Pulmonary hypertension,  Essential hypertension,  Dyslipidemia.  EKG in the hospital revealed normal sinus rhythm with sinus arrhythmia.  Patient is not on antihypertensive medication.  Blood pressure is fairly controlled.  Goal SBP less than 150-160, given age.  Plan:  Continue aspirin 81 mg daily  Continue simvastatin 20 mg daily  Monitor blood pressure  and cardiac status  Follow-up with cardiology as directed    DM type II.   Last hemoglobin A1c was 7.1% on July 19, 2022.  Blood glucose levels are in the range of 147-260.  Plan:  Continue diabetic diet  Continue metformin 500 mg twice daily  Continue sliding scale NovoLog  Continue to monitor blood glucose  Follow-up as outpatient    Chronic small right cerebellar infarcts (per CT head on October 19, 2022),  Left ICA stenosis (67%), per CT angio in August 2020.  Plan:  Continue aspirin 81 mg daily and simvastatin 10 mg daily  Follow-up as outpatient    CKD, stage IIIa.  Baseline creatinine 1.1-1.3  Last creatinine was 1.31 on October 20.  Plan to  Avoid NSAIDs and nephrotoxins  Monitor renal function periodically    GERD.  Not on H2 blocker or PPI.  Plan:   Monitor symptoms    Insomnia.  Plan:  Continue trazodone 25 mg nightly PRN          Orders written by provider at facility:  None.          Disclaimer: This note may contain text created using speech-recognition software and may contain unintended word substitutions.      Electronically signed by:  Andry Phan MD

## 2022-10-27 ENCOUNTER — TRANSITIONAL CARE UNIT VISIT (OUTPATIENT)
Dept: GERIATRICS | Facility: CLINIC | Age: 87
End: 2022-10-27
Payer: MEDICARE

## 2022-10-27 ENCOUNTER — LAB REQUISITION (OUTPATIENT)
Dept: LAB | Facility: CLINIC | Age: 87
End: 2022-10-27
Payer: MEDICARE

## 2022-10-27 VITALS
RESPIRATION RATE: 16 BRPM | SYSTOLIC BLOOD PRESSURE: 128 MMHG | WEIGHT: 166.6 LBS | HEART RATE: 63 BPM | OXYGEN SATURATION: 96 % | TEMPERATURE: 97.2 F | DIASTOLIC BLOOD PRESSURE: 67 MMHG | HEIGHT: 65 IN | BODY MASS INDEX: 27.76 KG/M2

## 2022-10-27 DIAGNOSIS — R41.0 DELIRIUM: ICD-10-CM

## 2022-10-27 DIAGNOSIS — M54.50 ACUTE LOW BACK PAIN WITHOUT SCIATICA, UNSPECIFIED BACK PAIN LATERALITY: Primary | ICD-10-CM

## 2022-10-27 DIAGNOSIS — I10 ESSENTIAL HYPERTENSION: ICD-10-CM

## 2022-10-27 DIAGNOSIS — G47.00 INSOMNIA, UNSPECIFIED TYPE: ICD-10-CM

## 2022-10-27 DIAGNOSIS — Z95.0 CARDIAC PACEMAKER IN SITU: ICD-10-CM

## 2022-10-27 DIAGNOSIS — I27.20 PULMONARY HYPERTENSION (H): ICD-10-CM

## 2022-10-27 DIAGNOSIS — I51.89 DIASTOLIC DYSFUNCTION: ICD-10-CM

## 2022-10-27 DIAGNOSIS — E11.69 TYPE 2 DIABETES MELLITUS WITH OTHER SPECIFIED COMPLICATION, WITHOUT LONG-TERM CURRENT USE OF INSULIN (H): ICD-10-CM

## 2022-10-27 DIAGNOSIS — I65.22 LEFT CAROTID ARTERY STENOSIS: ICD-10-CM

## 2022-10-27 DIAGNOSIS — N18.31 STAGE 3A CHRONIC KIDNEY DISEASE (H): ICD-10-CM

## 2022-10-27 DIAGNOSIS — G93.40 ACUTE ENCEPHALOPATHY: ICD-10-CM

## 2022-10-27 DIAGNOSIS — M51.369 DDD (DEGENERATIVE DISC DISEASE), LUMBAR: ICD-10-CM

## 2022-10-27 DIAGNOSIS — Z86.73 CHRONIC CEREBROVASCULAR ACCIDENT (CVA): ICD-10-CM

## 2022-10-27 DIAGNOSIS — R53.81 PHYSICAL DECONDITIONING: ICD-10-CM

## 2022-10-27 DIAGNOSIS — I25.10 CORONARY ARTERY DISEASE INVOLVING NATIVE CORONARY ARTERY OF NATIVE HEART WITHOUT ANGINA PECTORIS: ICD-10-CM

## 2022-10-27 DIAGNOSIS — K21.9 GASTROESOPHAGEAL REFLUX DISEASE, UNSPECIFIED WHETHER ESOPHAGITIS PRESENT: ICD-10-CM

## 2022-10-27 DIAGNOSIS — F41.9 ANXIETY: Primary | ICD-10-CM

## 2022-10-27 DIAGNOSIS — Z95.2 S/P TAVR (TRANSCATHETER AORTIC VALVE REPLACEMENT): ICD-10-CM

## 2022-10-27 DIAGNOSIS — U07.1 COVID-19: ICD-10-CM

## 2022-10-27 DIAGNOSIS — E78.5 DYSLIPIDEMIA: ICD-10-CM

## 2022-10-27 PROCEDURE — 99305 1ST NF CARE MODERATE MDM 35: CPT | Performed by: INTERNAL MEDICINE

## 2022-10-27 PROCEDURE — U0003 INFECTIOUS AGENT DETECTION BY NUCLEIC ACID (DNA OR RNA); SEVERE ACUTE RESPIRATORY SYNDROME CORONAVIRUS 2 (SARS-COV-2) (CORONAVIRUS DISEASE [COVID-19]), AMPLIFIED PROBE TECHNIQUE, MAKING USE OF HIGH THROUGHPUT TECHNOLOGIES AS DESCRIBED BY CMS-2020-01-R: HCPCS | Performed by: NURSE PRACTITIONER

## 2022-10-27 RX ORDER — SERTRALINE HYDROCHLORIDE 25 MG/1
25 TABLET, FILM COATED ORAL DAILY
Start: 2022-10-27 | End: 2022-12-09

## 2022-10-27 NOTE — PROGRESS NOTES
Claudio Jimenes  2/14/1934  1) Start sertraline 25 mg daily. Diagnosis: anxiety  2) [facility] 11/3. Diagnosis: drug monitoring.  SARY Mejia CNP on 10/27/2022 at 1:24 PM

## 2022-10-28 LAB — SARS-COV-2 RNA RESP QL NAA+PROBE: NEGATIVE

## 2022-10-30 ENCOUNTER — LAB REQUISITION (OUTPATIENT)
Dept: LAB | Facility: CLINIC | Age: 87
End: 2022-10-30
Payer: MEDICARE

## 2022-10-30 ENCOUNTER — TELEPHONE (OUTPATIENT)
Dept: GERIATRICS | Facility: CLINIC | Age: 87
End: 2022-10-30

## 2022-10-30 DIAGNOSIS — N39.0 URINARY TRACT INFECTION, SITE NOT SPECIFIED: ICD-10-CM

## 2022-10-30 DIAGNOSIS — R46.89 OTHER SYMPTOMS AND SIGNS INVOLVING APPEARANCE AND BEHAVIOR: ICD-10-CM

## 2022-10-30 NOTE — TELEPHONE ENCOUNTER
Reid Jimenes is a 88 year old  (2/14/1934), Nurse called today to report: hallucinations    ASSESSMENT/PLAN  Writer received a call earlier in the day that patient snuck out of the building and tried to get into someone's van he was able to be redirected back into the facility.  He is in the facility with altered mental status he does take Zoloft and trazodone however the first call he was now calm and sitting in the lobby.  Writer  later received a call that his behaviors were worsening and he was not able to be redirected and family was concerned.  He was sent back to the ER for further evaluation  Electronically signed by:   Elizabeth Strong CNP

## 2022-10-31 ENCOUNTER — LAB REQUISITION (OUTPATIENT)
Dept: LAB | Facility: CLINIC | Age: 87
End: 2022-10-31
Payer: MEDICARE

## 2022-10-31 DIAGNOSIS — U07.1 COVID-19: ICD-10-CM

## 2022-10-31 LAB
ANION GAP SERPL CALCULATED.3IONS-SCNC: 11 MMOL/L (ref 7–15)
BUN SERPL-MCNC: 31.8 MG/DL (ref 8–23)
CALCIUM SERPL-MCNC: 9.3 MG/DL (ref 8.8–10.2)
CHLORIDE SERPL-SCNC: 99 MMOL/L (ref 98–107)
CREAT SERPL-MCNC: 1.26 MG/DL (ref 0.67–1.17)
DEPRECATED HCO3 PLAS-SCNC: 27 MMOL/L (ref 22–29)
ERYTHROCYTE [DISTWIDTH] IN BLOOD BY AUTOMATED COUNT: 12.8 % (ref 10–15)
GFR SERPL CREATININE-BSD FRML MDRD: 55 ML/MIN/1.73M2
GLUCOSE SERPL-MCNC: 149 MG/DL (ref 70–99)
HCT VFR BLD AUTO: 41.8 % (ref 40–53)
HGB BLD-MCNC: 13.4 G/DL (ref 13.3–17.7)
MCH RBC QN AUTO: 29.3 PG (ref 26.5–33)
MCHC RBC AUTO-ENTMCNC: 32.1 G/DL (ref 31.5–36.5)
MCV RBC AUTO: 91 FL (ref 78–100)
PLATELET # BLD AUTO: 184 10E3/UL (ref 150–450)
POTASSIUM SERPL-SCNC: 4.4 MMOL/L (ref 3.4–5.3)
RBC # BLD AUTO: 4.58 10E6/UL (ref 4.4–5.9)
SODIUM SERPL-SCNC: 137 MMOL/L (ref 136–145)
WBC # BLD AUTO: 11 10E3/UL (ref 4–11)

## 2022-10-31 PROCEDURE — U0003 INFECTIOUS AGENT DETECTION BY NUCLEIC ACID (DNA OR RNA); SEVERE ACUTE RESPIRATORY SYNDROME CORONAVIRUS 2 (SARS-COV-2) (CORONAVIRUS DISEASE [COVID-19]), AMPLIFIED PROBE TECHNIQUE, MAKING USE OF HIGH THROUGHPUT TECHNOLOGIES AS DESCRIBED BY CMS-2020-01-R: HCPCS | Performed by: NURSE PRACTITIONER

## 2022-10-31 PROCEDURE — 80048 BASIC METABOLIC PNL TOTAL CA: CPT | Performed by: NURSE PRACTITIONER

## 2022-10-31 PROCEDURE — P9604 ONE-WAY ALLOW PRORATED TRIP: HCPCS | Performed by: NURSE PRACTITIONER

## 2022-10-31 PROCEDURE — 85027 COMPLETE CBC AUTOMATED: CPT | Performed by: NURSE PRACTITIONER

## 2022-10-31 NOTE — PROGRESS NOTES
[facility] GERIATRICS    Chief Complaint   Patient presents with     RECHECK     HPI:  Reid Jimenes is a 88 year old  (2/14/1934), who is being seen today for an episodic care visit at: [facility]  ([facility]) [[id number]].    placement in 2021 due to high grade conduction system disease, pulmonary HTN, s/p TAVR in 2018, PVD, bilateral carotid artery obstruction, back pain, alcohol dependence in remission     Summary of recent hospitalization:    Reid Jimenes was hospitalized at [facility] from 10/19-10/21/22 for severe back pain and confusion. He presented to the ED for evaluation of acute back pain and confusion. EKG revealed sinus rhythm with sinus arrhythmia with first-degree AV block, left bundle branch block.  Head CT showed no evidence of hemorrhage, small right cerebellar infarcts, presumably chronic. Laboratory evaluation significant for creatinine 1.26.  At home he was taking higher than recommended acetaminophen dosing, but acetaminophen level was negative.  Alcohol level negative. UA negative for infection. He was started on pain management for back pain with tylenol, robaxin, voltaren gel, lidocaine patch, PRN oxycodone. Was started on medrol dose pack on 10/20. Unable to do MARVEL inpatient. Follow up outpatient with Dr. Gomes.  With altered mental status suspected to be secondary to insomnia and pain, this improved while inpatient. He was also started on trazodone inpatient for sleep. Discharged to [facility] for physical rehabilitation and medical management.       Today's concern is: Robi was seen today with his spouse present during visit today. He had episode on 10/30 where patient attempted to break the windows in his room with foot pedals from his wheelchair, so that he could escape.  At that time he seemed to be having some confusion and possible hallucinations.  Nursing staff was able to calm him down, then later in the day he reportedly tried to elope from facility.   "Initially plan was to send him to the emergency department however family was present and he calmed down.  Since this episode his wife has been staying overnight.  She tells me today that he did wake up confused in the middle of the night, due to back pain.  She was able to easily redirect him, after receiving some Voltaren gel he fell back to sleep.  Overall she does believe his confusion comes and goes.  Today he reports ongoing back pain, it does seem to fluctuate.  He is not sure when he last had a bowel movement, but he denies abdominal pain and nausea.  He reports poor appetite, his wife does not believe he is drinking enough, and is requesting an order for hydration today. Per nursing documentation he has not had BM since admission. He denies dysuria/trouble voiding, shortness of breath, chest pain, dizziness/lightheadedness.  He continues to work with therapy.    Allergies, and PMH/PSH reviewed in EPIC today.  REVIEW OF SYSTEMS:  Limited secondary to cognitive impairment but today pt reports as above in HPI    Objective:   /56   Pulse 72   Temp 97.3  F (36.3  C)   Resp 18   Ht 1.651 m (5' 5\")   Wt 75.6 kg (166 lb 9.6 oz)   SpO2 94%   BMI 27.72 kg/m    GENERAL APPEARANCE:  Alert, in NAD  HEENT: normocephalic, moist mucous membranes, nose without drainage or crusting  RESP:  respiratory effort normal, no respiratory distress, Lung sounds clear, patient is on RA  CV: auscultation of heart done, rate and rhythm regular.   ABDOMEN: + bowel sounds, soft, nontender, no grimacing or guarding with palpation.  M/S: no lower extremity edema  NEURO: cranial nerves 2-12 grossly intact and at patient's baseline; moves extremities freely  PSYCH: insight and judgement impaired, memory impaired, affect and mood ok      Labs done in SNF are in [facility]. Please refer to them using [facility]/Care Everywhere. and Recent labs in [facility] reviewed by me today.     Assessment/Plan:  (G93.40) Acute encephalopathy  (R41.0) " Delirium  (R41.89) Cognitive impairment  Comment: reportedly improved prior to hospital discharge, however per nursing patient has been wandering and confused in the TCU, with significant episode on 10/30- is doing better now that spouse is staying overnight  -head CT as below showed chronic stroke, unable to obtain MRI due to pacemaker  -infectious work up negative  -SLUMS 11/30, CPT 4.1/5.6 in TCU indicating dementia level impairment and requires 24 hour supervision  Plan: OCCUPATIONAL THERAPY to complete cognitive testing for safe discharge planning. Nursing to assist with cares, meals, medication assistance, activities.    (M54.50) Acute low back pain without sciatica, unspecified back pain laterality    (M47.817) Facet arthropathy, lumbosacral  (M48.07) Lumbosacral spinal stenosis  (M41.9) Scoliosis of lumbar spine, unspecified scoliosis type  (M51.37) DDD (degenerative disc disease), lumbosacral  Comment: follows with Dr. Gomes pain specialist with [facility]  -patient with history of alcohol abuse, so avoid narcotics  -Unable to complete MARVEL inpatient, recommended conservative management prior to injections due to DJD  -completed medrol dosepak in TCU  -reports ongoing pain  Plan: Continue pain management with Tylenol 975 mg 3 times daily, Voltaren gel 4 times daily, lidocaine patch, methocarbamol 500 mg 4 times daily as needed. Therapy as below.  Follow-up with Dr. Gomes outpatient.     (E11.59) Type 2 diabetes mellitus with other circulatory complication, without long-term current use of insulin (H)  Comment: chronic  -hemoglobin a1c 7.1 on 7/19/22  -BS in TCU controlled 136-248 recently  Plan: Discontinue novolog sliding scale. Continue metformin  mg BID. Change BS checks to BID. Continue diabetic diet.     (I25.10) Coronary artery disease involving native coronary artery of native heart without angina pectoris  (I27.20) Pulmonary hypertension (H)  (I10) Essential  hypertension  (E78.5) Dyslipidemia  Comment: chronic, with mild nonobstructive CAD per coronary angiogram 6/2021  -not on antihypertensive therapy and BP is generally controlled 147/73, 109/56, 149/75, 117/80; HR 85, 72, 76  Plan: Continue baby aspirin, simvastatin 20 mg p.o. at bedtime.  VS per policy. Adjust medications as needed. Follow-up with cardiology per recommendations.     (Z95.0) Cardiac pacemaker in situ  Comment: placed due to complete heart block  Plan: Continue routine device checks and follow up with cardiology per recommendations     (Z95.2) S/P TAVR (transcatheter aortic valve replacement)  Comment: in 2018  Plan: Follow up with cardiology per recommendations     (I69.30) Chronic CVA (cerebrovascular accident)  Comment: head CT from 10/19/22 showed small right cerebellar infarcts, presumably chronic, these are new since CT head completed in 2021  Plan: Continue baby aspirin, simvastatin 20 mg at bedtime    (F41.9) Anxiety  (G47.00) Insomnia, unspecified type  Comment: started on trazodone inpatient for insomnia, family also requested sertraline be started for anxiety in the TCU  -sertraline started on 10/2722  -has not used trazodone since admission  Plan: Continue sertraline 25 mg po daily, trazodone 25 mg at bedtime PRN for sleep. BMP 11/8. Monitor mood and symptoms, as well as sleep. ACP referral as needed      (N18.31) CKD stage 3a  Comment: chronic  -baseline creatinine 1.1-1.3  -creatinine 1.26 on 10/31/22  Plan: BMP PRN. Avoid nephrotoxins. Renally dose medications as indicated.     (K59.01) Slow transit constipation  Comment: no documented BM since admission here  Plan: Start senna S 2 tabs po BID- stat. Administer miralax STAT. If no BM today administer suppository per JONATHAN. Discussed with nursing to monitor bowels closely.    (I65.22) Carotid artery calcification, left  Comment: chronic  -Carotid US 8/2021 shows 50-69% diameter stenosis of the left ICA relative to the distal ICA  diameter.  Plan: Follow up outpatient.      (F10.21) Alcohol dependence in remission (H)  Comment: chronic, noted  Plan: Continue to encourage cessation.     (R53.81) Physical deconditioning  Comment: Acute, secondary to recent hospitalization, medical conditions as above  -continues to work with therapy  Plan: Encourage participation in physical therapy/occupational therapy for strengthening and deconditioning. Discharge planning per their recommendation. Social work to assist with d/c planning.       Post Medication Reconciliation Status:  Medication reconciliation previously completed during another office visit      Disclaimer: This note may contain text created using speech-recognition software and may contain unintended word substitutions.       Electronically signed by: SARY Mejia CNP

## 2022-11-01 ENCOUNTER — TRANSITIONAL CARE UNIT VISIT (OUTPATIENT)
Dept: GERIATRICS | Facility: CLINIC | Age: 87
End: 2022-11-01
Payer: MEDICARE

## 2022-11-01 VITALS
OXYGEN SATURATION: 94 % | DIASTOLIC BLOOD PRESSURE: 56 MMHG | RESPIRATION RATE: 18 BRPM | HEART RATE: 72 BPM | BODY MASS INDEX: 27.76 KG/M2 | SYSTOLIC BLOOD PRESSURE: 109 MMHG | TEMPERATURE: 97.3 F | HEIGHT: 65 IN | WEIGHT: 166.6 LBS

## 2022-11-01 DIAGNOSIS — I65.22 CAROTID ARTERY CALCIFICATION, LEFT: ICD-10-CM

## 2022-11-01 DIAGNOSIS — E11.59 TYPE 2 DIABETES MELLITUS WITH OTHER CIRCULATORY COMPLICATION, WITHOUT LONG-TERM CURRENT USE OF INSULIN (H): ICD-10-CM

## 2022-11-01 DIAGNOSIS — R53.81 PHYSICAL DECONDITIONING: ICD-10-CM

## 2022-11-01 DIAGNOSIS — I25.10 CORONARY ARTERY DISEASE INVOLVING NATIVE CORONARY ARTERY OF NATIVE HEART WITHOUT ANGINA PECTORIS: ICD-10-CM

## 2022-11-01 DIAGNOSIS — M51.379 DDD (DEGENERATIVE DISC DISEASE), LUMBOSACRAL: ICD-10-CM

## 2022-11-01 DIAGNOSIS — Z95.0 CARDIAC PACEMAKER IN SITU: ICD-10-CM

## 2022-11-01 DIAGNOSIS — K59.01 SLOW TRANSIT CONSTIPATION: ICD-10-CM

## 2022-11-01 DIAGNOSIS — N18.31 STAGE 3A CHRONIC KIDNEY DISEASE (H): ICD-10-CM

## 2022-11-01 DIAGNOSIS — E78.5 DYSLIPIDEMIA: ICD-10-CM

## 2022-11-01 DIAGNOSIS — M48.07 LUMBOSACRAL SPINAL STENOSIS: ICD-10-CM

## 2022-11-01 DIAGNOSIS — Z86.73 CHRONIC CEREBROVASCULAR ACCIDENT (CVA): ICD-10-CM

## 2022-11-01 DIAGNOSIS — I10 ESSENTIAL HYPERTENSION: ICD-10-CM

## 2022-11-01 DIAGNOSIS — G47.00 INSOMNIA, UNSPECIFIED TYPE: ICD-10-CM

## 2022-11-01 DIAGNOSIS — I27.20 PULMONARY HYPERTENSION (H): ICD-10-CM

## 2022-11-01 DIAGNOSIS — R41.0 DELIRIUM: ICD-10-CM

## 2022-11-01 DIAGNOSIS — G93.40 ACUTE ENCEPHALOPATHY: Primary | ICD-10-CM

## 2022-11-01 DIAGNOSIS — M47.817 FACET ARTHROPATHY, LUMBOSACRAL: ICD-10-CM

## 2022-11-01 DIAGNOSIS — Z95.2 S/P TAVR (TRANSCATHETER AORTIC VALVE REPLACEMENT): ICD-10-CM

## 2022-11-01 DIAGNOSIS — R41.89 COGNITIVE IMPAIRMENT: ICD-10-CM

## 2022-11-01 DIAGNOSIS — M54.50 ACUTE LOW BACK PAIN WITHOUT SCIATICA, UNSPECIFIED BACK PAIN LATERALITY: ICD-10-CM

## 2022-11-01 DIAGNOSIS — M41.9 SCOLIOSIS OF LUMBAR SPINE, UNSPECIFIED SCOLIOSIS TYPE: ICD-10-CM

## 2022-11-01 DIAGNOSIS — F41.9 ANXIETY: ICD-10-CM

## 2022-11-01 DIAGNOSIS — F10.21 ALCOHOL DEPENDENCE IN REMISSION (H): ICD-10-CM

## 2022-11-01 LAB — SARS-COV-2 RNA RESP QL NAA+PROBE: NEGATIVE

## 2022-11-01 PROCEDURE — 99309 SBSQ NF CARE MODERATE MDM 30: CPT | Performed by: NURSE PRACTITIONER

## 2022-11-01 RX ORDER — SENNA AND DOCUSATE SODIUM 50; 8.6 MG/1; MG/1
2 TABLET, FILM COATED ORAL 2 TIMES DAILY
Start: 2022-11-01 | End: 2022-11-18

## 2022-11-01 NOTE — PATIENT INSTRUCTIONS
Orders  Reid Jimenes  2/14/1934  1) Discontinue novolog sliding scale.   2) Change BS checks to BID.   3) BMP 11/8. Diagnosis: drug monitoring  4) Start senna S 2 tabs po BID- first dose stat. Diagnosis: constipation  5) Administer miralax 17 gram po STAT Diagnosis: constipation  6) If no BM today administer suppository per JONATHAN. Diagnosis: constipation  7) Monitor bowels closely.  8) Nursing to encourage hydration 8 oz for every 1 hour awake  SARY Mejia CNP on 11/1/2022 at 12:05 PM

## 2022-11-02 ENCOUNTER — LAB REQUISITION (OUTPATIENT)
Dept: LAB | Facility: CLINIC | Age: 87
End: 2022-11-02
Payer: MEDICARE

## 2022-11-02 ENCOUNTER — PATIENT OUTREACH (OUTPATIENT)
Dept: CARE COORDINATION | Facility: CLINIC | Age: 87
End: 2022-11-02

## 2022-11-02 VITALS
WEIGHT: 164 LBS | HEART RATE: 84 BPM | RESPIRATION RATE: 18 BRPM | OXYGEN SATURATION: 94 % | SYSTOLIC BLOOD PRESSURE: 153 MMHG | TEMPERATURE: 97.3 F | DIASTOLIC BLOOD PRESSURE: 65 MMHG | HEIGHT: 65 IN | BODY MASS INDEX: 27.32 KG/M2

## 2022-11-02 DIAGNOSIS — N18.9 CHRONIC KIDNEY DISEASE, UNSPECIFIED: ICD-10-CM

## 2022-11-02 NOTE — PROGRESS NOTES
Clinic Care Coordination Contact  Care Team Conversations    RN OLVIN reached out to TCU SW Olga Chan, patient remains admitted to TCU with no discharge plans at this time. Olga reached out to [facility]'s coordinator to see what type of unit the patient will discharge to.     Plan: RN CC will review chart in two weeks.     Magy Liz RN Care Coordinator  [facility]  Email: [email or web address]  Phone: [phone]

## 2022-11-03 ENCOUNTER — DISCHARGE SUMMARY NURSING HOME (OUTPATIENT)
Dept: GERIATRICS | Facility: CLINIC | Age: 87
End: 2022-11-03
Payer: MEDICARE

## 2022-11-03 ENCOUNTER — LAB REQUISITION (OUTPATIENT)
Dept: LAB | Facility: CLINIC | Age: 87
End: 2022-11-03
Payer: MEDICARE

## 2022-11-03 DIAGNOSIS — E11.59 TYPE 2 DIABETES MELLITUS WITH OTHER CIRCULATORY COMPLICATION, WITHOUT LONG-TERM CURRENT USE OF INSULIN (H): ICD-10-CM

## 2022-11-03 DIAGNOSIS — I65.22 CAROTID ARTERY CALCIFICATION, LEFT: ICD-10-CM

## 2022-11-03 DIAGNOSIS — G93.40 ACUTE ENCEPHALOPATHY: Primary | ICD-10-CM

## 2022-11-03 DIAGNOSIS — F10.21 ALCOHOL DEPENDENCE IN REMISSION (H): ICD-10-CM

## 2022-11-03 DIAGNOSIS — E78.5 DYSLIPIDEMIA: ICD-10-CM

## 2022-11-03 DIAGNOSIS — K59.01 SLOW TRANSIT CONSTIPATION: ICD-10-CM

## 2022-11-03 DIAGNOSIS — I27.20 PULMONARY HYPERTENSION (H): ICD-10-CM

## 2022-11-03 DIAGNOSIS — M48.07 LUMBOSACRAL SPINAL STENOSIS: ICD-10-CM

## 2022-11-03 DIAGNOSIS — M51.379 DDD (DEGENERATIVE DISC DISEASE), LUMBOSACRAL: ICD-10-CM

## 2022-11-03 DIAGNOSIS — M47.817 FACET ARTHROPATHY, LUMBOSACRAL: ICD-10-CM

## 2022-11-03 DIAGNOSIS — U07.1 COVID-19: ICD-10-CM

## 2022-11-03 DIAGNOSIS — Z95.0 CARDIAC PACEMAKER IN SITU: ICD-10-CM

## 2022-11-03 DIAGNOSIS — I10 ESSENTIAL HYPERTENSION: ICD-10-CM

## 2022-11-03 DIAGNOSIS — R41.89 COGNITIVE IMPAIRMENT: ICD-10-CM

## 2022-11-03 DIAGNOSIS — M41.9 SCOLIOSIS OF LUMBAR SPINE, UNSPECIFIED SCOLIOSIS TYPE: ICD-10-CM

## 2022-11-03 DIAGNOSIS — G47.00 INSOMNIA, UNSPECIFIED TYPE: ICD-10-CM

## 2022-11-03 DIAGNOSIS — R41.0 DELIRIUM: ICD-10-CM

## 2022-11-03 DIAGNOSIS — Z95.2 S/P TAVR (TRANSCATHETER AORTIC VALVE REPLACEMENT): ICD-10-CM

## 2022-11-03 DIAGNOSIS — N18.31 STAGE 3A CHRONIC KIDNEY DISEASE (H): ICD-10-CM

## 2022-11-03 DIAGNOSIS — I25.10 CORONARY ARTERY DISEASE INVOLVING NATIVE CORONARY ARTERY OF NATIVE HEART WITHOUT ANGINA PECTORIS: ICD-10-CM

## 2022-11-03 DIAGNOSIS — M54.50 ACUTE LOW BACK PAIN WITHOUT SCIATICA, UNSPECIFIED BACK PAIN LATERALITY: ICD-10-CM

## 2022-11-03 DIAGNOSIS — Z86.73 CHRONIC CEREBROVASCULAR ACCIDENT (CVA): ICD-10-CM

## 2022-11-03 DIAGNOSIS — F41.9 ANXIETY: ICD-10-CM

## 2022-11-03 DIAGNOSIS — R53.81 PHYSICAL DECONDITIONING: ICD-10-CM

## 2022-11-03 LAB
ANION GAP SERPL CALCULATED.3IONS-SCNC: 9 MMOL/L (ref 7–15)
BUN SERPL-MCNC: 28.8 MG/DL (ref 8–23)
CALCIUM SERPL-MCNC: 9.3 MG/DL (ref 8.8–10.2)
CHLORIDE SERPL-SCNC: 102 MMOL/L (ref 98–107)
CREAT SERPL-MCNC: 1.14 MG/DL (ref 0.67–1.17)
DEPRECATED HCO3 PLAS-SCNC: 26 MMOL/L (ref 22–29)
GFR SERPL CREATININE-BSD FRML MDRD: 62 ML/MIN/1.73M2
GLUCOSE SERPL-MCNC: 155 MG/DL (ref 70–99)
POTASSIUM SERPL-SCNC: 4 MMOL/L (ref 3.4–5.3)
SODIUM SERPL-SCNC: 137 MMOL/L (ref 136–145)

## 2022-11-03 PROCEDURE — 99316 NF DSCHRG MGMT 30 MIN+: CPT | Performed by: NURSE PRACTITIONER

## 2022-11-03 PROCEDURE — 80048 BASIC METABOLIC PNL TOTAL CA: CPT | Performed by: NURSE PRACTITIONER

## 2022-11-03 PROCEDURE — U0003 INFECTIOUS AGENT DETECTION BY NUCLEIC ACID (DNA OR RNA); SEVERE ACUTE RESPIRATORY SYNDROME CORONAVIRUS 2 (SARS-COV-2) (CORONAVIRUS DISEASE [COVID-19]), AMPLIFIED PROBE TECHNIQUE, MAKING USE OF HIGH THROUGHPUT TECHNOLOGIES AS DESCRIBED BY CMS-2020-01-R: HCPCS | Performed by: NURSE PRACTITIONER

## 2022-11-03 NOTE — PATIENT INSTRUCTIONS
[facility] Discharge Orders    Name: Reid Baronxrude  : 1934  Planned Discharge Date: 22  Discharged to: to previous ILF with spouse    MEDICAL FOLLOW UP  Follow up with primary care provider in 1 week  Follow up with specialists per recommendations   Current [facility] scheduled appointments:  Appointments in Next Year      2022  3:45 PM  (Arrive by 3:30 PM)  [facility] 30 with Lluvia Gomes MD, [email or web address]  [facility] [place] ([facility] ) [phone]     Duane 10, 2023  8:30 AM  LAB with [place] LAB  [facility] ([facility] ) [phone]     2023 12:00 AM  CARDIAC DEVICE CHECK - REMOTE with HOSKINS TECH1  [facility] ([facility] ) [phone]     2023 10:30 AM  (Arrive by 10:10 AM)  Provider Visit with Viet Bingham MD  [facility] ([facility] ) [phone]         DISCHARGE MEDICATIONS:  The patient s pharmacy is authorized to dispense a 30-day supply of medications. Refill requests should be directed to the primary provider, Viet Bingham  Current Outpatient Medications   Medication Sig Dispense Refill    acetaminophen (TYLENOL) 325 MG tablet Take 3 tablets (975 mg) by mouth 3 times daily MAX dose of tylenol in 24 hours is 3000 mg      ascorbic acid 500 MG TABS Take 500 mg by mouth Takes off and on      aspirin 81 MG EC tablet Take 1 tablet (81 mg) by mouth daily 30 tablet     diclofenac (VOLTAREN) 1 % topical gel Apply 2-4 g topically 4 times daily (Patient taking differently: Apply 4 g topically 4 times daily)      glucosamine-chondroitin 500-400 MG CAPS per capsule Take 1 capsule by mouth Takes off and on      Lidocaine (LIDOCARE) 4 % Patch Place 1-3 patches onto the skin every 24 hours To prevent lidocaine toxicity,  patient should be patch free for 12 hrs daily.      metFORMIN (GLUCOPHAGE XR) 500 MG 24 hr tablet Take 1 tablet (500 mg) by mouth 2 times daily (with meals) (Patient taking differently: Take 500 mg by mouth daily) 180 tablet 3    methocarbamol (ROBAXIN) 500 MG tablet Take 1 tablet (500 mg) by mouth 4 times daily as needed for muscle spasms      Multiple Vitamins-Iron (MULTIVITAMIN/IRON PO) Take 1 tablet by mouth Takes off and on      senna-docusate (SENOKOT-S/PERICOLACE) 8.6-50 MG tablet Take 1 tablet by mouth 2 times daily as needed for constipation      SENNA-docusate sodium (SENNA S) 8.6-50 MG tablet Take 2 tablets by mouth 2 times daily      sertraline (ZOLOFT) 25 MG tablet Take 1 tablet (25 mg) by mouth daily      simvastatin (ZOCOR) 20 MG tablet Take 1 tablet (20 mg) by mouth At Bedtime 90 tablet 1    traZODone (DESYREL) 50 MG tablet Take 0.5 tablets (25 mg) by mouth nightly as needed for sleep 10 tablet 0    blood glucose (MIRA CONTOUR NEXT) test strip Use to test blood sugar one time daily or as directed. 100 strip 11    blood glucose monitoring (MIRA MICROLET) lancets Use to test blood sugar 1 times daily or as directed. 100 each 5       SERVICES:  Home Care:  Occupational Therapy, Physical Therapy, Registered Nurse, Home Health Aide,  and From:  [facility]    ADDITIONAL INSTRUCTIONS:  Monitor blood glucose monitoring 2 times a day. Keep a record and bring it to your next primary provider visit.   Continue to follow your diet:  Carbohydrate Controlled Diet.   Glucerna nutritional supplement recommended one time a day.   24 hour supervision for safety     SARY Mejia CNP  This document was electronically signed on November 3, 2022

## 2022-11-04 ENCOUNTER — TELEPHONE (OUTPATIENT)
Dept: INTERNAL MEDICINE | Facility: CLINIC | Age: 87
End: 2022-11-04

## 2022-11-04 LAB — SARS-COV-2 RNA RESP QL NAA+PROBE: NEGATIVE

## 2022-11-04 NOTE — TELEPHONE ENCOUNTER
Reason for Call:  Appointment Request    Patient requesting this type of appt: Chronic Diease Management/Medication/Follow-Up    Requested provider: Viet Bingham    Reason patient unable to be scheduled: Not with their preferred provider    When does patient want to be seen/preferred time: 3-7 days    Comments: Need follow up appointment from Transitional care at [facility] discharged 11/04/2022. Would like to be seen 11/09/2022 in the morning with Dr. Bingham    Could we send this information to you in [facility] or would you prefer to receive a phone call?:   Patient would prefer a phone call   Okay to leave a detailed message?: Yes at Home number on file [phone] (home)    Call taken on 11/4/2022 at 1:41 PM by Renee Kaplan

## 2022-11-07 ENCOUNTER — LAB REQUISITION (OUTPATIENT)
Dept: LAB | Facility: CLINIC | Age: 87
End: 2022-11-07
Payer: MEDICARE

## 2022-11-07 DIAGNOSIS — E87.6 HYPOKALEMIA: ICD-10-CM

## 2022-11-09 ENCOUNTER — OFFICE VISIT (OUTPATIENT)
Dept: INTERNAL MEDICINE | Facility: CLINIC | Age: 87
End: 2022-11-09
Payer: MEDICARE

## 2022-11-09 ENCOUNTER — MEDICAL CORRESPONDENCE (OUTPATIENT)
Dept: HEALTH INFORMATION MANAGEMENT | Facility: CLINIC | Age: 87
End: 2022-11-09

## 2022-11-09 VITALS
HEIGHT: 65 IN | SYSTOLIC BLOOD PRESSURE: 122 MMHG | TEMPERATURE: 98.1 F | OXYGEN SATURATION: 99 % | BODY MASS INDEX: 27.29 KG/M2 | DIASTOLIC BLOOD PRESSURE: 63 MMHG | HEART RATE: 78 BPM | RESPIRATION RATE: 20 BRPM

## 2022-11-09 DIAGNOSIS — Z23 NEED FOR PROPHYLACTIC VACCINATION AND INOCULATION AGAINST INFLUENZA: ICD-10-CM

## 2022-11-09 DIAGNOSIS — Z23 HIGH PRIORITY FOR 2019-NCOV VACCINE: ICD-10-CM

## 2022-11-09 DIAGNOSIS — E11.29 TYPE 2 DIABETES MELLITUS WITH MICROALBUMINURIA, WITHOUT LONG-TERM CURRENT USE OF INSULIN (H): ICD-10-CM

## 2022-11-09 DIAGNOSIS — R80.9 TYPE 2 DIABETES MELLITUS WITH MICROALBUMINURIA, WITHOUT LONG-TERM CURRENT USE OF INSULIN (H): ICD-10-CM

## 2022-11-09 DIAGNOSIS — I10 ESSENTIAL HYPERTENSION WITH GOAL BLOOD PRESSURE LESS THAN 140/90: ICD-10-CM

## 2022-11-09 DIAGNOSIS — R41.89 COGNITIVE IMPAIRMENT: Primary | ICD-10-CM

## 2022-11-09 DIAGNOSIS — E78.5 HYPERLIPIDEMIA WITH TARGET LDL LESS THAN 100: ICD-10-CM

## 2022-11-09 PROBLEM — H60.313 ACUTE DIFFUSE OTITIS EXTERNA OF BOTH EARS: Status: RESOLVED | Noted: 2017-03-27 | Resolved: 2022-11-09

## 2022-11-09 PROBLEM — M54.50 RIGHT-SIDED LOW BACK PAIN WITHOUT SCIATICA: Status: RESOLVED | Noted: 2022-03-29 | Resolved: 2022-11-09

## 2022-11-09 PROCEDURE — 90662 IIV NO PRSV INCREASED AG IM: CPT | Performed by: INTERNAL MEDICINE

## 2022-11-09 PROCEDURE — 91313 COVID-19,PF,MODERNA BIVALENT: CPT | Performed by: INTERNAL MEDICINE

## 2022-11-09 PROCEDURE — G0008 ADMIN INFLUENZA VIRUS VAC: HCPCS | Performed by: INTERNAL MEDICINE

## 2022-11-09 PROCEDURE — 0134A COVID-19,PF,MODERNA BIVALENT: CPT | Performed by: INTERNAL MEDICINE

## 2022-11-09 PROCEDURE — 99214 OFFICE O/P EST MOD 30 MIN: CPT | Mod: 25 | Performed by: INTERNAL MEDICINE

## 2022-11-09 NOTE — PROGRESS NOTES
Assessment & Plan     Cognitive impairment  At this time, patient did appear to be confused while present in the examination room.  He would respond to his name, but he did not answer questions very frequently.  I suspect that he may have some issues with the medications and Gummies that he has been receiving.  His wife did seem to be confused as to how many Gummies he should be taking within a 24-hour period.  She did state that the effect of these, it did not seem to kick in until he had multiple doses given to him.  I did explain to her that the effect of marijuana edibles takes place over the course of a few hours rather than instantly.  I did discuss with her that she may be contributing to his confusion and increased somnolence with the use of his trazodone as well as frequent doses of Gummies throughout a 24-hour period.  I did encourage her that she may want to back off on her use of the marijuana Gummies at this time.  Patient did express some concerns about being able to provide appropriate care for her  as he is requiring much more assistance and is becoming much more dependent in all aspects of daily living.  After much discussion, a social work referral was placed to assist the patient and his wife with long-term care planning.  - Primary Care - Care Coordination Referral; Future    Type 2 diabetes mellitus with microalbuminuria, without long-term current use of insulin (H)  Patient's most recent hemoglobin A1c was noted to be 7.1 in July 2022.  It does appear that his diabetes are under good control.  He will continue his metformin as currently prescribed.  Side effects of metformin were reviewed.  We will continue to monitor for any changes.    Hyperlipidemia with target LDL less than 100  Patient is not due for any repeat lab work at this time.  He will continue simvastatin 20 mg daily for management of his cholesterol.  Dietary modifications were discussed with the patient's wife for  continued cholesterol control.    Essential hypertension with goal blood pressure less than 140/90  Patient has had a history of hypertension, but his blood pressure currently appears to be under good control.  Review of his medication list does not show any antihypertensive agents at this time.  I did encourage his wife to continue monitoring his blood pressure closely.    Need for prophylactic vaccination and inoculation against influenza  Influenza vaccination administered.  - INFLUENZA, QUAD, HIGH DOSE, PF, 65YR + (FLUZONE HD)    High priority for 2019-nCoV vaccine  Moderna booster administered.  - COVID-19,PF,MODERNA BIVALENT 18+Yrs    Review of external notes as documented elsewhere in note  30 minutes spent on the date of the encounter doing chart review, history and exam, documentation and further activities per the note      See Patient Instructions    Return in about 3 months (around 2/9/2023) for Follow up diabetes.    Torsten Coleman MD  [facility] JAYLAN Rosenbaum is a 88 year old, presenting for the following health issues:  Follow Up (TCU follow up)      Patient is an 88-year-old  male who presents to the clinic for a follow-up visit. He was recently admitted to [facility] on October 19 through October 21 for back pain and confusion.  His work-up did include an EKG that noted sinus rhythm with first-degree AV block and left bundle branch block.  Head CT at that time showed no evidence of hemorrhage or acute findings, but there was thought to be some evidence of chronic right cerebellar infarct.  His blood work was relatively unremarkable.  He did have a drug screen and serum alcohol levels that were negative.  While in the hospital, patient was placed on Tylenol, Robaxin, Voltaren gel, lidocaine patches, and as needed oxycodone for his back pain.  He was also placed on a Medrol Dosepak.  It was felt that his altered mental status was probably a  combination of insomnia and pain.  His mental status did reportedly improve while admitted to the hospital.  He was placed on trazodone for assistance with his sleep difficulties.  Patient was discharged to the transitional care unit for physical rehabilitation and medical management on October 21, 2022.  Patient did have a relatively unremarkable stay in the transitional care unit.  He did complete therapy services.  Patient was noted to have some issues with wandering and confusion while in the TCU, but that did seem to improve once his spouse did stay overnight with him.  Patient was ultimately discharged home on November 3.  His wife reports that they are in the process of trying to obtain some home health services for continued assistance.  Since being home, his wife states that he seems to be sleeping a significant amount and he has had some issues with ongoing confusion.  She states that he will sleep 10 to 12 hours at night, and he does tend to nap frequently throughout the course of the day.  Patient has continued his trazodone and as needed Robaxin for his back pain.  His wife goes on to report that she has also been providing him with marijuana Gummies.  She states that she will give him anywhere from 1-3 Gummies per day.  She was not certain as to how much she should be providing to him.  Patient blood pressure has reportedly been under good control, and it does not appear that he has been taking any blood pressure medication recently.  He does take metformin 500 mg mouth twice daily for management of his blood sugar.  His wife would like for him to have his influenza and COVID booster today.  Patient has also been on 20 mg of simvastatin for management of his cholesterol.  Patient's wife did get very tearful during the course of the interview and examination today and she does state that she is feeling very overwhelmed with his decline in his cognitive status.  She is concerned that she is no longer  able to adequately provide care for him at home by herself.  She is wondering if what other options might be available to her.       Diabetes Follow-up    How often are you checking your blood sugar? Two times daily  Blood sugar testing frequency justification:  Patient modifying lifestyle changes (diet, exercise) with blood sugars  What time of day are you checking your blood sugars (select all that apply)?  Before meals  Have you had any blood sugars above 200?  No  Have you had any blood sugars below 70?  No    What symptoms do you notice when your blood sugar is low?  None    What concerns do you have today about your diabetes? None     Do you have any of these symptoms? (Select all that apply)  No numbness or tingling in feet.  No redness, sores or blisters on feet.  No complaints of excessive thirst.  No reports of blurry vision.  No significant changes to weight.    Hyperlipidemia Follow-Up      Are you regularly taking any medication or supplement to lower your cholesterol?   Yes- Zocor    Are you having muscle aches or other side effects that you think could be caused by your cholesterol lowering medication?  No    Hypertension Follow-up      Do you check your blood pressure regularly outside of the clinic? No     Are you following a low salt diet? Yes    Are your blood pressures ever more than 140 on the top number (systolic) OR more   than 90 on the bottom number (diastolic), for example 140/90? No    BP Readings from Last 2 Encounters:   11/09/22 122/63   11/02/22 (!) 153/65     Hemoglobin A1C (%)   Date Value   07/19/2022 7.1 (H)   01/14/2022 7.2 (H)   06/09/2021 7.1 (H)   01/12/2021 6.9 (H)     LDL Cholesterol Calculated (mg/dL)   Date Value   01/14/2022 45   07/19/2021 53   01/12/2021 66   07/10/2020 23         How many servings of fruits and vegetables do you eat daily?  2-3    On average, how many sweetened beverages do you drink each day (Examples: soda, juice, sweet tea, etc.  Do NOT count diet or  "artificially sweetened beverages)?   0    How many days per week do you exercise enough to make your heart beat faster? 3 or less    How many minutes a day do you exercise enough to make your heart beat faster? 9 or less    How many days per week do you miss taking your medication? 0        Review of Systems   Unable to perform ROS: Dementia          Objective    /63   Pulse 78   Temp 98.1  F (36.7  C)   Resp 20   Ht 1.651 m (5' 5\")   SpO2 99%   BMI 27.29 kg/m    Body mass index is 27.29 kg/m .  Physical Exam  Vitals reviewed.   HENT:      Head: Normocephalic and atraumatic.      Right Ear: Tympanic membrane, ear canal and external ear normal.      Left Ear: Tympanic membrane, ear canal and external ear normal.      Mouth/Throat:      Mouth: Mucous membranes are moist.      Pharynx: Oropharynx is clear.   Eyes:      Conjunctiva/sclera: Conjunctivae normal.      Pupils: Pupils are equal, round, and reactive to light.   Cardiovascular:      Rate and Rhythm: Normal rate and regular rhythm.      Pulses: Normal pulses.      Heart sounds: Normal heart sounds.   Pulmonary:      Effort: Pulmonary effort is normal.      Breath sounds: Normal breath sounds.   Skin:     General: Skin is warm.      Capillary Refill: Capillary refill takes less than 2 seconds.   Neurological:      Mental Status: He is alert.             "

## 2022-11-10 ENCOUNTER — PATIENT OUTREACH (OUTPATIENT)
Dept: CARE COORDINATION | Facility: CLINIC | Age: 87
End: 2022-11-10

## 2022-11-10 ASSESSMENT — ACTIVITIES OF DAILY LIVING (ADL)
DEPENDENT_IADLS:: CLEANING;COOKING;LAUNDRY;SHOPPING;MEAL PREPARATION;MEDICATION MANAGEMENT;MONEY MANAGEMENT;TRANSPORTATION

## 2022-11-10 NOTE — PROGRESS NOTES
Clinic Care Coordination Contact    Clinic Care Coordination Contact  OUTREACH    Referral Information:  Referral Source: PCP    Primary Diagnosis: Psychosocial    Chief Complaint   Patient presents with     Clinic Care Coordination - Initial     Post TCU Assessment         Universal Utilization: Reviewed on November 10, 2022  Clinic Utilization  Difficulty keeping appointments:: No  Compliance Concerns: No  No-Show Concerns: No  No PCP office visit in Past Year: No  Utilization    Hospital Admissions  1             ED Visits  1             No Show Count (past year)  1                Current as of: 11/9/2022  7:22 PM              Clinical Concerns:  Current Medical Concerns:    Patient Active Problem List   Diagnosis     Hyperlipidemia with target LDL less than 100     acp     Type 2 diabetes mellitus with other circulatory complications (H)     Peripheral vascular disease (H)     Essential hypertension with goal blood pressure less than 140/90     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     Type 2 diabetes mellitus with microalbuminuria, without long-term current use of insulin (H)     Pseudoaneurysm of femoral artery (H)     Coronary artery disease     Aortic valve stenosis, nonrheumatic     Need for SBE (subacute bacterial endocarditis) prophylaxis     LBBB (left bundle branch block)     Syncope     ST elevation myocardial infarction (STEMI), unspecified artery (H)     Complete heart block (H)     Cardiac pacemaker in situ     Facet arthropathy, lumbosacral     Scoliosis of lumbar spine, unspecified scoliosis type     DDD (degenerative disc disease), lumbosacral     Lumbosacral spinal stenosis     Alcohol dependence in remission (H)       Current Behavioral Concerns: See above    Education Provided to patient: RN CC role and reason for call.    Pain  Pain (GOAL):: No  Health Maintenance Reviewed: Due/Overdue   Health Maintenance Due   Topic Date Due     ZOSTER IMMUNIZATION (2 of 3)  12/05/2012     Clinical Pathway: None    Medication Management:  Medication review status: Medications reviewed and no changes reported per patient.        Current Outpatient Medications   Medication Instructions     acetaminophen (TYLENOL) 975 mg, Oral, 3 TIMES DAILY, MAX dose of tylenol in 24 hours is 3000 mg     aspirin (ASA) 81 mg, Oral, DAILY     blood glucose (MIRA CONTOUR NEXT) test strip Use to test blood sugar one time daily or as directed.     blood glucose monitoring (MIRA MICROLET) lancets Use to test blood sugar 1 times daily or as directed.     diclofenac (VOLTAREN) 2-4 g, Topical, 4 TIMES DAILY     glucosamine-chondroitin 500-400 MG CAPS per capsule 1 capsule, Oral, Takes off and on     Lidocaine (LIDOCARE) 4 % Patch 1-3 patches, Transdermal, EVERY 24 HOURS, To prevent lidocaine toxicity, patient should be patch free for 12 hrs daily.     metFORMIN (GLUCOPHAGE XR) 500 mg, Oral, 2 TIMES DAILY WITH MEALS     methocarbamol (ROBAXIN) 500 mg, Oral, 4 TIMES DAILY PRN     Multiple Vitamins-Iron (MULTIVITAMIN/IRON PO) 1 tablet, Oral, Takes off and on     senna-docusate (SENOKOT-S/PERICOLACE) 8.6-50 MG tablet 1 tablet, Oral, 2 TIMES DAILY PRN     SENNA-docusate sodium (SENNA S) 8.6-50 MG tablet 2 tablets, Oral, 2 TIMES DAILY     sertraline (ZOLOFT) 25 mg, Oral, DAILY     simvastatin (ZOCOR) 20 mg, Oral, AT BEDTIME     traZODone (DESYREL) 25 mg, Oral, AT BEDTIME PRN     vitamin C (ASCORBIC ACID) 500 mg, Oral, Takes off and on     Functional Status:  Dependent ADLs:: Ambulation-walker  Dependent IADLs:: Cleaning, Cooking, Laundry, Shopping, Meal Preparation, Medication Management, Money Management, Transportation  Bed or wheelchair confined:: No  Mobility Status: Independent w/Device  Fallen 2 or more times in the past year?: No  Any fall with injury in the past year?: No    Living Situation:  Current living arrangement:: I live in a private home with spouse  Type of residence:: Apartment    Lifestyle &  Psychosocial Needs:    Social Determinants of Health     Tobacco Use: Medium Risk     Smoking Tobacco Use: Former     Smokeless Tobacco Use: Former     Passive Exposure: Not on file   Alcohol Use: Not At Risk     Frequency of Alcohol Consumption: Never     Average Number of Drinks: Not on file     Frequency of Binge Drinking: Never   Financial Resource Strain: Low Risk      Difficulty of Paying Living Expenses: Not hard at all   Food Insecurity: No Food Insecurity     Worried About Running Out of Food in the Last Year: Never true     Ran Out of Food in the Last Year: Never true   Transportation Needs: No Transportation Needs     Lack of Transportation (Medical): No     Lack of Transportation (Non-Medical): No   Physical Activity: Inactive     Days of Exercise per Week: 0 days     Minutes of Exercise per Session: 0 min   Stress: No Stress Concern Present     Feeling of Stress : Not at all   Social Connections: Socially Integrated     Frequency of Communication with Friends and Family: Twice a week     Frequency of Social Gatherings with Friends and Family: Twice a week     Attends Latter-day Services: More than 4 times per year     Active Member of Clubs or Organizations: Yes     Attends Club or Organization Meetings: Never     Marital Status:    Intimate Partner Violence: Not At Risk     Fear of Current or Ex-Partner: No     Emotionally Abused: No     Physically Abused: No     Sexually Abused: No   Depression: Not at risk     PHQ-2 Score: 2   Housing Stability: Low Risk      Unable to Pay for Housing in the Last Year: No     Number of Places Lived in the Last Year: 1     Unstable Housing in the Last Year: No     Diet:: Diabetic diet  Inadequate nutrition (GOAL):: No  Tube Feeding: No  Inadequate activity/exercise (GOAL):: No  Significant changes in sleep pattern (GOAL): No  Transportation means:: Family     Latter-day or spiritual beliefs that impact treatment:: No  Mental health DX:: No  Mental health  management concern (GOAL):: No  Chemical Dependency Status: No Current Concerns  Informal Support system:: Spouse, Children     RN CC called and spoke with patient's wife KATE Shah on file. RN OLVIN reviewed information from 10/26/22 TCU Care Conference, which NATALIE BAH was a part of. Alyssa was working with Lilia Lo from [facility]/SNF[facility]Memory Care, for patient to be admitted to their memory care program. Family decided to bring patient home, however patient is requiring 24/7 supervision. Alyssa reports caregiver burnout however desire to keep patient in the home as long as possible.     Discussed in home support resources available to patient and wife. Discussed patient [place] to see if he would qualify for a PCA through the [facility] (they do not have LTC insurance), [facility] Respite volunteers up to 4 hours a week, [facility] Caregiver counseling and support program, [place] [facility] Caregiver Assurance program, [facility] Breathing Room support group, [facility] aging guidance, [facility] caregiver support, as well as private pay home care options. Alyssa requests resources are both mailed and sent through [employer], as their children check their [employer] and will be able to also review resources.    Patient's home care will initiate next week, discussed that this will be a temporary support. Alyssa verbalized understanding.       Resources and Interventions:  Current Resources:   Skilled Home Care Services: Skilled Nursing, Physicial Therapy, Occupational Therapy, Home Health Aid  Community Resources: DME, Home Care  Supplies Currently Used at Home: Diabetic Supplies  Equipment Currently Used at Home: walker, standard  Employment Status: retired     Advance Care Plan/Directive  Advanced Care Plans/Directives on file:: Yes  Type Advanced Care Plans/Directives: Advanced Directive - On File  Advanced Care Plan/Directive Status: Not Applicable    Referrals Placed: Community Resources      Care Plan:  Care Plan: In Home Support  and Resources     Problem: Lack of caregiver support     Goal: In Home Support and Resources     Start Date: 11/10/2022 Expected End Date: 11/10/2023    Note:     Barriers: Lack of caregiver support  Strengths: Patient's wife Alyssa is engaged and motivated   Patient expressed understanding of goal: Yes  Action steps to achieve this goal:  1. I understand that RN CC will mail and send through [employer] in home support resources, caregiver support resources and [place] Cho[place] information for [facility].  2. I will review resources with my children and utilize as we see fit.   3. I will continue to work with care coordination for any additional resources and support.                           Patient/Caregiver understanding: Patient reports understanding and denies any additional questions or concerns at this times. RN CC engaged in [employer]ID[employer] communication during encounter.      Outreach Frequency: monthly  Future Appointments              In 2 months [place] [facility] [place]    In 2 months HOSKINS TECH1 [facility]    In 2 months Viet Bingham MD [place], [place]          Plan: RN CC will send Care Coordination introduction letter with above discussed resources and complex care plan through [employer]. RN CC will also send Intro letter with resources through the mail at Alyssa's request. CHW will reach out to Alyssa in 2-3 weeks. RN CC will review chart in 6 weeks.     Magy Liz RN Care Coordinator  [facility]  Email: [email or web address]  Phone: [phone]

## 2022-11-10 NOTE — LETTER
[facility] CARE COORDINATION  [address]  [facility] [address]    November 10, 2022    Reid Jimenes  [address] [address]   [facility] [address]      Dear Clemente,    I am a clinic care coordinator who works with Viet Bingham MD, MD with the [facility]. I wanted to thank you for spending the time to talk with me.  Below is a description of clinic care coordination and how I can further assist you.       The clinic care coordination team is made up of a registered nurse, , financial resource worker and community health worker who understand the health care system. The goal of clinic care coordination is to help you manage your health and improve access to the health care system. Our team works alongside your provider to assist you in determining your health and social needs. We can help you obtain health care and community resources, providing you with necessary information and education. We can work with you through any barriers and develop a care plan that helps coordinate and strengthen the communication between you and your care team.    Please feel free to contact me with any questions or concerns regarding care coordination and what we can offer.      We are focused on providing you with the highest-quality healthcare experience possible.    Sincerely,     Magy Liz RN Care Coordinator  [facility]  Email: [email or web address]  Phone: [phone]                    Here are the resources we discussed:     Group Respite:The Breathing Space  Recharge and Take a Break: New Group Respite!  [facility] group respite, The Breathing Space, provides supervision and companionship with the addition of social engagement and 4 hours of brain-stimulating activities. The program will be led by a [facility] staff member with support from a team of volunteers. The program is designed for individuals with  early- to mid- stage memory loss or other conditions that require some supervision. During a session of The Breathing Space, participants, volunteers, and staff enjoy activities that stimulate the brain and promote community. Examples include music, games, exercise, discussion groups, art activities, guest speakers, and more.    [place]: Begins Friday, September 9, 2022  [facility] ([address], [place])  Bi-weekly on the 2nd and 4th Fridays of the month  9:00AM - 1:00PM    [place]: Begins Thursday, October 6, 2022  [facility] ([address] [facility])  Bi-weekly on the 1st and 3rd Thursdays of the month  9:00AM - 1:00PM    [facility] Caregiver Support Group Options     Wednesday, November 16th   1:00 p.m. - 2:30 p.m. (In-Person only)   [facility], [address]  Share and Support for all Caregivers      Wednesday, November 23rd   1:00 p.m. - 2:30 p.m. (In-Person only)   [facility], [address]  Share and Support for all Caregivers      Wednesday, November 23rd    6:00 p.m. - 7:00 p.m. (Zoom only)   Share and Support for Caregivers of Parents/Grandparents    [facility] s Caregiver Assurance program    Caregiver support and advisory services  Caring for a loved one can be overwhelming, even for the strongest people. But you don t have to do this alone.  Caregiver Assurance  is a membership program that provides resources over the phone, in-person, and online to aid you in your caregiving journey. We help you maintain your own well-being as you support others in living a fulfilling, independent life.     Speak with an Advisor to learn how Caregiver Assurance can help you:  [phone]  Customer service hours:   Monday - Saturday, 9 a.m. - 7 p.m.    [facility] offers Respite Volunteers and Caregiver Counseling and Coaching    [facility] Respite Volunteers  Everyone needs a break from their responsibilities once in a while. [facility] strives to  "make that break  happen for family caregivers by offering in-home respite volunteers for up to four hours a week.   Compassionate, well-trained volunteers provide supervision and companionship to the person who is frail or ill,  so that the caregiver can take a break, knowing their family member is safe.      [facility] Caregiver Counseling and Coaching  [facility] provides caregiver counseling and coaching to people caring for an aging loved one. This service allows for informal education and support based on your specific needs.   Through the guidance of a , get expert direction on resources from planning options and decision-making to having challenging family conversations    You can sign up online at:    [email or web address]/caregiving-information/     [facility]: [phone]    Caregiver Support Groups through [facility] Caregiver Support Groups are a safe place for caregivers to discuss their experiences and share resources. Groups also provide?caregivers with information, training and tools, and assist caregivers in building a supportive network.   Our support groups meet monthly for one hour. During the pandemic, meetings are being done virtually. We dionisio resume in-person groups when it is safe to do so per DHS and CDC guidelines.  All caregivers are welcome and invited to attend as they are available. Participants are invited to contribute to funding support groups on a sliding fee basis. [facility] is committed to serving all caregivers regardless of ability to pay.  Below are a few comments shared by our support group participants:   If I don't take care of myself, I won't be able to take care of my family and my 91-year-old mother.\"   It gives me a break and the friendships I have acquired are priceless.      Find your local support group contact     For general inquiries about [facility] Caregiver Support & Respite Services, call [phone].  CONTACT  Caregiver Support & " Respite  [phone]     [email or web address]      In-Home Aging Guidance  As the world changes around us every day, it is hard to find what you need. We are here to be a resource, connecting you to your community and any assistance you may need. [facility] offers FREE resource guidance to [place] residents ages 60+. Our service coordinators can help you identify the resources that you need to make aging easier. During a one-on-one meeting with one of our team members, they will help you identify the areas that you could use more assistance, share information about possible resources, offer emotional support, and assist with any applications.    Services can be challenging to navigate, and applications are sometimes confusing, but our team can alleviate the confusion. Whether you need assistance applying for [facility] supports, or simply knowing which meal providers are available in your area, we are here to help. We can answer questions over the phone or schedule a time to come to your home and meet with you one-on-one. Call to schedule an appointment at [phone].    These services are funded in part by [facility].      Private Pay Home Care/PCA:     Direct Support Connect - Home Page / [place].gov  The content of worker and other profiles and messages is solely the product of the Direct Support Connect   account holders. The [place] Department of Human Services makes no claim to authorship nor accuracy of profile or message information.     [email or web address]        -[facility]: P. [phone]    https://www.Wayward Labsinghearts.com/services/    -Senior Helpers: P. [phone]     Https://www.Dynamic Yieldhelpers.com/     -Visiting [place]: P. [phone]     Https://www.[place].com/     -Home Instead: Ph. [phone]     https://www.leaselock.Zhihu/location/505?redirFrom=/505        -[place]: Daron. [phone]      https://www.ZeusControls.com/locations/home-health/xfrxs-yo-2747     Resources that help review community care providers/agencies:     -Care Options Network: Care Options Network, now in a first-time offering, provides the strength of our professional association to you in this fully searchable website. Use this website to search for many senior care professionals ready to serve you.     https://www.careoptionsnetwork.org/      - Direct Support Connect from [facility]: https://directsupportPlatypus Platformnect.com/     -Senior LinkAge Line: The Senior LinkAge Line is a free statewide service of the [place] Board on Aging in partnership with [place]'s area agencies on aging. The Senior LinkAge Line assists older [place] and caregivers, by connecting them to local services, finding answers and getting the help they need.     P. [phone]     https://[place].gov[place]senior-linkage-line/     Other agencies for housework/chores:    -[facility]AR Home Services:  Home services for seniors, such as assistance with light household chores and outdoor yard work, can ease the burdens of home maintenance and allow families to focus on quality time.    Ph. [phone]     [address] [place] [address]  Https://[employer].org/     -Help at Your Door: Help At Your Door is a nonprofit serving seniors and individuals with disabilities across [place].     Our grocery assistance, home support and transportation services provide help with in-home tasks and chores.     Ph. [phone]  [address], [place] [address]  https://helpatyourdoor.org/          Below is the information about [place] Choice Assessment and the phone number to call to request assessment:    What is a [place]?    It is an in-person visit with a [place] certified  to learn about your needs, goals and preferences.    It helps to determine what kinds of supports you might be  able to receive, including public programs that might pay for those services.    It helps you learn about and access other support options if you do not qualify for publicly funded programs.    It usually occurs where you live.    It is free to you.    It will occur within 20 calendar days of your request.    Call [phone] to request an assessment:    [facility]    [facility]    [facility]    [address], [place], [place], [address] [phone]    Email: [email or web address].      Who can have an assessment?    If you are a person of any age with a disability or in need of long-term services and supports, you may ask for a [facility] assessment. You do not need to be eligible for Medical Assistance or any other publicly funded program to receive an assessment. However, to receive many available services, you will need to be eligible for Medical Assistance or another publicly funded program.    How do I prepare for the assessment?    If you are interested in receiving publicly funded services, you may call your [facility] or [facility] to see if you are eligible for Medical Assistance.    Think about what is important to you, where and how you want to live or work and other activities you want to do.    Think about challenges, barriers and concerns you have in doing the things you like. What help do you feel you need? Does anyone help you now and, if so, with what?    Have a list of your medications or the medication bottles available to share with the .     The [facility] certified  will come to you, where you live, and will need a place to sit and a table to work on with his or her computer.    Most visits take about two hours.    Who may attend my assessment?    Family members, friends, your legal representative or other significant people in your life    With your permission, a paid service  provider may share information in writing or by phone    What will the [facility] certified  ask me?    The assessment conversation might include:    Where and how you want to live, work and participate in your community     How you like to spend your time and with whom    Concerns or challenges that affect your ability to live as you choose    How you take care of your day-to-day personal needs, such as dressing, eating, bathing and getting around    How you manage your home and your physical and emotional health    What happens after the assessment? When will I know the results?    After the interview, the  will provide you a written summary of:    What he or she learned about you and your needs.

## 2022-11-10 NOTE — LETTER
[facility] CARE COORDINATION  [address]  [facility] [address]    November 10, 2022    Reid Jimenes  [address] [address]   [facility] [place] [address]      Dear Clemente,    I am a clinic care coordinator who works with Viet Bingham MD, MD with the [facility]. I wanted to thank you for spending the time to talk with me.  Below is a description of clinic care coordination and how I can further assist you.       The clinic care coordination team is made up of a registered nurse, , financial resource worker and community health worker who understand the health care system. The goal of clinic care coordination is to help you manage your health and improve access to the health care system. Our team works alongside your provider to assist you in determining your health and social needs. We can help you obtain health care and community resources, providing you with necessary information and education. We can work with you through any barriers and develop a care plan that helps coordinate and strengthen the communication between you and your care team.    Please feel free to contact me with any questions or concerns regarding care coordination and what we can offer.      We are focused on providing you with the highest-quality healthcare experience possible.    Sincerely,     Magy Liz RN Care Coordinator  [facility]  Email: [email or web address]  Phone: [phone]                    Here are the resources we discussed:     Group Respite:The Breathing Space  Recharge and Take a Break: New Group Respite!  [facility] group respite, The Breathing Space, provides supervision and companionship with the addition of social engagement and 4 hours of brain-stimulating activities. The program will be led by a [facility]S staff member with support from a team of volunteers. The program is designed for individuals with  early- to mid- stage memory loss or other conditions that require some supervision. During a session of The Breathing Space, participants, volunteers, and staff enjoy activities that stimulate the brain and promote community. Examples include music, games, exercise, discussion groups, art activities, guest speakers, and more.    [place]: Begins Friday, September 9, 2022  [facility] ([address], [place])  Bi-weekly on the 2nd and 4th Fridays of the month  9:00AM - 1:00PM    [place]: Begins Thursday, October 6, 2022  [facility] ([address])  Bi-weekly on the 1st and 3rd Thursdays of the month  9:00AM - 1:00PM    [facility] Caregiver Support Group Options     Wednesday, November 16th   1:00 p.m. - 2:30 p.m. (In-Person only)   [facility], [address]  Share and Support for all Caregivers      Wednesday, November 23rd   1:00 p.m. - 2:30 p.m. (In-Person only)   [facility], [address]  Share and Support for all Caregivers      Wednesday, November 23rd    6:00 p.m. - 7:00 p.m. (Zoom only)   Share and Support for Caregivers of Parents/Grandparents    [facility]ahid s Caregiver Assurance program    Caregiver support and advisory services  Caring for a loved one can be overwhelming, even for the strongest people. But you don t have to do this alone.  Caregiver Assurance  is a membership program that provides resources over the phone, in-person, and online to aid you in your caregiving journey. We help you maintain your own well-being as you support others in living a fulfilling, independent life.     Speak with an Advisor to learn how Caregiver Assurance can help you:  [phone]  Customer service hours:   Monday - Saturday, 9 a.m. - 7 p.m.    D[facility] offers Respite Volunteers and Caregiver Counseling and Coaching    [facility] Respite Volunteers  Everyone needs a break from their responsibilities once in a while. [facility]S strives to  "make that break  happen for family caregivers by offering in-home respite volunteers for up to four hours a week.   Compassionate, well-trained volunteers provide supervision and companionship to the person who is frail or ill,  so that the caregiver can take a break, knowing their family member is safe.      [facility] Caregiver Counseling and Coaching  [facility]S provides caregiver counseling and coaching to people caring for an aging loved one. This service allows for informal education and support based on your specific needs.   Through the guidance of a , get expert direction on resources from planning options and decision-making to having challenging family conversations    You can sign up online at:    https://darMoneytree.org/caregiving-information/     [facility]: [phone]    Caregiver Support Groups through [facility] Caregiver Support Groups are a safe place for caregivers to discuss their experiences and share resources. Groups also provide?caregivers with information, training and tools, and assist caregivers in building a supportive network.   Our support groups meet monthly for one hour. During the pandemic, meetings are being done virtually. We idonisio resume in-person groups when it is safe to do so per DHS and CDC guidelines.  All caregivers are welcome and invited to attend as they are available. Participants are invited to contribute to funding support groups on a sliding fee basis. [facility] is committed to serving all caregivers regardless of ability to pay.  Below are a few comments shared by our support group participants:   If I don't take care of myself, I won't be able to take care of my family and my 91-year-old mother.\"   It gives me a break and the friendships I have acquired are priceless.      Find your local support group contact     For general inquiries about [facility] Caregiver Support & Respite Services, call [phone].  CONTACT  Caregiver Support & " Respite  [phone]     [email or web address]      In-Home Aging Guidance  As the world changes around us every day, it is hard to find what you need. We are here to be a resource, connecting you to your community and any assistance you may need. [facility]S offers FREE resource guidance to [place] residents ages 60+. Our service coordinators can help you identify the resources that you need to make aging easier. During a one-on-one meeting with one of our team members, they will help you identify the areas that you could use more assistance, share information about possible resources, offer emotional support, and assist with any applications.    Services can be challenging to navigate, and applications are sometimes confusing, but our team can alleviate the confusion. Whether you need assistance applying for Psychiatric hospital supports, or simply knowing which meal providers are available in your area, we are here to help. We can answer questions over the phone or schedule a time to come to your home and meet with you one-on-one. Call to schedule an appointment at [phone].    These services are funded in part by [facility].      Private Pay Home Care/PCA:     Direct Support Connect - Home Page / [place].gov  The content of worker and other profiles and messages is solely the product of the Direct Support Connect   account holders. The [place] Department of Human Services makes no claim to authorship nor accuracy of profile or message information.     https://Publicfast.[place]/        -[facility]: P. [phone]    https://www.Glasshouse Internationalinghearts.com/services/    -Senior Helpers: P. [phone]     Https://www.Devverhelpers.com/     -[facility] [place]: P. [phone]     Https://www.Adspired Technologies.com/     -Home Instead: Ph. ([phone]     https://www.Handy.[place]/location/505?redirFrom=/505        -[place]: Daron. [phone]      https://www.Oyster.com/locations/home-health/xrgeo-hx-9690     Resources that help review community care providers/agencies:     -Care Options Network: Care Options Network, now in a first-time offering, provides the strength of our professional association to you in this fully searchable website. Use this website to search for many senior care professionals ready to serve you.     https://www.careoptionsnetwork.org/      - Direct Support Connect from [facility]: https://directsupportSmovenect.com/     -Senior LinkAge Line: The Senior LinkAge Line is a free statewide service of the [place] Board on Aging in partnership with [place]'s area agencies on aging. The Senior LinkAge Line assists older [place] and caregivers, by connecting them to local services, finding answers and getting the help they need.     P. [phone]     https://[place]senior-linkage-line/     Other agencies for housework/chores:    -DAR Home Services:  Home services for seniors, such as assistance with light household chores and outdoor yard work, can ease the burdens of home maintenance and allow families to focus on quality time.    Ph. [phone]     [address] [place] [address]  Https://Cloutex.org/     -Help at Your Door: Help At Your Door is a nonprofit serving seniors and individuals with disabilities across [place].     Our grocery assistance, home support and transportation services provide help with in-home tasks and chores.     Ph. [phone]  [address] [place] [address]  https://helpatyourdoor.org/          Below is the information about [place] Choice Assessment and the phone number to call to request assessment:    What is a [place]?    It is an in-person visit with a [place] certified  to learn about your needs, goals and preferences.    It helps to determine what kinds of supports you might be  able to receive, including public programs that might pay for those services.    It helps you learn about and access other support options if you do not qualify for publicly funded programs.    It usually occurs where you live.    It is free to you.    It will occur within 20 calendar days of your request.    Call [phone] to request an assessment:    [facility]    [facility] Assessment and Support Planning    [facility]    [address], [place], [place], [address] [phone]    Email: [email or web address].      Who can have an assessment?    If you are a person of any age with a disability or in need of long-term services and supports, you may ask for a [facility] assessment. You do not need to be eligible for Medical Assistance or any other publicly funded program to receive an assessment. However, to receive many available services, you will need to be eligible for Medical Assistance or another publicly funded program.    How do I prepare for the assessment?    If you are interested in receiving publicly funded services, you may call your [facility] or [facility] to see if you are eligible for Medical Assistance.    Think about what is important to you, where and how you want to live or work and other activities you want to do.    Think about challenges, barriers and concerns you have in doing the things you like. What help do you feel you need? Does anyone help you now and, if so, with what?    Have a list of your medications or the medication bottles available to share with the .     The [facility] certified  will come to you, where you live, and will need a place to sit and a table to work on with his or her computer.    Most visits take about two hours.    Who may attend my assessment?    Family members, friends, your legal representative or other significant people in your life    With your permission, a paid service  provider may share information in writing or by phone    What will the [facility] certified  ask me?    The assessment conversation might include:    Where and how you want to live, work and participate in your community     How you like to spend your time and with whom    Concerns or challenges that affect your ability to live as you choose    How you take care of your day-to-day personal needs, such as dressing, eating, bathing and getting around    How you manage your home and your physical and emotional health    What happens after the assessment? When will I know the results?    After the interview, the  will provide you a written summary of:    What he or she learned about you and your needs.        Enclosed: I have enclosed a copy of the Patient Centered Plan of Care. This has helpful information and goals that we have talked about. Please keep this in an easy to access place to use as needed.

## 2022-11-10 NOTE — LETTER
[facility]  Patient Centered Plan of Care  About Me:        Patient Name:  Reid Boyd    YOB: 1934  Age:         88 year old   [place] MRN:    [id number] Telephone Information:  Home Phone [phone]   Mobile [phone]       Address:  [address] [place] [address] Email address:  [email or web address]      Emergency Contact(s)    Name Relationship Lgl Grd Work Phone Home Phone Mobile Phone   1. EVA BOYD Spouse No   [phone]   2. JODI GARSIA Daughter No  [phone]    3. JOSEPH ALAMO Daughter No   [phone]           Primary language:  English     needed? No   [facility] [employer]:  [phone] op. 1  Other communication barriers:Cognitive impairment    Preferred Method of Communication:  Mary  Current living arrangement: I live in a private home with spouse    Mobility Status/ Medical Equipment: Independent w/Device        Health Maintenance  Health Maintenance Reviewed: Due/Overdue   Health Maintenance Due   Topic Date Due     ZOSTER IMMUNIZATION (2 of 3) 12/05/2012                   My Access Plan  Medical Emergency 911   Primary Clinic Line [facility] - [phone]   24 Hour Appointment Line [phone] or  [phone]) (toll-free)   24 Hour Nurse Line [phone] (toll-free)   Preferred Urgent Care No data recorded   Preferred Hospital [facility]  [phone]     Preferred Pharmacy [facility] [address] [facility] [address] [facility]     Behavioral Health Crisis Line The National Suicide Prevention Lifeline at 1-260.213.9172 or Text/Call 218             My Care Team Members  Patient Care Team       Relationship Specialty Notifications Start End    Viet Bingham MD PCP - General Internal Medicine  2/11/14     Phone: [phone] Pager: [phone] Fax: [phone]        [address] [facility] [address]    Viet Bingham  MD ILA Assigned PCP   1/16/14     Phone: [phone] Pager: [phone] Fax: [phone]        [address] [facility] [address]    Des Marks MD MD Vascular and Interventional Radiology  7/30/20     Phone: [phone] Pager: [phone] Fax: [phone]        [address] [facility] [address]    Mary Faulkner, NATALIE Specialty Care Coordinator Radiology  7/30/20     Phone: [phone] Pager: [phone]        Eulalio Higginbotham MD MD Cardiovascular Disease  1/19/22     Phone: [phone] Pager: [phone] Fax: [phone]        [facility] [address] [place] [address]    Jayesh Barker MD Assigned Neuroscience Provider   3/27/22     Phone: [phone] Fax: [phone]         [facility] [address] [facility] [address]    Lizz Love APRN CNP Assigned Heart and Vascular Provider   10/15/22     Phone: [phone] Fax: [phone]         [address] [place] [address]    [facility]    10/24/22     Phone: [phone] Fax: [phone]         [address] [facility] [address]    Dinora Cavazos APRN CNP Nurse Practitioner Geriatric Medicine All results, Admissions 10/24/22     Phone: [phone] Pager: [phone] Fax: [phone]        [address] [place] [address]    Mandie Liz, RN Lead Care Coordinator  Admissions 11/10/22     Mary Son MA Community Health Worker  Admissions 11/10/22                                         My Care Plans  Self Management and Treatment Plan  Care Plan  Care Plan: In Home Support and Resources     Problem: Lack of caregiver support     Goal: In Home Support and Resources     Start Date: 11/10/2022 Expected End Date: 11/10/2023    Note:     Barriers: Lack of caregiver support  Strengths: Patient's wife Alyssa is engaged and motivated   Patient expressed understanding of goal: Yes  Action steps to achieve this goal:  1. I understand  that RN CC will mail and send through [employer] in home support resources, caregiver support resources and [place] Choices Assessment information for [facility].  2. I will review resources with my children and utilize as we see fit.   3. I will continue to work with care coordination for any additional resources and support.                              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     Type 2 diabetes mellitus with microalbuminuria, without long-term current use of insulin (H)     Pseudoaneurysm of femoral artery (H)     Coronary artery disease     Aortic valve stenosis, nonrheumatic     Need for SBE (subacute bacterial endocarditis) prophylaxis     LBBB (left bundle branch block)     Syncope     ST elevation myocardial infarction (STEMI), unspecified artery (H)     Complete heart block (H)     Cardiac pacemaker in situ     Facet arthropathy, lumbosacral     Scoliosis of lumbar spine, unspecified scoliosis type     DDD (degenerative disc disease), lumbosacral     Lumbosacral spinal stenosis     Alcohol dependence in remission (H)      Current Medications and Allergies:    Current Outpatient Medications   Medication Instructions     acetaminophen (TYLENOL) 975 mg, Oral, 3 TIMES DAILY, MAX dose of tylenol in 24 hours is 3000 mg     aspirin (ASA) 81 mg, Oral, DAILY     blood glucose (MIRA CONTOUR NEXT) test strip Use to test blood sugar one time daily or as directed.     blood glucose monitoring (MIRA MICROLET) lancets Use to test blood sugar 1 times daily or as directed.     diclofenac  (VOLTAREN) 2-4 g, Topical, 4 TIMES DAILY     glucosamine-chondroitin 500-400 MG CAPS per capsule 1 capsule, Oral, Takes off and on     Lidocaine (LIDOCARE) 4 % Patch 1-3 patches, Transdermal, EVERY 24 HOURS, To prevent lidocaine toxicity, patient should be patch free for 12 hrs daily.     metFORMIN (GLUCOPHAGE XR) 500 mg, Oral, 2 TIMES DAILY WITH MEALS     methocarbamol (ROBAXIN) 500 mg, Oral, 4 TIMES DAILY PRN     Multiple Vitamins-Iron (MULTIVITAMIN/IRON PO) 1 tablet, Oral, Takes off and on     senna-docusate (SENOKOT-S/PERICOLACE) 8.6-50 MG tablet 1 tablet, Oral, 2 TIMES DAILY PRN     SENNA-docusate sodium (SENNA S) 8.6-50 MG tablet 2 tablets, Oral, 2 TIMES DAILY     sertraline (ZOLOFT) 25 mg, Oral, DAILY     simvastatin (ZOCOR) 20 mg, Oral, AT BEDTIME     traZODone (DESYREL) 25 mg, Oral, AT BEDTIME PRN     vitamin C (ASCORBIC ACID) 500 mg, Oral, Takes off and on        Allergies   Allergen Reactions     Fentanyl Other (See Comments)     Confusion and agitation after PPM     Versed [Midazolam] Other (See Comments)     Confusion and agitation post PPM       Care Coordination Start Date: 10/22/2022   Frequency of Care Coordination: monthly     Form Last Updated: 11/10/2022

## 2022-11-13 ENCOUNTER — NURSE TRIAGE (OUTPATIENT)
Dept: NURSING | Facility: CLINIC | Age: 87
End: 2022-11-13

## 2022-11-13 ENCOUNTER — APPOINTMENT (OUTPATIENT)
Dept: CT IMAGING | Facility: CLINIC | Age: 87
End: 2022-11-13
Attending: EMERGENCY MEDICINE
Payer: MEDICARE

## 2022-11-13 ENCOUNTER — TELEPHONE (OUTPATIENT)
Dept: PALLIATIVE MEDICINE | Facility: CLINIC | Age: 87
End: 2022-11-13

## 2022-11-13 ENCOUNTER — HOSPITAL ENCOUNTER (OUTPATIENT)
Facility: CLINIC | Age: 87
Setting detail: OBSERVATION
Discharge: HOME | End: 2022-11-15
Attending: EMERGENCY MEDICINE | Admitting: INTERNAL MEDICINE
Payer: MEDICARE

## 2022-11-13 DIAGNOSIS — M48.07 LUMBOSACRAL SPINAL STENOSIS: Primary | ICD-10-CM

## 2022-11-13 DIAGNOSIS — S32.010A COMPRESSION FRACTURE OF L1 VERTEBRA, INITIAL ENCOUNTER (H): ICD-10-CM

## 2022-11-13 DIAGNOSIS — R29.6 FALLS FREQUENTLY: ICD-10-CM

## 2022-11-13 DIAGNOSIS — M54.50 ACUTE RIGHT-SIDED LOW BACK PAIN WITHOUT SCIATICA: ICD-10-CM

## 2022-11-13 LAB
ALBUMIN UR-MCNC: 10 MG/DL
ANION GAP SERPL CALCULATED.3IONS-SCNC: 10 MMOL/L (ref 7–15)
APPEARANCE UR: CLEAR
BASOPHILS # BLD AUTO: 0 10E3/UL (ref 0–0.2)
BASOPHILS NFR BLD AUTO: 0 %
BILIRUB UR QL STRIP: NEGATIVE
BUN SERPL-MCNC: 27.8 MG/DL (ref 8–23)
CALCIUM SERPL-MCNC: 9.3 MG/DL (ref 8.8–10.2)
CHLORIDE SERPL-SCNC: 102 MMOL/L (ref 98–107)
COLOR UR AUTO: YELLOW
CREAT SERPL-MCNC: 1.1 MG/DL (ref 0.67–1.17)
DEPRECATED HCO3 PLAS-SCNC: 26 MMOL/L (ref 22–29)
EOSINOPHIL # BLD AUTO: 0.3 10E3/UL (ref 0–0.7)
EOSINOPHIL NFR BLD AUTO: 4 %
ERYTHROCYTE [DISTWIDTH] IN BLOOD BY AUTOMATED COUNT: 13.2 % (ref 10–15)
GFR SERPL CREATININE-BSD FRML MDRD: 65 ML/MIN/1.73M2
GLUCOSE BLDC GLUCOMTR-MCNC: 129 MG/DL (ref 70–99)
GLUCOSE SERPL-MCNC: 152 MG/DL (ref 70–99)
GLUCOSE UR STRIP-MCNC: NEGATIVE MG/DL
HBA1C MFR BLD: 7.7 %
HCT VFR BLD AUTO: 38.3 % (ref 40–53)
HGB BLD-MCNC: 12.4 G/DL (ref 13.3–17.7)
HGB UR QL STRIP: NEGATIVE
HYALINE CASTS: 3 /LPF
IMM GRANULOCYTES # BLD: 0 10E3/UL
IMM GRANULOCYTES NFR BLD: 0 %
KETONES UR STRIP-MCNC: NEGATIVE MG/DL
LEUKOCYTE ESTERASE UR QL STRIP: NEGATIVE
LYMPHOCYTES # BLD AUTO: 2.1 10E3/UL (ref 0.8–5.3)
LYMPHOCYTES NFR BLD AUTO: 27 %
MCH RBC QN AUTO: 29.3 PG (ref 26.5–33)
MCHC RBC AUTO-ENTMCNC: 32.4 G/DL (ref 31.5–36.5)
MCV RBC AUTO: 91 FL (ref 78–100)
MONOCYTES # BLD AUTO: 1 10E3/UL (ref 0–1.3)
MONOCYTES NFR BLD AUTO: 12 %
MUCOUS THREADS #/AREA URNS LPF: PRESENT /LPF
NEUTROPHILS # BLD AUTO: 4.4 10E3/UL (ref 1.6–8.3)
NEUTROPHILS NFR BLD AUTO: 57 %
NITRATE UR QL: NEGATIVE
NRBC # BLD AUTO: 0 10E3/UL
NRBC BLD AUTO-RTO: 0 /100
PH UR STRIP: 5 [PH] (ref 5–7)
PLATELET # BLD AUTO: 225 10E3/UL (ref 150–450)
POTASSIUM SERPL-SCNC: 4.5 MMOL/L (ref 3.4–5.3)
RBC # BLD AUTO: 4.23 10E6/UL (ref 4.4–5.9)
RBC URINE: <1 /HPF
SARS-COV-2 RNA RESP QL NAA+PROBE: NEGATIVE
SODIUM SERPL-SCNC: 138 MMOL/L (ref 136–145)
SP GR UR STRIP: 1.02 (ref 1–1.03)
SQUAMOUS EPITHELIAL: <1 /HPF
UROBILINOGEN UR STRIP-MCNC: NORMAL MG/DL
WBC # BLD AUTO: 7.8 10E3/UL (ref 4–11)
WBC URINE: <1 /HPF

## 2022-11-13 PROCEDURE — 250N000013 HC RX MED GY IP 250 OP 250 PS 637: Performed by: INTERNAL MEDICINE

## 2022-11-13 PROCEDURE — 83036 HEMOGLOBIN GLYCOSYLATED A1C: CPT | Performed by: INTERNAL MEDICINE

## 2022-11-13 PROCEDURE — 72128 CT CHEST SPINE W/O DYE: CPT | Mod: MA

## 2022-11-13 PROCEDURE — 99285 EMERGENCY DEPT VISIT HI MDM: CPT | Mod: 25

## 2022-11-13 PROCEDURE — 70450 CT HEAD/BRAIN W/O DYE: CPT | Mod: MA

## 2022-11-13 PROCEDURE — G0378 HOSPITAL OBSERVATION PER HR: HCPCS

## 2022-11-13 PROCEDURE — 250N000013 HC RX MED GY IP 250 OP 250 PS 637: Performed by: EMERGENCY MEDICINE

## 2022-11-13 PROCEDURE — 36415 COLL VENOUS BLD VENIPUNCTURE: CPT | Performed by: EMERGENCY MEDICINE

## 2022-11-13 PROCEDURE — 80048 BASIC METABOLIC PNL TOTAL CA: CPT | Performed by: EMERGENCY MEDICINE

## 2022-11-13 PROCEDURE — 93005 ELECTROCARDIOGRAM TRACING: CPT

## 2022-11-13 PROCEDURE — U0003 INFECTIOUS AGENT DETECTION BY NUCLEIC ACID (DNA OR RNA); SEVERE ACUTE RESPIRATORY SYNDROME CORONAVIRUS 2 (SARS-COV-2) (CORONAVIRUS DISEASE [COVID-19]), AMPLIFIED PROBE TECHNIQUE, MAKING USE OF HIGH THROUGHPUT TECHNOLOGIES AS DESCRIBED BY CMS-2020-01-R: HCPCS | Performed by: EMERGENCY MEDICINE

## 2022-11-13 PROCEDURE — 72131 CT LUMBAR SPINE W/O DYE: CPT | Mod: MA

## 2022-11-13 PROCEDURE — 82962 GLUCOSE BLOOD TEST: CPT

## 2022-11-13 PROCEDURE — 81003 URINALYSIS AUTO W/O SCOPE: CPT | Performed by: EMERGENCY MEDICINE

## 2022-11-13 PROCEDURE — 99220 PR INITIAL OBSERVATION CARE,LEVEL III: CPT | Performed by: INTERNAL MEDICINE

## 2022-11-13 PROCEDURE — 85025 COMPLETE CBC W/AUTO DIFF WBC: CPT | Performed by: EMERGENCY MEDICINE

## 2022-11-13 PROCEDURE — C9803 HOPD COVID-19 SPEC COLLECT: HCPCS

## 2022-11-13 RX ORDER — SERTRALINE HYDROCHLORIDE 25 MG/1
25 TABLET, FILM COATED ORAL EVERY EVENING
Status: DISCONTINUED | OUTPATIENT
Start: 2022-11-14 | End: 2022-11-15 | Stop reason: HOSPADM

## 2022-11-13 RX ORDER — ONDANSETRON 2 MG/ML
4 INJECTION INTRAMUSCULAR; INTRAVENOUS EVERY 6 HOURS PRN
Status: DISCONTINUED | OUTPATIENT
Start: 2022-11-13 | End: 2022-11-15 | Stop reason: HOSPADM

## 2022-11-13 RX ORDER — METFORMIN HCL 500 MG
500 TABLET, EXTENDED RELEASE 24 HR ORAL 2 TIMES DAILY WITH MEALS
Status: DISCONTINUED | OUTPATIENT
Start: 2022-11-14 | End: 2022-11-15 | Stop reason: HOSPADM

## 2022-11-13 RX ORDER — HYDROCHLOROTHIAZIDE 25 MG/1
25 TABLET ORAL DAILY
Status: DISCONTINUED | OUTPATIENT
Start: 2022-11-14 | End: 2022-11-13

## 2022-11-13 RX ORDER — METHOCARBAMOL 500 MG/1
500 TABLET, FILM COATED ORAL 4 TIMES DAILY PRN
Status: DISCONTINUED | OUTPATIENT
Start: 2022-11-13 | End: 2022-11-15 | Stop reason: HOSPADM

## 2022-11-13 RX ORDER — ACETAMINOPHEN 325 MG/1
975 TABLET ORAL 3 TIMES DAILY
Status: DISCONTINUED | OUTPATIENT
Start: 2022-11-14 | End: 2022-11-15 | Stop reason: HOSPADM

## 2022-11-13 RX ORDER — SIMVASTATIN 20 MG
20 TABLET ORAL AT BEDTIME
Status: DISCONTINUED | OUTPATIENT
Start: 2022-11-13 | End: 2022-11-15 | Stop reason: HOSPADM

## 2022-11-13 RX ORDER — HYDROCHLOROTHIAZIDE 25 MG/1
25 TABLET ORAL DAILY
Status: DISCONTINUED | OUTPATIENT
Start: 2022-11-13 | End: 2022-11-14

## 2022-11-13 RX ORDER — LIDOCAINE 4 G/G
1 PATCH TOPICAL
Status: DISCONTINUED | OUTPATIENT
Start: 2022-11-14 | End: 2022-11-15 | Stop reason: HOSPADM

## 2022-11-13 RX ORDER — AMOXICILLIN 250 MG
1 CAPSULE ORAL 2 TIMES DAILY PRN
Status: DISCONTINUED | OUTPATIENT
Start: 2022-11-13 | End: 2022-11-15 | Stop reason: HOSPADM

## 2022-11-13 RX ORDER — NICOTINE POLACRILEX 4 MG
15-30 LOZENGE BUCCAL
Status: DISCONTINUED | OUTPATIENT
Start: 2022-11-13 | End: 2022-11-15 | Stop reason: HOSPADM

## 2022-11-13 RX ORDER — ACETAMINOPHEN 325 MG/1
650 TABLET ORAL ONCE
Status: COMPLETED | OUTPATIENT
Start: 2022-11-13 | End: 2022-11-13

## 2022-11-13 RX ORDER — ASPIRIN 81 MG/1
81 TABLET ORAL DAILY
Status: DISCONTINUED | OUTPATIENT
Start: 2022-11-14 | End: 2022-11-15 | Stop reason: HOSPADM

## 2022-11-13 RX ORDER — DEXTROSE MONOHYDRATE 25 G/50ML
25-50 INJECTION, SOLUTION INTRAVENOUS
Status: DISCONTINUED | OUTPATIENT
Start: 2022-11-13 | End: 2022-11-15 | Stop reason: HOSPADM

## 2022-11-13 RX ORDER — ONDANSETRON 4 MG/1
4 TABLET, ORALLY DISINTEGRATING ORAL EVERY 6 HOURS PRN
Status: DISCONTINUED | OUTPATIENT
Start: 2022-11-13 | End: 2022-11-15 | Stop reason: HOSPADM

## 2022-11-13 RX ADMIN — SIMVASTATIN 20 MG: 20 TABLET, FILM COATED ORAL at 22:59

## 2022-11-13 RX ADMIN — ACETAMINOPHEN 650 MG: 325 TABLET, FILM COATED ORAL at 16:21

## 2022-11-13 RX ADMIN — HYDROCHLOROTHIAZIDE 25 MG: 25 TABLET ORAL at 23:44

## 2022-11-13 RX ADMIN — HYDROMORPHONE HYDROCHLORIDE 1 MG: 2 TABLET ORAL at 23:05

## 2022-11-13 ASSESSMENT — ACTIVITIES OF DAILY LIVING (ADL)
ADLS_ACUITY_SCORE: 35
ADLS_ACUITY_SCORE: 33
ADLS_ACUITY_SCORE: 35

## 2022-11-13 ASSESSMENT — ENCOUNTER SYMPTOMS
BACK PAIN: 1
DIZZINESS: 0
NUMBNESS: 0
SHORTNESS OF BREATH: 0
WEAKNESS: 1

## 2022-11-13 NOTE — TELEPHONE ENCOUNTER
Nurse Triage SBAR    Is this a 2nd Level Triage? NO    Situation: Patient having worsening back pain - reports severe - increased falls    Background: Daughter Lizz is calling today- not on consent- able to call her dad to add him to the call    Assessment: was in the hospital in October  Spinal stenosis  Severe lower back pain  Was in TCU for 10 days   Home for about one week- tylenol and lidocaine patch, robaxin as well  Pain has not been well controlled  Rating pain 9/10 - states patient has been unable to find a position of comfort- just woke up from a nap 8/10  - states patient fell last night 3 times   Unable to sit or stand without muscle spasms  Poor appetite   Increased confusion and missing furniture  Modesta Ca MD       10:25 AM  Note     The patients daughter called the on call line on Sunday saying her father is in severe back pain.      Recommended that they take him to the ER to be seen.      Modesta Ca MD             Protocol Recommended Disposition:   No disposition on file.    Recommendation: Upon chart review - daughter had already spoken to an MD in pain management- was advised to take him to the ER   Advised that patient should be seen in the ER per MD advice    ER now     Does the patient meet one of the following criteria for ADS visit consideration? 16+ years old, with an [facility] PCP     TIP  Providers, please consider if this condition is appropriate for management at one of our Acute and Diagnostic Services sites.     If patient is a good candidate, please use dotphrase <dot>triageresponse and select Refer to ADS to document.    Reason for Disposition    [1] SEVERE back pain (e.g., excruciating) AND [2] sudden onset AND [3] age > 60 years    Additional Information    Negative: Passed out (i.e., lost consciousness, collapsed and was not responding)    Negative: Shock suspected (e.g., cold/pale/clammy skin, too weak to stand, low BP, rapid pulse)    Negative: Sounds like a  life-threatening emergency to the triager    Negative: Major injury to the back (e.g., MVA, fall > 10 feet or 3 meters, penetrating injury, etc.)    Negative: Followed a tailbone injury    Negative: [1] Pain in the upper back over the ribs (rib cage) AND [2] radiates (travels, goes) into chest    Negative: [1] Pain in the upper back over the ribs (rib cage) AND [2] worsened by coughing (or clearly increases with breathing)    Negative: Back pain during pregnancy    Negative: Pain mainly in flank (i.e., in the side, over the lower ribs or just below the ribs)    Protocols used: BACK PAIN-A-AH

## 2022-11-13 NOTE — TELEPHONE ENCOUNTER
The patients daughter called the on call line on Sunday saying her father is in severe back pain.     Recommended that they take him to the ER to be seen.     Modesta Ca MD

## 2022-11-13 NOTE — ED PROVIDER NOTES
History   Chief Complaint:  Extremity Weakness       The history is provided by the patient and the spouse (History is supplemented by his wife).      Reid Jimenes is a 88 year old male with history of TAVR, pacemaker, dementia, hyperlipidemia, DM 2, hypertension, STEMI, and chronic low back pain who presents after 3 falls last night due to recent increase in generalized weakness and lower back pain. Per wife's report, last night he was unable to sleep due to discomfort with the pain. He would get up from bed frequently, walk around and try to sit in different chairs to feel more comfortable. While he was walking around, he had 3 falls, 2 were witnessed by his wife. He denies loss of consciousness, which his wife also denies, and is unable to recall if he felt dizzy or lightheaded prior to the falls. Wife notes that he had a similar series of falls on Wednesday, 4 days ago. He has an upcoming appointment with the pain clinic for a cortisone injection in 2 weeks. His wife reports that he was seen in the ED last month for similar symptoms and was admitted. While he was in the hospital, their primary concern was his back pain. He was then transferred to TCU where his care team was primarily concerned of his dementia. Since then, he has been home for about 1 week. His wife and daughter have been trying to bring him to memory care but has been denied at [facility] where they currently live independently due to concerns of aggression as seen at TCU. Currently, the patient denies any shortness of breath.    Review of Systems   Respiratory: Negative for shortness of breath.    Musculoskeletal: Positive for back pain (lower).   Neurological: Positive for weakness. Negative for dizziness and numbness.   All other systems reviewed and are negative.        Allergies:  Fentanyl  Versed [Midazolam]    Medications:  Aspirin  Metformin  Robaxin  Sertraline  Simvastatin  Trazodone     Past Medical History:     Hyperlipidemia   DM  type 2  Peripheral vascular disease  Hypertension   Bilateral carotid artery obstruction   Acute diffuse otitis externa of both ears   Pseudoaneurysm of femoral artery   CAD   Aortic valve stenosis   Left bundle branch block   Syncope   STEMI   Complete heart block   Cardiac pacemaker in situ   Facet arthroplasty  DDD  Lumbosacral spine stenosis   Right-sided low back pain without sciatica  Alcohol dependence in remission    Occlusion of carotid artery without cerebral infarction     Past Surgical History:    CV heart catheterization   CV temporary pacemaker insertion   Mandible surgery   Transcatheter aortic valve implant     Family History:    Stomach cancer  Alcohol/drug use  Myocardial infarction  Stroke     Social History:  The patient presents in a private vehicle  The patient presents with his wife  PCP: Viet Bingham MD    Physical Exam     Patient Vitals for the past 24 hrs:   BP Temp Pulse Resp SpO2   11/13/22 1826 -- -- -- -- 97 %   11/13/22 1825 -- -- -- -- 97 %   11/13/22 1824 -- -- -- -- 97 %   11/13/22 1823 -- -- -- -- 97 %   11/13/22 1822 -- -- -- -- 97 %   11/13/22 1820 -- -- -- -- 96 %   11/13/22 1819 -- -- -- -- 97 %   11/13/22 1319 -- -- -- -- 98 %   11/13/22 1317 138/55 97.8  F (36.6  C) 73 16 91 %       Physical Exam    HENT:  mmm, no rhinorrhea  Eyes: periorbital tissues and sclera normal   Neck: supple, no abnormal swelling  Lungs:  CTAB,  no resp distress  CV: rrr, no m/r/g, ppi  Abd: soft, nontender, nondistended, no rebound/masses/guarding/hsm  Ext: no peripheral edema, + mild ttp L spine in midline, no step off or deformity, no C or mid/prox T spine ttp   Skin: warm, dry, well perfused, no rashes/bruising/lesions on exposed skin  Neuro: alert, BUE 5/5 motor, 5/5 pf/df, able ot do SLR BLE, SILT all ext   Psych: Normal mood, normal affect      Emergency Department Course   ECG  ECG taken at 1601, ECG read at 1601  Sinus rhythm with 1st degree AV block.  Left bundle branch  block.  Abnormal ECG.  Rate 76 bpm. FL interval 304 ms. QRS duration 164 ms. QT/QTc 422/474 ms. P-R-T axis 80 -18 126.    Imaging:  Lumbar spine CT w/o contrast   Final Result   IMPRESSION:   1.  Interval development of moderate to high-grade compression fracture L1 new from 03/21/2022. Mild retropulsion and mild canal compromise noted with small degree of paraspinous/paravertebral edema. No epidural hemorrhage. No traumatic disc herniations.    Interspace widening suggested T12-L1 compared with prior study may reflect an element of anterior tension band disruption. Fracture line extends to the left L1 pedicle. Gas/vacuum phenomenon within the T12-L1 interspace presumed posttraumatic with    subchondral cleft of gas density superior L1. Etiology suspected traumatic in the setting of underlying demineralization. No evidence for marrow infiltrative process or aggressive lesions noted.      2.  Stable moderate to severe spinal stenosis L3-L4. Stable moderate to severe right L4-L5 foraminal compromise. This is due to generalized disc bulging.      3.  No new or progressive severe spinal stenosis or foraminal compromise otherwise is present.      4.  Otherwise stable height/alignment from 03/21/2022.      5.  See above for description and details.      CT Thoracic Spine w/o Contrast   Final Result   IMPRESSION:   1.  No acute fracture or posttraumatic malalignment is evident within the thoracic spine.      2.  No spinal stenosis or foraminal compromise at any thoracic level.      3.  Cardiac pacer.      4.  L1 compression fracture not well-seen on this examination, described on CT lumbar spine separate report.      5.  Articular pillars are intact.         Head CT w/o contrast   Final Result   IMPRESSION:   1.  No CT evidence for acute intracranial process.   2.  Brain atrophy and presumed chronic microvascular ischemic changes as above.        Report per radiology    Laboratory:  Labs Ordered and Resulted from Time of  ED Arrival to Time of ED Departure   BASIC METABOLIC PANEL - Abnormal       Result Value    Sodium 138      Potassium 4.5      Chloride 102      Carbon Dioxide (CO2) 26      Anion Gap 10      Urea Nitrogen 27.8 (*)     Creatinine 1.10      Calcium 9.3      Glucose 152 (*)     GFR Estimate 65     ROUTINE UA WITH MICROSCOPIC REFLEX TO CULTURE - Abnormal    Color Urine Yellow      Appearance Urine Clear      Glucose Urine Negative      Bilirubin Urine Negative      Ketones Urine Negative      Specific Gravity Urine 1.024      Blood Urine Negative      pH Urine 5.0      Protein Albumin Urine 10 (*)     Urobilinogen Urine Normal      Nitrite Urine Negative      Leukocyte Esterase Urine Negative      Mucus Urine Present (*)     RBC Urine <1      WBC Urine <1      Squamous Epithelials Urine <1      Hyaline Casts Urine 3 (*)    CBC WITH PLATELETS AND DIFFERENTIAL - Abnormal    WBC Count 7.8      RBC Count 4.23 (*)     Hemoglobin 12.4 (*)     Hematocrit 38.3 (*)     MCV 91      MCH 29.3      MCHC 32.4      RDW 13.2      Platelet Count 225      % Neutrophils 57      % Lymphocytes 27      % Monocytes 12      % Eosinophils 4      % Basophils 0      % Immature Granulocytes 0      NRBCs per 100 WBC 0      Absolute Neutrophils 4.4      Absolute Lymphocytes 2.1      Absolute Monocytes 1.0      Absolute Eosinophils 0.3      Absolute Basophils 0.0      Absolute Immature Granulocytes 0.0      Absolute NRBCs 0.0     COVID-19 VIRUS (CORONAVIRUS) BY PCR - Normal    SARS CoV2 PCR Negative          Emergency Department Course:    Reviewed:  I reviewed nursing notes, vitals, past medical history and Care Everywhere    Assessments:  1543 I obtained history and examined the patient as noted above.   1750 I rechecked the patient and explained findings.     Consults:  1826 I consulted with Dr. Israel, hospitalist, regarding the patient's history and presentation here in the emergency department who accepted the patient for  admission.    Interventions:  1621 Tylenol 650 mg PO    Disposition:  The patient was admitted to the hospital under the care of Dr. Israel.     Impression & Plan     Medical Decision Makin-year-old male with frequent falls and increasing lower back pain.  No focal neurologic deficits on examination here.  Vitals unremarkable.  Imaging showing L1 compression fracture we will presume there is an acute component to it given the increasing pain and recent falls.  Recommending admission for symptom control safe mobilization.  May need to readdress his ability to safely live at home.    Diagnosis:    ICD-10-CM    1. Falls frequently  R29.6       2. Compression fracture of L1 vertebra, initial encounter (H)  S32.010A           Scribe Disclosure:  Ellen BAZAN, am serving as a scribe at 3:52 PM on 2022 to document services personally performed by Camilo Yoder MD based on my observations and the provider's statements to me.            Camilo Yoder MD  [phone]

## 2022-11-13 NOTE — ED TRIAGE NOTES
Patient presents to ED d/t generalized weakness, back pain and falls. Wife reports patient has f/o appointment with pain clinic for cortisone injection 2 weeks.   has been frequently falling . Had 3 falls yesterday, wife witnessed 2 of them. Fell back wards landing on buttock and hitting back. Denies LOC.   Was told to be evaluated for compression fractures to back. Denies Neck pain at this time     Hard of hearing

## 2022-11-14 ENCOUNTER — APPOINTMENT (OUTPATIENT)
Dept: PHYSICAL THERAPY | Facility: CLINIC | Age: 87
End: 2022-11-14
Attending: INTERNAL MEDICINE
Payer: MEDICARE

## 2022-11-14 ENCOUNTER — TELEPHONE (OUTPATIENT)
Dept: INTERNAL MEDICINE | Facility: CLINIC | Age: 87
End: 2022-11-14

## 2022-11-14 DIAGNOSIS — S32.010A CLOSED WEDGE COMPRESSION FRACTURE OF L1 VERTEBRA, INITIAL ENCOUNTER (H): Primary | ICD-10-CM

## 2022-11-14 LAB
ATRIAL RATE - MUSE: 76 BPM
DIASTOLIC BLOOD PRESSURE - MUSE: NORMAL MMHG
GLUCOSE BLDC GLUCOMTR-MCNC: 139 MG/DL (ref 70–99)
GLUCOSE BLDC GLUCOMTR-MCNC: 145 MG/DL (ref 70–99)
GLUCOSE BLDC GLUCOMTR-MCNC: 156 MG/DL (ref 70–99)
GLUCOSE BLDC GLUCOMTR-MCNC: 202 MG/DL (ref 70–99)
INTERPRETATION ECG - MUSE: NORMAL
P AXIS - MUSE: 80 DEGREES
PR INTERVAL - MUSE: 304 MS
QRS DURATION - MUSE: 164 MS
QT - MUSE: 422 MS
QTC - MUSE: 474 MS
R AXIS - MUSE: -18 DEGREES
SYSTOLIC BLOOD PRESSURE - MUSE: NORMAL MMHG
T AXIS - MUSE: 126 DEGREES
VENTRICULAR RATE- MUSE: 76 BPM

## 2022-11-14 PROCEDURE — 82962 GLUCOSE BLOOD TEST: CPT | Mod: 91

## 2022-11-14 PROCEDURE — 250N000012 HC RX MED GY IP 250 OP 636 PS 637: Performed by: INTERNAL MEDICINE

## 2022-11-14 PROCEDURE — G0378 HOSPITAL OBSERVATION PER HR: HCPCS

## 2022-11-14 PROCEDURE — 250N000013 HC RX MED GY IP 250 OP 250 PS 637: Performed by: INTERNAL MEDICINE

## 2022-11-14 PROCEDURE — 99225 PR SUBSEQUENT OBSERVATION CARE,LEVEL II: CPT | Performed by: INTERNAL MEDICINE

## 2022-11-14 PROCEDURE — 999N000111 HC STATISTIC OT IP EVAL DEFER

## 2022-11-14 PROCEDURE — 96374 THER/PROPH/DIAG INJ IV PUSH: CPT

## 2022-11-14 PROCEDURE — 97161 PT EVAL LOW COMPLEX 20 MIN: CPT | Mod: GP | Performed by: PHYSICAL THERAPIST

## 2022-11-14 PROCEDURE — G0463 HOSPITAL OUTPT CLINIC VISIT: HCPCS | Performed by: PHYSICIAN ASSISTANT

## 2022-11-14 PROCEDURE — 250N000011 HC RX IP 250 OP 636: Performed by: INTERNAL MEDICINE

## 2022-11-14 PROCEDURE — G0463 HOSPITAL OUTPT CLINIC VISIT: HCPCS | Mod: 25 | Performed by: PHYSICIAN ASSISTANT

## 2022-11-14 PROCEDURE — 96372 THER/PROPH/DIAG INJ SC/IM: CPT | Performed by: INTERNAL MEDICINE

## 2022-11-14 PROCEDURE — L0464 TLSO 4MOD SACRO-SCAP PRE: HCPCS

## 2022-11-14 RX ORDER — HALOPERIDOL 5 MG/ML
2 INJECTION INTRAMUSCULAR EVERY 6 HOURS PRN
Status: DISCONTINUED | OUTPATIENT
Start: 2022-11-14 | End: 2022-11-15 | Stop reason: HOSPADM

## 2022-11-14 RX ADMIN — DICLOFENAC SODIUM 4 G: 10 GEL TOPICAL at 18:24

## 2022-11-14 RX ADMIN — INSULIN ASPART 2 UNITS: 100 INJECTION, SOLUTION INTRAVENOUS; SUBCUTANEOUS at 08:19

## 2022-11-14 RX ADMIN — TRAZODONE HYDROCHLORIDE 25 MG: 50 TABLET ORAL at 04:24

## 2022-11-14 RX ADMIN — SERTRALINE HYDROCHLORIDE 25 MG: 25 TABLET ORAL at 19:53

## 2022-11-14 RX ADMIN — SIMVASTATIN 20 MG: 20 TABLET, FILM COATED ORAL at 23:26

## 2022-11-14 RX ADMIN — HALOPERIDOL LACTATE 2 MG: 5 INJECTION, SOLUTION INTRAMUSCULAR at 18:16

## 2022-11-14 RX ADMIN — ACETAMINOPHEN 975 MG: 325 TABLET, FILM COATED ORAL at 14:02

## 2022-11-14 RX ADMIN — DICLOFENAC SODIUM 2 G: 10 GEL TOPICAL at 07:59

## 2022-11-14 RX ADMIN — LIDOCAINE 1 PATCH: 246 PATCH TOPICAL at 13:59

## 2022-11-14 RX ADMIN — METFORMIN HYDROCHLORIDE 500 MG: 500 TABLET, EXTENDED RELEASE ORAL at 19:10

## 2022-11-14 RX ADMIN — ACETAMINOPHEN 975 MG: 325 TABLET, FILM COATED ORAL at 19:53

## 2022-11-14 RX ADMIN — ACETAMINOPHEN 975 MG: 325 TABLET, FILM COATED ORAL at 07:56

## 2022-11-14 RX ADMIN — ASPIRIN 81 MG: 81 TABLET, COATED ORAL at 07:56

## 2022-11-14 RX ADMIN — DICLOFENAC SODIUM 4 G: 10 GEL TOPICAL at 14:01

## 2022-11-14 RX ADMIN — METFORMIN HYDROCHLORIDE 500 MG: 500 TABLET, EXTENDED RELEASE ORAL at 07:56

## 2022-11-14 RX ADMIN — INSULIN ASPART 1 UNITS: 100 INJECTION, SOLUTION INTRAVENOUS; SUBCUTANEOUS at 18:22

## 2022-11-14 RX ADMIN — HYDROCHLOROTHIAZIDE 25 MG: 25 TABLET ORAL at 07:56

## 2022-11-14 RX ADMIN — DICLOFENAC SODIUM 2 G: 10 GEL TOPICAL at 23:28

## 2022-11-14 ASSESSMENT — ACTIVITIES OF DAILY LIVING (ADL)
ADLS_ACUITY_SCORE: 35
ADLS_ACUITY_SCORE: 43
DEPENDENT_IADLS:: CLEANING;COOKING;LAUNDRY;SHOPPING;MEAL PREPARATION;MEDICATION MANAGEMENT;MONEY MANAGEMENT;TRANSPORTATION
ADLS_ACUITY_SCORE: 37
ADLS_ACUITY_SCORE: 43
ADLS_ACUITY_SCORE: 37
ADLS_ACUITY_SCORE: 43
ADLS_ACUITY_SCORE: 37

## 2022-11-14 NOTE — PROVIDER NOTIFICATION
Pt confused and very agitated. attempting to leave hospital. Says his ride is here to take him home and want to talk to the principal. Pt has been redirected and now having a sitter but he is refusing to go back to his room. Please advise.

## 2022-11-14 NOTE — ED NOTES
[facility]  ED Nurse Handoff Report    Reid Jimenes is a 88 year old male   ED Chief complaint: Extremity Weakness  . ED Diagnosis:   Final diagnoses:   None     Allergies:   Allergies   Allergen Reactions     Fentanyl Other (See Comments)     Confusion and agitation after PPM     Versed [Midazolam] Other (See Comments)     Confusion and agitation post PPM       Code Status: Full Code  Activity level - Baseline/Home:  Stand by Assist. Activity Level - Current:   Assist X 2. Lift room needed: No. Bariatric: No   Needed: No   Isolation: No. Infection: Not Applicable.     Vital Signs:   Vitals:    11/13/22 1823 11/13/22 1824 11/13/22 1825 11/13/22 1826   BP:       Pulse:       Resp:       Temp:       SpO2: 97% 97% 97% 97%       Cardiac Rhythm:  ,      Pain level:    Patient confused: No. Patient Falls Risk: Yes.   Elimination Status: Has voided   Patient Report - Initial Complaint: Patient has had more frequent calls with pain in his back . Focused Assessment: Patient has had an increase in his falls with associated weakness patient fell 3 times yesterday. Patient had a cortisone shot 2wks ago  Tests Performed: Labs, Imaging. Abnormal Results:   Lumbar spine CT w/o contrast   Final Result   IMPRESSION:   1.  Interval development of moderate to high-grade compression fracture L1 new from 03/21/2022. Mild retropulsion and mild canal compromise noted with small degree of paraspinous/paravertebral edema. No epidural hemorrhage. No traumatic disc herniations.    Interspace widening suggested T12-L1 compared with prior study may reflect an element of anterior tension band disruption. Fracture line extends to the left L1 pedicle. Gas/vacuum phenomenon within the T12-L1 interspace presumed posttraumatic with    subchondral cleft of gas density superior L1. Etiology suspected traumatic in the setting of underlying demineralization. No evidence for marrow infiltrative process or aggressive lesions  noted.      2.  Stable moderate to severe spinal stenosis L3-L4. Stable moderate to severe right L4-L5 foraminal compromise. This is due to generalized disc bulging.      3.  No new or progressive severe spinal stenosis or foraminal compromise otherwise is present.      4.  Otherwise stable height/alignment from 03/21/2022.      5.  See above for description and details.      CT Thoracic Spine w/o Contrast   Final Result   IMPRESSION:   1.  No acute fracture or posttraumatic malalignment is evident within the thoracic spine.      2.  No spinal stenosis or foraminal compromise at any thoracic level.      3.  Cardiac pacer.      4.  L1 compression fracture not well-seen on this examination, described on CT lumbar spine separate report.      5.  Articular pillars are intact.         Head CT w/o contrast   Final Result   IMPRESSION:   1.  No CT evidence for acute intracranial process.   2.  Brain atrophy and presumed chronic microvascular ischemic changes as above.        Labs Ordered and Resulted from Time of ED Arrival to Time of ED Departure   BASIC METABOLIC PANEL - Abnormal       Result Value    Sodium 138      Potassium 4.5      Chloride 102      Carbon Dioxide (CO2) 26      Anion Gap 10      Urea Nitrogen 27.8 (*)     Creatinine 1.10      Calcium 9.3      Glucose 152 (*)     GFR Estimate 65     ROUTINE UA WITH MICROSCOPIC REFLEX TO CULTURE - Abnormal    Color Urine Yellow      Appearance Urine Clear      Glucose Urine Negative      Bilirubin Urine Negative      Ketones Urine Negative      Specific Gravity Urine 1.024      Blood Urine Negative      pH Urine 5.0      Protein Albumin Urine 10 (*)     Urobilinogen Urine Normal      Nitrite Urine Negative      Leukocyte Esterase Urine Negative      Mucus Urine Present (*)     RBC Urine <1      WBC Urine <1      Squamous Epithelials Urine <1      Hyaline Casts Urine 3 (*)    CBC WITH PLATELETS AND DIFFERENTIAL - Abnormal    WBC Count 7.8      RBC Count 4.23 (*)      Hemoglobin 12.4 (*)     Hematocrit 38.3 (*)     MCV 91      MCH 29.3      MCHC 32.4      RDW 13.2      Platelet Count 225      % Neutrophils 57      % Lymphocytes 27      % Monocytes 12      % Eosinophils 4      % Basophils 0      % Immature Granulocytes 0      NRBCs per 100 WBC 0      Absolute Neutrophils 4.4      Absolute Lymphocytes 2.1      Absolute Monocytes 1.0      Absolute Eosinophils 0.3      Absolute Basophils 0.0      Absolute Immature Granulocytes 0.0      Absolute NRBCs 0.0     COVID-19 VIRUS (CORONAVIRUS) BY PCR - Normal    SARS CoV2 PCR Negative     .   Treatments provided: None  Family Comments: Wife updated but went home  OBS brochure/video discussed/provided to patient:  N/A  ED Medications:   Medications   acetaminophen (TYLENOL) tablet 650 mg (650 mg Oral Given 11/13/22 1621)     Drips infusing:  No  For the majority of the shift, the patient's behavior Green. Interventions performed were None.    Sepsis treatment initiated: No     Patient tested for COVID 19 prior to admission: YES    ED Nurse Name/Phone Number: Temi Santos RN,   6:26 PM      RECEIVING UNIT ED HANDOFF REVIEW    Above ED Nurse Handoff Report was reviewed: Yes  Reviewed by: Marley Mccallum RN on November 15, 2022 at 2:16 AM

## 2022-11-14 NOTE — CONSULTS
NEUROSURGERY CONSULT    Neurosurgery was asked by Dr. Israel to consult for a L2 fracture.      PLAN:    Mr. Jimenes's most recent imaging exhibits a compression fracture of L1 which is new from 03/21/2022. Based on his physical exam and imaging review, we do not see any indication for surgical intervention but will ask [facility] to stop by to fit and supply him with an off the shelf TLSO. We instructed the patient to wear the brace when out of bed, but may shower without the brace on. The brace will most likely be required for 3 months.    We feel that it would be in his best interest to proceed with a conservative approach by pain management set forth by the Medical team here at [facility]. We do suggest that he not lift anything greater than 5-10 pounds for six weeks and avoid excessive bending, twisting, and turning.     It has been a pleasure working with Mr. Jimenes. For now, he no longer requires additional in-patient Neurosurgical follow-up, but if concerns arise we would gladly assist with this. We did discuss signs of a worsening problem that he should seek being evaluated.     We will sign off at this point.     Please page our on-call service for any questions or concerns.     It has been a pleasure meeting Reid Jimenes. Thank you for having us be involved in his care.    ______________________________________________________________________    HPI:    Reid Jimenes is a pleasant 88 year old male who presented to the [facility] Emergency Department for acute on chronic lumbar spine pain and generalized weakness. He describes the symptoms as a constant,dull, ache that initiates in the midline low lumbar region and does not radiate nor is it accompanied with paresthesia. The symptoms have been present for one week approximately. He has suffered multiple falls. There are no bowel or bladder changes. No other concerns are voiced.    Past Medical History:   Diagnosis Date     Aortic valve  "stenosis, nonrheumatic     TAVR 18: 26mm Edward Sabino 3 valve     Coronary artery disease     cardiac cath 18: mild non-obstructive disease     High degree atrioventricular block 2021    DDD PM 6/10/2021     Hyperlipidaemia LDL goal < 100      Hypertension      Need for SBE (subacute bacterial endocarditis) prophylaxis      Pulmonary hypertension (H)      Type 2 diabetes mellitus (H)        Past Surgical History:   Procedure Laterality Date     CV HEART CATHETERIZATION WITH POSSIBLE INTERVENTION N/A 2021    Procedure: coronary angiogram;  Surgeon: Jayesh Rachel MD;  Location:  HEART CARDIAC CATH LAB     CV TEMPORARY PACEMAKER INSERTION N/A 2021    Procedure: Temporary Pacemaker Insertion;  Surgeon: Jayesh Rachel MD;  Location:  HEART CARDIAC CATH LAB     EP PACEMAKER N/A 6/10/2021    Procedure: EP Pacemaker;  Surgeon: Magdiel Silver MD;  Location:  HEART CARDIAC CATH LAB     MANDIBLE SURGERY       OTHER SURGICAL HISTORY      cardiac cath 18: mild non-obstructive disease     TRANSCATHETER AORTIC VALVE IMPLANT ANESTHESIA N/A 2018    Procedure: TRANSCATHETER AORTIC VALVE IMPLANT ANESTHESIA;  ANESTHESIA NEEDED FOR TAVR PROCEDURE;  Surgeon: GENERIC ANESTHESIA PROVIDER;  Location:  OR,  26mm Edward Sabino 3 valve       Allergies   Allergen Reactions     Fentanyl Other (See Comments)     Confusion and agitation after PPM     Versed [Midazolam] Other (See Comments)     Confusion and agitation post PPM       Social History     Tobacco Use     Smoking status: Former     Packs/day: 1.00     Years: 30.00     Pack years: 30.00     Types: Cigarettes     Start date:      Quit date: 1980     Years since quittin.2     Smokeless tobacco: Former     Types: Chew     Quit date: 1983   Substance Use Topics     Alcohol use: No     Comment: quit 30+ years ago. states, \"I'm an alcoholic\".       Family History   Problem Relation Age of Onset     Cancer " Mother 58         in her 50's, had throat and stomach cancer     Alcohol/Drug Mother      Heart Disease Father          about age 49     Alcohol/Drug Father      Myocardial Infarction Sister 70        Two heart attacks     Alcohol/Drug Sister      Cerebrovascular Disease Brother 65         age 65 of stroke     Alcohol/Drug Brother      Cancer Maternal Grandmother         stomach cancer     Alcohol/Drug Maternal Grandmother      Alcohol/Drug Maternal Grandfather      Alcohol/Drug Paternal Grandmother      Alcohol/Drug Paternal Grandfather      Myocardial Infarction Grandchild 18        Grandson     Myocardial Infarction Other 18        Nephew        Scheduled Medications      acetaminophen  975 mg Oral TID     aspirin  81 mg Oral Daily     diclofenac  2-4 g Topical 4x Daily     hydrochlorothiazide  25 mg Oral Daily     insulin aspart  1-7 Units Subcutaneous TID AC     insulin aspart  1-5 Units Subcutaneous At Bedtime     iohexol  10 mL INTRA-ARTICULAR Once     lidocaine  1 patch Transdermal Q24H    And     lidocaine   Transdermal Q8H LISA     metFORMIN  500 mg Oral BID w/meals     sertraline  25 mg Oral Daily     simvastatin  20 mg Oral At Bedtime       Home Medications    Aspirin  Metformin  Robaxin  Sertraline  Simvastatin  Trazodone     PRN Medications    glucose **OR** dextrose **OR** glucagon, HYDROmorphone, melatonin, methocarbamol, ondansetron **OR** ondansetron, senna-docusate, traZODone    ROS: 10 point ROS neg other than the symptoms noted above in the HPI.    Vitals:    BP (!) 161/85   Pulse 104   Temp 97.8  F (36.6  C)   Resp 16   SpO2 98%   There is no height or weight on file to calculate BMI.  No intake/output data recorded.    Exam:    Patient appears comfortable, conversational, and in no apparent distress.   Head: Normocephalic, without obvious abnormality, atraumatic, no facial asymmetry.   Eyes: conjunctivae/corneas clear. PERRL, EOM's intact.   Throat: lips, mucosa, and tongue  normal; teeth and gums normal.   Neck: supple, symmetrical, trachea midline, no adenopathy and thyroid: not enlarged, symmetric, no tenderness/mass/nodules.   Lungs: clear to auscultation bilaterally.   Heart: regular rate and rhythm.   Abdomen: soft, non-tender; bowel sounds normal; no masses, no organomegaly.   Pulses: 2+ and symmetric.   Skin: Skin color, texture, turgor normal. No rashes or lesions.     CN II-XII grossly intact, alert and appropriate with conversation and following commands.   Cervical spine is non tender to palpation. Appropriate range of motion of neck, not concerning for lhermitte's phenomenon.   Bilateral bicep 2/4 and tricep reflexes 1/4. Sensation intact throughout upper extremities.     UE muscle strength  Right  Left    Deltoid  5/5  5/5    Biceps  5/5  5/5    Triceps  5/5  5/5    Hand intrinsics  5/5  5/5    Hand grasp  5/5  5/5    Allison signs  neg  neg      Lumbar spine is non tender to palpation   Intact sensation throughout lower extremities.   Bilateral patellar 2/4 and achilles reflex 1/4.     LE muscle strength  Right  Left    Iliopsoas (hip flexion)  5/5  5/5    Quad (knee extension)  5/5  5/5    Hamstring (knee flexion)  5/5  5/5    Gastrocnemius (PF)  5/5  5/5    Tibialis Ant. (DF)  5/5  5/5    EHL  5/5  5/5      Negative Babinski bilaterally. Negative for clonus.   Calves are soft and non-tender bilaterally.     ECG/Telemetry:  ECG taken at 1601, ECG read at 1601  Sinus rhythm with 1st degree AV block.  Left bundle branch block.  Abnormal ECG.  Rate 76 bpm. MN interval 304 ms. QRS duration 164 ms. QT/QTc 422/474 ms. P-R-T axis 80 -18 126.    Imaging: : CT LUMBAR SPINE W/O CONTRAST - 11/13/2022 5:01 PM  Impression per radiology read - I have personally reviewed the images with the patient.    1.  Interval development of moderate to high-grade compression fracture L1 new from 03/21/2022. Mild retropulsion and mild canal compromise noted with small degree of  paraspinous/paravertebral edema. No epidural hemorrhage. No traumatic disc herniations.   Interspace widening suggested T12-L1 compared with prior study may reflect an element of anterior tension band disruption. Fracture line extends to the left L1 pedicle. Gas/vacuum phenomenon within the T12-L1 interspace presumed posttraumatic with   subchondral cleft of gas density superior L1. Etiology suspected traumatic in the setting of underlying demineralization. No evidence for marrow infiltrative process or aggressive lesions noted.     2.  Stable moderate to severe spinal stenosis L3-L4. Stable moderate to severe right L4-L5 foraminal compromise. This is due to generalized disc bulging.     3.  No new or progressive severe spinal stenosis or foraminal compromise otherwise is present.     4.  Otherwise stable height/alignment from 03/21/2022.    Available labs at time of consult:       Recent Labs   Lab 11/13/22  1616   WBC 7.8   HGB 12.4*   HCT 38.3*   MCV 91        Recent Labs   Lab 11/13/22  1616   WBC 7.8   HGB 12.4*   HCT 38.3*   MCV 91        No results for input(s): SED, CRP in the last 168 hours.  Recent Labs   Lab 11/13/22  1616   HGB 12.4*     No results for input(s): INR in the last 168 hours.  Recent Labs   Lab 11/13/22  1616        Recent Labs   Lab 11/13/22  1616   WBC 7.8         Respectfully,    SMILEY Sanchez, PAHOMERO  [facility] Neurosurgery  [facility]     Tel: [phone]      All imaging, physical findings, and the above plan have been reviewed with Dr. Zepeda.

## 2022-11-14 NOTE — PROGRESS NOTES
Pt alert and oriented to self only. Disoriented to place, time and situation. VSS. LS clear. BS +. Assist x 1 with gait belt. Voided in the bathroom. Back brace when OOB. On room air.    BP (!) 142/67 (BP Location: Left arm, Patient Position: Chair)   Pulse 70   Temp 97.8  F (36.6  C) (Oral)   Resp 16   SpO2 97%

## 2022-11-14 NOTE — PROGRESS NOTES
11/14/22 3970   Appointment Info   Signing Clinician's Name / Credentials (PT) Richar Black DPT   Living Environment   Living Environment Comments Per chart review pt lives in [facility] with spouse. Per  note, patient will be getting 4hrs of care/day this week.   Self-Care   Usual Activity Tolerance moderate   Current Activity Tolerance fair   Equipment Currently Used at Home walker, standard   Fall history within last six months yes   Number of times patient has fallen within last six months 2   General Information   Onset of Illness/Injury or Date of Surgery 11/13/22   Referring Physician Inocente Israel MD   Patient/Family Therapy Goals Statement (PT) None stated   Pertinent History of Current Problem (include personal factors and/or comorbidities that impact the POC) Per H&P, pt is a 88 year old male with history of dementia, type 2 diabetes, pacemaker in 2021, nonobstructive CAD, severe AS with TAVR admitted on 11/13/2022 with multiple falls with intractable back pain.  He was actually admitted for back pain in October and found to have severe degenerative changes at that time.  He was discharged to TCU then returned back to the [facility] where he resides with his wife.  There was consideration of transitioning him to a memory care unit as well.  He has been roaming at nighttime and fell trying to get into bed once and then fell a second time, hitting his back on a chair.   Existing Precautions/Restrictions fall;brace worn when out of bed   General Observations Pt   Cognition   Affect/Mental Status (Cognition) confused   Orientation Status (Cognition) oriented to;person   Follows Commands (Cognition) follows one-step commands;repetition of directions required;verbal cues/prompting required;50-74% accuracy;physical/tactile prompts required   Safety Deficit (Cognition) moderate deficit;at risk behavior observed   Pain Assessment   Patient Currently in Pain No   Posture    Posture  Forward head position;Protracted shoulders;Kyphosis   Range of Motion (ROM)   Range of Motion ROM is WFL   Strength (Manual Muscle Testing)   Strength (Manual Muscle Testing) Deficits observed during functional mobility   Strength Comments functionally demonstarted at least 3/5 B LE strength   Bed Mobility   Comment, (Bed Mobility) unable to assess during session   Transfers   Comment, (Transfers) sit <> Stand Deanna from folding chair   Gait/Stairs (Locomotion)   Comment, (Gait/Stairs) CGA w/o AD   Balance   Balance Comments fair standing balance w/o AD   Clinical Impression   Criteria for Skilled Therapeutic Intervention Yes, treatment indicated   PT Diagnosis (PT) impaired functional mobility   Influenced by the following impairments decreased B LE strength, impaired balance   Functional limitations due to impairments impaired bed mobility, transfers, ambulation   Clinical Presentation (PT Evaluation Complexity) Stable/Uncomplicated   Clinical Presentation Rationale Current status, home support, memory/cognition   Clinical Decision Making (Complexity) low complexity   Planned Therapy Interventions (PT) balance training;bed mobility training;gait training;patient/family education;strengthening;transfer training;progressive activity/exercise;neuromuscular re-education   Anticipated Equipment Needs at Discharge (PT) walker, standard   Clinical Impression Comments Limited evaluation d/t pt confusion and difficulty following commands.   PT Total Evaluation Time   PT Eval, Low Complexity Minutes (73630) 13   Physical Therapy Goals   PT Frequency 4x/week   PT Predicted Duration/Target Date for Goal Attainment 11/19/22   PT Goals Bed Mobility;Transfers;Gait   PT: Bed Mobility Modified independent;Supine to/from sit   PT: Transfers Supervision/stand-by assist;Sit to/from stand;Assistive device   PT: Gait Supervision/stand-by assist;Assistive device;50 feet   Interventions   Interventions Quick Adds Gait Training;Therapeutic  Activity   PT Discharge Planning   PT Plan progress ind with mobility   PT Discharge Recommendation (DC Rec) home with assist;home with home care physical therapy;Transitional Care Facility   PT Rationale for DC Rec Pt currently requiring Ax1 for mobility; most limited d/t cognition and ability to follow commands. Pt will need Ax1 with all OOB mobility, would benefit from use of FWW for stability if returning home. If not able to provide, would benefit from TCU stay. Pt may require more supportive living environment in future if spouse unable to provide support.   PT Brief overview of current status Ax1 with FWW   Total Session Time   Total Session Time (sum of timed and untimed services) 13

## 2022-11-14 NOTE — TELEPHONE ENCOUNTER
The Home Care/Assisted Living/Nursing Facility is calling regarding an established patient.  Has the patient seen Home Care in the past or is currently residing in Assisted Living or Nursing Facility? No.     NATALIE Martinez calling from [facility]  requesting the following orders that are NOT within the Home Care, Assisted Living or Nursing Home Eval and Treatment standing order and must be ordered by a Licensed Practitioner. Michelle states she called last Friday for orders, but never heard back from clinic. No record of home care order request being received.     Preferred Call Back Number: [phone]    Home Care Visits Continuation and PT/OT/Speech Therapy   PT, OT, Speech therapy - to evaluate and treat  Home health aide - 1x/week x8 weeks  Skilled nursing - 1x/week x8 weeks    Routing to Licensed Practitioner (Provider) to review request due to home care order being placed at TCU discharge and provide approval or recommendation. Michelle asks if another provider can review order request for patient since Dr. Bingham is out of the office today. Patient had appointment with Dr. Coleman 11/9/22, routed to Dr. Coleman to review.     Indira Mena RN  [facility]

## 2022-11-14 NOTE — CONSULTS
Care Management Initial Consult    General Information  Assessment completed with: Spouse or significant other, Children, VM-chart review, Wife, daughter on speaker, chart review  Type of CM/SW Visit: Initial Assessment    Primary Care Provider verified and updated as needed: No   Readmission within the last 30 days: previous discharge plan unsuccessful         Advance Care Planning: Advance Care Planning Reviewed: present on chart          Communication Assessment  Patient's communication style: spoken language (English or Bilingual)             Cognitive  Cognitive/Neuro/Behavioral: .WDL except, orientation  Level of Consciousness: confused  Arousal Level: opens eyes spontaneously  Orientation: disoriented to, place, time, situation  Mood/Behavior: calm, cooperative  Best Language: 0 - No aphasia  Speech: clear, spontaneous    Living Environment:   People in home: spouse     Current living Arrangements: independent living facility      Able to return to prior arrangements: yes       Family/Social Support:  Care provided by: spouse/significant other, self, homecare agency  Provides care for: no one, unable/limited ability to care for self  Marital Status:   Children, Wife  Alyssa       Description of Support System: Supportive, Involved    Support Assessment: Caregiver experiencing high stress    Current Resources:   Patient receiving home care services:       Community Resources:    Equipment currently used at home:    Supplies currently used at home:      Employment/Financial:  Employment Status: retired        Financial Concerns: No concerns identified   Referral to Financial Worker: No       Lifestyle & Psychosocial Needs:  Social Determinants of Health     Tobacco Use: Medium Risk     Smoking Tobacco Use: Former     Smokeless Tobacco Use: Former     Passive Exposure: Not on file   Alcohol Use: Not At Risk     Frequency of Alcohol Consumption: Never     Average Number of Drinks: Not on file     Frequency  of Binge Drinking: Never   Financial Resource Strain: Low Risk      Difficulty of Paying Living Expenses: Not hard at all   Food Insecurity: No Food Insecurity     Worried About Running Out of Food in the Last Year: Never true     Ran Out of Food in the Last Year: Never true   Transportation Needs: No Transportation Needs     Lack of Transportation (Medical): No     Lack of Transportation (Non-Medical): No   Physical Activity: Inactive     Days of Exercise per Week: 0 days     Minutes of Exercise per Session: 0 min   Stress: No Stress Concern Present     Feeling of Stress : Not at all   Social Connections: Socially Integrated     Frequency of Communication with Friends and Family: Twice a week     Frequency of Social Gatherings with Friends and Family: Twice a week     Attends Restorationism Services: More than 4 times per year     Active Member of Clubs or Organizations: Yes     Attends Club or Organization Meetings: Never     Marital Status:    Intimate Partner Violence: Not At Risk     Fear of Current or Ex-Partner: No     Emotionally Abused: No     Physically Abused: No     Sexually Abused: No   Depression: Not at risk     PHQ-2 Score: 2   Housing Stability: Low Risk      Unable to Pay for Housing in the Last Year: No     Number of Places Lived in the Last Year: 1     Unstable Housing in the Last Year: No       Functional Status:  Prior to admission patient needed assistance:   Dependent ADLs:: Ambulation-walker  Dependent IADLs:: Cleaning, Cooking, Laundry, Shopping, Meal Preparation, Medication Management, Money Management, Transportation  Assesssment of Functional Status: Needs assistive devices (DME)    Mental Health Status:  Mental Health Status: No Current Concerns       Chemical Dependency Status:  Chemical Dependency Status: No Current Concerns             Values/Beliefs:  Spiritual, Cultural Beliefs, Restorationism Practices, Values that affect care: yes               Additional Information:  MYNOR spoke with  patient's wife who had daughter on the phone as well. Patient has dementia. Patient lives with [facility] at [facility].     Patient was recently at [facility] for TCU. Patient's wife states they had a negative experience as she felt like they worked more on his dementia than rehab, but she would have preferred both. Patient was in TCU 10/21/22-10/4/22 where he discharged with [facility]. SW waiting a response from home care to verify.     Patient's family has been working with an [facility] for resources. Patient has family in the area that can help out if patient's wife cannot. Patient's wife states they have hired private care to start Wednesday and come back Friday this week for 4 hours each day to start. SW asked wife if she thinks the patient needs a higher level of care.  Patient's wife states she does not know what to do and is experiencing stress. She would like SW to call back after PT recs. She would not be opposed to TCU again, just not at [facility]. Patient's wife states that they are well set up at home for the patient.     Addendum 1510: [facility] confirmed patient is open with home care      MIKE Mcdermott, JERMAINE  Emergency Room   [phone]-Please contact the SW on the floor in which the patient is staying for any questions or concerns

## 2022-11-14 NOTE — PROGRESS NOTES
Pt alert and oriented to self only. Disoriented to place, time and situation. VSS. LS clear. BS +. Assist x 1 with gait belt. Voided in the bathroom. Back brace when OOB. On room air. C/o lower back pain. Scheduled Tylenol given. Pt confused and very agitated. attempting to leave hospital. Says his ride is here to take him home and want to talk to the principal. Pt has been redirected and now having a sitter but he is refusing to go back to his room. MD notified.    BP (!) 142/67 (BP Location: Left arm, Patient Position: Chair)   Pulse 70   Temp 97.8  F (36.6  C) (Oral)   Resp 16   SpO2 97%

## 2022-11-14 NOTE — PROGRESS NOTES
Care Management Follow Up    Length of Stay (days): 0    Expected Discharge Date: 11/14/2022       Additional Information:  Attempted to call patient's wife for MOON and discharge planning. MYNOR [facility] requesting a call back     MIKE Mcdermott, [facility]  Emergency Room   [phone]-Please contact the SW on the floor in which the patient is staying for any questions or concerns

## 2022-11-14 NOTE — H&P
"[facility]    History and Physical - Hospitalist Service       Date of Admission:  11/13/2022    Assessment & Plan      Reid Jimenes is a 88 year old male admitted on 11/13/2022.     Multiple Falls   Acute L1 Compression Fracture   Will give pain medications in the form of Dilaudid, scheduled acetaminophen, continue using diclofenac gel for topical use, try to fit the patient with TLSO brace  Consult spine surgery  Consult physical therapy and Occupational Therapy    Coronary artery disease  Continue aspirin 81 mg daily, simvastatin 20 mg daily    Hypertension  Patient is not on any medications  Will start hydrochlorothiazide    Aortic stenosis s/p TAVR  Continue aspirin    Type 2 diabetes mellitus  Continue metformin 500 mg twice daily along with sliding scale insulin        Diet: Regular Diet Adult    DVT Prophylaxis: Pneumatic Compression Devices  Johnson Catheter: Not present  Central Lines: None  Cardiac Monitoring: None  Code Status:   Full code    Clinically Significant Risk Factors Present on Admission                      # Overweight: Estimated body mass index is 27.29 kg/m  as calculated from the following:    Height as of 11/9/22: 1.651 m (5' 5\").    Weight as of 11/2/22: 74.4 kg (164 lb).           Disposition Plan   Admit the patient to observation status    The patient's care was discussed with the Patient and Patient's Family.    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  Therefore history was obtained from his wife who states that patient was recently admitted to the hospital between 10/19/2022 and 10/21/2022 with intractable back pain due to severe asymmetric degenerative changes in the spine following that he went to the TCU and came back to [facility] independent living situation about a week ago Friday.  He was supposed to go to a memory care unit but since he broke a window he was under observation for 2 weeks before that could happen.     His wife adds that patient has been rooming during the night having not slept most of the night.  He once fell down trying to go on the bed which she says landed him on his buttocks.  Subsequently he fell down the second time and hit a chair with his back.  At that time he was found sitting on the floor, the 4 time he rolled out of the couch and fell down she is not sure if he fell down another time during the night.  She feels that he needs to sleep as he has not slept.    Patient does not volunteer any history probably because he is not able to articulate it but says that he does have back pain.  He denies any other pain.  There was no history of fever .    Review of Systems    Unable to obtain reliable review of systems from the patient who looks confused.     Past Medical History    I have reviewed this patient's medical history and updated it with pertinent information if needed.   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  Past Surgical History:   Procedure Laterality Date     CV HEART CATHETERIZATION WITH POSSIBLE INTERVENTION N/A 2021    Procedure: coronary angiogram;  Surgeon: Jayesh Rachel MD;  Location:  HEART CARDIAC CATH LAB     CV TEMPORARY PACEMAKER INSERTION N/A 2021    Procedure: Temporary Pacemaker Insertion;  Surgeon: Jayesh Rachel MD;  Location:  HEART CARDIAC CATH LAB     EP PACEMAKER N/A 6/10/2021    Procedure: EP Pacemaker;  Surgeon: Magdiel Silver MD;  Location:  HEART CARDIAC CATH LAB     MANDIBLE SURGERY       OTHER SURGICAL HISTORY      cardiac cath 18: mild non-obstructive disease     TRANSCATHETER AORTIC VALVE IMPLANT ANESTHESIA N/A 2018    Procedure: TRANSCATHETER AORTIC VALVE IMPLANT ANESTHESIA;  ANESTHESIA NEEDED FOR TAVR PROCEDURE;  Surgeon: GENERIC ANESTHESIA PROVIDER;  Location:  OR,  26mm Edward Sabino 3 valve       Social History   I have reviewed this patient's social history and updated it with pertinent information if needed.  Social History     Tobacco Use     Smoking status: Former     Packs/day: 1.00     Years: 30.00     Pack years: 30.00     Types: Cigarettes     Start date:      Quit date: 1980     Years since quittin.2     Smokeless tobacco: Former     Types: Chew     Quit date: 1983   Vaping Use     Vaping Use: Never used   Substance Use Topics     Alcohol use: No     Comment: quit 30+ years ago. states, \"I'm an alcoholic\".     Drug use: No       Family History   I have reviewed this patient's family history and updated it with pertinent information if needed.  Family History   Problem Relation Age of Onset     Cancer Mother 58         in her 50's, " had throat and stomach cancer     Alcohol/Drug Mother      Heart Disease Father          about age 49     Alcohol/Drug Father      Myocardial Infarction Sister 70        Two heart attacks     Alcohol/Drug Sister      Cerebrovascular Disease Brother 65         age 65 of stroke     Alcohol/Drug Brother      Cancer Maternal Grandmother         stomach cancer     Alcohol/Drug Maternal Grandmother      Alcohol/Drug Maternal Grandfather      Alcohol/Drug Paternal Grandmother      Alcohol/Drug Paternal Grandfather      Myocardial Infarction Grandchild 18        Grandson     Myocardial Infarction Other 18        Nephew        Prior to Admission Medications   Prior to Admission Medications   Prescriptions Last Dose Informant Patient Reported? Taking?   Lidocaine (LIDOCARE) 4 % Patch   No No   Sig: Place 1-3 patches onto the skin every 24 hours To prevent lidocaine toxicity, patient should be patch free for 12 hrs daily.   Multiple Vitamins-Iron (MULTIVITAMIN/IRON PO)  Self Yes No   Sig: Take 1 tablet by mouth Takes off and on   SENNA-docusate sodium (SENNA S) 8.6-50 MG tablet   No No   Sig: Take 2 tablets by mouth 2 times daily   acetaminophen (TYLENOL) 325 MG tablet   No No   Sig: Take 3 tablets (975 mg) by mouth 3 times daily MAX dose of tylenol in 24 hours is 3000 mg   ascorbic acid 500 MG TABS  Self Yes No   Sig: Take 500 mg by mouth Takes off and on   aspirin 81 MG EC tablet   No No   Sig: Take 1 tablet (81 mg) by mouth daily   blood glucose (MIRA CONTOUR NEXT) test strip   No No   Sig: Use to test blood sugar one time daily or as directed.   blood glucose monitoring (MIRA MICROLET) lancets   No No   Sig: Use to test blood sugar 1 times daily or as directed.   diclofenac (VOLTAREN) 1 % topical gel   No No   Sig: Apply 2-4 g topically 4 times daily   Patient taking differently: Apply 4 g topically 4 times daily   glucosamine-chondroitin 500-400 MG CAPS per capsule   Yes No   Sig: Take 1 capsule by mouth Takes  off and on   metFORMIN (GLUCOPHAGE XR) 500 MG 24 hr tablet   No No   Sig: Take 1 tablet (500 mg) by mouth 2 times daily (with meals)   Patient taking differently: Take 500 mg by mouth daily   methocarbamol (ROBAXIN) 500 MG tablet   No No   Sig: Take 1 tablet (500 mg) by mouth 4 times daily as needed for muscle spasms   senna-docusate (SENOKOT-S/PERICOLACE) 8.6-50 MG tablet   No No   Sig: Take 1 tablet by mouth 2 times daily as needed for constipation   sertraline (ZOLOFT) 25 MG tablet   No No   Sig: Take 1 tablet (25 mg) by mouth daily   simvastatin (ZOCOR) 20 MG tablet   No No   Sig: Take 1 tablet (20 mg) by mouth At Bedtime   traZODone (DESYREL) 50 MG tablet   No No   Sig: Take 0.5 tablets (25 mg) by mouth nightly as needed for sleep      Facility-Administered Medications Last Administration Doses Remaining   iohexol (OMNIPAQUE) 300 mg/mL injection 10 mL None recorded 1        Allergies   Allergies   Allergen Reactions     Fentanyl Other (See Comments)     Confusion and agitation after PPM     Versed [Midazolam] Other (See Comments)     Confusion and agitation post PPM       Physical Exam   Vital Signs: Temp: 97.8  F (36.6  C)   BP: 138/55 Pulse: 73   Resp: 16 SpO2: 98 %      Weight: 0 lbs 0 oz      GENERAL: Patient does not look in any acute distress  HEENT: EOM+, Conjunctiva is clear   NECK:  no Jugular Venous distention  HEART: S1 S2 regular  Rate and Rhythm,    LUNGS: Respirations are not laboured, Lungs are clear to auscultation Crepitations or Wheezing   ABDOMEN: Soft, there is no tenderness , Bowel Sounds are Positive   There is tenderness around L1 area of this spine  LOWER LIMBS: no Pedal Edema  Bilaterally   CNS:  Alert, pleasantly confused, Moving all the Four Limbs       Data   Data reviewed today: I reviewed all medications, new labs and imaging results over the last 24 hours. I personally reviewed the EKG tracing showing Sinus rhythm with first-degree AV block and left bundle branch block that is  old.    Most Recent 3 CBC's:Recent Labs   Lab Test 11/13/22  1616 10/31/22  0700 10/20/22  0702   WBC 7.8 11.0 10.3   HGB 12.4* 13.4 13.2*   MCV 91 91 91    184 161     Most Recent 3 BMP's:Recent Labs   Lab Test 11/13/22  1616 11/03/22  0700 10/31/22  0700    137 137   POTASSIUM 4.5 4.0 4.4   CHLORIDE 102 102 99   CO2 26 26 27   BUN 27.8* 28.8* 31.8*   CR 1.10 1.14 1.26*   ANIONGAP 10 9 11   DEEPTI 9.3 9.3 9.3   * 155* 149*     Most Recent 2 LFT's:Recent Labs   Lab Test 10/19/22  1643 07/19/22  1121   AST 26 19   ALT 28 25   ALKPHOS 106 96   BILITOTAL 0.5 0.4     Most Recent Urinalysis:Recent Labs   Lab Test 11/13/22  1707   COLOR Yellow   APPEARANCE Clear   URINEGLC Negative   URINEBILI Negative   URINEKETONE Negative   SG 1.024   UBLD Negative   URINEPH 5.0   PROTEIN 10*   NITRITE Negative   LEUKEST Negative   RBCU <1   WBCU <1     Recent Results (from the past 24 hour(s))   Head CT w/o contrast    Narrative    EXAM: CT HEAD W/O CONTRAST  LOCATION: [facility]  DATE/TIME: 11/13/2022 4:59 PM    INDICATION: Multiple recent falls, dementia.  COMPARISON: Head CT 10/19/2022.  TECHNIQUE: Routine CT Head without IV contrast. Multiplanar reformats. Dose reduction techniques were used.    FINDINGS:  INTRACRANIAL CONTENTS: No intracranial hemorrhage, extraaxial collection, or mass effect. Small chronic infarction right cerebellum. Moderate presumed chronic small vessel ischemic changes. Normal ventricles and sulci.     VISUALIZED ORBITS/SINUSES/MASTOIDS: No intraorbital abnormality. No paranasal sinus mucosal disease. No middle ear or mastoid effusion.    BONES/SOFT TISSUES: No acute abnormality.      Impression    IMPRESSION:  1.  No CT evidence for acute intracranial process.  2.  Brain atrophy and presumed chronic microvascular ischemic changes as above.   CT Thoracic Spine w/o Contrast    Narrative    EXAM: CT THORACIC SPINE W/O CONTRAST  LOCATION: [facility]  DATE/TIME: 11/13/2022 5:00 PM    INDICATION: Fall, pain lower T-spine upper L-spine.  COMPARISON: None.  TECHNIQUE: Routine CT Thoracic Spine without IV contrast. Multiplanar reformats. Dose reduction techniques were used.     FINDINGS:  VERTEBRA: 12 thoracic segments. Compression fracture L1 incompletely included on this examination. There may be mild compression of the inferior endplate of T12 which appears nonacute. Rightward convexity thoracic curve apex T7. Otherwise satisfactory   height/alignment. Bridging and nonbridging osteophytes throughout the thoracic spine. Nothing for anterior tension band disruption. The articular pillars appear intact. Demineralization. No displaced posterior rib fractures are noted. No acute thoracic   fracture or posttraumatic subluxation.     CANAL/FORAMINA: No evidence for spinal stenosis or foraminal compromise. Nothing for epidural or paraspinous hemorrhage.    PARASPINAL: Cardiac pacer. Cardiac enlargement. Slight elevation left hemidiaphragm. No discrete pulmonary nodule or mass. Minimal dependent atelectasis left lung. Vascular calcification.      Impression    IMPRESSION:  1.  No acute fracture or posttraumatic malalignment is evident within the thoracic spine.    2.  No spinal stenosis or foraminal compromise at any thoracic level.    3.  Cardiac pacer.    4.  L1 compression fracture not well-seen on this examination, described on CT lumbar spine separate report.    5.  Articular pillars are intact.     Lumbar spine CT w/o contrast    Narrative    EXAM: CT LUMBAR SPINE W/O CONTRAST  LOCATION: [facility]  DATE/TIME: 11/13/2022 5:01 PM    INDICATION: Fall, pain lower T spine upper L spine  COMPARISON: 03/21/2022  TECHNIQUE: Routine CT Lumbar Spine without IV contrast. Multiplanar reformats. Dose reduction techniques were used.     FINDINGS:  VERTEBRA: 5 lumbar levels. Interval development of moderate grade compression L1 with mild  retropulsion loss of height L1 is about 60-70% with subchondral cleft of gas density, with adjacent gas/vacuum phenomenon within the T12-L1 interspace. This has   suggestive of benign etiology which may be due to demineralization and/or in the setting of trauma. Retropulsion with mild canal compromise is noted at this level with small degree of paraspinous/retrocrural edema extends to the retroaortic level.   Nothing for epidural hemorrhage. Retropulsion best seen series 4 image 43 series 9 image 30. No other compressions are identified. Interspace widening T12-L1 suggests possible anterior tension band disruption. Fracture line extends to the left L1 pedicle   anteriorly series 9 image 39. Disruption of the superior endplates and lateral cortical margins and inferior endplate to the left of midline related to this fracture. Otherwise stable height/alignment. Loss of height L4 inferior endplate. S-shaped lower   thoracic and lumbar curve convex to the right T11 convex left L4. No displaced lower rib fractures are present. Remainder of the posterior elements are intact. Sacral neural foramina are intact. Degenerative changes anterior SI joints right more the   left. Demineralization. No posttraumatic malalignment. 1 to 2 mm stable retrolistheses L2-L3 and L3-L4.     CANAL/FORAMINA: Stable moderate to severe spinal stenosis L3-L4 due to generalized disc bulge. Stable moderate to severe right L4-L5 foraminal compromise due to generalized disc bulge with right foraminal disc protrusion. Stable mild to moderate   foraminal narrowing L5-S1 left more than right. New mild canal compromise due to retropulsion at the L1 level.    PARASPINAL: Low attenuation blood products and/or paraspinous edema about L1 at the paravertebral and retrocrural level. No high density blood products. Satisfactory adrenal and renal contours. Vascular calcification. Symmetric psoas appearance   bilaterally.      Impression    IMPRESSION:  1.   Interval development of moderate to high-grade compression fracture L1 new from 03/21/2022. Mild retropulsion and mild canal compromise noted with small degree of paraspinous/paravertebral edema. No epidural hemorrhage. No traumatic disc herniations.   Interspace widening suggested T12-L1 compared with prior study may reflect an element of anterior tension band disruption. Fracture line extends to the left L1 pedicle. Gas/vacuum phenomenon within the T12-L1 interspace presumed posttraumatic with   subchondral cleft of gas density superior L1. Etiology suspected traumatic in the setting of underlying demineralization. No evidence for marrow infiltrative process or aggressive lesions noted.    2.  Stable moderate to severe spinal stenosis L3-L4. Stable moderate to severe right L4-L5 foraminal compromise. This is due to generalized disc bulging.    3.  No new or progressive severe spinal stenosis or foraminal compromise otherwise is present.    4.  Otherwise stable height/alignment from 03/21/2022.    5.  See above for description and details.

## 2022-11-14 NOTE — PHARMACY-ADMISSION MEDICATION HISTORY
Admission medication history interview status for this patient is complete. See [facility] admission navigator for allergy information, prior to admission medications and immunization status.     Medication history interview done, indicate source(s): Family  Medication history resources (including written lists, pill bottles, clinic record):None  Pharmacy: [facility] [id number] [place], [place] - [address] [facility]    Changes made to PTA medication list:  Added: none  Changed: metformin XR (removed note stating once daily, Robi in fact takes twice daily)  Reported as Not Taking: none  Removed: removed duplicate entry for senna-docusate    Actions taken by pharmacist (provider contacted, etc): I called Robi's daughter Lizz, who read off a home list. Then I called Robi's wife Alyssa who confirmed the items Lizz wasn't certain about, specifically metformin, methocarbamol, and simvastatin.     Additional medication history information:None    Medication reconciliation/reorder completed by provider prior to medication history?  N    Prior to Admission medications    Medication Sig Last Dose Taking? Auth Provider Long Term End Date   acetaminophen (TYLENOL) 325 MG tablet Take 3 tablets (975 mg) by mouth 3 times daily MAX dose of tylenol in 24 hours is 3000 mg 11/13/2022 at 1200 Yes Dinora Cavazos APRN CNP     ascorbic acid 500 MG TABS Take 500 mg by mouth Takes off and on 11/13/2022 at AM Yes Reported, Patient     aspirin 81 MG EC tablet Take 1 tablet (81 mg) by mouth daily 11/13/2022 at AM Yes Lizz Love APRN CNP     diclofenac (VOLTAREN) 1 % topical gel Apply 2-4 g topically 4 times daily  Patient taking differently: Apply 4 g topically 4 times daily 11/13/2022 at 1200 Yes Morales Myers MD     glucosamine-chondroitin 500-400 MG CAPS per capsule Take 1 capsule by mouth Takes off and on 11/13/2022 at AM Yes Unknown, Entered By History     Lidocaine (LIDOCARE) 4 % Patch Place 1-3 patches onto the skin every 24 hours  To prevent lidocaine toxicity, patient should be patch free for 12 hrs daily. 11/13/2022 Yes Morales Myers MD     metFORMIN (GLUCOPHAGE XR) 500 MG 24 hr tablet Take 1 tablet (500 mg) by mouth 2 times daily (with meals) 11/13/2022 at AM Yes Viet Bingham MD Yes    methocarbamol (ROBAXIN) 500 MG tablet Take 1 tablet (500 mg) by mouth 4 times daily as needed for muscle spasms 11/13/2022 at 1200 Yes Morales Myers MD     Multiple Vitamins-Iron (MULTIVITAMIN/IRON PO) Take 1 tablet by mouth Takes off and on 11/13/2022 at AM Yes Reported, Patient     sertraline (ZOLOFT) 25 MG tablet Take 1 tablet (25 mg) by mouth daily 11/12/2022 at PM Yes Dinora Cavazos APRN CNP Yes    simvastatin (ZOCOR) 20 MG tablet Take 1 tablet (20 mg) by mouth At Bedtime 11/12/2022 at PM Yes Viet Bingham MD Yes    traZODone (DESYREL) 50 MG tablet Take 0.5 tablets (25 mg) by mouth nightly as needed for sleep 11/12/2022 at PM Yes Morales Myers MD Yes    blood glucose (MIRA CONTOUR NEXT) test strip Use to test blood sugar one time daily or as directed.   Viet Bingham MD     blood glucose monitoring (MIRA MICROLET) lancets Use to test blood sugar 1 times daily or as directed.   Viet Bingham MD     senna-docusate (SENOKOT-S/PERICOLACE) 8.6-50 MG tablet Take 1 tablet by mouth 2 times daily as needed for constipation not started  Morales Myers MD     SENNA-docusate sodium (SENNA S) 8.6-50 MG tablet Take 2 tablets by mouth 2 times daily   Dinora Cavazos APRN CNP

## 2022-11-14 NOTE — PROGRESS NOTES
[facility]  Hospitalist Progress Note  Reji Dent MD 11/14/22    Reason for Stay (Diagnosis): L1 compression fracture         Assessment and Plan:      Summary of Stay: Reid Jimenes is a 88 year old male with history of dementia, type 2 diabetes, pacemaker in 2021, nonobstructive CAD, severe AS with TAVR admitted on 11/13/2022 with multiple falls with intractable back pain.  He was actually admitted for back pain in October and found to have severe degenerative changes at that time.  He was discharged to TCU then returned back to the [facility] where he resides with his wife.  There was consideration of transitioning him to a memory care unit as well.  He has been roaming at nighttime and fell trying to get into bed once and then fell a second time, hitting his back on a chair.    Here in the ER he was found to have an L1 compression fracture.  He was treated with topical and oral pain medications and orthotic consult was requested for brace placement.  Neurosurgery evaluated him and recommended nonoperative management with bracing when up out of bed though they noted he could take it off to shower.    At this point the main ongoing question is whether his wife can care for him at home at this time.  PT/OT and social work have been consulted.    Problem List/Assessment and Plan:   1. Multiple falls with L1 compression fracture: As above.  --Continue pain control with scheduled Tylenol, lidocaine patch, as needed Dilaudid 1 mg every 4 hours and as needed Robaxin.  --Orthotist consult for bracing.  --Neurosurgery recommending brace when out of bed but okay to remove while showering.    2.   History of dementia: It sounds as though this is evolving and family was considering transition to memory care.  --update: patient showing disorientation and has been fairly nonsensical and intermittently agitated.  Already has a sitter for redirection/safety.  Will make prn  haldol available for agitation as well.  Discussed w/ ER RN.      3.   Type 2 diabetes: Continue metformin, also has sliding scale available.    4.   History of nonobstructive CAD, hypertension, aortic stenosis with TAVR: No apparent acute issues.  Continuing aspirin, simvastatin.      DVT Prophylaxis: Ambulate with assist    Code Status: Full Code as he has been historically.  I was not aware of this when I talked with his wife today and did not revisit it with her.    Discharge Dispo/Date: Likely will be ready to either discharge home or to TCU tomorrow pending progress with therapy.        Interval History (Subjective):      I assumed care today, Robi tells me he overall feels better with reasonable pain control today    Getting fit with a TLSO brace    Neurosurgery saw him today and signed off.  Commendations as above.    I called his wife.  She is uncertain if she can take care of him at home at this time.  Will await PT/OT input.  He was in a TCU in late October after being admitted here for back pain issues.       Physical Exam:      Last Vital Signs:  BP (!) 142/67 (BP Location: Left arm, Patient Position: Chair)   Pulse 70   Temp 97.8  F (36.6  C)   Resp 16   SpO2 97%     No intake or output data in the 24 hours ending 11/14/22 1217    General: Alert, awake, no acute distress.  HEENT: NC/AT, eyes anicteric, external occular movements intact, face symmetric.    Cardiac: RRR, S1, S2.  No murmurs appreciated.  Pulmonary: Normal chest rise, normal work of breathing.  Lungs CTA BL  Abdomen: soft, non-tender, non-distended.  Bowel Sounds Present.  No guarding.  Extremities: no deformities.  Warm, well perfused.  Skin: no rashes or lesions noted.  Warm and Dry.  Neuro: No focal deficits noted.  Speech clear.  Coordination and strength grossly normal.  Has fairly obvious memory impairments.  Psych: Oriented to place, somewhat confused with apparent memory deficits         Medications:      All current  medications were reviewed with changes reflected in problem list.         Data:      All new lab and imaging data was reviewed.   Labs:  Recent Labs   Lab 11/14/22  0734 11/13/22  2346 11/13/22  1616   NA  --   --  138   POTASSIUM  --   --  4.5   CHLORIDE  --   --  102   CO2  --   --  26   ANIONGAP  --   --  10   *   < > 152*   BUN  --   --  27.8*   CR  --   --  1.10   GFRESTIMATED  --   --  65   DEEPTI  --   --  9.3    < > = values in this interval not displayed.     Recent Labs   Lab 11/13/22  1616   WBC 7.8   HGB 12.4*   HCT 38.3*   MCV 91         Imaging:   Recent Results (from the past 48 hour(s))   Head CT w/o contrast    Narrative    EXAM: CT HEAD W/O CONTRAST  LOCATION: [facility]  DATE/TIME: 11/13/2022 4:59 PM    INDICATION: Multiple recent falls, dementia.  COMPARISON: Head CT 10/19/2022.  TECHNIQUE: Routine CT Head without IV contrast. Multiplanar reformats. Dose reduction techniques were used.    FINDINGS:  INTRACRANIAL CONTENTS: No intracranial hemorrhage, extraaxial collection, or mass effect. Small chronic infarction right cerebellum. Moderate presumed chronic small vessel ischemic changes. Normal ventricles and sulci.     VISUALIZED ORBITS/SINUSES/MASTOIDS: No intraorbital abnormality. No paranasal sinus mucosal disease. No middle ear or mastoid effusion.    BONES/SOFT TISSUES: No acute abnormality.      Impression    IMPRESSION:  1.  No CT evidence for acute intracranial process.  2.  Brain atrophy and presumed chronic microvascular ischemic changes as above.   CT Thoracic Spine w/o Contrast    Narrative    EXAM: CT THORACIC SPINE W/O CONTRAST  LOCATION: [facility]  DATE/TIME: 11/13/2022 5:00 PM    INDICATION: Fall, pain lower T-spine upper L-spine.  COMPARISON: None.  TECHNIQUE: Routine CT Thoracic Spine without IV contrast. Multiplanar reformats. Dose reduction techniques were used.     FINDINGS:  VERTEBRA: 12 thoracic segments. Compression  fracture L1 incompletely included on this examination. There may be mild compression of the inferior endplate of T12 which appears nonacute. Rightward convexity thoracic curve apex T7. Otherwise satisfactory   height/alignment. Bridging and nonbridging osteophytes throughout the thoracic spine. Nothing for anterior tension band disruption. The articular pillars appear intact. Demineralization. No displaced posterior rib fractures are noted. No acute thoracic   fracture or posttraumatic subluxation.     CANAL/FORAMINA: No evidence for spinal stenosis or foraminal compromise. Nothing for epidural or paraspinous hemorrhage.    PARASPINAL: Cardiac pacer. Cardiac enlargement. Slight elevation left hemidiaphragm. No discrete pulmonary nodule or mass. Minimal dependent atelectasis left lung. Vascular calcification.      Impression    IMPRESSION:  1.  No acute fracture or posttraumatic malalignment is evident within the thoracic spine.    2.  No spinal stenosis or foraminal compromise at any thoracic level.    3.  Cardiac pacer.    4.  L1 compression fracture not well-seen on this examination, described on CT lumbar spine separate report.    5.  Articular pillars are intact.     Lumbar spine CT w/o contrast    Narrative    EXAM: CT LUMBAR SPINE W/O CONTRAST  LOCATION: [facility]  DATE/TIME: 11/13/2022 5:01 PM    INDICATION: Fall, pain lower T spine upper L spine  COMPARISON: 03/21/2022  TECHNIQUE: Routine CT Lumbar Spine without IV contrast. Multiplanar reformats. Dose reduction techniques were used.     FINDINGS:  VERTEBRA: 5 lumbar levels. Interval development of moderate grade compression L1 with mild retropulsion loss of height L1 is about 60-70% with subchondral cleft of gas density, with adjacent gas/vacuum phenomenon within the T12-L1 interspace. This has   suggestive of benign etiology which may be due to demineralization and/or in the setting of trauma. Retropulsion with mild canal compromise  is noted at this level with small degree of paraspinous/retrocrural edema extends to the retroaortic level.   Nothing for epidural hemorrhage. Retropulsion best seen series 4 image 43 series 9 image 30. No other compressions are identified. Interspace widening T12-L1 suggests possible anterior tension band disruption. Fracture line extends to the left L1 pedicle   anteriorly series 9 image 39. Disruption of the superior endplates and lateral cortical margins and inferior endplate to the left of midline related to this fracture. Otherwise stable height/alignment. Loss of height L4 inferior endplate. S-shaped lower   thoracic and lumbar curve convex to the right T11 convex left L4. No displaced lower rib fractures are present. Remainder of the posterior elements are intact. Sacral neural foramina are intact. Degenerative changes anterior SI joints right more the   left. Demineralization. No posttraumatic malalignment. 1 to 2 mm stable retrolistheses L2-L3 and L3-L4.     CANAL/FORAMINA: Stable moderate to severe spinal stenosis L3-L4 due to generalized disc bulge. Stable moderate to severe right L4-L5 foraminal compromise due to generalized disc bulge with right foraminal disc protrusion. Stable mild to moderate   foraminal narrowing L5-S1 left more than right. New mild canal compromise due to retropulsion at the L1 level.    PARASPINAL: Low attenuation blood products and/or paraspinous edema about L1 at the paravertebral and retrocrural level. No high density blood products. Satisfactory adrenal and renal contours. Vascular calcification. Symmetric psoas appearance   bilaterally.      Impression    IMPRESSION:  1.  Interval development of moderate to high-grade compression fracture L1 new from 03/21/2022. Mild retropulsion and mild canal compromise noted with small degree of paraspinous/paravertebral edema. No epidural hemorrhage. No traumatic disc herniations.   Interspace widening suggested T12-L1 compared with  prior study may reflect an element of anterior tension band disruption. Fracture line extends to the left L1 pedicle. Gas/vacuum phenomenon within the T12-L1 interspace presumed posttraumatic with   subchondral cleft of gas density superior L1. Etiology suspected traumatic in the setting of underlying demineralization. No evidence for marrow infiltrative process or aggressive lesions noted.    2.  Stable moderate to severe spinal stenosis L3-L4. Stable moderate to severe right L4-L5 foraminal compromise. This is due to generalized disc bulging.    3.  No new or progressive severe spinal stenosis or foraminal compromise otherwise is present.    4.  Otherwise stable height/alignment from 03/21/2022.    5.  See above for description and details.         Reji Dent MD , MD.

## 2022-11-14 NOTE — PROGRESS NOTES
Patient was fit with tlso.  Patient does NOT want me to go over the orthosis with him.  Oh his paperwork I wrote not to call if questions.  Written instructions and contact information given Note that I instructed patient important to keep sternal section off his pace maker. Please call orthotics if questions.  Derek DENSON.

## 2022-11-14 NOTE — TELEPHONE ENCOUNTER
Michelle advised Dr. Coleman gave approval for home care orders.     Indira Mena RN  [facility]

## 2022-11-14 NOTE — PROGRESS NOTES
Occupational Therapy: Orders received. Chart reviewed and discussed with care team.? Occupational Therapy not indicated due to pt needing A1 with PT; likely will need TCU, OBS status, very confused this date. ? Defer discharge recommendations to care team and PT. Please re-consult if new OT and ADL/discharge needs arise. ? Will complete orders.

## 2022-11-15 ENCOUNTER — APPOINTMENT (OUTPATIENT)
Dept: GENERAL RADIOLOGY | Facility: CLINIC | Age: 87
End: 2022-11-15
Attending: PHYSICIAN ASSISTANT
Payer: MEDICARE

## 2022-11-15 VITALS
WEIGHT: 160.5 LBS | BODY MASS INDEX: 26.71 KG/M2 | TEMPERATURE: 97 F | RESPIRATION RATE: 18 BRPM | DIASTOLIC BLOOD PRESSURE: 66 MMHG | OXYGEN SATURATION: 95 % | HEART RATE: 79 BPM | SYSTOLIC BLOOD PRESSURE: 144 MMHG

## 2022-11-15 LAB
GLUCOSE BLDC GLUCOMTR-MCNC: 156 MG/DL (ref 70–99)
GLUCOSE BLDC GLUCOMTR-MCNC: 160 MG/DL (ref 70–99)
GLUCOSE BLDC GLUCOMTR-MCNC: 174 MG/DL (ref 70–99)

## 2022-11-15 PROCEDURE — 99217 PR OBSERVATION CARE DISCHARGE: CPT | Performed by: HOSPITALIST

## 2022-11-15 PROCEDURE — 72020 X-RAY EXAM OF SPINE 1 VIEW: CPT

## 2022-11-15 PROCEDURE — 250N000013 HC RX MED GY IP 250 OP 250 PS 637: Performed by: INTERNAL MEDICINE

## 2022-11-15 PROCEDURE — 96372 THER/PROPH/DIAG INJ SC/IM: CPT

## 2022-11-15 PROCEDURE — 99232 SBSQ HOSP IP/OBS MODERATE 35: CPT | Performed by: PHYSICIAN ASSISTANT

## 2022-11-15 PROCEDURE — 82962 GLUCOSE BLOOD TEST: CPT

## 2022-11-15 PROCEDURE — G0378 HOSPITAL OBSERVATION PER HR: HCPCS

## 2022-11-15 RX ORDER — LIDOCAINE 4 G/G
1-3 PATCH TOPICAL EVERY 24 HOURS
Qty: 42 PATCH | Refills: 0 | Status: SHIPPED | OUTPATIENT
Start: 2022-11-15 | End: 2022-11-18

## 2022-11-15 RX ADMIN — ASPIRIN 81 MG: 81 TABLET, COATED ORAL at 08:24

## 2022-11-15 RX ADMIN — METFORMIN HYDROCHLORIDE 500 MG: 500 TABLET, EXTENDED RELEASE ORAL at 08:24

## 2022-11-15 RX ADMIN — LIDOCAINE 1 PATCH: 246 PATCH TOPICAL at 08:22

## 2022-11-15 RX ADMIN — ACETAMINOPHEN 975 MG: 325 TABLET, FILM COATED ORAL at 14:51

## 2022-11-15 RX ADMIN — DICLOFENAC SODIUM 2 G: 10 GEL TOPICAL at 08:28

## 2022-11-15 RX ADMIN — ACETAMINOPHEN 975 MG: 325 TABLET, FILM COATED ORAL at 08:24

## 2022-11-15 RX ADMIN — INSULIN ASPART 1 UNITS: 100 INJECTION, SOLUTION INTRAVENOUS; SUBCUTANEOUS at 08:37

## 2022-11-15 RX ADMIN — DICLOFENAC SODIUM 2 G: 10 GEL TOPICAL at 12:26

## 2022-11-15 RX ADMIN — INSULIN ASPART 1 UNITS: 100 INJECTION, SOLUTION INTRAVENOUS; SUBCUTANEOUS at 12:21

## 2022-11-15 ASSESSMENT — ACTIVITIES OF DAILY LIVING (ADL)
ADLS_ACUITY_SCORE: 43
ADLS_ACUITY_SCORE: 41
ADLS_ACUITY_SCORE: 43
ADLS_ACUITY_SCORE: 43
ADLS_ACUITY_SCORE: 41
ADLS_ACUITY_SCORE: 43

## 2022-11-15 NOTE — DISCHARGE SUMMARY
[facility]    Discharge Summary  Hospitalist    Date of Admission:  11/13/2022  Date of Discharge:  11/15/2022  Discharging Provider: Markus Melgoza MD  Date of Service (when I saw the patient): 11/15/22    Discharge Diagnoses   #Fall complicated by L1 compression fracture  #History of dementia  #Nonobstructive CAD  #Severe AS with TAVR  #Type 2 diabetes mellitus  #History of PPM    Hospital Course   Reid Jimenes is a 88 year old male with history of dementia, type 2 diabetes, pacemaker in 2021, nonobstructive CAD, severe AS with TAVR admitted on 11/13/2022 with multiple falls with intractable back pain.  He was actually admitted for back pain in October and found to have severe degenerative changes at that time.  He was discharged to TCU then returned back to the [facility] where he resides with his wife.  There was consideration of transitioning him to a memory care unit as well.  He has been roaming at nighttime and fell trying to get into bed once and then fell a second time, hitting his back on a chair.     Here in the ER he was found to have an L1 compression fracture.  He was treated with topical and oral pain medications and orthotic consult was requested for brace placement.  Neurosurgery evaluated him and recommended nonoperative management with bracing when up out of bed though they noted he could take it off to shower.    #Multiple falls with L1 compression fracture: As above.  Continue scheduled Tylenol, lidocaine patch along with topical Voltaren gel.  Patient does have as needed Robaxin at home which I told his wife to minimize if possible given it can increase confusion and someone with underlying dementia.  He will need bracing when he is out of bed.  His wife was given instructions on how to utilize the brace.  -I had an extensive discussion with the patient's wife, Alyssa, on the day of discharge.  I offered TCU as an alternative to home given  difficulty at home on presentation.  Patient's wife had a strong preference for discharge home given poor experience at TCU.  She does feel she can manage and her daughter is going to also assist.  I did note that we can send referrals to her alternative TCU's (not [facility]) but she still declined this and preferred to discharge home.  -Home cares were resumed on discharge.  Family is also arranging for some private care 2 times per week.  -We will follow-up with neurosurgery with repeat x-rays in 6 and 12 weeks.  He did have an x-ray done on 11/15, day of discharge.  I discussed with on-call neurosurgery, ANGELINE Panda who noted that it was stable and he is suitable for discharge from her perspective.  -I did also recommend follow-up with PCP in 1 week.     #History of dementia: It sounds as though this is evolving and family was considering transition to memory care.  This is still in the works but wife notes she is going to work on this as an outpatient.     #Type 2 diabetes: Continue metformin, also has sliding scale available.     #History of nonobstructive CAD, hypertension, aortic stenosis with TAVR: No apparent acute issues.  Continuing aspirin, simvastatin.    Markus Melgoza MD    Code Status   Full Code       Primary Care Physician   Viet Bingham MD    Physical Exam   Temp: 97  F (36.1  C) Temp src: Temporal BP: (!) 144/66 Pulse: 79   Resp: 18 SpO2: 95 % O2 Device: None (Room air)    Vitals:    11/15/22 0255   Weight: 72.8 kg (160 lb 8 oz)     Vital Signs with Ranges  Temp:  [97  F (36.1  C)-97.6  F (36.4  C)] 97  F (36.1  C)  Pulse:  [79-98] 79  Resp:  [16-18] 18  BP: (129-175)/(60-84) 144/66  SpO2:  [95 %-98 %] 95 %  No intake/output data recorded.    Patient is lying in bed.  Answers appropriately.  He is pleasant.  Not anxious or in distress.  Heart is regular.  No murmur.  Lungs are clear.  Normal work of breathing.  Sensation intact in all extremities.  Moves all extremities.  No tremor.  He is  aware of situation.  Oriented to place.    Discharge Disposition   Discharged to home  Condition at discharge: Stable    Consultations This Hospital Stay   SPINE SURGERY ADULT IP CONSULT  PHYSICAL THERAPY ADULT IP CONSULT  OCCUPATIONAL THERAPY ADULT IP CONSULT  ORTHOSIS BRACE IP CONSULT  CARE MANAGEMENT / SOCIAL WORK IP CONSULT    Time Spent on this Encounter   Markus BAZAN MD, personally saw the patient today and spent greater than 30 minutes discharging this patient.    Discharge Orders      X-ray lumbar spine 2-3 views*     Primary Care - Care Coordination Referral      Activity    Your activity upon discharge: Wear brace when out of bed and ambulating. May have OFF when in bed, resting, hygiene purposes. Please limit your lifting to no more that ten pounds and avoid excessive bending, twisting and turning at the lumbar spine. You should also avoid excessive jostling and jarring activities.     Reason for your hospital stay    You were hospitalized for back pain and difficulty managing at home.  You were brought into the hospital.  You were seen by therapies.  TCU was offered but declined.  Instead, you preferred to go home with home cares.     Resume Home Care Services     Follow-up and recommended labs and tests     Your Neurosurgical follow up appointments have been recommended in 6 and 12 weeks with XR prior. You may call [phone] to make, confirm or change your follow-up Neurosurgery appointment dates and/or times.    Follow up with primary care provider, Viet Bingham MD, within 7 days for hospital follow- up.  No follow up labs or test are needed.     Full Code     Diet    Follow this diet upon discharge: Orders Placed This Encounter      Moderate Consistent Carb (60 g CHO per Meal) Diet     Discharge Medications   Current Discharge Medication List      CONTINUE these medications which have CHANGED    Details   diclofenac (VOLTAREN) 1 % topical gel Apply 2-4 g topically 4 times daily for 14  days  Qty: 224 g, Refills: 0    Associated Diagnoses: Lumbosacral spinal stenosis; Acute right-sided low back pain without sciatica      Lidocaine (LIDOCARE) 4 % Patch Place 1-3 patches onto the skin every 24 hours for 14 days To prevent lidocaine toxicity, patient should be patch free for 12 hrs daily.  Qty: 42 patch, Refills: 0    Associated Diagnoses: Lumbosacral spinal stenosis; Acute right-sided low back pain without sciatica         CONTINUE these medications which have NOT CHANGED    Details   acetaminophen (TYLENOL) 325 MG tablet Take 3 tablets (975 mg) by mouth 3 times daily MAX dose of tylenol in 24 hours is 3000 mg    Associated Diagnoses: Lumbosacral spinal stenosis; Acute right-sided low back pain without sciatica      ascorbic acid 500 MG TABS Take 500 mg by mouth Takes off and on      aspirin 81 MG EC tablet Take 1 tablet (81 mg) by mouth daily  Qty: 30 tablet    Associated Diagnoses: Severe aortic stenosis      glucosamine-chondroitin 500-400 MG CAPS per capsule Take 1 capsule by mouth Takes off and on      metFORMIN (GLUCOPHAGE XR) 500 MG 24 hr tablet Take 1 tablet (500 mg) by mouth 2 times daily (with meals)  Qty: 180 tablet, Refills: 3    Associated Diagnoses: Type 2 diabetes mellitus with microalbuminuria, without long-term current use of insulin (H)      Multiple Vitamins-Iron (MULTIVITAMIN/IRON PO) Take 1 tablet by mouth Takes off and on      sertraline (ZOLOFT) 25 MG tablet Take 1 tablet (25 mg) by mouth daily    Associated Diagnoses: Anxiety      simvastatin (ZOCOR) 20 MG tablet Take 1 tablet (20 mg) by mouth At Bedtime  Qty: 90 tablet, Refills: 1    Associated Diagnoses: Hyperlipidemia with target LDL less than 100      traZODone (DESYREL) 50 MG tablet Take 0.5 tablets (25 mg) by mouth nightly as needed for sleep  Qty: 10 tablet, Refills: 0    Associated Diagnoses: Insomnia, unspecified type      blood glucose (MIRA CONTOUR NEXT) test strip Use to test blood sugar one time daily or as  directed.  Qty: 100 strip, Refills: 11    Associated Diagnoses: Type 2 diabetes mellitus with microalbuminuria, without long-term current use of insulin (H)      blood glucose monitoring (MIRA MICROLET) lancets Use to test blood sugar 1 times daily or as directed.  Qty: 100 each, Refills: 5    Associated Diagnoses: Type 2 diabetes mellitus with microalbuminuria, without long-term current use of insulin (H)      !! senna-docusate (SENOKOT-S/PERICOLACE) 8.6-50 MG tablet Take 1 tablet by mouth 2 times daily as needed for constipation    Associated Diagnoses: Constipation, unspecified constipation type      !! SENNA-docusate sodium (SENNA S) 8.6-50 MG tablet Take 2 tablets by mouth 2 times daily    Associated Diagnoses: Slow transit constipation       !! - Potential duplicate medications found. Please discuss with provider.      STOP taking these medications       methocarbamol (ROBAXIN) 500 MG tablet Comments:   Reason for Stopping:             Allergies   Allergies   Allergen Reactions     Fentanyl Other (See Comments)     Confusion and agitation after PPM     Versed [Midazolam] Other (See Comments)     Confusion and agitation post PPM     Data   Most Recent 3 CBC's:Recent Labs   Lab Test 11/13/22  1616 10/31/22  0700 10/20/22  0702   WBC 7.8 11.0 10.3   HGB 12.4* 13.4 13.2*   MCV 91 91 91    184 161      Most Recent 3 BMP's:  Recent Labs   Lab Test 11/15/22  1220 11/15/22  0836 11/15/22  0323 11/13/22  2346 11/13/22  1616 11/03/22  0700 10/31/22  0700   NA  --   --   --   --  138 137 137   POTASSIUM  --   --   --   --  4.5 4.0 4.4   CHLORIDE  --   --   --   --  102 102 99   CO2  --   --   --   --  26 26 27   BUN  --   --   --   --  27.8* 28.8* 31.8*   CR  --   --   --   --  1.10 1.14 1.26*   ANIONGAP  --   --   --   --  10 9 11   DEEPTI  --   --   --   --  9.3 9.3 9.3   * 174* 160*   < > 152* 155* 149*    < > = values in this interval not displayed.     Most Recent 2 LFT's:  Recent Labs   Lab Test  10/19/22  1643 07/19/22  1121   AST 26 19   ALT 28 25   ALKPHOS 106 96   BILITOTAL 0.5 0.4     Most Recent INR's and Anticoagulation Dosing History:  Anticoagulation Dose History     Recent Dosing and Labs Latest Ref Rng & Units 7/24/2018 8/17/2020 6/9/2021 6/10/2021    INR 0.86 - 1.14 1.01 1.02 1.05 1.10        Most Recent 3 Troponin's:  Recent Labs   Lab Test 06/09/21  1112 06/09/21  1110 01/10/21  1616 08/17/20  1106   TROPI  --  0.024 <0.015 <0.015   TROPONIN 0.04  --   --   --      Most Recent Cholesterol Panel:  Recent Labs   Lab Test 01/14/22  0840   CHOL 119   LDL 45   HDL 50   TRIG 120     Most Recent 6 Bacteria Isolates From Any Culture (See EPIC Reports for Culture Details):No lab results found.  Most Recent TSH, T4 and A1c Labs:  Recent Labs   Lab Test 11/13/22  1616 10/19/22  1643   TSH  --  1.03   A1C 7.7*  --

## 2022-11-15 NOTE — PROGRESS NOTES
[facility]  Patient is currently open to home care services with [facility]. The patient is currently receiving SN PT OT services.  [facility]  and team have been notified of patient admission.  [facility] liaison will continue to follow patient during stay.

## 2022-11-15 NOTE — PROGRESS NOTES
Pt alert and oriented to self only. Disoriented to place, time and situation. VSS. LS clear. BS +. Assist x 1 with gait belt. Voided in the bathroom. Back brace when OOB. On room air. C/o lower back pain. Scheduled Tylenol given. Pt confused and very agitated. attempting to leave hospital. Says his ride is here to take him home and want to talk to the principal. Pt has been redirected and now having a sitter but he is refusing to go back to his room. MD notified. BRANDO Mendieta ordered and administered to pt. Intervention effective.  and 156.     /60 (BP Location: Right arm, Patient Position: [place])   Pulse 92   Temp 97.6  F (36.4  C) (Oral)   Resp 16   SpO2 95%

## 2022-11-15 NOTE — PROGRESS NOTES
Pt arrived to room 623 at 0250 from the ED via cart. Pt is alert but disoriented to time, location, & situation. Pt denied pain. Belongings remain with patient. Call light within reach. Sitter at bedside.    BG: 160

## 2022-11-15 NOTE — PROGRESS NOTES
"PRIMARY DIAGNOSIS: \"GENERIC\" NURSING  OUTPATIENT/OBSERVATION GOALS TO BE MET BEFORE DISCHARGE:  1. ADLs back to baseline: Yes    2. Activity and level of assistance: Ax1    3. Pain status: Pt denied pain last night.    4. Return to near baseline physical activity: Yes     Discharge Planner Nurse   Safe discharge environment identified: No  Barriers to discharge: Yes       Entered by: Marley Mccallum RN 11/15/2022 7:43 AM     Pt is disoriented to time, place, and situation., Ax1 with gait/walker. Brace when OOB.  VSS. CMS intact. Voiding. Pt denied pain. Room air. PT consult pending.   "

## 2022-11-15 NOTE — PLAN OF CARE
Goal Outcome Evaluation:         Patient vital signs are at baseline: Yes  Patient able to ambulate as they were prior to admission or with assist devices provided by therapies during their stay:  Yes  Patient MUST void prior to discharge:  Yes  Patient able to tolerate oral intake:  Yes  Pain has adequate pain control using Oral analgesics:  Yes  Does patient have an identified :  Yes  Has goal D/C date and time been discussed with patient:  Yes       Reviewed discharge instructions and medications with patient, wife and daughter. Questions answered. Patient discharged to home with wife and daughter. Discharge instructions, medications ( Voltaren gel and lidocaine patches), and belongings.

## 2022-11-15 NOTE — PROGRESS NOTES
Care Management Discharge Note    Discharge Date: 11/15/2022       Discharge Disposition:      Discharge Services:      Discharge DME:      Discharge Transportation:      Private pay costs discussed: Not applicable    PAS Confirmation Code:    Patient/family educated on Medicare website which has current facility and service quality ratings:      Education Provided on the Discharge Plan:    Persons Notified of Discharge Plans:   Patient/Family in Agreement with the Plan: yes    Handoff Referral Completed: yes    Additional Information:    Updated that pt plans to discharge home with resumption of homecare today. Updated [facility] who is aware and agreeable. Plan is discharge to home with homecare.     MIKE Loera, JERMAINE  Inpatient Care Coordination  Ortho/Spine Unit  [phone]  Yomaira Myers, JERMAINE

## 2022-11-16 ENCOUNTER — PATIENT OUTREACH (OUTPATIENT)
Dept: CARE COORDINATION | Facility: CLINIC | Age: 87
End: 2022-11-16

## 2022-11-16 NOTE — PLAN OF CARE
Physical Therapy Discharge Summary    Reason for therapy discharge:    Discharged to home with home therapy.    Progress towards therapy goal(s). See goals on Care Plan in [facility] electronic health record for goal details.  Goals not met.  Barriers to achieving goals:   discharge from facility.    Therapy recommendation(s):    Continued therapy is recommended.  Rationale/Recommendations:  HHPT to maximize safety and indep with mobility.      Note: Pt not seen by documenting PT on this date. Information obtained from chart review.

## 2022-11-16 NOTE — PROGRESS NOTES
Clinic Care Coordination Contact  [facility]: Post-Discharge Note  SITUATION                                                      Admission:    Admission Date: 11/13/22   Reason for Admission: Falls with back pain  Discharge:   Discharge Date: 11/15/22  Discharge Diagnosis: #Fall complicated by L1 compression fracture  #History of dementia  #Nonobstructive CAD  #Severe AS with TAVR  #Type 2 diabetes mellitus  #History of PPM    BACKGROUND                                                      Per hospital discharge summary and inpatient provider notes:  Reid Jimenes is a 88 year old male with history of dementia, type 2 diabetes, pacemaker in 2021, nonobstructive CAD, severe AS with TAVR admitted on 11/13/2022 with multiple falls with intractable back pain.  He was actually admitted for back pain in October and found to have severe degenerative changes at that time.  He was discharged to TCU then returned back to the [facility] where he resides with his wife.  There was consideration of transitioning him to a memory care unit as well.  He has been roaming at nighttime and fell trying to get into bed once and then fell a second time, hitting his back on a chair.     Here in the ER he was found to have an L1 compression fracture.  He was treated with topical and oral pain medications and orthotic consult was requested for brace placement.  Neurosurgery evaluated him and recommended nonoperative management with bracing when up out of bed though they noted he could take it off to shower.     #Multiple falls with L1 compression fracture: As above.  Continue scheduled Tylenol, lidocaine patch along with topical Voltaren gel.  Patient does have as needed Robaxin at home which I told his wife to minimize if possible given it can increase confusion and someone with underlying dementia.  He will need bracing when he is out of bed.  His wife was given instructions on how to utilize the  brace.  -I had an extensive discussion with the patient's wife, Alyssa, on the day of discharge.  I offered TCU as an alternative to home given difficulty at home on presentation.  Patient's wife had a strong preference for discharge home given poor experience at TCU.  She does feel she can manage and her daughter is going to also assist.  I did note that we can send referrals to her alternative TCU's (not [facility]) but she still declined this and preferred to discharge home.  -Home cares were resumed on discharge.  Family is also arranging for some private care 2 times per week.  -We will follow-up with neurosurgery with repeat x-rays in 6 and 12 weeks.  He did have an x-ray done on 11/15, day of discharge.  I discussed with on-call neurosurgery, ANGELINE Panda who noted that it was stable and he is suitable for discharge from her perspective.  -I did also recommend follow-up with PCP in 1 week.     #History of dementia: It sounds as though this is evolving and family was considering transition to memory care.  This is still in the works but wife notes she is going to work on this as an outpatient.     #Type 2 diabetes: Continue metformin, also has sliding scale available.     #History of nonobstructive CAD, hypertension, aortic stenosis with TAVR: No apparent acute issues.  Continuing aspirin, simvastatin.     Markus Melgoza MD    ASSESSMENT      Enrollment  Outreach Frequency: monthly    Discharge Assessment  How are you doing now that you are home?: RN CC called and spoke with patient's wife KATE Shah on file. Not sleeping well at night, patient will wake up every 2-3 hours, this is when patient is most agitated. Alyssa is wondering if his Trazodone dose can be increased to help patient sleep or if another sleep aid medication can be prescribed. RN CC will route to PCP, and request patient call back. Also encouraged Alyssa to bring up during 11/18/22 PCP follow up appointment. Alyssa verbalized understanding. Alyssa  states that patient had a good day today (cognitively speaking), patient is playing cards with daughter right now. Reviewed AVS. Patient is wearing brace during the day, unable to to wear brace at night due to agitation. Pain is under control. HHC RN out today at 4pm and OT/PT will schedule after re-assessment. Family hired private home care HHA for 4 hours 2x a week through [facility]. They were also out today, and Alyssa states this was a positive experience. Alyssa states that she would like her two daughters add to Robi's CTC. Reviewed this process, RN CC will mail Alyssa blank CTC form to complete and bring to 11/18/22 appointment.  How are your symptoms? (Red Flag symptoms escalate to triage hotline per guidelines): Improved  Do you feel your condition is stable enough to be safe at home until your provider visit?: Yes  Does the patient have their discharge instructions? : Yes  Does the patient have questions regarding their discharge instructions? : No  Were you started on any new medications or were there changes to any of your previous medications? : Yes  Does the patient have all of their medications?: Yes  Do you have questions regarding any of your medications? : Yes (see comment) (Will route to PCP to address further, see RN CC note)  Do you have all of your needed medical supplies or equipment (DME)?  (i.e. oxygen tank, CPAP, cane, etc.): Yes  Discharge follow-up appointment scheduled within 14 calendar days? : Yes  Discharge Follow Up Appointment Date: 11/18/22  Discharge Follow Up Appointment Scheduled with?: Primary Care Provider              Care Management       Care Mgmt General Assessment  Referral  Referral Source: PCP  Health Care Home/Utilization  Preferred Hospital: [facility]  [phone]  Living Situation  Current living arrangement:: I live in a private home with spouse  Type of residence:: Apartment  Resources  Patient receiving home care services::  Yes  Skilled Home Care Services: Skilled Nursing;Physicial Therapy;Occupational Therapy;Home Health Aid  Community Resources: DME;Home Care (Private Pay HHA 2x week through [facility])  Supplies Currently Used at Home: Diabetic Supplies  Equipment Currently Used at Home: walker, standard  Referrals Placed: Community Resources  Employment Status: retired  Psychosocial  Temple or spiritual beliefs that impact treatment:: No  Mental health DX:: No  Mental health management concern (GOAL):: No  Informal Support system:: Spouse;Children  Functional Status  Dependent ADLs:: Ambulation-walker  Dependent IADLs:: Cleaning;Cooking;Laundry;Shopping;Meal Preparation;Medication Management;Money Management;Transportation  Bed or wheelchair confined:: No  Mobility Status: Independent w/Device  Advance Care Plan/Directive  Advanced Care Plans/Directives on file:: Yes  Type Advanced Care Plans/Directives: Advanced Directive - On File  Advanced Care Plan/Directive Status: Not Applicable    PLAN                                                      Outpatient Plan:  CHW will outreach to patient's wife in 2 weeks. RN CC will review chart in 6 weeks.     Future Appointments   Date Time Provider Department Center   11/18/2022 10:00 AM Jonah Camacho MD OXIM OX   11/28/2022  1:15 PM Modesta Ca MD BUPAIN [facility] PAIN MGMT   1/3/2023 10:30 AM [id number] BUFSXR FSOC - BURNS   1/3/2023 11:00 AM Rual Iqbal PA-C RHSBRS RSCC   1/10/2023  8:30 AM RI LAB RILABR RI   1/11/2023 12:00 [facility]   1/17/2023 10:30 AM Viet Bingham MD RIIM RI   2/14/2023 11:00 AM BUFSOCXR1 BUFSXR FSOC - [facility]   2/14/2023 11:30 AM Raul Iqbal PA-C RHSBRS RS         For any urgent concerns, please contact our 24 hour nurse triage line: [phone] ([phone])         Mandie Liz RN

## 2022-11-16 NOTE — LETTER
[facility] COORDINATION  [address]  [place] [address]    November 16, 2022    Alyssa Jimenes  [id number] [address]   [place] [address]      Dear Alyssa,        Here is the consent to communicate form to update, bring a completed copy to any [facility] and they can scan into Robi's chart.         Magy Liz RN Care Coordinator  [facility]  Email: [email or web address]  Phone: [phone]

## 2022-11-17 NOTE — TELEPHONE ENCOUNTER
Spoke with Alyssa, she states patient is currently taking Trazodone 25 mg nightly for sleep (1/2 of a 50 mg tablet. She will have Robi increase to 1 tablet daily at .  She will call back in 1 week with an update if not successful. AVI Meek R.N.

## 2022-11-17 NOTE — TELEPHONE ENCOUNTER
Okay to take one to two full tablets at bedtime as needed. If taking one-half tablet, would first increase to one tablet at bedtime.   Please advise pt.

## 2022-11-18 ENCOUNTER — TELEPHONE (OUTPATIENT)
Dept: INTERNAL MEDICINE | Facility: CLINIC | Age: 87
End: 2022-11-18

## 2022-11-18 ENCOUNTER — OFFICE VISIT (OUTPATIENT)
Dept: INTERNAL MEDICINE | Facility: CLINIC | Age: 87
End: 2022-11-18
Payer: MEDICARE

## 2022-11-18 VITALS
HEIGHT: 65 IN | BODY MASS INDEX: 28.17 KG/M2 | TEMPERATURE: 98.5 F | DIASTOLIC BLOOD PRESSURE: 64 MMHG | SYSTOLIC BLOOD PRESSURE: 122 MMHG | OXYGEN SATURATION: 96 % | HEART RATE: 60 BPM | WEIGHT: 169.1 LBS

## 2022-11-18 DIAGNOSIS — G47.00 INSOMNIA, UNSPECIFIED TYPE: ICD-10-CM

## 2022-11-18 DIAGNOSIS — M54.50 ACUTE RIGHT-SIDED LOW BACK PAIN WITHOUT SCIATICA: ICD-10-CM

## 2022-11-18 DIAGNOSIS — K59.01 SLOW TRANSIT CONSTIPATION: ICD-10-CM

## 2022-11-18 DIAGNOSIS — R80.9 TYPE 2 DIABETES MELLITUS WITH MICROALBUMINURIA, WITHOUT LONG-TERM CURRENT USE OF INSULIN (H): ICD-10-CM

## 2022-11-18 DIAGNOSIS — M48.07 LUMBOSACRAL SPINAL STENOSIS: ICD-10-CM

## 2022-11-18 DIAGNOSIS — N18.31 CHRONIC KIDNEY DISEASE, STAGE 3A (H): ICD-10-CM

## 2022-11-18 DIAGNOSIS — R41.89 COGNITIVE IMPAIRMENT: ICD-10-CM

## 2022-11-18 DIAGNOSIS — F03.B18 MODERATE DEMENTIA WITH OTHER BEHAVIORAL DISTURBANCE, UNSPECIFIED DEMENTIA TYPE (H): Primary | ICD-10-CM

## 2022-11-18 DIAGNOSIS — E11.29 TYPE 2 DIABETES MELLITUS WITH MICROALBUMINURIA, WITHOUT LONG-TERM CURRENT USE OF INSULIN (H): ICD-10-CM

## 2022-11-18 DIAGNOSIS — S32.010D COMPRESSION FRACTURE OF L1 VERTEBRA WITH ROUTINE HEALING, SUBSEQUENT ENCOUNTER: ICD-10-CM

## 2022-11-18 PROCEDURE — 99495 TRANSJ CARE MGMT MOD F2F 14D: CPT | Performed by: INTERNAL MEDICINE

## 2022-11-18 RX ORDER — LIDOCAINE 4 G/G
1 PATCH TOPICAL EVERY 24 HOURS
Qty: 30 PATCH | Refills: 2 | Status: SHIPPED | OUTPATIENT
Start: 2022-11-18 | End: 2024-01-09

## 2022-11-18 RX ORDER — DONEPEZIL HYDROCHLORIDE 5 MG/1
5 TABLET, FILM COATED ORAL AT BEDTIME
Qty: 30 TABLET | Refills: 2 | Status: SHIPPED | OUTPATIENT
Start: 2022-11-18 | End: 2023-02-08

## 2022-11-18 RX ORDER — TRAZODONE HYDROCHLORIDE 50 MG/1
50 TABLET, FILM COATED ORAL
Qty: 30 TABLET | Refills: 1 | Status: SHIPPED | OUTPATIENT
Start: 2022-11-18 | End: 2023-01-08

## 2022-11-18 RX ORDER — QUETIAPINE FUMARATE 25 MG/1
12.5-25 TABLET, FILM COATED ORAL 2 TIMES DAILY PRN
Qty: 30 TABLET | Refills: 2 | Status: SHIPPED | OUTPATIENT
Start: 2022-11-18 | End: 2023-06-16

## 2022-11-18 NOTE — PROGRESS NOTES
Assessment & Plan     (F03.B18) Moderate dementia with other behavioral disturbance, unspecified dementia type  (primary encounter diagnosis)  Comment:      Dementia progressing as reported by the family.     *  He is reaching the point where he cannot be trusted to live independently, and I agree with eventual memory care placement as they are considering.        New medication to help slow progression of memory loss:  Start Donepazil (generic Aricept) 5 mg once per day every day at bedtime.  This medication helps slow the progression of memory loss, it does not cure Alzheimers.  Main side effects include nausea, intestinal upset, diarrhea, bad dreams, decreased appetite, unintended weight loss.    If you experience any side effects from this medication, stop it and contact us.    Visit the Aricept web site (http://www.[email or web address] River Fiber.com/) for more information on Aricept and/or dementia.         Agitation reported by family (version of sundowning with changes in behaviors in the evening); Additional medication to help control severe agitation and anxiety:     --Quetiapine (generic Seroquel) 12.5 mg ( 1/2 of 25 mg tablet) or 25 mg twice per day as needed for acute agitation, or at bedtime as needed.  Main side effects of Quetiapine (Seroquel), including risk of drowsiness, memory issues, nightmares, and sleep walking.  Do not to drink any alcohol or operate heavy machinery after taking this medication and to call if any side effects occur.          Continue all other medications at the same doses.  Contact your usual pharmacy if you need refills.        Proceed with the OT evaluation of memory as planned       Follow up as planned with Dr. Bingham (your usual primary MD) as planned in 2 months.     Plan: donepezil (ARICEPT) 5 MG tablet, QUEtiapine         (SEROQUEL) 25 MG tablet            (G47.00) Insomnia, unspecified type  Comment: same medicaiotn as before.   Plan: traZODone (DESYREL) 50 MG tablet             (S32.010D) Compression fracture of L1 vertebra with routine healing, subsequent encounter  Comment: slow healing.   Follow up as planned with the spine surgeons.   Wear theback brace until told otherwise.   Avoid any sort of opiates due to risk of alteredmental status and constipatio.   Plan:     (M48.07) Lumbosacral spinal stenosis  Comment:   Plan: Lidocaine (LIDOCARE) 4 % Patch            (M54.50) Acute right-sided low back pain without sciatica  Comment:   Plan: Lidocaine (LIDOCARE) 4 % Patch            (N18.31) Chronic kidney disease, stage 3a (H)  Comment: This condition is currently controlled on the current medical regimen.  Continue current therapy.   Plan:     (E11.29,  R80.9) Type 2 diabetes mellitus with microalbuminuria, without long-term current use of insulin (H)  Comment: This condition is currently controlled on the current medical regimen.  Continue current therapy.   Plan: Continuous Blood Gluc  (FREESTYLE SANGEETA        2 READER) IVONNE, Continuous Blood Gluc Sensor         (FREESTYLE SANGEETA 2 SENSOR) MISC            (K59.01) Slow transit constipation  Comment:   Plan:     (R41.89) Cognitive impairment  Comment: as above   Plan:               MED REC REQUIRED  Post Medication Reconciliation Status:  Discharge medications reconciled and changed, see notes/orders        Return in about 2 months (around 1/18/2023) for in person Office visit, With your primary MD.    Jonah Camacho MD  [facility] SERAFIN Rosenbaum is a 88 year old accompanied by his spouse and daughter, presenting for the following health issues:    jsut discharged 2 days ago from [facility] after admission for acute back pain due to fall resulting in lumbar comrpession fracture/     Hospital F/U      HPI     Post Discharge Outreach 11/16/2022   Admission Date 11/13/2022   Reason for Admission Falls with back pain   Discharge Date 11/15/2022   Discharge Diagnosis #Fall  complicated by L1 compression fracture  #History of dementia  #Nonobstructive CAD  #Severe AS with TAVR  #Type 2 diabetes mellitus  #History of PPM   How are you doing now that you are home? RN CC called and spoke with patient's wife Alyssa, KATE on file. Not sleeping well at night, patient will wake up every 2-3 hours, this is when patient is most agitated. Alyssa is wondering if his Trazodone dose can be increased to help patient sleep or if another sleep aid medication can be prescribed. RN CC will route to PCP, and request patient call back. Also encouraged Alyssa to bring up during 11/18/22 PCP follow up appointment. Alyssa verbalized understanding. Alyssa states that patient had a good day today (cognitively speaking), patient is playing cards with daughter right now. Reviewed AVS. Patient is wearing brace during the day, unable to to wear brace at night due to agitation. Pain is under control. HHC RN out today at 4pm and OT/PT will schedule after re-assessment. Family hired private home care HHA for 4 hours 2x a week through [facility]. They were also out today, and Alyssa states this was a positive experience. Alyssa states that she would like her two daughters add to [facility]'s CTC. Reviewed this process, RN CC will mail Aylssa blank CTC form to complete and bring to 11/18/22 appointment.   How are your symptoms? (Red Flag symptoms escalate to triage hotline per guidelines) Improved   Do you feel your condition is stable enough to be safe at home until your provider visit? Yes   Does the patient have their discharge instructions?  Yes   Does the patient have questions regarding their discharge instructions?  No   Were you started on any new medications or were there changes to any of your previous medications?  Yes   Does the patient have all of their medications? Yes   Do you have questions regarding any of your medications?  Yes (see comment)   Do you have all of your needed medical supplies or equipment (DME)?   (i.e. oxygen tank, CPAP, cane, etc.) Yes   Discharge follow-up appointment scheduled within 14 calendar days?  Yes   Discharge Follow Up Appointment Date 11/18/2022   Discharge Follow Up Appointment Scheduled with? Primary Care Provider     Hospital Follow-up Visit:    Hospital/Nursing Home/IP Rehab Facility: [facility]  Date of Admission: 11-13-22  Date of Discharge: 11-15-22  Reason(s) for Admission: fall with compression fracture    Was your hospitalization related to COVID-19? No   Problems taking medications regularly:  None-meds set up for him  Medication changes since discharge: None  Problems adhering to non-medication therapy:  Ambulation and dementia    Summary of hospitalization:  [facility] discharge summary reviewed  Diagnostic Tests/Treatments reviewed.  Follow up needed: upcomign OT evaluation for memory and performance capbilities.   Other Healthcare Providers Involved in Patient s Care:         PT and OT  Update since discharge: improved.   Plan of care communicated with patient and family      Per discharge summary:    [facility]     Discharge Summary  Hospitalist     Date of Admission:  11/13/2022  Date of Discharge:  11/15/2022  Discharging Provider: Markus Melgoza MD  Date of Service (when I saw the patient): 11/15/22        Discharge Diagnoses     #Fall complicated by L1 compression fracture  #History of dementia  #Nonobstructive CAD  #Severe AS with TAVR  #Type 2 diabetes mellitus  #History of PPM           Hospital Course     Reid Jimenes is a 88 year old male with history of dementia, type 2 diabetes, pacemaker in 2021, nonobstructive CAD, severe AS with TAVR admitted on 11/13/2022 with multiple falls with intractable back pain.  He was actually admitted for back pain in October and found to have severe degenerative changes at that time.  He was discharged to TCU then returned back to the [facility] where  he resides with his wife.  There was consideration of transitioning him to a memory care unit as well.  He has been roaming at nighttime and fell trying to get into bed once and then fell a second time, hitting his back on a chair.     Here in the ER he was found to have an L1 compression fracture.  He was treated with topical and oral pain medications and orthotic consult was requested for brace placement.  Neurosurgery evaluated him and recommended nonoperative management with bracing when up out of bed though they noted he could take it off to shower.     #Multiple falls with L1 compression fracture: As above.  Continue scheduled Tylenol, lidocaine patch along with topical Voltaren gel.  Patient does have as needed Robaxin at home which I told his wife to minimize if possible given it can increase confusion and someone with underlying dementia.  He will need bracing when he is out of bed.  His wife was given instructions on how to utilize the brace.  -I had an extensive discussion with the patient's wife, Alyssa, on the day of discharge.  I offered TCU as an alternative to home given difficulty at home on presentation.  Patient's wife had a strong preference for discharge home given poor experience at TCU.  She does feel she can manage and her daughter is going to also assist.  I did note that we can send referrals to her alternative TCU's (not [facility]) but she still declined this and preferred to discharge home.  -Home cares were resumed on discharge.  Family is also arranging for some private care 2 times per week.  -We will follow-up with neurosurgery with repeat x-rays in 6 and 12 weeks.  He did have an x-ray done on 11/15, day of discharge.  I discussed with on-call neurosurgery, ANGELINE Panda who noted that it was stable and he is suitable for discharge from her perspective.  -I did also recommend follow-up with PCP in 1 week.     #History of dementia: It sounds as though this is evolving and family  was considering transition to memory care.  This is still in the works but wife notes she is going to work on this as an outpatient.     #Type 2 diabetes: Continue metformin, also has sliding scale available.     #History of nonobstructive CAD, hypertension, aortic stenosis with TAVR: No apparent acute issues.  Continuing aspirin, simvastatin.     Markus Melgoza MD           1.  Worsening dementia over the past year.   Patient is living in senior apartment, [facility]   Need physical exam and assessment.   He has very poor short term memory, cannot provbide substative answer to questions, all history from wife and daughter.      2.  Diabetes:  In regards specifically to the patient's diabetes, they reports no unusual symptoms.  Medication compliance: good  Diabetic diet compliance: good    Patient reports no significant episodes of hypo- or hyperglycemia    Diabetic complications: coronary artery disease and chronic kidney disease        Most  recent labs:    Lab Results   Component Value Date    A1C 7.7 11/13/2022    A1C 7.1 07/19/2022    A1C 7.2 01/14/2022    A1C 7.2 07/19/2021    A1C 7.1 06/09/2021    A1C 6.9 01/12/2021    A1C 6.4 07/10/2020    A1C 6.4 08/21/2019    A1C 6.5 05/06/2019    A1C 6.7 01/02/2019    A1C 6.6 08/28/2018    A1C 6.5 04/12/2018    A1C 6.7 11/01/2017    A1C 6.6 05/18/2017    A1C 6.6 09/28/2016    A1C 6.5 04/07/2016    A1C 6.2 10/07/2015    A1C 6.4 04/17/2015    A1C 6.2 10/17/2014    A1C 6.0 04/28/2014    A1C 6.4 10/03/2013    A1C 6.5 04/09/2013          GFR Estimate   Date Value Ref Range Status   11/13/2022 65 >60 mL/min/1.73m2 Final     Comment:     Effective December 21, 2021 eGFRcr in adults is calculated using the 2021 CKD-EPI creatinine equation which includes age and gender (Candace beauchamp al., NEJM, DOI: 10.1056/BODEdv1446391)   11/03/2022 62 >60 mL/min/1.73m2 Final     Comment:     Effective December 21, 2021 eGFRcr in adults is calculated  using the 2021 CKD-EPI creatinine equation which includes age and gender (Candace beauchamp al., [facility], DOI: 10.1056/HNEAfx3976904)   10/31/2022 55 (L) >60 mL/min/1.73m2 Final     Comment:     Effective December 21, 2021 eGFRcr in adults is calculated using the 2021 CKD-EPI creatinine equation which includes age and gender (Candace et al., [facility], DOI: 10.1056/DUPDmc7931449)   06/12/2021 55 (L) >60 mL/min/[1.73_m2] Final     Comment:     Non  GFR Calc  Starting 12/18/2018, serum creatinine based estimated GFR (eGFR) will be   calculated using the Chronic Kidney Disease Epidemiology Collaboration   (CKD-EPI) equation.     06/11/2021 66 >60 mL/min/[1.73_m2] Final     Comment:     Non  GFR Calc  Starting 12/18/2018, serum creatinine based estimated GFR (eGFR) will be   calculated using the Chronic Kidney Disease Epidemiology Collaboration   (CKD-EPI) equation.     06/10/2021 62 >60 mL/min/[1.73_m2] Final     Comment:     Non  GFR Calc  Starting 12/18/2018, serum creatinine based estimated GFR (eGFR) will be   calculated using the Chronic Kidney Disease Epidemiology Collaboration   (CKD-EPI) equation.       GFR Estimate If Black   Date Value Ref Range Status   06/12/2021 64 >60 mL/min/[1.73_m2] Final     Comment:      GFR Calc  Starting 12/18/2018, serum creatinine based estimated GFR (eGFR) will be   calculated using the Chronic Kidney Disease Epidemiology Collaboration   (CKD-EPI) equation.     06/11/2021 76 >60 mL/min/[1.73_m2] Final     Comment:      GFR Calc  Starting 12/18/2018, serum creatinine based estimated GFR (eGFR) will be   calculated using the Chronic Kidney Disease Epidemiology Collaboration   (CKD-EPI) equation.     06/10/2021 72 >60 mL/min/[1.73_m2] Final     Comment:      GFR Calc  Starting 12/18/2018, serum creatinine based estimated GFR (eGFR) will be   calculated using the Chronic Kidney Disease Epidemiology  "Collaboration   (CKD-EPI) equation.          **I reviewed the information recorded in the patient's EPIC chart (including but not limited to medical history, surgical history, family history, problem list, medication list, and allergy list) and updated the information as indicated based on the patients reported information.         Review of Systems   Constitutional, HEENT, cardiovascular, pulmonary, gi and gu systems are negative, except as otherwise noted.      Objective    /64   Pulse 60   Temp 98.5  F (36.9  C) (Tympanic)   Ht 1.651 m (5' 5\")   Wt 76.7 kg (169 lb 1.6 oz)   SpO2 96%   BMI 28.14 kg/m    Body mass index is 28.14 kg/m .  Physical Exam   GENERAL alert and no distress  EYES:  Normal sclera,conjunctiva, EOMI  HENT: oral and posterior pharynx without lesions or erythema, facies symmetric  NECK: Neck supple. No LAD, without thyroidmegaly.  RESP: Clear to ausculation bilaterally without wheezes or crackles. Normal BS in all fields.  CV: RRR normal S1S2 without murmurs, rubs or gallops.  LYMPH: no cervical lymph adenopathy appreciated  MS: extremities- wearing large lumbar back brace.   Unable to ambulate due to pain and the brace.   EXT:  no lower extremity edema  PSYCH: Alert and oriented times 1; speech- coherent, but does not provide substative relioabel answers to questions.   SKIN:  No obvious significant skin lesions on visible portions of face                     "

## 2022-11-18 NOTE — PATIENT INSTRUCTIONS
Dementia progressing     New medication to help slow progression of memory loss:  Start Donepazil (generic Aricept) 5 mg once per day every day at bedtime.  This medication helps slow the progression of memory loss, it does not cure Alzheimers.  Main side effects include nausea, intestinal upset, diarrhea, bad dreams, decreased appetite, unintended weight loss.    If you experience any side effects from this medication, stop it and contact us.    Visit the Aricept web site (http://www.ariKnewbi.com.com/) for more information on Aricept and/or dementia.       Additional medication to help control severe agitation and anxiety:     --Quetiapine (generic Seroquel) 12.5 mg ( 1/2 of 25 mg tablet) or 25 mg twice per day as needed for acute agitation, or at bedtime as needed.  Main side effects of Quetiapine (Seroquel), including risk of drowsiness, memory issues, nightmares, and sleep walking.  Do not to drink any alcohol or operate heavy machinery after taking this medication and to call if any side effects occur.        Continue all other medications at the same doses.  Contact your usual pharmacy if you need refills.      Proceed with the OT evaluation of memory as planned     Follow up as planned with Dr. Bingham (your usual primary MD) as planned in 2 months.         PERSONAL CONTINUOUS GLUCOSE MONITOR (CGM):     Investigate a personal continuous glucose monitoring  system (most common devices are Freestyle Carola and DexCom).  These systems allow for continuous glucose monitoring all throughout the day for 7-14 days and will show you the ways your glucose will fluctuate over this period by measuring your glucose every minute.  The sensor is applied to the back of your arm, and sits in place for up to 7-14 days, depending on which version you get.  You can really see how your diet and lifestyle changes affect your glucose levels.    If covered or reasonable expense, please consider getting it.      *  The goal for your  "diabetes control is to keep the glucose between , 70-75% of the time with very few low readings below 70.    Do NOT be alarmed if you see the glucose go very high, it always goes down.      *  Go to the \"settings\" button and Set the \"Target Range\" between .     *  At the end of the day, review the glucose readings and see if it was a good day for the diabetes or not.  If it was a good day ( time in target range 75% or better), then do more of the things that made it a good day.  If it was not a good day for the diabetes (time in target range LESS than 75% of the time), then review why it may have been a bad day for the diabetes and reconsider and change those variables moving forward.     *  The glucose readings from the continuous glucose monitor will not directly compare with those from a standard glucometer because they measure the glucose in slightly different ways, they are usually about 30-60 minutes later from what you would see on a standard glucometer.     Check their web sites for more details:      --Freestyle Carola: https://www.freestyleGaN Systemsre.iPrint/  For the FreestyleLIbre system, Check for eligibility for a voucher from the  for discounted Carola prices: [phone]  (may cost no more than $75/month if you are eligible)    --Dexcom:  https://www.dexcom.com/    In case you are worried about keeping the CGM sensor in place, the following are all patches meant to cover/secure medical devices.  All of them have device specific options (e.g., the patches for Dexcom have a rectangular hole cut, Carola and Guardian have no hole, etc.)  The first three are all available on Amazon.  Sugar Patch is ordered direct.  Sugar Patch is popular because it comes in many patterns and designs for those that want to use the patch as a fashion accessory.    * SimPatch  http://Science Exchange.ColorChip/  * GrifGrips  https://www.Jabong.com.com/  *Skin   https://Photobucket.com/  *Sugar " "Patch  https://Grocery Shopping Network.Peonut  *Hypafix is a good, all-purpose tape to cover most anything.  Waterproof and most people don't get any irritation.  I use the 2\" - 2 pack of 2\" x 30' rolls is $13.  https://www.Hunie/s?k=hypafix+tape             Standard recommendations for Dementia:  Exercise is the only known therapy that can delay cognitive decline. We recommend you perform at least 30 minutes of exercise (preferably aerobic, but other more mild forms are OK -- the main idea is to stay physically active) for 5 days a week.     Continue to optimize cerebrovascular risk factors (blood pressure control, lipid control, glycemic control, healthy diet and exercise).     Stay socially and intellectually active as much as possible. Participate in activities that are enjoyable to you. There is no need to perform mental quizzes or \"mind games\" to boost memory; studies have demonstrated that these specialized memory games do not actually boost the patient's cognitive performance in activities outside the game.      Eat healthy. A balanced diet with all major food groups is recommended. Other recommended diets include the Mediterranean diet.    Remove dangerous objects or fall hazards from the home environment.      DEMENTIA RESOURCES:     Regardless of the cause of your memory loss, the Alzheimer's Association has many resources, support groups, and FAQs for caregivers and patients living with dementia. I would highly recommend visiting their webpage: www.Alz.org.    Community Resources:  https://mngwep.[facility]/dementia-resources     Caregiver Support Program that helps to line up the right services for your and your loved one: https://[facility].org/services/caregiver-assurance  Speak with an Advisor to learn how Caregiver Assurance can help you: [phone]-[facility]([phone])     Adult rehabilitative mental health services (ARMHS) is a range of services that helps an individual develop and enhance psychiatric stability, " social competencies, personal and emotional adjustment, and independent living and community skills. You can find out more at: https://[place].gov/dhs/people-we-serve/people-with-disabilities/health-care/adult-mental-health/programs-services/[facility].jsp  Or thi 318-533-1354      Books:  *  The 36-Hour Day: A Family Guide to Caring for People with Alzheimer Disease, Other Dementias, and Memory Loss in Later Life, 4th Edition [Paperback]  (Nahomy Qureshi)    *  Surviving Alzheimer's  (Luda Aparicio)    *  Caregiver's Guide to Alzheimer's Disease: 300 Tips for Making Life Easier [Paperback]  Babs Bates ()     For more information on short term memory loss and Alzheimer's, check out the following resources:    Alzheimer's Association:  www.alz.org  Alzheimer's Disease Education and Referral (ADEAR) Center:  www.jj.nih.gov/alzheimers  Alzheimer's Foundation of [place]: Caregiving tips:  http://www.alzfdn.org/EducationandCare/strategiesforsuccess.html  The National [place] on Aging Information Center:  www.jj.nih.gov

## 2022-11-21 ENCOUNTER — TELEPHONE (OUTPATIENT)
Dept: INTERNAL MEDICINE | Facility: CLINIC | Age: 87
End: 2022-11-21

## 2022-11-21 NOTE — TELEPHONE ENCOUNTER
Larisa, occupational therapist with [facility].    Asking for verbal order for occupational therapy 1x/wk x 2 wks and then every other week x 4 wks.  Working on cognitive assessment and home safety.    Please advise, thanks.

## 2022-11-21 NOTE — TELEPHONE ENCOUNTER
Fax received from [facility] 11/09/22 for review and signature.  Put in Dr. Bingham's yellow folder.

## 2022-11-23 NOTE — TELEPHONE ENCOUNTER
Larisa calling again regarding occupational therapy orders requested below.    Please advise, thanks.

## 2022-11-25 NOTE — PROGRESS NOTES
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  They have a history of central low back pain without radiation to the legs.  Exam shows pain with extension and rotation and they have tried conservative treatment including medications.    Options/alternatives, benefits and risks were discussed with the patient including bleeding, infection, flared pain, tissue trauma, exposure to radiation, reaction to medications including seizure, spinal cord injury, paralysis, weakness, numbness and headache.    Questions were answered to his satisfaction and he agrees to proceed. Voluntary informed consent was obtained and signed.     Vitals were reviewed: Yes  Allergies were reviewed:  Yes   Medications were reviewed:  Yes   Pre-procedure pain score: 5/10    LUMBAR CT: 3/21/2022   FINDINGS:  Alignment is significant for levoconvex scoliosis and  multilevel grade 1 spondylolisthesis. Chronic-appearing L4 compression  deformity is present with large inferior endplate Schmorl's node and  moderate height loss. Multiple other smaller Schmorl's nodes are  present. No evidence of recent fracture. Severe asymmetrical  degenerative disc disease is present at L3-L4, L4-L5, and L5-S1.  Multilevel facet arthropathy is present which is severe on the left at  L4-L5 and L5-S1. Disc bulging contributes to mild to moderate spinal  canal stenosis at L2-L3 and L3-L4. No high-grade spinal canal  stenoses. Mild foraminal stenoses at multiple levels. Moderate  foraminal stenosis on the right at L2-L3. Severe foraminal stenosis  on  the right at L3-L4. Severe foraminal stenosis on the left at L4-L5.  Mild to moderate foraminal stenosis on the right at L4-L5. Severe  foraminal stenosis on the left L5-S1.     Extensive scattered vascular calcifications with aortic irregularity.  Lateral sacroiliac joint degenerative change.                                                                      IMPRESSION:  Severe asymmetrical degenerative change with scoliosis  and multiple stenoses as detailed.     Procedure:  After getting informed consent, patient was brought into the procedure suite and was placed in a prone position on the procedure table.   A Pause for the Cause was performed.  Patient was prepped and draped in sterile fashion.     Under AP fluoroscopic guidance the L4-5 and L5-S1 facet joints were identified bilaterally, and the C-arm was rotated obliquely to the affected side to open the joint space. A total of 10 ml of 1% lidocaine was injected at the needle entry point and needle tract. Then a 22 gauge 5 inch quincke type spinal needle was inserted and advanced under fluoroscopic guidance targeting the superior articular pillar of each joint. Once the needle made a contact with SAP, it was rotated and was then advanced into the joint.    AP fluoroscopic views were obtained to confirm the needle placement. Then,  Omnipaque 300 contrast dye was injected after negative aspiration for heme and CSF in each joint, confirming appropriate placement.  A total of 1mL of Omnipaque was used and none was wasted.    The injection was then accomplished using a solution containing 2ml of 1% Lidocaine mixed with 80mg of kenolog, divided between the 4 joints. The needles were removed..     Hemostasis was achieved, the area was cleaned, and bandaids were placed when appropriate.  The patient tolerated the procedure well, and was taken to the recovery room.    Images were saved to PACS.    Post-procedure pain score: 3/10  Follow-up includes:   -f/u phone  call in one week  -f/u with the referring provider  -Lumbar RFA was discussed as the next step if good pain relief is obtained but it does not last very long.     BECCA BRENNER MD   Pain Management & Addiction Medicine

## 2022-11-28 ENCOUNTER — RADIOLOGY INJECTION OFFICE VISIT (OUTPATIENT)
Dept: PALLIATIVE MEDICINE | Facility: CLINIC | Age: 87
End: 2022-11-28
Payer: MEDICARE

## 2022-11-28 ENCOUNTER — TELEPHONE (OUTPATIENT)
Dept: INTERNAL MEDICINE | Facility: CLINIC | Age: 87
End: 2022-11-28

## 2022-11-28 VITALS — HEART RATE: 60 BPM | OXYGEN SATURATION: 100 % | DIASTOLIC BLOOD PRESSURE: 61 MMHG | SYSTOLIC BLOOD PRESSURE: 152 MMHG

## 2022-11-28 DIAGNOSIS — M47.896 OTHER SPONDYLOSIS, LUMBAR REGION: Primary | ICD-10-CM

## 2022-11-28 PROCEDURE — 64494 INJ PARAVERT F JNT L/S 2 LEV: CPT | Mod: 50 | Performed by: ANESTHESIOLOGY

## 2022-11-28 PROCEDURE — 64493 INJ PARAVERT F JNT L/S 1 LEV: CPT | Mod: 50 | Performed by: ANESTHESIOLOGY

## 2022-11-28 RX ORDER — TRIAMCINOLONE ACETONIDE 40 MG/ML
40 INJECTION, SUSPENSION INTRA-ARTICULAR; INTRAMUSCULAR ONCE
Status: COMPLETED | OUTPATIENT
Start: 2022-11-28 | End: 2022-11-28

## 2022-11-28 RX ADMIN — TRIAMCINOLONE ACETONIDE 40 MG: 40 INJECTION, SUSPENSION INTRA-ARTICULAR; INTRAMUSCULAR at 13:53

## 2022-11-28 NOTE — NURSING NOTE
Discharge Information    IV Discontiued Time:  NA    Amount of Fluid Infused:  NA    Discharge Criteria = When patient returns to baseline or as per MD order    Consciousness:  Pt is fully awake    Circulation:  BP +/- 20% of pre-procedure level    Respiration:  Patient is able to breathe deeply    O2 Sat:  Patient is able to maintain O2 Sat >92% on room air    Activity:  Moves 4 extremities on command    Ambulation:  Patient is able to stand and walk or stand and pivot into wheelchair    Dressing:  Clean/dry or No Dressing    Notes:   Discharge instructions and AVS given to patient    Patient meets criteria for discharge?  YES    Admitted to PCU?  No    Responsible adult present to accompany patient home?  Yes    Signature/Title:    Karely Nixon RN  RN Care Coordinator  [facility] [place]

## 2022-11-28 NOTE — PATIENT INSTRUCTIONS
[facility] Procedure Discharge Instructions    Today you saw:   Dr. Modesta Ca      Your procedure: Facet joint injection      Medications used:  Lidocaine (anesthetic)  Bupivacaine (anesthetic)   Kenalog (steroid)  Omnipaque (contrast)           Be cautious when walking as numbness and/or weakness in the legs may occur up to 6-8 hours after the procedure due to effect of the local anesthetic  Do not drive for 6 hours. The effect of the local anesthetic could slow your reflexes.   Avoid strenuous activity for the first 24 hours. You may resume your regular activities after that.   You may shower, however avoid swimming, tub baths or hot tubs for 24 hours following your procedure  You may have a mild to moderate increase in pain for several days following the injection.    You may use ice packs for 10-15 minutes, 3 to 4 times a day at the injection site for comfort  Do not use heat to painful areas for 6 to 8 hours. This will give the local anesthetic time to wear off and prevent you from accidentally burning your skin.  Unless you have been directed to avoid the use of anti-inflammatory medications (NSAIDS-ibuprofen, Aleve, Motrin), you may use these medications or Tylenol for pain control if needed.   With diabetes, check your blood sugar more frequently than usual as your blood sugar may be higher than normal for 10-14 days following a steroid injection. Contact your doctor who manages your diabetes if your blood sugar is higher than usual  Possible side effects of steroids that you may experience include flushing, elevated blood pressure, increased appetite, mild headaches and restlessness.  All of these symptoms will get better with time.  It may take up to 14 days for the steroid medication to start working although you may feel the effect as early as a few days after the procedure.   Follow up with Dr Gomes in 6-8 weeks    If you experience any of the following, call the pain center line  during work hours at [phone] or on-call physician after hours at [phone]:  -Fever over 100 degree F  -Swelling, bleeding, redness, drainage, warmth at the injection site  -Progressive weakness or numbness in your legs or arms  -Loss of bowel or bladder function  -Unusual headache that is not relieved by Tylenol or your regular headache medication  -Unusual new onset of pain that is not improving

## 2022-11-28 NOTE — NURSING NOTE
Pre-procedure Intake    If YES to any questions or NO to having a   Please complete laminated checklist and leave on the computer keyboard for Provider, verbally inform provider if able.    For SCS Trial, RFA's or any sedation procedure: NA    If yes, for how long?         Are you taking any any blood thinners such as Coumadin, Warfarin, Jantoven, Pradaxa Xarelto, Eliquis, Edoxaban, Enoxaparin, Lovenox, Heparin, Arixtra, Fondaparinux, or Fragmin? OR Antiplatelet medication such as Plavix, Brilinta, or Effient?  NO     If yes, when did you take your last dose?        Do you take aspirin?   YES: 81 mg    If cervical procedure, have you held aspirin for 6 days?   NA    Do you have any allergies to contrast dye, iodine, steroid and/or numbing medications?  NO     Are you currently taking antibiotics or have an active infection?  NO     Have you had a fever/elevated temperature within the past week? NO     Are you currently taking oral steroids? NO     Do you have a ? Yes     Are you pregnant or breastfeeding? NA     Have you received the COVID-19 vaccine? Yes     If yes, was it your 1st, 2nd or only dose needed? Booster dose 11/9/22    Notify provider and RNs if systolic BP >170, diastolic BP >100, P >100 or O2 sats < 90%

## 2022-11-28 NOTE — TELEPHONE ENCOUNTER
Fax received from [facility] 11/17/22 for review and signature.  Put in Dr. Bingham's yellow folder.

## 2022-11-28 NOTE — TELEPHONE ENCOUNTER
[facility] * #[id number] SKILLED NURSING order received via fax    Forms in [employer] mail box for review and signature.

## 2022-11-28 NOTE — TELEPHONE ENCOUNTER
Fax received from [facility] 11/09/22 for review and signature.  Put in Dr. Bingham's yellow folder.

## 2022-11-30 ENCOUNTER — PATIENT OUTREACH (OUTPATIENT)
Dept: CARE COORDINATION | Facility: CLINIC | Age: 87
End: 2022-11-30

## 2022-11-30 ENCOUNTER — TELEPHONE (OUTPATIENT)
Dept: PALLIATIVE MEDICINE | Facility: CLINIC | Age: 87
End: 2022-11-30

## 2022-11-30 NOTE — TELEPHONE ENCOUNTER
[facility] Call Center    Phone Message    May a detailed message be left on voicemail: yes     Reason for Call: Other: Patient wife Alyssa is requesting a call back to discuss   patient injection on Monday in back should patient continue with difcofenac cream and lidocaine patch. Please call Alyssa at [phone] to advise.       Action Taken: Other: [facility]    Travel Screening: Not Applicable

## 2022-11-30 NOTE — PROGRESS NOTES
Clinic Care Coordination Contact    Community Health Worker Follow Up    Care Gaps:     Health Maintenance Due   Topic Date Due     ZOSTER IMMUNIZATION (2 of 3) 12/05/2012       Pt focused on addressing current goals.    Care Plan:   Care Plan: In Home Support and Resources     Problem: Lack of caregiver support     Goal: In Home Support and Resources     Start Date: 11/10/2022 Expected End Date: 11/10/2023    This Visit's Progress: 10%    Note:     Barriers: Lack of caregiver support  Strengths: Patient's wife Alyssa is engaged and motivated   Patient expressed understanding of goal: Yes  Action steps to achieve this goal:  1. I understand that RN CC will mail and send through [employer] in home support resources, [place] support resources and [place] for [facility].  2. I will review resources with my children and utilize as we see fit.   3. I will continue to work with care coordination for any additional resources and support.                           Intervention and Education during outreach:     Writer was able to connect with Alyssa (Pts spouse, CTC) regarding above goals. Alyssa shared that Pt did get an injection at the [facility] [place]. Alyssa had a question surrounding a prescribed topical cream. Writer provided the phone number to call and address this particular question; Alyssa is agreeable and can call independently.     Writer inquired about in home support. Pt is continuing to receive PT/OT through [facility]. Writer inquired how Pts ADLs are. Alyssa stated that she does not see a reason for Pt to need assistance showering. Writer inquired how meal preparation was. Alyssa shared that HHC is assisting with this. Writer reminded Alyssa of resources sent by CC RN to the Pts [facility] regarding potential options for assistance once HHC is done. Alyssa wants to take a look at them to prepare for after HHC is done. Writer offered to send meal delivery options and explained what the  service is. Alyssa declined at this time, however would like to talk about it more in the future.     Pt did receive CTC sent by CC RN, however Alyssa signed the paperwork. Writer explained the sections of the form and that Pt is to sign the bottom. Alyssa voiced understanding and thanked CC for sending another copy. Writer resent new CTC on 11/30/2022 to home address.       CHW Plan: Writer will reach out to Alyssa in 1 month to month to monitor the progression of the Pts goals.       DANIEL Mehta.S. Public Health  Community Health Worker  [facility]  Clinic Care Coordination  [phone]

## 2022-12-01 NOTE — TELEPHONE ENCOUNTER
Called pt and spouse. Advised that there would be no contraindication to continuing his diclofenac and lidocaine patch due to injection on Monday    Indira VALLE, RN Care Coordinator  [facility]  Pain Management

## 2022-12-07 ENCOUNTER — TELEPHONE (OUTPATIENT)
Dept: INTERNAL MEDICINE | Facility: CLINIC | Age: 87
End: 2022-12-07

## 2022-12-07 NOTE — TELEPHONE ENCOUNTER
Patient is testing blood sugar with Carola sensor.     Patient is taking Metformin as prescribed.       His blood sugar today 167 this am and now 278, 2 hours after breakfast.     Yesterday blood sugars were 302 in the evening and am was 200 over the past week.     Patient was in transitional in October, he was on insulin while in the facility .  He was discharge in 11/03/2022.     Since transition to home, he  does not drink fluids except two cups of coffee per his wife. He has no other fluids taken during the day. This low fluid intake is normal for him.     Patient instructed to increase fluid intake, specific  instructions given to patient and wife on what fluids to drink and how much to consume.     He should see increase in urination and they should call back tomorrow with blood sugars after 24 hours of hydration.       Dr. Camacho, please advise with further recommendation on elevated blood sugar.       Natalie Solano RN  -[facility]

## 2022-12-07 NOTE — TELEPHONE ENCOUNTER
Dr. Camacho is out of the office this week. It appears this patient actually follows with another provider. Routing this encounter to patient's PCP.

## 2022-12-08 NOTE — TELEPHONE ENCOUNTER
I don't have enough information to change diabetes med regimen.   It appears as though he only received sliding scale short-acting insulin while supervised in hospital (perhaps also in TCU), no documentation that patient has been given long-acting insulin or that he or wife have been trained in administering insulin.     Continue current med regimen for now.   May provide an update on glucoses next week.   Recommend diabetes education assistance for patient/wife if insulin is to be considered.

## 2022-12-09 ENCOUNTER — MEDICAL CORRESPONDENCE (OUTPATIENT)
Dept: HEALTH INFORMATION MANAGEMENT | Facility: CLINIC | Age: 87
End: 2022-12-09

## 2022-12-09 DIAGNOSIS — Z53.9 DIAGNOSIS NOT YET DEFINED: Primary | ICD-10-CM

## 2022-12-09 PROCEDURE — G0180 MD CERTIFICATION HHA PATIENT: HCPCS | Performed by: INTERNAL MEDICINE

## 2022-12-09 NOTE — TELEPHONE ENCOUNTER
Spoke with wife and she did get a hold of Dr Camacho's office and has been pushing fluids and his glucose has come down.     Thuy SALAZAR RN, BSN

## 2022-12-12 ENCOUNTER — TELEPHONE (OUTPATIENT)
Dept: INTERNAL MEDICINE | Facility: CLINIC | Age: 87
End: 2022-12-12

## 2022-12-12 DIAGNOSIS — M48.07 LUMBOSACRAL SPINAL STENOSIS: ICD-10-CM

## 2022-12-12 DIAGNOSIS — M54.50 ACUTE RIGHT-SIDED LOW BACK PAIN WITHOUT SCIATICA: ICD-10-CM

## 2022-12-12 RX ORDER — ACETAMINOPHEN 325 MG/1
650 TABLET ORAL 3 TIMES DAILY
Qty: 180 TABLET | Refills: 3 | Status: SHIPPED | OUTPATIENT
Start: 2022-12-12 | End: 2023-05-15

## 2022-12-12 NOTE — TELEPHONE ENCOUNTER
NATALIE Lama ([phone]) with [facility] calls with questions regarding medications:     1. Zoloft - patient has not been able to get this, she asks if we have received any refills from [facility]. Advised that Zoloft was refilled late Friday afternoon and should be at the pharmacy for .    2. Tylenol 325 mg - order states to take 3 tabs TID, however, pain is better controlled after steroid injection last week. Daina asks if they can decrease 2 tabs TID instead. Provider approval needed, order pended and routed to Dr. Bingham to review.      Indira Mena RN  [facility]

## 2022-12-13 ENCOUNTER — MEDICAL CORRESPONDENCE (OUTPATIENT)
Dept: HEALTH INFORMATION MANAGEMENT | Facility: CLINIC | Age: 87
End: 2022-12-13

## 2022-12-14 ENCOUNTER — TELEPHONE (OUTPATIENT)
Dept: INTERNAL MEDICINE | Facility: CLINIC | Age: 87
End: 2022-12-14

## 2022-12-15 ENCOUNTER — PATIENT OUTREACH (OUTPATIENT)
Dept: CARE COORDINATION | Facility: CLINIC | Age: 87
End: 2022-12-15

## 2022-12-15 NOTE — PROGRESS NOTES
Clinic Care Coordination Contact    Community Health Worker Follow Up    Care Gaps:     Health Maintenance Due   Topic Date Due     ZOSTER IMMUNIZATION (2 of 3) 12/05/2012     LIPID  01/14/2023       Alyssa focused on addressing current goals.     Care Plan:   Care Plan: In Home Support and Resources     Problem: Lack of caregiver support     Goal: In Home Support and Resources     Start Date: 11/10/2022 Expected End Date: 11/10/2023    This Visit's Progress: 20% Recent Progress: 10%    Note:     Barriers: Lack of caregiver support  Strengths: Patient's wife Alyssa is engaged and motivated   Patient expressed understanding of goal: Yes  Action steps to achieve this goal:  1. I understand that RN CC will mail and send through [employer] in home support resources, [place] support resources and [place] for [facility].  2. I will review resources with my children and utilize as we see fit.   3. I will continue to work with care coordination for any additional resources and support.                           Intervention and Education during outreach:     Writer was able to connect with Alyssa, Pts spouse (CTC) and address their above goals accordingly. Alyssa shared that they are still having PT/OT come to the house and a nurse that comes once a week and does med set up. Alyssa shares that this kind of takes care of things for them at the house. Writer encouraged Alyssa to look at the resources sent to Pts [employer] for more in-home support if needed.    Alyssa did have a question surrounding Pts pain. Alyssa states that the injections have helped a little with pain but it has not gone away completely. Alyssa states that Pt's pain level is 5 out of 10 (10 being the most pain). Writer reviewed upcoming appointments and encouraged Alyssa to call the clinic direclty if they are needing to address pain previous to upcoming appointments. Alyssa agreeable with plan.   Writer provided department phone number fo 1/3/2022  appointment as Alyssa was wondering more about what this appointment entails.     CHW Plan: Writer will reach out to Alyssa in 1 month to monitor the progression of the Pts goals.       ED Mehta. Public Health  Community Health Worker  [facility]  Clinic Care Coordination  [phone]

## 2022-12-16 ENCOUNTER — MEDICAL CORRESPONDENCE (OUTPATIENT)
Dept: HEALTH INFORMATION MANAGEMENT | Facility: CLINIC | Age: 87
End: 2022-12-16

## 2022-12-22 ENCOUNTER — MYC MEDICAL ADVICE (OUTPATIENT)
Dept: INTERNAL MEDICINE | Facility: CLINIC | Age: 87
End: 2022-12-22

## 2022-12-22 ENCOUNTER — PATIENT OUTREACH (OUTPATIENT)
Dept: CARE COORDINATION | Facility: CLINIC | Age: 87
End: 2022-12-22

## 2022-12-22 ENCOUNTER — TELEPHONE (OUTPATIENT)
Dept: INTERNAL MEDICINE | Facility: CLINIC | Age: 87
End: 2022-12-22

## 2022-12-22 ENCOUNTER — NURSE TRIAGE (OUTPATIENT)
Dept: INTERNAL MEDICINE | Facility: CLINIC | Age: 87
End: 2022-12-22

## 2022-12-22 DIAGNOSIS — R35.0 URINARY FREQUENCY: Primary | ICD-10-CM

## 2022-12-22 NOTE — TELEPHONE ENCOUNTER
Called spouse, to inform her of Dr. Bingham's evaluation. Pt's wife informs writer that pt increase in urination. Last night was urinating every 45 minutes.  Based on increased urinary frequency, please advise if UA needed to rule out possible UTI.    [place] RN

## 2022-12-22 NOTE — PROGRESS NOTES
Clinic Care Coordination Contact    Care Coordination Clinician Chart Review    Situation: Patient chart reviewed by care coordinator.       Background: Care Coordination initial assessment and enrollment to Care Coordination was 11/10/22.   Patient centered goals were developed with participation from patient.  RN CC handed patient off to CHW for continued outreach every 30 days.        Assessment: Per chart review, patient outreach completed by CC CHW on 12/15/22.  Patient is actively working to accomplish goal.  Patient's goal remains appropriate and relevant at this time.   Patient is not due for updated Plan of Care.  Annual assessment will be due 11/2023.      Care Plan: In Home Support and Resources     Problem: Lack of caregiver support     Goal: In Home Support and Resources     Start Date: 11/10/2022 Expected End Date: 11/10/2023    This Visit's Progress: 20% Recent Progress: 10%    Note:     Barriers: Lack of caregiver support  Strengths: Patient's wife Alyssa is engaged and motivated   Patient expressed understanding of goal: Yes  Action steps to achieve this goal:  1. I understand that RN CC will mail and send through [employer] in home support resources, [place] support resources and [place] for [facility].  2. I will review resources with my children and utilize as we see fit.   3. I will continue to work with care coordination for any additional resources and support.                               Plan/Recommendations: The patient will continue working with Care Coordination to achieve goal as above.  CHW will involve RN CC as needed or if patient is ready to move to maintenance.  RN CC will continue to monitor progress to goals and CHW outreaches every 6 weeks.   Plan of Care updated and mailed to patient: Victoria Liz RN Care Coordinator  [facility]  Email: [email or web address]  Phone: [phone]

## 2022-12-22 NOTE — TELEPHONE ENCOUNTER
My , Reid Jimenes, has high blood glucose readings.  At 10:00 a.m. it ws 259.  About 8:00 in the morning he had eaten hot instant oatmeal cereal and 1/2 small banana.     Most of the day, his glucose readings are in the mid 200's.  Are there suggestions on how to decrease the readings?     Has been having high blood sugar since October 2022.    Has been having groin pain on the left side.  Has rapid breathing at rest, it is loud 1 or more times a day.  Incontinence, Urinary has been going on for 3-4 weeks.Nurse Triage SBAR    Is this a 2nd Level Triage? YES, LICENSED PRACTITIONER REVIEW IS REQUIRED    Situation:   Wife sent via680 message wondering about how to lower glucose.    Background: My , Reid Jimenes, has high blood glucose readings.  At 10:00 a.m. it ws 259.  About 8:00 in the morning he had eaten hot instant oatmeal cereal and 1/2 small banana.     Most of the day, his glucose readings are in the mid 200's.  Are there suggestions on how to decrease the readings?     Assessment:   Pt has been having 200+ glucose readings for 3-4 weeks, urinary incontinence for 3-4 weeks, loud labored breathing that can be heard from a room away at least 1 time per day.    Protocol Recommended Disposition:   Go to ED Now    Recommendation:        Routed to provider    Does the patient meet one of the following criteria for ADS visit consideration? 16+ years old, with an [facility] PCP     TIP  Providers, please consider if this condition is appropriate for management at one of our Acute and Diagnostic Services sites.     If patient is a good candidate, please use dotphrase <dot>triageresponse and select Refer to ADS to document.      Reason for Disposition    Blood glucose > 240 mg/dL (13.3 mmol/L) and rapid breathing    Additional Information    Negative: Unconscious or difficult to awaken    Negative: Acting confused (e.g., disoriented, slurred speech)    Negative: Very weak (can't stand)    Negative: Sounds  like a life-threatening emergency to the triager    Negative: Vomiting and signs of dehydration (e.g., very dry mouth, lightheaded, dark urine)    Protocols used: DIABETES - HIGH BLOOD SUGAR-A-OH

## 2022-12-22 NOTE — TELEPHONE ENCOUNTER
Disagree with need to for urgent evaluation for diabetes.     If groin pain is severe, may need urgent care or ED evaluation. If mild to moderate and relieved by OTC analgesics, may schedule next available office appt.

## 2022-12-22 NOTE — TELEPHONE ENCOUNTER
Frequent urination may be from glycosuria from less well controlled diabetes. UA ordered in case this is due to UTI.

## 2022-12-23 ENCOUNTER — APPOINTMENT (OUTPATIENT)
Dept: LAB | Facility: CLINIC | Age: 87
End: 2022-12-23
Payer: MEDICARE

## 2022-12-27 ENCOUNTER — MEDICAL CORRESPONDENCE (OUTPATIENT)
Dept: HEALTH INFORMATION MANAGEMENT | Facility: CLINIC | Age: 87
End: 2022-12-27

## 2023-01-03 ENCOUNTER — OFFICE VISIT (OUTPATIENT)
Dept: NEUROSURGERY | Facility: CLINIC | Age: 88
End: 2023-01-03
Attending: PHYSICIAN ASSISTANT
Payer: MEDICARE

## 2023-01-03 ENCOUNTER — ANCILLARY PROCEDURE (OUTPATIENT)
Dept: GENERAL RADIOLOGY | Facility: CLINIC | Age: 88
End: 2023-01-03
Attending: PHYSICIAN ASSISTANT
Payer: MEDICARE

## 2023-01-03 VITALS — SYSTOLIC BLOOD PRESSURE: 120 MMHG | OXYGEN SATURATION: 92 % | DIASTOLIC BLOOD PRESSURE: 59 MMHG | HEART RATE: 66 BPM

## 2023-01-03 DIAGNOSIS — S32.010A CLOSED WEDGE COMPRESSION FRACTURE OF L1 VERTEBRA, INITIAL ENCOUNTER (H): Primary | ICD-10-CM

## 2023-01-03 DIAGNOSIS — S32.010A CLOSED WEDGE COMPRESSION FRACTURE OF L1 VERTEBRA, INITIAL ENCOUNTER (H): ICD-10-CM

## 2023-01-03 PROCEDURE — 72100 X-RAY EXAM L-S SPINE 2/3 VWS: CPT | Mod: TC | Performed by: RADIOLOGY

## 2023-01-03 PROCEDURE — G0463 HOSPITAL OUTPT CLINIC VISIT: HCPCS | Performed by: PHYSICIAN ASSISTANT

## 2023-01-03 PROCEDURE — G0463 HOSPITAL OUTPT CLINIC VISIT: HCPCS

## 2023-01-03 PROCEDURE — 99203 OFFICE O/P NEW LOW 30 MIN: CPT | Performed by: PHYSICIAN ASSISTANT

## 2023-01-03 ASSESSMENT — PAIN SCALES - GENERAL: PAINLEVEL: MODERATE PAIN (4)

## 2023-01-03 NOTE — PROGRESS NOTES
NEUROSURGERY CLINIC PROGRESS NOTE    DATE OF VISIT: 1/3/2023    HPI:     Reid Jimenes is a pleasant 88 year old male who presents to the clinic today for a  six-week follow-up visit. We initially evaluated him on 11/14/2022 for acute on chronic lumbar spine pain and generalized weakness.   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 The brace does not appear to be well fit. He rates his pain at 04/10.     Current Outpatient Medications   Medication     acetaminophen (TYLENOL) 325 MG tablet     ascorbic acid 500 MG TABS     aspirin 81 MG EC tablet     blood glucose (MIRA CONTOUR NEXT) test strip     blood glucose monitoring (MIRA MICROLET) lancets     Continuous Blood Gluc Sensor (FREESTYLE SANGEETA 2 SENSOR) MISC     donepezil (ARICEPT) 5 MG tablet     glucosamine-chondroitin 500-400 MG CAPS per capsule     Lidocaine (LIDOCARE) 4 % Patch     metFORMIN (GLUCOPHAGE XR) 500 MG 24 hr tablet     Multiple Vitamins-Iron (MULTIVITAMIN/IRON PO)     QUEtiapine (SEROQUEL) 25 MG tablet     senna-docusate (SENOKOT-S/PERICOLACE) 8.6-50 MG tablet     sertraline (ZOLOFT) 25 MG tablet     simvastatin (ZOCOR) 20 MG tablet     traZODone (DESYREL) 50 MG tablet     Current Facility-Administered Medications   Medication     iohexol (OMNIPAQUE) 300 mg/mL injection 10 mL       Allergies   Allergen Reactions     Fentanyl Other (See Comments)     Confusion and agitation after PPM     Versed [Midazolam] Other (See Comments)     Confusion and agitation post PPM       Past Medical History:   Diagnosis Date     Aortic valve stenosis, nonrheumatic     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: 26mm Edward Sabino 3 valve     Coronary artery disease     cardiac cath 7/24/18: mild non-obstructive disease     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    DDD PM 6/10/2021     Hyperlipidaemia LDL goal < 100      Hypertension      Need for SBE (subacute bacterial endocarditis) prophylaxis      Pulmonary hypertension (H)       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    status post TAVR using a 26 mm Greene S3 valve in 2018     Type 2 diabetes mellitus (H)        Review Of Systems    Skin: negative  Eyes: negative  Ears/Nose/Throat: negative  Respiratory: No shortness of breath, dyspnea on exertion, cough, or hemoptysis  Cardiovascular: negative  Gastrointestinal: negative  Musculoskeletal: back pain  Neurologic: negative  Psychiatric: negative  Hematologic/Lymphatic/Immunologic: negative  Endocrine: negative    OBJECTIVE:    /59   Pulse 66   SpO2 92%     Imaging:    LUMBAR SPINE TWO TO THREE VIEWS January 3, 2023 10:24 AM     Five lumbar type vertebrae. Moderate left convex curvature  of the lumbar spine centered at L3 again noted. Degenerative right  lateral listhesis of L2 upon L3 and degenerative left lateral  listhesis of L4 upon L5 again noted. Alignment otherwise normal. There  has been further loss of vertebral body height of the L1 vertebral  body since the comparison study with approximately 90% loss of  vertebral body height anteriorly. No new fractures. Loss of disc space  height and degenerative endplate spurring throughout the lumbar spine.  Facet arthropathy throughout the lumbar spine.    Radiographic Findings: Full radiological report in chart. I personally reviewed the images with the patient today.    Exam:    Patient appears comfortable and in no apparent distress. Moving all extremities.  Gait is non-antalgic.  CN II-XII grossly intact, alert and appropriate with conversation and following  commands  Bilateral upper extremities with full strength including hand intrinsics and grasp.  Sensation intact throughout.  Bilateral lower extremities 5/5 strength including plantar and dorsiflexion.  Normal sensation throughout bilaterally.    PLAN:    Reid Jimenes is six weeks out from a L1 fracture. 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 The brace does not appear to be well fit. He rates his pain at 04/10. Imaging was reviewed today and shows further loss of vertebral body height of the L1 vertebral body since the comparison study with approximately 90% loss of vertebral body height anteriorly.     The patient has remained compliant with the restrictions which included  not lifting anything greater than 5-10 lbs. Today we discussed continuing to avoid excessive bending, twisting, and turning at the waist and to avoid jostling and jarring activities. Since his imaging is stable, his symptoms are not overly significant, and his brace does not appear to fit well we will also liberalize the amount that he needs to wear his brace.     Mr. Jimenes will return to the clinic in six weeks with repeat imaging.    The patient gave verbal understanding and is in agreement with the above plan. He  will call or return to the clinic for any worsening or changes in symptoms.      Respectfully,     SMILEY Stark, BERTHA

## 2023-01-03 NOTE — LETTER
1/3/2023         RE: Reid Jimenes  [id number]  [place] [address]        Dear Colleague,    Thank you for referring your patient, Reid Jimenes, to the [facility] [place]. Please see a copy of my visit note below.    NEUROSURGERY CLINIC PROGRESS NOTE    DATE OF VISIT: 1/3/2023    HPI:     Reid Jimenes is a pleasant 88 year old male who presents to the clinic today for a  six-week follow-up visit. We initially evaluated him on 11/14/2022 for acute on chronic lumbar spine pain and generalized weakness.   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 The brace does not appear to be well fit. He rates his pain at 04/10.     Current Outpatient Medications   Medication     acetaminophen (TYLENOL) 325 MG tablet     ascorbic acid 500 MG TABS     aspirin 81 MG EC tablet     blood glucose (MIRA CONTOUR NEXT) test strip     blood glucose monitoring (MIRA MICROLET) lancets     Continuous Blood Gluc Sensor (FREESTYLE SANGEETA 2 SENSOR) MISC     donepezil (ARICEPT) 5 MG tablet     glucosamine-chondroitin 500-400 MG CAPS per capsule     Lidocaine (LIDOCARE) 4 % Patch     metFORMIN (GLUCOPHAGE XR) 500 MG 24 hr tablet     Multiple Vitamins-Iron (MULTIVITAMIN/IRON PO)     QUEtiapine (SEROQUEL) 25 MG tablet     senna-docusate (SENOKOT-S/PERICOLACE) 8.6-50 MG tablet     sertraline (ZOLOFT) 25 MG tablet     simvastatin (ZOCOR) 20 MG tablet     traZODone (DESYREL) 50 MG tablet     Current Facility-Administered Medications   Medication     iohexol (OMNIPAQUE) 300 mg/mL injection 10 mL       Allergies   Allergen Reactions     Fentanyl Other (See Comments)     Confusion and agitation after PPM     Versed [Midazolam] Other (See Comments)     Confusion and agitation post PPM       Past Medical History:   Diagnosis Date     Aortic valve stenosis, nonrheumatic     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: 26mm Edward Sabino 3 valve     Coronary  artery disease     cardiac cath 7/24/18: mild non-obstructive disease     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    DDD PM 6/10/2021     Hyperlipidaemia LDL goal < 100      Hypertension      Need for SBE (subacute bacterial endocarditis) prophylaxis      Pulmonary hypertension (H)      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    status post TAVR using a 26 mm Greene S3 valve in 2018     Type 2 diabetes mellitus (H)        Review Of Systems    Skin: negative  Eyes: negative  Ears/Nose/Throat: negative  Respiratory: No shortness of breath, dyspnea on exertion, cough, or hemoptysis  Cardiovascular: negative  Gastrointestinal: negative  Musculoskeletal: back pain  Neurologic: negative  Psychiatric: negative  Hematologic/Lymphatic/Immunologic: negative  Endocrine: negative    OBJECTIVE:    /59   Pulse 66   SpO2 92%     Imaging:    LUMBAR SPINE TWO TO THREE VIEWS January 3, 2023 10:24 AM     Five lumbar type vertebrae. Moderate left convex curvature  of the lumbar spine centered at L3 again noted. Degenerative right  lateral listhesis of L2 upon L3 and degenerative left lateral  listhesis of L4 upon L5 again noted. Alignment otherwise normal. There  has been further loss of vertebral body height of the L1 vertebral  body since the comparison study with approximately 90% loss of  vertebral body height anteriorly. No new fractures. Loss of disc space  height and degenerative endplate spurring throughout the lumbar spine.  Facet arthropathy throughout the lumbar spine.    Radiographic Findings: Full radiological report in chart. I personally reviewed the images with the patient today.    Exam:    Patient appears comfortable and in no apparent distress. Moving all extremities.  Gait is non-antalgic.  CN II-XII grossly intact, alert and appropriate with conversation and following  commands  Bilateral upper extremities with full strength including hand intrinsics and  grasp.  Sensation intact throughout.  Bilateral lower extremities 5/5 strength including plantar and dorsiflexion.  Normal sensation throughout bilaterally.    PLAN:    Reid Jimenes is six weeks out from a L1 fracture. 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 The brace does not appear to be well fit. He rates his pain at 04/10. Imaging was reviewed today and shows further loss of vertebral body height of the L1 vertebral body since the comparison study with approximately 90% loss of vertebral body height anteriorly.     The patient has remained compliant with the restrictions which included  not lifting anything greater than 5-10 lbs. Today we discussed continuing to avoid excessive bending, twisting, and turning at the waist and to avoid jostling and jarring activities. Since his imaging is stable, his symptoms are not overly significant, and his brace does not appear to fit well we will also liberalize the amount that he needs to wear his brace.     Mr. Jimenes will return to the clinic in six weeks with repeat imaging.    The patient gave verbal understanding and is in agreement with the above plan. He  will call or return to the clinic for any worsening or changes in symptoms.      Respectfully,     SMILEY Stark PA-C      Again, thank you for allowing me to participate in the care of your patient.        Sincerely,        Raul Iqbal PA-C

## 2023-01-05 ENCOUNTER — TELEPHONE (OUTPATIENT)
Dept: INTERNAL MEDICINE | Facility: CLINIC | Age: 88
End: 2023-01-05

## 2023-01-05 NOTE — TELEPHONE ENCOUNTER
[facility] calling for ongoing homecare orders. SKILLED NURSING 1/week for 9 weeks.    Tram [phone]

## 2023-01-05 NOTE — TELEPHONE ENCOUNTER
See lab results   The Home Care/Assisted Living/Nursing Facility is calling regarding an established patient.  Has the patient seen Home Care in the past or is currently residing in Assisted Living or Nursing Facility? Yes.     Tram calling from [facility] requesting the following orders that are within the Home Care, Assisted Living or Nursing Home Eval and Treatment standing order and can be signed as standing order signature required by RN.    Preferred Call Back Number: [phone]    Home Care Visits Continuation    Call placed to Tram. Left message approving orders.

## 2023-01-10 ENCOUNTER — LAB (OUTPATIENT)
Dept: LAB | Facility: CLINIC | Age: 88
End: 2023-01-10
Payer: MEDICARE

## 2023-01-10 DIAGNOSIS — G47.00 INSOMNIA, UNSPECIFIED TYPE: ICD-10-CM

## 2023-01-10 DIAGNOSIS — R80.9 TYPE 2 DIABETES MELLITUS WITH MICROALBUMINURIA, WITHOUT LONG-TERM CURRENT USE OF INSULIN (H): ICD-10-CM

## 2023-01-10 DIAGNOSIS — E78.5 HYPERLIPIDEMIA WITH TARGET LDL LESS THAN 100: ICD-10-CM

## 2023-01-10 DIAGNOSIS — E11.29 TYPE 2 DIABETES MELLITUS WITH MICROALBUMINURIA, WITHOUT LONG-TERM CURRENT USE OF INSULIN (H): ICD-10-CM

## 2023-01-10 LAB
ALBUMIN SERPL BCG-MCNC: 3.9 G/DL (ref 3.5–5.2)
ALP SERPL-CCNC: 101 U/L (ref 40–129)
ALT SERPL W P-5'-P-CCNC: 31 U/L (ref 10–50)
ANION GAP SERPL CALCULATED.3IONS-SCNC: 14 MMOL/L (ref 7–15)
AST SERPL W P-5'-P-CCNC: 22 U/L (ref 10–50)
BILIRUB SERPL-MCNC: 0.3 MG/DL
BUN SERPL-MCNC: 23.6 MG/DL (ref 8–23)
CALCIUM SERPL-MCNC: 9.4 MG/DL (ref 8.8–10.2)
CHLORIDE SERPL-SCNC: 106 MMOL/L (ref 98–107)
CHOLEST SERPL-MCNC: 105 MG/DL
CREAT SERPL-MCNC: 1.03 MG/DL (ref 0.67–1.17)
DEPRECATED HCO3 PLAS-SCNC: 22 MMOL/L (ref 22–29)
GFR SERPL CREATININE-BSD FRML MDRD: 70 ML/MIN/1.73M2
GLUCOSE SERPL-MCNC: 217 MG/DL (ref 70–99)
HBA1C MFR BLD: 7.4 % (ref 0–5.6)
HDLC SERPL-MCNC: 54 MG/DL
LDLC SERPL CALC-MCNC: 33 MG/DL
NONHDLC SERPL-MCNC: 51 MG/DL
POTASSIUM SERPL-SCNC: 4.5 MMOL/L (ref 3.4–5.3)
PROT SERPL-MCNC: 6.2 G/DL (ref 6.4–8.3)
SODIUM SERPL-SCNC: 142 MMOL/L (ref 136–145)
TRIGL SERPL-MCNC: 92 MG/DL

## 2023-01-10 PROCEDURE — 80053 COMPREHEN METABOLIC PANEL: CPT

## 2023-01-10 PROCEDURE — 80061 LIPID PANEL: CPT

## 2023-01-10 PROCEDURE — 36415 COLL VENOUS BLD VENIPUNCTURE: CPT

## 2023-01-10 PROCEDURE — 83036 HEMOGLOBIN GLYCOSYLATED A1C: CPT

## 2023-01-11 ENCOUNTER — ANCILLARY PROCEDURE (OUTPATIENT)
Dept: CARDIOLOGY | Facility: CLINIC | Age: 88
End: 2023-01-11
Attending: INTERNAL MEDICINE
Payer: MEDICARE

## 2023-01-11 DIAGNOSIS — I44.2 CHB (COMPLETE HEART BLOCK) (H): ICD-10-CM

## 2023-01-11 DIAGNOSIS — Z95.0 CARDIAC PACEMAKER IN SITU: ICD-10-CM

## 2023-01-11 PROCEDURE — 93294 REM INTERROG EVL PM/LDLS PM: CPT | Performed by: INTERNAL MEDICINE

## 2023-01-11 PROCEDURE — 93296 REM INTERROG EVL PM/IDS: CPT | Performed by: INTERNAL MEDICINE

## 2023-01-11 RX ORDER — TRAZODONE HYDROCHLORIDE 50 MG/1
TABLET, FILM COATED ORAL
Qty: 30 TABLET | Refills: 0 | OUTPATIENT
Start: 2023-01-11

## 2023-01-13 ENCOUNTER — PATIENT OUTREACH (OUTPATIENT)
Dept: CARE COORDINATION | Facility: CLINIC | Age: 88
End: 2023-01-13

## 2023-01-13 ENCOUNTER — TELEPHONE (OUTPATIENT)
Dept: INTERNAL MEDICINE | Facility: CLINIC | Age: 88
End: 2023-01-13
Payer: MEDICARE

## 2023-01-13 NOTE — TELEPHONE ENCOUNTER
Fax received from [facility] 01/08/23 for review and signature.  Put in Dr. Bingham's yellow folder.

## 2023-01-13 NOTE — PROGRESS NOTES
Clinic Care Coordination Contact    Community Health Worker Follow Up    Care Gaps:     Health Maintenance Due   Topic Date Due     ZOSTER IMMUNIZATION (2 of 3) 12/05/2012     EYE EXAM  01/17/2023       Pt focused on addressing current goals.     Care Plan:   Care Plan: In Home Support and Resources     Problem: Lack of caregiver support     Goal: In Home Support and Resources     Start Date: 11/10/2022 Expected End Date: 11/10/2023    This Visit's Progress: 30% Recent Progress: 20%    Note:     Barriers: Lack of caregiver support  Strengths: Patient's wife Alyssa is engaged and motivated   Patient expressed understanding of goal: Yes  Action steps to achieve this goal:  1. I understand that RN CC will mail and send through [employer] in home support resources, caregiver support resources and [place] Choices Assessment information for [facility].  2. I will review resources with my children and utilize as we see fit.   3. I understand the CHW called and left [facility] a VM with my contact information on 1/13/2023 requesting a return call for the volunteer home respite program.  4. I will continue obtaining PCA assistance for 10 hours a week (Monday's and Friday's)  5. I will call my Senior Housing ([facility]) directly at [phone] to request more in home services if needed.  4.  I will continue to work with care coordination for any additional resources and support.                           Intervention and Education during outreach:     Writer was able to connect with the Pts spouse, Alyssa  ([facility]) and address their above goals. Goal action steps updated accordingly. Writer went over resources previously sent by CC RN. Alyssa noted particular interest in [facility] program for volunteer respite assistance. Alyssa stated that the Pt does not need any assistance showering at this time. Alyssa notes that sometimes she just needs to get away. Per request of Alyssa, Writer called the program and left their call back information on Alyssa's  behalf. Writer will also resend resources to Alyssa via snail mail. Alyssa updated that the Pt graduated from PT/OT.     Alyssa mentioned wanting to create a long-term care plan as well. Per Alyssa, they currently live in a Senior Living apartment, however they do have access to higher levels of care if needed. Writer inquired if she knows who to call to access [facility] services. Alyssa is not sure. Per request, Writer will include [facility] number for Alyssa to call if they would like or need more in home support. Writer informed that they should be able to get her in contact with the person who arranges jail services for their residents. Alyssa voiced understanding.     Writer inquired if Alyssa had any other questions or concerns, however she did not. Alyssa knows she can contact CC at anytime.     CHW Plan: Writer will reach out to Alyssa in 1 month to monitor the progression of their goals.       ED Mehta. Public Health  Community Health Worker  [facility]  Clinic Care Coordination  [phone]

## 2023-01-17 ENCOUNTER — OFFICE VISIT (OUTPATIENT)
Dept: INTERNAL MEDICINE | Facility: CLINIC | Age: 88
End: 2023-01-17
Payer: MEDICARE

## 2023-01-17 VITALS
DIASTOLIC BLOOD PRESSURE: 62 MMHG | BODY MASS INDEX: 26.01 KG/M2 | OXYGEN SATURATION: 96 % | RESPIRATION RATE: 16 BRPM | SYSTOLIC BLOOD PRESSURE: 112 MMHG | HEIGHT: 65 IN | WEIGHT: 156.1 LBS | TEMPERATURE: 98 F | HEART RATE: 94 BPM

## 2023-01-17 DIAGNOSIS — F10.21 ALCOHOL DEPENDENCE IN REMISSION (H): ICD-10-CM

## 2023-01-17 DIAGNOSIS — N18.31 CHRONIC KIDNEY DISEASE, STAGE 3A (H): ICD-10-CM

## 2023-01-17 DIAGNOSIS — E11.29 TYPE 2 DIABETES MELLITUS WITH MICROALBUMINURIA, WITHOUT LONG-TERM CURRENT USE OF INSULIN (H): Primary | ICD-10-CM

## 2023-01-17 DIAGNOSIS — I73.9 PERIPHERAL VASCULAR DISEASE (H): ICD-10-CM

## 2023-01-17 DIAGNOSIS — F03.B18 MODERATE DEMENTIA WITH OTHER BEHAVIORAL DISTURBANCE, UNSPECIFIED DEMENTIA TYPE (H): ICD-10-CM

## 2023-01-17 DIAGNOSIS — I44.2 COMPLETE HEART BLOCK (H): ICD-10-CM

## 2023-01-17 DIAGNOSIS — R10.32 DISCOMFORT OF LEFT GROIN: ICD-10-CM

## 2023-01-17 DIAGNOSIS — R80.9 TYPE 2 DIABETES MELLITUS WITH MICROALBUMINURIA, WITHOUT LONG-TERM CURRENT USE OF INSULIN (H): Primary | ICD-10-CM

## 2023-01-17 DIAGNOSIS — S20.212S CONTUSION OF LEFT CHEST WALL, SEQUELA: ICD-10-CM

## 2023-01-17 PROCEDURE — 99214 OFFICE O/P EST MOD 30 MIN: CPT | Performed by: INTERNAL MEDICINE

## 2023-01-17 ASSESSMENT — PAIN SCALES - GENERAL: PAINLEVEL: NO PAIN (0)

## 2023-01-17 NOTE — PATIENT INSTRUCTIONS
Quetiapine (Seroquel) 25 mg tablets were prescribed by Dr Camacho on 11/18/2022.   You may take one-half to one full tablet at a time, twice a day and at bedtime, for confusion with agitation or hallucinations.   This may help you to feel more calm, but also may make you a bit drowsy.     Completed a [employer] sticker application today.     Okay to use diclofenac on the ribcage and in the left groin for pain.   If you are noting itching or rash in the groin, an antifungal cream or powder would be better.     Diabetes and cholesterol tests look fine.     See me in 3-6 months depending on how you are doing.

## 2023-01-17 NOTE — PROGRESS NOTES
"Face to face time with patient and wife: 33 minutes (1125---1158)      Assessment & Plan   (E11.29,  R80.9) Type 2 diabetes mellitus with microalbuminuria, without long-term current use of insulin (H)  (primary encounter diagnosis)  Comment: Well controlled. Continue current meds.     (F03.B18) Moderate dementia with other behavioral disturbance, unspecified dementia type  Comment: See pt instructions and epic orders.     (S20.212S) Contusion of left chest wall, sequela  (R10.32) Discomfort of left groin  Comment: May try topical agents as per patient instructions.     (I73.9) Peripheral vascular disease (H)  (I44.2) Complete heart block (H)  Comment: F/u with cardiology as they advise.     (N18.31) Chronic kidney disease, stage 3a (H)  Comment: Chronic stable condition.     (F10.21) Alcohol dependence in remission (H)  Comment: Remains abstinent.          BMI:   Estimated body mass index is 25.98 kg/m  as calculated from the following:    Height as of this encounter: 1.651 m (5' 5\").    Weight as of this encounter: 70.8 kg (156 lb 1.6 oz).     Blood sugar testing frequency justification:  Patient modifying lifestyle changes (diet, exercise) with blood sugars     Patient Instructions   Quetiapine (Seroquel) 25 mg tablets were prescribed by Dr Camacho on 11/18/2022.   You may take one-half to one full tablet at a time, twice a day and at bedtime, for confusion with agitation or hallucinations.   This may help you to feel more calm, but also may make you a bit drowsy.     Completed a handicapped parking sticker application today.     Okay to use diclofenac on the ribcage and in the left groin for pain.   If you are noting itching or rash in the groin, an antifungal cream or powder would be better.     Diabetes and cholesterol tests look fine.     See me in 3-6 months depending on how you are doing.       Return in about 3 months (around 4/17/2023) for Routine Visit.    Viet Bingham MD,   [facility] " JAYLAN Rosenbaum is a 88 year old accompanied by his spouse, presenting for the following health issues:  Follow Up (diabetes)      History of Present Illness       Diabetes:   He presents for follow up of diabetes.  He is checking home blood glucose two times daily. He checks blood glucose after meals.  Blood glucose is sometimes over 200 and never under 70. When his blood glucose is low, the patient is asymptomatic for confusion, blurred vision, lethargy and reports not feeling dizzy, shaky, or weak.  He has no concerns regarding his diabetes at this time.  He is not experiencing numbness or burning in feet, excessive thirst, blurry vision, weight changes or redness, sores or blisters on feet.         He eats 4 or more servings of fruits and vegetables daily.He consumes 0 sweetened beverage(s) daily.He exercises with enough effort to increase his heart rate 9 or less minutes per day.  He exercises with enough effort to increase his heart rate 3 or less days per week.   He is taking medications regularly.       Diabetes Follow-up    How often are you checking your blood sugar? Two times daily  Blood sugar testing frequency justification:  Adjustment of medication(s)  What time of day are you checking your blood sugars (select all that apply)?  After meals  Have you had any blood sugars above 200?  Yes   Have you had any blood sugars below 70?  No    What symptoms do you notice when your blood sugar is low?  None    What concerns do you have today about your diabetes? None and Blood sugar is often over 200     Do you have any of these symptoms? (Select all that apply)  No numbness or tingling in feet.  No redness, sores or blisters on feet.  No complaints of excessive thirst.  No reports of blurry vision.  No significant changes to weight.      BP Readings from Last 2 Encounters:   01/17/23 112/62   01/03/23 120/59     Hemoglobin A1C (%)   Date Value   01/10/2023 7.4 (H)   11/13/2022 7.7 (H)  "  2021 7.1 (H)   2021 6.9 (H)     LDL Cholesterol Calculated (mg/dL)   Date Value   01/10/2023 33   2022 45   2021 66   07/10/2020 23       The patient checks glucoses at least three times a day. They have been running in the 240-270 range recently.  However, his recent A1c on 1/10 was 7.4. His average glucoses have been as follows:  7 day av  14 day av  30 day av    His recent LDL-Cholesterol and current BP are at target.     His wife reports that he has had difficulty with his sleep/wake cycle. She tries to keep him awake in the evening until an 8 pm bedtime. He will often awaken by 4-5 AM. He has trouble staying awake during the day.   We discussed that Seroquel may be taken when agitated, and trazodone may be taken at hs.     They are living in an independent living situation now, but are looking into memory care.     He fell last week on Wednesday or Thursday and has some left chest wall pain. He is advised that he could try using topical ketoprofen or lidocaine.     He reports some discomfort in his groin without obvious rash, and has used ketoprofen there as well.     Past medical, family and social histories as well as medications reviewed and updated as needed.    Review of Systems   No dyspnea or cough. No retrosternal chest discomfort, or palpitations. No diarrhea, abdominal pain or rectal bleeding. Occasional unsteadiness.   No acute problems with vision or speech, lateralizing weakness or paresthesias.    ROS: as above or negative for Respiratory, CV, GI, endocrine, musculoskeletal, neuro systems.       Objective    /62 (BP Location: Left arm, Patient Position: Sitting, Cuff Size: Adult Regular)   Pulse 94   Temp 98  F (36.7  C) (Tympanic)   Resp 16   Ht 1.651 m (5' 5\")   Wt 70.8 kg (156 lb 1.6 oz)   SpO2 96%   BMI 25.98 kg/m    Body mass index is 25.98 kg/m .     Physical Exam   GENERAL: healthy, alert and no distress  RESP: lungs clear to " auscultation - no rales, rhonchi or wheezes  CV: regular rate and rhythm, normal S1 S2, no S3 or S4, no murmur, click or rub, no peripheral edema and peripheral pulses strong  MS: Left chest wall tenderness, no deformity. No gross musculoskeletal defects noted

## 2023-01-20 DIAGNOSIS — Z53.9 DIAGNOSIS NOT YET DEFINED: Primary | ICD-10-CM

## 2023-01-20 PROCEDURE — G0179 MD RECERTIFICATION HHA PT: HCPCS | Performed by: INTERNAL MEDICINE

## 2023-01-26 LAB
MDC_IDC_EPISODE_DTM: NORMAL
MDC_IDC_EPISODE_DTM: NORMAL
MDC_IDC_EPISODE_ID: NORMAL
MDC_IDC_EPISODE_ID: NORMAL
MDC_IDC_EPISODE_TYPE: NORMAL
MDC_IDC_EPISODE_TYPE: NORMAL
MDC_IDC_LEAD_IMPLANT_DT: NORMAL
MDC_IDC_LEAD_IMPLANT_DT: NORMAL
MDC_IDC_LEAD_LOCATION: NORMAL
MDC_IDC_LEAD_LOCATION: NORMAL
MDC_IDC_LEAD_LOCATION_DETAIL_1: NORMAL
MDC_IDC_LEAD_LOCATION_DETAIL_1: NORMAL
MDC_IDC_LEAD_MFG: NORMAL
MDC_IDC_LEAD_MFG: NORMAL
MDC_IDC_LEAD_MODEL: NORMAL
MDC_IDC_LEAD_MODEL: NORMAL
MDC_IDC_LEAD_POLARITY_TYPE: NORMAL
MDC_IDC_LEAD_POLARITY_TYPE: NORMAL
MDC_IDC_LEAD_SERIAL: NORMAL
MDC_IDC_LEAD_SERIAL: NORMAL
MDC_IDC_MSMT_BATTERY_DTM: NORMAL
MDC_IDC_MSMT_BATTERY_REMAINING_LONGEVITY: 90 MO
MDC_IDC_MSMT_BATTERY_REMAINING_PERCENTAGE: 100 %
MDC_IDC_MSMT_BATTERY_STATUS: NORMAL
MDC_IDC_MSMT_LEADCHNL_RA_IMPEDANCE_VALUE: 727 OHM
MDC_IDC_MSMT_LEADCHNL_RA_PACING_THRESHOLD_AMPLITUDE: 0.5 V
MDC_IDC_MSMT_LEADCHNL_RA_PACING_THRESHOLD_PULSEWIDTH: 0.4 MS
MDC_IDC_MSMT_LEADCHNL_RV_IMPEDANCE_VALUE: 634 OHM
MDC_IDC_MSMT_LEADCHNL_RV_PACING_THRESHOLD_AMPLITUDE: 0.9 V
MDC_IDC_MSMT_LEADCHNL_RV_PACING_THRESHOLD_PULSEWIDTH: 0.4 MS
MDC_IDC_PG_IMPLANT_DTM: NORMAL
MDC_IDC_PG_MFG: NORMAL
MDC_IDC_PG_MODEL: NORMAL
MDC_IDC_PG_SERIAL: NORMAL
MDC_IDC_PG_TYPE: NORMAL
MDC_IDC_SESS_CLINIC_NAME: NORMAL
MDC_IDC_SESS_DTM: NORMAL
MDC_IDC_SESS_TYPE: NORMAL
MDC_IDC_SET_BRADY_AT_MODE_SWITCH_MODE: NORMAL
MDC_IDC_SET_BRADY_AT_MODE_SWITCH_RATE: 170 {BEATS}/MIN
MDC_IDC_SET_BRADY_LOWRATE: 60 {BEATS}/MIN
MDC_IDC_SET_BRADY_MAX_SENSOR_RATE: 130 {BEATS}/MIN
MDC_IDC_SET_BRADY_MAX_TRACKING_RATE: 130 {BEATS}/MIN
MDC_IDC_SET_BRADY_MODE: NORMAL
MDC_IDC_SET_BRADY_PAV_DELAY_HIGH: 150 MS
MDC_IDC_SET_BRADY_PAV_DELAY_LOW: 200 MS
MDC_IDC_SET_BRADY_SAV_DELAY_HIGH: 150 MS
MDC_IDC_SET_BRADY_SAV_DELAY_LOW: 200 MS
MDC_IDC_SET_LEADCHNL_RA_PACING_AMPLITUDE: 2 V
MDC_IDC_SET_LEADCHNL_RA_PACING_CAPTURE_MODE: NORMAL
MDC_IDC_SET_LEADCHNL_RA_PACING_POLARITY: NORMAL
MDC_IDC_SET_LEADCHNL_RA_PACING_PULSEWIDTH: 0.4 MS
MDC_IDC_SET_LEADCHNL_RA_SENSING_ADAPTATION_MODE: NORMAL
MDC_IDC_SET_LEADCHNL_RA_SENSING_POLARITY: NORMAL
MDC_IDC_SET_LEADCHNL_RA_SENSING_SENSITIVITY: 0.25 MV
MDC_IDC_SET_LEADCHNL_RV_PACING_AMPLITUDE: 1.4 V
MDC_IDC_SET_LEADCHNL_RV_PACING_CAPTURE_MODE: NORMAL
MDC_IDC_SET_LEADCHNL_RV_PACING_POLARITY: NORMAL
MDC_IDC_SET_LEADCHNL_RV_PACING_PULSEWIDTH: 0.4 MS
MDC_IDC_SET_LEADCHNL_RV_SENSING_ADAPTATION_MODE: NORMAL
MDC_IDC_SET_LEADCHNL_RV_SENSING_POLARITY: NORMAL
MDC_IDC_SET_LEADCHNL_RV_SENSING_SENSITIVITY: 1.5 MV
MDC_IDC_SET_ZONE_DETECTION_INTERVAL: 375 MS
MDC_IDC_SET_ZONE_TYPE: NORMAL
MDC_IDC_SET_ZONE_VENDOR_TYPE: NORMAL
MDC_IDC_STAT_AT_BURDEN_PERCENT: 0 %
MDC_IDC_STAT_AT_DTM_END: NORMAL
MDC_IDC_STAT_AT_DTM_START: NORMAL
MDC_IDC_STAT_BRADY_DTM_END: NORMAL
MDC_IDC_STAT_BRADY_DTM_START: NORMAL
MDC_IDC_STAT_BRADY_RA_PERCENT_PACED: 14 %
MDC_IDC_STAT_BRADY_RV_PERCENT_PACED: 1 %
MDC_IDC_STAT_EPISODE_RECENT_COUNT: 0
MDC_IDC_STAT_EPISODE_RECENT_COUNT_DTM_END: NORMAL
MDC_IDC_STAT_EPISODE_RECENT_COUNT_DTM_START: NORMAL
MDC_IDC_STAT_EPISODE_TYPE: NORMAL
MDC_IDC_STAT_EPISODE_VENDOR_TYPE: NORMAL

## 2023-01-30 ENCOUNTER — OFFICE VISIT (OUTPATIENT)
Dept: PALLIATIVE MEDICINE | Facility: CLINIC | Age: 88
End: 2023-01-30
Payer: MEDICARE

## 2023-01-30 VITALS — DIASTOLIC BLOOD PRESSURE: 50 MMHG | HEART RATE: 62 BPM | OXYGEN SATURATION: 95 % | SYSTOLIC BLOOD PRESSURE: 109 MMHG

## 2023-01-30 DIAGNOSIS — M48.07 LUMBOSACRAL SPINAL STENOSIS: ICD-10-CM

## 2023-01-30 DIAGNOSIS — M47.817 LUMBOSACRAL SPONDYLOSIS WITHOUT MYELOPATHY: Primary | ICD-10-CM

## 2023-01-30 PROCEDURE — 99214 OFFICE O/P EST MOD 30 MIN: CPT | Performed by: PHYSICAL MEDICINE & REHABILITATION

## 2023-01-30 ASSESSMENT — PAIN SCALES - GENERAL: PAINLEVEL: MILD PAIN (3)

## 2023-01-30 NOTE — NURSING NOTE
PEG Score 10/14/2022 1/30/2023   PEG Total Score 9 4           Candace Drake MA  [facility] [place]

## 2023-01-30 NOTE — PROGRESS NOTES
[facility] Follow Up     Date of visit: 1/30/2023    Last seen by me on 10/14/2022.      Assessment:  Reid Jimenes is a 88 year old male who presents today with low back pain without radiculopathy. He has a history of lumbar spondylosis and lumbar spinal stenosis. He reports significant worsening of his pain for the last few months. He had an L1 compression fracture and is being seen by NSG for this. The pain he is describing today is located in the low back/buttock. He previously had lumbar facet joint injections which provided some benefit with this pain.       Plan:  1. Therapies:  None at this time  2. Diagnostic Studies: None  3. Medication Management: No changes made      4. Further procedures recommended: Discussed he should follow up with NSG regarding healing of compression fracture. We can repeat lumbar facet injections once this has healed.   5. Follow up: for injections      Chief complaint:   Chief Complaint   Patient presents with     Pain     Since his last visit, Reid Jimenes reports:  - Sustained an L1 compression fracture. Seeing NSG. Wearing brace. Further compression noted at last visit  - facet joint injections on 11/28/2022 provided some benefit. He feels that the pain is not as severe as it initially was.     Pain scores:  Pain intensity on average is 3 on a scale of 0-10.     Medications:       Current pain medications:  Tyelnol   Lidocaine patch         Other relevant medications:  Trazodone   sertraline      Past pain treatments:  PT: Yes   TENs Unit: no  Injections: lumbar facet joint injections- [facility]  Surgeries related to pain: no  Alternative Therapies:               Chiropractic: no              Acupuncture: no    Medications:  Current Outpatient Medications   Medication Sig Dispense Refill     acetaminophen (TYLENOL) 325 MG tablet Take 2 tablets (650 mg) by mouth 3 times daily MAX dose of tylenol in 24 hours is 3000 mg 180 tablet 3     ascorbic  acid 500 MG TABS Take 500 mg by mouth Takes off and on       aspirin 81 MG EC tablet Take 1 tablet (81 mg) by mouth daily 30 tablet      blood glucose (MIRA CONTOUR NEXT) test strip Use to test blood sugar one time daily or as directed. 100 strip 11     blood glucose monitoring (MIRA MICROLET) lancets Use to test blood sugar 1 times daily or as directed. 100 each 5     Continuous Blood Gluc Sensor (FREESTYLE SANGEETA 2 SENSOR) MISC 1 each every 14 days Use 1 sensor every 14 days. Use to read blood sugars per 's instructions. 2 each 5     donepezil (ARICEPT) 5 MG tablet Take 1 tablet (5 mg) by mouth At Bedtime 30 tablet 2     glucosamine-chondroitin 500-400 MG CAPS per capsule Take 1 capsule by mouth Takes off and on       Lidocaine (LIDOCARE) 4 % Patch Place 1 patch onto the skin every 24 hours PLACE ON THE AFFECTED AREA FOR 12 HOURS EACH DAY (Patient not taking: Reported on 1/17/2023) 30 patch 2     metFORMIN (GLUCOPHAGE XR) 500 MG 24 hr tablet Take 1 tablet (500 mg) by mouth 2 times daily (with meals) 180 tablet 3     Multiple Vitamins-Iron (MULTIVITAMIN/IRON PO) Take 1 tablet by mouth Takes off and on       QUEtiapine (SEROQUEL) 25 MG tablet Take 0.5-1 tablets (12.5-25 mg) by mouth 2 times daily as needed (acute severe agitation or anxiety) 30 tablet 2     senna-docusate (SENOKOT-S/PERICOLACE) 8.6-50 MG tablet Take 1 tablet by mouth 2 times daily as needed for constipation (Patient not taking: Reported on 1/17/2023)       sertraline (ZOLOFT) 25 MG tablet Take 1 tablet (25 mg) by mouth daily (Patient not taking: Reported on 1/17/2023) 90 tablet 0     simvastatin (ZOCOR) 20 MG tablet Take 1 tablet (20 mg) by mouth At Bedtime 90 tablet 1     traZODone (DESYREL) 50 MG tablet TAKE 1 TABLET BY MOUTH NIGHTLY AS NEEDED FOR SLEEP 30 tablet 0     Diagnostic tests:  XR Lumbar Spine on 1/3/2023:   IMPRESSION: Five lumbar type vertebrae. Moderate left convex curvature  of the lumbar spine centered at L3 again  noted. Degenerative right  lateral listhesis of L2 upon L3 and degenerative left lateral  listhesis of L4 upon L5 again noted. Alignment otherwise normal. There  has been further loss of vertebral body height of the L1 vertebral  body since the comparison study with approximately 90% loss of  vertebral body height anteriorly. No new fractures. Loss of disc space  height and degenerative endplate spurring throughout the lumbar spine.  Facet arthropathy throughout the lumbar spine.     Physical Exam:  Blood pressure 109/50, pulse 62, SpO2 95 %.  General: A&O x 3 in NAD  Gait: Slow  Resp: breathing unlabored on room air.   Psych: normal affect    Lluvia Gomes MD  [facility]     BILLING TIME DOCUMENTATION:   The total TIME spent on this patient on the date of the encounter/appointment was 26 minutes.      TOTAL TIME includes:   Time spent preparing to see the patient (reviewing records and tests)  Time spent face to face (or over the phone) with the patient   Time spent documenting clinical information in Epic

## 2023-01-30 NOTE — PATIENT INSTRUCTIONS
Follow up with Neurosurgery regarding your lumbar compression fracture. Monitor your pain after the fracture is healed. If the low back pain increases again we can repeat the lumbar facet joint injections. You can call the clinic interested in repeat injections and I will place the order.   Lluvia Gomes MD  [facility]     ----------------------------------------------------------------  Clinic Number:  [phone]   Call with any questions about your care and for scheduling assistance.   Calls are returned Monday through Friday between 8 AM and 4:30 PM. We usually get back to you within 2 business days depending on the issue/request.    If we are prescribing your medications:  For opioid medication refills, call the clinic or send a [email or web address] message 7 days in advance.  Please include:  Name of requested medication  Name of the pharmacy.  For non-opioid medications, call your pharmacy directly to request a refill. Please allow 3-4 days to be processed.   Per [place] State Law:  All controlled substance prescriptions must be filled within 30 days of being written.    For those controlled substances allowing refills, pickup must occur within 30 days of last fill.      We believe regular attendance is key to your success in our program!    Any time you are unable to keep your appointment we ask that you call us at least 24 hours in advance to cancel.This will allow us to offer the appointment time to another patient.   Multiple missed appointments may lead to dismissal from the clinic.

## 2023-02-02 ENCOUNTER — PATIENT OUTREACH (OUTPATIENT)
Dept: CARE COORDINATION | Facility: CLINIC | Age: 88
End: 2023-02-02
Payer: MEDICARE

## 2023-02-02 NOTE — PROGRESS NOTES
Clinic Care Coordination Contact  Care Coordination Clinician Chart Review    Situation: Patient chart reviewed by Care Coordinator.       Background: Care Coordination Program started: 10/22/2022. Initial assessment completed and patient-centered care plan(s) were developed with participation from patient. Lead CC handed patient off to CHW for continued outreaches.       Assessment: Per chart review, patient outreach completed by CC CHW on 1/13/23.  Patient is actively working to accomplish goal(s). Patient's goal(s) appropriate and relevant at this time. Patient is not due for updated Plan of Care.  Assessments will be completed annually or as needed/with change of patient status.      Care Plan: In Home Support and Resources     Problem: Lack of caregiver support     Goal: In Home Support and Resources     Start Date: 11/10/2022 Expected End Date: 11/10/2023    This Visit's Progress: 30% Recent Progress: 20%    Note:     Barriers: Lack of caregiver support  Strengths: Patient's wife Alyssa is engaged and motivated   Patient expressed understanding of goal: Yes  Action steps to achieve this goal:  1. I understand that RN CC will mail and send through [employer] in home support resources, caregiver support resources and [place] information for [facility].  2. I will review resources with my children and utilize as we see fit.   3. I understand the CHW called and left DARTS a VM with my contact information on 1/13/2023 requesting a return call for the volunteer home respite program.  4. I will continue obtaining PCA assistance for 10 hours a week (Monday's and Friday's)  5. I will call my Senior [facility] ([facility]) directly at [phone] to request more in home services if needed.  4.  I will continue to work with care coordination for any additional resources and support.                              Plan/Recommendations: The patient will continue working with Care Coordination to achieve goal(s)  as above. CHW will continue outreaches at minimum every 30 days and will involve Lead CC as needed or if patient is ready to move to Maintenance. Lead CC will continue to monitor CHW outreaches and patient's progress to goal(s) every 6 weeks.     Plan of Care updated and sent to patient: Victoria Liz RN Care Coordinator  [facility]  Email: [email or web address]  Phone: [phone]

## 2023-02-02 NOTE — LETTER
[place] [facility]  [address]  [facility] [address]    February 17, 2023        Redi Boyd  [address] [address]  [facility] [address]          Dear Kenn Mathews is an updated Complex Care Plan for your continued enrollment in Care Coordination. Please let us know if you have additional questions, concerns, or goals that we can assist with.    Sincerely,    NATALIE [place] [facility]  Patient Centered Plan of Care  About Me:        Patient Name:  Ried Boyd    YOB: 1934  Age:         89 year old   [place] MRN:    [id number] Telephone Information:  Home Phone [phone]   Mobile [phone]       Address:  [address] [place] [address] [address]  [facility] [address] Email address:  [email or web address]      Emergency Contact(s)    Name Relationship Lgl Grd Work Phone Home Phone Mobile Phone   1. EVA BOYD Spouse No   [phone]   2. JODI GARSIA Daughter No  [phone]    3. JOSEPH ALAMO Daughter No   [phone]           Primary language:  English     needed? No   [place] [facility]:  [phone] op. 1  Other communication barriers:Cognitive impairment    Preferred Method of Communication:  Mary  Current living arrangement: I live in a private home with spouse    Mobility Status/ Medical Equipment: Independent w/Device        Health Maintenance  Health Maintenance Reviewed: Due/Overdue   Health Maintenance Due   Topic Date Due     ZOSTER IMMUNIZATION (2 of 3) 12/05/2012     EYE EXAM  01/17/2023           My Access Plan  Medical Emergency 911   Primary Clinic Line [facility] - [phone]   24 Hour Appointment Line [phone] or  [phone]) (toll-free)   24 Hour Nurse Line [phone] (toll-free)   Preferred Urgent Care No data recorded   Preferred Hospital [facility]  [phone]     Preferred Pharmacy [facility] -  [place], [place] - [address] [facility]     Behavioral Health Crisis Line The National Suicide Prevention Lifeline at 1-118.462.7466 or Text/Call 988             My Care Team Members  Patient Care Team       Relationship Specialty Notifications Start End    Viet Bingham MD PCP - General Internal Medicine  2/11/14     Phone: [phone] Pager: [phone] Fax: [phone]        [address] [facility] [address] [facility] [address]    Viet Bingham MD Assigned PCP   1/16/14     Phone: [phone] Pager: [phone] Fax: [phone]        [address]  [facility] [address]    Des Marks MD MD Vascular and Interventional Radiology  7/30/20     Phone: [phone] Pager: [phone] Fax: [phone]        [address] [address]    Mary Faulkner RN Specialty Care Coordinator Radiology  7/30/20     Phone: [phone] Pager: [phone]        Eulalio Higginbotham MD MD Cardiovascular Disease  1/19/22     Phone: [phone] Pager: [phone] Fax: [phone]        [facility] [address] [place] [address]    Jayesh Barker MD Assigned Neuroscience Provider   3/27/22     Phone: [phone]          [address] [address]  [facility] [address]    Lizz Love APRN CNP Assigned Heart and Vascular Provider   10/15/22     Phone: [phone] Fax: [phone]         [address] [place] [address]    [facility]    10/24/22     Phone: [phone] Fax: [phone]         [address] [facility] [address]    Mandie Liz, RN Lead Care Coordinator  Admissions 11/10/22     Mary Son MA Community Health Worker   11/10/22     Rasheed, Raul Kaplan, PA-C Assigned Musculoskeletal Provider   1/14/23     Phone: [phone] Pager: [phone] Fax: [phone] [address] [address]            My Care Plans  Self Management and Treatment Plan  Care Plan  Care Plan: In Home Support and Resources      Problem: Lack of caregiver support     Goal: In Home Support and Resources     Start Date: 11/10/2022 Expected End Date: 11/10/2023    This Visit's Progress: 30% Recent Progress: 20%    Note:     Barriers: Lack of caregiver support  Strengths: Patient's wife Alyssa is engaged and motivated   Patient expressed understanding of goal: Yes  Action steps to achieve this goal:  1. I understand that RN CC will mail and send through [employer] in home support resources, caregiver support resources and [place] Choices [place] information for [facility].  2. I will review resources with my children and utilize as we see fit.   3. I understand the CHW called and left DARTS a VM with my contact information on 1/13/2023 requesting a return call for the volunteer home respite program.  4. I will continue obtaining PCA assistance for 10 hours a week (Monday's and Friday's)  5. I will call my Senior Housing ([facility]) directly at [phone] to request more in home services if needed.  4.  I will continue to work with care coordination for any additional resources and support.                            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     Type 2 diabetes mellitus with microalbuminuria, without long-term current use of insulin (H)     Pseudoaneurysm of femoral artery (H)     Coronary artery disease     Aortic valve stenosis, nonrheumatic     Need for SBE (subacute bacterial endocarditis) prophylaxis     LBBB (left bundle branch block)     Syncope     ST elevation  myocardial infarction (STEMI), unspecified artery (H)     Complete heart block (H)     Cardiac pacemaker in situ     Facet arthropathy, lumbosacral     Scoliosis of lumbar spine, unspecified scoliosis type     DDD (degenerative disc disease), lumbosacral     Lumbosacral spinal stenosis     Alcohol dependence in remission (H)     Chronic kidney disease, stage 3a (H)      Current Medications and Allergies:   Current Outpatient Medications   Medication Instructions     acetaminophen (TYLENOL) 650 mg, Oral, 3 TIMES DAILY, MAX dose of tylenol in 24 hours is 3000 mg     aspirin (ASA) 81 mg, Oral, DAILY     blood glucose (MIRA CONTOUR NEXT) test strip Use to test blood sugar one time daily or as directed.     blood glucose monitoring (MIRA MICROLET) lancets Use to test blood sugar 1 times daily or as directed.     Continuous Blood Gluc Sensor (FREESTYLE SANGEETA 2 SENSOR) MISC 1 each, Does not apply, EVERY 14 DAYS, Use 1 sensor every 14 days. Use to read blood sugars per 's instructions.     donepezil (ARICEPT) 5 MG tablet TAKE 1 TABLET BY MOUTH AT BEDTIME     glucosamine-chondroitin 500-400 MG CAPS per capsule 1 capsule, Oral, Takes off and on     Lidocaine (LIDOCARE) 4 % Patch 1 patch, Transdermal, EVERY 24 HOURS, PLACE ON THE AFFECTED AREA FOR 12 HOURS EACH DAY     metFORMIN (GLUCOPHAGE XR) 500 mg, Oral, 2 TIMES DAILY WITH MEALS     Multiple Vitamins-Iron (MULTIVITAMIN/IRON PO) 1 tablet, Oral, Takes off and on     QUEtiapine (SEROQUEL) 12.5-25 mg, Oral, 2 TIMES DAILY PRN     senna-docusate (SENOKOT-S/PERICOLACE) 8.6-50 MG tablet 1 tablet, Oral, 2 TIMES DAILY PRN     sertraline (ZOLOFT) 25 mg, Oral, DAILY     simvastatin (ZOCOR) 20 mg, Oral, AT BEDTIME     traZODone (DESYREL) 50 MG tablet TAKE 1 TABLET BY MOUTH NIGHTLY AS NEEDED FOR SLEEP     vitamin C (ASCORBIC ACID) 500 mg, Oral, Takes off and on        Allergies   Allergen Reactions     Fentanyl Other (See Comments)     Confusion and agitation after PPM      Versed [Midazolam] Other (See Comments)     Confusion and agitation post PPM         Care Coordination Start Date: 10/22/2022   Frequency of Care Coordination: monthly     Form Last Updated: 02/17/2023

## 2023-02-07 ENCOUNTER — TELEPHONE (OUTPATIENT)
Dept: PALLIATIVE MEDICINE | Facility: CLINIC | Age: 88
End: 2023-02-07
Payer: MEDICARE

## 2023-02-07 DIAGNOSIS — F03.B18 MODERATE DEMENTIA WITH OTHER BEHAVIORAL DISTURBANCE, UNSPECIFIED DEMENTIA TYPE (H): ICD-10-CM

## 2023-02-07 DIAGNOSIS — M47.817 LUMBOSACRAL SPONDYLOSIS WITHOUT MYELOPATHY: Primary | ICD-10-CM

## 2023-02-07 NOTE — TELEPHONE ENCOUNTER
[facility] Call Center    Phone Message    May a detailed message be left on voicemail: yes     Reason for Call: Other: Patient is having pain after his injection still. Please call back patient's spouse to discuss.      Action Taken: Other: Pain    Travel Screening: Not Applicable

## 2023-02-08 RX ORDER — DONEPEZIL HYDROCHLORIDE 5 MG/1
TABLET, FILM COATED ORAL
Qty: 90 TABLET | Refills: 1 | Status: SHIPPED | OUTPATIENT
Start: 2023-02-08 | End: 2023-08-21

## 2023-02-08 NOTE — TELEPHONE ENCOUNTER
Routing to review and order as appropriate    When they met with Dr Gomes in January she mentioned that the time period has probably worn off and they can call when they are ready to repeat injeciton -    Same pain has returned,  mid back pain nothing down the legs    Worse over the last few weeks since meeting on 1/30/23    Started at a 3/10 and now using meds again and back to 5/10 pain at its worst    Hoping to repeat:  Bilateral L4-5 & L5-S1 facet joint injections  Date of last Visit: 11/28/2022    Pt has upcoming Xray with NS and is hoping to be cleared from bracing for compression fx at this time as well       Karely BAZAN RN Care Coordinator  [facility]

## 2023-02-13 ENCOUNTER — TELEPHONE (OUTPATIENT)
Dept: PALLIATIVE MEDICINE | Facility: CLINIC | Age: 88
End: 2023-02-13
Payer: MEDICARE

## 2023-02-13 NOTE — TELEPHONE ENCOUNTER
[facility] Call Center    Phone Message    May a detailed message be left on voicemail: yes     Reason for Call: Other: Patient's wife Alyssa called stating patient is in need of another injection due to patient experiencing a lot of pain. She is requesting a call back to discuss.     Action Taken: Message routed to:  Other: [facility]    Travel Screening: Not Applicable

## 2023-02-13 NOTE — TELEPHONE ENCOUNTER
This concern is being addressed in an ongoing/open call.     Closing to prevent duplication    Karely BAZAN RN Care Coordinator  [facility]

## 2023-02-14 ENCOUNTER — ANCILLARY PROCEDURE (OUTPATIENT)
Dept: GENERAL RADIOLOGY | Facility: CLINIC | Age: 88
End: 2023-02-14
Attending: PHYSICIAN ASSISTANT
Payer: MEDICARE

## 2023-02-14 ENCOUNTER — OFFICE VISIT (OUTPATIENT)
Dept: NEUROSURGERY | Facility: CLINIC | Age: 88
End: 2023-02-14
Attending: PHYSICIAN ASSISTANT
Payer: MEDICARE

## 2023-02-14 VITALS — HEART RATE: 60 BPM | OXYGEN SATURATION: 95 % | DIASTOLIC BLOOD PRESSURE: 67 MMHG | SYSTOLIC BLOOD PRESSURE: 121 MMHG

## 2023-02-14 DIAGNOSIS — S32.010A COMPRESSION FRACTURE OF L1 VERTEBRA, INITIAL ENCOUNTER (H): ICD-10-CM

## 2023-02-14 DIAGNOSIS — S32.010A CLOSED WEDGE COMPRESSION FRACTURE OF L1 VERTEBRA, INITIAL ENCOUNTER (H): Primary | ICD-10-CM

## 2023-02-14 PROCEDURE — 99213 OFFICE O/P EST LOW 20 MIN: CPT | Performed by: PHYSICIAN ASSISTANT

## 2023-02-14 PROCEDURE — 72100 X-RAY EXAM L-S SPINE 2/3 VWS: CPT | Mod: TC | Performed by: RADIOLOGY

## 2023-02-14 PROCEDURE — G0463 HOSPITAL OUTPT CLINIC VISIT: HCPCS | Performed by: PHYSICIAN ASSISTANT

## 2023-02-14 PROCEDURE — G0463 HOSPITAL OUTPT CLINIC VISIT: HCPCS

## 2023-02-14 ASSESSMENT — PAIN SCALES - GENERAL: PAINLEVEL: MILD PAIN (2)

## 2023-02-14 NOTE — PROGRESS NOTES
"NEUROSURGERY CLINIC PROGRESS NOTE    DATE OF VISIT: 2/14/2023    HPI:     Reid Jimenes is a pleasant 89 year old male who presents to the clinic today for a  12-week follow-up visit for a L1 compression fracture. He has been wearing his TLSO and states that his pain has significantly improved, if not resolved. He is now more concerned regarding his lower lumbar / sacral region that he describes as a  constant, sharp pain that initiates in the midline low lumbar region and does not radiate distally nor is this pain accompanied with paresthesia. His wife is concerned about his posture and that he seems to be walking \"bent over\".  There are no bowel or bladder changes. No other concerns are voiced.    Current Outpatient Medications   Medication     acetaminophen (TYLENOL) 325 MG tablet     ascorbic acid 500 MG TABS     aspirin 81 MG EC tablet     blood glucose (MIRA CONTOUR NEXT) test strip     blood glucose monitoring (MIRA MICROLET) lancets     Continuous Blood Gluc Sensor (FREESTYLE SANGEETA 2 SENSOR) MISC     donepezil (ARICEPT) 5 MG tablet     glucosamine-chondroitin 500-400 MG CAPS per capsule     Lidocaine (LIDOCARE) 4 % Patch     metFORMIN (GLUCOPHAGE XR) 500 MG 24 hr tablet     Multiple Vitamins-Iron (MULTIVITAMIN/IRON PO)     QUEtiapine (SEROQUEL) 25 MG tablet     senna-docusate (SENOKOT-S/PERICOLACE) 8.6-50 MG tablet     sertraline (ZOLOFT) 25 MG tablet     simvastatin (ZOCOR) 20 MG tablet     traZODone (DESYREL) 50 MG tablet     Current Facility-Administered Medications   Medication     iohexol (OMNIPAQUE) 300 mg/mL injection 10 mL       Allergies   Allergen Reactions     Fentanyl Other (See Comments)     Confusion and agitation after PPM     Versed [Midazolam] Other (See Comments)     Confusion and agitation post PPM       Past Medical History:   Diagnosis Date     Aortic valve stenosis, nonrheumatic     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: 26mm Edward Sabino 3 valve     Coronary artery disease     cardiac cath 7/24/18: mild " non-obstructive disease     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    DDD PM 6/10/2021     Hyperlipidaemia LDL goal < 100      Hypertension      Need for SBE (subacute bacterial endocarditis) prophylaxis      Pulmonary hypertension (H)      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     Radiographic Findings: Full radiological report in chart. I personally reviewed the images with the patient today.    Exam:    Patient appears comfortable and in no apparent distress. Moving all extremities.  Gait is non-antalgic.  CN II-XII grossly intact, alert and appropriate with conversation and following  commands  Bilateral upper extremities with full strength including hand intrinsics and grasp.  Sensation intact throughout.  Bilateral lower extremities 5/5 strength including plantar and dorsiflexion.  Normal sensation throughout bilaterally.      ASSESSMENT:    1. Closed wedge compression fracture of L1 vertebra, initial encounter (H)        PLAN:    Reid Jimenes is 12 weeks out from a L1 fracture that seems to be essentially resolved. He is now more concerned regarding his lower lumbar / sacral region that he describes as a constant, sharp pain that initiates in the midline low lumbar region and does not radiate distally nor is this pain accompanied with paresthesia. His wife is concerned about his posture and that he seems to be  "walking \"bent over\".     Based on his physical exam, imaging review and past treatments, we feel that it would be in his best interest to continue a conservative approach by obtaining an evaluation by our colleagues on the Pain Management team to further discuss possible non-operative treatment options to include stretching and strengthening exercises as well as different types of steroid injections/ablations/blocks.      Imaging was reviewed today and all questions were answered.    The patient gave verbal understanding and is in agreement with the above plan. He  will call or return to the clinic for any worsening or changes in symptoms.      Respectfully,     SMILEY Stark, PAColleenC  "

## 2023-02-14 NOTE — NURSING NOTE
"Reid Jimenes is a 89 year old male who presents for:  Chief Complaint   Patient presents with     RECHECK     12 week follow-up          Vitals:    Vitals:    02/14/23 1139   BP: 121/67   Pulse: 60   SpO2: 95%       BMI:  Estimated body mass index is 25.98 kg/m  as calculated from the following:    Height as of 1/17/23: 5' 5\" (1.651 m).    Weight as of 1/17/23: 156 lb 1.6 oz (70.8 kg).    Pain Score:  Mild Pain (2)        Amendo Phorn      "

## 2023-02-14 NOTE — LETTER
"    2/14/2023         RE: Reid Jimenes  [address]  [place] [address]        Dear Colleague,    Thank you for referring your patient, Reid Jimenes, to the [facility] [place]. Please see a copy of my visit note below.    NEUROSURGERY CLINIC PROGRESS NOTE    DATE OF VISIT: 2/14/2023    HPI:     Reid Jimenes is a pleasant 89 year old male who presents to the clinic today for a  12-week follow-up visit for a L1 compression fracture. He has been wearing his TLSO and states that his pain has significantly improved, if not resolved. He is now more concerned regarding his lower lumbar / sacral region that he describes as a  constant, sharp pain that initiates in the midline low lumbar region and does not radiate distally nor is this pain accompanied with paresthesia. His wife is concerned about his posture and that he seems to be walking \"bent over\".  There are no bowel or bladder changes. No other concerns are voiced.    Current Outpatient Medications   Medication     acetaminophen (TYLENOL) 325 MG tablet     ascorbic acid 500 MG TABS     aspirin 81 MG EC tablet     blood glucose (MIRA CONTOUR NEXT) test strip     blood glucose monitoring (MIRA MICROLET) lancets     Continuous Blood Gluc Sensor (FREESTYLE SANGEETA 2 SENSOR) MISC     donepezil (ARICEPT) 5 MG tablet     glucosamine-chondroitin 500-400 MG CAPS per capsule     Lidocaine (LIDOCARE) 4 % Patch     metFORMIN (GLUCOPHAGE XR) 500 MG 24 hr tablet     Multiple Vitamins-Iron (MULTIVITAMIN/IRON PO)     QUEtiapine (SEROQUEL) 25 MG tablet     senna-docusate (SENOKOT-S/PERICOLACE) 8.6-50 MG tablet     sertraline (ZOLOFT) 25 MG tablet     simvastatin (ZOCOR) 20 MG tablet     traZODone (DESYREL) 50 MG tablet     Current Facility-Administered Medications   Medication     iohexol (OMNIPAQUE) 300 mg/mL injection 10 mL       Allergies   Allergen Reactions     Fentanyl Other (See Comments)     Confusion and " agitation after PPM     Versed [Midazolam] Other (See Comments)     Confusion and agitation post PPM       Past Medical History:   Diagnosis Date     Aortic valve stenosis, nonrheumatic     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: 26mm Edward Sabino 3 valve     Coronary artery disease     cardiac cath 7/24/18: mild non-obstructive disease     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    DDD PM 6/10/2021     Hyperlipidaemia LDL goal < 100      Hypertension      Need for SBE (subacute bacterial endocarditis) prophylaxis      Pulmonary hypertension (H)      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     Radiographic Findings: Full radiological report in chart. I personally reviewed the images with the patient today.    Exam:    Patient appears comfortable and in no apparent distress. Moving all extremities.  Gait is non-antalgic.  CN II-XII grossly intact, alert and appropriate with conversation and following  commands  Bilateral upper extremities with full strength including hand intrinsics and grasp.  Sensation intact throughout.  Bilateral lower extremities 5/5 strength including plantar and dorsiflexion.  Normal sensation throughout bilaterally.      ASSESSMENT:    1. Closed wedge compression fracture of L1 vertebra, initial encounter (H)        PLAN:    Reid Jimenes is 12 weeks out from a L1 fracture that seems to be essentially resolved. He is now  "more concerned regarding his lower lumbar / sacral region that he describes as a constant, sharp pain that initiates in the midline low lumbar region and does not radiate distally nor is this pain accompanied with paresthesia. His wife is concerned about his posture and that he seems to be walking \"bent over\".     Based on his physical exam, imaging review and past treatments, we feel that it would be in his best interest to continue a conservative approach by obtaining an evaluation by our colleagues on the Pain Management team to further discuss possible non-operative treatment options to include stretching and strengthening exercises as well as different types of steroid injections/ablations/blocks.      Imaging was reviewed today and all questions were answered.    The patient gave verbal understanding and is in agreement with the above plan. He  will call or return to the clinic for any worsening or changes in symptoms.      Respectfully,     SMILEY Stark PA-C      Again, thank you for allowing me to participate in the care of your patient.        Sincerely,        Raul Iqbal PA-C    "

## 2023-02-15 NOTE — TELEPHONE ENCOUNTER
Neurosurgery note and x rays complete. Please advise.    Karely BAZAN RN Care Coordinator  [facility]

## 2023-02-15 NOTE — TELEPHONE ENCOUNTER
Routing for prescreen and scheduling.    Pt is aware that he cannot schedule until late Feb as last MARVEL was 11/28/22    Karely BAZAN RN Care Coordinator  [facility]

## 2023-02-15 NOTE — TELEPHONE ENCOUNTER
Order placed for repeat lumbar facet joint injections.    Lluvia Gomes MD  [facility]

## 2023-02-16 ENCOUNTER — PATIENT OUTREACH (OUTPATIENT)
Dept: CARE COORDINATION | Facility: CLINIC | Age: 88
End: 2023-02-16
Payer: MEDICARE

## 2023-02-16 NOTE — TELEPHONE ENCOUNTER
Screening Questions for Radiology Injections:    Injection to be done at which interventional clinic site? [facility]    Procedure ordered by [facility]     Procedure ordered? Repeat bilateral L4-5 and L5-S1 facet joint injections     Transforaminal Cervical MARVEL - Send to [facility] ([facility]) - No  Community Site providers perform this procedure    What insurance would patient like us to bill for this procedure? Medicare & BC     Worker's comp or MVA (motor vehicle accident) -Any injection DO NOT SCHEDULE and route to Amanda Pena.      [employer] insurance - For SI joint injections, DO NOT SCHEDULE and route to Phoebe Jeffries.       ALL BCBS, Hum[employer] and HP CIGNA - DO NOT SCHEDULE and route to Phoebe Jeffries    MEDICA- facet joint injections, route to Phoebe Jeffries    IF SCHEDULING IN [place] PLEASE SCHEDULE AT LEAST 7-10 BUSINESS DAYS OUT SO A PA CAN BE OBTAINED    Is an  needed? No     Patient has a  home? (Review Grid) YES: Informed     Any chance of pregnancy? Not Applicable   If YES, do NOT schedule and route to RN pool  - Dr. Vegas route to Barbara Hoffman and PM&R Nurse  [[id number]]      Is patient actively being treated for cancer or immunocompromised? No  If YES, do NOT schedule and route to RN pool/ Dr. Vegas's Team    Does the patient have a bleeding or clotting disorder? No     If YES, okay to schedule AND route to RN nurse ralf/ Dr. Vegas's Team     (For any patients with platelet count <100, RN must forward to provider)    Is patient taking any Blood Thinners OR Antiplatelet medication?  No   If hold needed, do NOT schedule, route to RN pool/ Dr. Vegas's Team    Examples:   o Blood Thinners: (Coumadin, Warfarin, Jantoven, Pradaxa, Xarelto, Eliquis, Edoxaban, Enoxaparin, Lovenox, Heparin, Arixtra, Fondaparinux or Fragmin)  o Antiplatelet Medications: (Plavix, Brilinta or Effient)     Is patient taking any aspirin products (includes Excedrin and Fiorinal)? No     If more than  325mg/day, OK to schedule; Instruct Pt to decrease to less than 325 mg for 7 days AND route to RN pool/ Dr. Vegas's Team     For CERVICAL procedures, hold all aspirin products for 6 days.     Tell Pt that if aspirin product is not held for 6 days, the procedure WILL BE cancelled.     Any allergies to contrast dye, iodine, shellfish, or numbing and steroid medications? No    If YES, schedule and add allergy information to appointment notes AND route to the RN pool/ Dr. Vegas's Team    If MARVEL and Contrast Dye / Iodine Allergy? DO NOT SCHEDULE, route to RN pool/ Dr. Vegas's Team    Allergies: Fentanyl and Versed [midazolam]     Does patient have an active infection or treated for one within the past week? No    Is patient currently taking any antibiotics or steroid medications?  No     For patients on chronic, preventative, or prophylactic antibiotics, procedures may be scheduled.     For patients on antibiotics for active or recent infection, schedule 4 days after completed.    For patients on steroid medications, schedule 4 days after completed.     Has the patient had a flu shot or any other vaccinations within the past 7 days? No  If yes, explain that for the vaccine to work best they need to:       wait 1 week before and 1 week after getting any Vaccine    wait 1 week before and 2 weeks after getting Covid Vaccine #2 or BOOSTER    If patient has concerns about the timing, send to RN pool/ Dr. Vegas's Team    Does patient have an MRI/CT?  YES: 2022 Include Date and Check Procedure Scheduling Grid to see if required.    Was the MRI/CT done within the last 3 years?  Yes     If no route to RN Pool/ Dr. Meyers Team    If yes, where was the MRI/CT done? [facility]      Refer to PACS Transmissions list for approved external locations and route to RN Pool High Priority/ Dr. Meyers Team    If MRI was not done at approved external location do NOT schedule and route to RN pool/ Dr. Meyers Team      If patient has an  imaging disc, the injection MAY be scheduled but patient must bring disc to appt or appt will be cancelled.    Is patient able to transfer to a procedure table with minimal or no assistance? Yes     If no, do NOT schedule and route to RN Pool/ Dr. Vegas's Team    Procedure Specific Instructions:    If celiac plexus block, informed patient NPO for 6 hours and that it is okay to take medications with sips of water, especially blood pressure medications Not Applicable         If this is for a cervical procedure, informed patient that aspirin needs to be held for 6 days.   Not Applicable      Sedation, If Sedation is ordered for any procedure, patient must be NPO for 6 hours prior to procedure Not Applicable      If IV needed:    Do not schedule procedures requiring IV placement in the first appointment of the day or first appointment after lunch. Do NOT schedule at 0745, 0815 or 1245.     Instructed patient to arrive 30 minutes early for IV start if required. (Check Procedure Scheduling Grid)  Not Applicable    Reminders:    If you are started on any steroids or antibiotics between now and your appointment, you must contact us because the procedure may need to be cancelled.  Yes      As a reminder, receiving steroids can decrease your body's ability to fight infection.   Would you still like to move forward with scheduling the injection?  Yes      IV Sedation is not provided for procedures. If oral anti-anxiety medication is needed, the patient should request this from their referring provider.      Instruct patient to arrive as directed prior to the scheduled appointment time:  If IV needed 30 minutes before appointment time       For patients 85 or older we recommend having an adult stay w/ them for the remainder of the day.       If the patient is Diabetic, remind them to bring their glucometer.      Does the patient have any questions?  NO  Vanessa Nesbitt  [facility]

## 2023-02-16 NOTE — PROGRESS NOTES
Clinic Care Coordination Contact  [facility]/Voicemail       Clinical Data: Care Coordinator Outreach  Outreach attempted x 1.  Left message on patient's voicemail with call back information and requested return call.    Plan: Care Coordinator will try to reach patient again in 10 business days.      Mary SCHREIBER [facility]  Community Health Worker  [facility]  Clinic Care Coordination  [phone]

## 2023-02-16 NOTE — TELEPHONE ENCOUNTER
No PA required, okay to schedule    Per Availity:  This product does not require prior authorization for any service.      Phoebe BUSBY    [facility]

## 2023-02-24 ENCOUNTER — RADIOLOGY INJECTION OFFICE VISIT (OUTPATIENT)
Dept: PALLIATIVE MEDICINE | Facility: CLINIC | Age: 88
End: 2023-02-24
Payer: MEDICARE

## 2023-02-24 VITALS — OXYGEN SATURATION: 97 % | SYSTOLIC BLOOD PRESSURE: 160 MMHG | DIASTOLIC BLOOD PRESSURE: 75 MMHG | HEART RATE: 63 BPM

## 2023-02-24 DIAGNOSIS — M47.817 LUMBOSACRAL SPONDYLOSIS WITHOUT MYELOPATHY: ICD-10-CM

## 2023-02-24 PROCEDURE — 64494 INJ PARAVERT F JNT L/S 2 LEV: CPT | Mod: 50 | Performed by: PHYSICAL MEDICINE & REHABILITATION

## 2023-02-24 PROCEDURE — 64493 INJ PARAVERT F JNT L/S 1 LEV: CPT | Mod: 50 | Performed by: PHYSICAL MEDICINE & REHABILITATION

## 2023-02-24 RX ORDER — TRIAMCINOLONE ACETONIDE 40 MG/ML
40 INJECTION, SUSPENSION INTRA-ARTICULAR; INTRAMUSCULAR ONCE
Status: COMPLETED | OUTPATIENT
Start: 2023-02-24 | End: 2023-02-24

## 2023-02-24 RX ADMIN — TRIAMCINOLONE ACETONIDE 40 MG: 40 INJECTION, SUSPENSION INTRA-ARTICULAR; INTRAMUSCULAR at 11:45

## 2023-02-24 NOTE — NURSING NOTE
Discharge Information    IV Discontiued Time:  NA    Amount of Fluid Infused:  NA    Discharge Criteria = When patient returns to baseline or as per MD order    Consciousness:  Pt is fully awake    Circulation:  BP +/- 20% of pre-procedure level    Respiration:  Patient is able to breathe deeply    O2 Sat:  Patient is able to maintain O2 Sat >92% on room air    Activity:  Moves 4 extremities on command    Ambulation:  Patient is able to stand and walk or stand and pivot into wheelchair    Dressing:  Clean/dry or No Dressing    Notes:   Discharge instructions and AVS given to patient    Patient meets criteria for discharge?  YES    Admitted to PCU?  No    Responsible adult present to accompany patient home?  Yes    Signature/Title:    Indira Dupree RN  RN Care Coordinator  [facility] [place]

## 2023-02-24 NOTE — PROGRESS NOTES
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  They have a history of central low back pain without radiation to the legs.  Exam shows pain with extension and rotation and they have tried conservative treatment including medications.    This is a repeat injection. Last completed on 11/28/2022 which provided at least 2 months of benefit.     Options/alternatives, benefits and risks were discussed with the patient including bleeding, infection, flared pain, tissue trauma, exposure to radiation, reaction to medications including seizure, spinal cord injury, paralysis, weakness, numbness and headache.    Questions were answered to his satisfaction and he agrees to proceed. Voluntary informed consent was obtained and signed.     Vitals were reviewed: Yes  Allergies were reviewed:  Yes   Medications were reviewed:  Yes   Pre-procedure pain score: 2/10    LUMBAR CT: 3/21/2022   FINDINGS:  Alignment is significant for levoconvex scoliosis and  multilevel grade 1 spondylolisthesis. Chronic-appearing L4 compression  deformity is present with large inferior endplate Schmorl's node and  moderate height loss. Multiple other smaller Schmorl's nodes are  present. No evidence of recent fracture. Severe asymmetrical  degenerative disc disease is present at L3-L4, L4-L5, and L5-S1.  Multilevel facet arthropathy is present which is severe on the left at  L4-L5 and L5-S1. Disc bulging contributes to mild to moderate spinal  canal stenosis at L2-L3 and L3-L4. No high-grade spinal  canal  stenoses. Mild foraminal stenoses at multiple levels. Moderate  foraminal stenosis on the right at L2-L3. Severe foraminal stenosis on  the right at L3-L4. Severe foraminal stenosis on the left at L4-L5.  Mild to moderate foraminal stenosis on the right at L4-L5. Severe  foraminal stenosis on the left L5-S1.     Extensive scattered vascular calcifications with aortic irregularity.  Lateral sacroiliac joint degenerative change.                                                                      IMPRESSION:  Severe asymmetrical degenerative change with scoliosis  and multiple stenoses as detailed.     Procedure:  After getting informed consent, patient was brought into the procedure suite and was placed in a prone position on the procedure table.   A Pause for the Cause was performed.  Patient was prepped and draped in sterile fashion.     Under AP fluoroscopic guidance the L4-5 and L5-S1 facet joints were identified bilaterally, and the C-arm was rotated obliquely to the affected side to open the joint space. A total of 10 ml of 1% lidocaine was injected at the needle entry point and needle tract. Then a 22 gauge 3.5 inch quincke type spinal needle was inserted and advanced under fluoroscopic guidance targeting the superior articular pillar of each joint. Once the needle made a contact with SAP, it was rotated and was then advanced into the joint.    AP fluoroscopic views were obtained to confirm the needle placement. Then,  Omnipaque 300 contrast dye was injected after negative aspiration for heme and CSF in each joint, confirming appropriate placement.  A total of 1mL of Omnipaque was used and none was wasted.    The injection was then accomplished using a solution containing 2ml of 1% Lidocaine mixed with 80mg of kenolog, divided between the 4 joints. The needles were removed..     Hemostasis was achieved, the area was cleaned, and bandaids were placed when appropriate.  The patient tolerated the procedure  well, and was taken to the recovery room.    Images were saved to PACS.    Post-procedure pain score: 3/10  Follow-up includes:   -f/u with me in clinic  -Lumbar RFA was discussed as the next step if good pain relief is obtained but it does not last very long.     Lluvia Gomes MD  [facility]

## 2023-02-24 NOTE — NURSING NOTE
Pre-procedure Intake    If YES to any questions or NO to having a   Please complete laminated checklist and leave on the computer keyboard for Provider, verbally inform provider if able.    For SCS Trial, RFA's or any sedation procedure: NA    If yes, for how long?         Are you taking any any blood thinners such as Coumadin, Warfarin, Jantoven, Pradaxa Xarelto, Eliquis, Edoxaban, Enoxaparin, Lovenox, Heparin, Arixtra, Fondaparinux, or Fragmin? OR Antiplatelet medication such as Plavix, Brilinta, or Effient?  NO     If yes, when did you take your last dose?        Do you take aspirin?   YES: 81 mg    If cervical procedure, have you held aspirin for 6 days?   NA    Do you have any allergies to contrast dye, iodine, steroid and/or numbing medications?  NO     Are you currently taking antibiotics or have an active infection?  NO     Have you had a fever/elevated temperature within the past week? NO     Are you currently taking oral steroids? NO     Do you have a ? Yes     Are you pregnant or breastfeeding? NA     Have you received any vaccines in the past 2 weeks? NO       Notify provider and RNs if systolic BP >170, diastolic BP >100, P >100 or O2 sats < 90%

## 2023-02-24 NOTE — PATIENT INSTRUCTIONS
[facility] Procedure Discharge Instructions    Today you saw:   Dr. Lluvia Gomes      Your procedure:  Facet joint injection      Medications used:  Lidocaine (anesthetic)  Bupivacaine (anesthetic)     Kenalog (steroid)  Omnipaque (contrast)     If you were holding your blood thinning medication, please restart taking it: You did not need to hold any medication for this procedure today        Be cautious when walking as numbness and/or weakness in the legs may occur up to 6-8 hours after the procedure due to effect of the local anesthetic  Do not drive for 6 hours. The effect of the local anesthetic could slow your reflexes.   Avoid strenuous activity for the first 24 hours. You may resume your regular activities after that.   You may shower, however avoid swimming, tub baths or hot tubs for 24 hours following your procedure  You may have a mild to moderate increase in pain for several days following the injection.    You may use ice packs for 10-15 minutes, 3 to 4 times a day at the injection site for comfort  Do not use heat to painful areas for 6 to 8 hours. This will give the local anesthetic time to wear off and prevent you from accidentally burning your skin.  Unless you have been directed to avoid the use of anti-inflammatory medications (NSAIDS-ibuprofen, Aleve, Motrin), you may use these medications or Tylenol for pain control if needed.   With diabetes, check your blood sugar more frequently than usual as your blood sugar may be higher than normal for 10-14 days following a steroid injection. Contact your doctor who manages your diabetes if your blood sugar is higher than usual  Possible side effects of steroids that you may experience include flushing, elevated blood pressure, increased appetite, mild headaches and restlessness.  All of these symptoms will get better with time.  It may take up to 14 days for the steroid medication to start working although you may feel the effect as  early as a few days after the procedure.   Follow up with your referring provider in 2-3 weeks- Dr Gomes    If you experience any of the following, call the pain center line during work hours at [phone] or on-call physician after hours at [phone]:  -Fever over 100 degree F  -Swelling, bleeding, redness, drainage, warmth at the injection site  -Progressive weakness or numbness in your legs   -Loss of bowel or bladder function  -Unusual headache that is not relieved by Tylenol or your regular headache medication  -Unusual new onset of pain that is not improving

## 2023-02-28 ENCOUNTER — TELEPHONE (OUTPATIENT)
Dept: INTERNAL MEDICINE | Facility: CLINIC | Age: 88
End: 2023-02-28
Payer: MEDICARE

## 2023-03-02 ENCOUNTER — PATIENT OUTREACH (OUTPATIENT)
Dept: CARE COORDINATION | Facility: CLINIC | Age: 88
End: 2023-03-02
Payer: MEDICARE

## 2023-03-02 NOTE — PROGRESS NOTES
Clinic Care Coordination Contact  [facility]/Voicemail       Clinical Data: Care Coordinator Outreach  Outreach attempted x 2.  Left message on patient's voicemail with call back information and requested return call.    Plan: Care Coordinator will try to reach patient again in 10 business days.      Mary SCHREIBER [facility]  Community Health Worker  [facility]  Clinic Care Coordination  [phone]

## 2023-03-06 ENCOUNTER — TELEPHONE (OUTPATIENT)
Dept: INTERNAL MEDICINE | Facility: CLINIC | Age: 88
End: 2023-03-06
Payer: MEDICARE

## 2023-03-06 NOTE — TELEPHONE ENCOUNTER
Rabia RN with [facility] calls for verbal order      SKILLED NURSING  1x2wks  1 every other week for 6 wks  2 PRN    Best number to call back [phone]

## 2023-03-07 NOTE — TELEPHONE ENCOUNTER
The Home Care/Assisted Living/Nursing Facility is calling regarding an established patient.  Has the patient seen Home Care in the past or is currently residing in Assisted Living or Nursing Facility? Yes.     Rabia calling from [facility] requesting the following orders that are within the Home Care, Assisted Living or Nursing Home Eval and Treatment standing order and can be signed as standing order signature required by RN.    Preferred Call Back Number: [phone]    Home Care Visits Continuation SKILLED NURSING 1x2wks, 1 every other week for 6 wks  And 2 PRN        Any additional Orders:  Are there any orders requested, not stated above, that are outside of the standing order and must be routed to a licensed practitioner for approval?    No    Writer has verified [employer] will send fax to have orders signed.   AVI Meek R.N.

## 2023-03-16 ENCOUNTER — PATIENT OUTREACH (OUTPATIENT)
Dept: CARE COORDINATION | Facility: CLINIC | Age: 88
End: 2023-03-16
Payer: MEDICARE

## 2023-03-16 NOTE — PROGRESS NOTES
Clinic Care Coordination Contact    Situation: Patient chart reviewed by care coordinator.    Background/Assessment: CHW has been unable to reach patient since care coordination's last outreach. Patient will benefit from ongoing CHW outreach attempts per standard work.    Plan/Recommendations: CHW will reach out to patient per standard work flow. RN CC will review chart in 6 weeks.      Magy Liz RN Care Coordinator  [facility]  Email: [email or web address]  Phone: [phone]

## 2023-03-20 ENCOUNTER — TELEPHONE (OUTPATIENT)
Dept: INTERNAL MEDICINE | Facility: CLINIC | Age: 88
End: 2023-03-20

## 2023-03-20 ENCOUNTER — MYC MEDICAL ADVICE (OUTPATIENT)
Dept: INTERNAL MEDICINE | Facility: CLINIC | Age: 88
End: 2023-03-20
Payer: MEDICARE

## 2023-03-20 DIAGNOSIS — M54.50 ACUTE RIGHT-SIDED LOW BACK PAIN WITHOUT SCIATICA: ICD-10-CM

## 2023-03-20 DIAGNOSIS — M48.07 LUMBOSACRAL SPINAL STENOSIS: ICD-10-CM

## 2023-03-20 NOTE — TELEPHONE ENCOUNTER
Fax received from [facility] 03/09/23 for review and signature.  Put in Dr. Adkins's in basket.

## 2023-03-23 RX ORDER — LIDOCAINE 4 G/G
1 PATCH TOPICAL EVERY 24 HOURS
Qty: 30 PATCH | Refills: 2 | Status: CANCELLED | OUTPATIENT
Start: 2023-03-23

## 2023-03-23 NOTE — TELEPHONE ENCOUNTER
Patient's wife returns call. Advised of pharmacy message. Alyssa to call back with any future concerns.

## 2023-03-23 NOTE — TELEPHONE ENCOUNTER
Patient's wife calling.  States patient is having low back pain (history of this) and would like a refill of Lidocaine 4% patches and Diclofenac gel.    Lidocaine patches last filled 11/18/22 #30 patches with 2 refills.    Diclofenac 1% gel last filled 11/15/22 224g with 0 refills.    Spoke with pharmacist at [facility].  States the Lidocaine 4% patches are not covered by insurance.  Patient can get these over the counter.  And Diclofenac 1% gel is also over the counter.    Left message for wife to call back to inform her she can get medications OTC.

## 2023-03-30 ENCOUNTER — TELEPHONE (OUTPATIENT)
Dept: INTERNAL MEDICINE | Facility: CLINIC | Age: 88
End: 2023-03-30
Payer: MEDICARE

## 2023-03-30 DIAGNOSIS — Z53.9 DIAGNOSIS NOT YET DEFINED: Primary | ICD-10-CM

## 2023-03-30 PROCEDURE — G0179 MD RECERTIFICATION HHA PT: HCPCS | Performed by: INTERNAL MEDICINE

## 2023-03-30 NOTE — TELEPHONE ENCOUNTER
Fax received from [facility] 03/09/23 for review and signature.  Put in Dr. Bingham's yellow folder. in basket.

## 2023-03-31 ENCOUNTER — PATIENT OUTREACH (OUTPATIENT)
Dept: CARE COORDINATION | Facility: CLINIC | Age: 88
End: 2023-03-31
Payer: MEDICARE

## 2023-03-31 NOTE — PROGRESS NOTES
Clinic Care Coordination Contact    Community Health Worker Follow Up    Care Gaps:     Health Maintenance Due   Topic Date Due     ZOSTER IMMUNIZATION (2 of 3) 12/05/2012     EYE EXAM  01/17/2023       Alyssa was focused on addressing current goals    Care Plan:   Care Plan: In Home Support and Resources     Problem: Lack of caregiver support     Goal: In Home Support and Resources     Start Date: 11/10/2022 Expected End Date: 11/10/2023    This Visit's Progress: 30% Recent Progress: 30%    Note:     Barriers: Lack of caregiver support  Strengths: Patient's wife Alyssa is engaged and motivated   Patient expressed understanding of goal: Yes  Action steps to achieve this goal:  1. I understand that RN CC will mail and send through [employer] in home support resources, [place] support resources and [place] Cho[place] information for [facility].  2. I will review resources with my children and utilize as we see fit.   3. I will call [employer] directly to learn more about their volunteer respite program.  4. I will continue obtaining PCA assistance for 10 hours a week (Monday's and Friday's)  5. I will call my Senior [facility] ([facility]) directly at [phone] to request more in home services if needed.  4.  I will continue to work with care coordination for any additional resources and support.                           Intervention and Education during outreach:     CHW was able to connect with the Patient's spouse, Alyssa ([facility]), and go over their above goals. Alyssa shared that she is not sure exactly if [facility] called them as they have been receiving a lot of incoming calls; Writer voiced understanding. Provided Alyssa with the [employer] P. Number as she would like to learn more about the [facility] volunteer respite program.     Alyssa shares that otherwise, things are going pretty much the same - they are following-up with doctor's appointments as recommended. Alyssa had no other questions or concerns during the call and knows she  can contact the Writer back at anytime.       CHW Plan: Writer will reach out to Alyssa in 1 month to monitor the progression of their goals.       Mary WARD. Public Health  Community Health Worker  [facility]  Clinic Care Coordination  [phone]

## 2023-04-04 ENCOUNTER — ANCILLARY PROCEDURE (OUTPATIENT)
Dept: GENERAL RADIOLOGY | Facility: CLINIC | Age: 88
End: 2023-04-04
Attending: PHYSICAL MEDICINE & REHABILITATION
Payer: MEDICARE

## 2023-04-04 ENCOUNTER — OFFICE VISIT (OUTPATIENT)
Dept: PALLIATIVE MEDICINE | Facility: CLINIC | Age: 88
End: 2023-04-04
Payer: MEDICARE

## 2023-04-04 VITALS — DIASTOLIC BLOOD PRESSURE: 63 MMHG | SYSTOLIC BLOOD PRESSURE: 101 MMHG | OXYGEN SATURATION: 96 % | HEART RATE: 88 BPM

## 2023-04-04 DIAGNOSIS — M25.552 HIP PAIN, LEFT: Primary | ICD-10-CM

## 2023-04-04 DIAGNOSIS — M48.07 LUMBOSACRAL SPINAL STENOSIS: ICD-10-CM

## 2023-04-04 DIAGNOSIS — M47.817 LUMBOSACRAL SPONDYLOSIS WITHOUT MYELOPATHY: ICD-10-CM

## 2023-04-04 DIAGNOSIS — M25.552 HIP PAIN, LEFT: ICD-10-CM

## 2023-04-04 PROCEDURE — 73502 X-RAY EXAM HIP UNI 2-3 VIEWS: CPT | Mod: TC | Performed by: RADIOLOGY

## 2023-04-04 PROCEDURE — 99213 OFFICE O/P EST LOW 20 MIN: CPT | Performed by: PHYSICAL MEDICINE & REHABILITATION

## 2023-04-04 ASSESSMENT — PAIN SCALES - GENERAL: PAINLEVEL: MODERATE PAIN (4)

## 2023-04-04 NOTE — PROGRESS NOTES
[facility] Follow Up     Date of visit: 4/4/2023    Last seen by me on 1/30/2023     Assessment:  Reid Jimenes is a 89 year old male who presents today with mainly pain in the left iliac crest and hip into anterior thigh.  I have been seeing him primarily for chronic low back pain which is axial in nature.  Does not describe any radiating pain into his lower extremities. He has a history of lumbar spondylosis and lumbar spinal stenosis. He had an L1 compression fracture in the fall 2022 and was seen by neurosurgery for this.  He had conservative management with a TLSO.  Pain at this location has now resolved.  He had repeat lumbar facet joint injections on 2/24/2023 and has had benefit with this which is still ongoing.  The pain he is describing today is that newer pain in the left iliac crest groin and anterior thigh.  This pain has significantly limited his ambulation.  Strength is normal in the lower extremities.  He has not had any prior imaging of his left hip.  There is no reproduction of pain with internal or external rotation of the left hip.  Differential for this pain includes osteoarthritis of the left hip, lumbar radiculopathy (he did have some retropulsion of into the spinal canal on prior imaging of compression fracture), referred pain from facet arthropathy, myofascial pain.    Plan:  1. Diagnostic Studies: order placed for an x-ray of the left hip to evaluate for osteoarthritis  2. Medication Management: No changes made today. Continue Tylenol prn as well as lidocaine patches over the painful area    3. Further procedures recommended: We can repeat lumbar facet injections in the future  4. Follow up: I will my chart with x-ray results to discuss next steps.     Chief complaint:   Chief Complaint   Patient presents with     Pain     Since his last visit, Reid Jimenes reports:  - More proximal back pain secondary to L1 compression fracture has resolved.   - facet  joint injections on 11/28/2022 provided some benefit. He feels that the pain is not as severe as it initially was. Overall back pain is doing ok right now.   - He has a newer pain which is bothering him now. Over the past month or so he started having pain in left hip and anterior thigh. No hx of left hip surgery. No prior hip imaging.  -The pain is constant, worse with movement and walking.   - described as throbbing in nature.   - Not doing much walking right now    Pain scores:  Pain intensity currently is 4 on a scale of 0-10.     Medications:       Current pain medications:  Tylenol prn - [facility]  Lidocaine patch - [facility]         Other relevant medications:  Trazodone   sertraline    Past pain treatments:  PT: Yes   TENs Unit: no  Injections: lumbar facet joint injections- [facility]  Surgeries related to pain: no  Alternative Therapies:               Chiropractic: no              Acupuncture: no    Medications:  Current Outpatient Medications   Medication Sig Dispense Refill     acetaminophen (TYLENOL) 325 MG tablet Take 2 tablets (650 mg) by mouth 3 times daily MAX dose of tylenol in 24 hours is 3000 mg 180 tablet 3     ascorbic acid 500 MG TABS Take 500 mg by mouth Takes off and on       aspirin 81 MG EC tablet Take 1 tablet (81 mg) by mouth daily 30 tablet      blood glucose (MIRA CONTOUR NEXT) test strip Use to test blood sugar one time daily or as directed. 100 strip 11     blood glucose monitoring (MIRA MICROLET) lancets Use to test blood sugar 1 times daily or as directed. 100 each 5     Continuous Blood Gluc Sensor (FREESTYLE SANGEETA 2 SENSOR) MISC 1 each every 14 days Use 1 sensor every 14 days. Use to read blood sugars per 's instructions. 2 each 5     donepezil (ARICEPT) 5 MG tablet TAKE 1 TABLET BY MOUTH AT BEDTIME 90 tablet 1     glucosamine-chondroitin 500-400 MG CAPS per capsule Take 1 capsule by mouth Takes off and on       Lidocaine (LIDOCARE) 4 % Patch Place 1 patch onto the skin every 24 hours  PLACE ON THE AFFECTED AREA FOR 12 HOURS EACH DAY 30 patch 2     metFORMIN (GLUCOPHAGE XR) 500 MG 24 hr tablet Take 1 tablet (500 mg) by mouth 2 times daily (with meals) 180 tablet 3     Multiple Vitamins-Iron (MULTIVITAMIN/IRON PO) Take 1 tablet by mouth Takes off and on       QUEtiapine (SEROQUEL) 25 MG tablet Take 0.5-1 tablets (12.5-25 mg) by mouth 2 times daily as needed (acute severe agitation or anxiety) 30 tablet 2     senna-docusate (SENOKOT-S/PERICOLACE) 8.6-50 MG tablet Take 1 tablet by mouth 2 times daily as needed for constipation       sertraline (ZOLOFT) 25 MG tablet Take 1 tablet (25 mg) by mouth daily 90 tablet 0     simvastatin (ZOCOR) 20 MG tablet Take 1 tablet (20 mg) by mouth At Bedtime 90 tablet 1     traZODone (DESYREL) 50 MG tablet TAKE 1 TABLET BY MOUTH NIGHTLY AS NEEDED FOR SLEEP 30 tablet 1     Diagnostic tests:  XR Lumbar Spine on 1/3/2023:   IMPRESSION: Five lumbar type vertebrae. Moderate left convex curvature  of the lumbar spine centered at L3 again noted. Degenerative right  lateral listhesis of L2 upon L3 and degenerative left lateral  listhesis of L4 upon L5 again noted. Alignment otherwise normal. There  has been further loss of vertebral body height of the L1 vertebral  body since the comparison study with approximately 90% loss of  vertebral body height anteriorly. No new fractures. Loss of disc space  height and degenerative endplate spurring throughout the lumbar spine.  Facet arthropathy throughout the lumbar spine.     Physical Exam:  Blood pressure 101/63, pulse 88, SpO2 96 %.  General: A&O x 3 in NAD  Gait: Slow  Resp: breathing unlabored on room air.   Psych: normal affect  Neuro: strength 5/5 in BLE.  MSK: stands and walks with forward flexed posture. No significant pain with external or internal rotation of left hip.     Lluvia Gomes MD  [facility]     BILLING TIME DOCUMENTATION:   The total TIME spent on this patient on the date of the  encounter/appointment was 26 minutes.      TOTAL TIME includes:   Time spent preparing to see the patient (reviewing records and tests)  Time spent face to face (or over the phone) with the patient   Time spent documenting clinical information in Epic

## 2023-04-04 NOTE — PATIENT INSTRUCTIONS
Will get an x-ray of your left hip today. I will my chart you with the results and to discuss next steps.  If the facet joint injections have been helpful we can repeat them in the future.   Lluvia Gomes MD  [facility]     ----------------------------------------------------------------  Clinic Number:  [phone]   Call with any questions about your care and for scheduling assistance.   Calls are returned Monday through Friday between 8 AM and 4:30 PM. We usually get back to you within 2 business days depending on the issue/request.    If we are prescribing your medications:  For opioid medication refills, call the clinic or send a [employer] message 7 days in advance.  Please include:  Name of requested medication  Name of the pharmacy.  For non-opioid medications, call your pharmacy directly to request a refill. Please allow 3-4 days to be processed.   Per [place] State Law:  All controlled substance prescriptions must be filled within 30 days of being written.    For those controlled substances allowing refills, pickup must occur within 30 days of last fill.      We believe regular attendance is key to your success in our program!    Any time you are unable to keep your appointment we ask that you call us at least 24 hours in advance to cancel.This will allow us to offer the appointment time to another patient.   Multiple missed appointments may lead to dismissal from the clinic.

## 2023-04-07 DIAGNOSIS — Z53.9 DIAGNOSIS NOT YET DEFINED: Primary | ICD-10-CM

## 2023-04-07 PROCEDURE — G0179 MD RECERTIFICATION HHA PT: HCPCS | Performed by: INTERNAL MEDICINE

## 2023-04-18 ENCOUNTER — ANCILLARY PROCEDURE (OUTPATIENT)
Dept: CARDIOLOGY | Facility: CLINIC | Age: 88
End: 2023-04-18
Attending: INTERNAL MEDICINE
Payer: MEDICARE

## 2023-04-18 DIAGNOSIS — Z95.0 CARDIAC PACEMAKER IN SITU: ICD-10-CM

## 2023-04-18 DIAGNOSIS — I44.2 CHB (COMPLETE HEART BLOCK) (H): ICD-10-CM

## 2023-04-18 PROCEDURE — 93296 REM INTERROG EVL PM/IDS: CPT | Performed by: INTERNAL MEDICINE

## 2023-04-18 PROCEDURE — 93294 REM INTERROG EVL PM/LDLS PM: CPT | Performed by: INTERNAL MEDICINE

## 2023-04-25 ENCOUNTER — VIRTUAL VISIT (OUTPATIENT)
Dept: INTERNAL MEDICINE | Facility: CLINIC | Age: 88
End: 2023-04-25
Payer: MEDICARE

## 2023-04-25 VITALS
SYSTOLIC BLOOD PRESSURE: 114 MMHG | DIASTOLIC BLOOD PRESSURE: 60 MMHG | OXYGEN SATURATION: 95 % | HEART RATE: 91 BPM | BODY MASS INDEX: 26.33 KG/M2 | TEMPERATURE: 97.7 F | RESPIRATION RATE: 16 BRPM | WEIGHT: 158 LBS | HEIGHT: 65 IN

## 2023-04-25 DIAGNOSIS — E11.29 TYPE 2 DIABETES MELLITUS WITH MICROALBUMINURIA, WITHOUT LONG-TERM CURRENT USE OF INSULIN (H): ICD-10-CM

## 2023-04-25 DIAGNOSIS — R26.81 GAIT INSTABILITY: Primary | ICD-10-CM

## 2023-04-25 DIAGNOSIS — R80.9 TYPE 2 DIABETES MELLITUS WITH MICROALBUMINURIA, WITHOUT LONG-TERM CURRENT USE OF INSULIN (H): ICD-10-CM

## 2023-04-25 DIAGNOSIS — R10.32 LEFT GROIN PAIN: ICD-10-CM

## 2023-04-25 DIAGNOSIS — S32.010S COMPRESSION FRACTURE OF L1 VERTEBRA, SEQUELA: ICD-10-CM

## 2023-04-25 PROCEDURE — 99214 OFFICE O/P EST MOD 30 MIN: CPT | Mod: VID | Performed by: INTERNAL MEDICINE

## 2023-04-25 NOTE — PROGRESS NOTES
Assessment & Plan   (R26.81) Gait instability  (primary encounter diagnosis)  Comment: Referred to Physical Therapy to assess ambulation with cane vs walker.   Plan: Physical Therapy Referral          (R10.32) Left groin pain  (S32.010S) Compression fracture of L1 vertebra, sequela  Comment: continue current measures for pain.   Plan: Physical Therapy Referral          (E11.29,  R80.9) Type 2 diabetes mellitus with microalbuminuria, without long-term current use of insulin (H)  Comment: Update A1c in no more than three months, may check sooner if able.     Viet Bingham MD,   [facility] JAYLAN Rosenbaum is a 89 year old, presenting for the following health issues:  Groin Pain        4/25/2023    10:49 AM   Additional Questions   Roomed by Jeanette MANZO CMA   Accompanied by Alyssa spouse     History of Present Illness       Reason for visit:  Pain in groin, use of cane, renew medications    He eats 2-3 servings of fruits and vegetables daily.He consumes 7 sweetened beverage(s) daily.He exercises with enough effort to increase his heart rate 9 or less minutes per day.  He exercises with enough effort to increase his heart rate 3 or less days per week.   He is taking medications regularly.     Patient originally scheduled for an in-clinic visit this morning, but was unable to remain in the clinic.   We transitioned to a virtual video-visit in the afternoon.     The patient was hospitalized 11/13-11/15/2022 for back pain after a fall with an L1 vertebral compression fracture.   He wore a TLSO corset for some time. He walks with a walker, but would like to try walking with a cane.     He has been bothered by persistent left groin pain. The pain has been felt by the Pain Clinic possibly to be related to hip arthritis. He has been using topical lidocaine, topical Voltaren cream, and oral acetaminophen.   His wife asks whether he could take something else. We discussed potential benefits, but  "potential adverse effects such as sedation, constipation, confusion, gait instability. He will avoid this and continue current measures.     His glucoses have recently run into the 240-250 range. His last A1c was acceptable at 7.4 in January.   We discussed that he should return no later than three months from now.     Past medical, family and social histories as well as medications reviewed and updated as needed.    Review of Systems   REVIEW OF SYSTEMS: The following systems have been completely reviewed and are negative except as noted above:   Constitutional, respiratory, cardiovascular, musculoskeletal, endocrine, psychiatric, and neurologic systems.          Objective    /60 (BP Location: Left arm, Patient Position: Sitting, Cuff Size: Adult Large)   Pulse 91   Temp 97.7  F (36.5  C) (Tympanic)   Resp 16   Ht 1.651 m (5' 5\")   Wt 71.7 kg (158 lb)   SpO2 95%   BMI 26.29 kg/m    Body mass index is 26.29 kg/m .     Physical Exam       GENERAL: healthy, alert and no distress  EYES: Eyes grossly normal to inspection, PERRL and conjunctivae and sclerae normal  RESP: speaks in full sentences with no apparent respiratory distress  PSYCH: affect normal/bright and speech intact              "

## 2023-04-26 LAB
MDC_IDC_EPISODE_DTM: NORMAL
MDC_IDC_EPISODE_DURATION: 2 S
MDC_IDC_EPISODE_ID: NORMAL
MDC_IDC_EPISODE_TYPE: NORMAL
MDC_IDC_LEAD_IMPLANT_DT: NORMAL
MDC_IDC_LEAD_IMPLANT_DT: NORMAL
MDC_IDC_LEAD_LOCATION: NORMAL
MDC_IDC_LEAD_LOCATION: NORMAL
MDC_IDC_LEAD_LOCATION_DETAIL_1: NORMAL
MDC_IDC_LEAD_LOCATION_DETAIL_1: NORMAL
MDC_IDC_LEAD_MFG: NORMAL
MDC_IDC_LEAD_MFG: NORMAL
MDC_IDC_LEAD_MODEL: NORMAL
MDC_IDC_LEAD_MODEL: NORMAL
MDC_IDC_LEAD_POLARITY_TYPE: NORMAL
MDC_IDC_LEAD_POLARITY_TYPE: NORMAL
MDC_IDC_LEAD_SERIAL: NORMAL
MDC_IDC_LEAD_SERIAL: NORMAL
MDC_IDC_MSMT_BATTERY_DTM: NORMAL
MDC_IDC_MSMT_BATTERY_REMAINING_LONGEVITY: 84 MO
MDC_IDC_MSMT_BATTERY_REMAINING_PERCENTAGE: 100 %
MDC_IDC_MSMT_BATTERY_STATUS: NORMAL
MDC_IDC_MSMT_LEADCHNL_RA_IMPEDANCE_VALUE: 727 OHM
MDC_IDC_MSMT_LEADCHNL_RA_PACING_THRESHOLD_AMPLITUDE: 0.5 V
MDC_IDC_MSMT_LEADCHNL_RA_PACING_THRESHOLD_PULSEWIDTH: 0.4 MS
MDC_IDC_MSMT_LEADCHNL_RV_IMPEDANCE_VALUE: 616 OHM
MDC_IDC_MSMT_LEADCHNL_RV_PACING_THRESHOLD_AMPLITUDE: 0.8 V
MDC_IDC_MSMT_LEADCHNL_RV_PACING_THRESHOLD_PULSEWIDTH: 0.4 MS
MDC_IDC_PG_IMPLANT_DTM: NORMAL
MDC_IDC_PG_MFG: NORMAL
MDC_IDC_PG_MODEL: NORMAL
MDC_IDC_PG_SERIAL: NORMAL
MDC_IDC_PG_TYPE: NORMAL
MDC_IDC_SESS_CLINIC_NAME: NORMAL
MDC_IDC_SESS_DTM: NORMAL
MDC_IDC_SESS_TYPE: NORMAL
MDC_IDC_SET_BRADY_AT_MODE_SWITCH_MODE: NORMAL
MDC_IDC_SET_BRADY_AT_MODE_SWITCH_RATE: 170 {BEATS}/MIN
MDC_IDC_SET_BRADY_LOWRATE: 60 {BEATS}/MIN
MDC_IDC_SET_BRADY_MAX_SENSOR_RATE: 130 {BEATS}/MIN
MDC_IDC_SET_BRADY_MAX_TRACKING_RATE: 130 {BEATS}/MIN
MDC_IDC_SET_BRADY_MODE: NORMAL
MDC_IDC_SET_BRADY_PAV_DELAY_HIGH: 150 MS
MDC_IDC_SET_BRADY_PAV_DELAY_LOW: 200 MS
MDC_IDC_SET_BRADY_SAV_DELAY_HIGH: 150 MS
MDC_IDC_SET_BRADY_SAV_DELAY_LOW: 200 MS
MDC_IDC_SET_LEADCHNL_RA_PACING_AMPLITUDE: 2 V
MDC_IDC_SET_LEADCHNL_RA_PACING_CAPTURE_MODE: NORMAL
MDC_IDC_SET_LEADCHNL_RA_PACING_POLARITY: NORMAL
MDC_IDC_SET_LEADCHNL_RA_PACING_PULSEWIDTH: 0.4 MS
MDC_IDC_SET_LEADCHNL_RA_SENSING_ADAPTATION_MODE: NORMAL
MDC_IDC_SET_LEADCHNL_RA_SENSING_POLARITY: NORMAL
MDC_IDC_SET_LEADCHNL_RA_SENSING_SENSITIVITY: 0.25 MV
MDC_IDC_SET_LEADCHNL_RV_PACING_AMPLITUDE: 1.4 V
MDC_IDC_SET_LEADCHNL_RV_PACING_CAPTURE_MODE: NORMAL
MDC_IDC_SET_LEADCHNL_RV_PACING_POLARITY: NORMAL
MDC_IDC_SET_LEADCHNL_RV_PACING_PULSEWIDTH: 0.4 MS
MDC_IDC_SET_LEADCHNL_RV_SENSING_ADAPTATION_MODE: NORMAL
MDC_IDC_SET_LEADCHNL_RV_SENSING_POLARITY: NORMAL
MDC_IDC_SET_LEADCHNL_RV_SENSING_SENSITIVITY: 1.5 MV
MDC_IDC_SET_ZONE_DETECTION_INTERVAL: 375 MS
MDC_IDC_SET_ZONE_TYPE: NORMAL
MDC_IDC_SET_ZONE_VENDOR_TYPE: NORMAL
MDC_IDC_STAT_AT_BURDEN_PERCENT: 1 %
MDC_IDC_STAT_AT_DTM_END: NORMAL
MDC_IDC_STAT_AT_DTM_START: NORMAL
MDC_IDC_STAT_BRADY_DTM_END: NORMAL
MDC_IDC_STAT_BRADY_DTM_START: NORMAL
MDC_IDC_STAT_BRADY_RA_PERCENT_PACED: 22 %
MDC_IDC_STAT_BRADY_RV_PERCENT_PACED: 8 %
MDC_IDC_STAT_EPISODE_RECENT_COUNT: 0
MDC_IDC_STAT_EPISODE_RECENT_COUNT: 1
MDC_IDC_STAT_EPISODE_RECENT_COUNT_DTM_END: NORMAL
MDC_IDC_STAT_EPISODE_RECENT_COUNT_DTM_START: NORMAL
MDC_IDC_STAT_EPISODE_TYPE: NORMAL
MDC_IDC_STAT_EPISODE_VENDOR_TYPE: NORMAL

## 2023-04-27 ENCOUNTER — PATIENT OUTREACH (OUTPATIENT)
Dept: CARE COORDINATION | Facility: CLINIC | Age: 88
End: 2023-04-27
Payer: MEDICARE

## 2023-05-01 ENCOUNTER — PATIENT OUTREACH (OUTPATIENT)
Dept: CARE COORDINATION | Facility: CLINIC | Age: 88
End: 2023-05-01
Payer: MEDICARE

## 2023-05-01 NOTE — PROGRESS NOTES
Clinic Care Coordination Contact    Community Health Worker Follow Up    Care Gaps:     Health Maintenance Due   Topic Date Due     ZOSTER IMMUNIZATION (1 of 2) 12/05/2012     EYE EXAM  01/17/2023       Alyssa was focused on addressing current goals    Care Plan:   Care Plan: In Home Support and Resources Completed 5/1/2023    Problem: Lack of caregiver support  Resolved 5/1/2023    Goal: In Home Support and Resources  Completed 5/1/2023    Start Date: 11/10/2022 Expected End Date: 11/10/2023    This Visit's Progress: 100% Recent Progress: 30%    Note:     Barriers: Lack of caregiver support  Strengths: Patient's wife Alyssa is engaged and motivated   Patient expressed understanding of goal: Yes  Action steps to achieve this goal:  1. I understand that RN CC will mail and send through [employer] in home support resources, caregiver support resources and [place] information for [facility].  2. I will review resources with my children and utilize as we see fit.   3. I will call [employer] directly to learn more about their volunteer respite program.  4. I will continue obtaining PCA assistance for 10 hours a week (Monday's and Friday's)  5. I will call my Senior [facility] ([facility]) directly at [phone] to request more in home services if needed.  4.  I will continue to work with care coordination for any additional resources and support.   (Complete)                          Intervention and Education during outreach:     CHW was able to connect with Alyssa ([facility]) and address their above goals.    Alyssa informed that the Patient has declined the Physical Therapy referral at this time. Writer acknowledged the information and affirmed that deciding to engage with PT is their decision to make. Alyssa also expressed that the Patient has done PT in the past so he is familiar with what it entails; Writer voiced understanding.     Alyssa states that they do feel supported at home. PCA assistance is still supporting them  on Monday's and Friday's which allows for Alyssa to accomplish tasks outside of the home when needed. Currently, Alyssa is not needing additional resources for support. Alyssa knows she can contact the Writer at anytime. Discussed transitioning to maintenance; Alyssa is agreeable.       CHW Plan: Writer will reach out to Alyssa in 2 months to provide a follow-up      Mary SCHREIBER Public Health  Community Health Worker  [facility]  Clinic Care Coordination  [phone]

## 2023-05-01 NOTE — TELEPHONE ENCOUNTER
Approved to transition to Maintenance. RN CC will update patient's status during next scheduled chart review.     Magy Liz RN Care Coordinator  [facility]  Email: [email or web address]  Phone: [phone]

## 2023-05-08 DIAGNOSIS — E11.29 TYPE 2 DIABETES MELLITUS WITH MICROALBUMINURIA, WITHOUT LONG-TERM CURRENT USE OF INSULIN (H): ICD-10-CM

## 2023-05-08 DIAGNOSIS — R80.9 TYPE 2 DIABETES MELLITUS WITH MICROALBUMINURIA, WITHOUT LONG-TERM CURRENT USE OF INSULIN (H): ICD-10-CM

## 2023-06-02 ENCOUNTER — PATIENT OUTREACH (OUTPATIENT)
Dept: CARE COORDINATION | Facility: CLINIC | Age: 88
End: 2023-06-02
Payer: MEDICARE

## 2023-06-02 NOTE — TELEPHONE ENCOUNTER
Approved for monthly outreaches. Thank you!    Magy Liz RN Care Coordinator  [facility]  Email: [email or web address]  Phone: [phone]

## 2023-06-02 NOTE — PROGRESS NOTES
Clinic Care Coordination Contact    Community Health Worker Follow Up    Care Gaps:     Health Maintenance Due   Topic Date Due     ZOSTER IMMUNIZATION (1 of 2) 12/05/2012     EYE EXAM  01/17/2023       Alyssa was focused on addresing their current goals.    Care Plan:   Care Plan: in-home assistance     Problem: in-home assistance     Goal: I will call my ILF directly to access jail/memory care services.     Start Date: 6/2/2023 Expected End Date: 8/2/2023    This Visit's Progress: 10%    Priority: Medium    Note:     Barriers: none  Strengths: Partner, Alyssa, is supportive.  Patient expressed understanding of goal: yes  Action steps to achieve this goal:  1. I will call my Senior [facility] ([facility]) directly at [phone] to request more in home services if needed (jail/Memory care).  2. I will follow the steps needed to access the services (nurse evaluation).  3. I will continue working with Care Coordination.                          Intervention and Education during outreach:     CHW was able to connect with the Patient's spouse, Alyssa, and address their above goals. Alyssa shares that she believes the Patient is progressing in their dementia. Per Alyssa, the Patient is no longer wanting to follow neurology.     Reviewed that they are currently living in an ILF and are able to access jail and memory care services if needed. Alyssa is more concerned about knowing when the Patient needs a higher level of care. Discussed that it is likely the facility will conduct a nurse evaluation to see if the Patient is appropriate for higher levels of care; Alyssa voiced understanding.     Reviewed that she can contact the Writer at anytime; Alyssa voiced understanding.    CHW Plan: Writer will reach out to Alyssa in 1 month to monitor the progression of their goals.         Mary SCHREIBER Public Health  Community Health Worker  [place]  Clinic Care Coordination  [phone]

## 2023-06-08 ENCOUNTER — PATIENT OUTREACH (OUTPATIENT)
Dept: CARE COORDINATION | Facility: CLINIC | Age: 88
End: 2023-06-08
Payer: MEDICARE

## 2023-06-08 NOTE — PROGRESS NOTES
Clinic Care Coordination Contact  Care Coordination Clinician Chart Review    Situation: Patient chart reviewed by Care Coordinator.       Background: Care Coordination Program started: 10/22/2022. Initial assessment completed and patient-centered care plan(s) were developed with participation from patient. Lead CC handed patient off to CHW for continued outreaches.       Assessment: Per chart review, patient outreach completed by CC CHW on 6/2/23.  Patient is actively working to accomplish goal(s). Patient's goal(s) appropriate and relevant at this time. Patient is due for updated Plan of Care.  Assessments will be completed annually or as needed/with change of patient status.      Care Plan: in-home assistance     Problem: in-home assistance     Goal: I will call my ILF directly to access [facility]/memory care services.     Start Date: 6/2/2023 Expected End Date: 8/2/2023    This Visit's Progress: 10%    Priority: Medium    Note:     Barriers: none  Strengths: Partner, Alyssa, is supportive.  Patient expressed understanding of goal: yes  Action steps to achieve this goal:  1. I will call my [facility] ([facility]) directly at [phone] to request more in home services if needed (KATE/Memory care).  2. I will follow the steps needed to access the services (nurse evaluation).  3. I will continue working with Care Coordination.                             Plan/Recommendations: The patient will continue working with Care Coordination to achieve goal(s) as above. CHW will continue outreaches at minimum every 30 days and will involve Lead CC as needed or if patient is ready to move to Maintenance. Lead CC will continue to monitor CHW outreaches and patient's progress to goal(s) every 6 weeks.     Plan of Care updated and sent to patient: Yes, via Tatyana Grove RN Care Coordinator  [facility]  (Covering for Lead RN Care Coordinator)

## 2023-06-08 NOTE — LETTER
[facility]  Patient Centered Plan of Care  About Me:        Patient Name:  Reid Boyd    YOB: 1934  Age:         89 year old   [place] MRN:    [id number] Telephone Information:  Home Phone [phone]   Mobile [phone]       Address:  [address] [place] [address] Email address:  [email or web address]      Emergency Contact(s)    Name Relationship Lgl Grd Work Phone Home Phone Mobile Phone   1. EVA BOYD Spouse No   [phone]   2. JODI GARSIA Daughter No  [phone]    3. JOSEPH ALAMO Daughter No   [phone]           Primary language:  English     needed? No   [place] [facility]:  [phone] op. 1  Other communication barriers:Cognitive impairment    Preferred Method of Communication:  Mary  Current living arrangement: I live in a private home with spouse    Mobility Status/ Medical Equipment: Independent w/Device        Health Maintenance  Health Maintenance Reviewed:       My Access Plan  Medical Emergency 911   Primary Clinic Line [facility] - [phone]   24 Hour Appointment Line [phone] or  [phone]) (toll-free)   24 Hour Nurse Line [phone] (toll-free)   Preferred Urgent Care No data recorded   Preferred Hospital [facility]  [phone]       Preferred Pharmacy [facility] [address] [place] [address] [id number] [place] [address]     Behavioral Health Crisis Line The National Suicide Prevention Lifeline at 1-959.156.6891 or Text/Call 988             My Care Team Members  Patient Care Team         Relationship Specialty Notifications Start End    Viet Bingham MD PCP - General Internal Medicine  2/11/14     Phone: [phone] Pager: [phone] Fax: [phone]        [address] [facility] [address]    Viet Bingham MD Assigned PCP   1/16/14     Phone: [phone] Pager: [phone] Fax: [phone]        [address] [facility] [address]    Des Marks MD MD Vascular and Interventional Radiology  7/30/20     Phone: [phone] Pager: [phone] Fax: [phone]        [address] [facility] [address] [facility] [address]    Mary Faulkner RN Specialty Care Coordinator Radiology  7/30/20     Phone: [phone] Pager: [phone]        Eulalio Higginbotham MD MD Cardiovascular Disease  1/19/22     Pager: [phone]         Jayesh Barker MD Assigned Neuroscience Provider   3/27/22     Phone: [phone] [address]  [facility] [address]    Lizz Love APRN CNP Assigned Heart and Vascular Provider   10/15/22     Phone: [phone] Fax: [phone] [address] [place] [address]    [facility]    10/24/22     Phone: [phone] Fax: [phone]         [address] [facility] [address]    Mandie Liz, RN Lead Care Coordinator  Admissions 11/10/22     Mary Son MA Community Health Worker   11/10/22     Raul Iqbal PA-C Assigned Musculoskeletal Provider   1/14/23     Phone: [phone] Pager: [phone] Fax: [phone] [address] [place] [address]              My Care Plans  Self Management and Treatment Plan  Care Plan  Care Plan: in-home assistance       Problem: in-home assistance       Goal: I will call my ILF directly to access penitentiary/memory care services.       Start Date: 6/2/2023 Expected End Date: 8/2/2023    This Visit's Progress: 10%    Priority: Medium    Note:     Barriers: none  Strengths: Partner, Alyssa, is supportive.  Patient expressed understanding of goal: yes  Action steps to achieve this goal:  1. I will call my [facility] ([facility]) directly at [phone] to request more in home services if needed (KATE/Memory care).  2. I will follow the steps needed to access the services (nurse evaluation).  3. I will continue working with Care Coordination.                                  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     Type 2 diabetes mellitus with microalbuminuria, without long-term current use of insulin (H)     Pseudoaneurysm of femoral artery (H)     Coronary artery disease     Aortic valve stenosis, nonrheumatic     Need for SBE (subacute bacterial endocarditis) prophylaxis     LBBB (left bundle branch block)     Syncope     ST elevation myocardial infarction (STEMI), unspecified artery (H)     Complete heart block (H)     Cardiac pacemaker in situ     Facet arthropathy, lumbosacral     Scoliosis of lumbar spine, unspecified scoliosis type     DDD (degenerative disc disease), lumbosacral     Lumbosacral spinal stenosis     Alcohol dependence in remission (H)     Chronic kidney disease, stage 3a (H)      Current Medications and Allergies:    Current Outpatient Medications   Medication Instructions     acetaminophen (TYLENOL) 325 MG tablet TAKE 2 TABLETS BY MOUTH THREE TIMES DAILY . DO NOT EXCEED 3000 MG OF ACETAMINOPHEN PER 24 HOURS     aspirin (ASA) 81 mg, Oral, DAILY     blood glucose (MIRA CONTOUR NEXT) test strip Use to test blood sugar one time daily or as directed.     blood glucose monitoring (MIRA MICROLET) lancets Use to test blood sugar 1 times daily or as directed.     Continuous Blood Gluc  (FREESTYLE SANGEETA 2 READER) IVONNE USE TO READ BLOOD SUGARS PER  S INSTRUCTIONS     Continuous Blood Gluc Sensor (FREESTYLE SANGEETA 2 SENSOR) MISC USE 1 SENSOR EVERY 14 DAYS. USE TO READ BLOOD  SUGARS PER 'S INSTRUCTIONS     donepezil (ARICEPT) 5 MG tablet TAKE 1 TABLET BY MOUTH AT BEDTIME     glucosamine-chondroitin 500-400 MG CAPS per capsule 1 capsule, Oral, Takes off and on     Lidocaine (LIDOCARE) 4 % Patch 1 patch, Transdermal, EVERY 24 HOURS, PLACE ON THE AFFECTED AREA FOR 12 HOURS EACH DAY     metFORMIN (GLUCOPHAGE XR) 500 mg, Oral, 2 TIMES DAILY WITH MEALS     Multiple Vitamins-Iron (MULTIVITAMIN/IRON PO) 1 tablet, Oral, Takes off and on     QUEtiapine (SEROQUEL) 12.5-25 mg, Oral, 2 TIMES DAILY PRN     senna-docusate (SENOKOT-S/PERICOLACE) 8.6-50 MG tablet 1 tablet, Oral, 2 TIMES DAILY PRN     sertraline (ZOLOFT) 25 MG tablet Take 1 tablet by mouth once daily     simvastatin (ZOCOR) 20 mg, Oral, AT BEDTIME     traZODone (DESYREL) 50 MG tablet TAKE 1 TABLET BY MOUTH NIGHTLY AS NEEDED FOR SLEEP     vitamin C (ASCORBIC ACID) 500 mg, Oral, Takes off and on         Care Coordination Start Date: 10/22/2022   Frequency of Care Coordination: monthly       Form Last Updated: 06/08/2023

## 2023-06-19 ENCOUNTER — PATIENT OUTREACH (OUTPATIENT)
Dept: CARE COORDINATION | Facility: CLINIC | Age: 88
End: 2023-06-19
Payer: MEDICARE

## 2023-06-29 ENCOUNTER — PATIENT OUTREACH (OUTPATIENT)
Dept: CARE COORDINATION | Facility: CLINIC | Age: 88
End: 2023-06-29
Payer: MEDICARE

## 2023-06-29 NOTE — PROGRESS NOTES
Clinic Care Coordination Contact    Community Health Worker Follow Up    Care Gaps:     Health Maintenance Due   Topic Date Due     ZOSTER IMMUNIZATION (2 of 3) 12/05/2012     EYE EXAM  01/17/2023     A1C  07/10/2023     MEDICARE ANNUAL WELLNESS VISIT  07/19/2023     MICROALBUMIN  07/19/2023     DIABETIC FOOT EXAM  07/19/2023       Patient's wife, Alyssa, was focused on addressing their current goals    Care Plan:   Care Plan: in-home assistance     Problem: in-home assistance     Goal: I will call my ILF directly to access KATE/memory care services.     Start Date: 6/2/2023 Expected End Date: 8/2/2023    This Visit's Progress: 10%    Priority: Medium    Note:     Barriers: none  Strengths: Partner, Alyssa, is supportive.  Patient expressed understanding of goal: yes  Action steps to achieve this goal:  1. I will call my Senior [facility] ([facility]) directly at [phone] to request more in home services if needed (KATE/Memory care).  2. I will follow the steps needed to access the services (nurse evaluation).  3. I will continue working with Care Coordination.                          Intervention and Education during outreach:     CHW was able to connect with Alyssa ([facility]), and address their above goals. Per Alyssa, she has tried to reach out to [facility] to have a nurse come and evaluate the Patient for higher levels of care, however she has not heard back. Writer offered to call on her behalf; Alyssa is agreeable.     Writer placed a call to [facility] on 6/29/2023. From the , Writer was transferred to the Nursing Station. Writer was informed that they would relay the message to the Director of Nursing so that the Patient can have an evaluation done and the process of obtaining a higher level of care can be started.    Alyssa knows that she can reach the Writer at anytime.     CHW Plan: Writer will reach out to the Patient in 1 month to monitor the progression of their goals.       Mary SCHREIBER Public  Health  Community Health Worker  [facility]  Clinic Care Coordination  [phone]

## 2023-07-03 ENCOUNTER — PATIENT OUTREACH (OUTPATIENT)
Dept: CARE COORDINATION | Facility: CLINIC | Age: 88
End: 2023-07-03
Payer: MEDICARE

## 2023-07-05 ENCOUNTER — LAB (OUTPATIENT)
Dept: LAB | Facility: CLINIC | Age: 88
End: 2023-07-05
Payer: MEDICARE

## 2023-07-05 DIAGNOSIS — E11.29 TYPE 2 DIABETES MELLITUS WITH MICROALBUMINURIA, WITHOUT LONG-TERM CURRENT USE OF INSULIN (H): ICD-10-CM

## 2023-07-05 DIAGNOSIS — R80.9 TYPE 2 DIABETES MELLITUS WITH MICROALBUMINURIA, WITHOUT LONG-TERM CURRENT USE OF INSULIN (H): ICD-10-CM

## 2023-07-05 DIAGNOSIS — N18.31 CHRONIC KIDNEY DISEASE, STAGE 3A (H): Primary | ICD-10-CM

## 2023-07-05 LAB
CREAT UR-MCNC: 265 MG/DL
HBA1C MFR BLD: 6.4 % (ref 0–5.6)
MICROALBUMIN UR-MCNC: 36.5 MG/L
MICROALBUMIN/CREAT UR: 13.77 MG/G CR (ref 0–17)

## 2023-07-05 PROCEDURE — 82570 ASSAY OF URINE CREATININE: CPT

## 2023-07-05 PROCEDURE — 36415 COLL VENOUS BLD VENIPUNCTURE: CPT

## 2023-07-05 PROCEDURE — 83036 HEMOGLOBIN GLYCOSYLATED A1C: CPT

## 2023-07-05 PROCEDURE — 82043 UR ALBUMIN QUANTITATIVE: CPT

## 2023-07-13 ENCOUNTER — OFFICE VISIT (OUTPATIENT)
Dept: INTERNAL MEDICINE | Facility: CLINIC | Age: 88
End: 2023-07-13
Payer: MEDICARE

## 2023-07-13 VITALS
SYSTOLIC BLOOD PRESSURE: 113 MMHG | HEIGHT: 65 IN | BODY MASS INDEX: 25.83 KG/M2 | DIASTOLIC BLOOD PRESSURE: 49 MMHG | TEMPERATURE: 98.1 F | RESPIRATION RATE: 16 BRPM | OXYGEN SATURATION: 95 % | WEIGHT: 155 LBS | HEART RATE: 102 BPM

## 2023-07-13 DIAGNOSIS — I10 ESSENTIAL HYPERTENSION WITH GOAL BLOOD PRESSURE LESS THAN 140/90: ICD-10-CM

## 2023-07-13 DIAGNOSIS — E78.5 HYPERLIPIDEMIA WITH TARGET LDL LESS THAN 100: ICD-10-CM

## 2023-07-13 DIAGNOSIS — E11.29 TYPE 2 DIABETES MELLITUS WITH MICROALBUMINURIA, WITHOUT LONG-TERM CURRENT USE OF INSULIN (H): Primary | ICD-10-CM

## 2023-07-13 DIAGNOSIS — R80.9 TYPE 2 DIABETES MELLITUS WITH MICROALBUMINURIA, WITHOUT LONG-TERM CURRENT USE OF INSULIN (H): Primary | ICD-10-CM

## 2023-07-13 DIAGNOSIS — M47.817 FACET ARTHROPATHY, LUMBOSACRAL: ICD-10-CM

## 2023-07-13 PROCEDURE — 99214 OFFICE O/P EST MOD 30 MIN: CPT | Performed by: INTERNAL MEDICINE

## 2023-07-13 NOTE — PATIENT INSTRUCTIONS
"Diabetes control looks good, based on recent lab test.     Blood pressure looks good, and so did last cholesterol a few months ago.     Keep seeing cardiology as they advise.     Dr Bingham will fax needed information to the [facility].     See Dr Bingham in about six months (January 2024), with a \"lab only\" appointment a few days in advance.   "

## 2023-07-13 NOTE — PROGRESS NOTES
"  Assessment & Plan     (E11.29,  R80.9) Type 2 diabetes mellitus with microalbuminuria, without long-term current use of insulin (H)  (primary encounter diagnosis)  Comment: Stable. Continue current meds.   Plan: **Hemoglobin A1c FUTURE 3mo, **TSH with free T4        reflex FUTURE 2mo          (E78.5) Hyperlipidemia with target LDL less than 100  Comment: Stable according to previous labs.   Plan: **Comprehensive metabolic panel FUTURE 2mo,         Lipid panel reflex to direct LDL Fasting          (M47.817) Facet arthropathy, lumbosacral  Comment: stable  Plan:     (I10) Essential hypertension with goal blood pressure less than 140/90  Comment: BP at target. Continue current meds.  Plan:     Patient Instructions   Diabetes control looks good, based on recent lab test.     Blood pressure looks good, and so did last cholesterol a few months ago.     Keep seeing cardiology as they advise.     Dr Bingham will fax needed information to the [facility].     See Dr Bingham in about six months (January 2024), with a \"lab only\" appointment a few days in advance.       BMI:   Estimated body mass index is 25.79 kg/m  as calculated from the following:    Height as of this encounter: 1.651 m (5' 5\").    Weight as of this encounter: 70.3 kg (155 lb).     The information in this document, created by the physician assistant student for me, accurately reflects the services I personally performed and the decisions made by me. I have reviewed and approved this document for accuracy prior to leaving the patient care area.  July 13, 2023     Viet Bingham MD  [facility] JAYLAN Rosenbaum is a 89 year old, presenting for the following health issues:  Follow Up and Diabetes        7/13/2023     1:09 PM   Additional Questions   Roomed by Jeanette MANZO CMA   Accompanied by spouse, Sharon     History of Present Illness       Reason for visit:  CheckHe consumes 0 sweetened beverage(s) daily.He exercises with enough effort " "to increase his heart rate 9 or less minutes per day.  He exercises with enough effort to increase his heart rate 3 or less days per week.   He is taking medications regularly.     Patient present with wife who assists with the history.    A1c improved from 7.4 to 6.4. He remains on metformin 500 mg BID. He is checking glucose levels 3-4x a day. They average about 240. He has a GM but forgot to bring it in.     Wife states they are currently in an independent living facility but are interested in moving into an assisted living one. Wife states that she would like help with patient's bed time routine and overall just helping him accomplish day to day tasks. Notes that patient now has to ambulate with a walker but can forget to use it at times. He has had no recent falls. She is over seeing his medications.     Past medical, family and social histories as well as medications reviewed and updated as needed.      Review of Systems   No dyspnea or cough. No chest discomfort, dizziness or palpitations. No diarrhea, abdominal pain or rectal bleeding.   No acute problems with vision or speech, lateralizing weakness or paresthesias.    ROS: as above or negative for Respiratory, CV, GI, endocrine, neuro systems.    This document serves as a record of the services and decisions personally performed and made by Viet Bingham MD. It was created on his behalf by Stephanie Chow, a physician assistant student. The creation of this document is based on the provider's statements to the student.  July 13, 2023       Objective    /49 (BP Location: Left arm, Patient Position: Sitting, Cuff Size: Adult Large)   Pulse 102   Temp 98.1  F (36.7  C) (Oral)   Resp 16   Ht 1.651 m (5' 5\")   Wt 70.3 kg (155 lb)   SpO2 95%   BMI 25.79 kg/m    Body mass index is 25.79 kg/m .     Physical Exam   GENERAL: healthy, alert and no distress  RESP: lungs clear to auscultation - no rales, rhonchi or wheezes  CV: regular rate and rhythm, " normal S1 S2, no S3 or S4, no murmur, click or rub, no peripheral edema and peripheral pulses strong  MS: no gross musculoskeletal defects noted, no edema  SKIN: no suspicious lesions or rashes  NEURO: Normal strength and tone, mentation intact and speech normal  PSYCH: mentation appears normal, affect normal/bright    Labs reviewed in Epic.

## 2023-07-16 ENCOUNTER — TELEPHONE (OUTPATIENT)
Dept: INTERNAL MEDICINE | Facility: CLINIC | Age: 88
End: 2023-07-16
Payer: MEDICARE

## 2023-07-16 NOTE — TELEPHONE ENCOUNTER
Please fax recent office visit encounter from 7/13/2023 visit, and list of medications to [phone] (Attn: Perla)

## 2023-07-18 ENCOUNTER — DOCUMENTATION ONLY (OUTPATIENT)
Dept: OTHER | Facility: CLINIC | Age: 88
End: 2023-07-18
Payer: MEDICARE

## 2023-07-20 ENCOUNTER — PATIENT OUTREACH (OUTPATIENT)
Dept: CARE COORDINATION | Facility: CLINIC | Age: 88
End: 2023-07-20
Payer: MEDICARE

## 2023-07-20 NOTE — PROGRESS NOTES
Clinic Care Coordination Contact  Care Coordination Clinician Chart Review    Situation: Patient chart reviewed by Care Coordinator.       Background: Care Coordination Program started: 10/22/2022. Initial assessment completed and patient-centered care plan(s) were developed with participation from patient. Lead CC handed patient off to CHW for continued outreaches.       Assessment: Per chart review, patient outreach completed by CC CHW on 06/29/23.  Patient is actively working to accomplish goal(s). Patient's goal(s) appropriate and relevant at this time. Patient is not due for updated Plan of Care.  Assessments will be completed annually or as needed/with change of patient status.      Care Plan: in-home assistance     Problem: in-home assistance     Goal: I will call my ILF directly to access group home/memory care services.     Start Date: 6/2/2023 Expected End Date: 8/2/2023    This Visit's Progress: 10%    Priority: Medium    Note:     Barriers: none  Strengths: Partner, Alyssa, is supportive.  Patient expressed understanding of goal: yes  Action steps to achieve this goal:  1. I will call my [facility] ([facility]) directly at [phone] to request more in home services if needed (group home/Memory care).  2. I will follow the steps needed to access the services (nurse evaluation).  3. I will continue working with Care Coordination.                             Plan/Recommendations: The patient will continue working with Care Coordination to achieve goal(s) as above. CHW will continue outreaches at minimum every 30 days and will involve Lead CC as needed or if patient is ready to move to Maintenance. Lead CC will continue to monitor CHW outreaches and patient's progress to goal(s) every 6 weeks.     Plan of Care updated and sent to patient: BRANT StantonN, RN, PHN   Care Coordinator-[facility]  [email or web address],  [phone]

## 2023-07-24 ENCOUNTER — TELEPHONE (OUTPATIENT)
Dept: CARDIOLOGY | Facility: CLINIC | Age: 88
End: 2023-07-24

## 2023-07-24 NOTE — TELEPHONE ENCOUNTER
[facility] Call Center    Phone Message    May a detailed message be left on voicemail: yes     Reason for Call: Other: please call kael Shah to schedule a TAVR appt w/Lizz Love in [place]     Action Taken: Message routed to:  [facility] cardio    Travel Screening: Not Applicable     Thank you!  [facility]

## 2023-07-25 ENCOUNTER — ANCILLARY PROCEDURE (OUTPATIENT)
Dept: CARDIOLOGY | Facility: CLINIC | Age: 88
End: 2023-07-25
Attending: INTERNAL MEDICINE
Payer: MEDICARE

## 2023-07-25 DIAGNOSIS — I44.2 CHB (COMPLETE HEART BLOCK) (H): ICD-10-CM

## 2023-07-25 DIAGNOSIS — Z95.0 CARDIAC PACEMAKER IN SITU: ICD-10-CM

## 2023-07-25 DIAGNOSIS — Z95.0 CARDIAC PACEMAKER IN SITU: Primary | ICD-10-CM

## 2023-07-25 PROCEDURE — 93296 REM INTERROG EVL PM/IDS: CPT | Performed by: INTERNAL MEDICINE

## 2023-07-25 PROCEDURE — 93294 REM INTERROG EVL PM/LDLS PM: CPT | Performed by: INTERNAL MEDICINE

## 2023-07-31 ENCOUNTER — PATIENT OUTREACH (OUTPATIENT)
Dept: CARE COORDINATION | Facility: CLINIC | Age: 88
End: 2023-07-31
Payer: MEDICARE

## 2023-07-31 ENCOUNTER — TELEPHONE (OUTPATIENT)
Dept: INTERNAL MEDICINE | Facility: CLINIC | Age: 88
End: 2023-07-31
Payer: MEDICARE

## 2023-07-31 NOTE — PROGRESS NOTES
Clinic Care Coordination Contact  Community Health Worker Follow Up    Care Gaps:     Health Maintenance Due   Topic Date Due    ZOSTER IMMUNIZATION (2 of 3) 12/05/2012    EYE EXAM  01/17/2023    DIABETIC FOOT EXAM  07/19/2023    MEDICARE ANNUAL WELLNESS VISIT  07/19/2023       Alyssa was focused on addressing their current goals      Care Plan:   Care Plan: in-home assistance       Problem: in-home assistance       Goal: I will call my ILF directly to access [facility]/memory care services.       Start Date: 6/2/2023 Expected End Date: 8/2/2023    This Visit's Progress: 60% Recent Progress: 10%    Priority: Medium    Note:     Barriers: none  Strengths: Partner, Alyssa, is supportive.  Patient expressed understanding of goal: yes  Action steps to achieve this goal:  1. I will call my [facility]) directly at [phone] to request more in home services if needed ([facility]/Memory care).  2. I will follow the steps needed to access the services (nurse evaluation).  3. I will continue working with Care Coordination.                                Intervention and Education during outreach:     CHW was able to connect with Alyssa to check in and ensure that [facility] followed-up with her regarding the nurse evaluation for the Patient. Per Alyssa, the Patient was evaluated and they recommended either they move to an [facility] or obtain more in home support. Right now, they are getting assistance from a PCA Monday's and Friday's. Per Alyssa, their family would prefer they have more in-home support than receive [facility] support. Currently, their family is working to find the Patient a PCA to come on Wednesday's as based on the nurse evaluation it is recommended that they have PCA assistance three days a week.    Alyssa has no additional questions or concerns at this time and knows that she can call the  or [facility] directly for more support.      CHW Plan: Writer will reach out to Alyssa ([facility]) in 1 month to monitor the progression  of their goals.         Mary WARD. Public Health  Community Health Worker  [facility]  Clinic Care Coordination  [phone]

## 2023-08-03 LAB
MDC_IDC_EPISODE_DTM: NORMAL
MDC_IDC_EPISODE_ID: NORMAL
MDC_IDC_EPISODE_TYPE: NORMAL
MDC_IDC_LEAD_IMPLANT_DT: NORMAL
MDC_IDC_LEAD_IMPLANT_DT: NORMAL
MDC_IDC_LEAD_LOCATION: NORMAL
MDC_IDC_LEAD_LOCATION: NORMAL
MDC_IDC_LEAD_LOCATION_DETAIL_1: NORMAL
MDC_IDC_LEAD_LOCATION_DETAIL_1: NORMAL
MDC_IDC_LEAD_MFG: NORMAL
MDC_IDC_LEAD_MFG: NORMAL
MDC_IDC_LEAD_MODEL: NORMAL
MDC_IDC_LEAD_MODEL: NORMAL
MDC_IDC_LEAD_POLARITY_TYPE: NORMAL
MDC_IDC_LEAD_POLARITY_TYPE: NORMAL
MDC_IDC_LEAD_SERIAL: NORMAL
MDC_IDC_LEAD_SERIAL: NORMAL
MDC_IDC_MSMT_BATTERY_DTM: NORMAL
MDC_IDC_MSMT_BATTERY_REMAINING_LONGEVITY: 84 MO
MDC_IDC_MSMT_BATTERY_REMAINING_PERCENTAGE: 100 %
MDC_IDC_MSMT_BATTERY_STATUS: NORMAL
MDC_IDC_MSMT_LEADCHNL_RA_IMPEDANCE_VALUE: 719 OHM
MDC_IDC_MSMT_LEADCHNL_RA_PACING_THRESHOLD_AMPLITUDE: 0.5 V
MDC_IDC_MSMT_LEADCHNL_RA_PACING_THRESHOLD_PULSEWIDTH: 0.4 MS
MDC_IDC_MSMT_LEADCHNL_RV_IMPEDANCE_VALUE: 602 OHM
MDC_IDC_MSMT_LEADCHNL_RV_PACING_THRESHOLD_AMPLITUDE: 0.9 V
MDC_IDC_MSMT_LEADCHNL_RV_PACING_THRESHOLD_PULSEWIDTH: 0.4 MS
MDC_IDC_PG_IMPLANT_DTM: NORMAL
MDC_IDC_PG_MFG: NORMAL
MDC_IDC_PG_MODEL: NORMAL
MDC_IDC_PG_SERIAL: NORMAL
MDC_IDC_PG_TYPE: NORMAL
MDC_IDC_SESS_CLINIC_NAME: NORMAL
MDC_IDC_SESS_DTM: NORMAL
MDC_IDC_SESS_TYPE: NORMAL
MDC_IDC_SET_BRADY_AT_MODE_SWITCH_MODE: NORMAL
MDC_IDC_SET_BRADY_AT_MODE_SWITCH_RATE: 170 {BEATS}/MIN
MDC_IDC_SET_BRADY_LOWRATE: 60 {BEATS}/MIN
MDC_IDC_SET_BRADY_MAX_SENSOR_RATE: 130 {BEATS}/MIN
MDC_IDC_SET_BRADY_MAX_TRACKING_RATE: 130 {BEATS}/MIN
MDC_IDC_SET_BRADY_MODE: NORMAL
MDC_IDC_SET_BRADY_PAV_DELAY_HIGH: 150 MS
MDC_IDC_SET_BRADY_PAV_DELAY_LOW: 200 MS
MDC_IDC_SET_BRADY_SAV_DELAY_HIGH: 150 MS
MDC_IDC_SET_BRADY_SAV_DELAY_LOW: 200 MS
MDC_IDC_SET_LEADCHNL_RA_PACING_AMPLITUDE: 2 V
MDC_IDC_SET_LEADCHNL_RA_PACING_CAPTURE_MODE: NORMAL
MDC_IDC_SET_LEADCHNL_RA_PACING_POLARITY: NORMAL
MDC_IDC_SET_LEADCHNL_RA_PACING_PULSEWIDTH: 0.4 MS
MDC_IDC_SET_LEADCHNL_RA_SENSING_ADAPTATION_MODE: NORMAL
MDC_IDC_SET_LEADCHNL_RA_SENSING_POLARITY: NORMAL
MDC_IDC_SET_LEADCHNL_RA_SENSING_SENSITIVITY: 0.25 MV
MDC_IDC_SET_LEADCHNL_RV_PACING_AMPLITUDE: 1.4 V
MDC_IDC_SET_LEADCHNL_RV_PACING_CAPTURE_MODE: NORMAL
MDC_IDC_SET_LEADCHNL_RV_PACING_POLARITY: NORMAL
MDC_IDC_SET_LEADCHNL_RV_PACING_PULSEWIDTH: 0.4 MS
MDC_IDC_SET_LEADCHNL_RV_SENSING_ADAPTATION_MODE: NORMAL
MDC_IDC_SET_LEADCHNL_RV_SENSING_POLARITY: NORMAL
MDC_IDC_SET_LEADCHNL_RV_SENSING_SENSITIVITY: 1.5 MV
MDC_IDC_SET_ZONE_DETECTION_INTERVAL: 375 MS
MDC_IDC_SET_ZONE_TYPE: NORMAL
MDC_IDC_SET_ZONE_VENDOR_TYPE: NORMAL
MDC_IDC_STAT_AT_BURDEN_PERCENT: 1 %
MDC_IDC_STAT_AT_DTM_END: NORMAL
MDC_IDC_STAT_AT_DTM_START: NORMAL
MDC_IDC_STAT_BRADY_DTM_END: NORMAL
MDC_IDC_STAT_BRADY_DTM_START: NORMAL
MDC_IDC_STAT_BRADY_RA_PERCENT_PACED: 24 %
MDC_IDC_STAT_BRADY_RV_PERCENT_PACED: 16 %
MDC_IDC_STAT_EPISODE_RECENT_COUNT: 0
MDC_IDC_STAT_EPISODE_RECENT_COUNT: 1
MDC_IDC_STAT_EPISODE_RECENT_COUNT_DTM_END: NORMAL
MDC_IDC_STAT_EPISODE_RECENT_COUNT_DTM_START: NORMAL
MDC_IDC_STAT_EPISODE_TYPE: NORMAL
MDC_IDC_STAT_EPISODE_VENDOR_TYPE: NORMAL

## 2023-08-06 DIAGNOSIS — E78.5 HYPERLIPIDEMIA WITH TARGET LDL LESS THAN 100: ICD-10-CM

## 2023-08-07 ENCOUNTER — DOCUMENTATION ONLY (OUTPATIENT)
Dept: OTHER | Facility: CLINIC | Age: 88
End: 2023-08-07
Payer: MEDICARE

## 2023-08-07 RX ORDER — SIMVASTATIN 20 MG
20 TABLET ORAL AT BEDTIME
Qty: 90 TABLET | Refills: 1 | Status: SHIPPED | OUTPATIENT
Start: 2023-08-07 | End: 2023-11-20

## 2023-08-19 DIAGNOSIS — F03.B18 MODERATE DEMENTIA WITH OTHER BEHAVIORAL DISTURBANCE, UNSPECIFIED DEMENTIA TYPE (H): ICD-10-CM

## 2023-08-19 DIAGNOSIS — F41.9 ANXIETY: ICD-10-CM

## 2023-08-21 RX ORDER — SERTRALINE HYDROCHLORIDE 25 MG/1
TABLET, FILM COATED ORAL
Qty: 90 TABLET | Refills: 0 | Status: SHIPPED | OUTPATIENT
Start: 2023-08-21 | End: 2023-11-20

## 2023-08-21 RX ORDER — DONEPEZIL HYDROCHLORIDE 5 MG/1
TABLET, FILM COATED ORAL
Qty: 90 TABLET | Refills: 1 | Status: SHIPPED | OUTPATIENT
Start: 2023-08-21 | End: 2024-01-09

## 2023-08-26 ENCOUNTER — HEALTH MAINTENANCE LETTER (OUTPATIENT)
Age: 88
End: 2023-08-26

## 2023-08-31 ENCOUNTER — PATIENT OUTREACH (OUTPATIENT)
Dept: CARE COORDINATION | Facility: CLINIC | Age: 88
End: 2023-08-31
Payer: MEDICARE

## 2023-08-31 NOTE — PROGRESS NOTES
Clinic Care Coordination Contact  Care Coordination Clinician Chart Review    Situation: Patient chart reviewed by Care Coordinator.       Background: Care Coordination Program started: 10/22/2022. Initial assessment completed and patient-centered care plan(s) were developed with participation from patient. Lead CC handed patient off to CHW for continued outreaches.       Assessment: Per chart review, patient outreach completed by CC CHW on 7/31/23.  Patient is actively working to accomplish goal(s). Patient's goal(s) appropriate and relevant at this time. Patient is due for updated Plan of Care.  Assessments will be completed annually or as needed/with change of patient status.      Care Plan: in-home assistance       Problem: in-home assistance       Goal: I will call my ILF directly to access skilled nursing/memory care services.       Start Date: 6/2/2023 Expected End Date: 8/2/2023    This Visit's Progress: 70% Recent Progress: 60%    Priority: Medium    Note:     Barriers: none  Strengths: Partner, Alyssa, is supportive.  Patient expressed understanding of goal: yes  Action steps to achieve this goal:  1. I will call my [facility] ([facility]) directly at [phone] to request more in home services if needed (KATE/Memory care).  2. I will follow the steps needed to access the services (nurse evaluation).  3. I will continue working with Care Coordination.                                   Plan/Recommendations: The patient will continue working with Care Coordination to achieve goal(s) as above. CHW will continue outreaches at minimum every 30 days and will involve Lead CC as needed or if patient is ready to move to Maintenance. Lead CC will continue to monitor CHW outreaches and patient's progress to goal(s) every 6 weeks.     Plan of Care updated and sent to patient: Yes, via [employer].     Elizabeth Camacho, RN, BSN, CPHN, CM  [facility] Ambulatory Care Management  [facility], [facility]  [place]  Phone: [phone]  Email: [email or web address]

## 2023-08-31 NOTE — LETTER
[facility]  Patient Centered Plan of Care  About Me:        Patient Name:  Reid Boyd    YOB: 1934  Age:         89 year old   [place] MRN:    [id number] Telephone Information:  Home Phone [phone]   Mobile [phone]       Address:  [address] [place] [address] Email address:  [email or web address]      Emergency Contact(s)    Name Relationship Lgl Grd Work Phone Home Phone Mobile Phone   1. EVA BOYD Spouse No   [phone]   2. JODI GARSIA Daughter No  [phone]    3. JOSEPH ALAMO Daughter No   [phone]           Primary language:  English     needed? No   [place] [facility]:  [phone] op. 1  Other communication barriers:Cognitive impairment    Preferred Method of Communication:  Mary  Current living arrangement: I live in a private home with spouse    Mobility Status/ Medical Equipment: Independent w/Device    Health Maintenance  Health Maintenance Reviewed: Due/Overdue     My Access Plan  Medical Emergency 911   Primary Clinic Line [facility] - [phone]   24 Hour Appointment Line [phone] or  [phone]) (toll-free)   24 Hour Nurse Line [phone] (toll-free)   Preferred Urgent Care No data recorded   Preferred Hospital [facility]  [phone]     Preferred Pharmacy [facility] [address] [place] [address] [id number] [place] [address]     Behavioral Health Crisis Line The National Suicide Prevention Lifeline at 1-943.687.8178 or Text/Call 988     My Care Team Members  Patient Care Team         Relationship Specialty Notifications Start End    Viet Bingham MD PCP - General Internal Medicine  2/11/14     Phone: [phone] Pager: [phone] Fax: [phone]        [address] [facility] 160 [facility] [address]    Viet Bingham MD Assigned PCP   1/16/14     Phone: [phone] Pager: [phone] Fax: [phone]        [address] [facility] [address]    Des Marks MD MD Vascular and Interventional Radiology  7/30/20     Phone: [phone] Pager: [phone] Fax: [phone]        [address] [facility] [address] [facility] [address]    Mary Faulkner RN Specialty Care Coordinator Radiology  7/30/20     Phone: [phone] Pager: [phone]        Eulalio Higginbotham MD MD Cardiovascular Disease  1/19/22     Pager: [phone]         Jayesh Barker MD Assigned Neuroscience Provider   3/27/22     Phone: [phone] [address]  [facility] [address]    Lizz Love APRN CNP Assigned Heart and Vascular Provider   10/15/22     Phone: [phone] Fax: [phone] [address] [place] [address]    [facility]    10/24/22     Phone: [phone] Fax: [phone]         [address] [facility] [address]    Mary Son MA Community Health Worker   11/10/22     Raul Iqbal PA-C Assigned Musculoskeletal Provider   1/14/23     Phone: [phone] Pager: [phone] Fax: [phone] [address] [place] [address]    ANAIS Camacho, RN Lead Care Coordinator Primary Care - CC Admissions 6/9/23     Phone: [phone]                   My Care Plans  Self Management and Treatment Plan  Care Plan  Care Plan: in-home assistance       Problem: in-home assistance       Goal: I will call my ILF directly to access MCC/memory care services.       Start Date: 6/2/2023 Expected End Date: 8/2/2023    This Visit's Progress: 70% Recent Progress: 60%    Priority: Medium    Note:     Barriers: none  Strengths: Partner, Alyssa, is supportive.  Patient expressed understanding of goal: yes  Action steps to achieve this goal:  1. I will call my Senior Housing ([facility]) directly at [phone] to request more in home services if needed (M[facility]/Memory care).  2. I will follow the steps needed to access the services (nurse evaluation).  3.  I will continue working with Care Coordination.                                 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    Type 2 diabetes mellitus with microalbuminuria, without long-term current use of insulin (H)    Pseudoaneurysm of femoral artery (H)    Coronary artery disease    Aortic valve stenosis, nonrheumatic    Need for SBE (subacute bacterial endocarditis) prophylaxis    LBBB (left bundle branch block)    Syncope    ST elevation myocardial infarction (STEMI), unspecified artery (H)    Complete heart block (H)    Cardiac pacemaker in situ    Facet arthropathy, lumbosacral    Scoliosis of lumbar spine, unspecified scoliosis type    DDD (degenerative disc disease), lumbosacral    Lumbosacral spinal stenosis    Alcohol dependence in remission (H)    Chronic kidney disease, stage 3a (H)      Current Medications and Allergies:    Current Outpatient Medications   Medication    acetaminophen (TYLENOL) 325 MG tablet    ascorbic acid 500 MG TABS    aspirin 81 MG EC tablet    blood glucose (MIRA CONTOUR NEXT) test strip    blood glucose monitoring ([employer] MICROLET) lancets    Continuous Blood Gluc  (FREESTYLE SANGEETA 2 READER) IVONNE    Continuous Blood Gluc Sensor (FREESTYLE SANGEETA 2 SENSOR) MISC    donepezil (ARICEPT) 5 MG tablet    glucosamine-chondroitin 500-400 MG CAPS per capsule    Lidocaine (LIDOCARE) 4 % Patch    metFORMIN (GLUCOPHAGE XR) 500 MG 24 hr tablet    Multiple Vitamins-Iron (MULTIVITAMIN/IRON PO)    QUEtiapine (SEROQUEL) 25 MG tablet    senna-docusate  (SENOKOT-S/PERICOLACE) 8.6-50 MG tablet    sertraline (ZOLOFT) 25 MG tablet    simvastatin (ZOCOR) 20 MG tablet    traZODone (DESYREL) 50 MG tablet     Current Facility-Administered Medications   Medication    iohexol (OMNIPAQUE) 300 mg/mL injection 10 mL        Allergies   Allergen Reactions    Fentanyl Other (See Comments)     Confusion and agitation after PPM    Versed [Midazolam] Other (See Comments)     Confusion and agitation post PPM     Care Coordination Start Date: 10/22/2022   Frequency of Care Coordination: monthly     Form Last Updated: 08/31/2023

## 2023-09-05 ENCOUNTER — PATIENT OUTREACH (OUTPATIENT)
Dept: CARE COORDINATION | Facility: CLINIC | Age: 88
End: 2023-09-05
Payer: MEDICARE

## 2023-09-05 DIAGNOSIS — F03.B18 MODERATE DEMENTIA WITH OTHER BEHAVIORAL DISTURBANCE, UNSPECIFIED DEMENTIA TYPE (H): ICD-10-CM

## 2023-09-05 NOTE — PROGRESS NOTES
"Clinic Care Coordination Contact  Community Health Worker Follow Up    Care Gaps:     Health Maintenance Due   Topic Date Due    ZOSTER IMMUNIZATION (2 of 3) 12/05/2012    EYE EXAM  01/17/2023    MEDICARE ANNUAL WELLNESS VISIT  07/19/2023    DIABETIC FOOT EXAM  07/19/2023    INFLUENZA VACCINE (1) 09/01/2023    HEMOGLOBIN  11/13/2023       Alyssa is focused on addressing their current goals.     Care Plan:   Care Plan: in-home assistance       Problem: in-home assistance       Goal: I will call my ILF directly to access retirement/memory care services.       Start Date: 6/2/2023 Expected End Date: 8/2/2023    This Visit's Progress: 80% Recent Progress: 70%    Priority: Medium    Note:     Barriers: none  Strengths: Partner, Alyssa, is supportive.  Patient expressed understanding of goal: yes  Action steps to achieve this goal:  1. I will call my Senior Housing ([facility]) directly at [phone] to request more in home services if needed (retirement/Memory care).  2. I will follow the steps needed to access the services (nurse evaluation).  3. I will continue working with Care Coordination.                                Intervention and Education during outreach:     CHW was able to connect with the Patient's spouse, Alyssa (CTC). Alyssa shared that things are going better. States they have gotten into a routine which has helped a lot.     Writer inquired about getting additional PCA services. Alyssa states they have not heard back from \"[facility]\" the agency they are trying to get a PCA through. Writer encouraged them to call [facility] directly for an update; Alyssa is agreeable with plan.    Alyssa has no further questions or concerns at this time and knows that she can place a call to CC prior to next outreach.    CHW Plan: Writer will reach out to the Patient's spouse Alyssa in 1 month to monitor the progression of their goals.        Mary WARD. Public Health  Community Health Worker  [facility]  Clinic Care Coordination  [phone]

## 2023-09-08 RX ORDER — QUETIAPINE FUMARATE 25 MG/1
TABLET, FILM COATED ORAL
Qty: 30 TABLET | Refills: 1 | Status: SHIPPED | OUTPATIENT
Start: 2023-09-08 | End: 2024-01-09

## 2023-09-18 ENCOUNTER — TELEPHONE (OUTPATIENT)
Dept: PALLIATIVE MEDICINE | Facility: CLINIC | Age: 88
End: 2023-09-18
Payer: MEDICARE

## 2023-09-18 NOTE — TELEPHONE ENCOUNTER
Transfer note:to PCP, new referral needed for return    Last seen 04/04/23. No follow up    Indira VALLE, RN Care Coordinator  [facility]  Pain Management

## 2023-09-28 ENCOUNTER — HOSPITAL ENCOUNTER (OUTPATIENT)
Dept: CARDIOLOGY | Facility: CLINIC | Age: 88
Discharge: HOME | End: 2023-09-28
Attending: NURSE PRACTITIONER | Admitting: NURSE PRACTITIONER
Payer: MEDICARE

## 2023-09-28 DIAGNOSIS — I35.0 SEVERE AORTIC STENOSIS: ICD-10-CM

## 2023-09-28 DIAGNOSIS — Z95.2 S/P TAVR (TRANSCATHETER AORTIC VALVE REPLACEMENT): ICD-10-CM

## 2023-09-28 LAB — LVEF ECHO: NORMAL

## 2023-09-28 PROCEDURE — 93306 TTE W/DOPPLER COMPLETE: CPT

## 2023-09-28 PROCEDURE — 93306 TTE W/DOPPLER COMPLETE: CPT | Mod: 26 | Performed by: INTERNAL MEDICINE

## 2023-10-02 ENCOUNTER — OFFICE VISIT (OUTPATIENT)
Dept: CARDIOLOGY | Facility: CLINIC | Age: 88
End: 2023-10-02
Attending: NURSE PRACTITIONER
Payer: MEDICARE

## 2023-10-02 VITALS
WEIGHT: 156 LBS | DIASTOLIC BLOOD PRESSURE: 68 MMHG | HEIGHT: 65 IN | SYSTOLIC BLOOD PRESSURE: 134 MMHG | BODY MASS INDEX: 25.99 KG/M2 | HEART RATE: 65 BPM | OXYGEN SATURATION: 96 %

## 2023-10-02 DIAGNOSIS — I10 ESSENTIAL HYPERTENSION WITH GOAL BLOOD PRESSURE LESS THAN 140/90: ICD-10-CM

## 2023-10-02 DIAGNOSIS — I35.0 SEVERE AORTIC STENOSIS: Primary | ICD-10-CM

## 2023-10-02 DIAGNOSIS — Z95.2 S/P TAVR (TRANSCATHETER AORTIC VALVE REPLACEMENT): ICD-10-CM

## 2023-10-02 DIAGNOSIS — E78.5 HYPERLIPIDEMIA WITH TARGET LDL LESS THAN 100: ICD-10-CM

## 2023-10-02 DIAGNOSIS — Z95.0 CARDIAC PACEMAKER IN SITU: ICD-10-CM

## 2023-10-02 PROCEDURE — 99214 OFFICE O/P EST MOD 30 MIN: CPT | Performed by: NURSE PRACTITIONER

## 2023-10-02 NOTE — LETTER
10/2/2023    Viet Bingham MD, MD  [address] [facility] [address]  [place] [address]    RE: Reid Jimenes       Dear Colleague,     I had the pleasure of seeing Reid Jimenes in the [facility].  CARDIOLOGY CLINIC NOTE    PRIMARY CARDIOLOGIST  Dr. 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       He returns to the office today accompanied by his PCA for an annual follow-up.  He is feeling well on a cardiac standpoint, denies chest pain, shortness of breath, palpitations, PND, orthopnea, presyncope, syncope, or lower extremity edema.  Upon exam, lungs are clear bilaterally, heart rate and rhythm regular, soft systolic murmur, no edema.  He has right facial resolving ecchymosis related to previous dental extraction.       Recent echocardiogram shows a well-seated bioprosthetic aortic valve (26 mm Greene S3 valve) with a mean AOV pressure gradient of 18.5 mmHg and aortic valve area of 1.0 cm .  There is mild LVH.  EF is low normal at 50 to 55%.  Grade 1 diastolic dysfunction.  There is mild MR and TR.    Blood pressure is well controlled at 134/68  Last labs in January showed an unremarkable BMP with the exception of elevated glucose  A1c 6.4  Lipid panel is excellent with a total cholesterol of 105, HDL 54, LDL 33, and triglycerides 92  Weight stable at 156 pounds (baseline)    Last device interrogation showed 24% atrial pacing and 16% ventricular pacing.  Underlying rhythm is sinus bradycardia in the 50s.  No ventricular or atrial arrhythmias noted.    He stays very active walking  daily.  Compliant with all medications    PAST MEDICAL HISTORY:  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    DDD PM 6/10/2021    Hyperlipidaemia LDL goal < 100     Hypertension     Need for SBE (subacute bacterial endocarditis) prophylaxis     Pulmonary hypertension (H)     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    status post TAVR using a 26 mm Greene S3 valve in 2018    Type 2 diabetes mellitus (H)        MEDICATIONS:  Current Outpatient Medications   Medication    acetaminophen (TYLENOL) 325 MG tablet    ascorbic acid 500 MG TABS    aspirin 81 MG EC tablet    blood glucose (MIRA CONTOUR NEXT) test strip    blood glucose monitoring (MIRA MICROLET) lancets    Continuous Blood Gluc  (FREESTYLE SANGEETA 2 READER) IVONNE    Continuous Blood Gluc Sensor (FREESTYLE SANGEETA 2 SENSOR) MISC    donepezil (ARICEPT) 5 MG tablet    glucosamine-chondroitin 500-400 MG CAPS per capsule    Lidocaine (LIDOCARE) 4 % Patch    metFORMIN (GLUCOPHAGE XR) 500 MG 24 hr tablet    Multiple Vitamins-Iron (MULTIVITAMIN/IRON PO)    QUEtiapine (SEROQUEL) 25 MG tablet    senna-docusate (SENOKOT-S/PERICOLACE) 8.6-50 MG tablet    sertraline (ZOLOFT) 25 MG tablet    simvastatin (ZOCOR) 20 MG tablet    traZODone (DESYREL) 50 MG tablet     Current Facility-Administered Medications   Medication    iohexol (OMNIPAQUE) 300 mg/mL injection 10 mL       SOCIAL HISTORY:  I have reviewed this patient's social history and updated it with pertinent information if needed. Reid DIXON Jimenes  reports that he quit smoking about 43 years ago. His smoking use included cigarettes. He started smoking about 67 years ago. He has a 30.00 pack-year smoking history. He quit smokeless tobacco use about 40 years  ago.  His smokeless tobacco use included chew. He reports that he does not drink alcohol and does not use drugs.    PHYSICAL EXAM:  Pulse:  [65] 65  BP: (134)/(68) 134/68  SpO2:  [96 %] 96 %  156 lbs 0 oz    Constitutional: alert, no distress  Respiratory: Good bilateral air entry  Cardiovascular: Regular rate and rhythm, soft systolic murmur  GI: nondistended  Neuropsychiatric: appropriate affact    ASSESSMENT: Pertinent issues addressed/ reviewed during this cardiology visit  Severe aortic stenosis status post TAVR in 2018  Hypertension  Hyperlipidemia  High-grade AV block status post pacemaker implant  Nonobstructive CAD    RECOMMENDATIONS:  Overall, he is doing well on a cardiac standpoint.  Recent echocardiogram shows a well-functioning bioprosthetic aortic valve with a mean gradient of 18 mmHg and low normal EF of 50 to 55%.  He does not endorse any ischemic symptoms or evidence of heart failure.  Continue aspirin.  Follow-up limited echocardiogram in 1 year  Blood pressure is well controlled  LDL at goal, continue low-dose simvastatin  Current with device interrogations, follow up as scheduled.  Follow-up in 1 year with Dr. Higginbotham or sooner if needed.    It was a pleasure seeing this patient in clinic today. Please do not hesitate to contact me with any future questions.     SARY Ball, CNP  Cardiology - [facility]  October 2, 2023    Review of the result(s) of each unique test - Last echocardiogram, BMP, lipid panel.     The level of medical decision making during this visit was of moderate complexity.    This note was completed in part using dictation via the Dragon voice recognition software. Some word and grammatical errors may occur and must be interpreted in the appropriate clinical context.  If there are any questions pertaining to this issue, please contact me for further clarification.      Thank you for allowing me to participate in the care of your patient.      Sincerely,     Lizz  SARY Love CNP     [facility]  cc:   SARY Ball CNP  [address] [place] [address]

## 2023-10-02 NOTE — PATIENT INSTRUCTIONS
Thanks for participating in a office visit with the [facility] Heart clinic today.    Doing well on a cardiac standpoint  Reviewed results of recent echocardiogram - valve is working well.  Blood pressures stable  Encourage exercise and heart healthy diet  Echo in 1 year    Follow up in 1 year    Please call my nurse at  [phone] with any questions or concerns.    Scheduling phone number: [phone]  Reminder: Please bring in all current medications, over the counter supplements and vitamin bottles to your next appointment.

## 2023-10-02 NOTE — PROGRESS NOTES
CARDIOLOGY CLINIC NOTE    PRIMARY CARDIOLOGIST  Dr. 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       He returns to the office today accompanied by his PCA for an annual follow-up.  He is feeling well on a cardiac standpoint, denies chest pain, shortness of breath, palpitations, PND, orthopnea, presyncope, syncope, or lower extremity edema.  Upon exam, lungs are clear bilaterally, heart rate and rhythm regular, soft systolic murmur, no edema.  He has right facial resolving ecchymosis related to previous dental extraction.       Recent echocardiogram shows a well-seated bioprosthetic aortic valve (26 mm Greene S3 valve) with a mean AOV pressure gradient of 18.5 mmHg and aortic valve area of 1.0 cm .  There is mild LVH.  EF is low normal at 50 to 55%.  Grade 1 diastolic dysfunction.  There is mild MR and TR.    Blood pressure is well controlled at 134/68  Last labs in January showed an unremarkable BMP with the exception of elevated glucose  A1c 6.4  Lipid panel is excellent with a total cholesterol of 105, HDL 54, LDL 33, and triglycerides 92  Weight stable at 156 pounds (baseline)    Last device interrogation showed 24% atrial pacing and 16% ventricular pacing.  Underlying rhythm is sinus bradycardia in the 50s.  No ventricular or atrial arrhythmias noted.    He stays very active walking daily.  Compliant with all medications    PAST MEDICAL HISTORY:  Past Medical History:   Diagnosis Date    Aortic valve stenosis, nonrheumatic     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: 26mm Edward Sabino 3 valve    Coronary artery disease     cardiac cath  7/24/18: mild non-obstructive disease    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    DDD PM 6/10/2021    Hyperlipidaemia LDL goal < 100     Hypertension     Need for SBE (subacute bacterial endocarditis) prophylaxis     Pulmonary hypertension (H)     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    status post TAVR using a 26 mm Greene S3 valve in 2018    Type 2 diabetes mellitus (H)        MEDICATIONS:  Current Outpatient Medications   Medication    acetaminophen (TYLENOL) 325 MG tablet    ascorbic acid 500 MG TABS    aspirin 81 MG EC tablet    blood glucose (MIRA CONTOUR NEXT) test strip    blood glucose monitoring ([employer] MICROLET) lancets    Continuous Blood Gluc  (FREESTYLE SANGEETA 2 READER) IVONNE    Continuous Blood Gluc Sensor (FREESTYLE SANGEETA 2 SENSOR) MISC    donepezil (ARICEPT) 5 MG tablet    glucosamine-chondroitin 500-400 MG CAPS per capsule    Lidocaine (LIDOCARE) 4 % Patch    metFORMIN (GLUCOPHAGE XR) 500 MG 24 hr tablet    Multiple Vitamins-Iron (MULTIVITAMIN/IRON PO)    QUEtiapine (SEROQUEL) 25 MG tablet    senna-docusate (SENOKOT-S/PERICOLACE) 8.6-50 MG tablet    sertraline (ZOLOFT) 25 MG tablet    simvastatin (ZOCOR) 20 MG tablet    traZODone (DESYREL) 50 MG tablet     Current Facility-Administered Medications   Medication    iohexol (OMNIPAQUE) 300 mg/mL injection 10 mL       SOCIAL HISTORY:  I have reviewed this patient's social history and updated it with pertinent information if needed. Reid Baronesperanzachemo  reports that he quit smoking about 43 years ago. His smoking use included cigarettes. He started smoking about 67 years ago. He has a 30.00 pack-year smoking history. He quit smokeless tobacco use about 40 years ago.  His smokeless tobacco use included chew. He reports that he does not drink alcohol and does not use drugs.    PHYSICAL EXAM:  Pulse:  [65] 65  BP: (134)/(68) 134/68  SpO2:  [96 %] 96 %  156 lbs 0 oz    Constitutional: alert, no  distress  Respiratory: Good bilateral air entry  Cardiovascular: Regular rate and rhythm, soft systolic murmur  GI: nondistended  Neuropsychiatric: appropriate affact    ASSESSMENT: Pertinent issues addressed/ reviewed during this cardiology visit  Severe aortic stenosis status post TAVR in 2018  Hypertension  Hyperlipidemia  High-grade AV block status post pacemaker implant  Nonobstructive CAD    RECOMMENDATIONS:  Overall, he is doing well on a cardiac standpoint.  Recent echocardiogram shows a well-functioning bioprosthetic aortic valve with a mean gradient of 18 mmHg and low normal EF of 50 to 55%.  He does not endorse any ischemic symptoms or evidence of heart failure.  Continue aspirin.  Follow-up limited echocardiogram in 1 year  Blood pressure is well controlled  LDL at goal, continue low-dose simvastatin  Current with device interrogations, follow up as scheduled.  Follow-up in 1 year with Dr. Higginbotham or sooner if needed.    It was a pleasure seeing this patient in clinic today. Please do not hesitate to contact me with any future questions.     SARY Ball, CNP  Cardiology - [facility]  October 2, 2023    Review of the result(s) of each unique test - Last echocardiogram, BMP, lipid panel.     The level of medical decision making during this visit was of moderate complexity.    This note was completed in part using dictation via the Dragon voice recognition software. Some word and grammatical errors may occur and must be interpreted in the appropriate clinical context.  If there are any questions pertaining to this issue, please contact me for further clarification.

## 2023-10-09 ENCOUNTER — PATIENT OUTREACH (OUTPATIENT)
Dept: CARE COORDINATION | Facility: CLINIC | Age: 88
End: 2023-10-09
Payer: MEDICARE

## 2023-10-09 NOTE — PROGRESS NOTES
Clinic Care Coordination Contact  Community Health Worker Follow Up    Care Gaps:     Health Maintenance Due   Topic Date Due    ZOSTER IMMUNIZATION (2 of 3) 12/05/2012    MEDICARE ANNUAL WELLNESS VISIT  07/19/2023    DIABETIC FOOT EXAM  07/19/2023    INFLUENZA VACCINE (1) 09/01/2023    COVID-19 Vaccine (8 - 2023-24 season) 09/01/2023    HEMOGLOBIN  11/13/2023       Scheduled 1/9/2023    Addendum (typo): 1/9/2024.    Care Plan:   Care Plan: In Home Support and Resources Completed 5/1/2023      Problem: Lack of caregiver support  Resolved 5/1/2023      Goal: In Home Support and Resources  Completed 5/1/2023      Start Date: 11/10/2022 Expected End Date: 11/10/2023    This Visit's Progress: 100% Recent Progress: 30%    Note:     Barriers: Lack of caregiver support  Strengths: Patient's wife Alyssa is engaged and motivated   Patient expressed understanding of goal: Yes  Action steps to achieve this goal:  1. I understand that RN CC will mail and send through [employer] in home support resources, caregiver support resources and [place] Cho[place] information for [facility].  2. I will review resources with my children and utilize as we see fit.   3. I will call [employer] directly to learn more about their volunteer respite program.  4. I will continue obtaining PCA assistance for 10 hours a week (Monday's and Friday's)  5. I will call my Senior Housing ([facility]) directly at [phone] to request more in home services if needed.  4.  I will continue to work with care coordination for any additional resources and support.   (Complete)                              Care Plan: in-home assistance Completed 10/9/2023      Problem: in-home assistance  Resolved 10/9/2023      Goal: I will call my ILF directly to access KATE/memory care services.  Completed 10/9/2023      Start Date: 6/2/2023 Expected End Date: 8/2/2023    This Visit's Progress: 100% Recent Progress: 80%    Priority: Medium    Note:     Barriers:  none  Strengths: Partner, Alyssa, is supportive.  Patient expressed understanding of goal: yes  Action steps to achieve this goal:  1. I will call my Senior Housing ([facility]) directly at [phone] to request more in home services if needed (KATE/Memory care).  2. I will follow the steps needed to access the services (nurse evaluation).  3. I will continue working with Care Coordination.                                Intervention and Education during outreach:     CHW was able to connect with Alyssa (CTC) and address their above goals. Per Alyssa, they were able to potentially have a male PCA come to the home for an additional day during the week, however at this time they feel like this is not necessary. Writer voiced understanding.     Discussed due Medicare Annual Wellness visit. The Patient is scheduled for 1/9/2024. Alyssa hs no further Care Coordination needs and is agreeable with a check-in in 2 months.     CHW Plan: Writer will reach out to the Patient in 2 month to monitor the progression of their goals.        Mary SCHREIBER Public Health  Community Health Worker  [facility] Clinics:  [facility] Care Coordination  [phone]

## 2023-10-09 NOTE — PROGRESS NOTES
Clinic Care Coordination Contact  Care Coordination Clinician Chart Review    Situation: Patient chart reviewed by Care Coordinator for change in status to Maintenance.      Background: Care Coordination Program started: 10/22/2022. Initial assessment completed and patient-centered care plan(s) were developed with participation from patient. Lead CC handed patient off to CHW for continued outreaches.       Assessment: Per chart review, patient outreach completed by CC CHW on 10/9/23.  Patient is actively working to accomplish goal(s). Patient's goal(s) appropriate and relevant at this time. Patient is not due for updated Plan of Care.  Assessments will be completed annually or as needed/with change of patient status.      Care Plan: In Home Support and Resources Completed 5/1/2023      Problem: Lack of caregiver support  Resolved 5/1/2023      Goal: In Home Support and Resources  Completed 5/1/2023      Start Date: 11/10/2022 Expected End Date: 11/10/2023    This Visit's Progress: 100% Recent Progress: 30%    Note:     Barriers: Lack of caregiver support  Strengths: Patient's wife Alyssa is engaged and motivated   Patient expressed understanding of goal: Yes  Action steps to achieve this goal:  1. I understand that RN CC will mail and send through [employer] in home support resources, caregiver support resources and [place] information for [facility].  2. I will review resources with my children and utilize as we see fit.   3. I will call [employer] directly to learn more about their volunteer respite program.  4. I will continue obtaining PCA assistance for 10 hours a week (Monday's and Friday's)  5. I will call my Senior Housing ([facility]) directly at [phone] to request more in home services if needed.  4.  I will continue to work with care coordination for any additional resources and support.   (Complete)                              Care Plan: in-home assistance Completed 10/9/2023      Problem:  in-home assistance  Resolved 10/9/2023      Goal: I will call my ILF directly to access KATE/memory care services.  Completed 10/9/2023      Start Date: 6/2/2023 Expected End Date: 8/2/2023    This Visit's Progress: 100% Recent Progress: 80%    Priority: Medium    Note:     Barriers: none  Strengths: Partner, Alyssa, is supportive.  Patient expressed understanding of goal: yes  Action steps to achieve this goal:  1. I will call my Senior Housing ([facility]) directly at [phone] to request more in home services if needed (KATE/Memory care).  2. I will follow the steps needed to access the services (nurse evaluation).  3. I will continue working with Care Coordination.                                 Plan/Recommendations: Patient has achieved his goals and is transitioned to maintenance status. The patient will continue working with Care Coordination. CHW will continue outreaches at every 60 days .Lead CC will continue to monitor CHW outreaches and patient's progress to goal(s) every 8 weeks.     Plan of Care updated and sent to patient: No.    Elizabeth Camacho, RN, BSN, CPHN, CM  [facility] Ambulatory Care Management  [facility]  Phone: [phone]  Email: [email or web address]

## 2023-10-14 DIAGNOSIS — E11.29 TYPE 2 DIABETES MELLITUS WITH MICROALBUMINURIA, WITHOUT LONG-TERM CURRENT USE OF INSULIN (H): ICD-10-CM

## 2023-10-14 DIAGNOSIS — R80.9 TYPE 2 DIABETES MELLITUS WITH MICROALBUMINURIA, WITHOUT LONG-TERM CURRENT USE OF INSULIN (H): ICD-10-CM

## 2023-10-16 RX ORDER — METFORMIN HCL 500 MG
500 TABLET, EXTENDED RELEASE 24 HR ORAL 2 TIMES DAILY WITH MEALS
Qty: 180 TABLET | Refills: 0 | Status: SHIPPED | OUTPATIENT
Start: 2023-10-16 | End: 2024-01-09

## 2023-10-18 ENCOUNTER — ANCILLARY PROCEDURE (OUTPATIENT)
Dept: CARDIOLOGY | Facility: CLINIC | Age: 88
End: 2023-10-18
Attending: INTERNAL MEDICINE
Payer: MEDICARE

## 2023-10-18 DIAGNOSIS — Z95.0 CARDIAC PACEMAKER IN SITU: ICD-10-CM

## 2023-10-18 DIAGNOSIS — I44.2 CHB (COMPLETE HEART BLOCK) (H): ICD-10-CM

## 2023-10-18 PROCEDURE — 93280 PM DEVICE PROGR EVAL DUAL: CPT | Performed by: INTERNAL MEDICINE

## 2023-10-26 LAB
MDC_IDC_EPISODE_DTM: NORMAL
MDC_IDC_EPISODE_ID: NORMAL
MDC_IDC_EPISODE_TYPE: NORMAL
MDC_IDC_LEAD_CONNECTION_STATUS: NORMAL
MDC_IDC_LEAD_CONNECTION_STATUS: NORMAL
MDC_IDC_LEAD_IMPLANT_DT: NORMAL
MDC_IDC_LEAD_IMPLANT_DT: NORMAL
MDC_IDC_LEAD_LOCATION: NORMAL
MDC_IDC_LEAD_LOCATION: NORMAL
MDC_IDC_LEAD_LOCATION_DETAIL_1: NORMAL
MDC_IDC_LEAD_LOCATION_DETAIL_1: NORMAL
MDC_IDC_LEAD_MFG: NORMAL
MDC_IDC_LEAD_MFG: NORMAL
MDC_IDC_LEAD_MODEL: NORMAL
MDC_IDC_LEAD_MODEL: NORMAL
MDC_IDC_LEAD_POLARITY_TYPE: NORMAL
MDC_IDC_LEAD_POLARITY_TYPE: NORMAL
MDC_IDC_LEAD_SERIAL: NORMAL
MDC_IDC_LEAD_SERIAL: NORMAL
MDC_IDC_MSMT_BATTERY_DTM: NORMAL
MDC_IDC_MSMT_BATTERY_REMAINING_LONGEVITY: 78 MO
MDC_IDC_MSMT_BATTERY_REMAINING_PERCENTAGE: 100 %
MDC_IDC_MSMT_BATTERY_STATUS: NORMAL
MDC_IDC_MSMT_LEADCHNL_RA_IMPEDANCE_VALUE: 716 OHM
MDC_IDC_MSMT_LEADCHNL_RA_PACING_THRESHOLD_AMPLITUDE: 0.8 V
MDC_IDC_MSMT_LEADCHNL_RA_PACING_THRESHOLD_PULSEWIDTH: 0.4 MS
MDC_IDC_MSMT_LEADCHNL_RV_IMPEDANCE_VALUE: 602 OHM
MDC_IDC_MSMT_LEADCHNL_RV_PACING_THRESHOLD_AMPLITUDE: 0.8 V
MDC_IDC_MSMT_LEADCHNL_RV_PACING_THRESHOLD_PULSEWIDTH: 0.4 MS
MDC_IDC_PG_IMPLANT_DTM: NORMAL
MDC_IDC_PG_MFG: NORMAL
MDC_IDC_PG_MODEL: NORMAL
MDC_IDC_PG_SERIAL: NORMAL
MDC_IDC_PG_TYPE: NORMAL
MDC_IDC_SESS_CLINIC_NAME: NORMAL
MDC_IDC_SESS_DTM: NORMAL
MDC_IDC_SESS_TYPE: NORMAL
MDC_IDC_SET_BRADY_AT_MODE_SWITCH_MODE: NORMAL
MDC_IDC_SET_BRADY_AT_MODE_SWITCH_RATE: 170 {BEATS}/MIN
MDC_IDC_SET_BRADY_LOWRATE: 60 {BEATS}/MIN
MDC_IDC_SET_BRADY_MAX_SENSOR_RATE: 130 {BEATS}/MIN
MDC_IDC_SET_BRADY_MAX_TRACKING_RATE: 130 {BEATS}/MIN
MDC_IDC_SET_BRADY_MODE: NORMAL
MDC_IDC_SET_BRADY_PAV_DELAY_HIGH: 150 MS
MDC_IDC_SET_BRADY_PAV_DELAY_LOW: 200 MS
MDC_IDC_SET_BRADY_SAV_DELAY_HIGH: 150 MS
MDC_IDC_SET_BRADY_SAV_DELAY_LOW: 200 MS
MDC_IDC_SET_LEADCHNL_RA_PACING_AMPLITUDE: 2 V
MDC_IDC_SET_LEADCHNL_RA_PACING_CAPTURE_MODE: NORMAL
MDC_IDC_SET_LEADCHNL_RA_PACING_POLARITY: NORMAL
MDC_IDC_SET_LEADCHNL_RA_PACING_PULSEWIDTH: 0.4 MS
MDC_IDC_SET_LEADCHNL_RA_SENSING_ADAPTATION_MODE: NORMAL
MDC_IDC_SET_LEADCHNL_RA_SENSING_POLARITY: NORMAL
MDC_IDC_SET_LEADCHNL_RA_SENSING_SENSITIVITY: 0.25 MV
MDC_IDC_SET_LEADCHNL_RV_PACING_AMPLITUDE: 1.4 V
MDC_IDC_SET_LEADCHNL_RV_PACING_CAPTURE_MODE: NORMAL
MDC_IDC_SET_LEADCHNL_RV_PACING_POLARITY: NORMAL
MDC_IDC_SET_LEADCHNL_RV_PACING_PULSEWIDTH: 0.4 MS
MDC_IDC_SET_LEADCHNL_RV_SENSING_ADAPTATION_MODE: NORMAL
MDC_IDC_SET_LEADCHNL_RV_SENSING_POLARITY: NORMAL
MDC_IDC_SET_LEADCHNL_RV_SENSING_SENSITIVITY: 1.5 MV
MDC_IDC_SET_ZONE_DETECTION_INTERVAL: 375 MS
MDC_IDC_SET_ZONE_STATUS: NORMAL
MDC_IDC_SET_ZONE_TYPE: NORMAL
MDC_IDC_SET_ZONE_VENDOR_TYPE: NORMAL
MDC_IDC_STAT_AT_BURDEN_PERCENT: 1 %
MDC_IDC_STAT_AT_DTM_END: NORMAL
MDC_IDC_STAT_AT_DTM_START: NORMAL
MDC_IDC_STAT_BRADY_DTM_END: NORMAL
MDC_IDC_STAT_BRADY_DTM_START: NORMAL
MDC_IDC_STAT_BRADY_RA_PERCENT_PACED: 28 %
MDC_IDC_STAT_BRADY_RV_PERCENT_PACED: 19 %
MDC_IDC_STAT_EPISODE_RECENT_COUNT: 0
MDC_IDC_STAT_EPISODE_RECENT_COUNT_DTM_END: NORMAL
MDC_IDC_STAT_EPISODE_RECENT_COUNT_DTM_START: NORMAL
MDC_IDC_STAT_EPISODE_TYPE: NORMAL
MDC_IDC_STAT_EPISODE_VENDOR_TYPE: NORMAL
MDC_IDC_STAT_EPISODE_VENDOR_TYPE: NORMAL

## 2023-10-30 ENCOUNTER — TELEPHONE (OUTPATIENT)
Dept: INTERNAL MEDICINE | Facility: CLINIC | Age: 88
End: 2023-10-30
Payer: MEDICARE

## 2023-10-30 DIAGNOSIS — G47.00 INSOMNIA, UNSPECIFIED TYPE: ICD-10-CM

## 2023-10-30 RX ORDER — TRAZODONE HYDROCHLORIDE 50 MG/1
TABLET, FILM COATED ORAL
Qty: 30 TABLET | Refills: 0 | Status: SHIPPED | OUTPATIENT
Start: 2023-10-30 | End: 2023-11-28

## 2023-10-30 NOTE — TELEPHONE ENCOUNTER
Pts wife calls.   Pt is having diarrhea for 2 weeks. No appetite because is afraid to eat. She is not sure how often he goes but pretty much after he eats.     He is getting around OK. Not drinking enough water. He likes to drink coffee.   He is drinking Caffeine free coke.     Denies fevers.     Advised to have him drink less coffee and more water or sports drinks. She states she will talk with him again about this.   Scheduled appt tomorrow with Zari Squires CNP.     She verbalized understanding.

## 2023-10-31 ENCOUNTER — OFFICE VISIT (OUTPATIENT)
Dept: INTERNAL MEDICINE | Facility: CLINIC | Age: 88
End: 2023-10-31
Payer: MEDICARE

## 2023-10-31 VITALS
TEMPERATURE: 97.9 F | HEART RATE: 62 BPM | WEIGHT: 158.5 LBS | HEIGHT: 64 IN | DIASTOLIC BLOOD PRESSURE: 60 MMHG | BODY MASS INDEX: 27.06 KG/M2 | OXYGEN SATURATION: 96 % | RESPIRATION RATE: 18 BRPM | SYSTOLIC BLOOD PRESSURE: 110 MMHG

## 2023-10-31 DIAGNOSIS — H61.23 BILATERAL IMPACTED CERUMEN: ICD-10-CM

## 2023-10-31 DIAGNOSIS — R19.7 DIARRHEA, UNSPECIFIED TYPE: ICD-10-CM

## 2023-10-31 DIAGNOSIS — R19.5 RED STOOL: Primary | ICD-10-CM

## 2023-10-31 LAB
ALBUMIN SERPL BCG-MCNC: 4.2 G/DL (ref 3.5–5.2)
ALP SERPL-CCNC: 94 U/L (ref 40–129)
ALT SERPL W P-5'-P-CCNC: 16 U/L (ref 0–70)
ANION GAP SERPL CALCULATED.3IONS-SCNC: 11 MMOL/L (ref 7–15)
AST SERPL W P-5'-P-CCNC: 25 U/L (ref 0–45)
BASOPHILS # BLD AUTO: 0 10E3/UL (ref 0–0.2)
BASOPHILS NFR BLD AUTO: 0 %
BILIRUB SERPL-MCNC: 0.3 MG/DL
BUN SERPL-MCNC: 21.8 MG/DL (ref 8–23)
CALCIUM SERPL-MCNC: 9.5 MG/DL (ref 8.8–10.2)
CHLORIDE SERPL-SCNC: 108 MMOL/L (ref 98–107)
CREAT SERPL-MCNC: 1.26 MG/DL (ref 0.67–1.17)
DEPRECATED HCO3 PLAS-SCNC: 23 MMOL/L (ref 22–29)
EGFRCR SERPLBLD CKD-EPI 2021: 55 ML/MIN/1.73M2
EOSINOPHIL # BLD AUTO: 0.4 10E3/UL (ref 0–0.7)
EOSINOPHIL NFR BLD AUTO: 5 %
ERYTHROCYTE [DISTWIDTH] IN BLOOD BY AUTOMATED COUNT: 12.7 % (ref 10–15)
GLUCOSE SERPL-MCNC: 102 MG/DL (ref 70–99)
HCT VFR BLD AUTO: 35.8 % (ref 40–53)
HGB BLD-MCNC: 11.9 G/DL (ref 13.3–17.7)
IMM GRANULOCYTES # BLD: 0 10E3/UL
IMM GRANULOCYTES NFR BLD: 0 %
LYMPHOCYTES # BLD AUTO: 1.6 10E3/UL (ref 0.8–5.3)
LYMPHOCYTES NFR BLD AUTO: 20 %
MCH RBC QN AUTO: 30 PG (ref 26.5–33)
MCHC RBC AUTO-ENTMCNC: 33.2 G/DL (ref 31.5–36.5)
MCV RBC AUTO: 90 FL (ref 78–100)
MONOCYTES # BLD AUTO: 0.7 10E3/UL (ref 0–1.3)
MONOCYTES NFR BLD AUTO: 9 %
NEUTROPHILS # BLD AUTO: 5.5 10E3/UL (ref 1.6–8.3)
NEUTROPHILS NFR BLD AUTO: 67 %
PLATELET # BLD AUTO: 129 10E3/UL (ref 150–450)
POTASSIUM SERPL-SCNC: 4.2 MMOL/L (ref 3.4–5.3)
PROT SERPL-MCNC: 7 G/DL (ref 6.4–8.3)
RBC # BLD AUTO: 3.97 10E6/UL (ref 4.4–5.9)
SODIUM SERPL-SCNC: 142 MMOL/L (ref 135–145)
WBC # BLD AUTO: 8.2 10E3/UL (ref 4–11)

## 2023-10-31 PROCEDURE — 80053 COMPREHEN METABOLIC PANEL: CPT

## 2023-10-31 PROCEDURE — 86364 TISS TRNSGLTMNASE EA IG CLAS: CPT

## 2023-10-31 PROCEDURE — 99214 OFFICE O/P EST MOD 30 MIN: CPT | Mod: 25

## 2023-10-31 PROCEDURE — 69209 REMOVE IMPACTED EAR WAX UNI: CPT

## 2023-10-31 PROCEDURE — 85025 COMPLETE CBC W/AUTO DIFF WBC: CPT

## 2023-10-31 PROCEDURE — 36415 COLL VENOUS BLD VENIPUNCTURE: CPT

## 2023-10-31 ASSESSMENT — ENCOUNTER SYMPTOMS: DIARRHEA: 1

## 2023-10-31 NOTE — COMMUNITY RESOURCES LIST (ENGLISH)
10/31/2023   [facility] [facility]  N/A  For questions about this resource list or additional care needs, please contact your primary care clinic or care manager.  Phone: [phone]   Email: N/A   Address: [address] [place] [address]   Hours: N/A        Hotlines and Helplines       Hotline - Housing crisis  1  [facility] (Main Office) - Emergency Services Distance: 4.14 miles      Phone/Virtual   [address] [place] [address]  Language: English  Hours: Mon - Sun Open 24 Hours   Phone: (325) 810-2560 Email: info@[facility].org Website: http://[employer].org     2  [facility] Distance: 11 miles      Phone/Virtual   [address] [place] [address]  Language: English  Hours: Mon - Sun Open 24 Hours   Phone: (591) 305-5803 Email: communications@[facility].org Website: https://[facility].org/oursaviourshousing/          Housing       Coordinated Entry access point  3  [facility] [place] ([facility] - Housing Services Distance: 10.92 miles      In-Person   [address] [place] [address]  Language: English  Hours: Mon - Fri 9:00 AM - 5:00 PM  Fees: Free   Phone: (113) 486-4732 Email: housing@[facility].org Website: http://www.[facility].org/housing     4  [facility] - [address] [facility] Distance: 11.74 miles      In-Person, Phone/Virtual   [address] [place] [address]  Language: English, Uzbek  Hours: Mon - Fri 8:30 AM - 4:30 PM  Fees: Free   Phone: (502) 750-8447 Email: info@[facility].org Website: https://www.[facility]care.org/locations/[facility]/     Drop-in center or day shelter  5  [facility] Distance: 11.34 miles      In-Person   [address] [place] [address]  Language: English  Hours: Mon - Fri 12:00 PM - 3:00 PM  Fees: Free   Phone: (890) 138-4644 Email: magdalenocomwilfred@[employer] Website: http://peacehousecommunity.org/     6  [facility] [place]  Opp[facility] Distance: 11.43 miles      In-Person   [address] [place] [address]  Language: English, Paraguayan, Equatorial Guinean  Hours: Mon - Sat 7:00 AM - 3:00 PM  Fees: Free, Self Pay   Phone: (547) 701-4916 Email: info@[employer] Website: https://www.[employer]/locations/opportunity-center/     Housing search assistance  7  [facility] - Rental Homes for Future Homebuyers Program Distance: 3.94 miles      Phone/Virtual   [address] [place] [address]  Language: English  Hours: Mon - Fri 8:00 AM - 4:30 PM  Fees: Free   Phone: (286) 634-5441 Email: hra@[facility] Website: https://www.[facility]/hra/[place]-housing-and-oejmksrmyasep-azllauxli-dou     8  [facility] - Online Housing Search Assistance Distance: 8.57 miles      Phone/Virtual   [address] [place] [address]  Language: English  Hours: Mon - Sun Open 24 Hours  Fees: Free   Phone: (676) 460-7665 Email: findhousinhilda@[facility].[employer] [facility] Website: https://[facility].[employer] [facility]/     Shelter for families  9  [facility] Distance: 4.95 miles      In-Person   [address] [place] [address]  Language: English  Hours: Mon - Sun Open 24 Hours  Fees: Free, Sliding Fee   Phone: (459) 233-1993 Ext.1 Email: info@[facility] Website: http://www.[facility].org     Shelter for individuals  10  [facility] ([facility]) [facility] Distance: 7.14 miles      In-Person   [address] [place] [address]  Language: English, Equatorial Guinean  Hours: Mon - Fri 8:00 AM - 4:30 PM  Fees: Free   Phone: (440) 974-8240 Email: info@[facility].org Website: http://www.[facility].org     11  [facility] Distance: 11 miles      In-Person   [address] [place] [address]  Language: English  Hours: Mon - Sun Open 24 Hours  Fees: Free   Phone: (173) 739-9408 Email:  communications@[facility].org Website: https://[facility].org/oursaviourshousing/          Important Numbers & Websites       Emergency Services   911  [facility] Services   311  Poison Control   (490) 971-7204  Suicide Prevention Lifeline   (127) 255-4821 (TALK)  Child Abuse Hotline   (610) 802-8829 (4-A-Child)  Sexual Assault Hotline   (869) 134-5088 (HOPE)  National Runaway Safeline   (299) 597-5918 (RUNAWAY)  All-Options Talkline   (835) 149-8801  Substance Abuse Referral   (140) 763-9209 (HELP)

## 2023-10-31 NOTE — PATIENT INSTRUCTIONS
Limited the amount of coffee and coca-cola per day. Caffeine can induce diarrhea.     Will check for infection in the stools as well as lab work to make sure your hemoglobin isn't low due to the red stools. Will also check liver function.     Please follow-up with GI specialist.     Take imodium as needed after submitting a stool sample.

## 2023-10-31 NOTE — PROGRESS NOTES
Assessment & Plan     (R19.5) Red stool  (primary encounter diagnosis)  Comment: Patient presents to the clinic with chief complaint of diarrhea and red stools.  Patient states that the redness is not from blood but rather from tomatoes.  Clinical picture suggests a GI bleed with differential of celiac disease, infectious diarrhea, or viral illness.  Therefore encouraged the patient to follow-up with gastroenterology for further evaluation.  Plan: Enteric Bacteria and Virus Panel by [facility] Stool,         C. difficile Toxin B PCR with reflex to C.         difficile Antigen and Toxins A/B EIA, CBC with         platelets and differential, Comprehensive         metabolic panel (BMP + Alb, Alk Phos, ALT, AST,        Total. Bili, TP), Adult GI  Referral -        Consult Only, Tissue transglutaminase tayo IgA         and IgG        Labs pending, referral sent.     (R19.7) Diarrhea, unspecified type  Comment: Please see above.   Plan: Enteric Bacteria and Virus Panel by [facility] Stool,         C. difficile Toxin B PCR with reflex to C.         difficile Antigen and Toxins A/B EIA, CBC with         platelets and differential, Comprehensive         metabolic panel (BMP + Alb, Alk Phos, ALT, AST,        Total. Bili, TP), Adult GI  Referral -        Consult Only, Tissue transglutaminase tayo IgA         and IgG          (H61.23) Bilateral impacted cerumen  Comment: Patient has bilateral impacted ear cerumen with current use of hearing aids.  Due to compaction patient would like to get a ear irrigation.  Plan: [place] REMOVAL IMPACTED CERUMEN IRRIGATION/LVG         UNILAT        Bilateral cerumen irrigation lavage completed in clinic and tolerated with no side effects.      30 minutes spent by me on the date of the encounter doing chart review, review of test results, interpretation of tests, patient visit, documentation, and discussion with family            SARY Padilla [facility]  JAYLAN Rosenbaum is a 89 year old, presenting for the following health issues: Patient states he is stuffed in the nose and throat and has to go to the bathroom often. Diarrhea about 2 times a day, wife buddy says it more than 2 times a day. Buddy thinks its been 4-5 times a day. The last two days it has been explosive and had accidents. He has had an upset stomach for the better part of the month. And now congestion and a sore throat.     Recent antibiotics: none.   Recent hospitalization: none.   New foods: Has a lady coming and she makes him dinner with a lot of tomato and pasta. She has been coming to the house for the last 8-9 months.   New medications: None. No new supplements. Takes glucosamine, multivitamin and aspirin.   Red/Black stools: Denies.   Color: Red or brown  Consistency: watery  Weight loss: none.   Positive abdominal cramps.   Fevers: none  Seeds in stool: unsure      Diarrhea (Pt claims he is having stomach pain and diarrhea,  started few days ago)        10/31/2023     1:54 PM   Additional Questions   Roomed by margie   Accompanied by wifebuddy         10/31/2023     1:54 PM   Patient Reported Additional Medications   Patient reports taking the following new medications none       Diarrhea    History of Present Illness       Reason for visit:  Diarrhea    He eats 0-1 servings of fruits and vegetables daily.He consumes 1 sweetened beverage(s) daily.He exercises with enough effort to increase his heart rate 9 or less minutes per day.  He exercises with enough effort to increase his heart rate 3 or less days per week. He is missing 1 dose(s) of medications per week.                 Review of Systems   Gastrointestinal:  Positive for diarrhea.      Constitutional, HEENT, cardiovascular, pulmonary, gi and gu systems are negative, except as otherwise noted.      Objective    /60 (BP Location: Right arm, Patient Position: Sitting, Cuff Size: Adult Regular)   Pulse 62   Temp  "97.9  F (36.6  C) (Oral)   Resp 18   Ht 1.626 m (5' 4\")   Wt 71.9 kg (158 lb 8 oz)   SpO2 96%   BMI 27.21 kg/m    Body mass index is 27.21 kg/m .  Physical Exam   GENERAL: healthy, alert and no distress  EYES: Eyes grossly normal to inspection, PERRL and conjunctivae and sclerae normal  HENT: ear canals and TM's compacted with cerumen bilaterally, nose and mouth without ulcers or lesions  NECK: no adenopathy, no asymmetry, masses, or scars and thyroid normal to palpation  RESP: lungs clear to auscultation - no rales, rhonchi or wheezes  CV: regular rate and rhythm, normal S1 S2, no S3 or S4, no murmur, click or rub, no peripheral edema and peripheral pulses strong  ABDOMEN: soft, nontender, no hepatosplenomegaly, no masses and bowel sounds normal  MS: no gross musculoskeletal defects noted, no edema  SKIN: no suspicious lesions or rashes  PSYCH: mentation appears normal, affect normal/bright                      "

## 2023-11-01 LAB
ADV 40+41 DNA STL QL NAA+NON-PROBE: NEGATIVE
ASTRO TYP 1-8 RNA STL QL NAA+NON-PROBE: NEGATIVE
C CAYETANENSIS DNA STL QL NAA+NON-PROBE: NEGATIVE
C DIFF TOX B STL QL: NEGATIVE
CAMPYLOBACTER DNA SPEC NAA+PROBE: NEGATIVE
CRYPTOSP DNA STL QL NAA+NON-PROBE: NEGATIVE
E COLI O157 DNA STL QL NAA+NON-PROBE: ABNORMAL
E HISTOLYT DNA STL QL NAA+NON-PROBE: NEGATIVE
EAEC ASTA GENE ISLT QL NAA+PROBE: NEGATIVE
EC STX1+STX2 GENES STL QL NAA+NON-PROBE: NEGATIVE
EPEC EAE GENE STL QL NAA+NON-PROBE: NEGATIVE
ETEC LTA+ST1A+ST1B TOX ST NAA+NON-PROBE: NEGATIVE
G LAMBLIA DNA STL QL NAA+NON-PROBE: NEGATIVE
NOROVIRUS GI+II RNA STL QL NAA+NON-PROBE: POSITIVE
P SHIGELLOIDES DNA STL QL NAA+NON-PROBE: NEGATIVE
RVA RNA STL QL NAA+NON-PROBE: NEGATIVE
SALMONELLA SP RPOD STL QL NAA+PROBE: NEGATIVE
SAPO I+II+IV+V RNA STL QL NAA+NON-PROBE: NEGATIVE
SHIGELLA SP+EIEC IPAH ST NAA+NON-PROBE: NEGATIVE
TTG IGA SER-ACNC: 0.3 U/ML
TTG IGG SER-ACNC: <0.6 U/ML
V CHOLERAE DNA SPEC QL NAA+PROBE: NEGATIVE
VIBRIO DNA SPEC NAA+PROBE: NEGATIVE
Y ENTEROCOL DNA STL QL NAA+PROBE: NEGATIVE

## 2023-11-01 PROCEDURE — 87493 C DIFF AMPLIFIED PROBE: CPT

## 2023-11-02 DIAGNOSIS — D64.9 LOW HEMOGLOBIN: Primary | ICD-10-CM

## 2023-11-21 ENCOUNTER — LAB (OUTPATIENT)
Dept: LAB | Facility: CLINIC | Age: 88
End: 2023-11-21
Payer: MEDICARE

## 2023-11-21 DIAGNOSIS — D64.9 LOW HEMOGLOBIN: ICD-10-CM

## 2023-11-21 LAB
BASOPHILS # BLD AUTO: 0 10E3/UL (ref 0–0.2)
BASOPHILS NFR BLD AUTO: 0 %
EOSINOPHIL # BLD AUTO: 0.8 10E3/UL (ref 0–0.7)
EOSINOPHIL NFR BLD AUTO: 9 %
ERYTHROCYTE [DISTWIDTH] IN BLOOD BY AUTOMATED COUNT: 12.7 % (ref 10–15)
HCT VFR BLD AUTO: 36.3 % (ref 40–53)
HGB BLD-MCNC: 11.9 G/DL (ref 13.3–17.7)
IMM GRANULOCYTES # BLD: 0 10E3/UL
IMM GRANULOCYTES NFR BLD: 0 %
LYMPHOCYTES # BLD AUTO: 1.9 10E3/UL (ref 0.8–5.3)
LYMPHOCYTES NFR BLD AUTO: 23 %
MCH RBC QN AUTO: 30.3 PG (ref 26.5–33)
MCHC RBC AUTO-ENTMCNC: 32.8 G/DL (ref 31.5–36.5)
MCV RBC AUTO: 92 FL (ref 78–100)
MONOCYTES # BLD AUTO: 0.8 10E3/UL (ref 0–1.3)
MONOCYTES NFR BLD AUTO: 9 %
NEUTROPHILS # BLD AUTO: 4.9 10E3/UL (ref 1.6–8.3)
NEUTROPHILS NFR BLD AUTO: 58 %
PLATELET # BLD AUTO: 127 10E3/UL (ref 150–450)
RBC # BLD AUTO: 3.93 10E6/UL (ref 4.4–5.9)
WBC # BLD AUTO: 8.4 10E3/UL (ref 4–11)

## 2023-11-21 PROCEDURE — 85025 COMPLETE CBC W/AUTO DIFF WBC: CPT

## 2023-11-21 PROCEDURE — 36415 COLL VENOUS BLD VENIPUNCTURE: CPT

## 2023-11-28 DIAGNOSIS — G47.00 INSOMNIA, UNSPECIFIED TYPE: ICD-10-CM

## 2023-11-28 RX ORDER — TRAZODONE HYDROCHLORIDE 50 MG/1
TABLET, FILM COATED ORAL
Qty: 30 TABLET | Refills: 0 | Status: SHIPPED | OUTPATIENT
Start: 2023-11-28 | End: 2023-12-28

## 2023-11-29 ENCOUNTER — PATIENT OUTREACH (OUTPATIENT)
Dept: CARE COORDINATION | Facility: CLINIC | Age: 88
End: 2023-11-29
Payer: MEDICARE

## 2023-11-29 NOTE — PROGRESS NOTES
Clinic Care Coordination Contact    Situation: Patient chart reviewed by care coordinator.    Background: Patient is enrolled in Care Coordination and followed by CHW and RN CC.     Assessment: Task initiated to send patient updated Care Plan every 90 days or upon change in condition.     Plan/Recommendations: RN CC sent patient updated Care Plan via his [employer] account.     Elizabeth Camacho RN, BSN, CPHN, CM  [facility] Ambulatory Care Management    Phone: [phone]  Email: [email or web address]

## 2023-11-29 NOTE — LETTER
[facility]  Patient Centered Plan of Care  About Me:        Patient Name:  Reid Boyd    YOB: 1934  Age:         89 year old   [facility] MRN:    [id number] Telephone Information:  Home Phone [phone]   Mobile [phone]       Address:  [address] [place] [address] Email address:  [email or web address]      Emergency Contact(s)    Name Relationship Lgl Grd Work Phone Home Phone Mobile Phone   1. EVA BOYD Spouse No   [phone]   2. JODI GARSIA Daughter No  [phone]    3. JOSEPH ALAMO Daughter No   [phone]           Primary language:  English     needed? No   [facility] [employer]:  [phone] op. 1  Other communication barriers:Cognitive impairment    Preferred Method of Communication:  Mary  Current living arrangement: I live in a private home with spouse    Mobility Status/ Medical Equipment: Independent w/Device    Health Maintenance  Health Maintenance Reviewed: No data recorded    My Access Plan  Medical Emergency 911   Primary Clinic Line [facility] - [phone]   24 Hour Appointment Line [phone] or  [phone]) (toll-free)   24 Hour Nurse Line [phone] (toll-free)   Preferred Urgent Care No data recorded   Preferred Hospital [facility]  [phone]     Preferred Pharmacy [facility] [address] [place] [address] [address] [place] [address]     Behavioral Health Crisis Line The National Suicide Prevention Lifeline at 1-952.397.7749 or Text/Call 498     My Care Team Members  Patient Care Team         Relationship Specialty Notifications Start End    Viet Bingham MD PCP - General Internal Medicine  2/11/14     Phone: [phone] Pager: [phone] Fax: [phone]        [address] [facility] [address]    Viet Bingham MD Assigned PCP   1/16/14     Phone: [phone] Pager: [phone] Fax: [phone]        [address] [facility] [address]    Des Marks MD MD Vascular and Interventional Radiology  7/30/20     Phone: [phone] Pager: [phone] Fax: [phone]        [address] [facility] [address] [facility] [address]    Mary Faulkner, RN Specialty Care Coordinator Radiology  7/30/20     Phone: [phone] Pager: [phone]        Eulalio Higginbotham MD MD Cardiovascular Disease  1/19/22     Pager: [phone]         Lizz Love APRN CNP Assigned Heart and Vascular Provider   10/15/22     Phone: [phone] Fax: [phone] [address] [place] [address]    [facility]    10/24/22     Phone: [phone] Fax: [phone] [address] [facility] [address]    Mary Son CHW Community Health Worker   11/10/22     Raul Iqbal PA-C Assigned Musculoskeletal Provider   1/14/23     Phone: [phone] Pager: [phone] Fax: [phone] [address] [place] [address]    Elizabeth Camacho, NATALIE Lead Care Coordinator Primary Care - CC Admissions 6/9/23     Phone: [phone]                   My Care Plans  Self Management and Treatment Plan    Care Plan - Completed    Action Plans on File:     Advance Care Plans/Directives:   Advanced Care Plan/Directives on file:   Yes    Status of Document(s): No data recorded  Advanced Care Plan/Directives Type:   Advanced Directive - On File         My Medical and Care Information  Problem List   Patient Active Problem List   Diagnosis    Hyperlipidemia with target LDL less than 100    acp    Type 2 diabetes mellitus with other circulatory complications (H)    Peripheral vascular disease (H24)    Essential hypertension with goal blood pressure less than 140/90    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    Type 2 diabetes mellitus with microalbuminuria, without long-term current use of insulin (H)    Pseudoaneurysm of femoral artery (H24)     Coronary artery disease    Aortic valve stenosis, nonrheumatic    Need for SBE (subacute bacterial endocarditis) prophylaxis    LBBB (left bundle branch block)    Syncope    ST elevation myocardial infarction (STEMI), unspecified artery (H)    Complete heart block (H)    Cardiac pacemaker in situ    Facet arthropathy, lumbosacral    Scoliosis of lumbar spine, unspecified scoliosis type    DDD (degenerative disc disease), lumbosacral    Lumbosacral spinal stenosis    Alcohol dependence in remission (H)    Chronic kidney disease, stage 3a (H)      Current Medications and Allergies:    Current Outpatient Medications   Medication    acetaminophen (TYLENOL) 325 MG tablet    ascorbic acid 500 MG TABS    aspirin 81 MG EC tablet    blood glucose (MIRA CONTOUR NEXT) test strip    blood glucose monitoring (MIRA MICROLET) lancets    Continuous Blood Gluc  (FREESTYLE SANGEETA 2 READER) IVONNE    Continuous Blood Gluc Sensor (FREESTYLE SANGEETA 2 SENSOR) MISC    donepezil (ARICEPT) 5 MG tablet    glucosamine-chondroitin 500-400 MG CAPS per capsule    Lidocaine (LIDOCARE) 4 % Patch    metFORMIN (GLUCOPHAGE XR) 500 MG 24 hr tablet    Multiple Vitamins-Iron (MULTIVITAMIN/IRON PO)    QUEtiapine (SEROQUEL) 25 MG tablet    senna-docusate (SENOKOT-S/PERICOLACE) 8.6-50 MG tablet    sertraline (ZOLOFT) 25 MG tablet    simvastatin (ZOCOR) 20 MG tablet    traZODone (DESYREL) 50 MG tablet     Current Facility-Administered Medications   Medication    iohexol (OMNIPAQUE) 300 mg/mL injection 10 mL      Allergies   Allergen Reactions    Fentanyl Other (See Comments)     Confusion and agitation after PPM    Versed [Midazolam] Other (See Comments)     Confusion and agitation post PPM     Care Coordination Start Date: 10/22/2022   Frequency of Care Coordination: monthly     Form Last Updated: 11/29/2023

## 2023-12-18 ENCOUNTER — PATIENT OUTREACH (OUTPATIENT)
Dept: CARE COORDINATION | Facility: CLINIC | Age: 88
End: 2023-12-18
Payer: MEDICARE

## 2023-12-18 NOTE — PROGRESS NOTES
Clinic Care Coordination Contact  Care Team Conversations    CHW was able to connect with the Patient's spouse Alyssa ([facility]). Alyssa shared that sometimes she does get down due to Patient declining. They are living in an independent care facility. Encouraged Alyssa to reach out to the [place] to request a nurse evaluation if she feels like she needs more assistance; Alyssa voiced understanding.     Alyssa states she is interested in connecting with an Alzheimer Group Therapy/support group. Per Chart review, Alyssa/Patient are due for an annual reassessment with CC RN. Appointment scheduled for 12/21/2023 at 10:00 AM to discuss resources for Alzheimer Group Therapy/support groups available and re-enroll into monthly outreaches from CHW. Alyssa would like a group where she can bring the Patient along as she cannot leave him alone.     Alyssa had no additional questions or concerns for the Writer during the time of call.     Mary SCHREIBER [facility]  Community Health Worker  [facility]:  [facility] Care Coordination  [phone]

## 2023-12-20 ENCOUNTER — PATIENT OUTREACH (OUTPATIENT)
Dept: CARE COORDINATION | Facility: CLINIC | Age: 88
End: 2023-12-20
Payer: MEDICARE

## 2023-12-20 NOTE — PROGRESS NOTES
Clinic Care Coordination Contact  Care Coordination Clinician Chart Review    Situation: Patient chart reviewed by Care Coordinator.       Background: Care Coordination Program started: 10/22/2022. Initial assessment completed and patient-centered care plan(s) were developed with participation from patient. Lead CC handed patient off to CHW for continued outreaches.       Assessment: Per chart review, patient outreach completed by CC CHW on 12/18/23.  Patient is actively working to accomplish goal(s). Patient's goal(s) appropriate and relevant at this time. Patient is not due for updated Plan of Care.  Assessments will be completed annually or as needed/with change of patient status.       Plan/Recommendations: The patient will continue working with Care Coordination to achieve goal(s) as above. CHW will continue outreaches at minimum every 30 days and will involve Lead CC as needed or if patient is ready to move to Maintenance. Lead CC will continue to monitor CHW outreaches and patient's progress to goal(s) every 6 weeks.     Plan of Care updated and sent to patient: No    Elizabeth Camacho RN, BSN, CPHN, CM  [facility] Ambulatory Care Management    Phone: [phone]  Email: [email or web address]

## 2023-12-21 ENCOUNTER — PATIENT OUTREACH (OUTPATIENT)
Dept: NURSING | Facility: CLINIC | Age: 88
End: 2023-12-21
Payer: MEDICARE

## 2023-12-21 NOTE — LETTER
[facility] CARE COORDINATION  [address]  [facility] [address]    December 21, 2023    Reid METCALF Ryanchemo  [address] [address] [facility] [address]   [place] [address]      Dear Reid,    I am a clinic care coordinator who works with Viet Bingham MD, MD with the [facility]. I wanted to introduce myself and provide you with my contact information for you to be able to call me with any questions or concerns. I wanted to thank you for spending the time to talk with me.  Below is a description of clinic care coordination and how I can further assist you.       The clinic care coordination team is made up of a registered nurse, , financial resource worker and community health worker who understand the health care system. The goal of clinic care coordination is to help you manage your health and improve access to the health care system. Our team works alongside your provider to assist you in determining your health and social needs. We can help you obtain health care and community resources, providing you with necessary information and education. We can work with you through any barriers and develop a care plan that helps coordinate and strengthen the communication between you and your care team.  Our services are voluntary and are offered without charge to you personally.    Please feel free to contact me with any questions or concerns regarding care coordination and what we can offer.      We are focused on providing you with the highest-quality healthcare experience possible.    Sincerely,     Elizabeth Camacho RN, BSN, CPHN, CM  [facility] Ambulatory Care Management  [facility]  Phone: [phone]  Email: [email or web address]      Enclosed: I have enclosed a copy of the Patient Centered Plan of Care. This has helpful information and goals that we have talked about. Please keep this in an easy to access place to use as needed. Also  included are resources for Caregiver Support that I've printed off for you. Please contact me with any questions or new support needs.

## 2023-12-21 NOTE — LETTER
[facility]  Patient Centered Plan of Care  About Me:        Patient Name:  Reid Boyd    YOB: 1934  Age:         89 year old   [facility] MRN:    [id number] Telephone Information:  Home Phone [phone]   Mobile [phone]       Address:  [address] [place] [address] Email address:  [email or web address]      Emergency Contact(s)    Name Relationship Lgl Grd Work Phone Home Phone Mobile Phone   1. ALYSSA BOYD Spouse No   [phone]   2. JODI GARSIA Daughter No  [phone]    3. JOSEPH ALAMO Daughter No   [phone]           Primary language:  English     needed? No   [facility] [employer]:  [phone] op. 1  Other communication barriers:None    Preferred Method of Communication:  Mary  Current living arrangement: I live in a private home with spouse    Mobility Status/ Medical Equipment: Independent w/Device    Health Maintenance  Health Maintenance Reviewed: Due/Overdue  (Discussed with wife, Alyssa)    My Access Plan  Medical Emergency 911   Primary Clinic Line [facility] - [phone]   24 Hour Appointment Line [phone] or  [phone]) (toll-free)   24 Hour Nurse Line [phone] (toll-free)   Preferred Urgent Care No data recorded   Preferred Hospital [facility]  [phone]     Preferred Pharmacy [facility] [address] [place] [address]     Behavioral Health Crisis Line The National Suicide Prevention Lifeline at 1-596.744.5638 or Text/Call 108     My Care Team Members  Patient Care Team         Relationship Specialty Notifications Start End    Viet Bingham MD PCP - General Internal Medicine  2/11/14     Phone: [phone] Pager: [phone] Fax: [phone]        [address] [facility] [address] [facility] [address]    Viet Bingham MD Assigned PCP   1/16/14     Phone: [phone] Pager: [phone] Fax: [phone]         [address] [facility] [address]    Des Marks MD MD Vascular and Interventional Radiology  7/30/20     Phone: [phone] Pager: [phone] Fax: [phone]        [address] [facility] [address] [facility] [address]    Mary Faulkner RN Specialty Care Coordinator Radiology  7/30/20     Phone: [phone] Pager: [phone]        Eulalio Higginbotham MD MD Cardiovascular Disease  1/19/22     Pager: [phone]         Lizz Love APRN CNP Assigned Heart and Vascular Provider   10/15/22     Phone: [phone] Fax: [phone] [address] [place] [address]    [facility]    10/24/22     Phone: [phone] Fax: [phone]         [address] [facility] [address]    Mary Son CHW Community Health Worker   11/10/22     Raul Iqbal PA-C Assigned Musculoskeletal Provider   1/14/23     Phone: [phone] Pager: [phone] Fax: [phone] [address] [place] [address]    Elizabeth Camacho, NATALIE Lead Care Coordinator Primary Care - CC Admissions 6/9/23     Phone: [phone]                   My Care Plans  Self Management and Treatment Plan    Care Plan  Care Plan: Resources for Caregiver support       Problem: Resources for Caregiver support       Goal: Caregiver Support       Start Date: 12/21/2023 Expected End Date: 3/21/2024    Note:     Barriers: Cognitive decline; Limited caregiver support; Provider availability - wait time to complete appointments.   Strengths: Motivated; Agreeable to Care Coordination.   Patient expressed understanding of goal: Yes  Action steps to achieve this goal:  1. I will review resources for Caregiver Support sent by Care Coordinator.   2. I will contact Care Coordinator for additional resources if needed.   3. I will contact the care team with questions, concerns or support needs. I will use the clinic as a resource and I understand I can contact the clinic with 24/7  after hours services available. Care Coordinator will remain available as needed.                               Care Plan: Utilization       Problem: Increased risk of re-admission       Goal: I would like additional resources and support to manage my health and prevent future avoidable ED visits/hospital admissions       Start Date: 12/21/2023 Expected End Date: 3/21/2024    Note:     Barriers: Cognitive decline; Limited caregiver support; Provider availability - wait time to complete appointments.   Strengths: Motivated; Agreeable to Care Coordination.   Patient expressed understanding of goal: Yes  Action steps to achieve this goal:  1. I will utilize the services of Care Coordinator by contacting them when issues/concerns arise.    2. I will contact Care Coordinator for additional resources if needed.   3. I will contact the care team with questions, concerns or support needs. I will use the clinic as a resource and I understand I can contact the clinic with 24/7 after hours services available. Care Coordinator will remain available as needed.                               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    Type 2 diabetes mellitus with microalbuminuria, without long-term current use of insulin (H)    Pseudoaneurysm of femoral artery (H24)    Coronary artery disease    Aortic valve stenosis, nonrheumatic    Need for SBE (subacute  bacterial endocarditis) prophylaxis    LBBB (left bundle branch block)    Syncope    ST elevation myocardial infarction (STEMI), unspecified artery (H)    Complete heart block (H)    Cardiac pacemaker in situ    Facet arthropathy, lumbosacral    Scoliosis of lumbar spine, unspecified scoliosis type    DDD (degenerative disc disease), lumbosacral    Lumbosacral spinal stenosis    Alcohol dependence in remission (H)    Chronic kidney disease, stage 3a (H)      Current Medications and Allergies:    Current Outpatient Medications   Medication    acetaminophen (TYLENOL) 325 MG tablet    ascorbic acid 500 MG TABS    aspirin 81 MG EC tablet    blood glucose (MIRA CONTOUR NEXT) test strip    blood glucose monitoring ([employer] MICROLET) lancets    Continuous Blood Gluc  (FREESTYLE SANGEETA 2 READER) IVONNE    Continuous Blood Gluc Sensor (FREESTYLE SANGEETA 2 SENSOR) MISC    donepezil (ARICEPT) 5 MG tablet    glucosamine-chondroitin 500-400 MG CAPS per capsule    Lidocaine (LIDOCARE) 4 % Patch    metFORMIN (GLUCOPHAGE XR) 500 MG 24 hr tablet    Multiple Vitamins-Iron (MULTIVITAMIN/IRON PO)    QUEtiapine (SEROQUEL) 25 MG tablet    senna-docusate (SENOKOT-S/PERICOLACE) 8.6-50 MG tablet    sertraline (ZOLOFT) 25 MG tablet    simvastatin (ZOCOR) 20 MG tablet    traZODone (DESYREL) 50 MG tablet     Current Facility-Administered Medications   Medication    iohexol (OMNIPAQUE) 300 mg/mL injection 10 mL      Allergies   Allergen Reactions    Fentanyl Other (See Comments)     Confusion and agitation after PPM    Versed [Midazolam] Other (See Comments)     Confusion and agitation post PPM     Care Coordination Start Date: 10/22/2022   Frequency of Care Coordination: monthly, more frequently as needed     Form Last Updated: 12/21/2023

## 2023-12-21 NOTE — PROGRESS NOTES
Clinic Care Coordination Contact  Clinic Care Coordination Contact  OUTREACH    Referral Information: RN CC contacted patient's wife, Alyssa (CTC), for re-enrollment in Care Coordination.   Referral Source: Care Team     Chief Complaint   Patient presents with    Clinic Care Coordination - Follow-up      Universal Utilization: Risk of admission or ED visit 56.5%  Clinic Utilization  Difficulty keeping appointments:: No  Compliance Concerns: No  No-Show Concerns: No  No PCP office visit in Past Year: No  Utilization      No Show Count (past year)  0             ED Visits  0             Hospital Admissions  0                    Current as of: 12/21/2023  1:19 AM              Clinical Concerns:  Current Medical Concerns:  Patient unable to be left alone.     Current Behavioral Concerns: Patient unable to be left alone.     Education Provided to patient: Educated patient's wife, Alyssa, on Care Coordination: resources available; outreach schedule; goals to achieve.    Pain  Pain (GOAL):: No  Health Maintenance Reviewed: Due/Overdue  (Discussed with wifeAlyssa)  Clinical Pathway: None    Medication Management:  Medication review status: Did not review as Alyssa didn't have time.   Current Outpatient Medications   Medication    acetaminophen (TYLENOL) 325 MG tablet    ascorbic acid 500 MG TABS    aspirin 81 MG EC tablet    blood glucose (MIRA CONTOUR NEXT) test strip    blood glucose monitoring (MIRA MICROLET) lancets    Continuous Blood Gluc  (FREESTYLE SANGEETA 2 READER) IVONNE    Continuous Blood Gluc Sensor (FREESTYLE SANGEETA 2 SENSOR) MISC    donepezil (ARICEPT) 5 MG tablet    glucosamine-chondroitin 500-400 MG CAPS per capsule    Lidocaine (LIDOCARE) 4 % Patch    metFORMIN (GLUCOPHAGE XR) 500 MG 24 hr tablet    Multiple Vitamins-Iron (MULTIVITAMIN/IRON PO)    QUEtiapine (SEROQUEL) 25 MG tablet    senna-docusate (SENOKOT-S/PERICOLACE) 8.6-50 MG tablet    sertraline (ZOLOFT) 25 MG tablet    simvastatin (ZOCOR) 20 MG  tablet    traZODone (DESYREL) 50 MG tablet     Current Facility-Administered Medications   Medication    iohexol (OMNIPAQUE) 300 mg/mL injection 10 mL      Allergies   Allergen Reactions    Fentanyl Other (See Comments)     Confusion and agitation after PPM    Versed [Midazolam] Other (See Comments)     Confusion and agitation post PPM     Functional Status:  Dependent ADLs:: Ambulation-walker  Dependent IADLs:: Cleaning, Cooking, Laundry, Shopping, Meal Preparation, Medication Management, Money Management, Transportation  Bed or wheelchair confined:: No  Mobility Status: Independent w/Device  Fallen 2 or more times in the past year?: No  Any fall with injury in the past year?: No    Living Situation:  Current living arrangement:: I live in a private home with spouse  Type of residence:: Apartment    Lifestyle & Psychosocial Needs:    Social Determinants of Health     Food Insecurity: Low Risk  (12/21/2023)    Food Insecurity     Within the past 12 months, did you worry that your food would run out before you got money to buy more?: No     Within the past 12 months, did the food you bought just not last and you didn t have money to get more?: No   Depression: Not at risk (4/25/2023)    PHQ-2     PHQ-2 Score: 0   Housing Stability: Low Risk  (12/21/2023)    Housing Stability     Do you have housing? : Yes     Are you worried about losing your housing?: No   Recent Concern: Housing Stability - High Risk (10/31/2023)    Housing Stability     Do you have housing? : No     Are you worried about losing your housing?: No   Tobacco Use: Medium Risk (10/31/2023)    Patient History     Smoking Tobacco Use: Former     Smokeless Tobacco Use: Former     Passive Exposure: Not on file   Financial Resource Strain: Low Risk  (12/21/2023)    Financial Resource Strain     Within the past 12 months, have you or your family members you live with been unable to get utilities (heat, electricity) when it was really needed?: No   Alcohol  Use: Not At Risk (7/21/2021)    AUDIT-C     Frequency of Alcohol Consumption: Never     Average Number of Drinks: Not on file     Frequency of Binge Drinking: Never   Transportation Needs: Low Risk  (12/21/2023)    Transportation Needs     Within the past 12 months, has lack of transportation kept you from medical appointments, getting your medicines, non-medical meetings or appointments, work, or from getting things that you need?: No   Physical Activity: Inactive (7/19/2022)    Exercise Vital Sign     Days of Exercise per Week: 0 days     Minutes of Exercise per Session: 0 min   Interpersonal Safety: Not At Risk (7/19/2022)    Humiliation, Afraid, Rape, and Kick questionnaire     Fear of Current or Ex-Partner: No     Emotionally Abused: No     Physically Abused: No     Sexually Abused: No   Stress: No Stress Concern Present (7/19/2022)    [place] of Occupational Health - Occupational Stress Questionnaire     Feeling of Stress : Not at all   Social Connections: Socially Integrated (7/19/2022)    Social Connection and Isolation Panel [NHANES]     Frequency of Communication with Friends and Family: Twice a week     Frequency of Social Gatherings with Friends and Family: Twice a week     Attends Islam Services: More than 4 times per year     Active Member of Clubs or Organizations: Yes     Attends Club or Organization Meetings: Never     Marital Status:      Diet:: Diabetic diet  Inadequate nutrition (GOAL):: No  Tube Feeding: No  Inadequate activity/exercise (GOAL):: No  Significant changes in sleep pattern (GOAL): No  Transportation means:: Regular car     Islam or spiritual beliefs that impact treatment:: No  Mental health DX:: No  Mental health management concern (GOAL):: No  Chemical Dependency Status: No Current Concerns  Chemical Dependency Management:  (N/A)  Informal Support system:: Spouse, Children      Resources and Interventions:  Current Resources:      Community Resources: DME  (Private Pay HHA 2x week through [facility])  Supplies Currently Used at Home: Diabetic Supplies, Incontinence Supplies  Equipment Currently Used at Home: walker, standard, glucometer, shower chair, grab bar, tub/shower  Employment Status: retired     Advance Care Plan/Directive  Advanced Care Plans/Directives on file:: Yes  Status of record:: On File and Validated  Type Advanced Care Plans/Directives: Advanced Directive - On File    Referrals Placed: Community Resources    Care Plan:  Care Plan: Resources for Caregiver support       Problem: Resources for Caregiver support       Goal: Caregiver Support       Start Date: 12/21/2023 Expected End Date: 3/21/2024    Note:     Barriers: Cognitive decline; Limited caregiver support; Provider availability - wait time to complete appointments.   Strengths: Motivated; Agreeable to Care Coordination.   Patient expressed understanding of goal: Yes  Action steps to achieve this goal:  1. I will review resources for Caregiver Support sent by Care Coordinator.   2. I will contact Care Coordinator for additional resources if needed.   3. I will contact the care team with questions, concerns or support needs. I will use the clinic as a resource and I understand I can contact the clinic with 24/7 after hours services available. Care Coordinator will remain available as needed.                               Care Plan: Utilization       Problem: Increased risk of re-admission       Goal: I would like additional resources and support to manage my health and prevent future avoidable ED visits/hospital admissions       Start Date: 12/21/2023 Expected End Date: 3/21/2024    Note:     Barriers: Cognitive decline; Limited caregiver support; Provider availability - wait time to complete appointments.   Strengths: Motivated; Agreeable to Care Coordination.   Patient expressed understanding of goal: Yes  Action steps to achieve this goal:  1. I will utilize the services of Care Coordinator  by contacting them when issues/concerns arise.    2. I will contact Care Coordinator for additional resources if needed.   3. I will contact the care team with questions, concerns or support needs. I will use the clinic as a resource and I understand I can contact the clinic with 24/7 after hours services available. Care Coordinator will remain available as needed.                               Patient/Caregiver understanding: Patient/caregiver verbalized understanding and denies any additional questions or concerns at this time. RNCC engaged in AIDET communications during encounter.     Outreach Frequency: monthly, more frequently as needed    Future Appointments                In 1 week [place] [facility], [place]    In 2 weeks Viet Bingham MD [place], [place]    In 2 months [facility]            Plan: RN CC will send patient's wife, Alyssa, updated Care Plan and resources for caregiver support via USPS per her request. RN CC printed resources and Care Plan while in clinic on 12/21/23 and mailed to patient.    Elizabeth Camacho RN, BSN, CPHN, CM  [facility] Ambulatory Care Management  [facility]  Phone: [phone]  Email: [email or web address]

## 2024-01-02 ENCOUNTER — LAB (OUTPATIENT)
Dept: LAB | Facility: CLINIC | Age: 89
End: 2024-01-02
Payer: MEDICARE

## 2024-01-02 DIAGNOSIS — E78.5 HYPERLIPIDEMIA WITH TARGET LDL LESS THAN 100: ICD-10-CM

## 2024-01-02 DIAGNOSIS — E11.29 TYPE 2 DIABETES MELLITUS WITH MICROALBUMINURIA, WITHOUT LONG-TERM CURRENT USE OF INSULIN (H): ICD-10-CM

## 2024-01-02 DIAGNOSIS — R80.9 TYPE 2 DIABETES MELLITUS WITH MICROALBUMINURIA, WITHOUT LONG-TERM CURRENT USE OF INSULIN (H): ICD-10-CM

## 2024-01-02 LAB
ALBUMIN SERPL BCG-MCNC: 4.2 G/DL (ref 3.5–5.2)
ALP SERPL-CCNC: 85 U/L (ref 40–150)
ALT SERPL W P-5'-P-CCNC: 18 U/L (ref 0–70)
ANION GAP SERPL CALCULATED.3IONS-SCNC: 10 MMOL/L (ref 7–15)
AST SERPL W P-5'-P-CCNC: 30 U/L (ref 0–45)
BILIRUB SERPL-MCNC: 0.3 MG/DL
BUN SERPL-MCNC: 32.7 MG/DL (ref 8–23)
CALCIUM SERPL-MCNC: 9.5 MG/DL (ref 8.8–10.2)
CHLORIDE SERPL-SCNC: 106 MMOL/L (ref 98–107)
CHOLEST SERPL-MCNC: 112 MG/DL
CREAT SERPL-MCNC: 1.44 MG/DL (ref 0.67–1.17)
DEPRECATED HCO3 PLAS-SCNC: 27 MMOL/L (ref 22–29)
EGFRCR SERPLBLD CKD-EPI 2021: 46 ML/MIN/1.73M2
FASTING STATUS PATIENT QL REPORTED: YES
GLUCOSE SERPL-MCNC: 122 MG/DL (ref 70–99)
HBA1C MFR BLD: 6.1 % (ref 0–5.6)
HDLC SERPL-MCNC: 49 MG/DL
LDLC SERPL CALC-MCNC: 38 MG/DL
NONHDLC SERPL-MCNC: 63 MG/DL
POTASSIUM SERPL-SCNC: 4.6 MMOL/L (ref 3.4–5.3)
PROT SERPL-MCNC: 6.9 G/DL (ref 6.4–8.3)
SODIUM SERPL-SCNC: 143 MMOL/L (ref 135–145)
TRIGL SERPL-MCNC: 126 MG/DL
TSH SERPL DL<=0.005 MIU/L-ACNC: 1.86 UIU/ML (ref 0.3–4.2)

## 2024-01-02 PROCEDURE — 80053 COMPREHEN METABOLIC PANEL: CPT

## 2024-01-02 PROCEDURE — 83036 HEMOGLOBIN GLYCOSYLATED A1C: CPT

## 2024-01-02 PROCEDURE — 36415 COLL VENOUS BLD VENIPUNCTURE: CPT

## 2024-01-02 PROCEDURE — 80061 LIPID PANEL: CPT

## 2024-01-02 PROCEDURE — 84443 ASSAY THYROID STIM HORMONE: CPT

## 2024-01-06 DIAGNOSIS — E11.29 TYPE 2 DIABETES MELLITUS WITH MICROALBUMINURIA, WITHOUT LONG-TERM CURRENT USE OF INSULIN (H): ICD-10-CM

## 2024-01-06 DIAGNOSIS — R80.9 TYPE 2 DIABETES MELLITUS WITH MICROALBUMINURIA, WITHOUT LONG-TERM CURRENT USE OF INSULIN (H): ICD-10-CM

## 2024-01-06 DIAGNOSIS — F03.B18 MODERATE DEMENTIA WITH OTHER BEHAVIORAL DISTURBANCE, UNSPECIFIED DEMENTIA TYPE (H): ICD-10-CM

## 2024-01-09 ENCOUNTER — TELEPHONE (OUTPATIENT)
Dept: INTERNAL MEDICINE | Facility: CLINIC | Age: 89
End: 2024-01-09

## 2024-01-09 ENCOUNTER — OFFICE VISIT (OUTPATIENT)
Dept: INTERNAL MEDICINE | Facility: CLINIC | Age: 89
End: 2024-01-09
Payer: MEDICARE

## 2024-01-09 VITALS
TEMPERATURE: 97.7 F | RESPIRATION RATE: 18 BRPM | BODY MASS INDEX: 28.34 KG/M2 | HEART RATE: 87 BPM | SYSTOLIC BLOOD PRESSURE: 114 MMHG | HEIGHT: 62 IN | WEIGHT: 154 LBS | DIASTOLIC BLOOD PRESSURE: 58 MMHG | OXYGEN SATURATION: 97 %

## 2024-01-09 DIAGNOSIS — Z00.00 ENCOUNTER FOR MEDICARE ANNUAL WELLNESS EXAM: Primary | ICD-10-CM

## 2024-01-09 DIAGNOSIS — N18.31 CHRONIC KIDNEY DISEASE, STAGE 3A (H): ICD-10-CM

## 2024-01-09 DIAGNOSIS — E11.22 TYPE 2 DIABETES MELLITUS WITH STAGE 3 CHRONIC KIDNEY DISEASE, WITHOUT LONG-TERM CURRENT USE OF INSULIN, UNSPECIFIED WHETHER STAGE 3A OR 3B CKD (H): ICD-10-CM

## 2024-01-09 DIAGNOSIS — R32 URINARY INCONTINENCE, UNSPECIFIED TYPE: ICD-10-CM

## 2024-01-09 DIAGNOSIS — F03.B18 MODERATE DEMENTIA WITH OTHER BEHAVIORAL DISTURBANCE, UNSPECIFIED DEMENTIA TYPE (H): ICD-10-CM

## 2024-01-09 DIAGNOSIS — F10.21 ALCOHOL DEPENDENCE IN REMISSION (H): ICD-10-CM

## 2024-01-09 DIAGNOSIS — N18.30 TYPE 2 DIABETES MELLITUS WITH STAGE 3 CHRONIC KIDNEY DISEASE, WITHOUT LONG-TERM CURRENT USE OF INSULIN, UNSPECIFIED WHETHER STAGE 3A OR 3B CKD (H): ICD-10-CM

## 2024-01-09 DIAGNOSIS — F41.9 ANXIETY: ICD-10-CM

## 2024-01-09 DIAGNOSIS — R39.15 URINARY URGENCY: ICD-10-CM

## 2024-01-09 DIAGNOSIS — H91.93 BILATERAL HEARING LOSS, UNSPECIFIED HEARING LOSS TYPE: ICD-10-CM

## 2024-01-09 DIAGNOSIS — G47.00 INSOMNIA, UNSPECIFIED TYPE: ICD-10-CM

## 2024-01-09 DIAGNOSIS — I73.9 PERIPHERAL VASCULAR DISEASE (H): ICD-10-CM

## 2024-01-09 DIAGNOSIS — E78.5 HYPERLIPIDEMIA WITH TARGET LDL LESS THAN 100: ICD-10-CM

## 2024-01-09 DIAGNOSIS — R80.9 TYPE 2 DIABETES MELLITUS WITH MICROALBUMINURIA, WITHOUT LONG-TERM CURRENT USE OF INSULIN (H): ICD-10-CM

## 2024-01-09 DIAGNOSIS — I44.2 COMPLETE HEART BLOCK (H): ICD-10-CM

## 2024-01-09 DIAGNOSIS — E11.29 TYPE 2 DIABETES MELLITUS WITH MICROALBUMINURIA, WITHOUT LONG-TERM CURRENT USE OF INSULIN (H): ICD-10-CM

## 2024-01-09 PROCEDURE — 99207 PR FOOT EXAM NO CHARGE: CPT | Performed by: INTERNAL MEDICINE

## 2024-01-09 PROCEDURE — 99214 OFFICE O/P EST MOD 30 MIN: CPT | Mod: 25 | Performed by: INTERNAL MEDICINE

## 2024-01-09 PROCEDURE — G0439 PPPS, SUBSEQ VISIT: HCPCS | Performed by: INTERNAL MEDICINE

## 2024-01-09 RX ORDER — SIMVASTATIN 20 MG
20 TABLET ORAL AT BEDTIME
Qty: 90 TABLET | Refills: 3 | Status: SHIPPED | OUTPATIENT
Start: 2024-01-09

## 2024-01-09 RX ORDER — DONEPEZIL HYDROCHLORIDE 5 MG/1
5 TABLET, FILM COATED ORAL AT BEDTIME
Qty: 90 TABLET | Refills: 3 | Status: SHIPPED | OUTPATIENT
Start: 2024-01-09 | End: 2024-04-18

## 2024-01-09 RX ORDER — METFORMIN HCL 500 MG
500 TABLET, EXTENDED RELEASE 24 HR ORAL 2 TIMES DAILY WITH MEALS
Qty: 180 TABLET | Refills: 0 | OUTPATIENT
Start: 2024-01-09

## 2024-01-09 RX ORDER — RESPIRATORY SYNCYTIAL VIRUS VACCINE 120MCG/0.5
0.5 KIT INTRAMUSCULAR ONCE
Qty: 1 EACH | Refills: 0 | Status: CANCELLED | OUTPATIENT
Start: 2024-01-09 | End: 2024-01-09

## 2024-01-09 RX ORDER — QUETIAPINE FUMARATE 25 MG/1
TABLET, FILM COATED ORAL
Qty: 30 TABLET | Refills: 0 | OUTPATIENT
Start: 2024-01-09

## 2024-01-09 RX ORDER — SERTRALINE HYDROCHLORIDE 25 MG/1
25 TABLET, FILM COATED ORAL DAILY
Qty: 90 TABLET | Refills: 3 | Status: SHIPPED | OUTPATIENT
Start: 2024-01-09 | End: 2024-02-20

## 2024-01-09 RX ORDER — METFORMIN HCL 500 MG
500 TABLET, EXTENDED RELEASE 24 HR ORAL 2 TIMES DAILY WITH MEALS
Qty: 180 TABLET | Refills: 3 | Status: SHIPPED | OUTPATIENT
Start: 2024-01-09 | End: 2024-01-09

## 2024-01-09 RX ORDER — TRAZODONE HYDROCHLORIDE 50 MG/1
TABLET, FILM COATED ORAL
Qty: 90 TABLET | Refills: 3 | Status: SHIPPED | OUTPATIENT
Start: 2024-01-09

## 2024-01-09 RX ORDER — QUETIAPINE FUMARATE 25 MG/1
TABLET, FILM COATED ORAL
Qty: 90 TABLET | Refills: 3 | Status: SHIPPED | OUTPATIENT
Start: 2024-01-09 | End: 2024-01-09

## 2024-01-09 RX ORDER — QUETIAPINE FUMARATE 25 MG/1
TABLET, FILM COATED ORAL
Qty: 90 TABLET | Refills: 3 | Status: SHIPPED | OUTPATIENT
Start: 2024-01-09

## 2024-01-09 ASSESSMENT — ENCOUNTER SYMPTOMS
DIZZINESS: 0
FREQUENCY: 1
COUGH: 0
CONSTIPATION: 0
HEMATURIA: 0
FEVER: 0
JOINT SWELLING: 0
HEARTBURN: 1
CHILLS: 1
PARESTHESIAS: 0
HEMATOCHEZIA: 0
MYALGIAS: 0
NERVOUS/ANXIOUS: 0
ABDOMINAL PAIN: 0
EYE PAIN: 0
HEADACHES: 0
DIARRHEA: 1
DYSURIA: 0
NAUSEA: 0
SHORTNESS OF BREATH: 0
PALPITATIONS: 0
SORE THROAT: 0

## 2024-01-09 ASSESSMENT — ACTIVITIES OF DAILY LIVING (ADL)
CURRENT_FUNCTION: TELEPHONE REQUIRES ASSISTANCE
CURRENT_FUNCTION: MONEY MANAGEMENT REQUIRES ASSISTANCE
CURRENT_FUNCTION: MEDICATION ADMINISTRATION REQUIRES ASSISTANCE
CURRENT_FUNCTION: PREPARING MEALS REQUIRES ASSISTANCE
CURRENT_FUNCTION: HOUSEWORK REQUIRES ASSISTANCE

## 2024-01-09 NOTE — PATIENT INSTRUCTIONS
"Patient Education   Personalized Prevention Plan  You are due for the preventive services outlined below.  Your care team is available to assist you in scheduling these services.  If you have already completed any of these items, please share that information with your care team to update in your medical record.  Health Maintenance Due   Topic Date Due    RSV VACCINE (Pregnancy & 60+) (1 - 1-dose 60+ series) Never done    Zoster (Shingles) Vaccine (2 of 3) 12/05/2012    Diabetic Foot Exam  07/19/2023    Eye Exam  01/18/2024     Learning About Being Physically Active  What is physical activity?     Being physically active means doing any kind of activity that gets your body moving.  The types of physical activity that can help you get fit and stay healthy include:  Aerobic or \"cardio\" activities. These make your heart beat faster and make you breathe harder, such as brisk walking, riding a bike, or running. They strengthen your heart and lungs and build up your endurance.  Strength training activities. These make your muscles work against, or \"resist,\" something. Examples include lifting weights or doing push-ups. These activities help tone and strengthen your muscles and bones.  Stretches. These let you move your joints and muscles through their full range of motion. Stretching helps you be more flexible.  Reaching a balance between these three types of physical activity is important because each one contributes to your overall fitness.  What are the benefits of being active?  Being active is one of the best things you can do for your health. It helps you to:  Feel stronger and have more energy to do all the things you like to do.  Focus better at school or work.  Feel, think, and sleep better.  Reach and stay at a healthy weight.  Lose fat and build lean muscle.  Lower your risk for serious health problems, including diabetes, heart attack, high blood pressure, and some cancers.  Keep your heart, lungs, bones, " "muscles, and joints strong and healthy.  How can you make being active part of your life?  Start slowly. Make it your long-term goal to get at least 30 minutes of exercise on most days of the week. Walking is a good choice. You also may want to do other activities, such as running, swimming, cycling, or playing tennis or team sports.  Pick activities that you like--ones that make your heart beat faster, your muscles stronger, and your muscles and joints more flexible. If you find more than one thing you like doing, do them all. You don't have to do the same thing every day.  Get your heart pumping every day. Any activity that makes your heart beat faster and keeps it at that rate for a while counts.  Here are some great ways to get your heart beating faster:  Go for a brisk walk, run, or hike.  Go for a swim or bike ride.  Take an online exercise class or dance.  Play a game of touch football, basketball, or soccer.  Play tennis, pickleball, or racquetball.  Climb stairs.  Even some household chores can be aerobic. Just do them at a faster pace. Raking or mowing the lawn, sweeping the garage, and vacuuming and cleaning your home all can help get your heart rate up.  Strengthen your muscles during the week. You don't have to lift heavy weights or grow big, bulky muscles to get stronger. Doing a few simple activities that make your muscles work against, or \"resist,\" something can help you get stronger. Aim for at least twice a week.  For example, you can:  Do push-ups or sit-ups, which use your own body weight as resistance.  Lift weights or dumbbells or use stretch bands at home or in a gym or community center.  Stretch your muscles often. Stretching will help you as you become more active. It can help you stay flexible and loosen tight muscles. It can also help improve your balance and posture and can be a great way to relax.  Be sure to stretch the muscles you'll be using when you work out. It's best to warm your " "muscles slightly before you stretch them. Walk or do some other light aerobic activity for a few minutes. Then start stretching.  When you stretch your muscles:  Do it slowly. Stretching is not about going fast or making sudden movements.  Don't push or bounce during a stretch.  Hold each stretch for at least 15 to 30 seconds, if you can. You should feel a stretch in the muscle, but not pain.  Breathe out as you do the stretch. Then breathe in as you hold the stretch. Don't hold your breath.  If you're worried about how more activity might affect your health, have a checkup before you start. Follow any special advice your doctor gives you for getting a smart start.  Where can you learn more?  Go to https://www.[employer]/patiented  Enter W332 in the search box to learn more about \"Learning About Being Physically Active.\"  Current as of: June 6, 2023               Content Version: 13.8    5767-3420 [employer].   Care instructions adapted under license by your healthcare professional. If you have questions about a medical condition or this instruction, always ask your healthcare professional. [employer] disclaims any warranty or liability for your use of this information.      Activities of Daily Living    Your Health Risk Assessment indicates you have difficulties with activities of daily living such as housework, bathing, preparing meals, taking medication, etc. Please make a follow up appointment for us to address this issue in more detail.  Hearing Loss: Care Instructions  Overview     Hearing loss is a sudden or slow decrease in how well you hear. It can range from slight to profound. Permanent hearing loss can occur with aging. It also can happen when you are exposed long-term to loud noise. Examples include listening to loud music, riding motorcycles, or being around other loud machines.  Hearing loss can affect your work and home life. It can make you feel lonely or depressed. " You may feel that you have lost your independence. But hearing aids and other devices can help you hear better and feel connected to others.  Follow-up care is a key part of your treatment and safety. Be sure to make and go to all appointments, and call your doctor if you are having problems. It's also a good idea to know your test results and keep a list of the medicines you take.  How can you care for yourself at home?  Avoid loud noises whenever possible. This helps keep your hearing from getting worse.  Always wear hearing protection around loud noises.  Wear a hearing aid as directed.  A professional can help you pick a hearing aid that will work best for you.  You can also get hearing aids over the counter for mild to moderate hearing loss.  Have hearing tests as your doctor suggests. They can show whether your hearing has changed. Your hearing aid may need to be adjusted.  Use other devices as needed. These may include:  Telephone amplifiers and hearing aids that can connect to a television, stereo, radio, or microphone.  Devices that use lights or vibrations. These alert you to the doorbell, a ringing telephone, or a baby monitor.  Television closed-captioning. This shows the words at the bottom of the screen. Most new TVs can do this.  TTY (text telephone). This lets you type messages back and forth on the telephone instead of talking or listening. These devices are also called TDD. When messages are typed on the keyboard, they are sent over the phone line to a receiving TTY. The message is shown on a monitor.  Use text messaging, social media, and email if it is hard for you to communicate by telephone.  Try to learn a listening technique called speechreading. It is not lipreading. You pay attention to people's gestures, expressions, posture, and tone of voice. These clues can help you understand what a person is saying. Face the person you are talking to, and have them face you. Make sure the lighting is  "good. You need to see the other person's face clearly.  Think about counseling if you need help to adjust to your hearing loss.  When should you call for help?  Watch closely for changes in your health, and be sure to contact your doctor if:    You think your hearing is getting worse.     You have new symptoms, such as dizziness or nausea.   Where can you learn more?  Go to https://www.[employer].net/patiented  Enter R798 in the search box to learn more about \"Hearing Loss: Care Instructions.\"  Current as of: February 28, 2023               Content Version: 13.8    0686-7214 [employer].   Care instructions adapted under license by your healthcare professional. If you have questions about a medical condition or this instruction, always ask your healthcare professional. [employer] disclaims any warranty or liability for your use of this information.      Bladder Training: Care Instructions  Your Care Instructions     Bladder training is used to treat urge incontinence and stress incontinence. Urge incontinence means that the need to urinate comes on so fast that you can't get to a toilet in time. Stress incontinence means that you leak urine because of pressure on your bladder. For example, it may happen when you laugh, cough, or lift something heavy.  Bladder training can increase how long you can wait before you have to urinate. It can also help your bladder hold more urine. And it can give you better control over the urge to urinate.  It is important to remember that bladder training takes a few weeks to a few months to make a difference. You may not see results right away, but don't give up.  Follow-up care is a key part of your treatment and safety. Be sure to make and go to all appointments, and call your doctor if you are having problems. It's also a good idea to know your test results and keep a list of the medicines you take.  How can you care for yourself at home?  Work with your " doctor to come up with a bladder training program that is right for you. You may use one or more of the following methods.  Delayed urination  In the beginning, try to keep from urinating for 5 minutes after you first feel the need to go.  While you wait, take deep, slow breaths to relax. Kegel exercises can also help you delay the need to go to the bathroom.  After some practice, when you can easily wait 5 minutes to urinate, try to wait 10 minutes before you urinate.  Slowly increase the waiting period until you are able to control when you have to urinate.  Scheduled urination  Empty your bladder when you first wake up in the morning.  Schedule times throughout the day when you will urinate.  Start by going to the bathroom every hour, even if you don't need to go.  Slowly increase the time between trips to the bathroom.  When you have found a schedule that works well for you, keep doing it.  If you wake up during the night and have to urinate, do it. Apply your schedule to waking hours only.  Kegel exercises  These tighten and strengthen pelvic muscles, which can help you control the flow of urine. (If doing these exercises causes pain, stop doing them and talk with your doctor.) To do Kegel exercises:  Squeeze your muscles as if you were trying not to pass gas. Or squeeze your muscles as if you were stopping the flow of urine. Your belly, legs, and buttocks shouldn't move.  Hold the squeeze for 3 seconds, then relax for 5 to 10 seconds.  Start with 3 seconds, then add 1 second each week until you are able to squeeze for 10 seconds.  Repeat the exercise 10 times a session. Do 3 to 8 sessions a day.  When should you call for help?  Watch closely for changes in your health, and be sure to contact your doctor if:    Your incontinence is getting worse.     You do not get better as expected.   Where can you learn more?  Go to https://www.healthwise.net/patiented  Enter V684 in the search box to learn more about  "\"Bladder Training: Care Instructions.\"  Current as of: February 28, 2023               Content Version: 13.8    0062-7300 [employer].   Care instructions adapted under license by your healthcare professional. If you have questions about a medical condition or this instruction, always ask your healthcare professional. [employer] disclaims any warranty or liability for your use of this information.             Dr Bingham has placed orders for the following referrals:    Dr Andry Aparicio, Neurology specialist at [facility], in case you want to review the donepazil (Aricept) dose or other medications for memory.    Urology specialist in case you want to see someone about urgency to urinate and leakage of urine.     ENT specialist in case you'd like them to look at your hearing.     For medications: let's try stopping the metformin. This may be causing loose stools, and might be hard on the kidneys. Also, your A1c is good enough that you might not need it. If glucoses go up, try taking just one metformin pill per day.    Refilled other needed meds today.     Recent labs looked fine.     See me in six months for diabetes checkup--may be in office or video visit.   "

## 2024-01-09 NOTE — PROGRESS NOTES
"SUBJECTIVE:   Robi is a 89 year old, presenting for the following:  Medicare Visit        1/9/2024    10:15 AM   Additional Questions   Roomed by Jeanette MANZO CMA   Accompanied by spouse, \" Sharon\", formal name Cristina       Are you in the first 12 months of your Medicare coverage?  No    Healthy Habits:     In general, how would you rate your overall health?  Excellent    Frequency of exercise:  None    Do you usually eat at least 4 servings of fruit and vegetables a day, include whole grains    & fiber and avoid regularly eating high fat or \"junk\" foods?  Yes    Taking medications regularly:  Yes    Medication side effects:  None    Ability to successfully perform activities of daily living:  Telephone requires assistance, preparing meals requires assistance, housework requires assistance, medication administration requires assistance and money management requires assistance    Home Safety:  No safety concerns identified    Hearing Impairment:  Difficulty following a conversation in a noisy restaurant or crowded room, feel that people are mumbling or not speaking clearly, difficulty following dialogue in the theater, difficult to understand a speaker at a public meeting or Bahai service, need to ask people to speak up or repeat themselves and difficulty understanding soft or whispered speech    In the past 6 months, have you been bothered by leaking of urine? Yes    In general, how would you rate your overall mental or emotional health?  Good    Additional concerns today:  Yes      Today's PHQ-2 Score:       1/9/2024    10:09 AM   PHQ-2 ( 1999 [employer])   Q1: Little interest or pleasure in doing things 0   Q2: Feeling down, depressed or hopeless 0   PHQ-2 Score 0   Q1: Little interest or pleasure in doing things Not at all   Q2: Feeling down, depressed or hopeless Not at all   PHQ-2 Score 0           Have you ever done Advance Care Planning? (For example, a Health Directive, POLST, or a discussion with a medical provider " "or your loved ones about your wishes): Yes, advance care planning is on file.       Fall risk  Fallen 2 or more times in the past year?: No  Any fall with injury in the past year?: No    Cognitive Screening Not appropriate due to known dementia    Do you have sleep apnea, excessive snoring or daytime drowsiness? : daytime drowsiness    Reviewed and updated as needed this visit by clinical staff   Tobacco  Allergies  Meds              Reviewed and updated as needed this visit by Provider                 Social History     Tobacco Use    Smoking status: Former     Packs/day: 1.00     Years: 30.00     Additional pack years: 0.00     Total pack years: 30.00     Types: Cigarettes     Start date:      Quit date: 1980     Years since quittin.3    Smokeless tobacco: Former     Types: Chew     Quit date: 1983   Substance Use Topics    Alcohol use: No     Comment: quit 30+ years ago. states, \"I'm an alcoholic\".             2024    10:08 AM   Alcohol Use   Prescreen: >3 drinks/day or >7 drinks/week? No     Do you have a current opioid prescription? No  Do you use any other controlled substances or medications that are not prescribed by a provider? None    Upcoming eye appointment at the end of January.      PROBLEMS TO ADD ON...In addition to an Annual Wellness Exam, we addressed diabetes mellitus, hyperlipidemia, hypertension, memory concerns, urinary issues, loose stools, hearing loss.     The patient is tolerating his current medications without any adverse effects.  We reviewed recent labs, showing an A1c of 6.1 and an LDL-Cholesterol of 38.  His BP appears satisfactorily controlled.     He has had some ongoing memory concerns and remains on Aricept. He is offered a Neurology referral.     He has noted problems with urinary urgency and frequency and is offered a Urology referral.     He notes hearing loss and is offered an ENT referral.         Current providers sharing in care for this patient " include:   Patient Care Team:  Viet Bingham MD as PCP - General (Internal Medicine)  Viet Bingham MD as Assigned PCP  Des Marks MD as MD (Vascular and Interventional Radiology)  Mary Faulkner, RN as Specialty Care Coordinator (Radiology)  Eulalio Higginbotham MD as MD (Cardiovascular Disease)  Lizz Love APRN CNP as Assigned Heart and Vascular Provider  [facility]  Mary Son CHW as Community Health Worker  Raul Iqbal PA-C as Assigned Musculoskeletal Provider  Elizabeth Camacho, NATALIE as Lead Care Coordinator (Primary Care - CC)    The following health maintenance items are reviewed in Epic and correct as of today:  Health Maintenance   Topic Date Due    RSV VACCINE (Pregnancy & 60+) (1 - 1-dose 60+ series) Never done    ZOSTER IMMUNIZATION (2 of 3) 12/05/2012    MEDICARE ANNUAL WELLNESS VISIT  07/19/2023    DIABETIC FOOT EXAM  07/19/2023    EYE EXAM  01/18/2024    A1C  07/02/2024    MICROALBUMIN  07/05/2024    ANNUAL REVIEW OF HM ORDERS  07/13/2024    HEMOGLOBIN  11/21/2024    BMP  01/02/2025    LIPID  01/02/2025    FALL RISK ASSESSMENT  01/09/2025    ADVANCE CARE PLANNING  08/07/2028    DTAP/TDAP/TD IMMUNIZATION (5 - Td or Tdap) 07/19/2032    PHQ-2 (once per calendar year)  Completed    INFLUENZA VACCINE  Completed    Pneumococcal Vaccine: 65+ Years  Completed    URINALYSIS  Completed    COVID-19 Vaccine  Completed    IPV IMMUNIZATION  Aged Out    HPV IMMUNIZATION  Aged Out    MENINGITIS IMMUNIZATION  Aged Out    RSV MONOCLONAL ANTIBODY  Aged Out         Pneumococcal vaccinations:  Patient has received both pneumonia vaccinations (Prevnar-13 and Pneumovax-23)     Review of Systems   Constitutional:  Positive for chills. Negative for fever.   HENT:  Positive for hearing loss. Negative for congestion, ear pain and sore throat.    Eyes:  Negative for pain.   Respiratory:  Negative for cough and shortness of breath.    Cardiovascular:  Negative for  "chest pain, palpitations and peripheral edema.   Gastrointestinal:  Positive for diarrhea and heartburn. Negative for abdominal pain, constipation, hematochezia and nausea.   Genitourinary:  Positive for frequency and urgency. Negative for dysuria, genital sores, hematuria, impotence and penile discharge.   Musculoskeletal:  Negative for joint swelling and myalgias.   Skin:  Negative for rash.   Neurological:  Negative for dizziness, headaches and paresthesias.   Psychiatric/Behavioral:  Negative for mood changes. The patient is not nervous/anxious.          OBJECTIVE:   /58 (BP Location: Left arm, Patient Position: Sitting, Cuff Size: Adult Large)   Pulse 87   Temp 97.7  F (36.5  C) (Oral)   Resp 18   Ht 1.58 m (5' 2.21\")   Wt 69.9 kg (154 lb)   SpO2 97%   BMI 27.98 kg/m   Estimated body mass index is 27.98 kg/m  as calculated from the following:    Height as of this encounter: 1.58 m (5' 2.21\").    Weight as of this encounter: 69.9 kg (154 lb).    Physical Exam  GENERAL: alert and no distress  EYES: Eyes grossly normal to inspection, PERRL and conjunctivae and sclerae normal  HENT: ear canals and TM's normal, nose and mouth without ulcers or lesions  NECK: no adenopathy, no asymmetry, masses, or scars  RESP: lungs clear to auscultation - no rales, rhonchi or wheezes  CV: regular rate and rhythm, normal S1 S2, no S3 or S4, no murmur, click or rub, no peripheral edema  ABDOMEN: soft, nontender, no hepatosplenomegaly, no masses and bowel sounds normal  MS: no gross musculoskeletal defects noted, no edema  SKIN: no suspicious lesions or rashes  NEURO: Normal strength and tone, mentation intact and speech normal  PSYCH: mentation appears normal, affect normal/bright  LYMPH: no cervical, supraclavicular, axillary, or inguinal adenopathy    Diagnostic Test Results:  Labs reviewed in Epic    ASSESSMENT / PLAN:   (Z00.00) Encounter for Medicare annual wellness exam  (primary encounter diagnosis)  Comment: " Stable health. See epic orders.    (E11.29,  R80.9) Type 2 diabetes mellitus with microalbuminuria, without long-term current use of insulin (H)  Comment: A1c at target. Continue current measures.A1c at target. Continue current measures.   Will discontinue metformin due to diarrhea and excellent A1c results.   Plan: OFFICE/OUTPT VISIT,EST,LEVL IV, FOOT EXAM,         DISCONTINUED: metFORMIN (GLUCOPHAGE XR) 500 MG         24 hr tablet          (E11.22,  N18.30) Type 2 diabetes mellitus with stage 3 chronic kidney disease, without long-term current use of insulin, unspecified whether stage 3a or 3b CKD (H)  Plan: **Hemoglobin A1c FUTURE 6mo          (N18.31) Chronic kidney disease, stage 3a (H)  Plan: OFFICE/OUTPT VISIT,EST,LEVL IV          (F03.B18) Moderate dementia with other behavioral disturbance, unspecified dementia type (H)  Comment: Continue Aricept. Offered Neurology consult.   Plan: donepezil (ARICEPT) 5 MG tablet, Adult         Neurology  Referral, OFFICE/OUTPT         VISIT,EST,LEVL IV, DISCONTINUED: QUEtiapine         (SEROQUEL) 25 MG tablet          (R32) Urinary incontinence, unspecified type  (R39.15) Urinary urgency  Comment: Referred to Urology.   Plan: Adult Urology  Referral, OFFICE/OUTPT         VISIT,EST,LEVL IV          (F41.9) Anxiety  Comment: Stable. Continue sertraline.   Plan: sertraline (ZOLOFT) 25 MG tablet, OFFICE/OUTPT         VISIT,EST,LEVL IV          (E78.5) Hyperlipidemia with target LDL less than 100  Comment: LDL at target. Continue current meds.   Plan: simvastatin (ZOCOR) 20 MG tablet, OFFICE/OUTPT         VISIT,EST,LEVL IV          (G47.00) Insomnia, unspecified type  Comment: Offered Rx for trazodone.   Plan: traZODone (DESYREL) 50 MG tablet, OFFICE/OUTPT         VISIT,EST,LEVL IV          (I44.2) Complete heart block (H)  (I73.9) Peripheral vascular disease (H24)  Comment: Follow with cardiology as they advise.   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    (H91.93) Bilateral hearing loss, unspecified hearing loss type  Comment: Offered ENT referral.   Plan: Adult ENT  Referral            Patient has been advised of split billing requirements and indicates understanding: Yes      COUNSELING:  Reviewed preventive health counseling, as reflected in patient instructions      BMI:   Estimated body mass index is 27.98 kg/m  as calculated from the following:    Height as of this encounter: 1.58 m (5' 2.21\").    Weight as of this encounter: 69.9 kg (154 lb).         He reports that he quit smoking about 43 years ago. His smoking use included cigarettes. He started smoking about 68 years ago. He has a 30 pack-year smoking history. He quit smokeless tobacco use about 41 years ago.  His smokeless tobacco use included chew.      Appropriate preventive services were discussed with this patient, including applicable screening as appropriate for fall prevention, nutrition, physical activity, Tobacco-use cessation, weight loss and cognition.  Checklist reviewing preventive services available has been given to the patient.    Reviewed patients plan of care and provided an AVS. The Basic Care Plan (routine screening as documented in Health Maintenance) for Reid meets the Care Plan requirement. This Care Plan has been established and reviewed with the Patient and wife .          Viet Bingham MD  [facility]      Patient Instructions   Dr Bingham has placed orders for the following referrals:    Dr Andry Aparicio, Neurology specialist at [facility], in case you want to review the donepazil (Aricept) dose or other medications for memory.    Urology specialist in case you want to see someone about urgency to urinate and leakage of urine.     ENT specialist in case you'd like them to look at your hearing.     For medications: let's try stopping the metformin. This may be causing loose " stools, and might be hard on the kidneys. Also, your A1c is good enough that you might not need it. If glucoses go up, try taking just one metformin pill per day.    Refilled other needed meds today.     Recent labs looked fine.     See me in six months for diabetes checkup--may be in office or video visit.              Identified Health Risks:  I have reviewed Opioid Use Disorder and Substance Use Disorder risk factors and made any needed referrals. He is at risk for lack of exercise and has been provided with information to increase physical activity for the benefit of his well-being.  The patient was provided with written information regarding signs of hearing loss.  Information on urinary incontinence and treatment options given to patient.

## 2024-01-09 NOTE — TELEPHONE ENCOUNTER
"Pharmacy calls to clarify the Seroquel directions.     Is it one at bedtime and then up to 2 daily max or 3 maximum daily?   (If it is 3 total, Can put \"take an additional tablet, twice daily, as needed during the daytime).     The pharmacist states she can read it either way.     Please clarify for pharmacy to bill insurance.         "

## 2024-01-10 ENCOUNTER — TELEPHONE (OUTPATIENT)
Dept: INTERNAL MEDICINE | Facility: CLINIC | Age: 89
End: 2024-01-10
Payer: MEDICARE

## 2024-01-10 NOTE — TELEPHONE ENCOUNTER
Central Prior Authorization Team   Phone: [phone]    PA Initiation    Medication: QUEtiapine (SEROQUEL) 25 MG tablet  Insurance Company: [employer] - Phone [phone] Fax [phone]  Pharmacy Filling the Rx: [facility] [address] [place] [address]  Filling Pharmacy Phone: [phone]  Filling Pharmacy Fax:    Start Date: 1/10/2024

## 2024-01-10 NOTE — TELEPHONE ENCOUNTER
Pharmacy calls to report that prescription is needing a prior authorization. Prior authorization initiated in separate encounter.    Thank you,  Anthony Canales, Triage RN [facility]  9:48 AM 1/10/2024

## 2024-01-10 NOTE — TELEPHONE ENCOUNTER
Prior Authorization Retail Medication Request    Medication/Dose: QUEtiapine (SEROQUEL) 25 MG tablet  Diagnosis and ICD code (if different than what is on RX):    New/renewal/insurance change PA/secondary ins. PA:  Previously Tried and Failed:    Rationale:  - Moderate dementia with other behavioral disturbance, unspecified dementia type (H)     Insurance   Primary: Medicare  Insurance ID:  [id number]      Secondary (if applicable):[facility]  Insurance ID:  [id number]    Pharmacy Information (if different than what is on RX)  Name:    Phone:    Fax:      Thank you,  Anthony Canales, Triage RN [facility]  9:50 AM 1/10/2024

## 2024-01-10 NOTE — TELEPHONE ENCOUNTER
Prior Authorization Approval    Authorization Effective Date: 1/10/2024  Authorization Expiration Date: 12/31/2024  Medication: QUEtiapine (SEROQUEL) 25 MG tablet  Reference #:     Insurance Company: [employer] - Phone [phone] Fax [phone]  Which Pharmacy is filling the prescription (Not needed for infusion/clinic administered): [facility] [address] [place] [address] [id number] [place] [address]  Pharmacy Notified: Yes  Patient Notified: Instructed pharmacy to notify patient when script is ready to /ship.

## 2024-01-12 ENCOUNTER — TELEPHONE (OUTPATIENT)
Dept: INTERNAL MEDICINE | Facility: CLINIC | Age: 89
End: 2024-01-12
Payer: MEDICARE

## 2024-01-12 NOTE — TELEPHONE ENCOUNTER
General Call    Contacts         Type Contact Phone/Fax    01/12/2024 12:26 PM CST Phone (Incoming) Alyssa Jimenes (Emergency Contact) [phone]          Reason for Call:  ENT referral    What are your questions or concerns:  Patient was given a referral for ENT. All of the [facility] locations are north of the [place]. He would like one closer to home. Wife is wondering if he has any recommendations.    Date of last appointment with provider: 1/9/2024    Could we send this information to you in [facility] or would you prefer to receive a phone call?:   Patient would prefer a phone call   Okay to leave a detailed message?: Yes at Home number on file [phone] (home)

## 2024-01-22 ENCOUNTER — PATIENT OUTREACH (OUTPATIENT)
Dept: CARE COORDINATION | Facility: CLINIC | Age: 89
End: 2024-01-22
Payer: MEDICARE

## 2024-01-22 NOTE — PROGRESS NOTES
Clinic Care Coordination Contact  Community Health Worker Follow Up    Care Gaps:     Health Maintenance Due   Topic Date Due    RSV VACCINE (Pregnancy & 60+) (1 - 1-dose 60+ series) Never done    ZOSTER IMMUNIZATION (2 of 3) 12/05/2012    EYE EXAM  01/18/2024       Scheduled 7/3/2024      Care Plan:   Care Plan: Resources for Caregiver support       Problem: Resources for Caregiver support       Goal: Caregiver Support       Start Date: 12/21/2023 Expected End Date: 3/21/2024    This Visit's Progress: 10%    Note:     Barriers: Cognitive decline; Limited caregiver support; Provider availability - wait time to complete appointments.   Strengths: Motivated; Agreeable to Care Coordination.   Patient expressed understanding of goal: Yes  Action steps to achieve this goal:  1. I will review resources for Caregiver Support sent by Care Coordinator.   2. I will contact Care Coordinator for additional resources if needed.   3. I will contact the care team with questions, concerns or support needs. I will use the clinic as a resource and I understand I can contact the clinic with 24/7 after hours services available. Care Coordinator will remain available as needed.                               Care Plan: Utilization       Problem: Increased risk of re-admission       Goal: I would like additional resources and support to manage my health and prevent future avoidable ED visits/hospital admissions       Start Date: 12/21/2023 Expected End Date: 3/21/2024    This Visit's Progress: 10%    Note:     Barriers: Cognitive decline; Limited caregiver support; Provider availability - wait time to complete appointments.   Strengths: Motivated; Agreeable to Care Coordination.   Patient expressed understanding of goal: Yes  Action steps to achieve this goal:  1. I will utilize the services of Care Coordinator by contacting them when issues/concerns arise.    2. I will contact Care Coordinator for additional resources if needed.   3. I  will contact the care team with questions, concerns or support needs. I will use the clinic as a resource and I understand I can contact the clinic with 24/7 after hours services available. Care Coordinator will remain available as needed.                                 Intervention and Education during outreach:     CHW was able to connect with Alyssa ([facility]) and review their above goals. Alyssa shares that she would be interested in more support group types of resources.   Please see letter on 1/22/2024 via [employer] for resources sent with more details.     No additional CC needs identified during the time of call. Encouraged Alyssa to give the Writer a call back previous to next CC outreach if needed; Alyssa is agreeable with the plan.     CHW Plan: Writer will reach out to Alyssa in 1 month to monitor the progression of their goals.      Mary SCHREIBER Public Health  Community Health Worker  [facility] Clinics:  [place], [place] & [facility] Care Coordination  [phone]

## 2024-01-22 NOTE — LETTER
[facility] CARE COORDINATION  [facility]  January 22, 2024    Reid Jimenes  [address] [place] [id number]      Dear Reid,    I am a clinic community health worker who works with Viet Bingham MD, MD with the [facility]. I wanted to thank you for spending the time to talk with me.  Below is a description of clinic care coordination and how I can further assist you.       Support Groups:      [facility] Caregiver Support Groups provides space for caregivers to feel less alone and receive mutual support from other caregivers. We meet online and/or at [facility] location in [place] unless otherwise noted. Our Caregiver Support Groups are free to join and respite is available if you are looking for a long term care option for your care partner while you get a break. [email or web address]/what-we-offer/healthy-aging-caregiving-services/support-groups-caregivers  Caregiver Support Groups  Memory Loss Discussion Group  Second Thursday, 10:00-11:30 a.m.  Caregiver support group for people caring for someone with dementia. In-person group--social distance guidelines in place.  Caregiver Coffee Hour  First Friday, 9:00-10:30 a.m.  Have a cup of coffee and a treat while chatting with other caregivers. Caregivers are welcome to drop in anytime during the coffee hour. In-person group--social distance guidelines in place.  **Call [phone] or email [email or web address] for more information or to RSVP for a caregiver support group.     Caregiver Respite   Sign up for Athigo. Caregivers get time for rest and self-care while their care partner enjoy 4 hours of engaging activities, personal care and healthy meals in a safe, fun environment at the [facility] in [place], [place].  **Athigo is specifically for caregivers tending to the needs of those living  with memory loss and Parkinson's Disease.      Weekly Caregiver Check-in  [place] offers weekly check-ins so caregivers like you have someone to talk to about your caregiving responsibilities. Along with a phone call to see how you are doing, our trained staff and volunteers provide personalized guidance and resources to help you navigate your caregiving journey with peace of mind.  Call [phone] ([phone]) or email [email or web address] and let us know you d like to sign up for [place] s Telephone Reassurance Service for Caregivers     [facility] - Caregiver Support Group  Do you have a loved one or are you caring for a loved one? This support group is for all caregivers (full or part-time) to share their struggles/joys/needs, and receive support, information, and resources. We ll identify and reduce personal stress, learn communication skills for challenging situations, and much more. This is a confidential group facilitated by [facility] member Betzy Nj. Betzy has extensive experience and knowledge specific to dementia and caregiving. For those who have questions related specifically to dementia, Betzy is available to offer support after class and in between meetings, too! For questions, please contact Betzy at [email or web address].  3rd Saturday of the Month, 9-10 a.m., [place]  [email or web address]/support     Alzheimer's [facility] of [place] -   Connecting with others is a vital part of the caregiving equation. [facility] offers free Alzheimer s and dementia support groups, facilitated by [facility] licensed social workers. These support groups give caregivers and others impacted by Alzheimer s and dementia a place to connect and share with one another.   6-Week Telephone Support Group for Caregivers. Thursdays 3:30 pm - 4:30 pm (ET)  If you have any questions or would like information about registering for this group, contact [facility]  Devin Ochoa LMSW, [email or web address],  [phone]  Weekly Caregiver Support Group - Telephone. Mondays 7 pm - 8 pm (ET)  This support is currently full. To join the waitlist, contact [facility] [place] Toll-Free Helpline at [phone].        Adult /Respite:      [facility] - Recharge and Take a Break: New Group Respite!  [facility] group respite, [facility], provides supervision and companionship with the addition of social engagement and 4 hours of brain-stimulating activities. The program will be led by a [facility] staff member with support from a team of volunteers. The program is designed for individuals with early- to mid- stage memory loss or other conditions that require some supervision. During a session of [facility], participants, volunteers, and staff enjoy activities that stimulate the brain and promote community. Examples include music, games, exercise, discussion groups, art activities, guest speakers, and more.  Participation in [facility] program is free! Pre-Registration Required to participate. Call [facility] to register: [phone]  There are two locations available for group respite:  [place]: Begins Friday, September 9, 2022  [facility] ([address], [place])  Bi-weekly on the 2nd and 4th Fridays of the month  9:00AM - 1:00PM  [place]: Begins Thursday, October 6, 2022  [facility] ([address])  Bi-weekly on the 1st and 3rd Thursdays of the month  9:00AM - 1:00PM     [facility]  [phone] [address] [place] [address]     [facility]  [phone]  Locations in [facility]  [phone] [address] [place] [address]     [facility]  [phone] [address] [place] [address]     [facility]  [phone] [address] [place] [address]     [facility] Living [facility]  [phone] [address]  [place] [address]    [facility]  [phone]  [address] [place] [address]          Memory Cafes in [place]  Find [place] Memory Cafes near you and enjoy a  dementia-friendly  outing with your loved one. Some are sponsored by national organizations, while many more are offered by local groups. If your favorite Memory Cafe isn t shown, please let us know and we ll add it. It s free!     [email or web address]/memory-cafes-in[place]/          Please feel free to contact me with any questions or concerns regarding care coordination and what we can offer.      We are focused on providing you with the highest-quality healthcare experience possible.    Sincerely,       Mary WARD. Public Health  Community Health Worker  [facility]:  [facility] Care Coordination  [phone]

## 2024-02-01 ENCOUNTER — PATIENT OUTREACH (OUTPATIENT)
Dept: CARE COORDINATION | Facility: CLINIC | Age: 89
End: 2024-02-01
Payer: MEDICARE

## 2024-02-01 NOTE — PROGRESS NOTES
Clinic Care Coordination Contact  Care Coordination Clinician Chart Review    Situation: Patient chart reviewed by Care Coordinator.       Background: Care Coordination Program started: 10/22/2022. Initial assessment completed and patient-centered care plan(s) were developed with participation from patient. Lead CC handed patient off to CHW for continued outreaches.       Assessment: Per chart review, patient outreach completed by CC CHW on 1/22/24.  Patient is actively working to accomplish goal(s). Patient's goal(s) appropriate and relevant at this time. Patient is not due for updated Plan of Care.  Assessments will be completed annually or as needed/with change of patient status.      Care Plan: Resources for Caregiver support       Problem: Resources for Caregiver support       Goal: Caregiver Support       Start Date: 12/21/2023 Expected End Date: 3/21/2024    This Visit's Progress: 10%    Note:     Barriers: Cognitive decline; Limited caregiver support; Provider availability - wait time to complete appointments.   Strengths: Motivated; Agreeable to Care Coordination.   Patient expressed understanding of goal: Yes  Action steps to achieve this goal:  1. I will review resources for Caregiver Support sent by Care Coordinator.   2. I will contact Care Coordinator for additional resources if needed.   3. I will contact the care team with questions, concerns or support needs. I will use the clinic as a resource and I understand I can contact the clinic with 24/7 after hours services available. Care Coordinator will remain available as needed.                               Care Plan: Utilization       Problem: Increased risk of re-admission       Goal: I would like additional resources and support to manage my health and prevent future avoidable ED visits/hospital admissions       Start Date: 12/21/2023 Expected End Date: 3/21/2024    This Visit's Progress: 10%    Note:     Barriers: Cognitive decline; Limited  caregiver support; Provider availability - wait time to complete appointments.   Strengths: Motivated; Agreeable to Care Coordination.   Patient expressed understanding of goal: Yes  Action steps to achieve this goal:  1. I will utilize the services of Care Coordinator by contacting them when issues/concerns arise.    2. I will contact Care Coordinator for additional resources if needed.   3. I will contact the care team with questions, concerns or support needs. I will use the clinic as a resource and I understand I can contact the clinic with 24/7 after hours services available. Care Coordinator will remain available as needed.                                    Plan/Recommendations: The patient will continue working with Care Coordination to achieve goal(s) as above. CHW will continue outreaches at minimum every 30 days and will involve Lead CC as needed or if patient is ready to move to Maintenance. Lead CC will continue to monitor CHW outreaches and patient's progress to goal(s) every 6 weeks.     Plan of Care updated and sent to patient: No.    Elizabeth Caamcho RN, BSN, CPHN, CCM  [facility]  Phone: [phone]  Email: [email or web address]           Attending with

## 2024-02-12 ENCOUNTER — TELEPHONE (OUTPATIENT)
Dept: INTERNAL MEDICINE | Facility: CLINIC | Age: 89
End: 2024-02-12
Payer: MEDICARE

## 2024-02-12 NOTE — TELEPHONE ENCOUNTER
Please advise pt.     Metformin was stopped primarily to see if his complaints of diarrhea improved.     However, he may still remain off of metformin if fasting glucoses generally below 130 and post-meal glucoses generally running below 180, even if diarrhea not improved.

## 2024-02-12 NOTE — TELEPHONE ENCOUNTER
Freestyle Carola sensors just sent yesterday by primary care provider on 02/11/2024. Routing to primary care provider to advise on restarting Metformin.    Thank you,  Anthony Canales, Triage RN [facility] [place]  11:30 AM 2/12/2024

## 2024-02-12 NOTE — TELEPHONE ENCOUNTER
Medication Question or Refill    Contacts         Type Contact Phone/Fax    02/12/2024 09:42 AM CST Phone (Incoming) RyanAlyssa mclain (Emergency Contact) [phone]            What medication are you calling about (include dose and sig)?: Freestyle Carola 2 sensor kit (2) and metformin (1x in the evening)    Preferred Pharmacy:   [id number] - [place], [place] - [address] [address]  [address] [address] [address]  Phone: [phone] Fax: [phone]    [facility] - [place], [place] - [address]  [address] [facility] [address]  Phone: [phone] Fax: [phone]      Controlled Substance Agreement on file:   CSA -- Patient Level:    CSA: None found at the patient level.       Who prescribed the medication?: PCP    Do you need a refill? Yes    When did you use the medication last? Off metformin for a month and wife is wondering if he should go back on it and would like an answer.     Patient offered an appointment? No    Do you have any questions or concerns?  Yes: Wife wants to know if he should start taking the metformin again. Also pt. Has been out of Freestyle carola 2 sensor kit for 2 weeks. [facility] told wife that doctor has not called in and renewed it.       Could we send this information to you in [facility] or would you prefer to receive a phone call?:   Patient would prefer a phone call   Okay to leave a detailed message?: Yes at Cell number on file:    Telephone Information:   Mobile [phone]

## 2024-02-15 ENCOUNTER — TELEPHONE (OUTPATIENT)
Dept: INTERNAL MEDICINE | Facility: CLINIC | Age: 89
End: 2024-02-15

## 2024-02-15 NOTE — TELEPHONE ENCOUNTER
ADVANCED DIABETES SUPPLY * free style / pump insulin supply order received via fax     Forms and last 2 office visit notes in [employer] mail box for review and signature.

## 2024-02-19 ENCOUNTER — ANCILLARY PROCEDURE (OUTPATIENT)
Dept: CARDIOLOGY | Facility: CLINIC | Age: 89
End: 2024-02-19
Attending: INTERNAL MEDICINE
Payer: MEDICARE

## 2024-02-19 DIAGNOSIS — Z95.0 CARDIAC PACEMAKER IN SITU: ICD-10-CM

## 2024-02-19 DIAGNOSIS — I44.2 CHB (COMPLETE HEART BLOCK) (H): ICD-10-CM

## 2024-02-19 PROCEDURE — 93296 REM INTERROG EVL PM/IDS: CPT | Performed by: INTERNAL MEDICINE

## 2024-02-19 PROCEDURE — 93294 REM INTERROG EVL PM/LDLS PM: CPT | Performed by: INTERNAL MEDICINE

## 2024-02-20 ENCOUNTER — TELEPHONE (OUTPATIENT)
Dept: INTERNAL MEDICINE | Facility: CLINIC | Age: 89
End: 2024-02-20
Payer: MEDICARE

## 2024-02-20 DIAGNOSIS — R80.9 TYPE 2 DIABETES MELLITUS WITH MICROALBUMINURIA, WITHOUT LONG-TERM CURRENT USE OF INSULIN (H): ICD-10-CM

## 2024-02-20 DIAGNOSIS — E11.29 TYPE 2 DIABETES MELLITUS WITH MICROALBUMINURIA, WITHOUT LONG-TERM CURRENT USE OF INSULIN (H): ICD-10-CM

## 2024-02-20 DIAGNOSIS — F41.9 ANXIETY: ICD-10-CM

## 2024-02-20 RX ORDER — SERTRALINE HYDROCHLORIDE 25 MG/1
25 TABLET, FILM COATED ORAL DAILY
Qty: 90 TABLET | Refills: 3 | Status: SHIPPED | OUTPATIENT
Start: 2024-02-20

## 2024-02-20 NOTE — TELEPHONE ENCOUNTER
Patient spouse calls today. She says that she had called [facility] to refill the sertraline and done a refill request via phone, both time she is told that the refill is . She is seeking a refill of this medication for her spouse. Thank you

## 2024-02-20 NOTE — TELEPHONE ENCOUNTER
Wife, Sharon calls to report that patient's blood sugar readings have been elevated since discontinuing Metformin 500 last month. Patient was taken off Metformin during 01/09/2024 appointment per wife.     Readings are 240-245  on average in the last month and can get to be in the low 300's after eating.     Wife wants to know if patient should be placed back on Metformin, if so, they would need new prescription sent to [facility] in [place].    Please advise.     Thank you,  Anthony Canales, Triage RN [facility]  3:27 PM 2/20/2024

## 2024-02-21 ENCOUNTER — PATIENT OUTREACH (OUTPATIENT)
Dept: CARE COORDINATION | Facility: CLINIC | Age: 89
End: 2024-02-21
Payer: MEDICARE

## 2024-02-21 RX ORDER — METFORMIN HCL 500 MG
500 TABLET, EXTENDED RELEASE 24 HR ORAL 2 TIMES DAILY WITH MEALS
Qty: 180 TABLET | Refills: 3 | Status: SHIPPED | OUTPATIENT
Start: 2024-02-21

## 2024-02-21 NOTE — PROGRESS NOTES
Clinic Care Coordination Contact  Community Health Worker Follow Up    Care Gaps:     Health Maintenance Due   Topic Date Due    ZOSTER IMMUNIZATION (2 of 3) 12/05/2012    EYE EXAM  01/18/2024       Scheduled 7/3/2024      Care Plan:   Care Plan: Resources for Caregiver support       Problem: Resources for Caregiver support       Goal: Caregiver Support       Start Date: 12/21/2023 Expected End Date: 3/21/2024    This Visit's Progress: 90% Recent Progress: 10%    Note:     Barriers: Cognitive decline; Limited caregiver support; Provider availability - wait time to complete appointments.   Strengths: Motivated; Agreeable to Care Coordination.   Patient expressed understanding of goal: Yes  Action steps to achieve this goal:  1. I will review resources for Caregiver Support sent by Care Coordinator.   2. I will contact Care Coordinator for additional resources if needed.   3. I will contact the care team with questions, concerns or support needs. I will use the clinic as a resource and I understand I can contact the clinic with 24/7 after hours services available. Care Coordinator will remain available as needed.                               Care Plan: Utilization       Problem: Increased risk of re-admission       Goal: I would like additional resources and support to manage my health and prevent future avoidable ED visits/hospital admissions       Start Date: 12/21/2023 Expected End Date: 3/21/2024    This Visit's Progress: 10% Recent Progress: 10%    Note:     Barriers: Cognitive decline; Limited caregiver support; Provider availability - wait time to complete appointments.   Strengths: Motivated; Agreeable to Care Coordination.   Patient expressed understanding of goal: Yes  Action steps to achieve this goal:  1. I will utilize the services of Care Coordinator by contacting them when issues/concerns arise.    2. I will contact Care Coordinator for additional resources if needed.   3. I will contact the care team  with questions, concerns or support needs. I will use the clinic as a resource and I understand I can contact the clinic with 24/7 after hours services available. Care Coordinator will remain available as needed.                                 Intervention and Education during outreach:     CHW was able to connect with the Patient's spouse, Alyssa ([facility]), and review their above goals. Cristina shares that they are going to try an online support group next week Wednesday.   Alyssa is working on setting up mail delivery for the Patient's continuous glucose monitor. Alyssa is also waiting to hear back from the care team regarding Patient's metformin.     Alyssa had no additional questions or concerns during the time of call and knows that she can reach out to the Writer previous to next CC outreach if needed.       CHW Plan: Writer will reach out to Alyssa in 1 month to monitor the progression of their goals.      Mary SCHREIBER Public Health  Community Health Worker  [facility]:  [place], [place] [facility] Care Coordination  [phone]

## 2024-02-21 NOTE — TELEPHONE ENCOUNTER
Metformin was stopped due to concerns of loose stools. Did loose stools resolve?    If he is willing to resume metformin, may resume at previous dosing. E-prescribed Rx.

## 2024-02-22 NOTE — TELEPHONE ENCOUNTER
Called and spoke with wife, Sharon (consent to communicate on file) to relay provider message. Sharon says patient's loose stools resolved and patient is willing to restart medication. She will  medication from pharmacy and have patient restart it.    Thank you,  Anthony Canales, Triage RN [facility] [place]  10:39 AM 2/22/2024

## 2024-02-23 ENCOUNTER — PATIENT OUTREACH (OUTPATIENT)
Dept: CARE COORDINATION | Facility: CLINIC | Age: 89
End: 2024-02-23
Payer: MEDICARE

## 2024-02-23 NOTE — PROGRESS NOTES
Clinic Care Coordination Contact    Situation: Patient chart reviewed by care coordinator.    Background: Patient is enrolled in Care Coordination and followed by CHW and RN CC.     Assessment: Task initiated to send patient updated Care Plan every 90 days or upon change in condition.    Plan/Recommendations: RN CC sent patient updated Care Plan via his [employer] account.     Elizabeth Camacho RN, BSN, CPHN, CCM  [facility] Ambulatory Care Management  [facility]  Phone: [phone]  Email: [email or web address]

## 2024-02-23 NOTE — LETTER
Dear Robi,   Attached is an updated Patient Centered Plan of Care for your continued enrollment in Care Coordination. Please let us know if you have additional questions, concerns or goals that we can assist with.     Sincerely,   Elizabeth Camacho RN, BSN, CPHN, CM  [facility] Ambulatory Care Management  [facility]  Phone: [phone]  Email: [email or web address]  Patient Centered Plan of Care  About Me:        Patient Name:  Reid Boyd    YOB: 1934  Age:         89 year old   [place] MRN:    [id number] Telephone Information:  Home Phone [phone]   Mobile [phone]       Address:  [address] [place] [address] Email address:  [email or web address]      Emergency Contact(s)    Name Relationship Lgl Grd Work Phone Home Phone Mobile Phone   1. EVA BOYD Spouse No   [phone]   2. JODI GARSIA Daughter No  [phone]    3. JOSEPH ALAMO Daughter No   [phone]           Primary language:  English     needed? No   [facility] [employer]:  [phone] op. 1  Other communication barriers:None    Preferred Method of Communication:  Mary  Current living arrangement: I live in a private home with spouse    Mobility Status/ Medical Equipment: Independent w/Device    Health Maintenance  Health Maintenance Reviewed: Due/Overdue (Discussed with spouse.)      My Access Plan  Medical Emergency 911   Primary Clinic Line [facility] - [phone]   24 Hour Appointment Line [phone] or  [phone]) (toll-free)   24 Hour Nurse Line [phone] (toll-free)   Preferred Urgent Care No data recorded   Preferred Hospital [facility]  [phone]     Preferred Pharmacy [facility] [address] [facility] [address] [facility]     Behavioral Health Crisis Line The National Suicide Prevention Lifeline at  1-494.242.7913 or Text/Call 988     My Care Team Members  Patient Care Team         Relationship Specialty Notifications Start End    Viet Bingham MD PCP - General Internal Medicine  2/11/14     Phone: [phone] Pager: [phone] Fax: [phone]        [address] [facility] [address]    Viet Bingham MD Assigned PCP   1/16/14     Phone: [phone] Pager: [phone] Fax: [phone]        [address] [facility] [address]    Des Marks MD MD Vascular and Interventional Radiology  7/30/20     Phone: [phone] Pager: [phone] Fax: [phone]        [address] [facility] [address] [facility] [address]    Mary Faulkner RN Specialty Care Coordinator Radiology  7/30/20     Phone: [phone] Pager: [phone]        Eulalio Higginbotham MD MD Cardiovascular Disease  1/19/22     Pager: [phone]         Lizz Love APRN CNP Assigned Heart and Vascular Provider   10/15/22     Phone: [phone] Fax: [phone] [address] [place] [address]    [facility]    10/24/22     Phone: [phone] Fax: [phone]         [address] [facility] [address]    Mary Son CHW Community Health Worker   11/10/22     Phone: [phone]         Elizabeth Camacho, RN Lead Care Coordinator Primary Care - CC Admissions 6/9/23     Phone: [phone]         Raul Iqbal PA-C Assigned Neuroscience Provider   1/25/24     Phone: [phone] Pager: [phone] Fax: [phone] [address] [place] [address]              My Care Plans  Self Management and Treatment Plan    Care Plan  Care Plan: Resources for Caregiver support       Problem: Resources for Caregiver support       Goal: Caregiver Support       Start Date: 12/21/2023 Expected End Date: 3/21/2024    This Visit's Progress: 90% Recent Progress: 10%    Note:     Barriers: Cognitive decline; Limited caregiver support; Provider  availability - wait time to complete appointments.   Strengths: Motivated; Agreeable to Care Coordination.   Patient expressed understanding of goal: Yes  Action steps to achieve this goal:  1. I will review resources for Caregiver Support sent by Care Coordinator.   2. I will contact Care Coordinator for additional resources if needed.   3. I will contact the care team with questions, concerns or support needs. I will use the clinic as a resource and I understand I can contact the clinic with 24/7 after hours services available. Care Coordinator will remain available as needed.                               Care Plan: Utilization       Problem: Increased risk of re-admission       Goal: I would like additional resources and support to manage my health and prevent future avoidable ED visits/hospital admissions       Start Date: 12/21/2023 Expected End Date: 3/21/2024    This Visit's Progress: 20% Recent Progress: 10%    Note:     Barriers: Cognitive decline; Limited caregiver support; Provider availability - wait time to complete appointments.   Strengths: Motivated; Agreeable to Care Coordination.   Patient expressed understanding of goal: Yes  Action steps to achieve this goal:  1. I will utilize the services of Care Coordinator by contacting them when issues/concerns arise.    2. I will contact Care Coordinator for additional resources if needed.   3. I will contact the care team with questions, concerns or support needs. I will use the clinic as a resource and I understand I can contact the clinic with 24/7 after hours services available. Care Coordinator will remain available as needed.                               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    Type 2 diabetes mellitus with microalbuminuria, without long-term current use of insulin (H)    Pseudoaneurysm of femoral artery (H24)    Coronary artery disease    Aortic valve stenosis, nonrheumatic    Need for SBE (subacute bacterial endocarditis) prophylaxis    LBBB (left bundle branch block)    Syncope    ST elevation myocardial infarction (STEMI), unspecified artery (H)    Complete heart block (H)    Cardiac pacemaker in situ    Facet arthropathy, lumbosacral    Scoliosis of lumbar spine, unspecified scoliosis type    DDD (degenerative disc disease), lumbosacral    Lumbosacral spinal stenosis    Alcohol dependence in remission (H)    Chronic kidney disease, stage 3a (H)    Moderate dementia with other behavioral disturbance, unspecified dementia type (H)      Current Medications and Allergies:    Current Outpatient Medications   Medication    acetaminophen (TYLENOL) 325 MG tablet    ascorbic acid 500 MG TABS    aspirin 81 MG EC tablet    blood glucose (MIRA CONTOUR NEXT) test strip    blood glucose monitoring (MIRA MICROLET) lancets    Continuous Blood Gluc  (FREESTYLE SANGEETA 2 READER) IVONNE    Continuous Blood Gluc Sensor (FREESTYLE SANGEETA 2 SENSOR) MISC    donepezil (ARICEPT) 5 MG tablet    glucosamine-chondroitin 500-400 MG CAPS per capsule    metFORMIN (GLUCOPHAGE XR) 500 MG 24 hr tablet    Multiple Vitamins-Iron (MULTIVITAMIN/IRON PO)    QUEtiapine (SEROQUEL) 25 MG tablet    sertraline (ZOLOFT) 25 MG tablet    simvastatin (ZOCOR) 20 MG tablet    traZODone (DESYREL) 50 MG tablet     Current Facility-Administered Medications   Medication    iohexol (OMNIPAQUE) 300 mg/mL injection  10 mL      Allergies   Allergen Reactions    Fentanyl Other (See Comments)     Confusion and agitation after PPM    Versed [Midazolam] Other (See Comments)     Confusion and agitation post PPM     Care Coordination Start Date: 10/22/2022   Frequency of Care Coordination: monthly, more frequently as needed     Form Last Updated: 02/23/2024

## 2024-02-26 LAB
MDC_IDC_EPISODE_DTM: NORMAL
MDC_IDC_EPISODE_ID: NORMAL
MDC_IDC_EPISODE_TYPE: NORMAL
MDC_IDC_LEAD_CONNECTION_STATUS: NORMAL
MDC_IDC_LEAD_CONNECTION_STATUS: NORMAL
MDC_IDC_LEAD_IMPLANT_DT: NORMAL
MDC_IDC_LEAD_IMPLANT_DT: NORMAL
MDC_IDC_LEAD_LOCATION: NORMAL
MDC_IDC_LEAD_LOCATION: NORMAL
MDC_IDC_LEAD_LOCATION_DETAIL_1: NORMAL
MDC_IDC_LEAD_LOCATION_DETAIL_1: NORMAL
MDC_IDC_LEAD_MFG: NORMAL
MDC_IDC_LEAD_MFG: NORMAL
MDC_IDC_LEAD_MODEL: NORMAL
MDC_IDC_LEAD_MODEL: NORMAL
MDC_IDC_LEAD_POLARITY_TYPE: NORMAL
MDC_IDC_LEAD_POLARITY_TYPE: NORMAL
MDC_IDC_LEAD_SERIAL: NORMAL
MDC_IDC_LEAD_SERIAL: NORMAL
MDC_IDC_MSMT_BATTERY_DTM: NORMAL
MDC_IDC_MSMT_BATTERY_REMAINING_LONGEVITY: 72 MO
MDC_IDC_MSMT_BATTERY_REMAINING_PERCENTAGE: 100 %
MDC_IDC_MSMT_BATTERY_STATUS: NORMAL
MDC_IDC_MSMT_LEADCHNL_RA_IMPEDANCE_VALUE: 733 OHM
MDC_IDC_MSMT_LEADCHNL_RA_PACING_THRESHOLD_AMPLITUDE: 0.6 V
MDC_IDC_MSMT_LEADCHNL_RA_PACING_THRESHOLD_PULSEWIDTH: 0.4 MS
MDC_IDC_MSMT_LEADCHNL_RV_IMPEDANCE_VALUE: 610 OHM
MDC_IDC_MSMT_LEADCHNL_RV_PACING_THRESHOLD_AMPLITUDE: 0.8 V
MDC_IDC_MSMT_LEADCHNL_RV_PACING_THRESHOLD_PULSEWIDTH: 0.4 MS
MDC_IDC_PG_IMPLANT_DTM: NORMAL
MDC_IDC_PG_MFG: NORMAL
MDC_IDC_PG_MODEL: NORMAL
MDC_IDC_PG_SERIAL: NORMAL
MDC_IDC_PG_TYPE: NORMAL
MDC_IDC_SESS_CLINIC_NAME: NORMAL
MDC_IDC_SESS_DTM: NORMAL
MDC_IDC_SESS_TYPE: NORMAL
MDC_IDC_SET_BRADY_AT_MODE_SWITCH_MODE: NORMAL
MDC_IDC_SET_BRADY_AT_MODE_SWITCH_RATE: 170 {BEATS}/MIN
MDC_IDC_SET_BRADY_LOWRATE: 60 {BEATS}/MIN
MDC_IDC_SET_BRADY_MAX_SENSOR_RATE: 130 {BEATS}/MIN
MDC_IDC_SET_BRADY_MAX_TRACKING_RATE: 130 {BEATS}/MIN
MDC_IDC_SET_BRADY_MODE: NORMAL
MDC_IDC_SET_BRADY_PAV_DELAY_HIGH: 150 MS
MDC_IDC_SET_BRADY_PAV_DELAY_LOW: 200 MS
MDC_IDC_SET_BRADY_SAV_DELAY_HIGH: 150 MS
MDC_IDC_SET_BRADY_SAV_DELAY_LOW: 200 MS
MDC_IDC_SET_LEADCHNL_RA_PACING_AMPLITUDE: 2 V
MDC_IDC_SET_LEADCHNL_RA_PACING_CAPTURE_MODE: NORMAL
MDC_IDC_SET_LEADCHNL_RA_PACING_POLARITY: NORMAL
MDC_IDC_SET_LEADCHNL_RA_PACING_PULSEWIDTH: 0.4 MS
MDC_IDC_SET_LEADCHNL_RA_SENSING_ADAPTATION_MODE: NORMAL
MDC_IDC_SET_LEADCHNL_RA_SENSING_POLARITY: NORMAL
MDC_IDC_SET_LEADCHNL_RA_SENSING_SENSITIVITY: 0.25 MV
MDC_IDC_SET_LEADCHNL_RV_PACING_AMPLITUDE: 1.4 V
MDC_IDC_SET_LEADCHNL_RV_PACING_CAPTURE_MODE: NORMAL
MDC_IDC_SET_LEADCHNL_RV_PACING_POLARITY: NORMAL
MDC_IDC_SET_LEADCHNL_RV_PACING_PULSEWIDTH: 0.4 MS
MDC_IDC_SET_LEADCHNL_RV_SENSING_ADAPTATION_MODE: NORMAL
MDC_IDC_SET_LEADCHNL_RV_SENSING_POLARITY: NORMAL
MDC_IDC_SET_LEADCHNL_RV_SENSING_SENSITIVITY: 1.5 MV
MDC_IDC_SET_ZONE_DETECTION_INTERVAL: 375 MS
MDC_IDC_SET_ZONE_STATUS: NORMAL
MDC_IDC_SET_ZONE_TYPE: NORMAL
MDC_IDC_SET_ZONE_VENDOR_TYPE: NORMAL
MDC_IDC_STAT_AT_BURDEN_PERCENT: 0 %
MDC_IDC_STAT_AT_DTM_END: NORMAL
MDC_IDC_STAT_AT_DTM_START: NORMAL
MDC_IDC_STAT_BRADY_DTM_END: NORMAL
MDC_IDC_STAT_BRADY_DTM_START: NORMAL
MDC_IDC_STAT_BRADY_RA_PERCENT_PACED: 46 %
MDC_IDC_STAT_BRADY_RV_PERCENT_PACED: 34 %
MDC_IDC_STAT_EPISODE_RECENT_COUNT: 0
MDC_IDC_STAT_EPISODE_RECENT_COUNT_DTM_END: NORMAL
MDC_IDC_STAT_EPISODE_RECENT_COUNT_DTM_START: NORMAL
MDC_IDC_STAT_EPISODE_TYPE: NORMAL
MDC_IDC_STAT_EPISODE_VENDOR_TYPE: NORMAL
MDC_IDC_STAT_EPISODE_VENDOR_TYPE: NORMAL

## 2024-02-29 DIAGNOSIS — R80.9 TYPE 2 DIABETES MELLITUS WITH MICROALBUMINURIA, WITHOUT LONG-TERM CURRENT USE OF INSULIN (H): ICD-10-CM

## 2024-02-29 DIAGNOSIS — E11.29 TYPE 2 DIABETES MELLITUS WITH MICROALBUMINURIA, WITHOUT LONG-TERM CURRENT USE OF INSULIN (H): ICD-10-CM

## 2024-02-29 NOTE — TELEPHONE ENCOUNTER
Medication Question or Refill    Contacts         Type Contact Phone/Fax    02/29/2024 03:31 PM CST Phone (Incoming) Alyssa Jimenes (Emergency Contact) [phone]            What medication are you calling about (include dose and sig)?: Freestyle Carola 2 Censor     Preferred Pharmacy:   Fax: [phone]  [facility] in [place]      Controlled Substance Agreement on file:   CSA -- Patient Level:    CSA: None found at the patient level.       Who prescribed the medication?: Otilia Hines    Do you need a refill? Yes    When did you use the medication last? Hasn't used for a month    Patient offered an appointment? No    Do you have any questions or concerns?  No      Could we send this information to you in [facility] or would you prefer to receive a phone call?:   Patient would prefer a phone call   Okay to leave a detailed message?: Yes at Cell number on file:    Telephone Information:   Mobile [phone]

## 2024-03-07 NOTE — TELEPHONE ENCOUNTER
PCP left documentation on my desk that patient is trying to get sensors through [facility]. Current prescription was through [facility]. Sent updated Rx to [facility] instead. Also, sent in [facility] documentation with PCP's most recent notes.    Amarilis Flaherty, PharmD  Medication Therapy Management Pharmacist  Voicemail: [phone]

## 2024-03-14 ENCOUNTER — PATIENT OUTREACH (OUTPATIENT)
Dept: CARE COORDINATION | Facility: CLINIC | Age: 89
End: 2024-03-14
Payer: MEDICARE

## 2024-03-14 NOTE — PROGRESS NOTES
Clinic Care Coordination Contact  Care Coordination Clinician Chart Review    Situation: Patient chart reviewed by Care Coordinator.       Background: Care Coordination Program started: 10/22/2022. Initial assessment completed and patient-centered care plan(s) were developed with participation from patient. Lead CC handed patient off to CHW for continued outreaches.       Assessment: Per chart review, patient outreach completed by CC CHW on 2/21/24.  Patient is actively working to accomplish goal(s). Patient's goal(s) appropriate and relevant at this time. Patient is not due for updated Plan of Care.  Assessments will be completed annually or as needed/with change of patient status.      Care Plan: Resources for Caregiver support       Problem: Resources for Caregiver support       Goal: Caregiver Support       Start Date: 12/21/2023 Expected End Date: 3/21/2024    This Visit's Progress: 90% Recent Progress: 10%    Note:     Barriers: Cognitive decline; Limited caregiver support; Provider availability - wait time to complete appointments.   Strengths: Motivated; Agreeable to Care Coordination.   Patient expressed understanding of goal: Yes  Action steps to achieve this goal:  1. I will review resources for Caregiver Support sent by Care Coordinator.   2. I will contact Care Coordinator for additional resources if needed.   3. I will contact the care team with questions, concerns or support needs. I will use the clinic as a resource and I understand I can contact the clinic with 24/7 after hours services available. Care Coordinator will remain available as needed.                               Care Plan: Utilization       Problem: Increased risk of re-admission       Goal: I would like additional resources and support to manage my health and prevent future avoidable ED visits/hospital admissions       Start Date: 12/21/2023 Expected End Date: 3/21/2024    This Visit's Progress: 20% Recent Progress: 10%    Note:      Barriers: Cognitive decline; Limited caregiver support; Provider availability - wait time to complete appointments.   Strengths: Motivated; Agreeable to Care Coordination.   Patient expressed understanding of goal: Yes  Action steps to achieve this goal:  1. I will utilize the services of Care Coordinator by contacting them when issues/concerns arise.    2. I will contact Care Coordinator for additional resources if needed.   3. I will contact the care team with questions, concerns or support needs. I will use the clinic as a resource and I understand I can contact the clinic with 24/7 after hours services available. Care Coordinator will remain available as needed.                                    Plan/Recommendations: The patient will continue working with Care Coordination to achieve goal(s) as above. CHW will continue outreaches at minimum every 30 days and will involve Lead CC as needed or if patient is ready to move to Maintenance. Lead CC will continue to monitor CHW outreaches and patient's progress to goal(s) every 6 weeks.     Plan of Care updated and sent to patient: No.    Elizabeth Camacho RN, BSN, CPHN, CCM  [facility] Ambulatory Care Management  [facility]  Phone: [phone]  Email: [email or web address]

## 2024-03-19 ENCOUNTER — OFFICE VISIT (OUTPATIENT)
Dept: ORTHOPEDICS | Facility: CLINIC | Age: 89
End: 2024-03-19
Payer: MEDICARE

## 2024-03-19 VITALS — BODY MASS INDEX: 27.46 KG/M2 | HEIGHT: 63 IN | WEIGHT: 155 LBS

## 2024-03-19 DIAGNOSIS — T14.8XXA SKIN EXCORIATION: Primary | ICD-10-CM

## 2024-03-19 PROCEDURE — 99203 OFFICE O/P NEW LOW 30 MIN: CPT | Performed by: STUDENT IN AN ORGANIZED HEALTH CARE EDUCATION/TRAINING PROGRAM

## 2024-03-19 NOTE — PROGRESS NOTES
Orthopaedic Surgery Hand and Upper Extremity Clinic H&P Note:  Date: Mar 19, 2024    Patient Name: Reid Jimenes  MRN: [id number]    CHIEF COMPLAINT: Hand sores bilateral hands    Dominant Hand:right   Occupation: retired       HPI:  Mr. Reid Jimenes is a 89 year old male right  hand dominant who presents with multiple superficial excoriations and nonhealing sores over bilateral hands.. Patient states that he has had sores on both of his hands for several months, worsening over 2-3 weeks. He feels it is from scratching his hands due to the itch in his hands. 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    DDD PM 6/10/2021     Hyperlipidaemia LDL goal < 100      Hypertension      Need for SBE (subacute bacterial endocarditis) prophylaxis      Pulmonary hypertension (H)      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    status post TAVR using a 26 mm Greene S3 valve in 2018     Type 2 diabetes mellitus (H)        PAST SURGICAL HISTORY:  Past Surgical History:   Procedure Laterality Date     CV HEART CATHETERIZATION WITH POSSIBLE INTERVENTION N/A 6/9/2021    Procedure: coronary angiogram;  Surgeon: Jayesh Rachel MD;  Location:  HEART CARDIAC CATH LAB     CV TEMPORARY PACEMAKER INSERTION N/A 6/9/2021    Procedure: Temporary Pacemaker Insertion;  Surgeon: Jayesh Rachel MD;  Location: [facility] CARDIAC CATH LAB     EP PACEMAKER N/A 6/10/2021    Procedure: EP Pacemaker;  Surgeon: Magdiel Silver MD;  Location: [facility] CARDIAC CATH LAB      "MANDIBLE SURGERY       OTHER SURGICAL HISTORY      cardiac cath 18: mild non-obstructive disease     TRANSCATHETER AORTIC VALVE IMPLANT ANESTHESIA N/A 2018    Procedure: TRANSCATHETER AORTIC VALVE IMPLANT ANESTHESIA;  ANESTHESIA NEEDED FOR TAVR PROCEDURE;  Surgeon: GENERIC ANESTHESIA PROVIDER;  Location:  OR,  26mm Edward Sabino 3 valve       MEDICATIONS:  Current Outpatient Medications   Medication     acetaminophen (TYLENOL) 325 MG tablet     ascorbic acid 500 MG TABS     aspirin 81 MG EC tablet     blood glucose (MIRA CONTOUR NEXT) test strip     blood glucose monitoring (MIRA MICROLET) lancets     Continuous Blood Gluc  (FREESTYLE SANGEETA 2 READER) IVONNE     Continuous Blood Gluc Sensor (FREESTYLE SANGEETA 2 SENSOR) MISC     donepezil (ARICEPT) 5 MG tablet     glucosamine-chondroitin 500-400 MG CAPS per capsule     metFORMIN (GLUCOPHAGE XR) 500 MG 24 hr tablet     Multiple Vitamins-Iron (MULTIVITAMIN/IRON PO)     QUEtiapine (SEROQUEL) 25 MG tablet     sertraline (ZOLOFT) 25 MG tablet     simvastatin (ZOCOR) 20 MG tablet     traZODone (DESYREL) 50 MG tablet     Current Facility-Administered Medications   Medication     iohexol (OMNIPAQUE) 300 mg/mL injection 10 mL       ALLERGIES:     Allergies   Allergen Reactions     Fentanyl Other (See Comments)     Confusion and agitation after PPM     Versed [Midazolam] Other (See Comments)     Confusion and agitation post PPM       FAMILY HISTORY:  No pertinent family history    SOCIAL HISTORY:  Social History     Tobacco Use     Smoking status: Former     Packs/day: 1.00     Years: 30.00     Additional pack years: 0.00     Total pack years: 30.00     Types: Cigarettes     Start date:      Quit date: 1980     Years since quittin.5     Smokeless tobacco: Former     Types: Chew     Quit date: 1983   Vaping Use     Vaping Use: Never used   Substance Use Topics     Alcohol use: No     Comment: quit 30+ years ago. states, \"I'm an alcoholic\". "     Drug use: No       The patient's past medical, family, and social history was reviewed and confirmed.    REVIEW OF SYMPTOMS:      General: Negative   Eyes: Negative   Ear, Nose and Throat: Negative   Respiratory: Negative   Cardiovascular: Negative   Gastrointestinal: Negative   Genito-urinary: Negative   Musculoskeletal: Negative  Neurological: Negative   Psychological: Negative  HEME: Negative   ENDO: Negative   SKIN: Negative    VITALS:  There were no vitals filed for this visit.    EXAM:  General: NAD, A&Ox3  HEENT: NC/AT  CV: RRR by peripheral pulse  Pulmonary: Non-labored breathing on RA  BUE:  Multiple superficial dry excoriations/scaly plaques over the dorsum of bilateral hands and several fingers.  There is no surrounding erythema, drainage, evidence of infection  Triggering of the right index finger  Mild diffuse swelling of the fingers of the right hand preventing him from making close fist, able to make a full fist on the left  Intact FDP, FDS, EDC, EPL, FPL, hand intrinsics  Sensation intact to light touch median, radial, ulnar nerve distribution  Warm well-perfused, capillary refill less than 2 seconds       IMAGING:    None         IMPRESSION AND RECOMMENDATIONS:  Mr. Reid Jimenes is a 89 year old male right hand dominant with bilateral hand sores.    Unclear if this is due to scratching.  No complicating features appreciated.    I have recommended topical petroleum based ointment and Band-Aids to help them heal.    However I do recommend a referral to dermatology for further evaluation.    This was explained to the patient and his wife Sharon.  They voiced understanding and agreement.  All questions answered.    Follow-up with me as needed.    Bradley Remy MD    Hand, Upper Extremity & Microvascular Surgery  Department of Orthopaedic Surgery  [facility]

## 2024-03-19 NOTE — LETTER
3/19/2024         RE: Reid Jimenes  [address]  [place] [address]        Dear Colleague,    Thank you for referring your patient, Reid Jimenes, to the [facility] [place]. Please see a copy of my visit note below.    Orthopaedic Surgery Hand and Upper Extremity Clinic H&P Note:  Date: Mar 19, 2024    Patient Name: Reid Jimenes  MRN: [id number]    CHIEF COMPLAINT: Hand sores bilateral hands    Dominant Hand:right   Occupation: retired       HPI:  Mr. Reid Jimenes is a 89 year old male right  hand dominant who presents with multiple superficial excoriations and nonhealing sores over bilateral hands.. Patient states that he has had sores on both of his hands for several months, worsening over 2-3 weeks. He feels it is from scratching his hands due to the itch in his hands. 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    DDD PM 6/10/2021     Hyperlipidaemia LDL goal < 100      Hypertension      Need for SBE (subacute bacterial endocarditis) prophylaxis      Pulmonary hypertension (H)      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    status post TAVR using a 26 mm Greene S3 valve in 2018     Type 2 diabetes mellitus (H)        PAST SURGICAL HISTORY:  Past Surgical History:   Procedure Laterality Date     CV HEART CATHETERIZATION WITH POSSIBLE INTERVENTION N/A 6/9/2021    Procedure: coronary angiogram;  Surgeon: Jayesh Rachel MD;  Location:  HEART CARDIAC CATH LAB     CV TEMPORARY PACEMAKER  INSERTION N/A 6/9/2021    Procedure: Temporary Pacemaker Insertion;  Surgeon: Jayesh Rachel MD;  Location:  HEART CARDIAC CATH LAB     EP PACEMAKER N/A 6/10/2021    Procedure: EP Pacemaker;  Surgeon: Magdiel Silver MD;  Location:  HEART CARDIAC CATH LAB     MANDIBLE SURGERY  1958     OTHER SURGICAL HISTORY      cardiac cath 7/24/18: mild non-obstructive disease     TRANSCATHETER AORTIC VALVE IMPLANT ANESTHESIA N/A 8/28/2018    Procedure: TRANSCATHETER AORTIC VALVE IMPLANT ANESTHESIA;  ANESTHESIA NEEDED FOR TAVR PROCEDURE;  Surgeon: GENERIC ANESTHESIA PROVIDER;  Location:  OR,  26mm Edward Sabino 3 valve       MEDICATIONS:  Current Outpatient Medications   Medication     acetaminophen (TYLENOL) 325 MG tablet     ascorbic acid 500 MG TABS     aspirin 81 MG EC tablet     blood glucose (MIRA CONTOUR NEXT) test strip     blood glucose monitoring ([employer]yn MICROLET) lancets     Continuous Blood Gluc  (FREESTYLE SANGEETA 2 READER) IVONNE     Continuous Blood Gluc Sensor (FREESTYLE SANGEETA 2 SENSOR) MISC     donepezil (ARICEPT) 5 MG tablet     glucosamine-chondroitin 500-400 MG CAPS per capsule     metFORMIN (GLUCOPHAGE XR) 500 MG 24 hr tablet     Multiple Vitamins-Iron (MULTIVITAMIN/IRON PO)     QUEtiapine (SEROQUEL) 25 MG tablet     sertraline (ZOLOFT) 25 MG tablet     simvastatin (ZOCOR) 20 MG tablet     traZODone (DESYREL) 50 MG tablet     Current Facility-Administered Medications   Medication     iohexol (OMNIPAQUE) 300 mg/mL injection 10 mL       ALLERGIES:     Allergies   Allergen Reactions     Fentanyl Other (See Comments)     Confusion and agitation after PPM     Versed [Midazolam] Other (See Comments)     Confusion and agitation post PPM       FAMILY HISTORY:  No pertinent family history    SOCIAL HISTORY:  Social History     Tobacco Use     Smoking status: Former     Packs/day: 1.00     Years: 30.00     Additional pack years: 0.00     Total pack years: 30.00     Types: Cigarettes     Start  "date:      Quit date: 1980     Years since quittin.5     Smokeless tobacco: Former     Types: Chew     Quit date: 1983   Vaping Use     Vaping Use: Never used   Substance Use Topics     Alcohol use: No     Comment: quit 30+ years ago. states, \"I'm an alcoholic\".     Drug use: No       The patient's past medical, family, and social history was reviewed and confirmed.    REVIEW OF SYMPTOMS:      General: Negative   Eyes: Negative   Ear, Nose and Throat: Negative   Respiratory: Negative   Cardiovascular: Negative   Gastrointestinal: Negative   Genito-urinary: Negative   Musculoskeletal: Negative  Neurological: Negative   Psychological: Negative  HEME: Negative   ENDO: Negative   SKIN: Negative    VITALS:  There were no vitals filed for this visit.    EXAM:  General: NAD, A&Ox3  HEENT: NC/AT  CV: RRR by peripheral pulse  Pulmonary: Non-labored breathing on RA  BUE:  Multiple superficial dry excoriations/scaly plaques over the dorsum of bilateral hands and several fingers.  There is no surrounding erythema, drainage, evidence of infection  Triggering of the right index finger  Mild diffuse swelling of the fingers of the right hand preventing him from making close fist, able to make a full fist on the left  Intact FDP, FDS, EDC, EPL, FPL, hand intrinsics  Sensation intact to light touch median, radial, ulnar nerve distribution  Warm well-perfused, capillary refill less than 2 seconds       IMAGING:    None         IMPRESSION AND RECOMMENDATIONS:  Mr. Reid Jimenes is a 89 year old male right hand dominant with bilateral hand sores.    Unclear if this is due to scratching.  No complicating features appreciated.    I have recommended topical petroleum based ointment and Band-Aids to help them heal.    However I do recommend a referral to dermatology for further evaluation.    This was explained to the patient and his wife Sharon.  They voiced understanding and agreement.  All questions " answered.    Follow-up with me as needed.    Bradley Remy MD    Hand, Upper Extremity & Microvascular Surgery  Department of Orthopaedic Surgery  [facility]            Again, thank you for allowing me to participate in the care of your patient.        Sincerely,        Bradley Remy MD

## 2024-04-01 ENCOUNTER — PATIENT OUTREACH (OUTPATIENT)
Dept: CARE COORDINATION | Facility: CLINIC | Age: 89
End: 2024-04-01
Payer: MEDICARE

## 2024-04-01 NOTE — TELEPHONE ENCOUNTER
Patient's wife is calling to ask us to send this to [facility] instead of advanced diabetes supply.

## 2024-04-01 NOTE — PROGRESS NOTES
Clinic Care Coordination Contact  Community Health Worker Follow Up    Care Gaps:     Health Maintenance Due   Topic Date Due    ZOSTER IMMUNIZATION (2 of 3) 12/05/2012    EYE EXAM  01/18/2024       Scheduled to be addressed at ongoing PCP appointments    Care Plan:   Care Plan: Resources for Caregiver support       Problem: Resources for Caregiver support       Goal: Caregiver Support       Start Date: 12/21/2023 Expected End Date: 3/21/2024    This Visit's Progress: 90% Recent Progress: 90%    Note:     Barriers: Cognitive decline; Limited caregiver support; Provider availability - wait time to complete appointments.   Strengths: Motivated; Agreeable to Care Coordination.   Patient expressed understanding of goal: Yes  Action steps to achieve this goal:  1. I will review resources for Caregiver Support sent by Care Coordinator.   2. I will contact Care Coordinator for additional resources if needed.   3. I will contact the care team with questions, concerns or support needs. I will use the clinic as a resource and I understand I can contact the clinic with 24/7 after hours services available. Care Coordinator will remain available as needed.                                 Intervention and Education during outreach:    CHW was able to connect with the Patient's spouse to review their above goals. Sharon shares that she was able to attend the [employer] caregiver support group and states that it was very helpful and she plans to attend again. Patient shares that she is feeling burnt out caregiving for her  and she does not know what to do. Patient is still receiving PCA support Monday's and Friday's. Alyssa shares that they did have arrangements to have an additional PCA assist, however the Patient refused to have him so it ended up getting cancelled. Alyssa shares that they are paying for these services out of pocket. Alyssa shares that the Patient is a . Writer encouraged Sharon to contact the [facility] directly and ask  for a SW to assist with obtaining additional benefits/services.  [facility]  [phone]    Discussed Home Health Care Services as Sharon shares that they are struggling with Medication Management. Alyssa shares that he is very stubborn and she has struggled getting him to be complaint with taking his medications. Writer to route patient chart to the care team to assist in arranging an updated face-to-face for home health care orders. Alyssa is agreeable with plan.  Writer also encouraged Alyssa to reach out to their ILF ([facility]) to see if they can upgrade to [facility] services.    Sharon had no additional questions or concerns during the time of call.    CHW Plan: Care Coordination will reach out to Alyssa in 1 month to monitor the progression of their goals.      Mary SCHREIBER Public Health  Community Health Worker  [facility]:  [place], [place] [facility] Care Coordination  [phone]

## 2024-04-02 ENCOUNTER — PATIENT OUTREACH (OUTPATIENT)
Dept: CARE COORDINATION | Facility: CLINIC | Age: 89
End: 2024-04-02
Payer: MEDICARE

## 2024-04-02 NOTE — TELEPHONE ENCOUNTER
Please review messages below and advise.    Does patient need appointment?    Please review most recent message regarding issue with behavior during night last night.

## 2024-04-02 NOTE — TELEPHONE ENCOUNTER
"RN: please advise contact:    (Patient's spouse is identified as \"Alyssa Jimenes\" in demographics.   Does she also go by the nickname \"Sharon\", or is Sharon another individual?)    Is he currently taking quetiapine each night at one 25 mg tablet at bedtime?  If so, he could take additional 12.5 mg dose 1-2 hours later as needed for breakthrough agitation.     "

## 2024-04-02 NOTE — PROGRESS NOTES
"Received a VM from Sharon this morning. She shares that they had some issues last night. She shares the Patient had a 2 hour episode where he was \"out of control\" from about 2:00 AM-4:00 AM. Sharon states she does not know if the Patient had a hallucination or was sleep walking.    Mary SCHREIBER Public Health  Community Health Worker  [facility]:  [place], [place] [facility] Care Coordination  [phone]           "

## 2024-04-02 NOTE — PROGRESS NOTES
Clinic Care Coordination Contact    Situation: Patient chart reviewed by care coordinator.    Background: Patient is enrolled in Care Coordination and followed by CHW and RN CC.     Assessment: RN CC received message from CHW requesting writer contact patient's wife, Alyssa, to discuss ways to obtain additional help with patient. Contacted Alyssa to discuss situation. She stated she forgot his evening medications and patient became agitated/hallucinating at around 2am. This lasted until 4:30am. During that time the patient removed all the linen from the bed and remade the bed. He wouldn't lay down. He urinated into the bathroom from the bedroom. Alyssa had to clean up the floor, walls and cabinets.   RN CC reiterated the options mentioned by the CHW to Alyssa yesterday:   (1) Call [facility] service in [place] that they pay out of pocket for and requested additional hours. Alyssa stated that the patient didn't want strangers in their home. So she is going to wait a while on that.    (2) Contact [facility] and see what it would take to change to assisted living services for [place]. Maybe have staff administer patient's evening medications. Alyssa will look into this either today or tomorrow.   (3) Contact the [facility] (patient is service connected) and see what (if any) assistance she can obtain for the patient. Writer verified Alyssa has contact number for the [facility] ([phone]). Also, verified she has contact information for CHW and RN CC.     Plan/Recommendations: RN CC will set a generic outreach to follow up with Alyssa re: additional services.     Elizabeth Camacho RN, BSN, CPHN, [facility] Ambulatory Care Management  [facility]  Phone: [phone]  Email: [email or web address]

## 2024-04-03 NOTE — TELEPHONE ENCOUNTER
Called spouse and relayed message from provider.  Patient has been taking 12.5 mg at bedtime.  Spouse is going to give him a full tablet tonight.      Spouse stated patient has started to have more odd behaviors.  Has been wearing his undershirt over his shirt and putting on 2 diapers.  Advised that this is due to the progressing dementia.  She verbalized understanding.      She is looking into assisted living facilities for patient.

## 2024-04-05 ENCOUNTER — TELEPHONE (OUTPATIENT)
Dept: INTERNAL MEDICINE | Facility: CLINIC | Age: 89
End: 2024-04-05
Payer: MEDICARE

## 2024-04-05 NOTE — TELEPHONE ENCOUNTER
Please advise on message below.  Thanks!  Last office visit 1/9/24      Per Teams message to this RN:    Francisco Pierson, would this Patient be able to get home health care assistance for med affordability? If so, I think he would need an updated Face to Face! Just let me know, I was going to try and get misty some temporary caregiver relief if eligible. Thank you!

## 2024-04-05 NOTE — TELEPHONE ENCOUNTER
Called spouse and left message to call the clinic back.    Please assist in getting patient scheduled for a virtual appointment so provider can order the home care.

## 2024-04-09 ENCOUNTER — PATIENT OUTREACH (OUTPATIENT)
Dept: CARE COORDINATION | Facility: CLINIC | Age: 89
End: 2024-04-09
Payer: MEDICARE

## 2024-04-09 NOTE — PROGRESS NOTES
Clinic Care Coordination Contact  Follow Up Progress Note      Assessment: Alyssa was able to schedule the necessary meetings to follow up with obtaining additional services.   Meeting with Lilia - Tanisha director of their ILF either on Friday 4/12 or Monday 4/15/24.   Meeting scheduled with [facility] provider MD 4/26/24 at 10:20am & VA Audiologist 5/3/24 at 10:45.     Care Gaps:    Health Maintenance Due   Topic Date Due    ZOSTER IMMUNIZATION (2 of 3) 12/05/2012    EYE EXAM  01/18/2024     Did not discuss during this contact.     Care Plans  Care Plan: Resources for Caregiver support       Problem: Resources for Caregiver support       Goal: Caregiver Support       Start Date: 12/21/2023 Expected End Date: 3/21/2024    This Visit's Progress: 90% Recent Progress: 90%    Note:     Barriers: Cognitive decline; Limited caregiver support; Provider availability - wait time to complete appointments.   Strengths: Motivated; Agreeable to Care Coordination.   Patient expressed understanding of goal: Yes  Action steps to achieve this goal:  1. I will review resources for Caregiver Support sent by Care Coordinator.   2. I will contact Care Coordinator for additional resources if needed.   3. I will contact the care team with questions, concerns or support needs. I will use the clinic as a resource and I understand I can contact the clinic with 24/7 after hours services available. Care Coordinator will remain available as needed.                               Intervention/Education provided during outreach: Discussed patients plan of care. CC RN asked open ended questions, provided support, resources, and encouragement as needed. Patient will reach out to care team sooner than planned with new questions or concerns.     Plan: Alyssa requested the name and contact information for a SW at the [facility]. Writer will ask coworkers for any information. Care Coordinator will continue to follow patient for any additional needs.     Care Coordinator  will follow up as scheduled on 5/1/24.     Elizabeth Camacho RN, BSN, CPHN, CCM  [facility] Ambulatory Care Management  [facility]  Phone: [phone]  Email: [email or web address]

## 2024-04-10 ENCOUNTER — MEDICAL CORRESPONDENCE (OUTPATIENT)
Dept: HEALTH INFORMATION MANAGEMENT | Facility: CLINIC | Age: 89
End: 2024-04-10

## 2024-04-10 NOTE — TELEPHONE ENCOUNTER
[facility]    Does patient still need F2F with Dr. Bingham?      We have called spouse and left messages to call back.

## 2024-04-10 NOTE — TELEPHONE ENCOUNTER
Patient's home/cell number message on machine to call back to touch bases.  Does have appointments on file.

## 2024-04-10 NOTE — TELEPHONE ENCOUNTER
Per CC in routing comment:  Hello, I have talked with his spouse about long-term options, however she was interested in home health care if deemed appropriate by the PCP for sooner in-home support. Either an in-person or virtual visit would be needed in order to place home health care orders. If a VM was left for her, it can be awaited for the spouses return call. Unfortunately, I do not have access to the scheduling database. When I spoke with her, I let her know the care team would call to schedule a face-to-face appointment to do specifically home health orders. Care Coordination will continue reaching out to her for ongoing support -    Thank you!

## 2024-04-17 ENCOUNTER — TRANSFERRED RECORDS (OUTPATIENT)
Dept: HEALTH INFORMATION MANAGEMENT | Facility: CLINIC | Age: 89
End: 2024-04-17
Payer: MEDICARE

## 2024-04-18 ENCOUNTER — OFFICE VISIT (OUTPATIENT)
Dept: INTERNAL MEDICINE | Facility: CLINIC | Age: 89
End: 2024-04-18
Payer: MEDICARE

## 2024-04-18 VITALS
HEIGHT: 63 IN | BODY MASS INDEX: 26.93 KG/M2 | WEIGHT: 152 LBS | SYSTOLIC BLOOD PRESSURE: 110 MMHG | TEMPERATURE: 97.9 F | RESPIRATION RATE: 16 BRPM | HEART RATE: 110 BPM | OXYGEN SATURATION: 100 % | DIASTOLIC BLOOD PRESSURE: 58 MMHG

## 2024-04-18 DIAGNOSIS — E11.29 TYPE 2 DIABETES MELLITUS WITH MICROALBUMINURIA, WITHOUT LONG-TERM CURRENT USE OF INSULIN (H): ICD-10-CM

## 2024-04-18 DIAGNOSIS — R80.9 TYPE 2 DIABETES MELLITUS WITH MICROALBUMINURIA, WITHOUT LONG-TERM CURRENT USE OF INSULIN (H): ICD-10-CM

## 2024-04-18 DIAGNOSIS — E11.22 TYPE 2 DIABETES MELLITUS WITH STAGE 3A CHRONIC KIDNEY DISEASE, WITHOUT LONG-TERM CURRENT USE OF INSULIN (H): ICD-10-CM

## 2024-04-18 DIAGNOSIS — R21 RASH: ICD-10-CM

## 2024-04-18 DIAGNOSIS — F03.B18 MODERATE DEMENTIA WITH OTHER BEHAVIORAL DISTURBANCE, UNSPECIFIED DEMENTIA TYPE (H): Primary | ICD-10-CM

## 2024-04-18 DIAGNOSIS — N18.31 TYPE 2 DIABETES MELLITUS WITH STAGE 3A CHRONIC KIDNEY DISEASE, WITHOUT LONG-TERM CURRENT USE OF INSULIN (H): ICD-10-CM

## 2024-04-18 DIAGNOSIS — M79.643 PAIN OF HAND, UNSPECIFIED LATERALITY: ICD-10-CM

## 2024-04-18 PROCEDURE — 99214 OFFICE O/P EST MOD 30 MIN: CPT | Performed by: INTERNAL MEDICINE

## 2024-04-18 PROCEDURE — G2211 COMPLEX E/M VISIT ADD ON: HCPCS | Performed by: INTERNAL MEDICINE

## 2024-04-18 RX ORDER — DONEPEZIL HYDROCHLORIDE 10 MG/1
10 TABLET, FILM COATED ORAL AT BEDTIME
COMMUNITY
Start: 2024-04-18

## 2024-04-18 RX ORDER — LANCETS
EACH MISCELLANEOUS
Qty: 100 EACH | Refills: 6 | Status: SHIPPED | OUTPATIENT
Start: 2024-04-18 | End: 2024-04-18

## 2024-04-18 NOTE — PROGRESS NOTES
Face to face time with patient and wife: 35 minutes (1337---1412)   Time spent in chart review/documentation on date of visit: >10 min    Assessment & Plan   (F03.B18) Moderate dementia with other behavioral disturbance, unspecified dementia type (H)  (primary encounter diagnosis)  Comment: Follow recommendations of neurology.   Plan: donepezil (ARICEPT) 10 MG tablet, Home Care         Referral         (E11.22,  N18.31) Type 2 diabetes mellitus with stage 3a chronic kidney disease, without long-term current use of insulin (H)  Comment: Patient requires Home Care skilled nursing consult for diabetes management.     (E11.29,  R80.9) Type 2 diabetes mellitus with microalbuminuria, without long-term current use of insulin (H)  Comment: See epic orders.   Plan: DISCONTINUED: blood glucose monitoring (NO         BRAND SPECIFIED) meter device kit,         DISCONTINUED: blood glucose (NO BRAND         SPECIFIED) test strip, DISCONTINUED: thin (NO         BRAND SPECIFIED) lancets          (M79.643) Pain of hand, unspecified laterality  Comment: Continue to work with hand surgeon.     (R21) Rash  Comment: Recommended Vaseline petroleum jelly. See pt instructions.         Patient Instructions   Someone will contact you to help you to schedule a Home nurse visit, to see whether you might have access to any other home services.     For hand pain, would go with any recommendations provided by the hand surgeon.   For hand rash, agree with either Vaseline petroleum jelly, or else over the counter Aquaphor ointment or Eucerin cream at least twice a day.     For memory, agree with working with Dr Walters. Complete the form and give to [facility] so that they can forward their information to Dr Walters.     For diabetes, Dr Bingham has completed the form for the continuous glucose meter that you have previously used. However, this might not make it inexpensive. You could just go back to using the once daily regular  "glucose checks. Dr Bingham sent a prescription for new meter and strips to [facility].      Will plan on seeing you on 5/16/2024 as scheduled (for preop exam before cataract surgery).     Nancy Rosenbaum is a 89 year old, presenting for the following health issues:  Follow Up      4/18/2024     1:14 PM   Additional Questions   Roomed by Jeanette MANZO CMA   Accompanied by Sharon, spouse     History of Present Illness       Reason for visit:  Many questions    He eats 2-3 servings of fruits and vegetables daily.He consumes 4 sweetened beverage(s) daily.He exercises with enough effort to increase his heart rate 9 or less minutes per day.  He exercises with enough effort to increase his heart rate 3 or less days per week. He is missing 7 dose(s) of medications per week.  He is not taking prescribed medications regularly due to remembering to take.     Reviewed and updated recent medical history.     Neurology has recommended increasing Aricept from 5 mg to 10 mg daily at recent visit on 4/17.   He has seen Dr Walters with [facility].      A home health aide will be starting this Tuesday.   The patient requires a skilled nursing home care consult, needed for diabetes management.     He has been bothered by hand pain, and has seen a hand surgeon.   He has a rash on his hand, for which Vaseline petroleum jelly is advised.     Past medical, family and social histories as well as medications reviewed and updated as needed.    REVIEW OF SYSTEMS: The following systems have been completely reviewed and are negative except as noted above:   Constitutional, respiratory, cardiovascular, gastrointestinal, musculoskeletal, endocrine, psychiatric, and neurologic systems.        Objective    /58 (BP Location: Left arm, Patient Position: Sitting, Cuff Size: Adult Large)   Pulse 110   Temp 97.9  F (36.6  C) (Oral)   Resp 16   Ht 1.6 m (5' 3\")   Wt 68.9 kg (152 lb)   SpO2 100%   BMI 26.93 kg/m    Body mass index is 26.93 " kg/m .    Physical Exam   GENERAL: alert and no distress  RESP: lungs clear to auscultation - no rales, rhonchi or wheezes  CV: regular rate and rhythm, normal S1 S2, no S3 or S4, no murmur, click or rub, no peripheral edema  MS: no gross musculoskeletal defects noted, no edema  NEURO: Normal strength and tone, sensory exam grossly normal, and abnormal mental status - problems with recent memory          Signed Electronically by: Viet Bingham MD,

## 2024-04-18 NOTE — PATIENT INSTRUCTIONS
Someone will contact you to help you to schedule a Home nurse visit, to see whether you might have access to any other home services.     For hand pain, would go with any recommendations provided by the hand surgeon.   For hand rash, agree with either Vaseline petroleum jelly, or else over the counter Aquaphor ointment or Eucerin cream at least twice a day.     For memory, agree with working with Dr Walters. Complete the form and give to [facility] so that they can forward their information to Dr Walters.     For diabetes, Dr Bingham has completed the form for the continuous glucose meter that you have previously used. However, this might not make it inexpensive. You could just go back to using the once daily regular glucose checks. Dr Bingham sent a prescription for new meter and strips to [facility].      Will plan on seeing you on 5/16/2024 as scheduled (for preop exam before cataract surgery).

## 2024-04-18 NOTE — TELEPHONE ENCOUNTER
"[facility] pharmacy calls to say that provider needs to make some changes to the prescriptions sent over today for glucose testing supplies.    Current prescription states to use \"Once a day or as directed\". Pharmacy states that they need the  \"as directed\" removed per medicare and have the prescription only say \"once a day\". The prescription for the lancets also are needing specific direction on frequency as to how often patient should be using it.        Pharmacy will need updated prescription with these changes resent to pharmacy.Pended prescription's with updated sigs for provider to sign if appropriate.     Thank you,  Anthony Canales, Triage RN [facility]  2:19 PM 4/18/2024     "

## 2024-04-19 ENCOUNTER — TELEPHONE (OUTPATIENT)
Dept: INTERNAL MEDICINE | Facility: CLINIC | Age: 89
End: 2024-04-19
Payer: MEDICARE

## 2024-04-19 NOTE — TELEPHONE ENCOUNTER
Please approve delay in SOC, SOC to begin 4/21.    Thank you,  Hailey Singh LPN/JERI  [facility]  [phone]

## 2024-04-21 ENCOUNTER — MEDICAL CORRESPONDENCE (OUTPATIENT)
Dept: HEALTH INFORMATION MANAGEMENT | Facility: CLINIC | Age: 89
End: 2024-04-21

## 2024-04-22 RX ORDER — LANCETS
EACH MISCELLANEOUS
Qty: 100 EACH | Refills: 6 | Status: SHIPPED | OUTPATIENT
Start: 2024-04-22 | End: 2024-05-16

## 2024-04-23 ENCOUNTER — TELEPHONE (OUTPATIENT)
Dept: INTERNAL MEDICINE | Facility: CLINIC | Age: 89
End: 2024-04-23
Payer: MEDICARE

## 2024-04-23 NOTE — TELEPHONE ENCOUNTER
PT to continue 1x per week for 4 weeks, then every other week x 2 visits  for strength, range of motion and core stability exercises to address low back pain, gait and safety training. Also requesting an OT evaluation for noted pain and decreased range and strength in B hands R > L starting week of 5/5/24.    Please also still needing approval of delay SOC 4/21  Please approve the above  Hailey Singh LPN/NATHALIAN  [facility]  [phone]

## 2024-04-24 ENCOUNTER — TELEPHONE (OUTPATIENT)
Dept: INTERNAL MEDICINE | Facility: CLINIC | Age: 89
End: 2024-04-24
Payer: MEDICARE

## 2024-04-24 DIAGNOSIS — E11.29 TYPE 2 DIABETES MELLITUS WITH MICROALBUMINURIA, WITHOUT LONG-TERM CURRENT USE OF INSULIN (H): ICD-10-CM

## 2024-04-24 DIAGNOSIS — R80.9 TYPE 2 DIABETES MELLITUS WITH MICROALBUMINURIA, WITHOUT LONG-TERM CURRENT USE OF INSULIN (H): ICD-10-CM

## 2024-04-24 NOTE — TELEPHONE ENCOUNTER
PT to continue 1x per week for 4 weeks, then every other week x 2 visits  for strength, range of motion and core stability exercises to address low back pain, gait and safety training. Also requesting an OT evaluation for noted pain and decreased range and strength in B hands R > L starting week of 5/5/24.     Please also still needing approval of delay SOC 4/21  Please approve the above  Hailey Singh LPN/NATHALIAN  [facility]  [phone]

## 2024-04-25 ENCOUNTER — TELEPHONE (OUTPATIENT)
Dept: INTERNAL MEDICINE | Facility: CLINIC | Age: 89
End: 2024-04-25
Payer: MEDICARE

## 2024-04-25 NOTE — TELEPHONE ENCOUNTER
Left detailed voicemail message for Hailey giving approval of all orders requested. AVI Meek R.N.

## 2024-04-25 NOTE — TELEPHONE ENCOUNTER
Skilled nursing 1/wk x 3wks, every other week for duration of care, and 3 PRN visits  Physical therapy evaluation   Please also be aware- a medication interaction between the patients donazepil and seroquel.    These orders have been pending since last week needing approval of the above to carry on services for patient.  Please approve orders  Thank you,  Hailey Singh LPN/JERI  [facility]  [phone]

## 2024-05-07 ENCOUNTER — PATIENT OUTREACH (OUTPATIENT)
Dept: CARE COORDINATION | Facility: CLINIC | Age: 89
End: 2024-05-07
Payer: MEDICARE

## 2024-05-07 NOTE — PROGRESS NOTES
Clinic Care Coordination Contact  Care Coordination Clinician Chart Review    Situation: Patient chart reviewed by Care Coordinator.       Background: Care Coordination Program started: 10/22/2022. Initial assessment completed and patient-centered care plan(s) were developed with participation from patient. Lead CC handed patient off to CHW for continued outreaches.       Assessment: Per chart review, patient outreach completed by CC on 4/9/24.  Patient is actively working to accomplish goal(s). Patient's goal(s) appropriate and relevant at this time. Patient is not due for updated Plan of Care.  Assessments will be completed annually or as needed/with change of patient status.      Care Plan: Resources for Caregiver support       Problem: Resources for Caregiver support       Goal: Caregiver Support       Start Date: 12/21/2023 Expected End Date: 3/21/2024    This Visit's Progress: 90% Recent Progress: 90%    Note:     Barriers: Cognitive decline; Limited caregiver support; Provider availability - wait time to complete appointments.   Strengths: Motivated; Agreeable to Care Coordination.   Patient expressed understanding of goal: Yes  Action steps to achieve this goal:  1. I will review resources for Caregiver Support sent by Care Coordinator.   2. I will contact Care Coordinator for additional resources if needed.   3. I will contact the care team with questions, concerns or support needs. I will use the clinic as a resource and I understand I can contact the clinic with 24/7 after hours services available. Care Coordinator will remain available as needed.                                    Plan/Recommendations: The patient will continue working with Care Coordination to achieve goal(s) as above. CHW will continue outreaches at minimum every 30 days and will involve Lead CC as needed or if patient is ready to move to Maintenance. Lead CC will continue to monitor CHW outreaches and patient's progress to goal(s)  every 6 weeks.     Plan of Care updated and sent to patient: Veronica Camacho RN, BSN, CPHN, [facility]  [facility]  Phone: [phone]  Email: [email or web address]

## 2024-05-09 ENCOUNTER — TELEPHONE (OUTPATIENT)
Dept: INTERNAL MEDICINE | Facility: CLINIC | Age: 89
End: 2024-05-09
Payer: MEDICARE

## 2024-05-09 NOTE — TELEPHONE ENCOUNTER
Occupational Therapy orders arrived via fax from [facility] # [id number]    Placed in provider mailbox for signature

## 2024-05-09 NOTE — TELEPHONE ENCOUNTER
Home Health Certification arrived via fax from [facility] # [id number] - certification period 4/21/2024 to 6/19/2024    Placed in provider mailbox for signature

## 2024-05-10 ENCOUNTER — MEDICAL CORRESPONDENCE (OUTPATIENT)
Dept: HEALTH INFORMATION MANAGEMENT | Facility: CLINIC | Age: 89
End: 2024-05-10

## 2024-05-14 ENCOUNTER — TELEPHONE (OUTPATIENT)
Dept: INTERNAL MEDICINE | Facility: CLINIC | Age: 89
End: 2024-05-14
Payer: MEDICARE

## 2024-05-14 ENCOUNTER — MEDICAL CORRESPONDENCE (OUTPATIENT)
Dept: HEALTH INFORMATION MANAGEMENT | Facility: CLINIC | Age: 89
End: 2024-05-14

## 2024-05-14 NOTE — TELEPHONE ENCOUNTER
OT eval completed today after MD referral with request for 4 visits in 5 weeks to address UE/hand HEP, ADL assessment and training, energy conservation and caregiver education.   Please approve the above.  Thank you,  Hailey Singh LPN  [facility]  [phone]

## 2024-05-16 ENCOUNTER — PATIENT OUTREACH (OUTPATIENT)
Dept: CARE COORDINATION | Facility: CLINIC | Age: 89
End: 2024-05-16

## 2024-05-16 ENCOUNTER — OFFICE VISIT (OUTPATIENT)
Dept: INTERNAL MEDICINE | Facility: CLINIC | Age: 89
End: 2024-05-16
Payer: MEDICARE

## 2024-05-16 ENCOUNTER — TELEPHONE (OUTPATIENT)
Dept: INTERNAL MEDICINE | Facility: CLINIC | Age: 89
End: 2024-05-16

## 2024-05-16 VITALS
HEIGHT: 63 IN | HEART RATE: 96 BPM | TEMPERATURE: 97.9 F | OXYGEN SATURATION: 96 % | RESPIRATION RATE: 18 BRPM | SYSTOLIC BLOOD PRESSURE: 108 MMHG | WEIGHT: 151 LBS | DIASTOLIC BLOOD PRESSURE: 60 MMHG | BODY MASS INDEX: 26.75 KG/M2

## 2024-05-16 DIAGNOSIS — N18.31 TYPE 2 DIABETES MELLITUS WITH STAGE 3A CHRONIC KIDNEY DISEASE, WITHOUT LONG-TERM CURRENT USE OF INSULIN (H): ICD-10-CM

## 2024-05-16 DIAGNOSIS — E11.22 TYPE 2 DIABETES MELLITUS WITH STAGE 3A CHRONIC KIDNEY DISEASE, WITHOUT LONG-TERM CURRENT USE OF INSULIN (H): ICD-10-CM

## 2024-05-16 DIAGNOSIS — H25.9 AGE-RELATED CATARACT OF BOTH EYES, UNSPECIFIED AGE-RELATED CATARACT TYPE: ICD-10-CM

## 2024-05-16 DIAGNOSIS — Z01.818 PREOP GENERAL PHYSICAL EXAM: Primary | ICD-10-CM

## 2024-05-16 DIAGNOSIS — F03.B18 MODERATE DEMENTIA WITH OTHER BEHAVIORAL DISTURBANCE, UNSPECIFIED DEMENTIA TYPE (H): ICD-10-CM

## 2024-05-16 PROCEDURE — 99214 OFFICE O/P EST MOD 30 MIN: CPT | Performed by: INTERNAL MEDICINE

## 2024-05-16 RX ORDER — DONEPEZIL HYDROCHLORIDE 5 MG/1
5 TABLET, FILM COATED ORAL AT BEDTIME
Qty: 90 TABLET | Refills: 3 | Status: SHIPPED | OUTPATIENT
Start: 2024-05-16

## 2024-05-16 NOTE — PATIENT INSTRUCTIONS
Everything looks fine to go ahead with surgery as planned.     Take sertraline with sips of water on the morning of surgery.  Skip the metformin on the morning of surgery.     I've reduced the dose of donepezil back down to 5 mg each night.     See me in July 3 as scheduled.

## 2024-05-16 NOTE — PROGRESS NOTES
Clinic Care Coordination Contact  Community Health Worker Follow Up    Reason: CCC CHW Follow Up Called (follow up current goal's)    Care Gaps:   Health Maintenance Due   Topic Date Due    ZOSTER IMMUNIZATION (2 of 3) 12/05/2012     Patient have an appt with PCP today, 5/16/2024 per pt's spouse shared. Unable to informed pt of due care gaps detail at this time.    Care Plan:   Care Plan: Resources for Caregiver support       Problem: Resources for Caregiver support       Goal: Caregiver Support       Start Date: 12/21/2023 Expected End Date: 3/21/2024    This Visit's Progress: 90% Recent Progress: 90%    Note:     Barriers: Cognitive decline; Limited caregiver support; Provider availability - wait time to complete appointments.   Strengths: Motivated; Agreeable to Care Coordination.   Patient expressed understanding of goal: Yes    Action steps to achieve this goal:  1. I will review resources for Caregiver Support sent by Care Coordinator.   2. I will contact Care Coordinator for additional resources if needed.   3. I will contact the care team with questions, concerns or support needs. I will use the clinic as a resource and I understand I can contact the clinic with 24/7 after hours services available. Care Coordinator will remain available as needed.     (Updated: 5/16/2024)- Unable to review this goal with pt today due to the pt/spouse time.                              Media reviewed:   -Person-to-Person consent to communication is file to speak with pt's spouse Alyssa Jimenes.    Patient Outreach Discussion:   [facility] CHW was able to connect with patient's spouse, Alyssa Jimenes with patient present today. Introduced self. Explained reason of call.     Patient's spouse shares that they are at Dr. Bingham's office early for the pt appt. Patient have an pre-op appt today at 3:00PM with Dr. Bingham per appt desk reviewed. Pt's spouse shared; pt is doing good and no questions at this time. CHW suggested pt/spouse to  discuss any concerns with Dr. Bingham (PCP) today and may connect with CCC at phone number [phone] (FYI: CHW [place] office) to review current goal(s) and any additional resources support. Pt's spouse agreed. Pt's spouse aware of CHW/CCC next outreach plan.    Per appt desk reviewed:  Name: Robi Jimenes MRN: [id number]     Date: 5/16/2024 Status: Scheduled     Time: 3:00 PM Length: 30     Visit Type: PRE-OPERATIVE [2531] Copay: $0.00     Provider: Viet Bingham MD Department: [facility]       Notes: pre op phy for catracts with Dr Micaela Hurtado at [facility] on 05/22 and 06/05 *lsd*     CHW suggested patient for the following:  -Pt connect CCC/CHW with any additional resources need and PCP office for scheduling appt.     CHW Next Follow-up: Goal's follow up. Informed patient of due care gaps (if any).     Outreach frequency: monthly      CHW Plan:   -Next CHW outreach plan: 1 month

## 2024-05-16 NOTE — PROGRESS NOTES
Preoperative Evaluation  [facility]  [address]  [address]  [facility] [address]  Phone: [phone]  Primary Provider: Viet Bingham MD,   Pre-op Performing Provider: Viet Bingham MD,   May 16, 2024             5/16/2024   Surgical Information   What procedure is being done? cataract   [facility] or [facility]   Who is doing the procedure / surgery? [facility]   Date of surgery / procedure: may22   Time of surgery / procedure: dont  know   Where do you plan to recover after surgery? at home with family     Fax number for surgical facility: [phone]    Assessment & Plan     The proposed surgical procedure is considered LOW risk.    (Z01.818) Preop general physical exam  (primary encounter diagnosis)  Comment:  Satisfactory operative candidate with anesthesia as required.     (H25.9) Age-related cataract of both eyes, unspecified age-related cataract type  Comment: Reason for surgery.     (E11.22,  N18.31) Type 2 diabetes mellitus with stage 3a chronic kidney disease, without long-term current use of insulin (H)  Comment: Satisfactory control.   Patient needs skilled nursing home care consult to assist with management.   Plan: Hemoglobin A1c          (F03.B18) Moderate dementia with other behavioral disturbance, unspecified dementia type (H)  Comment: Reduce dose of Aricept due to diarrhea on 10 mg dose.   Patient requires continued Home Care.  Plan: donepezil (ARICEPT) 5 MG tablet                Possible Sleep Apnea: No        - No identified additional risk factors other than previously addressed        Recommendation  Approval given to proceed with proposed procedure, without further diagnostic evaluation.      Subjective   Robi is a 89 year old, presenting for the following:  Pre-Op Exam          5/16/2024     3:02 PM   Additional Questions   Roomed by Jeanette MANZO CMA   Accompanied by Alyssa, spouse     HPI related to  upcoming procedure: Preop exam for cataract surgery. No recent acute illness symptms.         5/16/2024   Pre-Op Questionnaire   Have you ever had a heart attack or stroke? (!) No    Have you ever had surgery on your heart or blood vessels, such as a stent placement, a coronary artery bypass, or surgery on an artery in your head, neck, heart, or legs? (!) YES TAVR in 2018   Do you have chest pain with activity? No   Do you have a history of heart failure? No   Do you currently have a cold, bronchitis or symptoms of other infection? No   Do you have a cough, shortness of breath, or wheezing? No   Do you or anyone in your family have previous history of blood clots? No   Do you or does anyone in your family have a serious bleeding problem such as prolonged bleeding following surgeries or cuts? No   Have you ever had problems with anemia or been told to take iron pills? No   Have you had any abnormal blood loss such as black, tarry or bloody stools? No   Have you ever had a blood transfusion? No   Are you willing to have a blood transfusion if it is medically needed before, during, or after your surgery? Yes   Have you or any of your relatives ever had problems with anesthesia? No   Do you have sleep apnea, excessive snoring or daytime drowsiness? No   Do you have any artifical heart valves or other implanted medical devices like a pacemaker, defibrillator, or continuous glucose monitor? (!) YES   What type of device do you have? cow   Name of the clinic that manages your device [facility]   Do you have artificial joints? No   Are you allergic to latex? No     Health Care Directive  Patient has a Health Care Directive on file      Preoperative Review of    reviewed - no record of controlled substances prescribed.        Patient Active Problem List    Diagnosis Date Noted    Moderate dementia with other behavioral disturbance, unspecified dementia type (H) 01/09/2024     Priority: Medium    Chronic kidney  disease, stage 3a (H) 11/18/2022     Priority: Medium    Alcohol dependence in remission (H) 07/19/2022     Priority: Medium    Lumbosacral spinal stenosis 03/22/2022     Priority: Medium    Facet arthropathy, lumbosacral 03/14/2022     Priority: Medium    Scoliosis of lumbar spine, unspecified scoliosis type 03/14/2022     Priority: Medium    DDD (degenerative disc disease), lumbosacral 03/14/2022     Priority: Medium    Cardiac pacemaker in situ 07/26/2021     Priority: Medium    ST elevation myocardial infarction (STEMI), unspecified artery (H) 06/09/2021     Priority: Medium     Added automatically from request for surgery [id number]      Complete heart block (H) 06/09/2021     Priority: Medium    Syncope 08/17/2020     Priority: Medium    Coronary artery disease      Priority: Medium     cardiac cath 7/24/18: mild non-obstructive disease      Aortic valve stenosis, nonrheumatic      Priority: Medium     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: 26mm Edward Sabino 3 valve      Need for SBE (subacute bacterial endocarditis) prophylaxis      Priority: Medium    LBBB (left bundle branch block)      Priority: Medium    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/06/2018     Priority: Medium     Left        Type 2 diabetes mellitus with microalbuminuria, without long-term current use of insulin (H) 05/18/2017     Priority: Medium    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      Hyperlipidemia with target LDL less than 100 02/05/2014     Priority: Medium     Diagnosis updated by automated process. Provider to review  and confirm.        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    DDD PM 6/10/2021    Hyperlipidaemia LDL goal < 100     Hypertension     Need for SBE (subacute bacterial endocarditis) prophylaxis     Pulmonary hypertension (H)     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    status post TAVR using a 26 mm Greene S3 valve in 2018    Type 2 diabetes mellitus (H)      Past Surgical History:   Procedure Laterality Date    CV HEART CATHETERIZATION WITH POSSIBLE INTERVENTION N/A 6/9/2021    Procedure: coronary angiogram;  Surgeon: Jayesh Rachel MD;  Location:  HEART CARDIAC CATH LAB    CV TEMPORARY PACEMAKER INSERTION N/A 6/9/2021    Procedure: Temporary Pacemaker Insertion;  Surgeon: Jayesh Rachel MD;  Location: [facility] CARDIAC CATH LAB    EP PACEMAKER N/A 6/10/2021    Procedure: EP Pacemaker;  Surgeon: Magdiel Silver MD;  Location:  HEART CARDIAC CATH LAB    MANDIBLE SURGERY  1958    OTHER SURGICAL HISTORY      cardiac cath 7/24/18: mild non-obstructive disease    TRANSCATHETER AORTIC VALVE IMPLANT ANESTHESIA N/A 8/28/2018    Procedure: TRANSCATHETER AORTIC VALVE IMPLANT ANESTHESIA;  ANESTHESIA NEEDED FOR TAVR PROCEDURE;  Surgeon: GENERIC ANESTHESIA PROVIDER;  Location:  OR,  26mm Edward Sabino 3 valve     Current Outpatient Medications   Medication Sig Dispense Refill    acetaminophen (TYLENOL) 325 MG tablet TAKE 2 TABLETS BY MOUTH THREE TIMES DAILY . DO NOT EXCEED 3000 MG OF ACETAMINOPHEN PER 24 HOURS 180 tablet 3    ascorbic acid 500 MG TABS Take 500 mg by mouth Takes off and on      aspirin 81 MG EC tablet Take 1 tablet (81 mg) by mouth daily 30 tablet     donepezil (ARICEPT) 10 MG tablet Take 1 tablet (10 mg) by mouth at bedtime       glucosamine-chondroitin 500-400 MG CAPS per capsule Take 1 capsule by mouth Takes off and on      metFORMIN (GLUCOPHAGE XR) 500 MG 24 hr tablet Take 1 tablet (500 mg) by mouth 2 times daily (with meals) 180 tablet 3    Multiple Vitamins-Iron (MULTIVITAMIN/IRON PO) Take 1 tablet by mouth Takes off and on      QUEtiapine (SEROQUEL) 25 MG tablet TAKE 1/2-1 TABLET BY MOUTH AT EACH BEDTIME, May also take one-half tab BID prn (for acute severe agitation or anxiety). Maximum daily dose: 2 tabs (50 mg). 90 tablet 3    sertraline (ZOLOFT) 25 MG tablet Take 1 tablet (25 mg) by mouth daily 90 tablet 3    simvastatin (ZOCOR) 20 MG tablet Take 1 tablet (20 mg) by mouth at bedtime 90 tablet 3    traZODone (DESYREL) 50 MG tablet TAKE 1 TABLET BY MOUTH NIGHTLY AS NEEDED FOR SLEEP 90 tablet 3    blood glucose (NO BRAND SPECIFIED) test strip Use to test blood sugar one time daily. To accompany: Blood Glucose Monitor Brands: per insurance. (Patient not taking: Reported on 5/16/2024) 100 strip 6    blood glucose monitoring (NO BRAND SPECIFIED) meter device kit Use to test blood sugar one time daily. Preferred blood glucose meter OR supplies to accompany: Blood Glucose Monitor Brands: per insurance. (Patient not taking: Reported on 5/16/2024) 1 kit 0    Continuous Blood Gluc  (FREESTYLE SANGEETA 2 READER) IVONNE USE TO READ BLOOD SUGARS PER  S INSTRUCTIONS (Patient not taking: Reported on 5/16/2024) 1 each 0    Continuous Blood Gluc Sensor (FREESTYLE SANGEETA 2 SENSOR) MISC Inject 1 each Subcutaneous every 14 days Change every 14 days. (Patient not taking: Reported on 5/16/2024) 6 each 3    thin (NO BRAND SPECIFIED) lancets Use with lanceting device once daily. To accompany: Blood Glucose Monitor Brands: per insurance.Use with lanceting device. To accompany: Blood Glucose Monitor Brands: per insurance. (Patient not taking: Reported on 5/16/2024) 100 each 6       Allergies   Allergen Reactions    Fentanyl Other (See  "Comments)     Confusion and agitation after PPM    Versed [Midazolam] Other (See Comments)     Confusion and agitation post PPM        Social History     Tobacco Use    Smoking status: Former     Current packs/day: 0.00     Average packs/day: 1 pack/day for 30.0 years (30.0 ttl pk-yrs)     Types: Cigarettes     Start date:      Quit date: 1980     Years since quittin.7    Smokeless tobacco: Former     Types: Chew     Quit date: 1983   Substance Use Topics    Alcohol use: No     Comment: quit 30+ years ago. states, \"I'm an alcoholic\".     Family History   Problem Relation Age of Onset    Cancer Mother 58         in her 50's, had throat and stomach cancer    Alcohol/Drug Mother     Heart Disease Father          about age 49    Alcohol/Drug Father     Myocardial Infarction Sister 70        Two heart attacks    Alcohol/Drug Sister     Cerebrovascular Disease Brother 65         age 65 of stroke    Alcohol/Drug Brother     Cancer Maternal Grandmother         stomach cancer    Alcohol/Drug Maternal Grandmother     Alcohol/Drug Maternal Grandfather     Alcohol/Drug Paternal Grandmother     Alcohol/Drug Paternal Grandfather     Myocardial Infarction Grandchild 18        Grandson    Myocardial Infarction Other 18        Nephew      History   Drug Use No           REVIEW OF SYSTEMS: The following systems have been completely reviewed and are negative except as noted above:   Constitutional, HEENT, respiratory, cardiovascular, gastrointestinal, genitourinary, musculoskeletal, dermatologic, hematologic, endocrine, psychiatric, and neurologic systems.      Objective    /60 (BP Location: Right arm, Patient Position: Sitting, Cuff Size: Adult Large)   Pulse 96   Temp 97.9  F (36.6  C) (Oral)   Resp 18   Ht 1.6 m (5' 3\")   Wt 68.5 kg (151 lb)   SpO2 96%   BMI 26.75 kg/m     Estimated body mass index is 26.75 kg/m  as calculated from the following:    Height as of this encounter: 1.6 m (5' " "3\").    Weight as of this encounter: 68.5 kg (151 lb).    Physical Exam  GENERAL: alert and no distress  EYES: Eyes grossly normal to inspection, PERRL and conjunctivae and sclerae normal  NECK: no adenopathy, no asymmetry, masses, or scars  RESP: lungs clear to auscultation - no rales, rhonchi or wheezes  CV: regular rate and rhythm, normal S1 S2, no S3 or S4, no murmur, click or rub, no peripheral edema  ABDOMEN: soft, nontender, no hepatosplenomegaly, no masses and bowel sounds normal  MS: no gross musculoskeletal defects noted, no edema    Recent Labs   Lab Test 01/02/24  0823 11/21/23  1010 10/31/23  1531 07/05/23  0912   HGB  --  11.9* 11.9*  --    PLT  --  127* 129*  --      --  142  --    POTASSIUM 4.6  --  4.2  --    CR 1.44*  --  1.26*  --    A1C 6.1*  --   --  6.4*        Diagnostics  No labs were ordered during this visit.   No EKG required for low risk surgery (cataract, skin procedure, breast biopsy, etc).    Revised Cardiac Risk Index (RCRI)  The patient has the following serious cardiovascular risks for perioperative complications:   - No serious cardiac risks = 0 points     RCRI Interpretation: 0 points: Class I (very low risk - 0.4% complication rate)         Signed Electronically by: Viet Bingham MD,   Copy of this evaluation report is provided to requesting physician.         "

## 2024-05-17 ENCOUNTER — TELEPHONE (OUTPATIENT)
Dept: INTERNAL MEDICINE | Facility: CLINIC | Age: 89
End: 2024-05-17
Payer: MEDICARE

## 2024-05-17 NOTE — TELEPHONE ENCOUNTER
Will provider please if agrees make an addendum to the F2F completed on 4/18/24.   The following is needed to the 4/18/24 encounter-Skilled nursing is needed for Diabetic management.  Please complete the above if in agreement.  Thank you,  Hailey Singh LPN  [facility]  [phone]

## 2024-05-21 NOTE — TELEPHONE ENCOUNTER
Will provider please if agrees make an addendum to the F2F completed on 4/18/24.   The following is needed to the 4/18/24 encounter-Skilled nursing is needed for Diabetic management.  Please complete the above if in agreement.  Thank you,  Hailey Singh LPN  [facility]  [phone]

## 2024-05-22 ENCOUNTER — TELEPHONE (OUTPATIENT)
Dept: INTERNAL MEDICINE | Facility: CLINIC | Age: 89
End: 2024-05-22
Payer: MEDICARE

## 2024-05-22 NOTE — TELEPHONE ENCOUNTER
Please advise as currently home care services remain on hold.  Does provider feel a new F2F needs to be completed with [facility]  Please advise.  Thank you,  Hailey Singh LPN  [facility]   [phone]

## 2024-05-23 ENCOUNTER — PATIENT OUTREACH (OUTPATIENT)
Dept: CARE COORDINATION | Facility: CLINIC | Age: 89
End: 2024-05-23
Payer: MEDICARE

## 2024-05-23 NOTE — LETTER
Dear Robi,   Attached is an updated Patient Centered Plan of Care for your continued enrollment in Care Coordination. Please let us know if you have additional questions, concerns or goals that we can assist with.     Sincerely,   Elizabeth Camacho RN, BSN, CPHN, CM  [facility] Ambulatory Care Management  [facility]  Phone: [phone]  Email: [email or web address]  Patient Centered Plan of Care  About Me:        Patient Name:  Reid Boyd    YOB: 1934  Age:         89 year old   [place] MRN:    [id number] Telephone Information:  Home Phone [phone]   Mobile [phone]       Address:  [address] [place] [address] Email address:  [email or web address]      Emergency Contact(s)    Name Relationship Lgl Grd Work Phone Home Phone Mobile Phone   1. EVA BOYD Spouse No   [phone]   2. JODI GARSIA Daughter No  [phone]    3. JOSEPH ALAMO Daughter No   [phone]           Primary language:  English     needed? No   [place] [employer]:  [phone] op. 1  Other communication barriers:None    Preferred Method of Communication:  Mary  Current living arrangement: I live in a private home with spouse    Mobility Status/ Medical Equipment: Independent w/Device    Health Maintenance  Health Maintenance Reviewed: Due/Overdue (Discussed with spouse.)      My Access Plan  Medical Emergency 911   Primary Clinic Line [facility] - [phone]   24 Hour Appointment Line [phone] or  [phone]) (toll-free)   24 Hour Nurse Line [phone] (toll-free)   Preferred Urgent Care No data recorded   Preferred Hospital [facility]  [phone]     Preferred Pharmacy [facility] [address] [facility] [address] [facility]     Behavioral Health Crisis Line The National Suicide Prevention Lifeline at  1-234.588.4703 or Text/Call 988     My Care Team Members  Patient Care Team         Relationship Specialty Notifications Start End    Viet Bingham MD PCP - General Internal Medicine  2/11/14     Phone: [phone] Pager: [phone] Fax: [phone]        [address] [facility] [address]    Viet Bingham MD Assigned PCP   1/16/14     Phone: [phone] Pager: [phone] Fax: [phone]        [address] [facility] [address]    Des Marks MD MD Vascular and Interventional Radiology  7/30/20     Phone: [phone] Pager: [phone] Fax: [phone]        [address] [facility] [address] [facility] [address]    Mary Faulkner RN Specialty Care Coordinator Radiology  7/30/20     Phone: [phone] Pager: [phone]        Eulalio Higginbotham MD MD Cardiovascular Disease  1/19/22     Pager: [phone]         Lizz Love APRN CNP Assigned Heart and Vascular Provider   10/15/22     Phone: [phone] Fax: [phone] [address] [place] [address]    [facility]    10/24/22     Phone: [phone] Fax: [phone] [address] [facility] [address]    Mary Son [facility] Health Worker   11/10/22     Phone: [phone]         Elizabeth Camacho, RN Lead Care Coordinator Primary Care - CC Admissions 6/9/23     Phone: [phone]         Raul Iqbal PA-C Assigned Neuroscience Provider   1/25/24     Phone: [phone] Pager: [phone] Fax: [phone] [address] [place] [address]    Bradley Remy MD Assigned Musculoskeletal Provider   3/23/24     Phone: [phone] Fax: [phone]         [address] [facility] [address]    Zaida Hunter, CHW Community Health Worker  Admissions 4/27/24     Phone: [phone]                   My Care Plans  Self Management and Treatment Plan    Care Plan  Care Plan: Resources for Caregiver support       Problem: Resources for  Caregiver support       Goal: Caregiver Support       Start Date: 12/21/2023 Expected End Date: 3/21/2024    This Visit's Progress: 90% Recent Progress: 90%    Note:     Barriers: Cognitive decline; Limited caregiver support; Provider availability - wait time to complete appointments.   Strengths: Motivated; Agreeable to Care Coordination.   Patient expressed understanding of goal: Yes    Action steps to achieve this goal:  1. I will review resources for Caregiver Support sent by Care Coordinator.   2. I will contact Care Coordinator for additional resources if needed.   3. I will contact the care team with questions, concerns or support needs. I will use the clinic as a resource and I understand I can contact the clinic with 24/7 after hours services available. Care Coordinator will remain available as needed.     (Updated: 5/16/2024)- Unable to review this goal with pt today due to the pt/spouse time.                              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    Type 2 diabetes mellitus with microalbuminuria, without long-term current use of insulin (H)    Pseudoaneurysm of femoral artery (H24)    Coronary artery disease    Aortic valve stenosis, nonrheumatic    Need for SBE (subacute bacterial endocarditis) prophylaxis    LBBB (left bundle branch block)    Syncope    ST elevation myocardial infarction (STEMI), unspecified artery (H)     Complete heart block (H)    Cardiac pacemaker in situ    Facet arthropathy, lumbosacral    Scoliosis of lumbar spine, unspecified scoliosis type    DDD (degenerative disc disease), lumbosacral    Lumbosacral spinal stenosis    Alcohol dependence in remission (H)    Chronic kidney disease, stage 3a (H)    Moderate dementia with other behavioral disturbance, unspecified dementia type (H)      Current Medications and Allergies:    Current Outpatient Medications   Medication Sig Dispense Refill    acetaminophen (TYLENOL) 325 MG tablet TAKE 2 TABLETS BY MOUTH THREE TIMES DAILY . DO NOT EXCEED 3000 MG OF ACETAMINOPHEN PER 24 HOURS 180 tablet 3    ascorbic acid 500 MG TABS Take 500 mg by mouth Takes off and on      aspirin 81 MG EC tablet Take 1 tablet (81 mg) by mouth daily 30 tablet     blood glucose (NO BRAND SPECIFIED) test strip Use to test blood sugar one time daily. To accompany: Blood Glucose Monitor Brands: per insurance. (Patient not taking: Reported on 5/16/2024) 100 strip 6    blood glucose monitoring (NO BRAND SPECIFIED) meter device kit Use to test blood sugar one time daily. Preferred blood glucose meter OR supplies to accompany: Blood Glucose Monitor Brands: per insurance. (Patient not taking: Reported on 5/16/2024) 1 kit 0    donepezil (ARICEPT) 10 MG tablet Take 1 tablet (10 mg) by mouth at bedtime      donepezil (ARICEPT) 5 MG tablet Take 1 tablet (5 mg) by mouth at bedtime 90 tablet 3    glucosamine-chondroitin 500-400 MG CAPS per capsule Take 1 capsule by mouth Takes off and on      metFORMIN (GLUCOPHAGE XR) 500 MG 24 hr tablet Take 1 tablet (500 mg) by mouth 2 times daily (with meals) 180 tablet 3    Multiple Vitamins-Iron (MULTIVITAMIN/IRON PO) Take 1 tablet by mouth Takes off and on      QUEtiapine (SEROQUEL) 25 MG tablet TAKE 1/2-1 TABLET BY MOUTH AT EACH BEDTIME, May also take one-half tab BID prn (for acute severe agitation or anxiety). Maximum daily dose: 2 tabs (50 mg). 90 tablet 3     sertraline (ZOLOFT) 25 MG tablet Take 1 tablet (25 mg) by mouth daily 90 tablet 3    simvastatin (ZOCOR) 20 MG tablet Take 1 tablet (20 mg) by mouth at bedtime 90 tablet 3    traZODone (DESYREL) 50 MG tablet TAKE 1 TABLET BY MOUTH NIGHTLY AS NEEDED FOR SLEEP 90 tablet 3     Current Facility-Administered Medications   Medication Dose Route Frequency Provider Last Rate Last Admin    iohexol (OMNIPAQUE) 300 mg/mL injection 10 mL  10 mL INTRA-ARTICULAR Once Lluvia Gomes MD          Allergies   Allergen Reactions    Fentanyl Other (See Comments)     Confusion and agitation after PPM    Versed [Midazolam] Other (See Comments)     Confusion and agitation post PPM     Care Coordination Start Date: 10/22/2022   Frequency of Care Coordination: monthly, more frequently as needed     Form Last Updated: 05/23/2024

## 2024-05-23 NOTE — PROGRESS NOTES
Clinic Care Coordination Contact    Situation: Patient chart reviewed by care coordinator.    Background: Patient is enrolled in Care Coordination and followed by CHW and RN CC.     Assessment: Task initiated to send patient updated Care Plan every 90 days or upon change in condition.     Plan/Recommendations: RN CC will send patient updated Care Plan via his eCert account.     Elizabeth Camacho RN, BSN, CPHN, CCM  [facility] Ambulatory Care Management  [facility]  Phone: [phone]  Email: [email or web address]

## 2024-05-28 NOTE — TELEPHONE ENCOUNTER
Thank you, noted,  Updated F2F sent in for us to resume home care services.  Hailey Singh LPN  [facility]

## 2024-06-03 ENCOUNTER — ANCILLARY PROCEDURE (OUTPATIENT)
Dept: CARDIOLOGY | Facility: CLINIC | Age: 89
End: 2024-06-03
Attending: INTERNAL MEDICINE
Payer: MEDICARE

## 2024-06-03 DIAGNOSIS — I44.2 CHB (COMPLETE HEART BLOCK) (H): ICD-10-CM

## 2024-06-03 DIAGNOSIS — Z95.0 CARDIAC PACEMAKER IN SITU: ICD-10-CM

## 2024-06-03 LAB
MDC_IDC_EPISODE_DTM: NORMAL
MDC_IDC_EPISODE_ID: NORMAL
MDC_IDC_EPISODE_TYPE: NORMAL
MDC_IDC_LEAD_CONNECTION_STATUS: NORMAL
MDC_IDC_LEAD_CONNECTION_STATUS: NORMAL
MDC_IDC_LEAD_IMPLANT_DT: NORMAL
MDC_IDC_LEAD_IMPLANT_DT: NORMAL
MDC_IDC_LEAD_LOCATION: NORMAL
MDC_IDC_LEAD_LOCATION: NORMAL
MDC_IDC_LEAD_LOCATION_DETAIL_1: NORMAL
MDC_IDC_LEAD_LOCATION_DETAIL_1: NORMAL
MDC_IDC_LEAD_MFG: NORMAL
MDC_IDC_LEAD_MFG: NORMAL
MDC_IDC_LEAD_MODEL: NORMAL
MDC_IDC_LEAD_MODEL: NORMAL
MDC_IDC_LEAD_POLARITY_TYPE: NORMAL
MDC_IDC_LEAD_POLARITY_TYPE: NORMAL
MDC_IDC_LEAD_SERIAL: NORMAL
MDC_IDC_LEAD_SERIAL: NORMAL
MDC_IDC_MSMT_BATTERY_DTM: NORMAL
MDC_IDC_MSMT_BATTERY_REMAINING_LONGEVITY: 72 MO
MDC_IDC_MSMT_BATTERY_REMAINING_PERCENTAGE: 100 %
MDC_IDC_MSMT_BATTERY_STATUS: NORMAL
MDC_IDC_MSMT_LEADCHNL_RA_IMPEDANCE_VALUE: 716 OHM
MDC_IDC_MSMT_LEADCHNL_RA_PACING_THRESHOLD_AMPLITUDE: 0.6 V
MDC_IDC_MSMT_LEADCHNL_RA_PACING_THRESHOLD_PULSEWIDTH: 0.4 MS
MDC_IDC_MSMT_LEADCHNL_RV_IMPEDANCE_VALUE: 562 OHM
MDC_IDC_MSMT_LEADCHNL_RV_PACING_THRESHOLD_AMPLITUDE: 1 V
MDC_IDC_MSMT_LEADCHNL_RV_PACING_THRESHOLD_PULSEWIDTH: 0.4 MS
MDC_IDC_PG_IMPLANT_DTM: NORMAL
MDC_IDC_PG_MFG: NORMAL
MDC_IDC_PG_MODEL: NORMAL
MDC_IDC_PG_SERIAL: NORMAL
MDC_IDC_PG_TYPE: NORMAL
MDC_IDC_SESS_CLINIC_NAME: NORMAL
MDC_IDC_SESS_DTM: NORMAL
MDC_IDC_SESS_TYPE: NORMAL
MDC_IDC_SET_BRADY_AT_MODE_SWITCH_MODE: NORMAL
MDC_IDC_SET_BRADY_AT_MODE_SWITCH_RATE: 170 {BEATS}/MIN
MDC_IDC_SET_BRADY_LOWRATE: 60 {BEATS}/MIN
MDC_IDC_SET_BRADY_MAX_SENSOR_RATE: 130 {BEATS}/MIN
MDC_IDC_SET_BRADY_MAX_TRACKING_RATE: 130 {BEATS}/MIN
MDC_IDC_SET_BRADY_MODE: NORMAL
MDC_IDC_SET_BRADY_PAV_DELAY_HIGH: 150 MS
MDC_IDC_SET_BRADY_PAV_DELAY_LOW: 200 MS
MDC_IDC_SET_BRADY_SAV_DELAY_HIGH: 150 MS
MDC_IDC_SET_BRADY_SAV_DELAY_LOW: 200 MS
MDC_IDC_SET_LEADCHNL_RA_PACING_AMPLITUDE: 2 V
MDC_IDC_SET_LEADCHNL_RA_PACING_CAPTURE_MODE: NORMAL
MDC_IDC_SET_LEADCHNL_RA_PACING_POLARITY: NORMAL
MDC_IDC_SET_LEADCHNL_RA_PACING_PULSEWIDTH: 0.4 MS
MDC_IDC_SET_LEADCHNL_RA_SENSING_ADAPTATION_MODE: NORMAL
MDC_IDC_SET_LEADCHNL_RA_SENSING_POLARITY: NORMAL
MDC_IDC_SET_LEADCHNL_RA_SENSING_SENSITIVITY: 0.25 MV
MDC_IDC_SET_LEADCHNL_RV_PACING_AMPLITUDE: 1.4 V
MDC_IDC_SET_LEADCHNL_RV_PACING_CAPTURE_MODE: NORMAL
MDC_IDC_SET_LEADCHNL_RV_PACING_POLARITY: NORMAL
MDC_IDC_SET_LEADCHNL_RV_PACING_PULSEWIDTH: 0.4 MS
MDC_IDC_SET_LEADCHNL_RV_SENSING_ADAPTATION_MODE: NORMAL
MDC_IDC_SET_LEADCHNL_RV_SENSING_POLARITY: NORMAL
MDC_IDC_SET_LEADCHNL_RV_SENSING_SENSITIVITY: 1.5 MV
MDC_IDC_SET_ZONE_DETECTION_INTERVAL: 375 MS
MDC_IDC_SET_ZONE_STATUS: NORMAL
MDC_IDC_SET_ZONE_TYPE: NORMAL
MDC_IDC_SET_ZONE_VENDOR_TYPE: NORMAL
MDC_IDC_STAT_AT_BURDEN_PERCENT: 0 %
MDC_IDC_STAT_AT_DTM_END: NORMAL
MDC_IDC_STAT_AT_DTM_START: NORMAL
MDC_IDC_STAT_BRADY_DTM_END: NORMAL
MDC_IDC_STAT_BRADY_DTM_START: NORMAL
MDC_IDC_STAT_BRADY_RA_PERCENT_PACED: 43 %
MDC_IDC_STAT_BRADY_RV_PERCENT_PACED: 36 %
MDC_IDC_STAT_EPISODE_RECENT_COUNT: 0
MDC_IDC_STAT_EPISODE_RECENT_COUNT_DTM_END: NORMAL
MDC_IDC_STAT_EPISODE_RECENT_COUNT_DTM_START: NORMAL
MDC_IDC_STAT_EPISODE_TYPE: NORMAL
MDC_IDC_STAT_EPISODE_VENDOR_TYPE: NORMAL
MDC_IDC_STAT_EPISODE_VENDOR_TYPE: NORMAL

## 2024-06-03 PROCEDURE — 93294 REM INTERROG EVL PM/LDLS PM: CPT | Performed by: INTERNAL MEDICINE

## 2024-06-03 PROCEDURE — 93296 REM INTERROG EVL PM/IDS: CPT | Performed by: INTERNAL MEDICINE

## 2024-06-18 ENCOUNTER — TELEPHONE (OUTPATIENT)
Dept: INTERNAL MEDICINE | Facility: CLINIC | Age: 89
End: 2024-06-18
Payer: MEDICARE

## 2024-06-18 ENCOUNTER — PATIENT OUTREACH (OUTPATIENT)
Dept: CARE COORDINATION | Facility: CLINIC | Age: 89
End: 2024-06-18
Payer: MEDICARE

## 2024-06-18 NOTE — PROGRESS NOTES
Clinic Care Coordination Contact  Care Coordination Clinician Chart Review    Situation: Patient chart reviewed by Care Coordinator.       Background: Care Coordination Program started: 10/22/2022. Initial assessment completed and patient-centered care plan(s) were developed with participation from patient. Lead CC handed patient off to CHW for continued outreaches.       Assessment: Per chart review, patient outreach completed by CC CHW on 5/16/24.  Patient is actively working to accomplish goal(s). Patient's goal(s) appropriate and relevant at this time. Patient is not due for updated Plan of Care.  Assessments will be completed annually or as needed/with change of patient status.      Care Plan: Resources for Caregiver support       Problem: Resources for Caregiver support       Goal: Caregiver Support       Start Date: 12/21/2023 Expected End Date: 3/21/2024    This Visit's Progress: 90% Recent Progress: 90%    Note:     Barriers: Cognitive decline; Limited caregiver support; Provider availability - wait time to complete appointments.   Strengths: Motivated; Agreeable to Care Coordination.   Patient expressed understanding of goal: Yes    Action steps to achieve this goal:  1. I will review resources for Caregiver Support sent by Care Coordinator.   2. I will contact Care Coordinator for additional resources if needed.   3. I will contact the care team with questions, concerns or support needs. I will use the clinic as a resource and I understand I can contact the clinic with 24/7 after hours services available. Care Coordinator will remain available as needed.     (Updated: 5/16/2024)- Unable to review this goal with pt today due to the pt/spouse time.                                   Plan/Recommendations: The patient will continue working with Care Coordination to achieve goal(s) as above. CHW will continue outreaches at minimum every 30 days and will involve Lead CC as needed or if patient is ready to move  to Maintenance. Lead CC will continue to monitor CHW outreaches and patient's progress to goal(s) every 6 weeks.     Plan of Care updated and sent to patient: Veronica Camacho RN, BSN, CPHN, [facility] Ambulatory Care Management  [facility]  Phone: [phone]  Email: [email or web address]

## 2024-06-18 NOTE — TELEPHONE ENCOUNTER
(recert) Skilled Nurse visit every 2wk [id number].   Please approve the above.  Thank you,  Hailey Singh LPN  [facility]

## 2024-06-20 ENCOUNTER — PATIENT OUTREACH (OUTPATIENT)
Dept: CARE COORDINATION | Facility: CLINIC | Age: 89
End: 2024-06-20
Payer: MEDICARE

## 2024-06-20 ENCOUNTER — MEDICAL CORRESPONDENCE (OUTPATIENT)
Dept: HEALTH INFORMATION MANAGEMENT | Facility: CLINIC | Age: 89
End: 2024-06-20

## 2024-06-20 NOTE — PROGRESS NOTES
Clinic Care Coordination Contact  Community Health Worker Follow Up    Care Gaps:   Health Maintenance Due   Topic Date Due    ZOSTER IMMUNIZATION (2 of 3) 12/05/2012    COVID-19 Vaccine (7 - 2023-24 season) 05/26/2024    A1C  07/02/2024    MICROALBUMIN  07/05/2024     Declined due to Alyssa expressing that Patient will follow up with PCP on 07/03 appointment.      Care Plan:   Care Plan: In Home Support and Resources Completed 5/1/2023      Problem: Lack of caregiver support  Resolved 5/1/2023      Goal: In Home Support and Resources  Completed 5/1/2023      Start Date: 11/10/2022 Expected End Date: 11/10/2023    This Visit's Progress: 100% Recent Progress: 30%    Note:     Barriers: Lack of caregiver support  Strengths: Patient's wife Alyssa is engaged and motivated   Patient expressed understanding of goal: Yes  Action steps to achieve this goal:  1. I understand that RN CC will mail and send through [employer] in home support resources, caregiver support resources and [place] Choices [place] information for [facility].  2. I will review resources with my children and utilize as we see fit.   3. I will call [employer] directly to learn more about their volunteer respite program.  4. I will continue obtaining PCA assistance for 10 hours a week (Monday's and Friday's)  5. I will call my Senior Housing ([facility]) directly at [phone] to request more in home services if needed.  4.  I will continue to work with care coordination for any additional resources and support.   (Complete)                              Care Plan: in-home assistance Completed 10/9/2023      Problem: in-home assistance  Resolved 10/9/2023      Goal: I will call my ILF directly to access [facility]/memory care services.  Completed 10/9/2023      Start Date: 6/2/2023 Expected End Date: 8/2/2023    This Visit's Progress: 100% Recent Progress: 80%    Priority: Medium    Note:     Barriers: none  Strengths: Partner, Alyssa, is supportive.  Patient expressed  understanding of goal: yes  Action steps to achieve this goal:  1. I will call my Senior Housing ([facility]) directly at [phone] to request more in home services if needed (long-term/Memory care).  2. I will follow the steps needed to access the services (nurse evaluation).  3. I will continue working with Care Coordination.                              Care Plan: Resources for Caregiver support       Problem: Resources for Caregiver support       Goal: Caregiver Support       Start Date: 12/21/2023 Expected End Date: 3/21/2024    This Visit's Progress: 100% Recent Progress: 90%    Note:     Barriers: Cognitive decline; Limited caregiver support; Provider availability - wait time to complete appointments.   Strengths: Motivated; Agreeable to Care Coordination.   Patient expressed understanding of goal: Yes    Action steps to achieve this goal:  1. I will review resources for Caregiver Support sent by Care Coordinator.   2. I will contact Care Coordinator for additional resources if needed.   3. I will contact the care team with questions, concerns or support needs. I will use the clinic as a resource and I understand I can contact the clinic with 24/7 after hours services available. Care Coordinator will remain available as needed.     (Updated: 5/16/2024)- Unable to review this goal with pt today due to the pt/spouse time.                                Intervention and Education during outreach:     Spoke to Patient's Spouse (Alyssa)  CHW Introduced intent of call regarding monthly follow up.   Alyssa reports that things are going very well. Further expressing that Patient has completed the goals within PT and OT. Further resulting in Patient completing session recommended. Alyssa expressed no additional support or resources is needed regarding PT and OT.   Alyssa expressed that she feels well support with an RN that comes every 2 weeks to support Patient with meds. set up and to check in.   Alyssa expressed no additional  support or resources is needed within CCC at this time. Further expressing that Patient is ready to be placed on Maintenance status within [facility].   Alyssa declined CHW from confirming upcoming appointments.   CHW encourages Alyssa to contact CHW/ CCC Team if additional support is needed. CHW provides Alyssa with CHW's contact information. Alyssa acknowledged.     CHW Plan: CHW will route message to Lead CC (RNCC);  Patient has completed all goals with Clinic Care Coordination.  Please review the chart and confirm if maintenance  is approved.      ALTAF Medeiros  Clinic Care Coordination   [facility]   Phone: [phone]  [email or web address]

## 2024-06-20 NOTE — Clinical Note
Carlito Johnson,   Patient has completed all goals with Clinic Care Coordination.  Please review the chart and confirm if maintenance  is approved.  Marielena Romero

## 2024-06-25 ENCOUNTER — LAB (OUTPATIENT)
Dept: LAB | Facility: CLINIC | Age: 89
End: 2024-06-25
Payer: MEDICARE

## 2024-06-25 DIAGNOSIS — E11.22 TYPE 2 DIABETES MELLITUS WITH STAGE 3A CHRONIC KIDNEY DISEASE, WITHOUT LONG-TERM CURRENT USE OF INSULIN (H): ICD-10-CM

## 2024-06-25 DIAGNOSIS — N18.31 CHRONIC KIDNEY DISEASE, STAGE 3A (H): Primary | ICD-10-CM

## 2024-06-25 DIAGNOSIS — N18.30 TYPE 2 DIABETES MELLITUS WITH STAGE 3 CHRONIC KIDNEY DISEASE, WITHOUT LONG-TERM CURRENT USE OF INSULIN, UNSPECIFIED WHETHER STAGE 3A OR 3B CKD (H): ICD-10-CM

## 2024-06-25 DIAGNOSIS — N18.31 TYPE 2 DIABETES MELLITUS WITH STAGE 3A CHRONIC KIDNEY DISEASE, WITHOUT LONG-TERM CURRENT USE OF INSULIN (H): ICD-10-CM

## 2024-06-25 DIAGNOSIS — E11.22 TYPE 2 DIABETES MELLITUS WITH STAGE 3 CHRONIC KIDNEY DISEASE, WITHOUT LONG-TERM CURRENT USE OF INSULIN, UNSPECIFIED WHETHER STAGE 3A OR 3B CKD (H): ICD-10-CM

## 2024-06-25 LAB — HBA1C MFR BLD: 6.2 % (ref 0–5.6)

## 2024-06-25 PROCEDURE — 36415 COLL VENOUS BLD VENIPUNCTURE: CPT

## 2024-06-25 PROCEDURE — 82043 UR ALBUMIN QUANTITATIVE: CPT

## 2024-06-25 PROCEDURE — 82570 ASSAY OF URINE CREATININE: CPT

## 2024-06-25 PROCEDURE — 83036 HEMOGLOBIN GLYCOSYLATED A1C: CPT

## 2024-06-26 LAB
CREAT UR-MCNC: 101 MG/DL
MICROALBUMIN UR-MCNC: 27.3 MG/L
MICROALBUMIN/CREAT UR: 27.03 MG/G CR (ref 0–17)

## 2024-06-27 ENCOUNTER — TELEPHONE (OUTPATIENT)
Dept: INTERNAL MEDICINE | Facility: CLINIC | Age: 89
End: 2024-06-27
Payer: MEDICARE

## 2024-07-03 ENCOUNTER — OFFICE VISIT (OUTPATIENT)
Dept: INTERNAL MEDICINE | Facility: CLINIC | Age: 89
End: 2024-07-03
Payer: MEDICARE

## 2024-07-03 VITALS
TEMPERATURE: 97.8 F | HEIGHT: 63 IN | BODY MASS INDEX: 26.93 KG/M2 | HEART RATE: 90 BPM | SYSTOLIC BLOOD PRESSURE: 118 MMHG | OXYGEN SATURATION: 98 % | DIASTOLIC BLOOD PRESSURE: 60 MMHG | WEIGHT: 152 LBS | RESPIRATION RATE: 16 BRPM

## 2024-07-03 DIAGNOSIS — R80.9 TYPE 2 DIABETES MELLITUS WITH MICROALBUMINURIA, WITHOUT LONG-TERM CURRENT USE OF INSULIN (H): ICD-10-CM

## 2024-07-03 DIAGNOSIS — E78.5 HYPERLIPIDEMIA WITH TARGET LDL LESS THAN 100: ICD-10-CM

## 2024-07-03 DIAGNOSIS — N18.31 TYPE 2 DIABETES MELLITUS WITH STAGE 3A CHRONIC KIDNEY DISEASE, WITHOUT LONG-TERM CURRENT USE OF INSULIN (H): Primary | ICD-10-CM

## 2024-07-03 DIAGNOSIS — I10 ESSENTIAL HYPERTENSION WITH GOAL BLOOD PRESSURE LESS THAN 140/90: ICD-10-CM

## 2024-07-03 DIAGNOSIS — E11.29 TYPE 2 DIABETES MELLITUS WITH MICROALBUMINURIA, WITHOUT LONG-TERM CURRENT USE OF INSULIN (H): ICD-10-CM

## 2024-07-03 DIAGNOSIS — E11.22 TYPE 2 DIABETES MELLITUS WITH STAGE 3A CHRONIC KIDNEY DISEASE, WITHOUT LONG-TERM CURRENT USE OF INSULIN (H): Primary | ICD-10-CM

## 2024-07-03 DIAGNOSIS — R21 RASH: ICD-10-CM

## 2024-07-03 PROCEDURE — 99207 PR FOOT EXAM NO CHARGE: CPT | Performed by: INTERNAL MEDICINE

## 2024-07-03 PROCEDURE — 99214 OFFICE O/P EST MOD 30 MIN: CPT | Performed by: INTERNAL MEDICINE

## 2024-07-03 PROCEDURE — G2211 COMPLEX E/M VISIT ADD ON: HCPCS | Performed by: INTERNAL MEDICINE

## 2024-07-03 NOTE — PROGRESS NOTES
"  Assessment & Plan   (E11.22,  N18.31) Type 2 diabetes mellitus with stage 3a chronic kidney disease, without long-term current use of insulin (H)  (primary encounter diagnosis)  Comment: Very well controlled. Continue current measures.   Plan: FOOT EXAM          (E11.29,  R80.9) Type 2 diabetes mellitus with microalbuminuria, without long-term current use of insulin (H)  Comment: Stable, minimal urine microalbumin elevation.     (E78.5) Hyperlipidemia with target LDL less than 100  Comment: Previous LDL at target. Continue current meds.     (I10) Essential hypertension with goal blood pressure less than 140/90  Comment: BP at target. Continue current meds.     (R21) Rash  Comment: See pt instructions.       The longitudinal plan of care for the diagnosis(es)/condition(s) as documented were addressed during this visit. Due to the added complexity in care, I will continue to support Robi in the subsequent management and with ongoing continuity of care.    BMI  Estimated body mass index is 26.93 kg/m  as calculated from the following:    Height as of this encounter: 1.6 m (5' 3\").    Weight as of this encounter: 68.9 kg (152 lb).         Patient Instructions   All chronic conditions look stable.     Will assure that refills are available for all meds.     Consider Vaseline petroleum jelly and a bandage over sore skin on fingers. We could have you see Dermatology if needed.     Stop by the lab downstairs ([address]) before heading over to the hospital.     Otherwise, see Dr Bingham for next Wellness Visit after 1/9/2025, You can schedule that today or soon.     Subjective   Robi is a 89 year old, presenting for the following health issues:  Follow Up and Diabetes      7/3/2024     9:27 AM   Additional Questions   Roomed by Jeanette MANZO CMA   Accompanied by Asya, daughter     History of Present Illness       Diabetes:   He presents for follow up of diabetes.    He is not checking blood glucose.         He has no concerns " "regarding his diabetes at this time.   He is not experiencing numbness or burning in feet, excessive thirst, blurry vision, weight changes or redness, sores or blisters on feet.           He eats 0-1 servings of fruits and vegetables daily.He consumes 2 sweetened beverage(s) daily.He exercises with enough effort to increase his heart rate 9 or less minutes per day.  He exercises with enough effort to increase his heart rate 3 or less days per week.   He is taking medications regularly.       Patient seen in follow up for diabetes mellitus, hyperlipidemia, hypertension.   He is accompanied by his daughter Asya. She advises me that the patient's wife (and her mother) was admitted to the hospital yesterday with a stroke.     He has a rash on his fingers and on his right foot. Previous recommendations have been to apply Vaseline petroleum jelly and a bandage over these lesions for possible dry skin/eczema. He has been offered dermatology referral if they persist and remain bothersome.     He did not see Urology for urinary urgency. He is scheduled for cardiology follow up in around September.     Not checking glucoses. Recent labs show excellent A1c at 6.2. Mild elevation in microalbumin.     Past medical, family and social histories as well as medications reviewed and updated as needed.    No dyspnea or cough. No chest discomfort, dizziness or palpitations. No diarrhea, abdominal pain or rectal bleeding.   No acute problems with vision or speech, lateralizing weakness or paresthesias.    ROS: as above or negative for Respiratory, CV, GI, endocrine, neuro systems.       Objective    /60 (BP Location: Left arm, Patient Position: Sitting, Cuff Size: Adult Large)   Pulse 90   Temp 97.8  F (36.6  C) (Oral)   Resp 16   Ht 1.6 m (5' 3\")   Wt 68.9 kg (152 lb)   SpO2 98%   BMI 26.93 kg/m    Body mass index is 26.93 kg/m .    Physical Exam   GENERAL: alert and no distress  EYES: Eyes grossly normal to inspection, " PERRL and conjunctivae and sclerae normal  RESP: lungs clear to auscultation - no rales, rhonchi or wheezes  CV: regular rate and rhythm, normal S1 S2, no S3 or S4, no murmur, click or rub, no peripheral edema  MS: no gross musculoskeletal defects noted, no edema  SKIN: small patches of eczematous scaling erythema on proximal dorsal 4th fingers and on right dorsum of midfoot.   Diabetic foot exam: normal DP and PT pulses, no trophic changes or ulcerative lesions, and normal sensory exam            Signed Electronically by: Viet Bingham MD,

## 2024-07-03 NOTE — PATIENT INSTRUCTIONS
All chronic conditions look stable.     Will assure that refills are available for all meds.     Consider Vaseline petroleum jelly and a bandage over sore skin on fingers. We could have you see Dermatology if needed.     Stop by the lab downstairs ([address]) before heading over to the hospital.     Otherwise, see Dr Bingham for next Wellness Visit after 1/9/2025, You can schedule that today or soon.

## 2024-07-18 ENCOUNTER — TRANSFERRED RECORDS (OUTPATIENT)
Dept: HEALTH INFORMATION MANAGEMENT | Facility: CLINIC | Age: 89
End: 2024-07-18
Payer: MEDICARE

## 2024-07-25 ENCOUNTER — PATIENT OUTREACH (OUTPATIENT)
Dept: CARE COORDINATION | Facility: CLINIC | Age: 89
End: 2024-07-25
Payer: MEDICARE

## 2024-07-25 ENCOUNTER — TELEPHONE (OUTPATIENT)
Dept: INTERNAL MEDICINE | Facility: CLINIC | Age: 89
End: 2024-07-25
Payer: MEDICARE

## 2024-07-25 NOTE — TELEPHONE ENCOUNTER
Form completed with help from wife (see CTC). Form to provider primary for signature/date. Patient's wife would like to  when she is in the clinic for a 7- [place] appointment.

## 2024-07-25 NOTE — PROGRESS NOTES
Clinic Care Coordination Contact  Patient has completed all goals with Clinic Care Coordination.  CHW requested that RN CC please review the chart. Maintenance  is approved.    Elizabeth Camacho RN, BSN, CPHN, CCM  [facility] Ambulatory Care Management  [facility] [place]  Phone: [phone]  Email: [email or web address]    Clinic Care Coordination Contact  Patient has completed all goals with Clinic Care Coordination as of 7/25/24.  Please review the chart and confirm if maintenance is approved.    Mamie Mckeon  Community Health Worker   [email or web address]   Clinic Care Coordination / Ambulatory Care Management  [facility]  [email or web address]  Office: [phone]  Gender Pronouns: She/her/hers  Employed by [employer]

## 2024-07-25 NOTE — TELEPHONE ENCOUNTER
Forms/Letter Request    Type of form/letter: OTHER: [employer]       Do we have the form/letter: Yes:     Who is the form from? [employer]. Family brought for to clinic.     Where did/will the form come from? Patient or family brought in       When is form/letter needed by: ASAP    How would you like the form/letter returned:  Not noted    Patient Notified form requests are processed in 5-7 business days:Yes    Could we send this information to you in [employer] or would you prefer to receive a phone call?:   Patient would prefer a phone call   Okay to leave a detailed message?: No at Home number on file [phone] (home)     26-May-2019 15:47

## 2024-07-25 NOTE — TELEPHONE ENCOUNTER
Patient's home/cell number message on machine to call back.  Daughter noted on form as POA requested that we speak to patients wife regarding form completion of form. If patient calls back by 3:45 today please transfer call to Martina at [phone]. If not please let them know I will call them on Monday when I am back in the clinic.

## 2024-07-25 NOTE — PROGRESS NOTES
Clinic Care Coordination Contact  Community Health Worker Follow Up    Care Gaps:     Health Maintenance Due   Topic Date Due    ZOSTER IMMUNIZATION (2 of 3) 12/05/2012    COVID-19 Vaccine (7 - 2023-24 season) 05/26/2024     Not addressed at this time as patient is scheduled to see his PCP (Viet Bingham MD) on 1/10/2025 2:30 PM.    Care Plan:   Care Plan: In Home Support and Resources Completed 5/1/2023      Problem: Lack of caregiver support  Resolved 5/1/2023      Goal: In Home Support and Resources  Completed 5/1/2023      Start Date: 11/10/2022 Expected End Date: 11/10/2023    This Visit's Progress: 100% Recent Progress: 30%    Note:     Barriers: Lack of caregiver support  Strengths: Patient's wife Alyssa is engaged and motivated   Patient expressed understanding of goal: Yes  Action steps to achieve this goal:  1. I understand that RN CC will mail and send through [employer] in home support resources, [place] support resources and [place] for [facility].  2. I will review resources with my children and utilize as we see fit.   3. I will call [employer] directly to learn more about their volunteer respite program.  4. I will continue obtaining PCA assistance for 10 hours a week (Monday's and Friday's)  5. I will call my Senior [facility] ([facility]) directly at [phone] to request more in home services if needed.  4.  I will continue to work with care coordination for any additional resources and support.   (Complete)                              Care Plan: in-home assistance Completed 10/9/2023      Problem: in-home assistance  Resolved 10/9/2023      Goal: I will call my ILF directly to access half-way/memory care services.  Completed 10/9/2023      Start Date: 6/2/2023 Expected End Date: 8/2/2023    This Visit's Progress: 100% Recent Progress: 80%    Priority: Medium    Note:     Barriers: none  Strengths: Partner, Alyssa, is supportive.  Patient expressed understanding of goal:  yes  Action steps to achieve this goal:  1. I will call my Senior [facility] ([facility]) directly at [phone] to request more in home services if needed (retirement/Memory care).  2. I will follow the steps needed to access the services (nurse evaluation).  3. I will continue working with Care Coordination.                              Care Plan: Resources for Caregiver support       Problem: Resources for Caregiver support       Goal: Caregiver Support  Completed 7/25/2024      Start Date: 12/21/2023 Expected End Date: 3/21/2024    This Visit's Progress: 100% Recent Progress: 100%    Note:     Barriers: Cognitive decline; Limited caregiver support; Provider availability - wait time to complete appointments.   Strengths: Motivated; Agreeable to Care Coordination.   Patient expressed understanding of goal: Yes    Action steps to achieve this goal:  1. I will review resources for Caregiver Support sent by Care Coordinator.   2. I will contact Care Coordinator for additional resources if needed.   3. I will contact the care team with questions, concerns or support needs. I will use the clinic as a resource and I understand I can contact the clinic with 24/7 after hours services available. Care Coordinator will remain available as needed.     (7/25/24 Update: Completed)                              Intervention and Education during outreach: Patient's wife Alyssa (consent to communicate on file) noted that patient's goal for caregiver support has been accomplished. Patient receives caregiver support at home now and there is no new concern/issue at this moment.     CHW Plan: Next CHW follow-up outreach in one month.     Mamie Mckeon  Community Health Worker   [email or web address]   Clinic Care Coordination / Ambulatory Care Management  [facility], and [facility]  [email or web address]  Office: [phone]  Gender Pronouns: She/her/hers  Employed by [facility] [employer]

## 2024-07-30 ENCOUNTER — PATIENT OUTREACH (OUTPATIENT)
Dept: CARE COORDINATION | Facility: CLINIC | Age: 89
End: 2024-07-30
Payer: MEDICARE

## 2024-07-30 ENCOUNTER — TELEPHONE (OUTPATIENT)
Dept: INTERNAL MEDICINE | Facility: CLINIC | Age: 89
End: 2024-07-30
Payer: MEDICARE

## 2024-07-30 NOTE — TELEPHONE ENCOUNTER
Wife calling for pt - C2C on file    Wife asking for the [facility] nurses contact information due to the nurse has not reached out to set up a visit this week     Line got disconnected   RN called [phone] and LM with the home care - [facility]- information - [phone]    Olga Michaels RN, BSN  [facility]

## 2024-07-30 NOTE — PROGRESS NOTES
Clinic Care Coordination Contact  Care Coordination Clinician Chart Review    Situation: Patient chart reviewed by Care Coordinator.       Background: Care Coordination Program started: 10/22/2022. Initial assessment completed and patient-centered care plan(s) were developed with participation from patient. Lead CC handed patient off to CHW for continued outreaches.       Assessment: Per chart review, patient outreach completed by CC CHW on 7/25/24.  Patient is actively working to accomplish goal(s). Patient's goal(s) appropriate and relevant at this time. Patient is not due for updated Plan of Care.  Assessments will be completed annually or as needed/with change of patient status.      Care Plan: In Home Support and Resources Completed 5/1/2023      Problem: Lack of caregiver support  Resolved 5/1/2023      Goal: In Home Support and Resources  Completed 5/1/2023      Start Date: 11/10/2022 Expected End Date: 11/10/2023    This Visit's Progress: 100% Recent Progress: 30%    Note:     Barriers: Lack of caregiver support  Strengths: Patient's wife Alyssa is engaged and motivated   Patient expressed understanding of goal: Yes  Action steps to achieve this goal:  1. I understand that RN CC will mail and send through [email or web address] in home support resources, [place] support resources and [place] for [facility].  2. I will review resources with my children and utilize as we see fit.   3. I will call [employer] directly to learn more about their volunteer respite program.  4. I will continue obtaining PCA assistance for 10 hours a week (Monday's and Friday's)  5. I will call my Senior Housing ([facility]) directly at [phone] to request more in home services if needed.  4.  I will continue to work with care coordination for any additional resources and support.   (Complete)                              Care Plan: in-home assistance Completed 10/9/2023      Problem: in-home assistance  Resolved  10/9/2023      Goal: I will call my ILF directly to access KATE/memory care services.  Completed 10/9/2023      Start Date: 6/2/2023 Expected End Date: 8/2/2023    This Visit's Progress: 100% Recent Progress: 80%    Priority: Medium    Note:     Barriers: none  Strengths: Partner, Alyssa, is supportive.  Patient expressed understanding of goal: yes  Action steps to achieve this goal:  1. I will call my Senior Housing ([facility]) directly at [phone] to request more in home services if needed (KATE/Memory care).  2. I will follow the steps needed to access the services (nurse evaluation).  3. I will continue working with Care Coordination.                              Care Plan: Resources for Caregiver support       Problem: Resources for Caregiver support       Goal: Caregiver Support  Completed 7/25/2024      Start Date: 12/21/2023 Expected End Date: 3/21/2024    This Visit's Progress: 100% Recent Progress: 100%    Note:     Barriers: Cognitive decline; Limited caregiver support; Provider availability - wait time to complete appointments.   Strengths: Motivated; Agreeable to Care Coordination.   Patient expressed understanding of goal: Yes    Action steps to achieve this goal:  1. I will review resources for Caregiver Support sent by Care Coordinator.   2. I will contact Care Coordinator for additional resources if needed.   3. I will contact the care team with questions, concerns or support needs. I will use the clinic as a resource and I understand I can contact the clinic with 24/7 after hours services available. Care Coordinator will remain available as needed.     (7/25/24 Update: Completed)                                   Plan/Recommendations: The patient will continue working with Care Coordination to achieve goal(s) as above. CHW will continue outreaches at minimum every 30 days and will involve Lead CC as patient ihas moved to Maintenance. Lead CC will continue to monitor CHW outreaches and patient's  progress to goal(s) every 60 days.     Plan of Care updated and sent to patient: Veronica Camacho RN, BSN, CPHN, CCM  [facility] Ambulatory Care Management  [facility]  Phone: [phone]  Email: [email or web address]

## 2024-07-30 NOTE — TELEPHONE ENCOUNTER
Wife calls back. She is asking if Pt can continue to have Home care nurses come out every 2 weeks x 8 weeks for medication set up and assessment.   She states his HC expires this week.     Advised that we can confirm with Dr Bingham, but if the Home care nurses think that he still needs Home care services, and insurance would cover it, then this should be OK.     Wife was advised to call the HC nurses and discuss with them as well, to continue services.

## 2024-08-05 ENCOUNTER — MYC MEDICAL ADVICE (OUTPATIENT)
Dept: INTERNAL MEDICINE | Facility: CLINIC | Age: 89
End: 2024-08-05

## 2024-08-15 ENCOUNTER — TELEPHONE (OUTPATIENT)
Dept: INTERNAL MEDICINE | Facility: CLINIC | Age: 89
End: 2024-08-15
Payer: MEDICARE

## 2024-08-15 NOTE — TELEPHONE ENCOUNTER
SN EVERY OTHER WK for 8WKS.    Also, the wife who is the primary caregiver unable to check the patient BG, stating it's difficult for her to check his BG daily, Homecare attempted to teach the wife, and she was unable to teach back or demonstrated how to do BG checks.    The wife asking if itis okay not to check his BG since he is not on insulin.  BGT  results today 121.    Please approve and advise given above update.  Thank you,  Hailey Singh LPN  [facility]

## 2024-08-16 ENCOUNTER — TELEPHONE (OUTPATIENT)
Dept: INTERNAL MEDICINE | Facility: CLINIC | Age: 89
End: 2024-08-16
Payer: MEDICARE

## 2024-08-16 NOTE — TELEPHONE ENCOUNTER
Please update as to blood glucose monitoring.    Caregiver unable to check the patient BG, stating it's difficult for her to check his BG daily, homecare attempted to teach the wife, and she was unable to teach back or demonstrated how to do BG checks.  The wife asking if itis okay not to check his BG since he is not on insulin.   result at home care visit.    Please advise  Thank you,  Hailey Singh LPN  [facility]

## 2024-08-19 ENCOUNTER — MEDICAL CORRESPONDENCE (OUTPATIENT)
Dept: HEALTH INFORMATION MANAGEMENT | Facility: CLINIC | Age: 89
End: 2024-08-19

## 2024-08-21 ENCOUNTER — PATIENT OUTREACH (OUTPATIENT)
Dept: CARE COORDINATION | Facility: CLINIC | Age: 89
End: 2024-08-21
Payer: MEDICARE

## 2024-08-21 NOTE — LETTER
Dear Robi,   Attached is an updated Patient Centered Plan of Care for your continued enrollment in Care Coordination. Please let us know if you have additional questions, concerns or goals that we can assist with.     Sincerely,   Elizabeth Camacho RN, BSN, CPHN, CM  [facility] Ambulatory Care Management  [facility]  Phone: [phone]  Email: [email or web address]  Patient Centered Plan of Care  About Me:        Patient Name:  Reid Boyd    YOB: 1934  Age:         89 year old   [place] MRN:    [id number] Telephone Information:  Home Phone [phone]   Mobile [phone]       Address:  [address] [place] [address] Email address:  [email or web address]      Emergency Contact(s)    Name Relationship Lgl Grd Work Phone Home Phone Mobile Phone   1. EVA BOYD Spouse No   [phone]   2. JODI GARSIA Daughter No  [phone]    3. JOSEPH ALAMO Daughter No   [phone]           Primary language:  English     needed? No   [place] [employer]:  [phone] op. 1  Other communication barriers:None    Preferred Method of Communication:  Mary  Current living arrangement: I live in a private home with spouse    Mobility Status/ Medical Equipment: Independent w/Device    Health Maintenance  Health Maintenance Reviewed: Due/Overdue (Discussed with spouse.)      My Access Plan  Medical Emergency 911   Primary Clinic Line [facility] - [phone]   24 Hour Appointment Line [phone] or  [phone]) (toll-free)   24 Hour Nurse Line [phone] (toll-free)   Preferred Urgent Care No data recorded   Preferred Hospital [facility]  [phone]     Preferred Pharmacy [facility] [address] [address] [facility]     Behavioral Health Crisis Line The National Suicide Prevention Lifeline at  1-913.821.3897 or Text/Call 988     My Care Team Members  Patient Care Team         Relationship Specialty Notifications Start End    Viet Bingham MD PCP - General Internal Medicine  2/11/14     Phone: [phone] Pager: [phone] Fax: [phone]        [address] [facility] [address]    Viet Bingham MD Assigned PCP   1/16/14     Phone: [phone] Pager: [phone] Fax: [phone]        [address] [facility] [address]    Des Marks MD MD Vascular and Interventional Radiology  7/30/20     Phone: [phone] Pager: [phone] Fax: [phone]        [address] [facility] [address] [facility] [address]    Mary Faulkner RN Specialty Care Coordinator Radiology  7/30/20     Phone: [phone] Pager: [phone]        Eulalio Higginbotham MD MD Cardiovascular Disease  1/19/22     Phone: [phone] Pager: [phone] Fax: [phone]        [facility] [address] [place] [address]    Lizz Love APRN CNP Assigned Heart and Vascular Provider   10/15/22     Phone: [phone] Fax: [phone]         [address] [place] [address]    [facility]    10/24/22     Phone: [phone] Fax: [phone]         [address] [facility] [address]    Elizabeth Camacho, NATALIE Lead Care Coordinator Primary Care - CC Admissions 6/9/23     Phone: [phone]         Raul Iqbal PA-C Assigned Neuroscience Provider   1/25/24     Phone: [phone] Pager: [phone] Fax: [phone] [address] [place] [address]    Bradley Remy MD Assigned Musculoskeletal Provider   3/23/24     Phone: [phone] Fax: [phone] [address] [facility] [address]    Mamie Mckeon, CINDYW Community Health Worker  Admissions 7/12/24               My Care Plans  Self Management and Treatment Plan    Care Plan  Care Plan: Resources for Caregiver support       Problem: Resources for Caregiver support                    Action Plans on File:     Advance Care Plans/Directives:   Advanced Care Plan/Directives on file:   Yes    Status of Document(s):   On File and Validated    Advanced Care Plan/Directives Type:   Advanced Directive - On File         My Medical and Care Information  Problem List   Patient Active Problem List   Diagnosis    Hyperlipidemia with target LDL less than 100    Type 2 diabetes mellitus with other circulatory complications (H)    Peripheral vascular disease (H24)    Essential hypertension with goal blood pressure less than 140/90    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    Type 2 diabetes mellitus with microalbuminuria, without long-term current use of insulin (H)    Pseudoaneurysm of femoral artery (H24)    Coronary artery disease    Aortic valve stenosis, nonrheumatic    Need for SBE (subacute bacterial endocarditis) prophylaxis    LBBB (left bundle branch block)    Syncope    ST elevation myocardial infarction (STEMI), unspecified artery (H)    Complete heart block (H)    Cardiac pacemaker in situ    Facet arthropathy, lumbosacral    Scoliosis of lumbar spine, unspecified scoliosis type    DDD (degenerative disc disease), lumbosacral    Lumbosacral spinal stenosis    Alcohol dependence in remission (H)    Chronic kidney disease, stage 3a (H)    Moderate dementia with other behavioral disturbance, unspecified dementia type (H)      Current Medications and Allergies:    Current Outpatient Medications   Medication Sig Dispense Refill    acetaminophen (TYLENOL) 325 MG tablet TAKE 2 TABLETS BY MOUTH THREE TIMES DAILY . DO NOT EXCEED 3000 MG OF ACETAMINOPHEN PER 24 HOURS 180 tablet 3    ascorbic acid 500 MG TABS Take 500 mg by mouth Takes off and on      aspirin 81 MG EC tablet Take 1 tablet (81 mg) by mouth daily 30 tablet     donepezil (ARICEPT) 10 MG tablet Take 1 tablet (10 mg) by mouth at bedtime      donepezil (ARICEPT) 5 MG tablet Take 1 tablet (5 mg) by mouth at bedtime  90 tablet 3    glucosamine-chondroitin 500-400 MG CAPS per capsule Take 1 capsule by mouth Takes off and on      metFORMIN (GLUCOPHAGE XR) 500 MG 24 hr tablet Take 1 tablet (500 mg) by mouth 2 times daily (with meals) 180 tablet 3    Multiple Vitamins-Iron (MULTIVITAMIN/IRON PO) Take 1 tablet by mouth Takes off and on      QUEtiapine (SEROQUEL) 25 MG tablet TAKE 1/2-1 TABLET BY MOUTH AT EACH BEDTIME, May also take one-half tab BID prn (for acute severe agitation or anxiety). Maximum daily dose: 2 tabs (50 mg). 90 tablet 3    sertraline (ZOLOFT) 25 MG tablet Take 1 tablet (25 mg) by mouth daily 90 tablet 3    simvastatin (ZOCOR) 20 MG tablet Take 1 tablet (20 mg) by mouth at bedtime 90 tablet 3    traZODone (DESYREL) 50 MG tablet TAKE 1 TABLET BY MOUTH NIGHTLY AS NEEDED FOR SLEEP 90 tablet 3     Current Facility-Administered Medications   Medication Dose Route Frequency Provider Last Rate Last Admin    iohexol (OMNIPAQUE) 300 mg/mL injection 10 mL  10 mL INTRA-ARTICULAR Once Lluvia Gomes MD          Allergies   Allergen Reactions    Fentanyl Other (See Comments)     Confusion and agitation after PPM    Versed [Midazolam] Other (See Comments)     Confusion and agitation post PPM     Care Coordination Start Date: 10/22/2022   Frequency of Care Coordination: monthly, more frequently as needed     Form Last Updated: 08/21/2024

## 2024-08-21 NOTE — PROGRESS NOTES
Clinic Care Coordination Contact    Situation: Patient chart reviewed by care coordinator.    Background: Patient is enrolled in Care Coordination and followed by CHW and RN CC.     Assessment: Task initiated to send patient updated Care Plan every 90 days or upon change in condition.     Plan/Recommendations: RN CC will send updated Care Plan to patient via [employer].     Elizabeth Camacho RN, BSN, CPHN, CCM  [facility] Ambulatory Care Management  [facility]  Phone: [phone]  Email: [email or web address]

## 2024-08-22 ENCOUNTER — TELEPHONE (OUTPATIENT)
Dept: INTERNAL MEDICINE | Facility: CLINIC | Age: 89
End: 2024-08-22
Payer: MEDICARE

## 2024-08-23 DIAGNOSIS — Z53.9 DIAGNOSIS NOT YET DEFINED: Primary | ICD-10-CM

## 2024-08-23 PROCEDURE — G0179 MD RECERTIFICATION HHA PT: HCPCS | Performed by: INTERNAL MEDICINE

## 2024-08-28 ENCOUNTER — HOSPITAL ENCOUNTER (EMERGENCY)
Facility: CLINIC | Age: 89
Discharge: HOME | End: 2024-08-28
Payer: MEDICARE

## 2024-08-28 VITALS
OXYGEN SATURATION: 97 % | DIASTOLIC BLOOD PRESSURE: 49 MMHG | SYSTOLIC BLOOD PRESSURE: 119 MMHG | HEART RATE: 71 BPM | RESPIRATION RATE: 18 BRPM | TEMPERATURE: 97.2 F

## 2024-08-28 DIAGNOSIS — R21 RASH AND NONSPECIFIC SKIN ERUPTION: ICD-10-CM

## 2024-08-28 LAB
ANION GAP SERPL CALCULATED.3IONS-SCNC: 10 MMOL/L (ref 7–15)
BASOPHILS # BLD AUTO: 0.1 10E3/UL (ref 0–0.2)
BASOPHILS NFR BLD AUTO: 1 %
BUN SERPL-MCNC: 33.9 MG/DL (ref 8–23)
CALCIUM SERPL-MCNC: 9.2 MG/DL (ref 8.8–10.4)
CHLORIDE SERPL-SCNC: 103 MMOL/L (ref 98–107)
CREAT SERPL-MCNC: 1.37 MG/DL (ref 0.67–1.17)
EGFRCR SERPLBLD CKD-EPI 2021: 49 ML/MIN/1.73M2
EOSINOPHIL # BLD AUTO: 0.9 10E3/UL (ref 0–0.7)
EOSINOPHIL NFR BLD AUTO: 9 %
ERYTHROCYTE [DISTWIDTH] IN BLOOD BY AUTOMATED COUNT: 12.9 % (ref 10–15)
GLUCOSE SERPL-MCNC: 133 MG/DL (ref 70–99)
HCO3 SERPL-SCNC: 25 MMOL/L (ref 22–29)
HCT VFR BLD AUTO: 39.3 % (ref 40–53)
HGB BLD-MCNC: 12.5 G/DL (ref 13.3–17.7)
IMM GRANULOCYTES # BLD: 0 10E3/UL
IMM GRANULOCYTES NFR BLD: 0 %
LYMPHOCYTES # BLD AUTO: 1.8 10E3/UL (ref 0.8–5.3)
LYMPHOCYTES NFR BLD AUTO: 18 %
MCH RBC QN AUTO: 29.6 PG (ref 26.5–33)
MCHC RBC AUTO-ENTMCNC: 31.8 G/DL (ref 31.5–36.5)
MCV RBC AUTO: 93 FL (ref 78–100)
MONOCYTES # BLD AUTO: 1 10E3/UL (ref 0–1.3)
MONOCYTES NFR BLD AUTO: 10 %
NEUTROPHILS # BLD AUTO: 6 10E3/UL (ref 1.6–8.3)
NEUTROPHILS NFR BLD AUTO: 62 %
NRBC # BLD AUTO: 0 10E3/UL
NRBC BLD AUTO-RTO: 0 /100
PLATELET # BLD AUTO: 164 10E3/UL (ref 150–450)
POTASSIUM SERPL-SCNC: 5.4 MMOL/L (ref 3.4–5.3)
RBC # BLD AUTO: 4.22 10E6/UL (ref 4.4–5.9)
SODIUM SERPL-SCNC: 138 MMOL/L (ref 135–145)
WBC # BLD AUTO: 9.7 10E3/UL (ref 4–11)

## 2024-08-28 PROCEDURE — 96360 HYDRATION IV INFUSION INIT: CPT

## 2024-08-28 PROCEDURE — 85004 AUTOMATED DIFF WBC COUNT: CPT

## 2024-08-28 PROCEDURE — 80048 BASIC METABOLIC PNL TOTAL CA: CPT

## 2024-08-28 PROCEDURE — 36415 COLL VENOUS BLD VENIPUNCTURE: CPT

## 2024-08-28 PROCEDURE — 258N000003 HC RX IP 258 OP 636

## 2024-08-28 PROCEDURE — 82310 ASSAY OF CALCIUM: CPT

## 2024-08-28 PROCEDURE — 99283 EMERGENCY DEPT VISIT LOW MDM: CPT

## 2024-08-28 RX ORDER — BENZOCAINE/MENTHOL 6 MG-10 MG
LOZENGE MUCOUS MEMBRANE DAILY PRN
Qty: 30 G | Refills: 0 | Status: SHIPPED | OUTPATIENT
Start: 2024-08-28 | End: 2024-09-04

## 2024-08-28 RX ADMIN — SODIUM CHLORIDE 500 ML: 9 INJECTION, SOLUTION INTRAVENOUS at 21:48

## 2024-08-28 ASSESSMENT — COLUMBIA-SUICIDE SEVERITY RATING SCALE - C-SSRS
1. IN THE PAST MONTH, HAVE YOU WISHED YOU WERE DEAD OR WISHED YOU COULD GO TO SLEEP AND NOT WAKE UP?: NO
6. HAVE YOU EVER DONE ANYTHING, STARTED TO DO ANYTHING, OR PREPARED TO DO ANYTHING TO END YOUR LIFE?: NO
2. HAVE YOU ACTUALLY HAD ANY THOUGHTS OF KILLING YOURSELF IN THE PAST MONTH?: NO

## 2024-08-28 ASSESSMENT — ACTIVITIES OF DAILY LIVING (ADL)
ADLS_ACUITY_SCORE: 38
ADLS_ACUITY_SCORE: 36
ADLS_ACUITY_SCORE: 36

## 2024-08-29 ENCOUNTER — TELEPHONE (OUTPATIENT)
Dept: DERMATOLOGY | Facility: CLINIC | Age: 89
End: 2024-08-29
Payer: MEDICARE

## 2024-08-29 ENCOUNTER — PATIENT OUTREACH (OUTPATIENT)
Dept: CARE COORDINATION | Facility: CLINIC | Age: 89
End: 2024-08-29
Payer: MEDICARE

## 2024-08-29 NOTE — TELEPHONE ENCOUNTER
This encounter is being sent to inform the clinic that this patient has a referral from JOHN BOCANEGRA for the diagnoses of R21 (ICD-10-CM) - Rash and nonspecific skin eruption and has requested that this patient be seen within Urgent 3-5 and. Based on the availability of our provider(s), we are unable to accommodate this request.    Were all sites offered this patient?  Yes    Consent to communicate on file    Does scheduling algorithm request to schedule next available?  Patient has been scheduled for the first available opening with [place] on 4/30.  We have informed the patient that the clinic will review their referral and reach out if a sooner appointment is medically necessary.

## 2024-08-29 NOTE — ED TRIAGE NOTES
Here for concern of bilateral arms that are bleeding, worse on left arm than right, for more than 1 week. Per spouse, patient has been rubbing his arm due to itching. ABCs intact.      Triage Assessment (Adult)       Row Name 08/28/24 1920          Triage Assessment    Airway WDL WDL        Respiratory WDL    Respiratory WDL WDL        Cardiac WDL    Cardiac WDL WDL

## 2024-08-29 NOTE — TELEPHONE ENCOUNTER
Called and spoke with pt wife and advised October is our soonest and she could call insurance to check with other providers in the area might be covered. Pt wife scheduled for October and added to waitlist.    Thank you,  Elina PATRICK RN  Dermatology   [phone]

## 2024-08-29 NOTE — ED PROVIDER NOTES
Emergency Department Note      History of Present Illness     Chief Complaint   Wound Check    HPI   Reid Jimenes is a 89 year old male on aspirin 81 mg with a history of hypertension, hyperlipidemia, CAD, pacemaker, murmur, type 2 diabetes mellitus, cerebellar infarct, and complete heart block who presents with wounds and rashes to bilateral arms. He has had these sores for one week. The area is red and his caretaker thought his arms were swollen. The redness and sores are spreading up his arm. He is not on any other blood thinners. He adds he has associated arm irritation/itching. He tried putting lotion over the area multiple times a day which has not been helping. He has a few spots on his legs but denies having a rash in any other areas. The spots appear and bleed and itch per his wife. He has had eczema and his spouse adds she has been putting triamcinolone over his hands. He was prescribed this medication a few months ago. No fevers or headaches. No recent medication changes. He denies any numbness or weakness of his arms.    Independent Historian   Wife as detailed above.    Review of External Notes   7/3/24: Patient evaluated by primary care provider for rash, advised to follow-up with dermatology referral.    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   Coronary artery disease  Cardiac pacemaker in situ   Complete heart block   Cataracts  Cerebellar infarct   High degree atrioventricular block  Hyperlipidemia  Hypertension  Facet arthropathy   LBBB  Lumbosacral spinal stenosis   Moderate dementia   Pulmonary hypertension  PVD  Pseudoaneurysm of femoral artery   S/P TAVR  Scoliosis of lumbar spine   STEMI  Type 2 diabetes mellitus   Tobacco abuse, in remission    Medications   Aspirin 81 MG  Donepezil   Metformin   Quetiapine  Sertraline   Simvastatin    Trazodone     Surgical History   CV heart catheterization with possible intervention  CV temporary pacemaker insertion  Cataract removal   EP pacemaker  Mandible surgery  Transcatheter aortic valve implant anesthesia     Physical Exam     Patient Vitals for the past 24 hrs:   BP Temp Temp src Pulse Resp SpO2   08/28/24 1926 119/49 -- -- -- -- --   08/28/24 1921 -- 97.2  F (36.2  C) Temporal 71 18 97 %     Physical Exam  General: Alert, well developed, well nourished. Cooperative.     In mild distress  HEENT:  Head:  Atraumatic  Ears:  External ears are normal  Eyes:   Conjunctivae normal and EOM are normal. No scleral icterus.    Pupils are equal, round, and reactive to light.   Neck:   Normal range of motion. Neck supple.  CV:  Normal rate, regular rhythm, normal heart sounds and radial pulses are 2+ and symmetric.  No murmur.  Resp:  Breath sounds are clear bilaterally    Non-labored, no retractions or accessory muscle use  MS:  Normal range of motion. No edema.    Back atraumatic.  Skin:  Erythematous patchy blanchable rash over bilateral forearms with excoriations and few areas of bleeding. Few purpuric spots noted proximally to this rash. No significant warmth or swelling. Warm and dry.    Neuro:   Alert. Normal strength.   Psych: Normal mood and affect.    Diagnostics     Lab Results   Labs Ordered and Resulted from Time of ED Arrival to Time of ED Departure   BASIC METABOLIC PANEL - Abnormal       Result Value    Sodium 138      Potassium 5.4 (*)     Chloride 103      Carbon Dioxide (CO2) 25      Anion Gap 10      Urea Nitrogen 33.9 (*)     Creatinine 1.37 (*)     GFR Estimate 49 (*)     Calcium 9.2      Glucose 133 (*)    CBC WITH PLATELETS AND DIFFERENTIAL - Abnormal    WBC Count 9.7      RBC Count 4.22 (*)     Hemoglobin 12.5 (*)     Hematocrit 39.3 (*)     MCV 93      MCH 29.6      MCHC 31.8      RDW 12.9      Platelet Count 164      % Neutrophils 62      % Lymphocytes 18      % Monocytes 10      %  Eosinophils 9      % Basophils 1      % Immature Granulocytes 0      NRBCs per 100 WBC 0      Absolute Neutrophils 6.0      Absolute Lymphocytes 1.8      Absolute Monocytes 1.0      Absolute Eosinophils 0.9 (*)     Absolute Basophils 0.1      Absolute Immature Granulocytes 0.0      Absolute NRBCs 0.0       Independent Interpretation   None    ED Course      Medications Administered   Medications   sodium chloride 0.9% BOLUS 500 mL (500 mLs Intravenous $New Bag 8/28/24 2148)     Discussion of Management   None    ED Course   ED Course as of 08/28/24 2212   Wed Aug 28, 2024   2036 I obtained the patient's history and examined as noted above.    2052 I staffed the patient with Dr. Jacobs.    2058 Dr. Jacobs met the patient and obtained the patient's history and performed a physical exam.    2146 I rechecked the patient and explained findings.      Additional Documentation  None    Medical Decision Making / Diagnosis     CMS Diagnoses: None    MIPS       None    MDM   Reid Jimenes is a 89 year old male on aspirin 81 mg with a history of hypertension, hyperlipidemia, CAD, pacemaker, murmur, type 2 diabetes mellitus, cerebellar infarct, and complete heart block who presents with wounds and rashes to bilateral arms.  On exam, the patient has an erythematous patchy blanchable rash over bilateral forearms with few areas of excoriation and bleeding.  He also has a few areas of purpuric lesions proximal to this rash as well.  Blood work shows mild anemia without leukocytosis or thrombocytopenia.  The patient has CKD and creatinine is consistent with this and improved compared to baseline.  Potassium is minimally elevated.  Vital signs are within normal limits without evidence of hypotension, fever, tachycardia, or hypoxia. A broad differential was considered including serum sickness, hypersensitivity vasculitis, toxic epidermal necrolysis, bear-shiva syndrome, allergic phenomena/urticaria, reactive arthritis, viral  rash, etc.  Based on history, exam and especially skin findings, believe this rash is most likely related to dermatitis given itching and excoriations.  We provided the patient with 500 mL of fluid for treatment of CKD and minimally elevated potassium.  I also provided him with a prescription for hydrocortisone to use sparingly as needed for itching.  He was advised to follow-up with his primary care provider and a dermatology referral was placed.  He will follow-up with dermatology soon as he is able to and otherwise return to the ER for fevers, spreading rash, increased pain, difficulty breathing, vomiting, or any other new concerns.  The patient and his wife are comfortable with this plan and all questions were answered.      Disposition   The patient was discharged.     Diagnosis     ICD-10-CM    1. Rash and nonspecific skin eruption  R21 Adult Dermatology  Referral         Discharge Medications   New Prescriptions    HYDROCORTISONE (CORTAID) 1 % EXTERNAL CREAM    Apply topically daily as needed for rash or itching.     Scribe Disclosure:  I, Cary Francis, am serving as a scribe at 7:59 PM on 8/28/2024 to document services personally performed by Anika Smith PA-C based on my observations and the provider's statements to me.      Anika Smith PA-C Berthiaume, Carley J, PA-C  08/29/24 0010

## 2024-08-29 NOTE — DISCHARGE INSTRUCTIONS
Follow up with dermatology  Wrap arms with soft gauze to protect from itching  Use steroid cream sparingly  Follow up with dermatology this week for further evaluation  Return to ED for fever, spreading rash, increased pain, headache, vomiting, or any other new concerns

## 2024-08-29 NOTE — PROGRESS NOTES
Clinic Care Coordination Contact    Situation: Patient chart reviewed by care coordinator.    Background: Patient is enrolled in Care Coordination and followed by CINDYW and RN CC. He is in maintenance status.     Assessment: Patient was seen at [facility] ED for rash and nonspecific skin eruption. Per provider note: Reid Jimenes is a 89 year old male on aspirin 81 mg with a history of hypertension, hyperlipidemia, CAD, pacemaker, murmur, type 2 diabetes mellitus, cerebellar infarct, and complete heart block who presents with wounds and rashes to bilateral arms.  On exam, the patient has an erythematous patchy blanchable rash over bilateral forearms with few areas of excoriation and bleeding.  He also has a few areas of purpuric lesions proximal to this rash as well.  Blood work shows mild anemia without leukocytosis or thrombocytopenia.  The patient has CKD and creatinine is consistent with this and improved compared to baseline.  Potassium is minimally elevated.  Vital signs are within normal limits without evidence of hypotension, fever, tachycardia, or hypoxia. A broad differential was considered including serum sickness, hypersensitivity vasculitis, toxic epidermal necrolysis, bear-shiva syndrome, allergic phenomena/urticaria, reactive arthritis, viral rash, etc.  Based on history, exam and especially skin findings, believe this rash is most likely related to dermatitis given itching and excoriations.  We provided the patient with 500 mL of fluid for treatment of CKD and minimally elevated potassium.  I also provided him with a prescription for hydrocortisone to use sparingly as needed for itching.  He was advised to follow-up with his primary care provider and a dermatology referral was placed.  He will follow-up with dermatology soon as he is able to and otherwise return to the ER for fevers, spreading rash, increased pain, difficulty breathing, vomiting, or any other new  concerns.  The patient and his wife are comfortable with this plan and all questions were answered.     Plan/Recommendations: No further action needed by Care Coordination.     Elizabeth Camacho RN, BSN, CPHN, CCM  [facility]  [facility]  Phone: [phone]  Email: [email or web address]

## 2024-09-03 ENCOUNTER — OFFICE VISIT (OUTPATIENT)
Dept: INTERNAL MEDICINE | Facility: CLINIC | Age: 89
End: 2024-09-03
Payer: MEDICARE

## 2024-09-03 VITALS
HEIGHT: 63 IN | TEMPERATURE: 98 F | OXYGEN SATURATION: 98 % | SYSTOLIC BLOOD PRESSURE: 128 MMHG | RESPIRATION RATE: 18 BRPM | DIASTOLIC BLOOD PRESSURE: 58 MMHG | HEART RATE: 85 BPM | BODY MASS INDEX: 27.29 KG/M2 | WEIGHT: 154 LBS

## 2024-09-03 DIAGNOSIS — L85.3 XEROSIS OF SKIN: ICD-10-CM

## 2024-09-03 DIAGNOSIS — R21 RASH: Primary | ICD-10-CM

## 2024-09-03 DIAGNOSIS — L29.9 ITCHY SKIN: ICD-10-CM

## 2024-09-03 PROCEDURE — G2211 COMPLEX E/M VISIT ADD ON: HCPCS | Performed by: INTERNAL MEDICINE

## 2024-09-03 PROCEDURE — 99213 OFFICE O/P EST LOW 20 MIN: CPT | Performed by: INTERNAL MEDICINE

## 2024-09-03 NOTE — PATIENT INSTRUCTIONS
Very often itching of the skin is related to dry skin.     Avoid soap over all but the essential areas. Soap can castellanos all of the natural oils out of the skin and make it more try.     Use Eucerin cream or Aquaphor ointment over the skin twice a day.   Consider either Zyrtec (non-sedating) or Hydroxyzine (sedating) antihistamine pills, but not both.     We will see if the dermatologist has other ideas.

## 2024-09-03 NOTE — PROGRESS NOTES
"  Assessment & Plan   (R21) Rash  (primary encounter diagnosis)  (L29.9) Itchy skin  (L85.3) Xerosis of skin  Comment: See pt instructions and epic orders. Follow up with dermatology as scheduled.     MED REC REQUIRED  Post Medication Reconciliation Status: discharge medications reconciled, continue medications without change    Patient Instructions   Very often itching of the skin is related to dry skin.     Avoid soap over all but the essential areas. Soap can castellanos all of the natural oils out of the skin and make it more try.     Use Eucerin cream or Aquaphor ointment over the skin twice a day.   Consider either Zyrtec (non-sedating) or Hydroxyzine (sedating) antihistamine pills, but not both.     We will see if the dermatologist has other ideas.      Nancy Rosenbaum is a 89 year old, presenting for the following health issues:  ER F/U      9/3/2024    11:02 AM   Additional Questions   Roomed by Jeanette MANZO CMA   Accompanied by Aylssa, spouse     HPI     ED/UC Followup:    Facility:  [facility] ED  Date of visit: 8/28/2024  Reason for visit: wound check, bilateral arms  Current Status: Slightly improved.       We reviewed recent ED evaluation on 8/28/2024 for an itching rash predominant over the forearms.     Although the patient reports that it may be slightly worse, his wife is present and feels as though it is somewhat improved.     She notes that the only obvious change in his routine had been to switch to an \"Olay lotion\", which he has since discontinued .  His newest oral medication would be quetiapine, which he has taken for several months now.     Discussed that dry skin is typically the most common cause of itching in the absence of other obvious skin pathology  Recommended limiting use of soap to essential areas while showering, and using a good skin moisturizer. See patient instructions.     Agreed with continuing use of 1% hydrocortisone cream.     Past medical, family and social histories as " "well as medications reviewed and updated as needed.    REVIEW OF SYSTEMS: The following systems have been completely reviewed and are negative except as noted above:   Constitutional, dermatologic, and neurologic systems.          Objective    /58 (BP Location: Left arm, Patient Position: Sitting, Cuff Size: Adult Large)   Pulse 85   Temp 98  F (36.7  C) (Oral)   Resp 18   Ht 1.6 m (5' 3\")   Wt 69.9 kg (154 lb)   SpO2 98%   BMI 27.28 kg/m    Body mass index is 27.28 kg/m .    Physical Exam   GENERAL: alert and no distress  SKIN: Dry eczematous skin with patchy erythema and secondary excoriation/scabbing predominantly on the forearms, to a lesser degree in the lower legs, right worse than left.          Signed Electronically by: Viet Bingham MD,     "

## 2024-09-18 ENCOUNTER — ANCILLARY PROCEDURE (OUTPATIENT)
Dept: CARDIOLOGY | Facility: CLINIC | Age: 89
End: 2024-09-18
Payer: MEDICARE

## 2024-09-18 ENCOUNTER — HOSPITAL ENCOUNTER (OUTPATIENT)
Dept: CARDIOLOGY | Facility: CLINIC | Age: 89
Discharge: HOME | End: 2024-09-18
Attending: NURSE PRACTITIONER | Admitting: NURSE PRACTITIONER
Payer: MEDICARE

## 2024-09-18 DIAGNOSIS — Z95.0 CARDIAC PACEMAKER IN SITU: ICD-10-CM

## 2024-09-18 DIAGNOSIS — I44.2 CHB (COMPLETE HEART BLOCK) (H): ICD-10-CM

## 2024-09-18 DIAGNOSIS — I35.0 SEVERE AORTIC STENOSIS: ICD-10-CM

## 2024-09-18 LAB — LVEF ECHO: NORMAL

## 2024-09-18 PROCEDURE — 93308 TTE F-UP OR LMTD: CPT | Mod: 26 | Performed by: INTERNAL MEDICINE

## 2024-09-18 PROCEDURE — 93280 PM DEVICE PROGR EVAL DUAL: CPT | Performed by: INTERNAL MEDICINE

## 2024-09-18 PROCEDURE — 93325 DOPPLER ECHO COLOR FLOW MAPG: CPT | Mod: 26 | Performed by: INTERNAL MEDICINE

## 2024-09-18 PROCEDURE — 93325 DOPPLER ECHO COLOR FLOW MAPG: CPT

## 2024-09-18 PROCEDURE — 93321 DOPPLER ECHO F-UP/LMTD STD: CPT | Mod: 26 | Performed by: INTERNAL MEDICINE

## 2024-09-19 LAB
MDC_IDC_EPISODE_DTM: NORMAL
MDC_IDC_EPISODE_ID: NORMAL
MDC_IDC_EPISODE_TYPE: NORMAL
MDC_IDC_LEAD_CONNECTION_STATUS: NORMAL
MDC_IDC_LEAD_CONNECTION_STATUS: NORMAL
MDC_IDC_LEAD_IMPLANT_DT: NORMAL
MDC_IDC_LEAD_IMPLANT_DT: NORMAL
MDC_IDC_LEAD_LOCATION: NORMAL
MDC_IDC_LEAD_LOCATION: NORMAL
MDC_IDC_LEAD_LOCATION_DETAIL_1: NORMAL
MDC_IDC_LEAD_LOCATION_DETAIL_1: NORMAL
MDC_IDC_LEAD_MFG: NORMAL
MDC_IDC_LEAD_MFG: NORMAL
MDC_IDC_LEAD_MODEL: NORMAL
MDC_IDC_LEAD_MODEL: NORMAL
MDC_IDC_LEAD_POLARITY_TYPE: NORMAL
MDC_IDC_LEAD_POLARITY_TYPE: NORMAL
MDC_IDC_LEAD_SERIAL: NORMAL
MDC_IDC_LEAD_SERIAL: NORMAL
MDC_IDC_MSMT_BATTERY_DTM: NORMAL
MDC_IDC_MSMT_BATTERY_REMAINING_LONGEVITY: 72 MO
MDC_IDC_MSMT_BATTERY_REMAINING_PERCENTAGE: 100 %
MDC_IDC_MSMT_BATTERY_STATUS: NORMAL
MDC_IDC_MSMT_LEADCHNL_RA_IMPEDANCE_VALUE: 721 OHM
MDC_IDC_MSMT_LEADCHNL_RA_PACING_THRESHOLD_AMPLITUDE: 1.1 V
MDC_IDC_MSMT_LEADCHNL_RA_PACING_THRESHOLD_PULSEWIDTH: 0.4 MS
MDC_IDC_MSMT_LEADCHNL_RV_IMPEDANCE_VALUE: 576 OHM
MDC_IDC_MSMT_LEADCHNL_RV_PACING_THRESHOLD_AMPLITUDE: 0.8 V
MDC_IDC_MSMT_LEADCHNL_RV_PACING_THRESHOLD_PULSEWIDTH: 0.4 MS
MDC_IDC_PG_IMPLANT_DTM: NORMAL
MDC_IDC_PG_MFG: NORMAL
MDC_IDC_PG_MODEL: NORMAL
MDC_IDC_PG_SERIAL: NORMAL
MDC_IDC_PG_TYPE: NORMAL
MDC_IDC_SESS_CLINIC_NAME: NORMAL
MDC_IDC_SESS_DTM: NORMAL
MDC_IDC_SESS_TYPE: NORMAL
MDC_IDC_SET_BRADY_AT_MODE_SWITCH_MODE: NORMAL
MDC_IDC_SET_BRADY_AT_MODE_SWITCH_RATE: 170 {BEATS}/MIN
MDC_IDC_SET_BRADY_LOWRATE: 60 {BEATS}/MIN
MDC_IDC_SET_BRADY_MAX_SENSOR_RATE: 130 {BEATS}/MIN
MDC_IDC_SET_BRADY_MAX_TRACKING_RATE: 130 {BEATS}/MIN
MDC_IDC_SET_BRADY_MODE: NORMAL
MDC_IDC_SET_BRADY_PAV_DELAY_HIGH: 150 MS
MDC_IDC_SET_BRADY_PAV_DELAY_LOW: 200 MS
MDC_IDC_SET_BRADY_SAV_DELAY_HIGH: 150 MS
MDC_IDC_SET_BRADY_SAV_DELAY_LOW: 200 MS
MDC_IDC_SET_LEADCHNL_RA_PACING_AMPLITUDE: 2 V
MDC_IDC_SET_LEADCHNL_RA_PACING_CAPTURE_MODE: NORMAL
MDC_IDC_SET_LEADCHNL_RA_PACING_POLARITY: NORMAL
MDC_IDC_SET_LEADCHNL_RA_PACING_PULSEWIDTH: 0.4 MS
MDC_IDC_SET_LEADCHNL_RA_SENSING_ADAPTATION_MODE: NORMAL
MDC_IDC_SET_LEADCHNL_RA_SENSING_POLARITY: NORMAL
MDC_IDC_SET_LEADCHNL_RA_SENSING_SENSITIVITY: 0.25 MV
MDC_IDC_SET_LEADCHNL_RV_PACING_AMPLITUDE: 1.5 V
MDC_IDC_SET_LEADCHNL_RV_PACING_CAPTURE_MODE: NORMAL
MDC_IDC_SET_LEADCHNL_RV_PACING_POLARITY: NORMAL
MDC_IDC_SET_LEADCHNL_RV_PACING_PULSEWIDTH: 0.4 MS
MDC_IDC_SET_LEADCHNL_RV_SENSING_ADAPTATION_MODE: NORMAL
MDC_IDC_SET_LEADCHNL_RV_SENSING_POLARITY: NORMAL
MDC_IDC_SET_LEADCHNL_RV_SENSING_SENSITIVITY: 1.5 MV
MDC_IDC_SET_ZONE_DETECTION_INTERVAL: 375 MS
MDC_IDC_SET_ZONE_STATUS: NORMAL
MDC_IDC_SET_ZONE_TYPE: NORMAL
MDC_IDC_SET_ZONE_VENDOR_TYPE: NORMAL
MDC_IDC_STAT_AT_BURDEN_PERCENT: 0 %
MDC_IDC_STAT_AT_DTM_END: NORMAL
MDC_IDC_STAT_AT_DTM_START: NORMAL
MDC_IDC_STAT_BRADY_DTM_END: NORMAL
MDC_IDC_STAT_BRADY_DTM_START: NORMAL
MDC_IDC_STAT_BRADY_RA_PERCENT_PACED: 44 %
MDC_IDC_STAT_BRADY_RV_PERCENT_PACED: 38 %
MDC_IDC_STAT_EPISODE_RECENT_COUNT: 0
MDC_IDC_STAT_EPISODE_RECENT_COUNT_DTM_END: NORMAL
MDC_IDC_STAT_EPISODE_RECENT_COUNT_DTM_START: NORMAL
MDC_IDC_STAT_EPISODE_TYPE: NORMAL
MDC_IDC_STAT_EPISODE_VENDOR_TYPE: NORMAL
MDC_IDC_STAT_EPISODE_VENDOR_TYPE: NORMAL

## 2024-09-23 ENCOUNTER — OFFICE VISIT (OUTPATIENT)
Dept: CARDIOLOGY | Facility: CLINIC | Age: 89
End: 2024-09-23
Payer: MEDICARE

## 2024-09-23 VITALS
BODY MASS INDEX: 27.25 KG/M2 | OXYGEN SATURATION: 99 % | HEIGHT: 63 IN | HEART RATE: 60 BPM | DIASTOLIC BLOOD PRESSURE: 71 MMHG | WEIGHT: 153.8 LBS | SYSTOLIC BLOOD PRESSURE: 123 MMHG

## 2024-09-23 DIAGNOSIS — I35.0 SEVERE AORTIC STENOSIS: Primary | ICD-10-CM

## 2024-09-23 DIAGNOSIS — I44.2 COMPLETE HEART BLOCK (H): ICD-10-CM

## 2024-09-23 DIAGNOSIS — E78.5 HYPERLIPIDEMIA WITH TARGET LDL LESS THAN 100: ICD-10-CM

## 2024-09-23 DIAGNOSIS — I10 ESSENTIAL HYPERTENSION WITH GOAL BLOOD PRESSURE LESS THAN 140/90: ICD-10-CM

## 2024-09-23 DIAGNOSIS — N18.31 CHRONIC KIDNEY DISEASE, STAGE 3A (H): ICD-10-CM

## 2024-09-23 DIAGNOSIS — Z95.2 S/P TAVR (TRANSCATHETER AORTIC VALVE REPLACEMENT): ICD-10-CM

## 2024-09-23 DIAGNOSIS — Z95.0 CARDIAC PACEMAKER IN SITU: ICD-10-CM

## 2024-09-23 PROCEDURE — 99214 OFFICE O/P EST MOD 30 MIN: CPT | Performed by: NURSE PRACTITIONER

## 2024-09-23 NOTE — PATIENT INSTRUCTIONS
Thanks for participating in a office visit with the [facility] clinic today.    Doing well on a cardiac standpoint  Reviewed results of echocardiogram - aortic valve is functioning well.   Continue current medical therapy  Echo in 1 year    Follow up in 1 year with Dr. Higginbotham     Please call my nurse at   [phone]. Call with any questions or concerns.    Scheduling phone number: [phone]  Reminder: Please bring in all current medications, over the counter supplements and vitamin bottles to your next appointment.

## 2024-09-23 NOTE — LETTER
9/23/2024    Viet Bingham MD, MD  [address] [facility] [address]  [facility] [address]    RE: Reid Jimenes       Dear Colleague,     I had the pleasure of seeing Reid Jimenes in the [facility].  CARDIOLOGY CLINIC NOTE    PRIMARY CARDIOLOGIST  Dr. Higginbotham    PRIMARY CARE PHYSICIAN:  Viet Bingham MD    HISTORY OF PRESENT ILLNESS:  Reid Franklin is a very pleasant 89-year-old male with a past medical history significant for severe aortic stenosis status ( mean gradient 42 mmHg) status post TAVR in 2018 using a 26 mm Greene S3 valve, hypertension, hyperlipidemia, left bundle branch block, AV block status post pacemaker implant in 2021, pulmonary hypertension, type 2 diabetes and mild nonobstructive CAD.    He returns to the office today for an annual follow-up.  He offers no cardiac complaint, denies chest pain, shortness of breath, palpitations, PND, orthopnea, presyncope, syncope, or lower extremity edema.  He walks regularly and there are multilevel assisted living facility.  His only complaint is orthopedic.    Echocardiogram on 9/18/2024 showed a well-seated bioprosthetic aortic valve with a mean gradient of 15.6 mmHg, normal ejection fraction estimated at 55 to 60%, mild aortic regurgitation and mild mitral regurgitation.    Recent device interrogation showed 44% atrial pacing and 30% ventricular pacing, underlying rhythm is sinus bradycardia in the mid 50s.  No atrial or ventricular arrhythmias noted.    Blood pressure is well-controlled at 123/71  Recent labs showed sodium of 138, potassium 5.4, BUN 33.9, creatinine 1.37 and GFR 49.  Hemoglobin 12.5 and platelet 164.  A1c 6.2  Weight stable at 153 pounds    Compliant with all medications.  PAST MEDICAL HISTORY:  Past Medical History:   Diagnosis Date     Aortic valve stenosis, nonrheumatic     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: 26mm Edward Sabino 3 valve     Coronary artery disease     cardiac cath 7/24/18: mild non-obstructive disease     High degree  atrioventricular block 06/09/2021    DDD PM 6/10/2021     Hyperlipidaemia LDL goal < 100      Hypertension      Need for SBE (subacute bacterial endocarditis) prophylaxis      Pulmonary hypertension (H)      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    status post TAVR using a 26 mm Greene S3 valve in 2018     Type 2 diabetes mellitus (H)        MEDICATIONS:  Current Outpatient Medications   Medication Sig Dispense Refill     acetaminophen (TYLENOL) 325 MG tablet TAKE 2 TABLETS BY MOUTH THREE TIMES DAILY . DO NOT EXCEED 3000 MG OF ACETAMINOPHEN PER 24 HOURS 180 tablet 3     ascorbic acid 500 MG TABS Take 500 mg by mouth Takes off and on       aspirin 81 MG EC tablet Take 1 tablet (81 mg) by mouth daily 30 tablet      donepezil (ARICEPT) 10 MG tablet Take 1 tablet (10 mg) by mouth at bedtime       donepezil (ARICEPT) 5 MG tablet Take 1 tablet (5 mg) by mouth at bedtime 90 tablet 3     glucosamine-chondroitin 500-400 MG CAPS per capsule Take 1 capsule by mouth Takes off and on       metFORMIN (GLUCOPHAGE XR) 500 MG 24 hr tablet Take 1 tablet (500 mg) by mouth 2 times daily (with meals) 180 tablet 3     Multiple Vitamins-Iron (MULTIVITAMIN/IRON PO) Take 1 tablet by mouth Takes off and on       QUEtiapine (SEROQUEL) 25 MG tablet TAKE 1/2-1 TABLET BY MOUTH AT EACH BEDTIME, May also take one-half tab BID prn (for acute severe agitation or anxiety). Maximum daily dose: 2 tabs (50 mg). 90 tablet 3     sertraline (ZOLOFT) 25 MG tablet Take 1 tablet (25 mg) by mouth daily 90 tablet 3     simvastatin (ZOCOR) 20 MG tablet Take 1 tablet (20 mg) by mouth at bedtime 90 tablet 3     traZODone (DESYREL) 50 MG tablet TAKE 1 TABLET BY MOUTH NIGHTLY AS NEEDED FOR SLEEP 90 tablet 3     Current Facility-Administered Medications   Medication Dose Route Frequency Provider Last Rate Last Admin     iohexol (OMNIPAQUE) 300 mg/mL injection 10 mL  10 mL INTRA-ARTICULAR Once Lluvia Gomes MD            SOCIAL HISTORY:  I have reviewed this patient's social history and updated it with pertinent information if needed. Reid Jimenes  reports that he quit smoking about 44 years ago. His smoking use included cigarettes. He started smoking about 68 years ago. He has a 30 pack-year smoking history. He quit smokeless tobacco use about 41 years ago.  His smokeless tobacco use included chew. He reports that he does not drink alcohol and does not use drugs.    PHYSICAL EXAM:  Pulse:  [60] 60  BP: (123)/(71) 123/71  SpO2:  [99 %] 99 %  153 lbs 12.8 oz    Constitutional: alert, no distress  Respiratory: Good bilateral air entry  Cardiovascular: Regular rate and rhythm, soft systolic murmur  GI: nondistended  Neuropsychiatric: appropriate affact    ASSESSMENT/PLAN:  Pertinent issues addressed/ reviewed during this cardiology visit  Severe aortic stenosis -status post TAVR in 2018 using a 26 mm Greene S3 valve. Echocardiogram on 9/18/2024 showed a well-seated bioprosthetic aortic valve with a mean gradient of 15.6 mmHg.  Continue with current medical therapy.  Follow-up echocardiogram in 1 year  Hypertension -well-controlled  Hyperlipidemia -on low-dose simvastatin  Pacemaker - Recent device interrogation showed 44% atrial pacing and 30% ventricular pacing, underlying rhythm is sinus bradycardia in the mid 50s.  No atrial or ventricular arrhythmias noted.  Follow-up with device clinic as scheduled.  CKD -creatinine 1.37    Follow-up with Dr. Higginbotham in 1 year with echo prior    It was a pleasure seeing this patient in clinic today. Please do not hesitate to contact me with any future questions.     SARY Ball, CNP  Cardiology - [facility]  09/23/2024       The level of medical decision making during this visit was of moderate complexity.    This note was completed in part using dictation via the Dragon voice recognition software. Some word and grammatical errors may occur and must be interpreted in the  appropriate clinical context.  If there are any questions pertaining to this issue, please contact me for further clarification.    Thank you for allowing me to participate in the care of your patient.      Sincerely,     SARY Ball CNP     [facility]  cc:   SARY Ball CNP  [address] [place] [address]

## 2024-09-23 NOTE — PROGRESS NOTES
CARDIOLOGY CLINIC NOTE    PRIMARY CARDIOLOGIST  Dr. Higginbotham    PRIMARY CARE PHYSICIAN:  Viet Bingham MD    HISTORY OF PRESENT ILLNESS:  Reid Franklin is a very pleasant 89-year-old male with a past medical history significant for severe aortic stenosis status ( mean gradient 42 mmHg) status post TAVR in 2018 using a 26 mm Greene S3 valve, hypertension, hyperlipidemia, left bundle branch block, AV block status post pacemaker implant in 2021, pulmonary hypertension, type 2 diabetes and mild nonobstructive CAD.    He returns to the office today for an annual follow-up.  He offers no cardiac complaint, denies chest pain, shortness of breath, palpitations, PND, orthopnea, presyncope, syncope, or lower extremity edema.  He walks regularly and there are multilevel assisted living facility.  His only complaint is orthopedic.    Echocardiogram on 9/18/2024 showed a well-seated bioprosthetic aortic valve with a mean gradient of 15.6 mmHg, normal ejection fraction estimated at 55 to 60%, mild aortic regurgitation and mild mitral regurgitation.    Recent device interrogation showed 44% atrial pacing and 30% ventricular pacing, underlying rhythm is sinus bradycardia in the mid 50s.  No atrial or ventricular arrhythmias noted.    Blood pressure is well-controlled at 123/71  Recent labs showed sodium of 138, potassium 5.4, BUN 33.9, creatinine 1.37 and GFR 49.  Hemoglobin 12.5 and platelet 164.  A1c 6.2  Weight stable at 153 pounds    Compliant with all medications.  PAST MEDICAL HISTORY:  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    DDD PM 6/10/2021    Hyperlipidaemia LDL goal < 100     Hypertension     Need for SBE (subacute bacterial endocarditis) prophylaxis     Pulmonary hypertension (H)     S/P TAVR  (transcatheter aortic valve replacement)  26 mm Greene S3 valve in 2018 2018    status post TAVR using a 26 mm Greene S3 valve in 2018    Type 2 diabetes mellitus (H)        MEDICATIONS:  Current Outpatient Medications   Medication Sig Dispense Refill    acetaminophen (TYLENOL) 325 MG tablet TAKE 2 TABLETS BY MOUTH THREE TIMES DAILY . DO NOT EXCEED 3000 MG OF ACETAMINOPHEN PER 24 HOURS 180 tablet 3    ascorbic acid 500 MG TABS Take 500 mg by mouth Takes off and on      aspirin 81 MG EC tablet Take 1 tablet (81 mg) by mouth daily 30 tablet     donepezil (ARICEPT) 10 MG tablet Take 1 tablet (10 mg) by mouth at bedtime      donepezil (ARICEPT) 5 MG tablet Take 1 tablet (5 mg) by mouth at bedtime 90 tablet 3    glucosamine-chondroitin 500-400 MG CAPS per capsule Take 1 capsule by mouth Takes off and on      metFORMIN (GLUCOPHAGE XR) 500 MG 24 hr tablet Take 1 tablet (500 mg) by mouth 2 times daily (with meals) 180 tablet 3    Multiple Vitamins-Iron (MULTIVITAMIN/IRON PO) Take 1 tablet by mouth Takes off and on      QUEtiapine (SEROQUEL) 25 MG tablet TAKE 1/2-1 TABLET BY MOUTH AT EACH BEDTIME, May also take one-half tab BID prn (for acute severe agitation or anxiety). Maximum daily dose: 2 tabs (50 mg). 90 tablet 3    sertraline (ZOLOFT) 25 MG tablet Take 1 tablet (25 mg) by mouth daily 90 tablet 3    simvastatin (ZOCOR) 20 MG tablet Take 1 tablet (20 mg) by mouth at bedtime 90 tablet 3    traZODone (DESYREL) 50 MG tablet TAKE 1 TABLET BY MOUTH NIGHTLY AS NEEDED FOR SLEEP 90 tablet 3     Current Facility-Administered Medications   Medication Dose Route Frequency Provider Last Rate Last Admin    iohexol (OMNIPAQUE) 300 mg/mL injection 10 mL  10 mL INTRA-ARTICULAR Once Lluvia Gomes MD           SOCIAL HISTORY:  I have reviewed this patient's social history and updated it with pertinent information if needed. eRid DIXON Jimenes  reports that he quit smoking about 44 years ago. His smoking use included cigarettes. He  started smoking about 68 years ago. He has a 30 pack-year smoking history. He quit smokeless tobacco use about 41 years ago.  His smokeless tobacco use included chew. He reports that he does not drink alcohol and does not use drugs.    PHYSICAL EXAM:  Pulse:  [60] 60  BP: (123)/(71) 123/71  SpO2:  [99 %] 99 %  153 lbs 12.8 oz    Constitutional: alert, no distress  Respiratory: Good bilateral air entry  Cardiovascular: Regular rate and rhythm, soft systolic murmur  GI: nondistended  Neuropsychiatric: appropriate affact    ASSESSMENT/PLAN:  Pertinent issues addressed/ reviewed during this cardiology visit  Severe aortic stenosis -status post TAVR in 2018 using a 26 mm Greene S3 valve. Echocardiogram on 9/18/2024 showed a well-seated bioprosthetic aortic valve with a mean gradient of 15.6 mmHg.  Continue with current medical therapy.  Follow-up echocardiogram in 1 year  Hypertension -well-controlled  Hyperlipidemia -on low-dose simvastatin  Pacemaker - Recent device interrogation showed 44% atrial pacing and 30% ventricular pacing, underlying rhythm is sinus bradycardia in the mid 50s.  No atrial or ventricular arrhythmias noted.  Follow-up with device clinic as scheduled.  CKD -creatinine 1.37    Follow-up with Dr. Higginbotham in 1 year with echo prior    It was a pleasure seeing this patient in clinic today. Please do not hesitate to contact me with any future questions.     SARY Ball, CNP  Cardiology - [facility] Heart  09/23/2024       The level of medical decision making during this visit was of moderate complexity.    This note was completed in part using dictation via the Dragon voice recognition software. Some word and grammatical errors may occur and must be interpreted in the appropriate clinical context.  If there are any questions pertaining to this issue, please contact me for further clarification.

## 2024-09-30 ENCOUNTER — PATIENT OUTREACH (OUTPATIENT)
Dept: CARE COORDINATION | Facility: CLINIC | Age: 89
End: 2024-09-30
Payer: MEDICARE

## 2024-09-30 NOTE — Clinical Note
FYI- Patient has graduated from the Care Coordination program. If any new needs arise, please enter a  new Care Coordination referral. Thank you.   Elizabeth Camacho RN, BSN, CPHN, [facility] Ambulatory Care Management [facility] Phone: [phone] Email: [email or web address]

## 2024-09-30 NOTE — PROGRESS NOTES
Clinic Care Coordination Contact  Follow Up Progress Note      Assessment: RN CC contacted patient for 60 day maintenance follow up (assisting CHW). Patient was asleep still. His wife Alyssa stated he's doing fine. He's done with home care for medication set up. Alyssa doesn't anticipate any new needs.      Care Gaps:    Health Maintenance Due   Topic Date Due    ZOSTER IMMUNIZATION (2 of 3) 12/05/2012    INFLUENZA VACCINE (1) 09/01/2024    COVID-19 Vaccine (7 - 2024-25 season) 09/01/2024     Discussed with patient previously.     Care Plans    Intervention/Education provided during outreach: Discussed patients plan of care. CC RN asked open ended questions, provided support, resources, and encouragement as needed. Patient will reach out to care team sooner than planned with new questions or concerns.     Plan: RN CC will graduate patient from Care Coordination program. Verified patient/wife has CC contact information for any future needs.     Care Coordinator will do no further follow ups.     Elizabeth Camacho RN, BSN, CPHN, CCM  [facility] Ambulatory Care Management  [facility]  Phone: [phone]  Email: [email or web address]

## 2024-09-30 NOTE — LETTER
[facility] CARE COORDINATION  [address]  [place] [address]    September 30, 2024    Reid DIXON Yudy  [address] [address]   [place] [address]    Dear Reid,  Your Care Team congratulates you on your journey to maintain wellness. This document will help guide you on your journey to maintain a healthy lifestyle.  You can use this to help you overcome any barriers you may encounter.  If you should have any questions or concerns, you can contact the members of your Care Team or contact your Primary Care Clinic for assistance.     Health Maintenance  Health Maintenance Reviewed:      My Access Plan  Medical Emergency 911   Primary Clinic [facility] - [phone]   24 Hour Appointment Line [phone] or  [phone]) (toll-free)   24 Hour Nurse Line [phone] (toll-free)   Preferred Urgent Care     Preferred Hospital     Preferred Pharmacy [facility] [address] [facility] [address] [facility]     Behavioral Health Crisis Line The National Suicide Prevention Lifeline at 1-293.536.4023 or 911     My Care Team Members  Patient Care Team         Relationship Specialty Notifications Start End    Viet Bingham MD PCP - General Internal Medicine  2/11/14     Phone: [phone] Pager: [phone] Fax: [phone]        [address] [place] [address]    Viet Bingham MD Assigned PCP   1/16/14     Phone: [phone] Pager: [phone] Fax: [phone]        [address] [place] [address]    Des Marks MD MD Vascular and Interventional Radiology  7/30/20     Phone: [phone] Pager: [phone] Fax: [phone]        [address] [place] [address]    Mary Faulkner, RN Specialty Care Coordinator Radiology  7/30/20     Phone: [phone] Pager: [phone]        Eulalio Higginbotham MD MD Cardiovascular Disease  1/19/22     Pager: [phone]         Ivan  SARY Zamudio CNP Assigned Heart and Vascular Provider   10/15/22     Phone: [phone] Fax: [phone] [address] [place] [address]    [facility]    10/24/22     Phone: [phone] Fax: [phone]         [address] [facility] [address]    Elizabeth Camacho, NATALIE Lead Care Coordinator Primary Care -  Admissions 6/9/23 9/30/24    Phone: [phone]         Bradley Remy MD Assigned Musculoskeletal Provider   3/23/24     Phone: [phone] Fax: [phone] [address] [facility] [address]    Mamie Mckeon CHW Community Health Worker  Admissions 7/12/24 9/30/24    Lake Alegria MD MD Dermatology  8/29/24     Phone: [phone] Fax: [phone] [address] [facility] [address]              Advance Care Plans/Directives Type:      Thank you for providing us with your Advance Directive. We will keep this on file and recommend that it be updated every 2 years, if your health situation changes, if there is a death of one of your health care agents, and/or if there are changes in your marital status.    It has been your Clinic Care Team's pleasure to work with you on accomplishing your goals.    Regards,  Your Clinic Care Team

## 2024-10-01 ENCOUNTER — TELEPHONE (OUTPATIENT)
Dept: INTERNAL MEDICINE | Facility: CLINIC | Age: 89
End: 2024-10-01
Payer: MEDICARE

## 2024-10-01 NOTE — TELEPHONE ENCOUNTER
Forms/Letter Request    Type of form/letter: Physical Therapy Plan 9/25/2024      Do we have the form/letter: Yes: placed in [place] mailbox since Otilia is out    Who is the form from? [facility] # [id number]    Where did/will the form come from? form was faxed in    When is form/letter needed by: 5-7    How would you like the form/letter returned: Fax : [phone]    Patient Notified form requests are processed in 5-7 business days:Yes    Could we send this information to you in [employer] or would you prefer to receive a phone call?:   NA

## 2024-10-02 ENCOUNTER — MEDICAL CORRESPONDENCE (OUTPATIENT)
Dept: HEALTH INFORMATION MANAGEMENT | Facility: CLINIC | Age: 89
End: 2024-10-02

## 2024-10-15 ENCOUNTER — TELEPHONE (OUTPATIENT)
Dept: INTERNAL MEDICINE | Facility: CLINIC | Age: 89
End: 2024-10-15
Payer: MEDICARE

## 2024-10-15 NOTE — TELEPHONE ENCOUNTER
Patient has discharged from the home care services, goals have been met, and no further skill needs.     Please be updated of the above.    Thank you,  Hailey Singh LPN  [facility]

## 2024-10-21 ENCOUNTER — OFFICE VISIT (OUTPATIENT)
Dept: DERMATOLOGY | Facility: CLINIC | Age: 89
End: 2024-10-21
Payer: MEDICARE

## 2024-10-21 DIAGNOSIS — R21 RASH AND NONSPECIFIC SKIN ERUPTION: ICD-10-CM

## 2024-10-21 DIAGNOSIS — L29.9 PRURITUS: ICD-10-CM

## 2024-10-21 DIAGNOSIS — L20.89 OTHER ATOPIC DERMATITIS: Primary | ICD-10-CM

## 2024-10-21 PROCEDURE — 99204 OFFICE O/P NEW MOD 45 MIN: CPT | Performed by: STUDENT IN AN ORGANIZED HEALTH CARE EDUCATION/TRAINING PROGRAM

## 2024-10-21 RX ORDER — FLUOCINONIDE 0.5 MG/G
OINTMENT TOPICAL 2 TIMES DAILY
Qty: 30 G | Refills: 3 | Status: SHIPPED | OUTPATIENT
Start: 2024-10-21

## 2024-10-21 NOTE — PROGRESS NOTES
[facility] Dermatology Note    Encounter Date: Oct 21, 2024    Dermatology Problem List:    ______________________________________    Impression/Plan:  Robi was seen today for derm problem.    Diagnoses and all orders for this visit:    Pruritus  Other atopic dermatitis  -     fluocinonide (LIDEX) 0.05 % external ointment; Apply topically 2 times daily.  -Small areas of eczematous dermatitis on the dorsal hands some excoriations  - Start Lidex ointment twice daily as needed        Follow-up PRN.       Staff Involved:  Staff Only    Lake Alegria MD   of Dermatology  Department of Dermatology  [facility] [employer]      CC:   Chief Complaint   Patient presents with    Derm Problem     Follow up rash   Red spots on both hands        History of Present Illness:  Mr. Reid Jimenes is a 89 year old male who presents as a new patient.    Robi was seen in the ER August 28 for wounds and rashes to the bilateral arms.  It was noted that the sores have been present for 1 week.  He described the areas as itching and bleeding.  They have been using triamcinolone.    Labs:      Physical exam:  Vitals: There were no vitals taken for this visit.  GEN: well developed, well-nourished, in no acute distress, in a pleasant mood.     SKIN: Rivera phototype 1  - Focused examination of the hands was performed.  -Pink scaly plaques with punctate areas of serous crust at sites of excoriation  - No other lesions of concern on areas examined.     Past Medical History:   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    DDD PM 6/10/2021    Hyperlipidaemia LDL goal < 100     Hypertension     Need for SBE (subacute bacterial endocarditis) prophylaxis     Pulmonary hypertension  (H)     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    status post TAVR using a 26 mm Greene S3 valve in 2018    Type 2 diabetes mellitus (H)      Past Surgical History:   Procedure Laterality Date    CV HEART CATHETERIZATION WITH POSSIBLE INTERVENTION N/A 2021    Procedure: coronary angiogram;  Surgeon: Jayesh Rachel MD;  Location:  HEART CARDIAC CATH LAB    CV TEMPORARY PACEMAKER INSERTION N/A 2021    Procedure: Temporary Pacemaker Insertion;  Surgeon: Jayesh Rachel MD;  Location:  HEART CARDIAC CATH LAB    EP PACEMAKER N/A 6/10/2021    Procedure: EP Pacemaker;  Surgeon: Magdiel Silver MD;  Location:  HEART CARDIAC CATH LAB    MANDIBLE SURGERY      OTHER SURGICAL HISTORY      cardiac cath 18: mild non-obstructive disease    TRANSCATHETER AORTIC VALVE IMPLANT ANESTHESIA N/A 2018    Procedure: TRANSCATHETER AORTIC VALVE IMPLANT ANESTHESIA;  ANESTHESIA NEEDED FOR TAVR PROCEDURE;  Surgeon: GENERIC ANESTHESIA PROVIDER;  Location:  OR,  26mm Edward Sabino 3 valve       Social History:   reports that he quit smoking about 44 years ago. His smoking use included cigarettes. He started smoking about 68 years ago. He has a 30 pack-year smoking history. He quit smokeless tobacco use about 41 years ago.  His smokeless tobacco use included chew. He reports that he does not drink alcohol and does not use drugs.    Family History:  Family History   Problem Relation Age of Onset    Cancer Mother 58         in her 50's, had throat and stomach cancer    Alcohol/Drug Mother     Heart Disease Father          about age 49    Alcohol/Drug Father     Myocardial Infarction Sister 70        Two heart attacks    Alcohol/Drug Sister     Cerebrovascular Disease Brother 65         age 65 of stroke    Alcohol/Drug Brother     Cancer Maternal Grandmother         stomach cancer    Alcohol/Drug Maternal Grandmother     Alcohol/Drug Maternal  Grandfather     Alcohol/Drug Paternal Grandmother     Alcohol/Drug Paternal Grandfather     Myocardial Infarction Grandchild 18        Grandson    Myocardial Infarction Other 18        Nephew        Medications:  Current Outpatient Medications   Medication Sig Dispense Refill    acetaminophen (TYLENOL) 325 MG tablet TAKE 2 TABLETS BY MOUTH THREE TIMES DAILY . DO NOT EXCEED 3000 MG OF ACETAMINOPHEN PER 24 HOURS 180 tablet 3    ascorbic acid 500 MG TABS Take 500 mg by mouth Takes off and on      aspirin 81 MG EC tablet Take 1 tablet (81 mg) by mouth daily 30 tablet     donepezil (ARICEPT) 10 MG tablet Take 1 tablet (10 mg) by mouth at bedtime      fluocinonide (LIDEX) 0.05 % external ointment Apply topically 2 times daily. 30 g 3    glucosamine-chondroitin 500-400 MG CAPS per capsule Take 1 capsule by mouth Takes off and on      metFORMIN (GLUCOPHAGE XR) 500 MG 24 hr tablet Take 1 tablet (500 mg) by mouth 2 times daily (with meals) 180 tablet 3    Multiple Vitamins-Iron (MULTIVITAMIN/IRON PO) Take 1 tablet by mouth Takes off and on      QUEtiapine (SEROQUEL) 25 MG tablet TAKE 1/2-1 TABLET BY MOUTH AT EACH BEDTIME, May also take one-half tab BID prn (for acute severe agitation or anxiety). Maximum daily dose: 2 tabs (50 mg). 90 tablet 3    sertraline (ZOLOFT) 25 MG tablet Take 1 tablet (25 mg) by mouth daily 90 tablet 3    simvastatin (ZOCOR) 20 MG tablet Take 1 tablet (20 mg) by mouth at bedtime 90 tablet 3    traZODone (DESYREL) 50 MG tablet TAKE 1 TABLET BY MOUTH NIGHTLY AS NEEDED FOR SLEEP 90 tablet 3    donepezil (ARICEPT) 5 MG tablet Take 1 tablet (5 mg) by mouth at bedtime 90 tablet 3     Allergies   Allergen Reactions    Fentanyl Other (See Comments)     Confusion and agitation after PPM    Versed [Midazolam] Other (See Comments)     Confusion and agitation post PPM

## 2024-10-21 NOTE — LETTER
10/21/2024      Reid Jimenes  [address] [address]  [place] [address]      Dear Colleague,    Thank you for referring your patient, Reid Jimenes, to the [facility]. Please see a copy of my visit note below.    [facility] Dermatology Note    Encounter Date: Oct 21, 2024    Dermatology Problem List:    ______________________________________    Impression/Plan:  Robi was seen today for derm problem.    Diagnoses and all orders for this visit:    Pruritus  Other atopic dermatitis  -     fluocinonide (LIDEX) 0.05 % external ointment; Apply topically 2 times daily.  -Small areas of eczematous dermatitis on the dorsal hands some excoriations  - Start Lidex ointment twice daily as needed        Follow-up PRN.       Staff Involved:  Staff Only    Lake Alegria MD   of Dermatology  Department of Dermatology  [facility] [employer]      CC:   Chief Complaint   Patient presents with     Derm Problem     Follow up rash   Red spots on both hands        History of Present Illness:  Mr. Reid Jimenes is a 89 year old male who presents as a new patient.    Robi was seen in the ER August 28 for wounds and rashes to the bilateral arms.  It was noted that the sores have been present for 1 week.  He described the areas as itching and bleeding.  They have been using triamcinolone.    Labs:      Physical exam:  Vitals: There were no vitals taken for this visit.  GEN: well developed, well-nourished, in no acute distress, in a pleasant mood.     SKIN: Rivera phototype 1  - Focused examination of the hands was performed.  -Pink scaly plaques with punctate areas of serous crust at sites of excoriation  - No other lesions of concern on areas examined.     Past Medical History:   Past Medical History:   Diagnosis Date     Aortic valve stenosis, nonrheumatic     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: 26mm Edward Sabino 3 valve     Coronary artery  Detail Level: Detailed disease     cardiac cath 18: mild non-obstructive disease     High degree atrioventricular block 2021    DDD PM 6/10/2021     Hyperlipidaemia LDL goal < 100      Hypertension      Need for SBE (subacute bacterial endocarditis) prophylaxis      Pulmonary hypertension (H)      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    status post TAVR using a 26 mm Greene S3 valve in 2018     Type 2 diabetes mellitus (H)      Past Surgical History:   Procedure Laterality Date     CV HEART CATHETERIZATION WITH POSSIBLE INTERVENTION N/A 2021    Procedure: coronary angiogram;  Surgeon: Jayesh Rachel MD;  Location:  HEART CARDIAC CATH LAB     CV TEMPORARY PACEMAKER INSERTION N/A 2021    Procedure: Temporary Pacemaker Insertion;  Surgeon: Jayesh Rachel MD;  Location:  HEART CARDIAC CATH LAB     EP PACEMAKER N/A 6/10/2021    Procedure: EP Pacemaker;  Surgeon: Magdiel Silver MD;  Location:  HEART CARDIAC CATH LAB     MANDIBLE SURGERY       OTHER SURGICAL HISTORY      cardiac cath 18: mild non-obstructive disease     TRANSCATHETER AORTIC VALVE IMPLANT ANESTHESIA N/A 2018    Procedure: TRANSCATHETER AORTIC VALVE IMPLANT ANESTHESIA;  ANESTHESIA NEEDED FOR TAVR PROCEDURE;  Surgeon: GENERIC ANESTHESIA PROVIDER;  Location:  OR,  26mm Edward Sabino 3 valve       Social History:   reports that he quit smoking about 44 years ago. His smoking use included cigarettes. He started smoking about 68 years ago. He has a 30 pack-year smoking history. He quit smokeless tobacco use about 41 years ago.  His smokeless tobacco use included chew. He reports that he does not drink alcohol and does not use drugs.    Family History:  Family History   Problem Relation Age of Onset     Cancer Mother 58         in her 50's, had throat and stomach cancer     Alcohol/Drug Mother      Heart Disease Father          about age 49     Alcohol/Drug Father       Myocardial Infarction Sister 70        Two heart attacks     Alcohol/Drug Sister      Cerebrovascular Disease Brother 65         age 65 of stroke     Alcohol/Drug Brother      Cancer Maternal Grandmother         stomach cancer     Alcohol/Drug Maternal Grandmother      Alcohol/Drug Maternal Grandfather      Alcohol/Drug Paternal Grandmother      Alcohol/Drug Paternal Grandfather      Myocardial Infarction Grandchild 18        Grandson     Myocardial Infarction Other 18        Nephew        Medications:  Current Outpatient Medications   Medication Sig Dispense Refill     acetaminophen (TYLENOL) 325 MG tablet TAKE 2 TABLETS BY MOUTH THREE TIMES DAILY . DO NOT EXCEED 3000 MG OF ACETAMINOPHEN PER 24 HOURS 180 tablet 3     ascorbic acid 500 MG TABS Take 500 mg by mouth Takes off and on       aspirin 81 MG EC tablet Take 1 tablet (81 mg) by mouth daily 30 tablet      donepezil (ARICEPT) 10 MG tablet Take 1 tablet (10 mg) by mouth at bedtime       fluocinonide (LIDEX) 0.05 % external ointment Apply topically 2 times daily. 30 g 3     glucosamine-chondroitin 500-400 MG CAPS per capsule Take 1 capsule by mouth Takes off and on       metFORMIN (GLUCOPHAGE XR) 500 MG 24 hr tablet Take 1 tablet (500 mg) by mouth 2 times daily (with meals) 180 tablet 3     Multiple Vitamins-Iron (MULTIVITAMIN/IRON PO) Take 1 tablet by mouth Takes off and on       QUEtiapine (SEROQUEL) 25 MG tablet TAKE 1/2-1 TABLET BY MOUTH AT EACH BEDTIME, May also take one-half tab BID prn (for acute severe agitation or anxiety). Maximum daily dose: 2 tabs (50 mg). 90 tablet 3     sertraline (ZOLOFT) 25 MG tablet Take 1 tablet (25 mg) by mouth daily 90 tablet 3     simvastatin (ZOCOR) 20 MG tablet Take 1 tablet (20 mg) by mouth at bedtime 90 tablet 3     traZODone (DESYREL) 50 MG tablet TAKE 1 TABLET BY MOUTH NIGHTLY AS NEEDED FOR SLEEP 90 tablet 3     donepezil (ARICEPT) 5 MG tablet Take 1 tablet (5 mg) by mouth at bedtime 90 tablet 3     Allergies  Size Of Lesion: .5 x .5 cm   Allergen Reactions     Fentanyl Other (See Comments)     Confusion and agitation after PPM     Versed [Midazolam] Other (See Comments)     Confusion and agitation post PPM               Again, thank you for allowing me to participate in the care of your patient.        Sincerely,        Lake Alegria MD   Size Of Lesion In Cm (Optional): 0

## 2024-11-01 ENCOUNTER — TELEPHONE (OUTPATIENT)
Dept: INTERNAL MEDICINE | Facility: CLINIC | Age: 89
End: 2024-11-01
Payer: MEDICARE

## 2024-11-01 DIAGNOSIS — F03.B18 MODERATE DEMENTIA WITH OTHER BEHAVIORAL DISTURBANCE, UNSPECIFIED DEMENTIA TYPE (H): Primary | ICD-10-CM

## 2024-11-01 NOTE — TELEPHONE ENCOUNTER
General Call    Contacts       Contact Date/Time Type Contact Phone/Fax    11/01/2024 03:19 PM CDT Phone (Incoming) Alyssa Jimenes (Emergency Contact) [phone] (M)          Reason for Call:  Transition care     What are your questions or concerns:  Wife Sharon want to know how she can transition from my care to memory care for Robi    Date of last appointment with provider: 9/3/2024    Could we send this information to you in [employer] or would you prefer to receive a phone call?:   Patient would prefer a phone call   Okay to leave a detailed message?: Yes at Cell number on file:    Telephone Information:   Mobile [phone]

## 2024-11-01 NOTE — TELEPHONE ENCOUNTER
Call to spouse. Spouse asking if Dr. Bingham can facilitate a transition to a memory care. Advised will send message to care coordination to assist with options.

## 2024-11-05 ENCOUNTER — PATIENT OUTREACH (OUTPATIENT)
Dept: CARE COORDINATION | Facility: CLINIC | Age: 89
End: 2024-11-05
Payer: MEDICARE

## 2024-11-05 NOTE — PROGRESS NOTES
Clinic Care Coordination Contact  Community Health Worker Initial Outreach    CHW spoke with patient's daughters Pastora and Mira (C2C on file) via a conference call.     CHW Initial Information Gathering:  Referral Source: PCP  Preferred Hospital: [facility]  [phone]  Current living arrangement:: I live in a private home with spouse  Type of residence:: Apartment  Community Resources: Home Care (Private Pay [facility] 2x/week and 3x/week [facility])  Supplies Currently Used at Home: Diabetic Supplies, Incontinence Supplies  Equipment Currently Used at Home: grab bar, tub/shower, walker, standard, glucometer, shower chair  Informal Support system:: Spouse, Children  No PCP office visit in Past Year: No  Transportation means:: [employer] (Application completed but hasn't used)  CHW Additional Questions  If ED/Hospital discharge, follow-up appointment scheduled as recommended?: N/A  Medication changes made following ED/Hospital discharge?: N/A  MyChart active?: Yes  Patient sent Social Drivers of Health questionnaire?: Yes    Diagnosis: Moderate dementia with other behavioral disturbance, unspecified dementia type (H)    Reason for Referral: Care Transition    Patient accepts CC: Yes. Patient scheduled for assessment with SW CC on 11/6/24 at 1pm. Patient's wife Alyssa and daughter Pastora will be available for the [facility] SW Assessment and patient's daughter Mira will be with patient at that time.     Patient noted desire to discuss the following concerns/needs:     Advise/suggestion on how to facilitate positive conversation on adjustment to changes with patient (especially at to a transition to a memory care)  Support from patient's PCP Viet Bingham MD on facilitation of positive conversation with patient     Per patient's daughters Pastora and Mira, paperwork for [employer] apartment for patient to move in (in the same building where patient currently lives) has already been completed  "and needed furniture have been set aside. At this time, patient has no knowledge of this. Patient's family's (wife Alyssa and daughters Arie) goal is to move patient in the apartment by end of November 2024.     Patient's family desires a positive transition for patient and would like to work with patient's PCP Viet Bingham MD during the care transition. Patient's family would like the conversation initiated by patient's PCP Viet Bingham MD and a family meeting with the PCP if possible.     Patient's family is also exploring if a patient's [facility] provider can or is willing to facilitate the conversation for the family.     Per patient's daughters Pastora and Mira, patient has very negative perception on memory care (noting that he will be \"caged\") and has been very oppose to the idea of him going in memory care. Patient is very sensitive to the term \"memory care\" itself and family hopes to get further advise/suggestion on positive terms to use in stead of \"memory care\" and also facilitate the needed conversation/family meeting.     Patient's daughters Pastora and Mira reported patient's recent new development of \"wondering off\" which is an added concern to them     Transportation:    Per patient's daughters Pastora and Mira, paperwork for [employer] has been completed. Patient has not used [employer] services yet.     Mamie Mckeon  Community Health Worker   [email or web address]   Clinic Care Coordination / Ambulatory Care Management  [facility]  [email or web address]  Office: [phone]  Gender Pronouns: She/her/hers  Employed by [employer]    "

## 2024-11-05 NOTE — Clinical Note
Francisco Ramos  [facility] SW Assessment is scheduled for 11/6/24 at 1pm. Please call patient's wife Alyssa and daughter Pastora (C2C on file).   Thank you!  Mamie

## 2024-11-06 ENCOUNTER — PATIENT OUTREACH (OUTPATIENT)
Dept: NURSING | Facility: CLINIC | Age: 89
End: 2024-11-06
Payer: MEDICARE

## 2024-11-06 NOTE — PROGRESS NOTES
Clinic Care Coordination Contact  Clinic Care Coordination Contact  OUTREACH    Referral Information:  Referral Source: PCP  Primary Diagnosis: Psychosocial    Chief Complaint   Patient presents with    Clinic Care Coordination - Initial      Universal Utilization: Not discussed   Clinic Utilization  Compliance Concerns: No  No-Show Concerns: No  No PCP office visit in Past Year: No  Utilization      No Show Count (past year)  0             ED Visits  1             Hospital Admissions  0                    Current as of: 11/6/2024  1:10 AM            Clinical Concerns:  Current Medical Concerns:    Patient Active Problem List   Diagnosis    Hyperlipidemia with target LDL less than 100    Type 2 diabetes mellitus with other circulatory complications (H)    Peripheral vascular disease (H)    Essential hypertension with goal blood pressure less than 140/90    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    Type 2 diabetes mellitus with microalbuminuria, without long-term current use of insulin (H)    Pseudoaneurysm of femoral artery (H)    Coronary artery disease    Aortic valve stenosis, nonrheumatic    Need for SBE (subacute bacterial endocarditis) prophylaxis    LBBB (left bundle branch block)    Syncope    ST elevation myocardial infarction (STEMI), unspecified artery (H)    Complete heart block (H)    Cardiac pacemaker in situ    Facet arthropathy, lumbosacral    Scoliosis of lumbar spine, unspecified scoliosis type    DDD (degenerative disc disease), lumbosacral    Lumbosacral spinal stenosis    Alcohol dependence in remission (H)    Chronic kidney disease, stage 3a (H)    Moderate dementia with other behavioral disturbance, unspecified dementia type (H)    S/P TAVR (transcatheter aortic valve replacement)       Current Behavioral Concerns: Dementia    Education Provided to patient: CC role, emotional support, suggestions for transition to memory care and community resources    Pain  Pain  (GOAL):: No  Health Maintenance Reviewed: Due/Overdue   Health Maintenance Due   Topic Date Due    ZOSTER IMMUNIZATION (2 of 3) 12/05/2012    INFLUENZA VACCINE (1) 09/01/2024    COVID-19 Vaccine (7 - 2024-25 season) 09/01/2024     Clinical Pathway: Defer to future outreach, if needed     Medication Management:  Medication review status: Defer to follow-up with medical provider     Current Outpatient Medications:     acetaminophen (TYLENOL) 325 MG tablet, TAKE 2 TABLETS BY MOUTH THREE TIMES DAILY . DO NOT EXCEED 3000 MG OF ACETAMINOPHEN PER 24 HOURS, Disp: 180 tablet, Rfl: 3    ascorbic acid 500 MG TABS, Take 500 mg by mouth Takes off and on, Disp: , Rfl:     aspirin 81 MG EC tablet, Take 1 tablet (81 mg) by mouth daily, Disp: 30 tablet, Rfl:     donepezil (ARICEPT) 10 MG tablet, Take 1 tablet (10 mg) by mouth at bedtime, Disp: , Rfl:     donepezil (ARICEPT) 5 MG tablet, Take 1 tablet (5 mg) by mouth at bedtime, Disp: 90 tablet, Rfl: 3    fluocinonide (LIDEX) 0.05 % external ointment, Apply topically 2 times daily., Disp: 30 g, Rfl: 3    glucosamine-chondroitin 500-400 MG CAPS per capsule, Take 1 capsule by mouth Takes off and on, Disp: , Rfl:     metFORMIN (GLUCOPHAGE XR) 500 MG 24 hr tablet, Take 1 tablet (500 mg) by mouth 2 times daily (with meals), Disp: 180 tablet, Rfl: 3    Multiple Vitamins-Iron (MULTIVITAMIN/IRON PO), Take 1 tablet by mouth Takes off and on, Disp: , Rfl:     QUEtiapine (SEROQUEL) 25 MG tablet, TAKE 1/2-1 TABLET BY MOUTH AT EACH BEDTIME, May also take one-half tab BID prn (for acute severe agitation or anxiety). Maximum daily dose: 2 tabs (50 mg)., Disp: 90 tablet, Rfl: 3    sertraline (ZOLOFT) 25 MG tablet, Take 1 tablet (25 mg) by mouth daily, Disp: 90 tablet, Rfl: 3    simvastatin (ZOCOR) 20 MG tablet, Take 1 tablet (20 mg) by mouth at bedtime, Disp: 90 tablet, Rfl: 3    traZODone (DESYREL) 50 MG tablet, TAKE 1 TABLET BY MOUTH NIGHTLY AS NEEDED FOR SLEEP, Disp: 90 tablet, Rfl: 3    Current  Facility-Administered Medications:     iohexol (OMNIPAQUE) 300 mg/mL injection 10 mL, 10 mL, INTRA-ARTICULAR, Once, Lluvia Gomes MD     Functional Status:  Dependent ADLs:: Ambulation-walker  Dependent IADLs:: Cleaning, Cooking, Laundry, Shopping, Meal Preparation, Medication Management, Money Management, Transportation  Bed or wheelchair confined:: No  Mobility Status: Independent w/Device  Fallen 2 or more times in the past year?: No  Any fall with injury in the past year?: No    Living Situation:  Current living arrangement:: I live in assisted living ([facility])  Type of residence:: Independent Senior Living    Lifestyle & Psychosocial Needs:    Social Drivers of Health     Food Insecurity: Low Risk  (1/9/2024)    Food Insecurity     Within the past 12 months, did you worry that your food would run out before you got money to buy more?: No     Within the past 12 months, did the food you bought just not last and you didn t have money to get more?: No   Depression: Not at risk (1/9/2024)    PHQ-2     PHQ-2 Score: 0   Housing Stability: Low Risk  (1/9/2024)    Housing Stability     Do you have housing? : Yes     Are you worried about losing your housing?: No   Recent Concern: Housing Stability - High Risk (10/31/2023)    Housing Stability     Do you have housing? : No     Are you worried about losing your housing?: No   Tobacco Use: Medium Risk (10/21/2024)    Patient History     Smoking Tobacco Use: Former     Smokeless Tobacco Use: Former     Passive Exposure: Not on file   Financial Resource Strain: Low Risk  (1/9/2024)    Financial Resource Strain     Within the past 12 months, have you or your family members you live with been unable to get utilities (heat, electricity) when it was really needed?: No   Alcohol Use: Not At Risk (7/21/2021)    AUDIT-C     Frequency of Alcohol Consumption: Never     Average Number of Drinks: Not on file     Frequency of Binge Drinking: Never    Transportation Needs: Low Risk  (1/9/2024)    Transportation Needs     Within the past 12 months, has lack of transportation kept you from medical appointments, getting your medicines, non-medical meetings or appointments, work, or from getting things that you need?: No   Physical Activity: Inactive (7/19/2022)    Exercise Vital Sign     Days of Exercise per Week: 0 days     Minutes of Exercise per Session: 0 min   Interpersonal Safety: Low Risk  (7/3/2024)    Interpersonal Safety     Do you feel physically and emotionally safe where you currently live?: Yes     Within the past 12 months, have you been hit, slapped, kicked or otherwise physically hurt by someone?: No     Within the past 12 months, have you been humiliated or emotionally abused in other ways by your partner or ex-partner?: No   Stress: No Stress Concern Present (7/19/2022)    [place] [employer] - Occupational Stress Questionnaire     Feeling of Stress : Not at all   Social Connections: Socially Integrated (7/19/2022)    Social Connection and Isolation Panel [NHANES]     Frequency of Communication with Friends and Family: Twice a week     Frequency of Social Gatherings with Friends and Family: Twice a week     Attends Amish Services: More than 4 times per year     Active Member of Clubs or Organizations: Yes     Attends Club or Organization Meetings: Never     Marital Status:    Health Literacy: Not on file     Diet:: Diabetic diet  Inadequate nutrition (GOAL):: No  Tube Feeding: No  Inadequate activity/exercise (GOAL):: No  Significant changes in sleep pattern (GOAL): No  Transportation means:: [employer], Family (Application completed but hasn't used)     Amish or spiritual beliefs that impact treatment:: No  Chemical Dependency Status: No Current Concerns  Informal Support system:: Spouse, Children     CC called pt wife Alyssa and dtr Lizz (C2C on file) on 3-way call today at scheduled assessment time.  "Introduced self and discussed CC role. Pt family reported that pt and pt wife currently live at [facility] in the [facility]. Due to pt cognition and advancing dementia, pt family has secured a studio apt for the pt on the memory care unit in the same building and are trying to coordinate what needs to be done in order to move pt. Pt family shared that pt is very resistant to moving into memory care and the family is requesting a family meeting or care conference with pt's PCP so that the recommendation for higher level of care can come from pt's PCP and not the family. Pt family requesting that if able, the PCP recommends \"assisted living\" or \"enhanced living\" and not \"memory care\". They are also hoping this can take place ASAP. Explained that CC would be happy to pass along the message, but unsure if/how the apt would be billed, if availability in the timeframe they are requesting and if in-person vs video vs phone. Pt family prefer that this take place in the next week and be in-person. CC explored other support as well, specifically staff in their senior living who help others with this transition and/or the Alzheimer's Association. Pt family has been in contact and working with both suggestions mentioned, but still perseverating on meeting with pt's PCP.  Pt family even mentioned that once pt moves into the memory care, pt will be seen by the MD that rounds at the [facility] and pt has met with this provider x1. Given the upcoming established care with this provider, CC also suggested this MD as another support. Pt family stated that pt is unable to remember most conversations from day-to-day but is sometimes able to remember things from 2-3 weeks ago. CC made mention that this may make it hard for pt to remember conversation with PCP if able to accommodate. Pt family biggest concern is that pt has began to wander and the memory care unit is a locked unit for more security.      CC asked if any additional resources " or support needed. Pt family denied needing anything in particular but discussion took place about other suggestions and advise to help with the transition. Having the studio apt fully furnished and with items from his current apartment to feel like home, bring pt to his new apartment with ease and less explanation (as it can be difficult for the pt to understand), to bring the pt to an activity (which family identified he enjoys on the memory care unit) and right to his new apartment to get settled in and having a distraction when he arrives and additional ways of communication with pt vs reason and arguing. Offered to send pt family resources and information on Alzheimer's Association and caregiver support groups. Pt wife stated that she is in a caregiver support group on zoom but is open to additional suggestions. Pt wife requested CC send via email: [email or web address]. Pt family appreciative of the call and denied additional needs.     Information emailed to pt wife and PCP messaged. If needed, CC will follow-up with family after response from PCP.       Resources and Interventions:  Current Resources:      Community Resources: Home Care (Private Pay A 2x/week and [facility]x/week [facility])  Supplies Currently Used at Home: Diabetic Supplies, Incontinence Supplies  Equipment Currently Used at Home: grab bar, tub/shower, walker, standard, glucometer, shower chair  Employment Status: retired    Advance Care Plan/Directive  Advanced Care Plans/Directives on file:: Yes  Status of record:: On File and Validated  Type Advanced Care Plans/Directives: Advanced Directive - On File    Referrals Placed: Community Resources    The patient consented via Verbal consent to have contact information and resources sent via email in an unencrypted manner.    Patient/Caregiver understanding: Yes     Outreach Frequency: 2 weeks, more frequently as needed  Future Appointments                In 1 month [address]  [facility] PSA CLIN    In 3 months Viet Bingham MD [place], [place]            Plan: Will follow-up with PCP and/or pt family and remain available if needs arise.     ASHISH [facility]   Primary Care Social Work Care Coordinator  [facility] [place] [facility]   Phone: [phone]

## 2024-12-31 ENCOUNTER — ANCILLARY PROCEDURE (OUTPATIENT)
Dept: CARDIOLOGY | Facility: CLINIC | Age: 89
End: 2024-12-31
Attending: INTERNAL MEDICINE
Payer: MEDICARE

## 2024-12-31 DIAGNOSIS — Z95.0 CARDIAC PACEMAKER IN SITU: ICD-10-CM

## 2024-12-31 DIAGNOSIS — I44.2 CHB (COMPLETE HEART BLOCK) (H): ICD-10-CM

## 2024-12-31 LAB
MDC_IDC_EPISODE_DTM: NORMAL
MDC_IDC_EPISODE_ID: NORMAL
MDC_IDC_EPISODE_TYPE: NORMAL
MDC_IDC_LEAD_CONNECTION_STATUS: NORMAL
MDC_IDC_LEAD_CONNECTION_STATUS: NORMAL
MDC_IDC_LEAD_IMPLANT_DT: NORMAL
MDC_IDC_LEAD_IMPLANT_DT: NORMAL
MDC_IDC_LEAD_LOCATION: NORMAL
MDC_IDC_LEAD_LOCATION: NORMAL
MDC_IDC_LEAD_LOCATION_DETAIL_1: NORMAL
MDC_IDC_LEAD_LOCATION_DETAIL_1: NORMAL
MDC_IDC_LEAD_MFG: NORMAL
MDC_IDC_LEAD_MFG: NORMAL
MDC_IDC_LEAD_MODEL: NORMAL
MDC_IDC_LEAD_MODEL: NORMAL
MDC_IDC_LEAD_POLARITY_TYPE: NORMAL
MDC_IDC_LEAD_POLARITY_TYPE: NORMAL
MDC_IDC_LEAD_SERIAL: NORMAL
MDC_IDC_LEAD_SERIAL: NORMAL
MDC_IDC_MSMT_BATTERY_DTM: NORMAL
MDC_IDC_MSMT_BATTERY_REMAINING_LONGEVITY: 66 MO
MDC_IDC_MSMT_BATTERY_REMAINING_PERCENTAGE: 100 %
MDC_IDC_MSMT_BATTERY_STATUS: NORMAL
MDC_IDC_MSMT_LEADCHNL_RA_IMPEDANCE_VALUE: 686 OHM
MDC_IDC_MSMT_LEADCHNL_RA_PACING_THRESHOLD_AMPLITUDE: 0.6 V
MDC_IDC_MSMT_LEADCHNL_RA_PACING_THRESHOLD_PULSEWIDTH: 0.4 MS
MDC_IDC_MSMT_LEADCHNL_RV_IMPEDANCE_VALUE: 557 OHM
MDC_IDC_MSMT_LEADCHNL_RV_PACING_THRESHOLD_AMPLITUDE: 0.9 V
MDC_IDC_MSMT_LEADCHNL_RV_PACING_THRESHOLD_PULSEWIDTH: 0.4 MS
MDC_IDC_PG_IMPLANT_DTM: NORMAL
MDC_IDC_PG_MFG: NORMAL
MDC_IDC_PG_MODEL: NORMAL
MDC_IDC_PG_SERIAL: NORMAL
MDC_IDC_PG_TYPE: NORMAL
MDC_IDC_SESS_CLINIC_NAME: NORMAL
MDC_IDC_SESS_DTM: NORMAL
MDC_IDC_SESS_TYPE: NORMAL
MDC_IDC_SET_BRADY_AT_MODE_SWITCH_MODE: NORMAL
MDC_IDC_SET_BRADY_AT_MODE_SWITCH_RATE: 170 {BEATS}/MIN
MDC_IDC_SET_BRADY_LOWRATE: 60 {BEATS}/MIN
MDC_IDC_SET_BRADY_MAX_SENSOR_RATE: 130 {BEATS}/MIN
MDC_IDC_SET_BRADY_MAX_TRACKING_RATE: 130 {BEATS}/MIN
MDC_IDC_SET_BRADY_MODE: NORMAL
MDC_IDC_SET_BRADY_PAV_DELAY_HIGH: 150 MS
MDC_IDC_SET_BRADY_PAV_DELAY_LOW: 200 MS
MDC_IDC_SET_BRADY_SAV_DELAY_HIGH: 150 MS
MDC_IDC_SET_BRADY_SAV_DELAY_LOW: 200 MS
MDC_IDC_SET_LEADCHNL_RA_PACING_AMPLITUDE: 2 V
MDC_IDC_SET_LEADCHNL_RA_PACING_CAPTURE_MODE: NORMAL
MDC_IDC_SET_LEADCHNL_RA_PACING_POLARITY: NORMAL
MDC_IDC_SET_LEADCHNL_RA_PACING_PULSEWIDTH: 0.4 MS
MDC_IDC_SET_LEADCHNL_RA_SENSING_ADAPTATION_MODE: NORMAL
MDC_IDC_SET_LEADCHNL_RA_SENSING_POLARITY: NORMAL
MDC_IDC_SET_LEADCHNL_RA_SENSING_SENSITIVITY: 0.25 MV
MDC_IDC_SET_LEADCHNL_RV_PACING_AMPLITUDE: 1.5 V
MDC_IDC_SET_LEADCHNL_RV_PACING_CAPTURE_MODE: NORMAL
MDC_IDC_SET_LEADCHNL_RV_PACING_POLARITY: NORMAL
MDC_IDC_SET_LEADCHNL_RV_PACING_PULSEWIDTH: 0.4 MS
MDC_IDC_SET_LEADCHNL_RV_SENSING_ADAPTATION_MODE: NORMAL
MDC_IDC_SET_LEADCHNL_RV_SENSING_POLARITY: NORMAL
MDC_IDC_SET_LEADCHNL_RV_SENSING_SENSITIVITY: 1.5 MV
MDC_IDC_SET_ZONE_DETECTION_INTERVAL: 375 MS
MDC_IDC_SET_ZONE_STATUS: NORMAL
MDC_IDC_SET_ZONE_TYPE: NORMAL
MDC_IDC_SET_ZONE_VENDOR_TYPE: NORMAL
MDC_IDC_STAT_AT_BURDEN_PERCENT: 0 %
MDC_IDC_STAT_AT_DTM_END: NORMAL
MDC_IDC_STAT_AT_DTM_START: NORMAL
MDC_IDC_STAT_BRADY_DTM_END: NORMAL
MDC_IDC_STAT_BRADY_DTM_START: NORMAL
MDC_IDC_STAT_BRADY_RA_PERCENT_PACED: 55 %
MDC_IDC_STAT_BRADY_RV_PERCENT_PACED: 43 %
MDC_IDC_STAT_EPISODE_RECENT_COUNT: 0
MDC_IDC_STAT_EPISODE_RECENT_COUNT_DTM_END: NORMAL
MDC_IDC_STAT_EPISODE_RECENT_COUNT_DTM_START: NORMAL
MDC_IDC_STAT_EPISODE_TYPE: NORMAL
MDC_IDC_STAT_EPISODE_VENDOR_TYPE: NORMAL
MDC_IDC_STAT_EPISODE_VENDOR_TYPE: NORMAL

## 2024-12-31 PROCEDURE — 93296 REM INTERROG EVL PM/IDS: CPT | Performed by: INTERNAL MEDICINE

## 2024-12-31 PROCEDURE — 93294 REM INTERROG EVL PM/LDLS PM: CPT | Performed by: INTERNAL MEDICINE

## 2025-01-26 ENCOUNTER — HEALTH MAINTENANCE LETTER (OUTPATIENT)
Age: OVER 89
End: 2025-01-26

## 2025-03-24 ENCOUNTER — HOSPITAL ENCOUNTER (EMERGENCY)
Facility: CLINIC | Age: OVER 89
Discharge: HOME | DRG: 871 | End: 2025-03-24
Attending: EMERGENCY MEDICINE | Admitting: EMERGENCY MEDICINE
Payer: MEDICARE

## 2025-03-24 ENCOUNTER — APPOINTMENT (OUTPATIENT)
Dept: GENERAL RADIOLOGY | Facility: CLINIC | Age: OVER 89
DRG: 871 | End: 2025-03-24
Attending: EMERGENCY MEDICINE
Payer: MEDICARE

## 2025-03-24 VITALS
HEART RATE: 84 BPM | DIASTOLIC BLOOD PRESSURE: 84 MMHG | TEMPERATURE: 98.9 F | RESPIRATION RATE: 18 BRPM | OXYGEN SATURATION: 94 % | SYSTOLIC BLOOD PRESSURE: 115 MMHG

## 2025-03-24 DIAGNOSIS — R05.8 PRODUCTIVE COUGH: ICD-10-CM

## 2025-03-24 LAB
ANION GAP SERPL CALCULATED.3IONS-SCNC: 10 MMOL/L (ref 7–15)
BASOPHILS # BLD AUTO: 0 10E3/UL (ref 0–0.2)
BASOPHILS NFR BLD AUTO: 0 %
BUN SERPL-MCNC: 34.9 MG/DL (ref 8–23)
CALCIUM SERPL-MCNC: 9.2 MG/DL (ref 8.8–10.4)
CHLORIDE SERPL-SCNC: 104 MMOL/L (ref 98–107)
CREAT SERPL-MCNC: 1.36 MG/DL (ref 0.67–1.17)
EGFRCR SERPLBLD CKD-EPI 2021: 49 ML/MIN/1.73M2
EOSINOPHIL # BLD AUTO: 0.1 10E3/UL (ref 0–0.7)
EOSINOPHIL NFR BLD AUTO: 1 %
ERYTHROCYTE [DISTWIDTH] IN BLOOD BY AUTOMATED COUNT: 12.5 % (ref 10–15)
FLUAV RNA SPEC QL NAA+PROBE: NEGATIVE
FLUBV RNA RESP QL NAA+PROBE: NEGATIVE
GLUCOSE SERPL-MCNC: 128 MG/DL (ref 70–99)
HCO3 SERPL-SCNC: 28 MMOL/L (ref 22–29)
HCT VFR BLD AUTO: 36.2 % (ref 40–53)
HGB BLD-MCNC: 11.6 G/DL (ref 13.3–17.7)
IMM GRANULOCYTES # BLD: 0.1 10E3/UL
IMM GRANULOCYTES NFR BLD: 1 %
LYMPHOCYTES # BLD AUTO: 2.5 10E3/UL (ref 0.8–5.3)
LYMPHOCYTES NFR BLD AUTO: 18 %
MCH RBC QN AUTO: 29.4 PG (ref 26.5–33)
MCHC RBC AUTO-ENTMCNC: 32 G/DL (ref 31.5–36.5)
MCV RBC AUTO: 92 FL (ref 78–100)
MONOCYTES # BLD AUTO: 1.6 10E3/UL (ref 0–1.3)
MONOCYTES NFR BLD AUTO: 12 %
NEUTROPHILS # BLD AUTO: 9.3 10E3/UL (ref 1.6–8.3)
NEUTROPHILS NFR BLD AUTO: 69 %
NRBC # BLD AUTO: 0 10E3/UL
NRBC BLD AUTO-RTO: 0 /100
PLAT MORPH BLD: NORMAL
PLATELET # BLD AUTO: 221 10E3/UL (ref 150–450)
POTASSIUM SERPL-SCNC: 4.8 MMOL/L (ref 3.4–5.3)
RBC # BLD AUTO: 3.94 10E6/UL (ref 4.4–5.9)
RBC MORPH BLD: NORMAL
RSV RNA SPEC NAA+PROBE: NEGATIVE
SARS-COV-2 RNA RESP QL NAA+PROBE: NEGATIVE
SODIUM SERPL-SCNC: 142 MMOL/L (ref 135–145)
WBC # BLD AUTO: 13.6 10E3/UL (ref 4–11)

## 2025-03-24 PROCEDURE — 36415 COLL VENOUS BLD VENIPUNCTURE: CPT | Performed by: EMERGENCY MEDICINE

## 2025-03-24 PROCEDURE — 87637 SARSCOV2&INF A&B&RSV AMP PRB: CPT | Performed by: EMERGENCY MEDICINE

## 2025-03-24 PROCEDURE — 999N000065 XR CHEST 2 VIEWS

## 2025-03-24 PROCEDURE — 99284 EMERGENCY DEPT VISIT MOD MDM: CPT | Mod: 25

## 2025-03-24 PROCEDURE — 85025 COMPLETE CBC W/AUTO DIFF WBC: CPT | Performed by: EMERGENCY MEDICINE

## 2025-03-24 PROCEDURE — 71046 X-RAY EXAM CHEST 2 VIEWS: CPT

## 2025-03-24 PROCEDURE — 80048 BASIC METABOLIC PNL TOTAL CA: CPT | Performed by: EMERGENCY MEDICINE

## 2025-03-24 RX ORDER — AZITHROMYCIN 250 MG/1
TABLET, FILM COATED ORAL
Qty: 6 TABLET | Refills: 0 | Status: ON HOLD | OUTPATIENT
Start: 2025-03-24 | End: 2025-03-29

## 2025-03-24 RX ORDER — PREDNISONE 20 MG/1
20 TABLET ORAL DAILY
Qty: 5 TABLET | Refills: 0 | Status: ON HOLD | OUTPATIENT
Start: 2025-03-24

## 2025-03-24 RX ORDER — ALBUTEROL SULFATE 90 UG/1
2 INHALANT RESPIRATORY (INHALATION) EVERY 6 HOURS PRN
Qty: 18 G | Refills: 0 | Status: ON HOLD | OUTPATIENT
Start: 2025-03-24

## 2025-03-24 RX ORDER — ALBUTEROL SULFATE 90 UG/1
2 INHALANT RESPIRATORY (INHALATION) EVERY 6 HOURS PRN
Qty: 18 G | Refills: 0 | Status: SHIPPED | OUTPATIENT
Start: 2025-03-24 | End: 2025-03-24

## 2025-03-24 RX ORDER — AZITHROMYCIN 250 MG/1
TABLET, FILM COATED ORAL
Qty: 6 TABLET | Refills: 0 | Status: SHIPPED | OUTPATIENT
Start: 2025-03-24 | End: 2025-03-24

## 2025-03-24 RX ORDER — PREDNISONE 20 MG/1
20 TABLET ORAL DAILY
Qty: 5 TABLET | Refills: 0 | Status: SHIPPED | OUTPATIENT
Start: 2025-03-24 | End: 2025-03-24

## 2025-03-24 ASSESSMENT — COLUMBIA-SUICIDE SEVERITY RATING SCALE - C-SSRS
2. HAVE YOU ACTUALLY HAD ANY THOUGHTS OF KILLING YOURSELF IN THE PAST MONTH?: NO
1. IN THE PAST MONTH, HAVE YOU WISHED YOU WERE DEAD OR WISHED YOU COULD GO TO SLEEP AND NOT WAKE UP?: NO
6. HAVE YOU EVER DONE ANYTHING, STARTED TO DO ANYTHING, OR PREPARED TO DO ANYTHING TO END YOUR LIFE?: NO

## 2025-03-24 ASSESSMENT — ACTIVITIES OF DAILY LIVING (ADL)
ADLS_ACUITY_SCORE: 50
ADLS_ACUITY_SCORE: 50

## 2025-03-24 NOTE — ED PROVIDER NOTES
Emergency Department Note      History of Present Illness     Chief Complaint   Cough      HPI   Reid Jimenes is a 89 year old male in memory care at [facility] with a history of hypertension, hyperlipidemia, CAD, pacemaker, murmur, type 2 diabetes mellitus, cerebellar infarct, and complete heart block presenting with cough. The patient states that he started having cough and rhinorrhea seven days ago (03/17/25). The cough has not gotten better. He has had no fever or recent travel. The patient has history of COPD or asthma.     Independent Historian   Wife as detailed above.    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   Coronary artery disease  Cardiac pacemaker in situ   Complete heart block   Cataracts  Cerebellar infarct   High degree atrioventricular block  Hyperlipidemia  Hypertension  Facet arthropathy   LBBB  Lumbosacral spinal stenosis   Moderate dementia   Pulmonary hypertension  PVD  Pseudoaneurysm of femoral artery   S/P TAVR  Scoliosis of lumbar spine   STEMI  Type 2 diabetes mellitus   Tobacco abuse, in remission     Medications   Aspirin 81 MG  Donepezil   Metformin   Quetiapine  Sertraline   Simvastatin   Trazodone      Surgical History   CV heart catheterization with possible intervention  CV temporary pacemaker insertion  Cataract removal   EP pacemaker  Mandible surgery  Transcatheter aortic valve implant anesthesia     Physical Exam     Patient Vitals for the past 24 hrs:   BP Temp Temp src Pulse Resp SpO2   03/24/25 1052 136/65 97.3  F (36.3  C) Oral 71 20 96 %     Physical Exam  Constitutional: Alert, attentive, GCS 15  HENT:    Nose: Nose normal.   Eyes: EOM are normal, anicteric, conjugate gaze  CV: regular rate and rhythm   Chest: Central rhonchi and wet cough  GI:  non tender. No distension. No  guarding or rebound.    MSK: No LE edema, no tenderness to palpation of BLE.  Neurological: Alert, attentive, moving all extremities equally.   Skin: Skin is warm and dry.     Diagnostics     Lab Results   Labs Ordered and Resulted from Time of ED Arrival to Time of ED Departure   BASIC METABOLIC PANEL - Abnormal       Result Value    Sodium 142      Potassium 4.8      Chloride 104      Carbon Dioxide (CO2) 28      Anion Gap 10      Urea Nitrogen 34.9 (*)     Creatinine 1.36 (*)     GFR Estimate 49 (*)     Calcium 9.2      Glucose 128 (*)    CBC WITH PLATELETS AND DIFFERENTIAL - Abnormal    WBC Count 13.6 (*)     RBC Count 3.94 (*)     Hemoglobin 11.6 (*)     Hematocrit 36.2 (*)     MCV 92      MCH 29.4      MCHC 32.0      RDW 12.5      Platelet Count 221      % Neutrophils 69      % Lymphocytes 18      % Monocytes 12      % Eosinophils 1      % Basophils 0      % Immature Granulocytes 1      NRBCs per 100 WBC 0      Absolute Neutrophils 9.3 (*)     Absolute Lymphocytes 2.5      Absolute Monocytes 1.6 (*)     Absolute Eosinophils 0.1      Absolute Basophils 0.0      Absolute Immature Granulocytes 0.1      Absolute NRBCs 0.0     INFLUENZA A/B, RSV AND SARS-COV2 PCR - Normal    Influenza A PCR Negative      Influenza B PCR Negative      RSV PCR Negative      SARS CoV2 PCR Negative     RBC AND PLATELET MORPHOLOGY    RBC Morphology Confirmed RBC Indices      Platelet Assessment        Value: Automated Count Confirmed. Platelet morphology is normal.       Imaging   Chest XR,  PA & LAT   Final Result   IMPRESSION: 2-lead left subclavian pacemaker with lead tips projecting over right atrium and right ventricle. TAVR. Stable appearing L1 compression fracture with complete loss of the anterior vertebral body height.      Slight elevation left hemidiaphragm. Heart size appears normal. Lungs appear clear.        Independent Interpretation   CXR: No pneumothorax.    ED Course      Medications Administered   Medications - No  data to display    Procedures   Procedures     Discussion of Management   None    ED Course   ED Course as of 03/24/25 1637   Mon Mar 24, 2025   1511 I obtained the history and examined the patient as noted above.      1603 I disscussed dicharge instructions, patient is comfortable with discharge.        Additional Documentation  None    Medical Decision Making / Diagnosis     CMS Diagnoses: None    MIPS       None    [facility]   Reid Jimenes is a 89 year old male past medical history significant for hypertension, diabetes, aortic valve stenosis status post TAVR, pacemaker in situ, dementia presenting for evaluation of 1 week of cough, without associated chest pain.  He does have a mild leukocytosis here at 13 however is afebrile with sats of 96% on room air, with ambulation, he did desat to 89% but was not overtly tachypneic or distressed and recovered quickly.  Chest x-ray is without evidence of clear pneumonia.  His wife is sick with similar symptoms consistent with most likely a virus however given his duration of symptoms, will initiate him on azithromycin  given his clear chest x-ray, recommend a short course of low-dose prednisone as well as albuterol for cough and work of breathing.  Return precautions were reviewed including worsening shortness of breath, fever, increasing confusion.  They do live at a skilled nursing facility.  Wife expressed understand this plan and he was discharged.    Disposition   The patient was discharged.     Diagnosis     ICD-10-CM    1. Productive cough  R05.8            Discharge Medications   Current Discharge Medication List        START taking these medications    Details   azithromycin (ZITHROMAX) 250 MG tablet Take 2 tablets (500 mg) by mouth daily for 1 day, THEN 1 tablet (250 mg) daily for 4 days.  Qty: 6 tablet, Refills: 0      albuterol (PROAIR HFA/PROVENTIL HFA/VENTOLIN HFA) 108 (90 Base) MCG/ACT inhaler Inhale 2 puffs into the lungs every 6 hours as needed for shortness  of breath, wheezing or cough.  Qty: 18 g, Refills: 0    Comments: Pharmacy may dispense brand covered by insurance (Proair, or proventil or ventolin or generic albuterol inhaler)      predniSONE (DELTASONE) 20 MG tablet Take 1 tablet (20 mg) by mouth daily.  Qty: 5 tablet, Refills: 0             Joshua Ortiz MD  [employer]  4:36 PM 03/24/25     Scribe Disclosure:  I, Alyssa Martin, am serving as a scribe at 3:10 PM on 3/24/2025 to document services personally performed by Joshua Ortiz MD based on my observations and the provider's statements to me.        Joshua Ortiz MD  03/24/25 9231       Joshua Ortiz MD  03/25/25 8211

## 2025-03-24 NOTE — ED TRIAGE NOTES
"Pt states he has cough and nasal congestion for several months. Says symptoms are the same today but \"I'm hoping something will change\". Denies difficulty breathing, no increased work of breathing noted. Congested cough noted. Pt is poor historian, not oriented to time.         "

## 2025-03-24 NOTE — DISCHARGE INSTRUCTIONS
" he should take the antibiotics (Zithromax), the prednisone as prescribed.  You should use the albuterol inhaler to help with any mild breathlessness or coughing fits.  It is okay to  an over-the-counter cough medication, recommend dextromethorphan containing medications these will be \"DM\" medications like Robitussin DM.  If he develops worsening shortness of breath, develops a fever, worsening lethargy or confusion, he should return here to the emergency department.  Otherwise he should follow-up with his primary care doctor in the next few days.  "

## 2025-03-25 ENCOUNTER — APPOINTMENT (OUTPATIENT)
Dept: GENERAL RADIOLOGY | Facility: CLINIC | Age: OVER 89
End: 2025-03-25
Attending: EMERGENCY MEDICINE
Payer: MEDICARE

## 2025-03-25 ENCOUNTER — HOSPITAL ENCOUNTER (INPATIENT)
Facility: CLINIC | Age: OVER 89
End: 2025-03-25
Attending: EMERGENCY MEDICINE | Admitting: INTERNAL MEDICINE
Payer: MEDICARE

## 2025-03-25 DIAGNOSIS — I50.20 SYSTOLIC HEART FAILURE, UNSPECIFIED HF CHRONICITY (H): Primary | ICD-10-CM

## 2025-03-25 DIAGNOSIS — J96.01 ACUTE RESPIRATORY FAILURE WITH HYPOXIA (H): ICD-10-CM

## 2025-03-25 DIAGNOSIS — J18.9 COMMUNITY ACQUIRED PNEUMONIA OF RIGHT LOWER LOBE OF LUNG: ICD-10-CM

## 2025-03-25 DIAGNOSIS — R73.9 HYPERGLYCEMIA: ICD-10-CM

## 2025-03-25 LAB
ANION GAP SERPL CALCULATED.3IONS-SCNC: 18 MMOL/L (ref 7–15)
BASE EXCESS BLDV CALC-SCNC: -7.5 MMOL/L (ref -3–3)
BASOPHILS # BLD AUTO: 0.1 10E3/UL (ref 0–0.2)
BASOPHILS NFR BLD AUTO: 0 %
BUN SERPL-MCNC: 47.9 MG/DL (ref 8–23)
CALCIUM SERPL-MCNC: 9.2 MG/DL (ref 8.8–10.4)
CHLORIDE SERPL-SCNC: 99 MMOL/L (ref 98–107)
CREAT SERPL-MCNC: 2.07 MG/DL (ref 0.67–1.17)
EGFRCR SERPLBLD CKD-EPI 2021: 30 ML/MIN/1.73M2
EOSINOPHIL # BLD AUTO: 0 10E3/UL (ref 0–0.7)
EOSINOPHIL NFR BLD AUTO: 0 %
ERYTHROCYTE [DISTWIDTH] IN BLOOD BY AUTOMATED COUNT: 12.6 % (ref 10–15)
GLUCOSE SERPL-MCNC: 458 MG/DL (ref 70–99)
HCO3 BLDV-SCNC: 22 MMOL/L (ref 21–28)
HCO3 BLDV-SCNC: 22 MMOL/L (ref 21–28)
HCO3 SERPL-SCNC: 19 MMOL/L (ref 22–29)
HCT VFR BLD AUTO: 35.5 % (ref 40–53)
HGB BLD-MCNC: 10.8 G/DL (ref 13.3–17.7)
HOLD SPECIMEN: NORMAL
HOLD SPECIMEN: NORMAL
IMM GRANULOCYTES # BLD: 0.6 10E3/UL
IMM GRANULOCYTES NFR BLD: 4 %
LACTATE BLD-SCNC: 8 MMOL/L (ref 0.7–2)
LYMPHOCYTES # BLD AUTO: 2 10E3/UL (ref 0.8–5.3)
LYMPHOCYTES NFR BLD AUTO: 12 %
MCH RBC QN AUTO: 28.7 PG (ref 26.5–33)
MCHC RBC AUTO-ENTMCNC: 30.4 G/DL (ref 31.5–36.5)
MCV RBC AUTO: 94 FL (ref 78–100)
MONOCYTES # BLD AUTO: 1.1 10E3/UL (ref 0–1.3)
MONOCYTES NFR BLD AUTO: 7 %
NEUTROPHILS # BLD AUTO: 13.1 10E3/UL (ref 1.6–8.3)
NEUTROPHILS NFR BLD AUTO: 78 %
NRBC # BLD AUTO: 0 10E3/UL
NRBC BLD AUTO-RTO: 0 /100
NT-PROBNP SERPL-MCNC: 5342 PG/ML (ref 0–1800)
O2/TOTAL GAS SETTING VFR VENT: 40 %
OXYHGB MFR BLDV: 40 % (ref 70–75)
PCO2 BLDV: 60 MM HG (ref 40–50)
PCO2 BLDV: 61 MM HG (ref 40–50)
PH BLDV: 7.16 [PH] (ref 7.32–7.43)
PH BLDV: 7.17 [PH] (ref 7.32–7.43)
PLATELET # BLD AUTO: 278 10E3/UL (ref 150–450)
PO2 BLDV: 27 MM HG (ref 25–47)
PO2 BLDV: 31 MM HG (ref 25–47)
POTASSIUM SERPL-SCNC: 4.7 MMOL/L (ref 3.4–5.3)
RBC # BLD AUTO: 3.76 10E6/UL (ref 4.4–5.9)
SAO2 % BLDV: 36 % (ref 70–75)
SAO2 % BLDV: 41 % (ref 70–75)
SODIUM SERPL-SCNC: 136 MMOL/L (ref 135–145)
WBC # BLD AUTO: 16.9 10E3/UL (ref 4–11)

## 2025-03-25 PROCEDURE — 82010 KETONE BODYS QUAN: CPT | Performed by: EMERGENCY MEDICINE

## 2025-03-25 PROCEDURE — 71045 X-RAY EXAM CHEST 1 VIEW: CPT

## 2025-03-25 PROCEDURE — 999N000157 HC STATISTIC RCP TIME EA 10 MIN

## 2025-03-25 PROCEDURE — 83880 ASSAY OF NATRIURETIC PEPTIDE: CPT | Performed by: EMERGENCY MEDICINE

## 2025-03-25 PROCEDURE — 80051 ELECTROLYTE PANEL: CPT | Performed by: EMERGENCY MEDICINE

## 2025-03-25 PROCEDURE — 84484 ASSAY OF TROPONIN QUANT: CPT | Performed by: EMERGENCY MEDICINE

## 2025-03-25 PROCEDURE — 96367 TX/PROPH/DG ADDL SEQ IV INF: CPT

## 2025-03-25 PROCEDURE — 82803 BLOOD GASES ANY COMBINATION: CPT

## 2025-03-25 PROCEDURE — 96365 THER/PROPH/DIAG IV INF INIT: CPT

## 2025-03-25 PROCEDURE — 250N000009 HC RX 250: Performed by: EMERGENCY MEDICINE

## 2025-03-25 PROCEDURE — 85014 HEMATOCRIT: CPT | Performed by: EMERGENCY MEDICINE

## 2025-03-25 PROCEDURE — 258N000003 HC RX IP 258 OP 636: Performed by: EMERGENCY MEDICINE

## 2025-03-25 PROCEDURE — 36415 COLL VENOUS BLD VENIPUNCTURE: CPT | Performed by: EMERGENCY MEDICINE

## 2025-03-25 PROCEDURE — 83036 HEMOGLOBIN GLYCOSYLATED A1C: CPT | Performed by: INTERNAL MEDICINE

## 2025-03-25 PROCEDURE — 94660 CPAP INITIATION&MGMT: CPT

## 2025-03-25 PROCEDURE — 83605 ASSAY OF LACTIC ACID: CPT

## 2025-03-25 PROCEDURE — 85025 COMPLETE CBC W/AUTO DIFF WBC: CPT | Performed by: EMERGENCY MEDICINE

## 2025-03-25 PROCEDURE — 99285 EMERGENCY DEPT VISIT HI MDM: CPT | Mod: 25

## 2025-03-25 PROCEDURE — 93005 ELECTROCARDIOGRAM TRACING: CPT

## 2025-03-25 PROCEDURE — 94640 AIRWAY INHALATION TREATMENT: CPT

## 2025-03-25 PROCEDURE — 80048 BASIC METABOLIC PNL TOTAL CA: CPT | Performed by: EMERGENCY MEDICINE

## 2025-03-25 PROCEDURE — 82805 BLOOD GASES W/O2 SATURATION: CPT | Performed by: EMERGENCY MEDICINE

## 2025-03-25 PROCEDURE — 87040 BLOOD CULTURE FOR BACTERIA: CPT | Performed by: EMERGENCY MEDICINE

## 2025-03-25 PROCEDURE — 250N000011 HC RX IP 250 OP 636: Performed by: EMERGENCY MEDICINE

## 2025-03-25 RX ORDER — CEFTRIAXONE 1 G/1
1 INJECTION, POWDER, FOR SOLUTION INTRAMUSCULAR; INTRAVENOUS ONCE
Status: COMPLETED | OUTPATIENT
Start: 2025-03-25 | End: 2025-03-25

## 2025-03-25 RX ORDER — AZITHROMYCIN 500 MG/5ML
500 INJECTION, POWDER, LYOPHILIZED, FOR SOLUTION INTRAVENOUS ONCE
Status: COMPLETED | OUTPATIENT
Start: 2025-03-25 | End: 2025-03-26

## 2025-03-25 RX ORDER — IPRATROPIUM BROMIDE AND ALBUTEROL SULFATE 2.5; .5 MG/3ML; MG/3ML
3 SOLUTION RESPIRATORY (INHALATION)
Status: COMPLETED | OUTPATIENT
Start: 2025-03-25 | End: 2025-03-25

## 2025-03-25 RX ORDER — IPRATROPIUM BROMIDE AND ALBUTEROL SULFATE 2.5; .5 MG/3ML; MG/3ML
SOLUTION RESPIRATORY (INHALATION)
Status: COMPLETED
Start: 2025-03-25 | End: 2025-03-25

## 2025-03-25 RX ADMIN — CEFTRIAXONE 1 G: 1 INJECTION, POWDER, FOR SOLUTION INTRAMUSCULAR; INTRAVENOUS at 22:58

## 2025-03-25 RX ADMIN — IPRATROPIUM BROMIDE AND ALBUTEROL SULFATE 3 ML: .5; 3 SOLUTION RESPIRATORY (INHALATION) at 22:31

## 2025-03-25 RX ADMIN — AZITHROMYCIN MONOHYDRATE 500 MG: 500 INJECTION, POWDER, LYOPHILIZED, FOR SOLUTION INTRAVENOUS at 23:58

## 2025-03-25 RX ADMIN — IPRATROPIUM BROMIDE AND ALBUTEROL SULFATE 3 ML: .5; 3 SOLUTION RESPIRATORY (INHALATION) at 22:29

## 2025-03-25 RX ADMIN — SODIUM CHLORIDE 500 ML: 0.9 INJECTION, SOLUTION INTRAVENOUS at 23:37

## 2025-03-25 RX ADMIN — IPRATROPIUM BROMIDE AND ALBUTEROL SULFATE 3 ML: .5; 3 SOLUTION RESPIRATORY (INHALATION) at 22:33

## 2025-03-25 ASSESSMENT — COLUMBIA-SUICIDE SEVERITY RATING SCALE - C-SSRS
2. HAVE YOU ACTUALLY HAD ANY THOUGHTS OF KILLING YOURSELF IN THE PAST MONTH?: NO
6. HAVE YOU EVER DONE ANYTHING, STARTED TO DO ANYTHING, OR PREPARED TO DO ANYTHING TO END YOUR LIFE?: NO
1. IN THE PAST MONTH, HAVE YOU WISHED YOU WERE DEAD OR WISHED YOU COULD GO TO SLEEP AND NOT WAKE UP?: NO

## 2025-03-25 ASSESSMENT — ACTIVITIES OF DAILY LIVING (ADL): ADLS_ACUITY_SCORE: 50

## 2025-03-26 PROBLEM — J96.01 ACUTE RESPIRATORY FAILURE WITH HYPOXIA (H): Status: ACTIVE | Noted: 2025-03-26

## 2025-03-26 PROBLEM — R73.9 HYPERGLYCEMIA: Status: ACTIVE | Noted: 2025-03-26

## 2025-03-26 PROBLEM — J18.9 COMMUNITY ACQUIRED PNEUMONIA OF RIGHT LOWER LOBE OF LUNG: Status: ACTIVE | Noted: 2025-03-26

## 2025-03-26 LAB
ANION GAP SERPL CALCULATED.3IONS-SCNC: 13 MMOL/L (ref 7–15)
ATRIAL RATE - MUSE: 86 BPM
B-OH-BUTYR SERPL-SCNC: 0.27 MMOL/L
BASE EXCESS BLDV CALC-SCNC: -1.1 MMOL/L (ref -3–3)
BASE EXCESS BLDV CALC-SCNC: -3.1 MMOL/L (ref -3–3)
BUN SERPL-MCNC: 51.1 MG/DL (ref 8–23)
C PNEUM DNA SPEC QL NAA+PROBE: NOT DETECTED
CALCIUM SERPL-MCNC: 8.5 MG/DL (ref 8.8–10.4)
CHLORIDE SERPL-SCNC: 106 MMOL/L (ref 98–107)
CREAT SERPL-MCNC: 1.84 MG/DL (ref 0.67–1.17)
DIASTOLIC BLOOD PRESSURE - MUSE: NORMAL MMHG
EGFRCR SERPLBLD CKD-EPI 2021: 34 ML/MIN/1.73M2
ERYTHROCYTE [DISTWIDTH] IN BLOOD BY AUTOMATED COUNT: 12.7 % (ref 10–15)
EST. AVERAGE GLUCOSE BLD GHB EST-MCNC: 157 MG/DL
FLUAV H1 2009 PAND RNA SPEC QL NAA+PROBE: NOT DETECTED
FLUAV H1 RNA SPEC QL NAA+PROBE: NOT DETECTED
FLUAV H3 RNA SPEC QL NAA+PROBE: NOT DETECTED
FLUAV RNA SPEC QL NAA+PROBE: NOT DETECTED
FLUBV RNA SPEC QL NAA+PROBE: NOT DETECTED
GLUCOSE BLDC GLUCOMTR-MCNC: 122 MG/DL (ref 70–99)
GLUCOSE BLDC GLUCOMTR-MCNC: 177 MG/DL (ref 70–99)
GLUCOSE BLDC GLUCOMTR-MCNC: 246 MG/DL (ref 70–99)
GLUCOSE BLDC GLUCOMTR-MCNC: 248 MG/DL (ref 70–99)
GLUCOSE BLDC GLUCOMTR-MCNC: 293 MG/DL (ref 70–99)
GLUCOSE BLDC GLUCOMTR-MCNC: 384 MG/DL (ref 70–99)
GLUCOSE BLDC GLUCOMTR-MCNC: 414 MG/DL (ref 70–99)
GLUCOSE BLDC GLUCOMTR-MCNC: 451 MG/DL (ref 70–99)
GLUCOSE SERPL-MCNC: 381 MG/DL (ref 70–99)
HADV DNA SPEC QL NAA+PROBE: NOT DETECTED
HBA1C MFR BLD: 7.1 %
HCO3 BLDV-SCNC: 24 MMOL/L (ref 21–28)
HCO3 BLDV-SCNC: 24 MMOL/L (ref 21–28)
HCO3 BLDV-SCNC: 25 MMOL/L (ref 21–28)
HCO3 SERPL-SCNC: 23 MMOL/L (ref 22–29)
HCOV PNL SPEC NAA+PROBE: NOT DETECTED
HCT VFR BLD AUTO: 31.2 % (ref 40–53)
HGB BLD-MCNC: 9.7 G/DL (ref 13.3–17.7)
HMPV RNA SPEC QL NAA+PROBE: NOT DETECTED
HPIV1 RNA SPEC QL NAA+PROBE: NOT DETECTED
HPIV2 RNA SPEC QL NAA+PROBE: NOT DETECTED
HPIV3 RNA SPEC QL NAA+PROBE: NOT DETECTED
HPIV4 RNA SPEC QL NAA+PROBE: NOT DETECTED
INTERPRETATION ECG - MUSE: NORMAL
LACTATE BLD-SCNC: 3.9 MMOL/L (ref 0.7–2)
LACTATE SERPL-SCNC: 3.4 MMOL/L (ref 0.7–2)
LACTATE SERPL-SCNC: 4.2 MMOL/L (ref 0.7–2)
M PNEUMO DNA SPEC QL NAA+PROBE: NOT DETECTED
MAGNESIUM SERPL-MCNC: 2.5 MG/DL (ref 1.7–2.3)
MCH RBC QN AUTO: 28.9 PG (ref 26.5–33)
MCHC RBC AUTO-ENTMCNC: 31.1 G/DL (ref 31.5–36.5)
MCV RBC AUTO: 93 FL (ref 78–100)
O2/TOTAL GAS SETTING VFR VENT: 30 %
O2/TOTAL GAS SETTING VFR VENT: 4 %
OXYHGB MFR BLDV: 78 % (ref 70–75)
OXYHGB MFR BLDV: 80 % (ref 70–75)
P AXIS - MUSE: NORMAL DEGREES
PCO2 BLDV: 50 MM HG (ref 40–50)
PCO2 BLDV: 54 MM HG (ref 40–50)
PCO2 BLDV: 57 MM HG (ref 40–50)
PH BLDV: 7.23 [PH] (ref 7.32–7.43)
PH BLDV: 7.28 [PH] (ref 7.32–7.43)
PH BLDV: 7.28 [PH] (ref 7.32–7.43)
PLATELET # BLD AUTO: 232 10E3/UL (ref 150–450)
PO2 BLDV: 30 MM HG (ref 25–47)
PO2 BLDV: 43 MM HG (ref 25–47)
PO2 BLDV: 48 MM HG (ref 25–47)
POTASSIUM SERPL-SCNC: 4.8 MMOL/L (ref 3.4–5.3)
PR INTERVAL - MUSE: NORMAL MS
QRS DURATION - MUSE: 168 MS
QT - MUSE: 414 MS
QTC - MUSE: 495 MS
R AXIS - MUSE: 43 DEGREES
RBC # BLD AUTO: 3.36 10E6/UL (ref 4.4–5.9)
RSV RNA SPEC QL NAA+PROBE: NOT DETECTED
RSV RNA SPEC QL NAA+PROBE: NOT DETECTED
RV+EV RNA SPEC QL NAA+PROBE: NOT DETECTED
SAO2 % BLDV: 44 % (ref 70–75)
SAO2 % BLDV: 78.7 % (ref 70–75)
SAO2 % BLDV: 82.6 % (ref 70–75)
SODIUM SERPL-SCNC: 142 MMOL/L (ref 135–145)
SYSTOLIC BLOOD PRESSURE - MUSE: NORMAL MMHG
T AXIS - MUSE: 193 DEGREES
TROPONIN T SERPL HS-MCNC: 254 NG/L
TROPONIN T SERPL HS-MCNC: 96 NG/L
VENTRICULAR RATE- MUSE: 86 BPM
WBC # BLD AUTO: 18.1 10E3/UL (ref 4–11)

## 2025-03-26 PROCEDURE — 250N000013 HC RX MED GY IP 250 OP 250 PS 637: Performed by: INTERNAL MEDICINE

## 2025-03-26 PROCEDURE — 84484 ASSAY OF TROPONIN QUANT: CPT | Performed by: INTERNAL MEDICINE

## 2025-03-26 PROCEDURE — 36415 COLL VENOUS BLD VENIPUNCTURE: CPT | Performed by: INTERNAL MEDICINE

## 2025-03-26 PROCEDURE — 85018 HEMOGLOBIN: CPT | Performed by: INTERNAL MEDICINE

## 2025-03-26 PROCEDURE — 85041 AUTOMATED RBC COUNT: CPT | Performed by: INTERNAL MEDICINE

## 2025-03-26 PROCEDURE — 250N000011 HC RX IP 250 OP 636: Performed by: INTERNAL MEDICINE

## 2025-03-26 PROCEDURE — 99222 1ST HOSP IP/OBS MODERATE 55: CPT | Mod: AI | Performed by: INTERNAL MEDICINE

## 2025-03-26 PROCEDURE — 82962 GLUCOSE BLOOD TEST: CPT

## 2025-03-26 PROCEDURE — 94640 AIRWAY INHALATION TREATMENT: CPT

## 2025-03-26 PROCEDURE — 80048 BASIC METABOLIC PNL TOTAL CA: CPT | Performed by: INTERNAL MEDICINE

## 2025-03-26 PROCEDURE — 83735 ASSAY OF MAGNESIUM: CPT | Performed by: INTERNAL MEDICINE

## 2025-03-26 PROCEDURE — 258N000003 HC RX IP 258 OP 636: Performed by: INTERNAL MEDICINE

## 2025-03-26 PROCEDURE — 5A09457 ASSISTANCE WITH RESPIRATORY VENTILATION, 24-96 CONSECUTIVE HOURS, CONTINUOUS POSITIVE AIRWAY PRESSURE: ICD-10-PCS | Performed by: INTERNAL MEDICINE

## 2025-03-26 PROCEDURE — 99207 PR APP CREDIT; MD BILLING SHARED VISIT: CPT | Performed by: INTERNAL MEDICINE

## 2025-03-26 PROCEDURE — 120N000004 HC R&B MS OVERFLOW

## 2025-03-26 PROCEDURE — 36415 COLL VENOUS BLD VENIPUNCTURE: CPT

## 2025-03-26 PROCEDURE — 82805 BLOOD GASES W/O2 SATURATION: CPT | Performed by: INTERNAL MEDICINE

## 2025-03-26 PROCEDURE — 83605 ASSAY OF LACTIC ACID: CPT

## 2025-03-26 PROCEDURE — 83605 ASSAY OF LACTIC ACID: CPT | Performed by: INTERNAL MEDICINE

## 2025-03-26 PROCEDURE — 999N000157 HC STATISTIC RCP TIME EA 10 MIN

## 2025-03-26 PROCEDURE — 82803 BLOOD GASES ANY COMBINATION: CPT

## 2025-03-26 PROCEDURE — 87486 CHLMYD PNEUM DNA AMP PROBE: CPT | Performed by: INTERNAL MEDICINE

## 2025-03-26 PROCEDURE — 250N000009 HC RX 250: Performed by: INTERNAL MEDICINE

## 2025-03-26 RX ORDER — SERTRALINE HYDROCHLORIDE 25 MG/1
25 TABLET, FILM COATED ORAL DAILY
Status: DISCONTINUED | OUTPATIENT
Start: 2025-03-26 | End: 2025-03-31 | Stop reason: HOSPADM

## 2025-03-26 RX ORDER — DEXTROSE MONOHYDRATE 25 G/50ML
25-50 INJECTION, SOLUTION INTRAVENOUS
Status: DISCONTINUED | OUTPATIENT
Start: 2025-03-26 | End: 2025-03-31 | Stop reason: HOSPADM

## 2025-03-26 RX ORDER — POLYETHYLENE GLYCOL 3350 17 G/17G
17 POWDER, FOR SOLUTION ORAL DAILY
Status: DISCONTINUED | OUTPATIENT
Start: 2025-03-26 | End: 2025-03-31 | Stop reason: HOSPADM

## 2025-03-26 RX ORDER — TRAZODONE HYDROCHLORIDE 50 MG/1
50 TABLET ORAL
Status: DISCONTINUED | OUTPATIENT
Start: 2025-03-26 | End: 2025-03-31 | Stop reason: HOSPADM

## 2025-03-26 RX ORDER — CEFTRIAXONE 1 G/1
1 INJECTION, POWDER, FOR SOLUTION INTRAMUSCULAR; INTRAVENOUS EVERY 24 HOURS
Status: DISCONTINUED | OUTPATIENT
Start: 2025-03-26 | End: 2025-03-30

## 2025-03-26 RX ORDER — AZITHROMYCIN 250 MG/1
250 TABLET, FILM COATED ORAL DAILY
Status: COMPLETED | OUTPATIENT
Start: 2025-03-26 | End: 2025-03-29

## 2025-03-26 RX ORDER — SALIVA STIMULANT COMB. NO.3
1 SPRAY, NON-AEROSOL (ML) MUCOUS MEMBRANE 4 TIMES DAILY PRN
Status: DISCONTINUED | OUTPATIENT
Start: 2025-03-26 | End: 2025-03-31 | Stop reason: HOSPADM

## 2025-03-26 RX ORDER — DEXTROSE MONOHYDRATE 25 G/50ML
25-50 INJECTION, SOLUTION INTRAVENOUS
Status: DISCONTINUED | OUTPATIENT
Start: 2025-03-26 | End: 2025-03-26

## 2025-03-26 RX ORDER — ASCORBIC ACID 500 MG
500 TABLET ORAL DAILY
Status: DISCONTINUED | OUTPATIENT
Start: 2025-03-26 | End: 2025-03-31 | Stop reason: HOSPADM

## 2025-03-26 RX ORDER — ASPIRIN 81 MG/1
81 TABLET ORAL DAILY
Status: DISCONTINUED | OUTPATIENT
Start: 2025-03-26 | End: 2025-03-31 | Stop reason: HOSPADM

## 2025-03-26 RX ORDER — SIMVASTATIN 20 MG
20 TABLET ORAL AT BEDTIME
Status: DISCONTINUED | OUTPATIENT
Start: 2025-03-26 | End: 2025-03-31 | Stop reason: HOSPADM

## 2025-03-26 RX ORDER — DONEPEZIL HYDROCHLORIDE 5 MG/1
5 TABLET, FILM COATED ORAL AT BEDTIME
Status: DISCONTINUED | OUTPATIENT
Start: 2025-03-26 | End: 2025-03-31 | Stop reason: HOSPADM

## 2025-03-26 RX ORDER — ONDANSETRON 4 MG/1
4 TABLET, ORALLY DISINTEGRATING ORAL EVERY 6 HOURS PRN
Status: DISCONTINUED | OUTPATIENT
Start: 2025-03-26 | End: 2025-03-31 | Stop reason: HOSPADM

## 2025-03-26 RX ORDER — QUETIAPINE FUMARATE 25 MG/1
12.5 TABLET, FILM COATED ORAL AT BEDTIME
COMMUNITY

## 2025-03-26 RX ORDER — ONDANSETRON 2 MG/ML
4 INJECTION INTRAMUSCULAR; INTRAVENOUS EVERY 6 HOURS PRN
Status: DISCONTINUED | OUTPATIENT
Start: 2025-03-26 | End: 2025-03-31 | Stop reason: HOSPADM

## 2025-03-26 RX ORDER — METFORMIN HYDROCHLORIDE 500 MG/1
500 TABLET, EXTENDED RELEASE ORAL 2 TIMES DAILY WITH MEALS
Status: DISCONTINUED | OUTPATIENT
Start: 2025-03-26 | End: 2025-03-26

## 2025-03-26 RX ORDER — ALBUTEROL SULFATE 90 UG/1
2 INHALANT RESPIRATORY (INHALATION) EVERY 6 HOURS PRN
Status: DISCONTINUED | OUTPATIENT
Start: 2025-03-26 | End: 2025-03-31 | Stop reason: HOSPADM

## 2025-03-26 RX ORDER — MICONAZOLE NITRATE 20 MG/G
CREAM TOPICAL 2 TIMES DAILY PRN
Status: DISCONTINUED | OUTPATIENT
Start: 2025-03-26 | End: 2025-03-31 | Stop reason: HOSPADM

## 2025-03-26 RX ORDER — AMOXICILLIN 250 MG
2 CAPSULE ORAL 2 TIMES DAILY PRN
Status: DISCONTINUED | OUTPATIENT
Start: 2025-03-26 | End: 2025-03-31 | Stop reason: HOSPADM

## 2025-03-26 RX ORDER — ACETAMINOPHEN 650 MG/1
650 SUPPOSITORY RECTAL EVERY 4 HOURS PRN
Status: DISCONTINUED | OUTPATIENT
Start: 2025-03-26 | End: 2025-03-31 | Stop reason: HOSPADM

## 2025-03-26 RX ORDER — DONEPEZIL HYDROCHLORIDE 5 MG/1
5 TABLET, FILM COATED ORAL AT BEDTIME
COMMUNITY

## 2025-03-26 RX ORDER — DONEPEZIL HYDROCHLORIDE 5 MG/1
5 TABLET, FILM COATED ORAL AT BEDTIME
Status: DISCONTINUED | OUTPATIENT
Start: 2025-03-26 | End: 2025-03-26

## 2025-03-26 RX ORDER — NICOTINE POLACRILEX 4 MG
15-30 LOZENGE BUCCAL
Status: DISCONTINUED | OUTPATIENT
Start: 2025-03-26 | End: 2025-03-26

## 2025-03-26 RX ORDER — ONDANSETRON 4 MG/1
4 TABLET, FILM COATED ORAL 3 TIMES DAILY
COMMUNITY

## 2025-03-26 RX ORDER — NICOTINE POLACRILEX 4 MG
15-30 LOZENGE BUCCAL
Status: DISCONTINUED | OUTPATIENT
Start: 2025-03-26 | End: 2025-03-31 | Stop reason: HOSPADM

## 2025-03-26 RX ORDER — LIDOCAINE 40 MG/G
CREAM TOPICAL
Status: DISCONTINUED | OUTPATIENT
Start: 2025-03-26 | End: 2025-03-31 | Stop reason: HOSPADM

## 2025-03-26 RX ORDER — CALCIUM CARBONATE 500 MG/1
1000 TABLET, CHEWABLE ORAL 2 TIMES DAILY PRN
Status: DISCONTINUED | OUTPATIENT
Start: 2025-03-26 | End: 2025-03-31 | Stop reason: HOSPADM

## 2025-03-26 RX ORDER — LIDOCAINE 40 MG/G
CREAM TOPICAL
Status: DISCONTINUED | OUTPATIENT
Start: 2025-03-26 | End: 2025-03-26

## 2025-03-26 RX ORDER — QUETIAPINE FUMARATE 25 MG/1
12.5 TABLET, FILM COATED ORAL 2 TIMES DAILY PRN
COMMUNITY

## 2025-03-26 RX ORDER — ACETAMINOPHEN 325 MG/1
650 TABLET ORAL EVERY 4 HOURS PRN
Status: DISCONTINUED | OUTPATIENT
Start: 2025-03-26 | End: 2025-03-31 | Stop reason: HOSPADM

## 2025-03-26 RX ORDER — ENOXAPARIN SODIUM 100 MG/ML
30 INJECTION SUBCUTANEOUS EVERY 24 HOURS
Status: DISCONTINUED | OUTPATIENT
Start: 2025-03-26 | End: 2025-03-31 | Stop reason: HOSPADM

## 2025-03-26 RX ORDER — IPRATROPIUM BROMIDE AND ALBUTEROL SULFATE 2.5; .5 MG/3ML; MG/3ML
3 SOLUTION RESPIRATORY (INHALATION) EVERY 4 HOURS PRN
Status: DISCONTINUED | OUTPATIENT
Start: 2025-03-26 | End: 2025-03-31 | Stop reason: HOSPADM

## 2025-03-26 RX ORDER — ACETAMINOPHEN 325 MG/1
650 TABLET ORAL EVERY 6 HOURS PRN
Status: DISCONTINUED | OUTPATIENT
Start: 2025-03-26 | End: 2025-03-27

## 2025-03-26 RX ORDER — AMOXICILLIN 250 MG
1 CAPSULE ORAL 2 TIMES DAILY PRN
Status: DISCONTINUED | OUTPATIENT
Start: 2025-03-26 | End: 2025-03-31 | Stop reason: HOSPADM

## 2025-03-26 RX ORDER — GUAIFENESIN 600 MG/1
1200 TABLET, EXTENDED RELEASE ORAL 2 TIMES DAILY PRN
COMMUNITY

## 2025-03-26 RX ADMIN — IPRATROPIUM BROMIDE AND ALBUTEROL SULFATE 3 ML: .5; 3 SOLUTION RESPIRATORY (INHALATION) at 09:47

## 2025-03-26 RX ADMIN — CEFTRIAXONE 1 G: 1 INJECTION, POWDER, FOR SOLUTION INTRAMUSCULAR; INTRAVENOUS at 22:49

## 2025-03-26 RX ADMIN — AZITHROMYCIN 250 MG: 250 TABLET, FILM COATED ORAL at 09:03

## 2025-03-26 RX ADMIN — ASPIRIN 81 MG: 81 TABLET, COATED ORAL at 13:43

## 2025-03-26 RX ADMIN — ENOXAPARIN SODIUM 30 MG: 30 INJECTION SUBCUTANEOUS at 09:03

## 2025-03-26 RX ADMIN — SIMVASTATIN 20 MG: 20 TABLET, FILM COATED ORAL at 20:21

## 2025-03-26 RX ADMIN — OXYCODONE HYDROCHLORIDE AND ACETAMINOPHEN 500 MG: 500 TABLET ORAL at 13:43

## 2025-03-26 RX ADMIN — TRAZODONE HYDROCHLORIDE 50 MG: 50 TABLET ORAL at 20:21

## 2025-03-26 RX ADMIN — DONEPEZIL HYDROCHLORIDE 5 MG: 5 TABLET ORAL at 20:21

## 2025-03-26 RX ADMIN — SODIUM CHLORIDE, SODIUM LACTATE, POTASSIUM CHLORIDE, AND CALCIUM CHLORIDE 1000 ML: .6; .31; .03; .02 INJECTION, SOLUTION INTRAVENOUS at 01:52

## 2025-03-26 RX ADMIN — SERTRALINE HYDROCHLORIDE 25 MG: 25 TABLET ORAL at 13:42

## 2025-03-26 ASSESSMENT — ACTIVITIES OF DAILY LIVING (ADL)
ADLS_ACUITY_SCORE: 49
DEPENDENT_IADLS:: CLEANING;COOKING;LAUNDRY;SHOPPING;MEAL PREPARATION;MEDICATION MANAGEMENT;MONEY MANAGEMENT;TRANSPORTATION
ADLS_ACUITY_SCORE: 48
ADLS_ACUITY_SCORE: 49
ADLS_ACUITY_SCORE: 48
ADLS_ACUITY_SCORE: 54
ADLS_ACUITY_SCORE: 54
ADLS_ACUITY_SCORE: 48
ADLS_ACUITY_SCORE: 50
ADLS_ACUITY_SCORE: 48
ADLS_ACUITY_SCORE: 54
ADLS_ACUITY_SCORE: 49
ADLS_ACUITY_SCORE: 54
ADLS_ACUITY_SCORE: 48
ADLS_ACUITY_SCORE: 54
ADLS_ACUITY_SCORE: 49
ADLS_ACUITY_SCORE: 50
ADLS_ACUITY_SCORE: 50
ADLS_ACUITY_SCORE: 48
ADLS_ACUITY_SCORE: 54
ADLS_ACUITY_SCORE: 54
ADLS_ACUITY_SCORE: 49

## 2025-03-26 NOTE — PHARMACY-ADMISSION MEDICATION HISTORY
"Pharmacist Admission Medication History    Admission medication history is complete. The information provided in this note is only as accurate as the sources available at the time of the update.    Information Source(s): Facility (U/NH/) medication list/MAR via fax ([facility] in [place], [place]).    Pertinent Information:  Called facility, requested MAR or med list - med list faxed with a note: \" resident took his 8pm medications on 03/25/25 except quetiapine\". Metformin - NOT on the list.    Changes made to PTA medication list:  Added: mucinex  Deleted: glucosamine, zofran  Changed: donepezil, quetiapine, metformin to \"not taking\" as NOT on the med list.    Allergies reviewed with patient and updates made in EHR: no    Medication History Completed By: Betty Hannah RPH 3/26/2025 1:15 PM    Current Facility-Administered Medications for the 3/25/25 encounter (Hospital Encounter)   Medication    iohexol (OMNIPAQUE) 300 mg/mL injection 10 mL     PTA Med List   Medication Sig Note Last Dose/Taking    acetaminophen (TYLENOL) 325 MG tablet TAKE 2 TABLETS BY MOUTH THREE TIMES DAILY . DO NOT EXCEED 3000 MG OF ACETAMINOPHEN PER 24 HOURS  Taking    albuterol (PROAIR HFA/PROVENTIL HFA/VENTOLIN HFA) 108 (90 Base) MCG/ACT inhaler Inhale 2 puffs into the lungs every 6 hours as needed for shortness of breath, wheezing or cough.  Taking As Needed    ascorbic acid 500 MG TABS Take 500 mg by mouth daily.  Taking    aspirin 81 MG EC tablet Take 1 tablet (81 mg) by mouth daily  Taking    azithromycin (ZITHROMAX) 250 MG tablet Take 2 tablets (500 mg) by mouth daily for 1 day, THEN 1 tablet (250 mg) daily for 4 days. 3/26/2025: Rx order date: 3/24/25 Taking    donepezil (ARICEPT) 5 MG tablet Take 5 mg by mouth at bedtime.  Taking    donepezil (ARICEPT) 5 MG tablet Take 1 tablet (5 mg) by mouth at bedtime  Taking    fluocinonide (LIDEX) 0.05 % external ointment Apply topically 2 times daily. 3/26/2025: Apply to backs of both " hands for eczematous dermatitis* Taking    guaiFENesin (MUCINEX) 600 MG 12 hr tablet Take 1,200 mg by mouth 2 times daily as needed for congestion.  Taking As Needed    Multiple Vitamins-Iron (MULTIVITAMIN/IRON PO) Take 1 tablet by mouth daily. Certavite senior*  Taking    ondansetron (ZOFRAN) 4 MG tablet Take 4 mg by mouth 3 times daily. 3/20/25 TID x7 days.  Taking    predniSONE (DELTASONE) 20 MG tablet Take 1 tablet (20 mg) by mouth daily.  Taking    QUEtiapine (SEROQUEL) 25 MG tablet Take 12.5 mg by mouth at bedtime. Dose = 1/2 tab = 12.5mg  Taking    QUEtiapine (SEROQUEL) 25 MG tablet Take 12.5 mg by mouth 2 times daily as needed (anxiety or severe agitation). Dose = 1/2 tab = 12.5mg  Taking As Needed    sertraline (ZOLOFT) 25 MG tablet Take 1 tablet by mouth once daily  Taking    simvastatin (ZOCOR) 20 MG tablet TAKE 1 TABLET BY MOUTH AT BEDTIME  Taking    traZODone (DESYREL) 50 MG tablet TAKE 1 TABLET BY MOUTH NIGHTLY AS NEEDED FOR SLEEP  Taking

## 2025-03-26 NOTE — PROGRESS NOTES
Brief no charge note  Patient  care assumed , seen and examined by me today. Medical records and history and physical by admitting physician was reviewed.  Care plan was discussed with patient/family.  Please review history and physical from earlier today for details.    Briefly : Reid Jimenes is a 89 year old male with a history of  htn/hlp/T2dm, no CAD by cath 2021, hx of Aortic Stenosis s/p TAVR, hx of CHB s/p PPM, CKD  3a with baseline creat 1.3-1.4 range, dementia, lumbar stenosis admitted on 3/25/2025 with SOB with increased work of breathing     Current issues  Cough , hypoxia off bipap , wheezing   Discussed with family and bedside nurse     Problem list  Acute hypercapnic and suspected hypoxic respiratory failure   Acute respiratory acidosis   LLL CAP     Plan  Nebs , abx , oxygen   Add steroid   Home med reviewed   Transfer to floor

## 2025-03-26 NOTE — ED PROVIDER NOTES
Emergency Department Note      History of Present Illness     Chief Complaint   Respiratory Distress    HPI   Reid Jimenes is a 89 year old male with history of dementia, STEMI, CAD, hypertension, type 2 diabetes, complete heart block, and stage 3a CKD who presents to the ED via EMS for evaluation of respiratory distress. Per EMS report, the patient comes from the [facility] and was recently discharged with pneumonia. 25 minutes prior to coming in, he began to experience respiratory difficulty and chest pressure. During my initial evaluation, he denies chest pain and states the CPAP is helping his breathing. Patient's O2 saturations were initially in the mid 80's on room air and his initial pressure was 80/40, but he was eventually placed on CPAP. Sugar was 427.    Independent Historian   EMS as detailed above.    Review of External Notes   I reviewed the ED note with Dr. Ortiz from yesterday. Patient was seen for a cough and had a negative chest x-ray.    Echo from 9/18/24:  Interpretation Summary     There is a bioprosthetic aortic valve. TAVR 26mm Greene Sabino 3 2018 ( EOA  1.3 cm2/MSG 15.6mmHg/DI0.42/AT 97msec  Left ventricular systolic function is normal.  The visual ejection fraction is 55-60%.  The left ventricle is normal in size.  There is mild (1+) aortic regurgitation.  The right ventricle is normal in structure, function and size.  The left atrium is mildly dilated.  There is mild (1+) mitral regurgitation.  There is a pacemaker lead in the right ventricle.     AS compared with the last study 9/28/2023 there appears to be no signficant  change.    Past Medical History     Medical History and Problem List   Aortic valve stenosis, nonrheumatic  Benign prostatic hyperplasia   Bilateral carotid artery obstruction  CAD  Carotid artery stenosis   Combined forms of age-related cataract, bilateral   Complete heart block  Coronary atherosclerosis   Degenerative disc disease, lumbosacral  "  Dementia   Facet arthropathy, lumbosacral   GERD  High degree atrioventricular block  Hyperlipidemia  Hypertension  Insomnia   Left bundle branch block   Lumbosacral spinal stenosis   Osteoarthritis   Peripheral vascular disease  Pseudoaneurysm of femoral artery  Pulmonary hypertension  Scoliosis of lumbar spine   Sensorineural hearing loss, bilateral   Sequelae of cerebral infarction   Stage 3a CKD  STEMI  Type 2 diabetes  Vitamin B12 deficiency     Medications   Albuterol  Aricept   Aspirin 81 mg  Deltasone  Desyrel   Glucophage   Prilosec   Prozac   Seroquel   Zithromax  Zocor  Zofran   Zoloft     Surgical History   Bilateral cataract removal  Cardiac cath  Coronary angiogram  EP pacemaker  Mandible surgery   TAVR  Temporary pacemaker insertion     Physical Exam     Patient Vitals for the past 24 hrs:   BP Temp Temp src Pulse Resp SpO2 Height Weight   03/26/25 0115 106/76 -- -- 101 -- 98 % -- --   03/26/25 0100 94/42 -- -- 114 -- 99 % -- --   03/26/25 0045 107/59 -- -- 98 -- 100 % -- --   03/26/25 0000 113/56 -- -- 86 -- 99 % -- --   03/25/25 2300 (!) 105/36 -- -- 86 -- 100 % -- --   03/25/25 2253 99/45 -- -- 80 -- 98 % -- --   03/25/25 2244 -- -- -- 80 -- 100 % -- --   03/25/25 2217 111/52 97.9  F (36.6  C) Temporal 86 20 100 % 1.619 m (5' 3.75\") 69.4 kg (153 lb)     Physical Exam  General: alert, on CPAP  CV: regular rate, regular rhythm  Resp: Tachypneic, increased work of breathing.  Diffuse rales with expiratory wheezes.  GI: abdomen soft and nontender, no guarding  MSK: no bony tenderness  Skin: appropriately warm and dry  Extremities: no edema, calves non-tender  Neuro: alert, clear speech, oriented  Psych: normal mood and affect    Diagnostics     Lab Results   Labs Ordered and Resulted from Time of ED Arrival to Time of ED Departure   BASIC METABOLIC PANEL - Abnormal       Result Value    Sodium 136      Potassium 4.7      Chloride 99      Carbon Dioxide (CO2) 19 (*)     Anion Gap 18 (*)     Urea " Nitrogen 47.9 (*)     Creatinine 2.07 (*)     GFR Estimate 30 (*)     Calcium 9.2      Glucose 458 (*)    NT PROBNP INPATIENT - Abnormal    N terminal Pro BNP Inpatient 5,342 (*)    BLOOD GAS VENOUS - Abnormal    pH Venous 7.16 (*)     pCO2 Venous 61 (*)     pO2 Venous 31      Bicarbonate Venous 22      Base Excess/Deficit Venous -7.5 (*)     FIO2 40      Oxyhemoglobin Venous 40 (*)     O2 Sat, Venous 41.0 (*)    CBC WITH PLATELETS AND DIFFERENTIAL - Abnormal    WBC Count 16.9 (*)     RBC Count 3.76 (*)     Hemoglobin 10.8 (*)     Hematocrit 35.5 (*)     MCV 94      MCH 28.7      MCHC 30.4 (*)     RDW 12.6      Platelet Count 278      % Neutrophils 78      % Lymphocytes 12      % Monocytes 7      % Eosinophils 0      % Basophils 0      % Immature Granulocytes 4      NRBCs per 100 WBC 0      Absolute Neutrophils 13.1 (*)     Absolute Lymphocytes 2.0      Absolute Monocytes 1.1      Absolute Eosinophils 0.0      Absolute Basophils 0.1      Absolute Immature Granulocytes 0.6 (*)     Absolute NRBCs 0.0     ISTAT GASES LACTATE VENOUS POCT - Abnormal    Lactic Acid POCT 8.0 (*)     Bicarbonate Venous POCT 22      O2 Sat, Venous POCT 36 (*)     pCO2 Venous POCT 60 (*)     pH Venous POCT 7.17 (*)     pO2 Venous POCT 27     LACTIC ACID WHOLE BLOOD - Abnormal    Lactic Acid 4.2 (*)    ISTAT GASES LACTATE VENOUS POCT - Abnormal    Lactic Acid POCT 3.9 (*)     Bicarbonate Venous POCT 24      O2 Sat, Venous POCT 44 (*)     pCO2 Venous POCT 57 (*)     pH Venous POCT 7.23 (*)     pO2 Venous POCT 30     TROPONIN T, HIGH SENSITIVITY - Abnormal    Troponin T, High Sensitivity 96 (*)    GLUCOSE BY METER - Abnormal    GLUCOSE BY METER POCT 451 (*)    KETONE BETA-HYDROXYBUTYRATE QUANTITATIVE, RAPID - Normal    Ketone (Beta-Hydroxybutyrate) Quantitative 0.27     GLUCOSE MONITOR NURSING POCT   ISTAT GASES LACTATE VENOUS POCT   GLUCOSE MONITOR NURSING POCT   GLUCOSE MONITOR NURSING POCT   TROPONIN T, HIGH SENSITIVITY   BLOOD CULTURE    BLOOD CULTURE     Imaging   XR Chest Port 1 View   Final Result   IMPRESSION: Cardiomediastinal silhouette. Cardiac leads. TAVR. Mild elevation left hemidiaphragm. Interstitial prominence. Mild venous congestion not excluded. Fibroatelectasis or mild infiltrate left lung base. Atherosclerotic aorta.        EKG   ECG taken at 2214, ECG read at 2224  Undetermined rhythm  Left bundle branch block    Patient now has paced rhythm when compared to prior, dated 11/13/22.  Rate 86 bpm. DC interval * ms. QRS duration 168 ms. QT/QTc 414/495 ms. P-R-T axes * 43 193.    Independent Interpretation   CXR: some infiltrate in right lower lobe.    ED Course      Medications Administered   Medications   glucose gel 15-30 g (has no administration in time range)     Or   dextrose 50 % injection 25-50 mL (has no administration in time range)     Or   glucagon injection 1 mg (has no administration in time range)   insulin aspart (NovoLOG) injection (RAPID ACTING) (has no administration in time range)   insulin aspart (NovoLOG) injection (RAPID ACTING) (has no administration in time range)   lactated ringers BOLUS 1,000 mL (has no administration in time range)   ipratropium - albuterol 0.5 mg/2.5 mg/3 mL (DUONEB) neb solution 3 mL (3 mLs Nebulization $Given 3/25/25 2233)   cefTRIAXone (ROCEPHIN) 1 g vial to attach to  mL bag for ADULTS or NS 50 mL bag for PEDS (0 g Intravenous Stopped 3/25/25 2337)   azithromycin (ZITHROMAX) 500 mg in  mL intermittent infusion (0 mg Intravenous Stopped 3/26/25 0123)   sodium chloride 0.9% BOLUS 500 mL (0 mLs Intravenous Stopped 3/26/25 0042)     Procedures   None      Discussion of Management   Admitting Hospitalist, Dr. Steven    ED Course   ED Course as of 03/26/25 0140   Tue Mar 25, 2025   2213 I obtained history and examined the patient as noted above.    2225 I rechecked the patient after he was placed on BiPAP.   2250 Patient states that he would want to be DNR DNI.   2259 I rechecked  the patient.   2329 I called the patient's daughter, Lizz, and updated her.   Wed Mar 26, 2025   0008 I rechecked the patient.  Daughter, Lizz, updated regarding plan of care.   0106 I consulted with admitting hospitalist, Dr. Steven, regarding admission. Discussed patient's presentation, findings, and plan of care.      Additional Documentation  None    Medical Decision Making / Diagnosis     CMS Diagnoses: IV Antibiotics given and/or elevated Lactate of 8 and no sepsis note found - Delete this reminder and enter the sepsis note or '.edcms' before signing chart.>>>The patient has signs of sepsis   Sepsis ED evaluation   The patient has signs of sepsis as evidenced by:  1. Presence of 2 SIRS criteria, suspected infection, AND  2. Organ dysfunction: Lactic Acidosis with value >2.0 due to sepsis    Sepsis Care Initiation: Starting at  2223 PM on 03/25/25, until specified. Prior to this documentation, sepsis, severe sepsis, or septic shock was NOT thought to be a significant cause of illness. This order represents the first time infection was seriously considered to be affecting the patient.    Lactic Acid Results:  Recent Labs   Lab Test 03/26/25  0125 03/26/25  0027 03/25/25 2223   LACT 4.2* 3.9* 8.0*       3 Hour Bundle 6 Hour Bundle (Reassessment)   Blood Cultures before IV Antibiotics: Yes  Antibiotics given: see below  Prehospital fluid volume (mL):   Prehospital IV Fluid Type: normal saline   Prehospital IV Intake: 50   Prehospital Fluid Start Time: 2200   Prehospital Fluid Stop Time: 2222     Total fluids given (ED +Pre-hospital):  The patient responded to a lesser volume of IV fluids. The initial volume ordered was 1500 mL.    Repeat Lactic Acid Level: Ordered by reflex for 2 hours after initial lactic acid collection.     BMI Readings from Last 1 Encounters:   03/25/25 26.47 kg/m        Anti-infectives (From admission through now)      Start     Dose/Rate Route Frequency Ordered Stop    03/25/25 2250   cefTRIAXone (ROCEPHIN) 1 g vial to attach to  mL bag for ADULTS or NS 50 mL bag for PEDS         1 g  over 15-30 Minutes Intravenous ONCE 03/25/25 2247 03/25/25 2337    03/25/25 2250  azithromycin (ZITHROMAX) 500 mg in  mL intermittent infusion         500 mg  over 1 Hours Intravenous ONCE 03/25/25 2247 03/26/25 0123                MIPS       None    [facility] [place]   Reid Jimenes is a 89 year old male with a history of coronary artery disease, left bundle branch block, pacemaker, TAVR, presenting today with increased shortness of breath.  On exam, the patient is hypoxic but afebrile.  EKG shows a paced rhythm with chronic left bundle branch block.  The patient arrived on CPAP, and was transitioned to BiPAP.  He has been tolerating this well, with improvement in work of breathing and oxygenation.  Chest x-ray shows looks to me like a new right lower lobe infiltrate compared to x-ray yesterday.  In addition, the patient has an elevated white blood cell count compared to yesterday.  He was started on broad-spectrum antibiotics for commune Acquired pneumonia.  I did give him a small fluid bolus as well, which he tolerated well and had improvement in blood pressure after this.  Initial blood gas showed acidosis, which appears to be a mixed respiratory and metabolic acidosis.  This has improved significantly, though lactate remains elevated at 3.9.  DKA is in the differential diagnosis, though given normal beta hydroxybutyrate, I think this is unlikely.  On recheck, the patient remains alert, answer questions appropriately, continues to be tolerating the BiPAP well.  I did discussion with the patient with daughter present, and he confirms that he is a DNR DNI.  Otherwise, labs demonstrate an elevated BN peptide.  Based on exam, soft pressures, elevated lactate and white blood cell count, I am more concerned for sepsis related to pneumonia rather than pulmonary edema and volume overload.  Patient will be admitted to  the C unit for continued respiratory support and continued workup.    Disposition   The patient was admitted to the hospital.     Diagnosis     ICD-10-CM    1. Acute respiratory failure with hypoxia (H)  J96.01       2. Community acquired pneumonia of right lower lobe of lung  J18.9       3. Hyperglycemia  R73.9          Scribe Disclosure:  TOSHA BAZAN, am serving as a scribe at 10:10 PM on 3/25/2025 to document services personally performed by Mcaie Longo MD based on my observations and the provider's statements to me.      Macie Longo MD  03/26/25 0210

## 2025-03-26 NOTE — PLAN OF CARE
"ICU End of Shift Summary.  For vital signs and complete assessments, please see documentation flowsheets.     Pertinent assessments: Patient alert to self. Up for majority of day. Tolerating regular diet. Weaned to RA. Denies SOB. Wheezy at times- prn neb given. Up x1 with walker and gb. Up in chair today.   Major Shift Events: became overflow patient, weaned to RA, diet advanced   Plan (Upcoming Events): continue antibiotics, possible discharge tomorrow, SW following  Discharge/Transfer Needs: transfer orders are in-pending bed availability     Bedside Shift Report Completed : y  Bedside Safety Check Completed: y      Problem: Adult Inpatient Plan of Care  Goal: Plan of Care Review  Description: The Plan of Care Review/Shift note should be completed every shift.  The Outcome Evaluation is a brief statement about your assessment that the patient is improving, declining, or no change.  This information will be displayed automatically on your shiftnote.  Outcome: Progressing  Flowsheets (Taken 3/26/2025 8777)  Outcome Evaluation: Patient on RA. Vitally stable. 02 removed. Denies SOB. Afebrile.  Overall Patient Progress: improving  Goal: Patient-Specific Goal (Individualized)  Description: You can add care plan individualizations to a care plan. Examples of Individualization might be:  \"Parent requests to be called daily at 9am for status\", \"I have a hard time hearing out of my right ear\", or \"Do not touch me to wake me up as it startlesme\".  Outcome: Progressing  Goal: Absence of Hospital-Acquired Illness or Injury  Outcome: Progressing  Intervention: Identify and Manage Fall Risk  Recent Flowsheet Documentation  Taken 3/26/2025 1639 by Meenu Dutta RN  Safety Promotion/Fall Prevention:   activity supervised   assistive device/personal items within reach   clutter free environment maintained   increased rounding and observation   increase visualization of patient   nonskid shoes/slippers when out of bed   " patient and family education   room near nurse's station   safety round/check completed   room organization consistent   supervised activity   treat reversible contributory factors   treat underlying cause  Taken 3/26/2025 0800 by Meenu Dutta RN  Safety Promotion/Fall Prevention:   activity supervised   assistive device/personal items within reach   clutter free environment maintained   increased rounding and observation   increase visualization of patient   nonskid shoes/slippers when out of bed   patient and family education   room near nurse's station   safety round/check completed   room organization consistent   supervised activity   treat reversible contributory factors   treat underlying cause  Intervention: Prevent Skin Injury  Recent Flowsheet Documentation  Taken 3/26/2025 1639 by Meenu Dutta RN  Skin Protection:   adhesive use limited   incontinence pads utilized   tubing/devices free from skin contact  Taken 3/26/2025 1620 by Meenu Dutta RN  Body Position: position changed independently  Taken 3/26/2025 0800 by Meenu Dutta RN  Body Position: position changed independently  Skin Protection:   adhesive use limited   incontinence pads utilized   tubing/devices free from skin contact  Intervention: Prevent and Manage VTE (Venous Thromboembolism) Risk  Recent Flowsheet Documentation  Taken 3/26/2025 1639 by Meenu Dutta RN  VTE Prevention/Management: (on sq lovenox) SCDs off (sequential compression devices)  Taken 3/26/2025 0800 by Meenu Dutta RN  VTE Prevention/Management: (on sq lovenox) SCDs off (sequential compression devices)  Intervention: Prevent Infection  Recent Flowsheet Documentation  Taken 3/26/2025 1639 by Meenu Dutta RN  Infection Prevention:   equipment surfaces disinfected   hand hygiene promoted   rest/sleep promoted   single patient room provided  Taken 3/26/2025 0800 by Meenu Dutta RN  Infection Prevention:   equipment  surfaces disinfected   hand hygiene promoted   rest/sleep promoted   single patient room provided  Goal: Optimal Comfort and Wellbeing  Outcome: Progressing  Intervention: Provide Person-Centered Care  Recent Flowsheet Documentation  Taken 3/26/2025 1639 by Meenu Dutta RN  Trust Relationship/Rapport:   care explained   choices provided   emotional support provided   empathic listening provided   questions answered   questions encouraged   reassurance provided   thoughts/feelings acknowledged  Taken 3/26/2025 0800 by Meenu Dutta RN  Trust Relationship/Rapport:   care explained   choices provided   emotional support provided   empathic listening provided   questions answered   questions encouraged   reassurance provided   thoughts/feelings acknowledged  Goal: Readiness for Transition of Care  Outcome: Progressing   Goal Outcome Evaluation:           Overall Patient Progress: improvingOverall Patient Progress: improving    Outcome Evaluation: Patient on RA. Vitally stable. 02 removed. Denies SOB. Afebrile.

## 2025-03-26 NOTE — PLAN OF CARE
Goal Outcome Evaluation:      Plan of Care Reviewed With: patient, caregiver, family    Overall Patient Progress: improvingOverall Patient Progress: improving    Outcome Evaluation: Anticipate discharge back to [facility] when medically ready.

## 2025-03-26 NOTE — H&P
History and Physical     Reid Jimenes MRN# [id number]   YOB: 1934 Age: 89 year old      Date of Admission:  3/25/2025    Primary care provider: Physicians, [facility]          Assessment and Plan:     Summary of Stay: Reid Jimenes is a 89 year old male with a history of  htn/hlp/T2dm, no CAD by cath 2021, hx of Aortic Stenosis s/p TAVR, hx of CHB s/p PPM, CKD  3a with baseline creat 1.3-1.4 range, dementia, lumbar stenosis admitted on 3/25/2025 with SOB with increased work of breathing    The history is obtained in discussion with the dtr at the bedside, he's not able to give me any meaningful information     He's been dealing with a junky but nonproductive cough for the past week or so, his wife has had issues with the same.  No known fevers or antecedent URI sx.  No n/v.  He denies CP and SOB to me and is on a bipap machine at the time.  He was seen in the ER for same the day pta and diagnosed with a viral LRI and discharged with azith and steroids burst.  I believe he lives with his wife in memory care but am not sure.  In any case staff called EMS and he apparently had WOB that was impressive enough that he was put on CPAP    ER VS he is afebrile, mild tachycardia and BPs in the 's over 50's  BMP with ROSANNE bun/creat 50/2.07  bicarb is 19 and AG is 18 lactate 8.0 (!)-> 3.9 VBG 7.17/61 -> 7.23/57   BNP 5342, trop 96, EKG I think it's sinus with 1*AVB and LBBB  CBC leukocytosis 16.9 with neutrophilia and lymphopenia     CXR with ? LLL infiltrate     I am asked to admit for suspected pna with acute hypoxic respiratory failure       Problem List:   Acute hypercapnic and suspected hypoxic respiratory failure   Acute respiratory acidosis   Seen in ED with similar sx yesterday but not as severe.  Not sure what pushed him over the edge today  -BiPAP with significant improvement in WOB  -VBGs 7.17/61->7.23/57->7.28/50 and mentating ok.  Ok to leave off BiPAP for now and will savi VBG with phillip  labs     LLL CAP   Sepsis elevated lactate, leukocytosis, tachycardia   Junky cough but not productive  I don't see any hx of lung disease and he's not wheezing at this time so not sure that further steroids would be beneficial.  I still think viral pna in the ddx and so broadened to full respiratory panel (covid/flu/rsv negative 3/24)  -ceftriaxone/azith, sputum culture if able     Hyperglycemia   T2dm   Looks to be on metformin pta which will be on hold in light of elevated lactate   Quite hyperglycemic I suspect due to recent steroids   -aspart 7 unit(s) given and BG still at 384 so given an additional 5 unit(s)  -monitor with ISS, hopefully will calm down as distance grows between his steroid dose       Htn,hlp  Hx Aortic Stenosis  s/p TAVR  Hx of CHB s/p PPM   Looks to just be on asa only, await formal med rec    Dementia   Cont with donepezil 10 mg at bedtime  At risk for delirium, EKG QTc 495 so on the lengthier side, ck mag     DVT Prophylaxis: Enoxaparin (Lovenox) SQ  Code Status: DNR but ok for temporary intubation. He's DNR based on POLST that's scanned into epic and daughter agrees that would be consistent with his prior wishes.  She's not sure about intubation and he is unable to answer that reliably for me.  She would like to discuss it with her mom and siblings before taking it off the table.  So for now is ok for temporary intubation   Functional Status: lives with wife I believe in memory care and gets around with a walker   Johnson: not needed  Access:   PIV           Time spent 70 minutes reviewing epic including notes/labs/prior hx, current medications.  In addition to interviewing and examining the patient, updated patient and family regarding plan of care          Chief Complaint:     Acute SOB        History of Present Illness:   : Reid Jimenes is a 89 year old male with a history of  htn/hlp/T2dm, no CAD by cath 2021, hx of Aortic Stenosis s/p TAVR, hx of CHB s/p PPM, CKD  3a with baseline  creat 1.3-1.4 range, dementia, lumbar stenosis admitted on 3/25/2025 with SOB with increased work of breathing    The history is obtained in discussion with the dtr at the bedside, he's not able to give me any meaningful information     He's been dealing with a junky but nonproductive cough for the past week or so, his wife has had issues with the same.  No known fevers or antecedent URI sx.  No n/v.  He denies CP and SOB to me and is on a bipap machine at the time.  He was seen in the ER for same the day pta and diagnosed with a viral LRI and discharged with azith and steroids burst.  I believe he lives with his wife in memory care but am not sure.  In any case staff called EMS and he apparently had WOB that was impressive enough that he was put on CPAP    ER VS he is afebrile, mild tachycardia and BPs in the 's over 50's  BMP with ROSANNE bun/creat 50/2.07  bicarb is 19 and AG is 18 lactate 8.0 (!)-> 3.9 VBG 7.17/61 -> 7.23/57   BNP 5342, trop 96, EKG I think it's sinus with 1*AVB and LBBB  CBC leukocytosis 16.9 with neutrophilia and lymphopenia     CXR with ? 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    DDD PM 6/10/2021    Hyperlipidaemia LDL goal < 100     Hypertension     Need for SBE (subacute bacterial endocarditis) prophylaxis     Pulmonary hypertension (H)     S/P TAVR (transcatheter aortic valve replacement)  26 mm Greene S3 valve in  "2018 2018    status post TAVR using a 26 mm Greene S3 valve in 2018    Spinal stenosis     Type 2 diabetes mellitus (H)              Past Surgical History:     Past Surgical History:   Procedure Laterality Date    CV HEART CATHETERIZATION WITH POSSIBLE INTERVENTION N/A 2021    Procedure: coronary angiogram;  Surgeon: Jayesh Rachel MD;  Location:  HEART CARDIAC CATH LAB    CV TEMPORARY PACEMAKER INSERTION N/A 2021    Procedure: Temporary Pacemaker Insertion;  Surgeon: Jayesh Rachel MD;  Location:  HEART CARDIAC CATH LAB    EP PACEMAKER N/A 6/10/2021    Procedure: EP Pacemaker;  Surgeon: Magdiel Silver MD;  Location:  HEART CARDIAC CATH LAB    MANDIBLE SURGERY      OTHER SURGICAL HISTORY      cardiac cath 18: mild non-obstructive disease    TRANSCATHETER AORTIC VALVE IMPLANT ANESTHESIA N/A 2018    Procedure: TRANSCATHETER AORTIC VALVE IMPLANT ANESTHESIA;  ANESTHESIA NEEDED FOR TAVR PROCEDURE;  Surgeon: GENERIC ANESTHESIA PROVIDER;  Location:  OR,  26mm Edward Sabino 3 valve             Social History:     Social History     Tobacco Use    Smoking status: Former     Current packs/day: 0.00     Average packs/day: 1 pack/day for 30.0 years (30.0 ttl pk-yrs)     Types: Cigarettes     Start date:      Quit date: 1980     Years since quittin.6    Smokeless tobacco: Former     Types: Chew     Quit date: 1983   Substance Use Topics    Alcohol use: No     Comment: quit 30+ years ago. states, \"I'm an alcoholic\".             Family History:     Family History   Problem Relation Age of Onset    Cancer Mother 58         in her 50's, had throat and stomach cancer    Alcohol/Drug Mother     Heart Disease Father          about age 49    Alcohol/Drug Father     Myocardial Infarction Sister 70        Two heart attacks    Alcohol/Drug Sister     Cerebrovascular Disease Brother 65         age 65 of stroke    Alcohol/Drug Brother     Cancer Maternal " "Grandmother         stomach cancer    Alcohol/Drug Maternal Grandmother     Alcohol/Drug Maternal Grandfather     Alcohol/Drug Paternal Grandmother     Alcohol/Drug Paternal Grandfather     Myocardial Infarction Grandchild 18        Grandson    Myocardial Infarction Other 18        Nephew             Allergies:     Allergies   Allergen Reactions    Fentanyl Other (See Comments)     Confusion and agitation after PPM    Versed [Midazolam] Other (See Comments)     Confusion and agitation post PPM             Medications:     Await med rec           Review of Systems:     A Comprehensive greater than 10 system review of systems was carried out.  Pertinent positives and negatives are noted above.  Otherwise negative for contributory information.           Physical Exam:   Blood pressure 106/76, pulse 101, temperature 97.9  F (36.6  C), temperature source Temporal, resp. rate 20, height 1.619 m (5' 3.75\"), weight 69.4 kg (153 lb), SpO2 98%.  Exam:    General:  Pleasant nad looks stated age  HEENT:  Head nc/at sclera clear PER bipap in place  Neck is supple  Lungs: cta b nl effort   CV:  RRR no m/r/g no le edema  Abd:  S/nt/nd no r/g  Neuro:  Cn 2-12 grossly intact and strength is intact in b ue and le  Alert unable to assess orientation but follows commands  affect appropriate   Skin:  W/d no c/c               Data:     Results for orders placed or performed during the hospital encounter of 03/25/25   XR Chest Port 1 View     Status: None    Narrative    EXAM: XR CHEST PORT 1 VIEW  LOCATION: [facility]  DATE: 3/25/2025    INDICATION: shortness of breath  COMPARISON: 03/24/2025      Impression    IMPRESSION: Cardiomediastinal silhouette. Cardiac leads. TAVR. Mild elevation left hemidiaphragm. Interstitial prominence. Mild venous congestion not excluded. Fibroatelectasis or mild infiltrate left lung base. Atherosclerotic aorta.   Basic metabolic panel     Status: Abnormal   Result Value Ref Range    " Sodium 136 135 - 145 mmol/L    Potassium 4.7 3.4 - 5.3 mmol/L    Chloride 99 98 - 107 mmol/L    Carbon Dioxide (CO2) 19 (L) 22 - 29 mmol/L    Anion Gap 18 (H) 7 - 15 mmol/L    Urea Nitrogen 47.9 (H) 8.0 - 23.0 mg/dL    Creatinine 2.07 (H) 0.67 - 1.17 mg/dL    GFR Estimate 30 (L) >60 mL/min/1.73m2    Calcium 9.2 8.8 - 10.4 mg/dL    Glucose 458 (H) 70 - 99 mg/dL   Nt probnp inpatient     Status: Abnormal   Result Value Ref Range    N terminal Pro BNP Inpatient 5,342 (H) 0 - 1,800 pg/mL   Blood gas venous     Status: Abnormal   Result Value Ref Range    pH Venous 7.16 (LL) 7.32 - 7.43    pCO2 Venous 61 (H) 40 - 50 mm Hg    pO2 Venous 31 25 - 47 mm Hg    Bicarbonate Venous 22 21 - 28 mmol/L    Base Excess/Deficit Venous -7.5 (L) -3.0 - 3.0 mmol/L    FIO2 40     Oxyhemoglobin Venous 40 (L) 70 - 75 %    O2 Sat, Venous 41.0 (L) 70.0 - 75.0 %    Narrative    In healthy individuals, oxyhemoglobin (O2Hb) and oxygen saturation (SO2) are approximately equal. In the presence of dyshemoglobins, oxyhemoglobin can be considerably lower than oxygen saturation.   [place] Draw     Status: None    Narrative    The following orders were created for panel order [place] Draw.  Procedure                               Abnormality         Status                     ---------                               -----------         ------                     Extra Blue Top Tube[[id number]]                             Final result               Extra Red Top Tube[[id number]]                              Final result                 Please view results for these tests on the individual orders.   CBC with platelets and differential     Status: Abnormal   Result Value Ref Range    WBC Count 16.9 (H) 4.0 - 11.0 10e3/uL    RBC Count 3.76 (L) 4.40 - 5.90 10e6/uL    Hemoglobin 10.8 (L) 13.3 - 17.7 g/dL    Hematocrit 35.5 (L) 40.0 - 53.0 %    MCV 94 78 - 100 fL    MCH 28.7 26.5 - 33.0 pg    MCHC 30.4 (L) 31.5 - 36.5 g/dL    RDW 12.6 10.0 - 15.0 %    Platelet  Count 278 150 - 450 10e3/uL    % Neutrophils 78 %    % Lymphocytes 12 %    % Monocytes 7 %    % Eosinophils 0 %    % Basophils 0 %    % Immature Granulocytes 4 %    NRBCs per 100 WBC 0 <1 /100    Absolute Neutrophils 13.1 (H) 1.6 - 8.3 10e3/uL    Absolute Lymphocytes 2.0 0.8 - 5.3 10e3/uL    Absolute Monocytes 1.1 0.0 - 1.3 10e3/uL    Absolute Eosinophils 0.0 0.0 - 0.7 10e3/uL    Absolute Basophils 0.1 0.0 - 0.2 10e3/uL    Absolute Immature Granulocytes 0.6 (H) <=0.4 10e3/uL    Absolute NRBCs 0.0 10e3/uL   Extra Blue Top Tube     Status: None   Result Value Ref Range    Hold Specimen JIC    Extra Red Top Tube     Status: None   Result Value Ref Range    Hold Specimen JIC    iStat Gases (lactate) venous, POCT     Status: Abnormal   Result Value Ref Range    Lactic Acid POCT 8.0 (HH) 0.7 - 2.0 mmol/L    Bicarbonate Venous POCT 22 21 - 28 mmol/L    O2 Sat, Venous POCT 36 (L) 70 - 75 %    pCO2 Venous POCT 60 (H) 40 - 50 mm Hg    pH Venous POCT 7.17 (LL) 7.32 - 7.43    pO2 Venous POCT 27 25 - 47 mm Hg   Lactic acid whole blood     Status: Abnormal   Result Value Ref Range    Lactic Acid 4.2 (HH) 0.7 - 2.0 mmol/L   iStat Gases (lactate) venous, POCT     Status: Abnormal   Result Value Ref Range    Lactic Acid POCT 3.9 (H) 0.7 - 2.0 mmol/L    Bicarbonate Venous POCT 24 21 - 28 mmol/L    O2 Sat, Venous POCT 44 (L) 70 - 75 %    pCO2 Venous POCT 57 (H) 40 - 50 mm Hg    pH Venous POCT 7.23 (L) 7.32 - 7.43    pO2 Venous POCT 30 25 - 47 mm Hg   Troponin T, High Sensitivity     Status: Abnormal   Result Value Ref Range    Troponin T, High Sensitivity 96 (H) <=22 ng/L   Ketone Beta-Hydroxybutyrate Quantitative     Status: Normal   Result Value Ref Range    Ketone (Beta-Hydroxybutyrate) Quantitative 0.27 <=0.30 mmol/L   Glucose by meter     Status: Abnormal   Result Value Ref Range    GLUCOSE BY METER POCT 451 (H) 70 - 99 mg/dL   Hemoglobin A1c     Status: Abnormal   Result Value Ref Range    Estimated Average Glucose 157 (H) <117  mg/dL    Hemoglobin A1C 7.1 (H) <5.7 %   Blood gas venous     Status: Abnormal   Result Value Ref Range    pH Venous 7.28 (L) 7.32 - 7.43    pCO2 Venous 50 40 - 50 mm Hg    pO2 Venous 48 (H) 25 - 47 mm Hg    Bicarbonate Venous 24 21 - 28 mmol/L    Base Excess/Deficit Venous -3.1 (L) -3.0 - 3.0 mmol/L    FIO2 30     Oxyhemoglobin Venous 78 (H) 70 - 75 %    O2 Sat, Venous 78.7 (H) 70.0 - 75.0 %    Narrative    In healthy individuals, oxyhemoglobin (O2Hb) and oxygen saturation (SO2) are approximately equal. In the presence of dyshemoglobins, oxyhemoglobin can be considerably lower than oxygen saturation.   Lactic acid whole blood     Status: Abnormal   Result Value Ref Range    Lactic Acid 3.4 (H) 0.7 - 2.0 mmol/L   Glucose by meter     Status: Abnormal   Result Value Ref Range    GLUCOSE BY METER POCT 414 (H) 70 - 99 mg/dL   Glucose by meter     Status: Abnormal   Result Value Ref Range    GLUCOSE BY METER POCT 384 (H) 70 - 99 mg/dL   EKG 12-lead, tracing only     Status: None (Preliminary result)   Result Value Ref Range    Systolic Blood Pressure  mmHg    Diastolic Blood Pressure  mmHg    Ventricular Rate 86 BPM    Atrial Rate 86 BPM    NH Interval  ms    QRS Duration 168 ms     ms    QTc 495 ms    P Axis  degrees    R AXIS 43 degrees    T Axis 193 degrees    Interpretation ECG       Undetermined rhythm  Left bundle branch block  Abnormal ECG  When compared with ECG of 13-Nov-2022 16:01,  Current undetermined rhythm precludes rhythm comparison, needs review  Questionable change in QRS axis  T wave inversion now evident in Inferior leads     CBC with platelets differential     Status: Abnormal    Narrative    The following orders were created for panel order CBC with platelets differential.  Procedure                               Abnormality         Status                     ---------                               -----------         ------                     CBC with platelets and ...[[id number]]  Abnormal             Final result                 Please view results for these tests on the individual orders.

## 2025-03-26 NOTE — PLAN OF CARE
"Goal Outcome Evaluation:      Plan of Care Reviewed With: patient    Overall Patient Progress: no changeOverall Patient Progress: no change    Outcome Evaluation: Pt admitted at 0300. Changed over to 4L NC during transport by RT. LS expiratory wheezes.  Patient confused, disoriented to time and situation, forgetful. Resides in memory care unit. Daughter remained at bedside throughout night. Attentive to patient. BG's elevated, additional dose of insulin given with order to recheck 2 hours later, recheck pending. Denies pain. Pt states he voided x 1 in ED, unable to verify.      Problem: Adult Inpatient Plan of Care  Goal: Plan of Care Review  Description: The Plan of Care Review/Shift note should be completed every shift.  The Outcome Evaluation is a brief statement about your assessment that the patient is improving, declining, or no change.  This information will be displayed automatically on your shiftnote.  Outcome: Not Progressing  Flowsheets (Taken 3/26/2025 0523)  Outcome Evaluation: Pt admitted at 0300. Changed over to 4L NC during transport by RT. LS expiratory wheezes.  Patient confused, disoriented to time and situation, forgetful. Resides in memory care unit. Daughter remained at bedside throughout night. Attentive to patient. BG's elevated, additional dose of insulin given with order to recheck 2 hours later, recheck pending. Denies pain. Pt states he voided x 1 in ED, unable to verify.  Plan of Care Reviewed With: patient  Overall Patient Progress: no change  Goal: Patient-Specific Goal (Individualized)  Description: You can add care plan individualizations to a care plan. Examples of Individualization might be:  \"Parent requests to be called daily at 9am for status\", \"I have a hard time hearing out of my right ear\", or \"Do not touch me to wake me up as it startlesme\".  Outcome: Not Progressing  Flowsheets (Taken 3/26/2025 0523)  Individualized Care Needs: forgetful has dementia  Goal: Absence of " Hospital-Acquired Illness or Injury  Outcome: Not Progressing  Intervention: Identify and Manage Fall Risk  Recent Flowsheet Documentation  Taken 3/26/2025 0300 by Michelle Joe RN  Safety Promotion/Fall Prevention:   clutter free environment maintained   increased rounding and observation   increase visualization of patient   lighting adjusted   safety round/check completed  Intervention: Prevent Skin Injury  Recent Flowsheet Documentation  Taken 3/26/2025 0300 by Michelle Joe RN  Body Position:   turned   right   side-lying 30 degrees  Intervention: Prevent and Manage VTE (Venous Thromboembolism) Risk  Recent Flowsheet Documentation  Taken 3/26/2025 0300 by Michelle Joe RN  VTE Prevention/Management: SCDs off (sequential compression devices)  Goal: Optimal Comfort and Wellbeing  Outcome: Not Progressing  Intervention: Provide Person-Centered Care  Recent Flowsheet Documentation  Taken 3/26/2025 0300 by Michelle Joe RN  Trust Relationship/Rapport: care explained  Goal: Readiness for Transition of Care  Outcome: Not Progressing  Intervention: Mutually Develop Transition Plan  Recent Flowsheet Documentation  Taken 3/26/2025 0300 by Michelle Joe RN  Equipment Currently Used at Home: walker, rolling     Problem: Delirium  Goal: Optimal Coping  Outcome: Not Progressing  Intervention: Optimize Psychosocial Adjustment to Delirium  Recent Flowsheet Documentation  Taken 3/26/2025 0300 by Michelle Joe RN  Supportive Measures:   relaxation techniques promoted   verbalization of feelings encouraged  Family/Support System Care: presence promoted  Goal: Improved Behavioral Control  Outcome: Not Progressing  Intervention: Minimize Safety Risk  Recent Flowsheet Documentation  Taken 3/26/2025 0300 by Michelle Joe RN  Enhanced Safety Measures: room near unit station  Trust Relationship/Rapport: care explained  Goal: Improved Attention and Thought Clarity  Outcome:  Not Progressing  Intervention: Maximize Cognitive Function  Recent Flowsheet Documentation  Taken 3/26/2025 0300 by Michelle Joe RN  Sensory Stimulation Regulation:   lighting decreased   care clustered  Reorientation Measures:   clock in view   hearing device use encouraged   reorientation provided  Goal: Improved Sleep  Outcome: Not Progressing     Problem: Gas Exchange Impaired  Goal: Optimal Gas Exchange  Outcome: Not Progressing  Intervention: Optimize Oxygenation and Ventilation  Recent Flowsheet Documentation  Taken 3/26/2025 0300 by Michelle Joe RN  Airway/Ventilation Management:   airway patency maintained   pulmonary hygiene promoted  Head of Bed (HOB) Positioning: HOB at 30 degrees     Problem: Comorbidity Management  Goal: Blood Glucose Levels Within Targeted Range  Outcome: Not Progressing  Intervention: Monitor and Manage Glycemia  Recent Flowsheet Documentation  Taken 3/26/2025 0300 by Michelle Joe RN  Medication Review/Management: medications reviewed  Goal: Blood Pressure in Desired Range  Outcome: Not Progressing  Intervention: Maintain Blood Pressure Management  Recent Flowsheet Documentation  Taken 3/26/2025 0300 by Michelle Joe RN  Medication Review/Management: medications reviewed        normal performance normal performance

## 2025-03-26 NOTE — ED TRIAGE NOTES
Pt presents via EMS for evaluation of respiratory distress. Pt resides at [facility] in memory care. Was dx with pneumonia yesterday. About 25 minutes PTA, pt began c/o respiratory difficulty and chest pressure. Initial oxygen saturations were in the mid 80s on RA. Initial BP 80/40, so EMS started with 15 LPM non-rebreather with oxygen getting to 90. After BP improved, pt placed on C-pap at 12 LPM. Hx of dementia, type II DM and has a pacemaker. Iv established, NS TKO, .

## 2025-03-26 NOTE — CONSULTS
Care Management Initial Consult    General Information  Assessment completed with: Family, Patient, Caregiver,    Type of CM/SW Visit: Initial Assessment    Primary Care Provider verified and updated as needed:     Readmission within the last 30 days: no previous admission in last 30 days      Reason for Consult: discharge planning     Communication Assessment  Patient's communication style: spoken language (English or Bilingual)    Hearing Difficulty or Deaf: yes   Wear Glasses or Blind: yes    Cognitive  Cognitive/Neuro/Behavioral: .WDL except  Level of Consciousness: confused, alert  Arousal Level: opens eyes spontaneously  Orientation: disoriented to, place, time, situation  Mood/Behavior: calm, cooperative  Best Language: 0 - No aphasia  Speech: clear, spontaneous    Living Environment:   People in home:  (Memory Care)     Current living Arrangements: assisted living      Able to return to prior arrangements: yes     Family/Social Support:  Care provided by:  (Facility staff)  Provides care for: no one, unable/limited ability to care for self  Marital Status:   Support system: Facility resident(s)/Staff, Wife, Children          Description of Support System: Supportive, Involved       Current Resources:   Patient receiving home care services: Yes  Skilled Home Care Services: Home Health Aid  Equipment currently used at home: walker, rolling  Supplies currently used at home: Diabetic Supplies    Does the patient's insurance plan have a 3 day qualifying hospital stay waiver?  No    Lifestyle & Psychosocial Needs:  Social Drivers of Health     Food Insecurity: Low Risk  (3/26/2025)    Food Insecurity     Within the past 12 months, did you worry that your food would run out before you got money to buy more?: No     Within the past 12 months, did the food you bought just not last and you didn t have money to get more?: No   Depression: Not at risk (1/9/2024)    PHQ-2     PHQ-2 Score: 0   Housing Stability:  High Risk (3/26/2025)    Housing Stability     Do you have housing? : No     Are you worried about losing your housing?: No   Tobacco Use: Medium Risk (10/21/2024)    Patient History     Smoking Tobacco Use: Former     Smokeless Tobacco Use: Former     Passive Exposure: Not on file   Financial Resource Strain: Low Risk  (3/26/2025)    Financial Resource Strain     Within the past 12 months, have you or your family members you live with been unable to get utilities (heat, electricity) when it was really needed?: No   Alcohol Use: Not At Risk (7/21/2021)    AUDIT-C     Frequency of Alcohol Consumption: Never     Average Number of Drinks: Not on file     Frequency of Binge Drinking: Never   Transportation Needs: Low Risk  (3/26/2025)    Transportation Needs     Within the past 12 months, has lack of transportation kept you from medical appointments, getting your medicines, non-medical meetings or appointments, work, or from getting things that you need?: No   Physical Activity: Inactive (7/19/2022)    Exercise Vital Sign     Days of Exercise per Week: 0 days     Minutes of Exercise per Session: 0 min   Interpersonal Safety: High Risk (3/26/2025)    Interpersonal Safety     Do you feel physically and emotionally safe where you currently live?: No     Within the past 12 months, have you been hit, slapped, kicked or otherwise physically hurt by someone?: No     Within the past 12 months, have you been humiliated or emotionally abused in other ways by your partner or ex-partner?: No   Stress: No Stress Concern Present (7/19/2022)    [employer] - Occupational Stress Questionnaire     Feeling of Stress : Not at all   Social Connections: Socially Integrated (7/19/2022)    Social Connection and Isolation Panel [NHANES]     Frequency of Communication with Friends and Family: Twice a week     Frequency of Social Gatherings with Friends and Family: Twice a week     Attends Christian Services: More  than 4 times per year     Active Member of Clubs or Organizations: Yes     Attends Club or Organization Meetings: Never     Marital Status:    Health Literacy: Not on file       Functional Status:  Prior to admission patient needed assistance:   Dependent ADLs:: Ambulation-walker, Bathing  Dependent IADLs:: Cleaning, Cooking, Laundry, Shopping, Meal Preparation, Medication Management, Money Management, Transportation    Values/Beliefs:  Spiritual, Cultural Beliefs, Advent Practices, Values that affect care:    Description of Beliefs that Will Affect Care: magy          Discussed  Partnership in Safe Discharge Planning  document with patient/family: No    Additional Information:  RN CC/SW is following/consulted for discharge planning. Pt admitted with resp failure.  Per chart review, pt resides in an Assisted Living Facility. Spoke to pt and family to verify pt s residence at [facility]. Verbal permission was received to speak to the facility to verify services and to discuss the discharge plan of care. A call was placed to [facility] and services were verified.     Patient receives services as follows: Med management, meals, housekeeping, bathing, escort    [facility] contact (name/number): [phone]  Fax #: [phone]  Barriers to pt returning: None  Baseline mobility: SBA with walker  Time of preferred return: Before 1500 (cannot take back this weekend as they do not have a nurse)  New meds/prescriptions/Pharmacy: [facility]  Transportation: Family    Other: Pt has HHA services through [facility] under [facility] funding Saturday and Sunday. Pt also has private hire HHA Monday and Friday for 6 hours through [facility].    RN CC/SW will continue to follow for discharge planning and return to facility.     Next Steps: Continue to follow for discharge planning.    Mary Santiago RN   Inpatient Care Coordination  [facility]   Phone: [phone]

## 2025-03-27 ENCOUNTER — APPOINTMENT (OUTPATIENT)
Dept: CT IMAGING | Facility: CLINIC | Age: OVER 89
End: 2025-03-27
Attending: INTERNAL MEDICINE
Payer: MEDICARE

## 2025-03-27 ENCOUNTER — APPOINTMENT (OUTPATIENT)
Dept: CARDIOLOGY | Facility: CLINIC | Age: OVER 89
DRG: 871 | End: 2025-03-27
Attending: INTERNAL MEDICINE
Payer: MEDICARE

## 2025-03-27 LAB
ANION GAP SERPL CALCULATED.3IONS-SCNC: 13 MMOL/L (ref 7–15)
BACTERIA BLD CULT: NORMAL
BACTERIA BLD CULT: NORMAL
BUN SERPL-MCNC: 44.5 MG/DL (ref 8–23)
CALCIUM SERPL-MCNC: 8.8 MG/DL (ref 8.8–10.4)
CHLORIDE SERPL-SCNC: 103 MMOL/L (ref 98–107)
CREAT SERPL-MCNC: 1.6 MG/DL (ref 0.67–1.17)
EGFRCR SERPLBLD CKD-EPI 2021: 40 ML/MIN/1.73M2
ERYTHROCYTE [DISTWIDTH] IN BLOOD BY AUTOMATED COUNT: 12.8 % (ref 10–15)
GLUCOSE BLDC GLUCOMTR-MCNC: 166 MG/DL (ref 70–99)
GLUCOSE BLDC GLUCOMTR-MCNC: 201 MG/DL (ref 70–99)
GLUCOSE BLDC GLUCOMTR-MCNC: 233 MG/DL (ref 70–99)
GLUCOSE BLDC GLUCOMTR-MCNC: 322 MG/DL (ref 70–99)
GLUCOSE BLDC GLUCOMTR-MCNC: 364 MG/DL (ref 70–99)
GLUCOSE SERPL-MCNC: 190 MG/DL (ref 70–99)
HCO3 SERPL-SCNC: 22 MMOL/L (ref 22–29)
HCT VFR BLD AUTO: 33.9 % (ref 40–53)
HGB BLD-MCNC: 10.5 G/DL (ref 13.3–17.7)
HOLD SPECIMEN: NORMAL
HOLD SPECIMEN: NORMAL
LVEF ECHO: NORMAL
MCH RBC QN AUTO: 28.7 PG (ref 26.5–33)
MCHC RBC AUTO-ENTMCNC: 31 G/DL (ref 31.5–36.5)
MCV RBC AUTO: 93 FL (ref 78–100)
NT-PROBNP SERPL-MCNC: 8376 PG/ML (ref 0–1800)
PLATELET # BLD AUTO: 264 10E3/UL (ref 150–450)
POTASSIUM SERPL-SCNC: 5.2 MMOL/L (ref 3.4–5.3)
RBC # BLD AUTO: 3.66 10E6/UL (ref 4.4–5.9)
SODIUM SERPL-SCNC: 138 MMOL/L (ref 135–145)
TROPONIN T SERPL HS-MCNC: 424 NG/L
TROPONIN T SERPL HS-MCNC: 521 NG/L
TROPONIN T SERPL HS-MCNC: 534 NG/L
WBC # BLD AUTO: 15.8 10E3/UL (ref 4–11)

## 2025-03-27 PROCEDURE — 93306 TTE W/DOPPLER COMPLETE: CPT

## 2025-03-27 PROCEDURE — 93306 TTE W/DOPPLER COMPLETE: CPT | Mod: 26 | Performed by: INTERNAL MEDICINE

## 2025-03-27 PROCEDURE — 250N000011 HC RX IP 250 OP 636: Performed by: INTERNAL MEDICINE

## 2025-03-27 PROCEDURE — 36415 COLL VENOUS BLD VENIPUNCTURE: CPT | Performed by: INTERNAL MEDICINE

## 2025-03-27 PROCEDURE — 250N000013 HC RX MED GY IP 250 OP 250 PS 637: Performed by: INTERNAL MEDICINE

## 2025-03-27 PROCEDURE — 83880 ASSAY OF NATRIURETIC PEPTIDE: CPT | Performed by: INTERNAL MEDICINE

## 2025-03-27 PROCEDURE — 84484 ASSAY OF TROPONIN QUANT: CPT | Performed by: INTERNAL MEDICINE

## 2025-03-27 PROCEDURE — 94660 CPAP INITIATION&MGMT: CPT

## 2025-03-27 PROCEDURE — 250N000012 HC RX MED GY IP 250 OP 636 PS 637: Performed by: INTERNAL MEDICINE

## 2025-03-27 PROCEDURE — 250N000009 HC RX 250: Performed by: INTERNAL MEDICINE

## 2025-03-27 PROCEDURE — 85027 COMPLETE CBC AUTOMATED: CPT | Performed by: INTERNAL MEDICINE

## 2025-03-27 PROCEDURE — 94640 AIRWAY INHALATION TREATMENT: CPT | Mod: 76

## 2025-03-27 PROCEDURE — 80048 BASIC METABOLIC PNL TOTAL CA: CPT | Performed by: INTERNAL MEDICINE

## 2025-03-27 PROCEDURE — 120N000004 HC R&B MS OVERFLOW

## 2025-03-27 PROCEDURE — 71250 CT THORAX DX C-: CPT

## 2025-03-27 PROCEDURE — 999N000157 HC STATISTIC RCP TIME EA 10 MIN

## 2025-03-27 PROCEDURE — 99233 SBSQ HOSP IP/OBS HIGH 50: CPT | Performed by: INTERNAL MEDICINE

## 2025-03-27 RX ORDER — ONDANSETRON 4 MG/1
4 TABLET, FILM COATED ORAL 3 TIMES DAILY
Status: DISCONTINUED | OUTPATIENT
Start: 2025-03-27 | End: 2025-03-29

## 2025-03-27 RX ORDER — FUROSEMIDE 10 MG/ML
20 INJECTION INTRAMUSCULAR; INTRAVENOUS EVERY 8 HOURS
Status: DISCONTINUED | OUTPATIENT
Start: 2025-03-27 | End: 2025-03-27

## 2025-03-27 RX ORDER — METHYLPREDNISOLONE SODIUM SUCCINATE 40 MG/ML
40 INJECTION INTRAMUSCULAR; INTRAVENOUS EVERY 12 HOURS
Status: DISCONTINUED | OUTPATIENT
Start: 2025-03-27 | End: 2025-03-29

## 2025-03-27 RX ORDER — FUROSEMIDE 10 MG/ML
40 INJECTION INTRAMUSCULAR; INTRAVENOUS EVERY 8 HOURS
Status: DISCONTINUED | OUTPATIENT
Start: 2025-03-27 | End: 2025-03-28

## 2025-03-27 RX ORDER — FUROSEMIDE 10 MG/ML
40 INJECTION INTRAMUSCULAR; INTRAVENOUS ONCE
Status: COMPLETED | OUTPATIENT
Start: 2025-03-27 | End: 2025-03-27

## 2025-03-27 RX ORDER — DONEPEZIL HYDROCHLORIDE 5 MG/1
5 TABLET, FILM COATED ORAL AT BEDTIME
Status: DISCONTINUED | OUTPATIENT
Start: 2025-03-27 | End: 2025-03-27

## 2025-03-27 RX ADMIN — ASPIRIN 81 MG: 81 TABLET, COATED ORAL at 09:07

## 2025-03-27 RX ADMIN — FUROSEMIDE 40 MG: 10 INJECTION, SOLUTION INTRAVENOUS at 23:02

## 2025-03-27 RX ADMIN — QUETIAPINE FUMARATE 12.5 MG: 25 TABLET ORAL at 22:00

## 2025-03-27 RX ADMIN — CEFTRIAXONE 1 G: 1 INJECTION, POWDER, FOR SOLUTION INTRAMUSCULAR; INTRAVENOUS at 22:16

## 2025-03-27 RX ADMIN — METHYLPREDNISOLONE SODIUM SUCCINATE 40 MG: 40 INJECTION INTRAMUSCULAR; INTRAVENOUS at 23:02

## 2025-03-27 RX ADMIN — OXYCODONE HYDROCHLORIDE AND ACETAMINOPHEN 500 MG: 500 TABLET ORAL at 09:07

## 2025-03-27 RX ADMIN — SIMVASTATIN 20 MG: 20 TABLET, FILM COATED ORAL at 21:59

## 2025-03-27 RX ADMIN — ENOXAPARIN SODIUM 30 MG: 30 INJECTION SUBCUTANEOUS at 09:07

## 2025-03-27 RX ADMIN — METHYLPREDNISOLONE SODIUM SUCCINATE 40 MG: 40 INJECTION INTRAMUSCULAR; INTRAVENOUS at 11:58

## 2025-03-27 RX ADMIN — ONDANSETRON HYDROCHLORIDE 4 MG: 4 TABLET, FILM COATED ORAL at 22:00

## 2025-03-27 RX ADMIN — INSULIN GLARGINE 10 UNITS: 100 INJECTION, SOLUTION SUBCUTANEOUS at 23:02

## 2025-03-27 RX ADMIN — FUROSEMIDE 40 MG: 10 INJECTION, SOLUTION INTRAVENOUS at 11:58

## 2025-03-27 RX ADMIN — IPRATROPIUM BROMIDE AND ALBUTEROL SULFATE 3 ML: .5; 3 SOLUTION RESPIRATORY (INHALATION) at 17:27

## 2025-03-27 RX ADMIN — FUROSEMIDE 40 MG: 10 INJECTION, SOLUTION INTRAVENOUS at 16:12

## 2025-03-27 RX ADMIN — IPRATROPIUM BROMIDE AND ALBUTEROL SULFATE 3 ML: .5; 3 SOLUTION RESPIRATORY (INHALATION) at 07:41

## 2025-03-27 RX ADMIN — AZITHROMYCIN 250 MG: 250 TABLET, FILM COATED ORAL at 09:07

## 2025-03-27 RX ADMIN — SERTRALINE HYDROCHLORIDE 25 MG: 25 TABLET ORAL at 09:07

## 2025-03-27 RX ADMIN — DONEPEZIL HYDROCHLORIDE 5 MG: 5 TABLET ORAL at 21:59

## 2025-03-27 ASSESSMENT — ACTIVITIES OF DAILY LIVING (ADL)
ADLS_ACUITY_SCORE: 54
ADLS_ACUITY_SCORE: 54
ADLS_ACUITY_SCORE: 58
ADLS_ACUITY_SCORE: 54
ADLS_ACUITY_SCORE: 58
ADLS_ACUITY_SCORE: 54

## 2025-03-27 NOTE — PLAN OF CARE
"ICU End of Shift Summary.  For vital signs and complete assessments, please see documentation flowsheets.     Pertinent assessments: Patient alert and oriented to self. Restless at times but directable.  PIV- SL. Incontinent of urine. No BM. Tolerating regular diet. Congested cough- prn neb given. Up with x1 with walker and gb. Up in chair multiple times today.   Major Shift Events: echo done, CT chest done, IV lasix started on pt   Plan (Upcoming Events): IV lasix, SW following, PT consult, tele monitoring   Discharge/Transfer Needs: transfer orders are in-pending bed availability     Bedside Shift Report Completed : y   Bedside Safety Check Completed: y      Problem: Adult Inpatient Plan of Care  Goal: Plan of Care Review  Description: The Plan of Care Review/Shift note should be completed every shift.  The Outcome Evaluation is a brief statement about your assessment that the patient is improving, declining, or no change.  This information will be displayed automatically on your shiftnote.  Outcome: Progressing  Flowsheets (Taken 3/27/2025 1652)  Outcome Evaluation: Pt needed 02 briefly before IV lasix. CT chest done. IV lasix started. Ambulating as x1 with walker and gb. Up in chair multiple times today. Prn neb give- congested cough. Echo done  Plan of Care Reviewed With: patient  Overall Patient Progress: no change  Goal: Patient-Specific Goal (Individualized)  Description: You can add care plan individualizations to a care plan. Examples of Individualization might be:  \"Parent requests to be called daily at 9am for status\", \"I have a hard time hearing out of my right ear\", or \"Do not touch me to wake me up as it startlesme\".  Outcome: Progressing  Goal: Absence of Hospital-Acquired Illness or Injury  Outcome: Progressing  Intervention: Identify and Manage Fall Risk  Recent Flowsheet Documentation  Taken 3/27/2025 1622 by Meenu Dutta RN  Safety Promotion/Fall Prevention:   activity supervised   " assistive device/personal items within reach   clutter free environment maintained   increased rounding and observation   increase visualization of patient   nonskid shoes/slippers when out of bed   patient and family education   room near nurse's station   safety round/check completed   room organization consistent   supervised activity   treat reversible contributory factors   treat underlying cause  Taken 3/27/2025 0855 by Meenu Dutta RN  Safety Promotion/Fall Prevention:   activity supervised   assistive device/personal items within reach   clutter free environment maintained   increased rounding and observation   increase visualization of patient   nonskid shoes/slippers when out of bed   patient and family education   room near nurse's station   safety round/check completed   room organization consistent   supervised activity   treat reversible contributory factors   treat underlying cause  Intervention: Prevent Skin Injury  Recent Flowsheet Documentation  Taken 3/27/2025 1622 by Meenu Dutta RN  Skin Protection:   adhesive use limited   incontinence pads utilized   tubing/devices free from skin contact   pulse oximeter probe site changed  Taken 3/27/2025 1600 by Meenu Dutta RN  Body Position:   position changed independently   supine  Taken 3/27/2025 0855 by Meenu Dutta RN  Skin Protection:   adhesive use limited   incontinence pads utilized   tubing/devices free from skin contact   pulse oximeter probe site changed  Taken 3/27/2025 0800 by Meenu Dutta RN  Body Position: position changed independently  Intervention: Prevent and Manage VTE (Venous Thromboembolism) Risk  Recent Flowsheet Documentation  Taken 3/27/2025 1622 by Meenu Dutta RN  VTE Prevention/Management: (on sq lovenox) SCDs off (sequential compression devices)  Taken 3/27/2025 0855 by Meenu Dutta RN  VTE Prevention/Management: (on sq lovenox) SCDs off (sequential compression  devices)  Intervention: Prevent Infection  Recent Flowsheet Documentation  Taken 3/27/2025 1622 by Meenu Dutta RN  Infection Prevention:   equipment surfaces disinfected   hand hygiene promoted   rest/sleep promoted   single patient room provided  Taken 3/27/2025 0855 by Meenu Dutta RN  Infection Prevention:   equipment surfaces disinfected   hand hygiene promoted   rest/sleep promoted   single patient room provided  Goal: Optimal Comfort and Wellbeing  Outcome: Progressing  Intervention: Provide Person-Centered Care  Recent Flowsheet Documentation  Taken 3/27/2025 1622 by Meenu Dutta RN  Trust Relationship/Rapport:   care explained   choices provided   emotional support provided   empathic listening provided   questions answered   questions encouraged   reassurance provided   thoughts/feelings acknowledged  Taken 3/27/2025 0855 by Meenu Dutta RN  Trust Relationship/Rapport:   care explained   choices provided   emotional support provided   empathic listening provided   questions answered   questions encouraged   reassurance provided   thoughts/feelings acknowledged  Goal: Readiness for Transition of Care  Outcome: Progressing   Goal Outcome Evaluation:      Plan of Care Reviewed With: patient    Overall Patient Progress: no changeOverall Patient Progress: no change    Outcome Evaluation: Pt needed 02 briefly before IV lasix. CT chest done. IV lasix started. Ambulating as x1 with walker and gb. Up in chair multiple times today. Prn neb give- congested cough. Echo done

## 2025-03-27 NOTE — PROGRESS NOTES
I was paged a little while ago regarding a troponin lab that came back high at 254 although this was an add-on from 5 AM labs that was just never done.  Troponin 6 hours before that was 96.  He is here with respiratory failure on BiPAP along with pneumonia and sepsis which could explain the elevated troponin in this patient with renal failure and other comorbidities.  I repeated a troponin T to trend until plateau which is rising to 534.  Ordered a TTE.  Will repeat troponin T in the morning.

## 2025-03-27 NOTE — CONSULTS
"SPIRITUAL HEALTH SERVICES - Consult Note  RH ICU    Referral Source: [facility] consult to assess spiritual and emotional needs because patient or his family responded \"Yes\" to the question in the admission assessment, \"Do you have any spiritual or Hindu beliefs that will affect your care?\"      Pt Robi's spouse Sharon was present.  Introduced them to [facility].  Robi's narrative was often confused.  Sharon shared that their two daughters are core to their support network.  Robi is Pentecostalism and is affiliated with [employer] [facility], in [place].  Sharon welcomed my offer to contact them and to ask them to add Robi to their prayer list.  Robi welcomed my offer to pray for him but declined prayer in the moment.  Sharon reported that she is coping well with Robi's hospitalization.  She shared that they have been  for 68 years and reminisced briefly that they met in college.    Plan: Informed pt and his spouse how they can request further  support.  This author and other chaplains remain available per pt/family request.     Jerman Schneider M.Div., [facility]  Staff     [facility]S available 24/7 for emergent requests/referrals, either by paging the on-call  or by entering an ASAP/STAT consult in [facility], which will also page the on-call .   "

## 2025-03-27 NOTE — PLAN OF CARE
"Goal Outcome Evaluation:      Plan of Care Reviewed With: patient    Overall Patient Progress: no changeOverall Patient Progress: no change    Outcome Evaluation: Patient went on bipap early on in night due to increased WOB, tachycardia, increased wheezing and diaphoretic. Calmed down after being placed on bipap and slept most of the night after. Removed bipap this morning to room air. Ambulated to bathroom x 2 with A1 with walker and gaitbelt.  Incontinent x 1.  LS wheezy. Afebrile overnight. Confused. Critical trop back tonight, recheck this morning pending.      Problem: Adult Inpatient Plan of Care  Goal: Plan of Care Review  Description: The Plan of Care Review/Shift note should be completed every shift.  The Outcome Evaluation is a brief statement about your assessment that the patient is improving, declining, or no change.  This information will be displayed automatically on your shiftnote.  Outcome: Not Progressing  Flowsheets (Taken 3/27/2025 0630)  Outcome Evaluation: Patient went on bipap early on in night due to increased WOB, tachycardia, increased wheezing and diaphoretic. Calmed down after being placed on bipap and slept most of the night after. Removed bipap this morning to room air. Ambulated to bathroom x 2 with A1 with walker and gaitbelt.  Incontinent x 1.  LS wheezy. Afebrile overnight. Confused. Critical trop back tonight, recheck this morning pending.  Plan of Care Reviewed With: patient  Overall Patient Progress: no change  Goal: Patient-Specific Goal (Individualized)  Description: You can add care plan individualizations to a care plan. Examples of Individualization might be:  \"Parent requests to be called daily at 9am for status\", \"I have a hard time hearing out of my right ear\", or \"Do not touch me to wake me up as it startlesme\".  Outcome: Not Progressing  Goal: Absence of Hospital-Acquired Illness or Injury  Outcome: Not Progressing  Intervention: Identify and Manage Fall Risk  Recent " Flowsheet Documentation  Taken 3/27/2025 0328 by Michelle Joe RN  Safety Promotion/Fall Prevention:   clutter free environment maintained   increased rounding and observation   increase visualization of patient   lighting adjusted   safety round/check completed  Taken 3/26/2025 1945 by Michelle Joe RN  Safety Promotion/Fall Prevention:   clutter free environment maintained   increased rounding and observation   increase visualization of patient   lighting adjusted   safety round/check completed  Intervention: Prevent Skin Injury  Recent Flowsheet Documentation  Taken 3/27/2025 0328 by Michelle Joe RN  Body Position: position changed independently  Skin Protection: adhesive use limited  Taken 3/27/2025 0111 by Michelle Joe RN  Body Position: position changed independently  Taken 3/26/2025 1945 by Michelle Joe RN  Body Position: position changed independently  Skin Protection: adhesive use limited  Intervention: Prevent and Manage VTE (Venous Thromboembolism) Risk  Recent Flowsheet Documentation  Taken 3/27/2025 0328 by Michelle Joe RN  VTE Prevention/Management: SCDs off (sequential compression devices)  Taken 3/26/2025 1945 by Michelle Joe RN  VTE Prevention/Management: SCDs off (sequential compression devices)  Intervention: Prevent Infection  Recent Flowsheet Documentation  Taken 3/27/2025 0328 by Michelle Joe RN  Infection Prevention:   single patient room provided   rest/sleep promoted  Taken 3/26/2025 1945 by Michelle Joe RN  Infection Prevention:   single patient room provided   rest/sleep promoted  Goal: Optimal Comfort and Wellbeing  Outcome: Not Progressing  Intervention: Provide Person-Centered Care  Recent Flowsheet Documentation  Taken 3/27/2025 0328 by Michelle Joe RN  Trust Relationship/Rapport: care explained  Taken 3/26/2025 1945 by Michelle Joe RN  Trust Relationship/Rapport: care  explained  Goal: Readiness for Transition of Care  Outcome: Not Progressing     Problem: Delirium  Goal: Optimal Coping  Outcome: Not Progressing  Intervention: Optimize Psychosocial Adjustment to Delirium  Recent Flowsheet Documentation  Taken 3/27/2025 0328 by Michelle Joe RN  Supportive Measures:   relaxation techniques promoted   verbalization of feelings encouraged  Family/Support System Care: presence promoted  Taken 3/26/2025 1945 by Michelle Joe RN  Supportive Measures:   relaxation techniques promoted   verbalization of feelings encouraged  Family/Support System Care: presence promoted  Goal: Improved Behavioral Control  Outcome: Not Progressing  Intervention: Prevent and Manage Agitation  Recent Flowsheet Documentation  Taken 3/27/2025 0328 by Michelle Joe RN  Environment Familiarity/Consistency: familiar objects from home provided  Taken 3/26/2025 1945 by Michelle Joe RN  Environment Familiarity/Consistency: familiar objects from home provided  Intervention: Minimize Safety Risk  Recent Flowsheet Documentation  Taken 3/27/2025 0328 by Michelle Joe RN  Enhanced Safety Measures: room near unit station  Trust Relationship/Rapport: care explained  Taken 3/26/2025 1945 by Michelle Joe RN  Enhanced Safety Measures: room near unit station  Trust Relationship/Rapport: care explained  Goal: Improved Attention and Thought Clarity  Outcome: Not Progressing  Intervention: Maximize Cognitive Function  Recent Flowsheet Documentation  Taken 3/27/2025 0328 by Michelle Joe RN  Sensory Stimulation Regulation:   lighting decreased   care clustered  Reorientation Measures:   clock in view   hearing device use encouraged   reorientation provided  Taken 3/26/2025 1945 by Michelle Joe RN  Sensory Stimulation Regulation:   lighting decreased   care clustered  Reorientation Measures:   clock in view   hearing device use encouraged   reorientation  provided  Goal: Improved Sleep  Outcome: Not Progressing     Problem: Gas Exchange Impaired  Goal: Optimal Gas Exchange  Outcome: Not Progressing  Intervention: Optimize Oxygenation and Ventilation  Recent Flowsheet Documentation  Taken 3/27/2025 0328 by Michelle Joe RN  Airway/Ventilation Management:   airway patency maintained   pulmonary hygiene promoted  Head of Bed (HOB) Positioning: HOB at 15 degrees  Taken 3/26/2025 1945 by Michelle Joe, RN  Airway/Ventilation Management:   airway patency maintained   pulmonary hygiene promoted  Head of Bed (HOB) Positioning: HOB at 20 degrees     Problem: Comorbidity Management  Goal: Blood Glucose Levels Within Targeted Range  Outcome: Not Progressing  Intervention: Monitor and Manage Glycemia  Recent Flowsheet Documentation  Taken 3/27/2025 0328 by Michelle Joe RN  Medication Review/Management: medications reviewed  Taken 3/26/2025 1945 by Michelle Joe RN  Medication Review/Management: medications reviewed  Goal: Blood Pressure in Desired Range  Outcome: Not Progressing  Intervention: Maintain Blood Pressure Management  Recent Flowsheet Documentation  Taken 3/27/2025 0328 by Michelle Joe RN  Medication Review/Management: medications reviewed  Taken 3/26/2025 1945 by Michelle Joe RN  Medication Review/Management: medications reviewed

## 2025-03-27 NOTE — CONSULTS
Care Management Follow Up    Length of Stay (days): 1    Expected Discharge Date: 03/28/2025     Concerns to be Addressed: discharge planning     Patient plan of care discussed at interdisciplinary rounds: Yes    Anticipated Discharge Disposition: Assisted Living, Home Care     Anticipated Discharge Services: PCA     Education Provided on the Discharge Plan:    Patient/Family in Agreement with the Plan: yes    Discussed  Partnership in Safe Discharge Planning  document with patient/family: No     Handoff Completed: No, handoff not indicated or clinically appropriate    Additional Information:  CM/SW aware of consult ordered 3/27/2025 for elevated unplanned readmission risk score of 22%. Please see CM/SW note on 3/26/2025 for initial assessment/consult note.    Next Steps: Continue to follow for discharge planning.    Mary Santiago RN   Inpatient Care Coordination  [facility]   Phone: [phone]

## 2025-03-27 NOTE — PROGRESS NOTES
A BiPAP of  14/6 @ 25% was applied to the pt via the mask for an increase in WOB and SOB.  The skin in contact with the mask and straps looks good and intact. Pt is tolerating it well. RT will continue to monitor and assess the pt's respiratory status and needs.    Santiago Rhoades, RT, RT  3/27/2025 3:04 AM

## 2025-03-27 NOTE — PROGRESS NOTES
Came and saw the patient  He is on BIPAP 16/6 with 25% FIO2 saturating 98%. HR 80  Comfortable. States BIPAP made him feel better

## 2025-03-27 NOTE — PROVIDER NOTIFICATION
Paged Dr. Gilbert: Pt became anxious, increased wheezing than previously, tachycardic, clammy and increased WOB. Had been on RA, placed 2L on with no improvement.  BP WDL. Placed on bipap.    Updated page: After a while patient calmed down, no longer clammy or tachycardic and now resting with eyes closed, remains on bipap.

## 2025-03-27 NOTE — PROGRESS NOTES
[facility]  Hospitalist Progress Note  Varun Abdalla M.D., M.B.A.   03/27/2025    Reason for Stay/active problem list      Acute hypercapnic and suspected hypoxic respiratory failure   Acute respiratory acidosis          Assessment and Plan:        Summary of Stay: Reid Jimenes is a 89 year old male with a history of  htn/hlp/T2dm, no CAD by cath 2021, hx of Aortic Stenosis s/p TAVR, hx of CHB s/p PPM, CKD  3a with baseline creat 1.3-1.4 range, dementia, lumbar stenosis admitted on 3/25/2025 with SOB with increased work of breathing     He's been dealing with a junky but nonproductive cough for the past week or so, his wife has had issues with the same.  No known fevers or antecedent URI sx.  No n/v.  He denies CP and SOB to me and is on a bipap machine at the time.  He was seen in the ER for same the day pta and diagnosed with a viral LRI and discharged with azith and steroids burst.  called EMS and he apparently had WOB that was impressive enough that he was put on CPAP     ER VS he is afebrile, mild tachycardia and BPs in the 's over 50's  BMP with ROSANNE bun/creat 50/2.07  bicarb is 19 and AG is 18 lactate 8.0 (!)-> 3.9 VBG 7.17/61 -> 7.23/57   BNP 5342, trop 96, EKG I think it's sinus with 1*AVB and LBBB  CBC leukocytosis 16.9 with neutrophilia and lymphopenia      CXR with ? LLL infiltrate        Problem List with Assessment and Plan:    Acute hypercapnic and hypoxic respiratory failure - multifactorial - CAP , ?CHF , Reactive airway disease   Acute respiratory acidosis     --Seen in ED with similar sx yesterday but not as severe.    - treated with BiPAP with significant improvement in WOB  -currently okay but he required BIPAP overnight      LLL CAP   Wheezing   Sepsis elevated lactate, leukocytosis, tachycardia   Junky cough but not productive  - treated with nebs , abx   - still wheezy - will start IV lasix . Add steroid , continue oxygen  CT pending         Hyperglycemia  "  T2dm   Looks to be on metformin pta which will be on hold in light of elevated lactate   - on sliding scale   -- BG 'S , will schedule 10 units of lantus at bedtime ,continue ssi        Htn,hlp  Hx Aortic Stenosis  s/p TAVR  Hx of CHB s/p PPM   Home med continued -ASA      Elevated troponin - likely demand ischemia   -- 254>>534>>521>>424  --echo pending   Dementia   Acute encephalopathy - septic ,  Cont with donepezil 10 mg at bedtime  - treat delirium as needed       VTE Prophylaxis: Enoxaparin (Lovenox) SQ  Code Status: No CPR- Pre-arrest intubation OK  Diet: Regular Diet Adult    Johnson Catheter: Not present          Plan for today:  Lasix   Steroid   Nebs   Echo       Family updated today:   Yes  ,update discussed with multiple family     Anticipated Disposition : home        Medically Ready for Discharge: Anticipated in 2-4 Days- 1-2d  : Pending progress               Interval History (Subjective):        Patient is seen and examined by me today and medical record reviewed.Overnight events noted and care discussed with nursing staff.confused this morning   Has no chest pain   Needed BIPAP last night                   Physical Exam:        Last Vital Signs:  BP (!) 145/72 (BP Location: Left arm)   Pulse 74   Temp 98.2  F (36.8  C) (Oral)   Resp (!) 31   Ht 1.619 m (5' 3.75\")   Wt 77 kg (169 lb 12.1 oz)   SpO2 92%   BMI 29.37 kg/m      I/O last 3 completed shifts:  In: 720 [P.O.:720]  Out: 50 [Urine:50]    Wt Readings from Last 5 Encounters:   03/27/25 77 kg (169 lb 12.1 oz)   09/23/24 69.8 kg (153 lb 12.8 oz)   09/03/24 69.9 kg (154 lb)   07/03/24 68.9 kg (152 lb)   05/16/24 68.5 kg (151 lb)        Constitutional: Awake, alert, cooperative, no apparent distress     Respiratory: No increased WOB , diffuse wheezing    Cardiovascular: Regular rate and rhythm, normal S1 and S2, and no murmur noted   Abdomen: Normal bowel sounds, soft, non-distended, non-tender   Skin: No new rashes, no cyanosis, dry " "to touch   Neuro: Alert with  no new focal weakness   Extremities: No edema   Other(s):        All other systems: Negative          Medications:        All current medications were reviewed with changes reflected in problem list.         Data:      All new lab and imaging data was reviewed.      Data reviewed today: I reviewed all new labs and imaging results over the last 24 hours. I personally reviewed       Recent Labs   Lab 03/27/25  0633 03/26/25  0532 03/25/25  2226   WBC 15.8* 18.1* 16.9*   HGB 10.5* 9.7* 10.8*   HCT 33.9* 31.2* 35.5*   MCV 93 93 94    232 278     No results for input(s): \"CULT\" in the last 168 hours.  Recent Labs   Lab 03/27/25  1214 03/27/25  0751 03/27/25  0633 03/26/25  0610 03/26/25  0532 03/26/25  0116 03/25/25  2226   NA  --   --  138  --  142  --  136   POTASSIUM  --   --  5.2  --  4.8  --  4.7   CHLORIDE  --   --  103  --  106  --  99   CO2  --   --  22  --  23  --  19*   ANIONGAP  --   --  13  --  13  --  18*   * 201* 190*   < > 381*   < > 458*   BUN  --   --  44.5*  --  51.1*  --  47.9*   CR  --   --  1.60*  --  1.84*  --  2.07*   GFRESTIMATED  --   --  40*  --  34*  --  30*   DEEPTI  --   --  8.8  --  8.5*  --  9.2   MAG  --   --   --   --  2.5*  --   --     < > = values in this interval not displayed.       Recent Labs   Lab 03/27/25  1214 03/27/25  0751 03/27/25  0633 03/27/25  0305 03/26/25 2025   * 201* 190* 166* 177*       No results for input(s): \"INR\" in the last 168 hours.      No results for input(s): \"TROPONIN\", \"TROPI\", \"TROPR\" in the last 168 hours.    Invalid input(s): \"TROP\", \"TROPONINIES\"    Recent Results (from the past 48 hours)   XR Chest Port 1 View    Narrative    EXAM: XR CHEST PORT 1 VIEW  LOCATION: [facility]  DATE: 3/25/2025    INDICATION: shortness of breath  COMPARISON: 03/24/2025      Impression    IMPRESSION: Cardiomediastinal silhouette. Cardiac leads. TAVR. Mild elevation left hemidiaphragm. Interstitial " prominence. Mild venous congestion not excluded. Fibroatelectasis or mild infiltrate left lung base. Atherosclerotic aorta.       COVID Status:  COVID-19 PCR Results          3/24/2025    10:55   COVID-19 PCR Results   SARS CoV2 PCR Negative      COVID-19 Antibody Results, Testing for Immunity           No data to display                 Disclaimer: This note consists of symbols derived from keyboarding, dictation and/or voice recognition software. As a result, there may be errors in the script that have gone undetected. Please consider this when interpreting information found in this chart.

## 2025-03-28 ENCOUNTER — APPOINTMENT (OUTPATIENT)
Dept: PHYSICAL THERAPY | Facility: CLINIC | Age: OVER 89
End: 2025-03-28
Attending: INTERNAL MEDICINE
Payer: MEDICARE

## 2025-03-28 ENCOUNTER — APPOINTMENT (OUTPATIENT)
Dept: ULTRASOUND IMAGING | Facility: CLINIC | Age: OVER 89
End: 2025-03-28
Attending: INTERNAL MEDICINE
Payer: MEDICARE

## 2025-03-28 VITALS
DIASTOLIC BLOOD PRESSURE: 71 MMHG | OXYGEN SATURATION: 93 % | HEIGHT: 64 IN | HEART RATE: 70 BPM | RESPIRATION RATE: 25 BRPM | BODY MASS INDEX: 28.98 KG/M2 | WEIGHT: 169.75 LBS | SYSTOLIC BLOOD PRESSURE: 156 MMHG | TEMPERATURE: 98 F

## 2025-03-28 LAB
ANION GAP SERPL CALCULATED.3IONS-SCNC: 11 MMOL/L (ref 7–15)
ANION GAP SERPL CALCULATED.3IONS-SCNC: 12 MMOL/L (ref 7–15)
BUN SERPL-MCNC: 46 MG/DL (ref 8–23)
BUN SERPL-MCNC: 47.1 MG/DL (ref 8–23)
CALCIUM SERPL-MCNC: 9 MG/DL (ref 8.8–10.4)
CALCIUM SERPL-MCNC: 9.1 MG/DL (ref 8.8–10.4)
CHLORIDE SERPL-SCNC: 100 MMOL/L (ref 98–107)
CHLORIDE SERPL-SCNC: 99 MMOL/L (ref 98–107)
CREAT SERPL-MCNC: 1.65 MG/DL (ref 0.67–1.17)
CREAT SERPL-MCNC: 1.77 MG/DL (ref 0.67–1.17)
EGFRCR SERPLBLD CKD-EPI 2021: 36 ML/MIN/1.73M2
EGFRCR SERPLBLD CKD-EPI 2021: 39 ML/MIN/1.73M2
ERYTHROCYTE [DISTWIDTH] IN BLOOD BY AUTOMATED COUNT: 12.3 % (ref 10–15)
GLUCOSE BLDC GLUCOMTR-MCNC: 208 MG/DL (ref 70–99)
GLUCOSE BLDC GLUCOMTR-MCNC: 211 MG/DL (ref 70–99)
GLUCOSE BLDC GLUCOMTR-MCNC: 242 MG/DL (ref 70–99)
GLUCOSE BLDC GLUCOMTR-MCNC: 250 MG/DL (ref 70–99)
GLUCOSE BLDC GLUCOMTR-MCNC: 429 MG/DL (ref 70–99)
GLUCOSE SERPL-MCNC: 266 MG/DL (ref 70–99)
GLUCOSE SERPL-MCNC: 371 MG/DL (ref 70–99)
HCO3 SERPL-SCNC: 28 MMOL/L (ref 22–29)
HCO3 SERPL-SCNC: 30 MMOL/L (ref 22–29)
HCT VFR BLD AUTO: 32.4 % (ref 40–53)
HGB BLD-MCNC: 10.5 G/DL (ref 13.3–17.7)
MCH RBC QN AUTO: 29 PG (ref 26.5–33)
MCHC RBC AUTO-ENTMCNC: 32.4 G/DL (ref 31.5–36.5)
MCV RBC AUTO: 90 FL (ref 78–100)
PLATELET # BLD AUTO: 270 10E3/UL (ref 150–450)
POTASSIUM SERPL-SCNC: 5.1 MMOL/L (ref 3.4–5.3)
POTASSIUM SERPL-SCNC: 5.5 MMOL/L (ref 3.4–5.3)
RBC # BLD AUTO: 3.62 10E6/UL (ref 4.4–5.9)
SODIUM SERPL-SCNC: 140 MMOL/L (ref 135–145)
SODIUM SERPL-SCNC: 140 MMOL/L (ref 135–145)
WBC # BLD AUTO: 16.2 10E3/UL (ref 4–11)

## 2025-03-28 PROCEDURE — 99222 1ST HOSP IP/OBS MODERATE 55: CPT | Performed by: INTERNAL MEDICINE

## 2025-03-28 PROCEDURE — 82310 ASSAY OF CALCIUM: CPT | Performed by: INTERNAL MEDICINE

## 2025-03-28 PROCEDURE — 97530 THERAPEUTIC ACTIVITIES: CPT | Mod: GP | Performed by: PHYSICAL THERAPIST

## 2025-03-28 PROCEDURE — 97116 GAIT TRAINING THERAPY: CPT | Mod: GP | Performed by: PHYSICAL THERAPIST

## 2025-03-28 PROCEDURE — 76705 ECHO EXAM OF ABDOMEN: CPT

## 2025-03-28 PROCEDURE — 250N000011 HC RX IP 250 OP 636: Mod: JZ | Performed by: INTERNAL MEDICINE

## 2025-03-28 PROCEDURE — 97161 PT EVAL LOW COMPLEX 20 MIN: CPT | Mod: GP | Performed by: PHYSICAL THERAPIST

## 2025-03-28 PROCEDURE — 120N000001 HC R&B MED SURG/OB

## 2025-03-28 PROCEDURE — 36415 COLL VENOUS BLD VENIPUNCTURE: CPT | Performed by: INTERNAL MEDICINE

## 2025-03-28 PROCEDURE — 250N000013 HC RX MED GY IP 250 OP 250 PS 637: Performed by: INTERNAL MEDICINE

## 2025-03-28 PROCEDURE — 99233 SBSQ HOSP IP/OBS HIGH 50: CPT | Performed by: INTERNAL MEDICINE

## 2025-03-28 PROCEDURE — 250N000013 HC RX MED GY IP 250 OP 250 PS 637: Performed by: NURSE PRACTITIONER

## 2025-03-28 PROCEDURE — 85018 HEMOGLOBIN: CPT | Performed by: INTERNAL MEDICINE

## 2025-03-28 PROCEDURE — 80048 BASIC METABOLIC PNL TOTAL CA: CPT | Performed by: INTERNAL MEDICINE

## 2025-03-28 PROCEDURE — 250N000011 HC RX IP 250 OP 636: Performed by: INTERNAL MEDICINE

## 2025-03-28 RX ORDER — CARVEDILOL 3.12 MG/1
3.12 TABLET ORAL 2 TIMES DAILY WITH MEALS
Status: DISCONTINUED | OUTPATIENT
Start: 2025-03-28 | End: 2025-03-31 | Stop reason: HOSPADM

## 2025-03-28 RX ORDER — FUROSEMIDE 40 MG/1
40 TABLET ORAL DAILY
Status: DISCONTINUED | OUTPATIENT
Start: 2025-03-28 | End: 2025-03-31 | Stop reason: HOSPADM

## 2025-03-28 RX ADMIN — METHYLPREDNISOLONE SODIUM SUCCINATE 40 MG: 40 INJECTION INTRAMUSCULAR; INTRAVENOUS at 12:10

## 2025-03-28 RX ADMIN — QUETIAPINE FUMARATE 12.5 MG: 25 TABLET ORAL at 18:24

## 2025-03-28 RX ADMIN — ASPIRIN 81 MG: 81 TABLET, COATED ORAL at 12:06

## 2025-03-28 RX ADMIN — SERTRALINE HYDROCHLORIDE 25 MG: 25 TABLET ORAL at 12:08

## 2025-03-28 RX ADMIN — DONEPEZIL HYDROCHLORIDE 5 MG: 5 TABLET ORAL at 21:06

## 2025-03-28 RX ADMIN — ONDANSETRON HYDROCHLORIDE 4 MG: 4 TABLET, FILM COATED ORAL at 21:06

## 2025-03-28 RX ADMIN — SIMVASTATIN 20 MG: 20 TABLET, FILM COATED ORAL at 21:06

## 2025-03-28 RX ADMIN — CARVEDILOL 3.12 MG: 3.12 TABLET, FILM COATED ORAL at 15:37

## 2025-03-28 RX ADMIN — POLYETHYLENE GLYCOL 3350 17 G: 17 POWDER, FOR SOLUTION ORAL at 12:10

## 2025-03-28 RX ADMIN — CEFTRIAXONE 1 G: 1 INJECTION, POWDER, FOR SOLUTION INTRAMUSCULAR; INTRAVENOUS at 21:06

## 2025-03-28 RX ADMIN — AZITHROMYCIN 250 MG: 250 TABLET, FILM COATED ORAL at 12:07

## 2025-03-28 RX ADMIN — ONDANSETRON HYDROCHLORIDE 4 MG: 4 TABLET, FILM COATED ORAL at 16:17

## 2025-03-28 RX ADMIN — ENOXAPARIN SODIUM 30 MG: 30 INJECTION SUBCUTANEOUS at 12:10

## 2025-03-28 RX ADMIN — FUROSEMIDE 40 MG: 40 TABLET ORAL at 12:17

## 2025-03-28 RX ADMIN — OXYCODONE HYDROCHLORIDE AND ACETAMINOPHEN 500 MG: 500 TABLET ORAL at 12:09

## 2025-03-28 RX ADMIN — ONDANSETRON HYDROCHLORIDE 4 MG: 4 TABLET, FILM COATED ORAL at 12:08

## 2025-03-28 ASSESSMENT — ACTIVITIES OF DAILY LIVING (ADL)
ADLS_ACUITY_SCORE: 58
ADLS_ACUITY_SCORE: 57
ADLS_ACUITY_SCORE: 59
ADLS_ACUITY_SCORE: 58
ADLS_ACUITY_SCORE: 59
ADLS_ACUITY_SCORE: 57
ADLS_ACUITY_SCORE: 59
ADLS_ACUITY_SCORE: 57
ADLS_ACUITY_SCORE: 59
ADLS_ACUITY_SCORE: 58
ADLS_ACUITY_SCORE: 54
ADLS_ACUITY_SCORE: 57
ADLS_ACUITY_SCORE: 59
ADLS_ACUITY_SCORE: 57
ADLS_ACUITY_SCORE: 58
ADLS_ACUITY_SCORE: 58
ADLS_ACUITY_SCORE: 59
ADLS_ACUITY_SCORE: 58
ADLS_ACUITY_SCORE: 57
ADLS_ACUITY_SCORE: 59

## 2025-03-28 NOTE — PLAN OF CARE
"Only oriented to self. Confused. Incontinent/has brief on. Reg diet. BG ac/hs. Carb count. Ax1 walker/GB. On 2L NC. Tele disc. Cards/PT social. Plan to discharge back to assisted living memory care when he can wean off O2 and be stable on RA.               Goal Outcome Evaluation:      Plan of Care Reviewed With: patient          Outcome Evaluation: On 2L NC. Confused.      Problem: Adult Inpatient Plan of Care  Goal: Plan of Care Review  Description: The Plan of Care Review/Shift note should be completed every shift.  The Outcome Evaluation is a brief statement about your assessment that the patient is improving, declining, or no change.  This information will be displayed automatically on your shiftnote.  Outcome: Progressing  Flowsheets (Taken 3/28/2025 1449)  Outcome Evaluation: On 2L NC. Confused.  Plan of Care Reviewed With: patient  Goal: Patient-Specific Goal (Individualized)  Description: You can add care plan individualizations to a care plan. Examples of Individualization might be:  \"Parent requests to be called daily at 9am for status\", \"I have a hard time hearing out of my right ear\", or \"Do not touch me to wake me up as it startlesme\".  Outcome: Progressing  Goal: Absence of Hospital-Acquired Illness or Injury  Outcome: Progressing  Intervention: Identify and Manage Fall Risk  Recent Flowsheet Documentation  Taken 3/28/2025 1149 by Unruly Pompa RN  Safety Promotion/Fall Prevention: safety round/check completed  Intervention: Prevent Skin Injury  Recent Flowsheet Documentation  Taken 3/28/2025 1149 by Unruly Pompa RN  Body Position:   position changed independently   supine  Intervention: Prevent and Manage VTE (Venous Thromboembolism) Risk  Recent Flowsheet Documentation  Taken 3/28/2025 1149 by Unruly Pompa RN  VTE Prevention/Management: SCDs off (sequential compression devices)  Intervention: Prevent Infection  Recent Flowsheet Documentation  Taken 3/28/2025 1149 by Unruly Pompa" ILA RN  Infection Prevention:   hand hygiene promoted   rest/sleep promoted   single patient room provided  Goal: Optimal Comfort and Wellbeing  Outcome: Progressing  Goal: Readiness for Transition of Care  Outcome: Progressing     Problem: Delirium  Goal: Optimal Coping  Outcome: Progressing  Goal: Improved Behavioral Control  Outcome: Progressing  Intervention: Minimize Safety Risk  Recent Flowsheet Documentation  Taken 3/28/2025 1149 by Unruly Pompa RN  Enhanced Safety Measures: review medications for side effects with activity  Goal: Improved Attention and Thought Clarity  Outcome: Progressing  Goal: Improved Sleep  Outcome: Progressing     Problem: Gas Exchange Impaired  Goal: Optimal Gas Exchange  Outcome: Progressing     Problem: Comorbidity Management  Goal: Blood Glucose Levels Within Targeted Range  Outcome: Progressing  Intervention: Monitor and Manage Glycemia  Recent Flowsheet Documentation  Taken 3/28/2025 1149 by Unruly Pompa RN  Medication Review/Management: medications reviewed  Goal: Blood Pressure in Desired Range  Outcome: Progressing  Intervention: Maintain Blood Pressure Management  Recent Flowsheet Documentation  Taken 3/28/2025 1149 by Unruly Pompa RN  Medication Review/Management: medications reviewed

## 2025-03-28 NOTE — PROGRESS NOTES
[facility]  Hospitalist Progress Note  Varun Abdalla M.D., M.B.A.   03/28/2025    Reason for Stay/active problem list      Acute hypercapnic and suspected hypoxic respiratory failure, community-acquired pneumonia         Assessment and Plan:        Summary of Stay: Reid Jimenes is a 89 year old male with a history of  htn/hlp/T2dm, no CAD by cath 2021, hx of Aortic Stenosis s/p TAVR, hx of CHB s/p PPM, CKD  3a with baseline creat 1.3-1.4 range, dementia, lumbar stenosis admitted on 3/25/2025 with SOB with increased work of breathing     He's been dealing with a junky but nonproductive cough for the past week or so, his wife has had issues with the same.  No known fevers or antecedent URI sx.  No n/v.  He denies CP and SOB to me and is on a bipap machine at the time.  He was seen in the ER for same the day pta and diagnosed with a viral LRI and discharged with azith and steroids burst.  called EMS and he apparently had WOB that was impressive enough that he was put on CPAP     ER VS he is afebrile, mild tachycardia and BPs in the 's over 50's  BMP with ROSANNE bun/creat 50/2.07  bicarb is 19 and AG is 18 lactate 8.0 (!)-> 3.9 VBG 7.17/61 -> 7.23/57   BNP 5342, trop 96, EKG I think it's sinus with 1*AVB and LBBB  CBC leukocytosis 16.9 with neutrophilia and lymphopenia      CXR with ? LLL infiltrate        Problem List with Assessment and Plan:    Acute hypercapnic and hypoxic respiratory failure - multifactorial - CAP , ?CHF , Reactive airway disease   Acute respiratory acidosis   - treated with BiPAP with significant improvement in WOB  -Patient condition improved and he was transferred out of ICU.  --Currently his breathing is much better.  He is on nasal supplemental oxygen couple liters.  Continue supplemental oxygen as needed and may wean out of as able.  Treat pneumonia and wheezing as below     LLL CAP   Wheezing   Sepsis elevated lactate, leukocytosis, tachycardia   Junky cough but  not productive  - treated with nebs , abx   -Patient continues to have significant wheezing and has been coughing and hypoxic.  CT of the chest was obtained which showed right upper lobe pneumonia, subpleural and interstitial opacities mostly notable in the lingula and left lower lobe which may represent scarring, atelectasis, edema and/or pneumonitis.  Small volume bilateral pleural effusion left greater than right was also noted.  -- Patient was started on steroids Solu-Medrol, continued with nebs, supplemental oxygen.  Has been on oral Lasix       Hyperglycemia   T2dm   Looks to be on metformin pta which will be on hold in light of elevated lactate   -Has been on sliding scale.  Blood sugar got worse after initiation of Solu-Medrol.  2 units of Lantus was added.  Blood sugar remained elevated, will titrate to 15 units of Lantus at bedtime, sliding scale insulin with aspart.  Continue to monitor.       Htn,hlp  Hx Aortic Stenosis  s/p TAVR  Hx of CHB s/p PPM   Home med continued -ASA      Elevated troponin - likely demand ischemia   -- 254>>534>>521>>424    --echo showed bioprosthetic aortic valve TAVR in place EF 35 to 40% which is significant decrease from most recent study.  Findings discussed with family and cardiology consulted.  Cardiology recommended repeat echo in 2 to 3 months and follow-up with cardiology as an outpatient.  Cardiomyopathy-No indication of acute heart failure with reduced ejection fraction       Dementia   Acute encephalopathy - septic ,  Cont with donepezil 10 mg at bedtime  - treat delirium as needed         VTE Prophylaxis: Enoxaparin (Lovenox) SQ  Code Status: No CPR- Pre-arrest intubation OK  Diet: Regular Diet Adult    Johnson Catheter: Not present            Family updated today:   Yes  ,update discussed with daughter at bedside    Anticipated Disposition : Memory care unit       Medically Ready for Discharge: Anticipated in 2-4 Days likely Monday  : Pending progress                "Interval History (Subjective):        Patient is seen and examined by me today and medical record reviewed.Overnight events noted and care discussed with nursing staff.patient is feeling much better today.  He is eating pancakes.  He denies any chest pain or shortness of breath.  He requires 2 L of supplemental oxygen.  His daughter was at bedside.  Care plan discussed with bedside nurse.  Echocardiogram result was reviewed.                    Physical Exam:        Last Vital Signs:  BP (!) 156/66 (BP Location: Right arm)   Pulse 72   Temp 98  F (36.7  C) (Oral)   Resp 22   Ht 1.619 m (5' 3.75\")   Wt 77 kg (169 lb 12.1 oz)   SpO2 100%   BMI 29.37 kg/m      I/O last 3 completed shifts:  In: 789 [P.O.:680; I.V.:100; IV Piggyback:9]  Out: -     Wt Readings from Last 5 Encounters:   03/27/25 77 kg (169 lb 12.1 oz)   09/23/24 69.8 kg (153 lb 12.8 oz)   09/03/24 69.9 kg (154 lb)   07/03/24 68.9 kg (152 lb)   05/16/24 68.5 kg (151 lb)        Constitutional: Awake, alert, cooperative, no apparent distress     Respiratory: No increased WOB , diffuse wheezing    Cardiovascular: Regular rate and rhythm, normal S1 and S2, and no murmur noted   Abdomen: Normal bowel sounds, soft, non-distended, non-tender   Skin: No new rashes, no cyanosis, dry to touch   Neuro: Alert with  no new focal weakness   Extremities: No edema   Other(s):        All other systems: Negative          Medications:        All current medications were reviewed with changes reflected in problem list.         Data:      All new lab and imaging data was reviewed.      Data reviewed today: I reviewed all new labs and imaging results over the last 24 hours. I personally reviewed       Recent Labs   Lab 03/28/25  0801 03/27/25  0633 03/26/25  0532   WBC 16.2* 15.8* 18.1*   HGB 10.5* 10.5* 9.7*   HCT 32.4* 33.9* 31.2*   MCV 90 93 93    264 232     No results for input(s): \"CULT\" in the last 168 hours.  Recent Labs   Lab 03/28/25  1355 03/28/25  1000 " "03/28/25  0814 03/28/25  0801 03/27/25  0751 03/27/25  0633 03/26/25  0610 03/26/25  0532     --   --  140  --  138  --  142   POTASSIUM 5.1  --   --  5.5*  --  5.2  --  4.8   CHLORIDE 100  --   --  99  --  103  --  106   CO2 28  --   --  30*  --  22  --  23   ANIONGAP 12  --   --  11  --  13  --  13   * 250* 242* 266*   < > 190*   < > 381*   BUN 47.1*  --   --  46.0*  --  44.5*  --  51.1*   CR 1.77*  --   --  1.65*  --  1.60*  --  1.84*   GFRESTIMATED 36*  --   --  39*  --  40*  --  34*   DEEPTI 9.0  --   --  9.1  --  8.8  --  8.5*   MAG  --   --   --   --   --   --   --  2.5*    < > = values in this interval not displayed.       Recent Labs   Lab 03/28/25  1355 03/28/25  1000 03/28/25  0814 03/28/25  0801 03/28/25  0207   * 250* 242* 266* 208*       No results for input(s): \"INR\" in the last 168 hours.      No results for input(s): \"TROPONIN\", \"TROPI\", \"TROPR\" in the last 168 hours.    Invalid input(s): \"TROP\", \"TROPONINIES\"    Recent Results (from the past 48 hours)   XR Chest Port 1 View    Narrative    EXAM: XR CHEST PORT 1 VIEW  LOCATION: [facility]  DATE: 3/25/2025    INDICATION: shortness of breath  COMPARISON: 03/24/2025      Impression    IMPRESSION: Cardiomediastinal silhouette. Cardiac leads. TAVR. Mild elevation left hemidiaphragm. Interstitial prominence. Mild venous congestion not excluded. Fibroatelectasis or mild infiltrate left lung base. Atherosclerotic aorta.       COVID Status:  COVID-19 PCR Results          3/24/2025    10:55   COVID-19 PCR Results   SARS CoV2 PCR Negative      COVID-19 Antibody Results, Testing for Immunity           No data to display                 Disclaimer: This note consists of symbols derived from keyboarding, dictation and/or voice recognition software. As a result, there may be errors in the script that have gone undetected. Please consider this when interpreting information found in this chart.    "

## 2025-03-28 NOTE — CONSULTS
[facility]    Cardiology Consultation     Date of Admission:  3/25/2025    Assessment & Plan   Reid Jimenes is a 89 year old male who was admitted on 3/25/2025 with worsening dyspnea. He carries a PMH significant for severe aortic stenosis status post TAVR in 2018, hypertension, hyperlipidemia, left bundle branch block, AV block status post pacemaker, pulmonary hypertension, type 2 diabetes, CKD ( baseline creatinine 1.3-1.4) and moderate nonobstructive CAD.    Acute hypercapnic and hypoxic respiratory failure/pneumonia  -Chest CT shows right upper lobe pneumonia  -IV Rocephin and azithromycin, prednisone, Duonebs    2.  HFrEF/cardiomyopathy/pulmonary hypertension  -NT proBNP 5342 on admission, increased to 8376   -Echocardiogram yesterday showed a moderately reduced ejection fraction estimated at 35 to 40% (55 to 60% on previous study 9/2024).  Mild to moderate mitral and tricuspid regurgitation.  RV systolic pressure is elevated, consistent with moderate to severe pulmonary hypertension.   -40 mg IV Lasix x 2 doses  -Down~4 kg since admission.  Difficult to assess due to inaccurate I/Os, ?  Weights.  Verbal dry weight 175.     3.  NSTEMI -likely demand ischemia in the setting of #1 and #2  - HS troponin peaked at 534, now downtrending.  -Denies chest pain  -Coronary angiogram in 2021 showed CAD with no focal stenosis.  -PTA aspirin    4.  Severe aortic stenosis -status post TAVR in 2018 (26 mm Greene S3 valve)   -AoV pressure gradient of 13 mmHg, aortic valve area of 1.5 cm .     5.  AV Block/ LBBB s/p pacemaker implant   -Device interrogation (12/31) showed 55% atrial pacing and 43% ventricular pacing.  No atrial or ventricular arrhythmias noted.    6.  ROSANNE -creatinine 2.07 on admission, downtrending to 1.65 (1.3-1.4 baseline)  7. Type 2 Diabetes  8.  Dementia   - PTA aricept       Plan   EF 35-40%, NT pro BNP 8376. Wt 169 lb, down 8 lbs since admission ( dry weight 175). Coarse breath  sounds bilaterally. Start daily lasix PO 40 mg. Per GDMT, start low dose carvedilol 3.125 mg BID. Further uptitration if BP and renal function allow.   Troponin elevation, likely demand ischemia. Coronary angiogram in 2021 showed mild non obstructive CAD. Denies chest pain. Consider outpatient ischemic evaluation.   Continue aspirin and statin.   4.  Daily weights, strict I/O's, low sodium diet.       High complexity     SARY Ball CNP, MD    Primary Care Physician   [facility]    Reason for Consult   Reason for consult: I was asked by hospital team to evaluate this patient for acute heart failure exacerbation and NSTEMI.    History of Present Illness   Reid Jimenes is a 89 year old male who presented to the ER on 3/25/2025 with progressive shortness of breath and cough x 1-2 weeks. He carries a PMH significant for severe aortic stenosis status post TAVR in 2018, hypertension, hyperlipidemia, left bundle branch block, AV block status post pacemaker, pulmonary hypertension, type 2 diabetes, CKD ( baseline creatinine 1.3-1.4) and moderate nonobstructive CAD.    His hospital workup consisted of a chest CT showing right upper lobe pneumonia.  Started on IV Rocephin and azythromycin.  NT proBNP was elevated at 5342, initial troponin 96 and peaked at 534, creatinine 2.07, BUN 47.9.  WBC elevated at 16.9, hemoglobin 10.8, hematocrit 35.5 and platelet 274.  Echocardiogram yesterday showed a reduced ejection fraction estimated at 35 to 40%,(previous 55 to 60%, 9/2024).  Was mild to moderate mitral and tricuspid regurgitation.  RV systolic pressure was elevated consistent with moderate to severe pulmonary hypertension.  For this reason, cardiology was consulted for further management.    Upon my visit, patient's daughter is present at bedside.  Patient is up in the chair on 2 L NC.  He is a very poor historian from patient's daughter.  He tells me his breathing is unchanged since admission.  He  denies chest pain, palpitations, PND, orthopnea, presyncope lower extremity edema.  Lungs are coarse w/ crackles to bilateral bases, heart rate and rhythm regular, audible systolic murmur, mild JVD elevated extremity edema. He has a pacemaker.  Blood pressure is elevated at 156/66.  He has been given 3 doses of IV Lasix, down approximately 9 pounds.  Renal function is improving with a creatinine of 1.65, BUN 46, 40 potassium 5.5.     Past Medical History   Past Medical History:   Diagnosis Date    Aortic valve stenosis, nonrheumatic     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: 26mm Edward Sabino 3 valve    Coronary artery disease     cardiac cath 7/24/18: mild non-obstructive disease    Dementia (H)     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    DDD PM 6/10/2021    Hyperlipidaemia LDL goal < 100     Hypertension     Need for SBE (subacute bacterial endocarditis) prophylaxis     Pulmonary hypertension (H)     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    status post TAVR using a 26 mm Greene S3 valve in 2018    Spinal stenosis     Type 2 diabetes mellitus (H)          Past Surgical History   Past Surgical History:   Procedure Laterality Date    CV HEART CATHETERIZATION WITH POSSIBLE INTERVENTION N/A 6/9/2021    Procedure: coronary angiogram;  Surgeon: Jayesh Rachel MD;  Location:  HEART CARDIAC CATH LAB    CV TEMPORARY PACEMAKER INSERTION N/A 6/9/2021    Procedure: Temporary Pacemaker Insertion;  Surgeon: Jayesh Rachel MD;  Location:  HEART CARDIAC CATH LAB    EP PACEMAKER N/A 6/10/2021    Procedure: EP Pacemaker;  Surgeon: Magdiel Silver MD;  Location:  HEART CARDIAC CATH LAB    MANDIBLE SURGERY  1958    OTHER SURGICAL HISTORY      cardiac cath 7/24/18: mild non-obstructive disease    TRANSCATHETER AORTIC VALVE IMPLANT ANESTHESIA N/A 8/28/2018    Procedure: TRANSCATHETER AORTIC VALVE IMPLANT ANESTHESIA;  ANESTHESIA NEEDED FOR TAVR PROCEDURE;  Surgeon: GENERIC  ANESTHESIA PROVIDER;  Location:  OR,  26mm Bradley Gallo 3 valve         Prior to Admission Medications   Prior to Admission Medications   Prescriptions Last Dose Informant Patient Reported? Taking?   Multiple Vitamins-Iron (MULTIVITAMIN/IRON PO)  Self Yes Yes   Sig: Take 1 tablet by mouth daily. Certavite senior*   QUEtiapine (SEROQUEL) 25 MG tablet   Yes Yes   Sig: Take 12.5 mg by mouth at bedtime. Dose = 1/2 tab = 12.5mg   QUEtiapine (SEROQUEL) 25 MG tablet   Yes Yes   Sig: Take 12.5 mg by mouth 2 times daily as needed (anxiety or severe agitation). Dose = 1/2 tab = 12.5mg   acetaminophen (TYLENOL) 325 MG tablet   No Yes   Sig: TAKE 2 TABLETS BY MOUTH THREE TIMES DAILY . DO NOT EXCEED 3000 MG OF ACETAMINOPHEN PER 24 HOURS   albuterol (PROAIR HFA/PROVENTIL HFA/VENTOLIN HFA) 108 (90 Base) MCG/ACT inhaler   No Yes   Sig: Inhale 2 puffs into the lungs every 6 hours as needed for shortness of breath, wheezing or cough.   ascorbic acid 500 MG TABS  Self Yes Yes   Sig: Take 500 mg by mouth daily.   aspirin 81 MG EC tablet   No Yes   Sig: Take 1 tablet (81 mg) by mouth daily   azithromycin (ZITHROMAX) 250 MG tablet   No Yes   Sig: Take 2 tablets (500 mg) by mouth daily for 1 day, THEN 1 tablet (250 mg) daily for 4 days.   donepezil (ARICEPT) 5 MG tablet   No Yes   Sig: Take 1 tablet (5 mg) by mouth at bedtime   donepezil (ARICEPT) 5 MG tablet   Yes Yes   Sig: Take 5 mg by mouth at bedtime.   fluocinonide (LIDEX) 0.05 % external ointment   No Yes   Sig: Apply topically 2 times daily.   guaiFENesin (MUCINEX) 600 MG 12 hr tablet   Yes Yes   Sig: Take 1,200 mg by mouth 2 times daily as needed for congestion.   metFORMIN (GLUCOPHAGE XR) 500 MG 24 hr tablet Not Taking  No No   Sig: TAKE 1 TABLET BY MOUTH TWICE DAILY WITH MEALS   Patient not taking: Reported on 3/26/2025   ondansetron (ZOFRAN) 4 MG tablet   Yes Yes   Sig: Take 4 mg by mouth 3 times daily. 3/20/25 TID x7 days.   predniSONE (DELTASONE) 20 MG tablet   No Yes    Sig: Take 1 tablet (20 mg) by mouth daily.   sertraline (ZOLOFT) 25 MG tablet   No Yes   Sig: Take 1 tablet by mouth once daily   simvastatin (ZOCOR) 20 MG tablet   No Yes   Sig: TAKE 1 TABLET BY MOUTH AT BEDTIME   traZODone (DESYREL) 50 MG tablet   No Yes   Sig: TAKE 1 TABLET BY MOUTH NIGHTLY AS NEEDED FOR SLEEP      Facility-Administered Medications Last Administration Doses Remaining   iohexol (OMNIPAQUE) 300 mg/mL injection 10 mL None recorded 1        Current Facility-Administered Medications   Medication Dose Route Frequency Provider Last Rate Last Admin    acetaminophen (TYLENOL) tablet 650 mg  650 mg Oral Q4H PRN Kiya Steven MD        Or    acetaminophen (TYLENOL) Suppository 650 mg  650 mg Rectal Q4H PRN Kiya Steven MD        albuterol (PROVENTIL HFA/VENTOLIN HFA) inhaler  2 puff Inhalation Q6H PRN Varun Abdalla MD        artificial saliva (BIOTENE MT) solution 1 spray  1 spray Mouth/Throat 4x Daily PRN Kiya Steven MD        aspirin EC tablet 81 mg  81 mg Oral Daily Varun Abdalla MD   81 mg at 03/28/25 1206    azithromycin (ZITHROMAX) tablet 250 mg  250 mg Oral Daily Kiya Steven MD   250 mg at 03/28/25 1207    benzocaine-menthol (CHLORASEPTIC) 6-10 MG lozenge 1 lozenge  1 lozenge Buccal Q1H PRN Kiya Steven MD        calcium carbonate (TUMS) chewable tablet 1,000 mg  1,000 mg Oral BID PRN Kiya Steven MD        carvedilol (COREG) tablet 3.125 mg  3.125 mg Oral BID w/meals Lizz Love APRN CNP        cefTRIAXone (ROCEPHIN) 1 g vial to attach to  mL bag for ADULTS or NS 50 mL bag for PEDS  1 g Intravenous Q24H Kiya Steven MD   1 g at 03/27/25 2216    glucose gel 15-30 g  15-30 g Oral Q15 Min PRN Varun Abdalla MD        Or    dextrose 50 % injection 25-50 mL  25-50 mL Intravenous Q15 Min PRN Varun Abdalla MD        Or    glucagon injection 1 mg  1 mg Subcutaneous Q15 Min PRN Varun Abdalla MD        donepezil (ARICEPT) tablet 5 mg  5 mg Oral At Bedtime  Varun Abdalla MD   5 mg at 03/27/25 2159    enoxaparin ANTICOAGULANT (LOVENOX) injection 30 mg  30 mg Subcutaneous Q24H Kiya Steven MD   30 mg at 03/28/25 1210    furosemide (LASIX) tablet 40 mg  40 mg Oral Daily Lizz Love, APRN CNP        insulin aspart (NovoLOG) injection (RAPID ACTING)  1-7 Units Subcutaneous TID AC Varun Abdalla MD   3 Units at 03/28/25 1020    insulin aspart (NovoLOG) injection (RAPID ACTING)  1-5 Units Subcutaneous At Bedtime Varun Abdalla MD   4 Units at 03/27/25 2204    insulin glargine (LANTUS PEN) injection 10 Units  10 Units Subcutaneous At Bedtime Varun Abdalla MD   10 Units at 03/27/25 2302    ipratropium - albuterol 0.5 mg/2.5 mg/3 mL (DUONEB) neb solution 3 mL  3 mL Nebulization Q4H PRN Varun Abdalla MD   3 mL at 03/27/25 1727    lidocaine (LMX4) cream   Topical Q1H PRN Kiya Steven MD        lidocaine 1 % 0.1-1 mL  0.1-1 mL Other Q1H PRN Kiya Steven MD        menthol-zinc oxide (CALMOSEPTINE) 0.44-20.6 % ointment OINT   Topical 4x Daily PRN Kiya Steven MD        methylPREDNISolone sodium succinate (SOLU-MEDROL) injection 40 mg  40 mg Intravenous Q12H Varun Abdalla MD   40 mg at 03/28/25 1210    miconazole (MICATIN) 2 % cream   Topical BID PRN Kiya Steven MD        miconazole (MICATIN) 2 % powder   Topical BID PRN Kiya Steven MD        ondansetron (ZOFRAN ODT) ODT tab 4 mg  4 mg Oral Q6H PRN Kiya Steven MD        Or    ondansetron (ZOFRAN) injection 4 mg  4 mg Intravenous Q6H PRN Kiya Steven MD        ondansetron (ZOFRAN) tablet 4 mg  4 mg Oral TID Varun Abdalla MD   4 mg at 03/28/25 1208    polyethylene glycol (MIRALAX) Packet 17 g  17 g Oral Daily Kiya Steven MD   17 g at 03/28/25 1210    QUEtiapine (SEROquel) half-tab 12.5 mg  12.5 mg Oral At Bedtime Varun Abdalla MD   12.5 mg at 03/27/25 2200    QUEtiapine (SEROquel) half-tab 12.5 mg  12.5 mg Oral BID PRN Varun Abdalla MD        senna-docusate  (SENOKOT-S/PERICOLACE) 8.6-50 MG per tablet 1 tablet  1 tablet Oral BID PRN Kiya Steven MD        Or    senna-docusate (SENOKOT-S/PERICOLACE) 8.6-50 MG per tablet 2 tablet  2 tablet Oral BID PRN Kiya Steven MD        sertraline (ZOLOFT) tablet 25 mg  25 mg Oral Daily Varun Abdalla MD   25 mg at 03/28/25 1208    simvastatin (ZOCOR) tablet 20 mg  20 mg Oral At Bedtime Varun Abdalla MD   20 mg at 03/27/25 2159    sodium chloride (PF) 0.9% PF flush 3 mL  3 mL Intracatheter Q8H Kiya Steven MD   3 mL at 03/27/25 2217    sodium chloride (PF) 0.9% PF flush 3 mL  3 mL Intracatheter q1 min prn Kiya Steven MD        sodium chloride (PF) 0.9% PF flush 3 mL  3 mL Intracatheter q1 min prn Kiya Steven MD        traZODone (DESYREL) tablet 50 mg  50 mg Oral At Bedtime PRN Varun Abdalla MD   50 mg at 03/26/25 2021    vitamin C (ASCORBIC ACID) tablet 500 mg  500 mg Oral Daily Varun Abdalla MD   500 mg at 03/28/25 1209     Current Facility-Administered Medications   Medication Dose Route Frequency Provider Last Rate Last Admin    acetaminophen (TYLENOL) tablet 650 mg  650 mg Oral Q4H PRN Kiya Steven MD        Or    acetaminophen (TYLENOL) Suppository 650 mg  650 mg Rectal Q4H PRN Kiya Steven MD        albuterol (PROVENTIL HFA/VENTOLIN HFA) inhaler  2 puff Inhalation Q6H PRN Varun Abdalla MD        artificial saliva (BIOTENE MT) solution 1 spray  1 spray Mouth/Throat 4x Daily PRN Kiya Steven MD        aspirin EC tablet 81 mg  81 mg Oral Daily Varun Abdalla MD   81 mg at 03/28/25 1206    azithromycin (ZITHROMAX) tablet 250 mg  250 mg Oral Daily Kiya Steven MD   250 mg at 03/28/25 1207    benzocaine-menthol (CHLORASEPTIC) 6-10 MG lozenge 1 lozenge  1 lozenge Buccal Q1H PRN Kiya Steven MD        calcium carbonate (TUMS) chewable tablet 1,000 mg  1,000 mg Oral BID PRN Kiya Steven MD        carvedilol (COREG) tablet 3.125 mg  3.125 mg Oral BID w/meals Lizz Love, APRN CNP         cefTRIAXone (ROCEPHIN) 1 g vial to attach to  mL bag for ADULTS or NS 50 mL bag for PEDS  1 g Intravenous Q24H Kiya Steven MD   1 g at 03/27/25 2216    glucose gel 15-30 g  15-30 g Oral Q15 Min PRN Varun Abdalla MD        Or    dextrose 50 % injection 25-50 mL  25-50 mL Intravenous Q15 Min PRN Varun Abdalla MD        Or    glucagon injection 1 mg  1 mg Subcutaneous Q15 Min PRN Varun Abdalla MD        donepezil (ARICEPT) tablet 5 mg  5 mg Oral At Bedtime Varun Abdalla MD   5 mg at 03/27/25 2159    enoxaparin ANTICOAGULANT (LOVENOX) injection 30 mg  30 mg Subcutaneous Q24H Kiya Steven MD   30 mg at 03/28/25 1210    furosemide (LASIX) tablet 40 mg  40 mg Oral Daily Lizz Love, APRN CNP        insulin aspart (NovoLOG) injection (RAPID ACTING)  1-7 Units Subcutaneous TID AC Varun Abdalla MD   3 Units at 03/28/25 1020    insulin aspart (NovoLOG) injection (RAPID ACTING)  1-5 Units Subcutaneous At Bedtime Varun Abdalla MD   4 Units at 03/27/25 2204    insulin glargine (LANTUS PEN) injection 10 Units  10 Units Subcutaneous At Bedtime Varun Abdalla MD   10 Units at 03/27/25 2302    ipratropium - albuterol 0.5 mg/2.5 mg/3 mL (DUONEB) neb solution 3 mL  3 mL Nebulization Q4H PRN Varun Abdalla MD   3 mL at 03/27/25 1727    lidocaine (LMX4) cream   Topical Q1H PRN Kiya Steven MD        lidocaine 1 % 0.1-1 mL  0.1-1 mL Other Q1H PRN Kiya Steven MD        menthol-zinc oxide (CALMOSEPTINE) 0.44-20.6 % ointment OINT   Topical 4x Daily PRN Kiya Steven MD        methylPREDNISolone sodium succinate (SOLU-MEDROL) injection 40 mg  40 mg Intravenous Q12H Varun Abdalla MD   40 mg at 03/28/25 1210    miconazole (MICATIN) 2 % cream   Topical BID PRN Kiya Steven MD        miconazole (MICATIN) 2 % powder   Topical BID PRN Kiya Steven MD        ondansetron (ZOFRAN ODT) ODT tab 4 mg  4 mg Oral Q6H PRN Kiya Steven MD        Or    ondansetron (ZOFRAN) injection 4  mg  4 mg Intravenous Q6H PRN Kiya Steven MD        ondansetron (ZOFRAN) tablet 4 mg  4 mg Oral TID Varun Abdalla MD   4 mg at 03/28/25 1208    polyethylene glycol (MIRALAX) Packet 17 g  17 g Oral Daily Kiya Steven MD   17 g at 03/28/25 1210    QUEtiapine (SEROquel) half-tab 12.5 mg  12.5 mg Oral At Bedtime Varun Abdalla MD   12.5 mg at 03/27/25 2200    QUEtiapine (SEROquel) half-tab 12.5 mg  12.5 mg Oral BID PRN Varun Abdalla MD senna-docusate (SENOKOT-S/PERICOLACE) 8.6-50 MG per tablet 1 tablet  1 tablet Oral BID PRN Kiya Steven MD        Or    senna-docusate (SENOKOT-S/PERICOLACE) 8.6-50 MG per tablet 2 tablet  2 tablet Oral BID PRN Kiya Steven MD        sertraline (ZOLOFT) tablet 25 mg  25 mg Oral Daily Varun Abdalla MD   25 mg at 03/28/25 1208    simvastatin (ZOCOR) tablet 20 mg  20 mg Oral At Bedtime Varun Abdalla MD   20 mg at 03/27/25 2159    sodium chloride (PF) 0.9% PF flush 3 mL  3 mL Intracatheter Q8H Kiya Steven MD   3 mL at 03/27/25 2217    sodium chloride (PF) 0.9% PF flush 3 mL  3 mL Intracatheter q1 min prn Kiya Steven MD        sodium chloride (PF) 0.9% PF flush 3 mL  3 mL Intracatheter q1 min prn Kiya Steven MD        traZODone (DESYREL) tablet 50 mg  50 mg Oral At Bedtime PRN Varun Abdalla MD   50 mg at 03/26/25 2021    vitamin C (ASCORBIC ACID) tablet 500 mg  500 mg Oral Daily Varun Abdalla MD   500 mg at 03/28/25 1209     Allergies   Allergies   Allergen Reactions    Fentanyl Other (See Comments)     Confusion and agitation after PPM    Versed [Midazolam] Other (See Comments)     Confusion and agitation post PPM       Social History    reports that he quit smoking about 44 years ago. His smoking use included cigarettes. He started smoking about 69 years ago. He has a 30 pack-year smoking history. He quit smokeless tobacco use about 42 years ago.  His smokeless tobacco use included chew. He reports that he does not drink alcohol and  "does not use drugs.      Family History   I have reviewed this patient's family history and updated it with pertinent information if needed.  Family History   Problem Relation Age of Onset    Cancer Mother 58         in her 50's, had throat and stomach cancer    Alcohol/Drug Mother     Heart Disease Father          about age 49    Alcohol/Drug Father     Myocardial Infarction Sister 70        Two heart attacks    Alcohol/Drug Sister     Cerebrovascular Disease Brother 65         age 65 of stroke    Alcohol/Drug Brother     Cancer Maternal Grandmother         stomach cancer    Alcohol/Drug Maternal Grandmother     Alcohol/Drug Maternal Grandfather     Alcohol/Drug Paternal Grandmother     Alcohol/Drug Paternal Grandfather     Myocardial Infarction Grandchild 18        Grandson    Myocardial Infarction Other 18        Nephew           Review of Systems   A comprehensive review of system was performed and is negative other than that noted in the HPI or here.     Physical Exam   Vital Signs with Ranges  Temp:  [98  F (36.7  C)-98.3  F (36.8  C)] 98  F (36.7  C)  Pulse:  [66-84] 72  Resp:  [15-31] 22  BP: (138-160)/(64-71) 156/66  SpO2:  [88 %-100 %] 100 %  Wt Readings from Last 4 Encounters:   25 77 kg (169 lb 12.1 oz)   24 69.8 kg (153 lb 12.8 oz)   24 69.9 kg (154 lb)   24 68.9 kg (152 lb)     I/O last 3 completed shifts:  In: 789 [P.O.:680; I.V.:100; IV Piggyback:9]  Out: -       Vitals: BP (!) 156/66 (BP Location: Right arm)   Pulse 72   Temp 98  F (36.7  C) (Oral)   Resp 22   Ht 1.619 m (5' 3.75\")   Wt 77 kg (169 lb 12.1 oz)   SpO2 100%   BMI 29.37 kg/m      Physical Exam:   General - Alert and oriented to time place and person in no acute distress  Eyes - No scleral icterus  HEENT - Neck supple, moist mucous membranes. Elevated JVD  Cardiovascular - Regular rate and rhythm, systolic murmru   Extremities - There is no LE edema  Respiratory - coarse breath sounds with " "bilateral LL crackles.   Skin - No pallor or cyanosis  Psych - Appropriate affect   Neurological - No gross motor neurological focal deficits    Recent Labs   Lab 25  1000 25  0814 25  0801 25  0751 25  0633 25  0610 25  0532   WBC  --   --  16.2*  --  15.8*  --  18.1*   HGB  --   --  10.5*  --  10.5*  --  9.7*   MCV  --   --  90  --  93  --  93   PLT  --   --  270  --  264  --  232   NA  --   --  140  --  138  --  142   POTASSIUM  --   --  5.5*  --  5.2  --  4.8   CHLORIDE  --   --  99  --  103  --  106   CO2  --   --  30*  --  22  --  23   BUN  --   --  46.0*  --  44.5*  --  51.1*   CR  --   --  1.65*  --  1.60*  --  1.84*   GFRESTIMATED  --   --  39*  --  40*  --  34*   ANIONGAP  --   --  11  --  13  --  13   DEEPTI  --   --  9.1  --  8.8  --  8.5*   * 242* 266*   < > 190*   < > 381*    < > = values in this interval not displayed.     Recent Labs   Lab Test 24  0823 01/10/23  0819   CHOL 112 105   HDL 49 54   LDL 38 33   TRIG 126 92     Recent Labs   Lab 25  0801 25  0633 25  0532   WBC 16.2* 15.8* 18.1*   HGB 10.5* 10.5* 9.7*   HCT 32.4* 33.9* 31.2*   MCV 90 93 93    264 232     Recent Labs   Lab 25  0532 25  0340 25  0027   PHV 7.28* 7.28* 7.23*   PO2V 43 48* 30   PCO2V 54* 50 57*   HCO3V  24     Recent Labs   Lab 25  1109 25  2226   NTBNPI 8,376* 5,342*     No results for input(s): \"DD\" in the last 168 hours.  No results for input(s): \"SED\", \"CRP\" in the last 168 hours.  Recent Labs   Lab 25  0801 25  0633 25  0532    264 232       Imaging:  Recent Results (from the past 48 hours)   Echocardiogram Complete   Result Value    LVEF  35-40%    Narrative    [id number]  [id number]  [id number]  [id number]^KATE^JORY^[facility]  Echocardiography Laboratory  [address] [place] [address]     Name: FIONASARAH STOCKTON DIXON  MRN: [id number]  : " 02/14/1934  Study Date: 03/27/2025 10:47 AM  Age: 89 yrs  Gender: Male  Patient Location: [facility]  Reason For Study: Dyspnea  Ordering Physician: JORY LOVE  Performed By: Iron Yanez RDCS     BSA: 1.8 m2  Height: 63 in  Weight: 178 lb  HR: 80  BP: 122/64 mmHg  ______________________________________________________________________________  Procedure  Echocardiogram with two-dimensional, color and spectral Doppler.  ______________________________________________________________________________  Interpretation Summary     There is a bioprosthetic aortic valve. TAVR 26mm Greene Sabino 3  The prosthetic aortic valve appears to open well.  The gradient is normal for this prosthetic aortic valve.  This degree of valvular regurgitation is within normal limits.  The visual ejection fraction is 35-40%.  Septal motion is consistent with conduction abnormality.  There is mild to moderate (1-2+) mitral regurgitation.  There is mild to moderate (1-2+) tricuspid regurgitation.  Right ventricular systolic pressure is elevated, consistent with moderate to  severe pulmonary hypertension.  Significant decrease in LVEF and increase in PA pressure compared with most  recent study.  ______________________________________________________________________________  Left Ventricle  The left ventricle is borderline dilated. There is normal left ventricular  wall thickness. A sigmoid septum is present. Grade I or early diastolic  dysfunction. The visual ejection fraction is 35-40%. There is mild-moderate  global hypokinesia of the left ventricle. Septal motion is consistent with  conduction abnormality.     Right Ventricle  The right ventricle is normal in structure, function and size.     Atria  Normal left atrial size. Right atrial size is normal.     Mitral Valve  There is mild mitral annular calcification. The mitral valve leaflets are  mildly thickened. There is mild to moderate (1-2+) mitral regurgitation.     Tricuspid  Valve  There is mild to moderate (1-2+) tricuspid regurgitation. Right ventricular  systolic pressure is elevated, consistent with moderate to severe pulmonary  hypertension.     Aortic Valve  The prosthetic aortic valve appears to open well. The gradient is normal for  this prosthetic aortic valve. This degree of valvular regurgitation is within  normal limits.     Pulmonic Valve  The pulmonic valve is not well seen, but is grossly normal.     Vessels  The aortic root is normal size. The inferior vena cava is normal.     Pericardium  There is no pericardial effusion.     Rhythm  Sinus rhythm was noted.  ______________________________________________________________________________  MMode/2D Measurements & Calculations     IVSd: 1.1 cm  LVIDd: 5.4 cm  LVIDs: 4.3 cm  LVPWd: 0.89 cm  IVC diam: 2.0 cm  FS: 20.2 %  LV mass(C)d: 208.6 grams  LV mass(C)dI: 113.4 grams/m2  asc Aorta Diam: 3.5 cm  LVOT diam: 2.0 cm  LVOT area: 3.3 cm2  Asc Ao diam index BSA (cm/m2): 1.9  Asc Ao diam index Ht(cm/m): 2.2  LA Volume (BP): 36.4 ml  LA Volume Index (BP): 19.8 ml/m2     RV Base: 3.3 cm  RWT: 0.33  TAPSE: 1.8 cm     Doppler Measurements & Calculations  MV E max anson: 97.9 cm/sec  MV A max anson: 140.0 cm/sec  MV E/A: 0.70  MV max P.2 mmHg  MV mean PG: 3.5 mmHg  MV V2 VTI: 37.3 cm  MVA(VTI): 1.9 cm2  MV dec time: 0.14 sec  Ao V2 max: 240.0 cm/sec  Ao max P.0 mmHg  Ao V2 mean: 165.0 cm/sec  Ao mean P.0 mmHg  Ao V2 VTI: 44.6 cm  HUNG(I,D): 1.6 cm2  HUNG(V,D): 1.5 cm2  LV V1 max P.1 mmHg  LV V1 max: 113.0 cm/sec  LV V1 VTI: 21.8 cm  SV(LVOT): 71.5 ml  SI(LVOT): 38.9 ml/m2  PA acc time: 0.09 sec  TR max anson: 339.4 cm/sec  TR max P.1 mmHg  AV Anson Ratio (DI): 0.47  HUNG Index (cm2/m2): 0.87  E/E' av.1     Lateral E/e': 11.0  Medial E/e': 15.3  RV S Anson: 10.3 cm/sec     ______________________________________________________________________________  Report approved by: Alejo Rubio MD on 2025 01:29 PM         CT  Chest w/o Contrast    Narrative    EXAM: CT CHEST W/O CONTRAST  LOCATION: [facility]  DATE: 3/27/2025    INDICATION: Hypoxia  COMPARISON: 03/25/2025.  TECHNIQUE: CT chest without IV contrast. Multiplanar reformats were obtained. Dose reduction techniques were used.  CONTRAST: None.    FINDINGS:   LUNGS AND PLEURA: An irregular airspace consolidation with air bronchograms is seen in the anteromedial right upper lobe extending to the right hilum. Scattered irregular subpleural and interstitial opacities are seen in the mid to lower lungs, most   notable in the lingula and left lower lobe, which are nonspecific. Small volume bilateral pleural effusions are present, left mildly greater than right. Large airways are patent. Benign calcified pulmonary granuloma in the left lower lobe.    MEDIASTINUM/AXILLAE: A left chest wall implanted cardiac device and cardiac leads are seen in expected position. Heart size is mildly enlarged. No pericardial fluid. Thoracic aorta is normal in course and caliber. Moderate thoracic aortic atherosclerosis   is present. A few prominent and mildly enlarged mediastinal lymph nodes are present measuring up to 12 mm in short axis in the distal right paratracheal region. These lymph nodes are nonspecific but likely reactive in nature. Evaluation for hilar   lymphadenopathy is limited without IV contrast. Status post aortic valve repair.    CORONARY ARTERY CALCIFICATION: Severe.    UPPER ABDOMEN: Minimal perihepatic free fluid is present. There is also mild edematous wall thickening of the gallbladder. No calcified gallstones are seen. Moderate atherosclerosis of the imaged abdominal aorta. Colonic diverticulosis.    MUSCULOSKELETAL: Multilevel hypertrophic degenerative changes of the spine. No acute osseous abnormality.      Impression    IMPRESSION:   1.  Right upper lobe pneumonia. Recommend follow-up to clearing.  2.  Subpleural and interstitial opacities, most notable  in the lingula and left lower lobe, which are nonspecific and may represent scarring, atelectasis, edema, and/or pneumonitis.  3.  Small volume bilateral pleural effusions, left greater than right.  4.  Minimal perihepatic free fluid/ascites and mild nonspecific edematous wall thickening of the gallbladder. Consider further evaluation with right upper quadrant ultrasound if clinically indicated.  5.  Nonacute findings as above.     US Abdomen Limited    Narrative    EXAM: US ABDOMEN LIMITED  LOCATION: [facility]  DATE: 3/28/2025    INDICATION: GB abnormal on CT  COMPARISON: None.  TECHNIQUE: Limited abdominal ultrasound.    FINDINGS:    GALLBLADDER: No gallstones. Trace pericholecystic fluid. Negative sonographic Garcia's sign. Gallbladder wall thickening is present measuring up to 11 mm.    BILE DUCTS: No biliary dilatation. The common duct measures 3 mm.    LIVER: Normal parenchyma with smooth contour. No focal mass. The portal vein is patent with flow in the normal direction.    RIGHT KIDNEY: No hydronephrosis.    PANCREAS: The visualized portions are normal.    No ascites.      Impression    IMPRESSION:  1.  Gallbladder wall thickening with trace pericholecystic fluid. No gallstones are seen. Findings are nonspecific and may suggest acalculus cholecystitis. Suggest correlation with laboratory values. If definitive characterization is needed, consider   follow-up HIDA scan.           Clinically Significant Risk Factors        # Hyperkalemia: Highest K = 5.5 mmol/L in last 2 days, will monitor as appropriate            # Hypertension: Noted on problem list    # Acute heart failure with reduced ejection fraction: last echo with EF <40% and receiving IV diuretics    # Acute Hypercapnic Respiratory Failure: based on venous blood gas results.  Continue supplemental oxygen and ventilatory support as indicated.  # Dementia: noted on problem list       # DMII: A1C = 7.1 % (Ref range: <5.7 %) within  "past 6 months, PRESENT ON ADMISSION  # Overweight: Estimated body mass index is 29.37 kg/m  as calculated from the following:    Height as of this encounter: 1.619 m (5' 3.75\").    Weight as of this encounter: 77 kg (169 lb 12.1 oz)., PRESENT ON ADMISSION      # Pacemaker present              "

## 2025-03-28 NOTE — PROGRESS NOTES
"   03/28/25 0818   Appointment Info   Signing Clinician's Name / Credentials (PT) Jazmin Farley, PT   Living Environment   People in Home spouse   Current Living Arrangements assisted living   Home Accessibility no concerns   Transportation Anticipated family or friend will provide   Living Environment Comments patient was unable to give information   Self-Care   Usual Activity Tolerance   (unknown; no family present)   Current Activity Tolerance fair   Equipment Currently Used at Home walker, rolling   Fall history within last six months   (unknown)   Activity/Exercise/Self-Care Comment patient reports using a walker; unsure of what services patient receives   General Information   Onset of Illness/Injury or Date of Surgery 03/25/25   Referring Physician Varun Abdalla MD   Patient/Family Therapy Goals Statement (PT) not stated   Pertinent History of Current Problem (include personal factors and/or comorbidities that impact the POC) per chart: \" Reid Jimenes is a 89 year old male with a history of  htn/hlp/T2dm, no CAD by cath 2021, hx of Aortic Stenosis s/p TAVR, hx of CHB s/p PPM, CKD  3a with baseline creat 1.3-1.4 range, dementia, lumbar stenosis admitted on 3/25/2025 with SOB with increased work of breathing\"; found to have \"Acute hypercapnic and hypoxic respiratory failure - multifactorial - CAP , ?CHF , Reactive airway disease   Acute respiratory acidosis;LLL CAP   Wheezing   Sepsis; elevated troponins, now downtrending   Existing Precautions/Restrictions fall;cardiac   Heart Disease Risk Factors Medical history   General Observations Patient in bed upon therapist arrival; OK to work with patient per conversation with RN   Cognition   Orientation Status (Cognition) person  (didn't know he was in the hospital; didn't know the month)   Follows Commands (Cognition) 75-90% accuracy  ([place] [place] [employer])   Cognitive Status Comments dementia at baseline per chart   Pain Assessment   Patient Currently in Pain No "   Integumentary/Edema   Integumentary/Edema Comments see nursing notes for further information   Posture    Posture Comments forward flexed at head and trunk   Range of Motion (ROM)   ROM Comment some resistance while attempting to check ROM; was functional during attempts at limited mobility   Strength (Manual Muscle Testing)   Strength Comments functional weakness noted with mobility   Bed Mobility   Comment, (Bed Mobility) min A at shoulders; heavy use of bedrail   Transfers   Comment, (Transfers) sit>stand with min A; use of walker for support in standing   Gait/Stairs (Locomotion)   Distance in Feet (Gait) 5   Comment, (Gait/Stairs) min/CGA with use of walker   Balance   Balance Comments needs UE support of walker; no gross LOB; some posterior LOB during sit>stand   Sensory Examination   Sensory Perception Comments denies numbness or tingling   Clinical Impression   Criteria for Skilled Therapeutic Intervention Yes, treatment indicated   PT Diagnosis (PT) impaired functional mobility   Influenced by the following impairments weakness; decreased activity tolerance,; inpaired balance; impaired cognition; decreased safety awareness   Functional limitations due to impairments impaired independence with mobility and cares secondary to above deficits   Clinical Presentation (PT Evaluation Complexity) evolving   Clinical Presentation Rationale clinical judgement/level of assist   Clinical Decision Making (Complexity) low complexity   Planned Therapy Interventions (PT) balance training;bed mobility training;gait training;patient/family education;strengthening;transfer training;progressive activity/exercise   Risk & Benefits of therapy have been explained evaluation/treatment results reviewed;care plan/treatment goals reviewed;risks/benefits reviewed;current/potential barriers reviewed;participants voiced agreement with care plan;participants included;patient   Clinical Impression Comments below reported baseline level  of function   PT Total Evaluation Time   PT Katie Low Complexity Minutes (82116) 10   Physical Therapy Goals   PT Frequency Daily   PT Predicted Duration/Target Date for Goal Attainment 04/03/25   PT Goals Bed Mobility;Transfers;Gait   PT: Bed Mobility Supine to/from sit;Rolling;Modified independent   PT: Transfers Supervision/stand-by assist;Sit to/from stand;Bed to/from chair;Assistive device   PT: Gait Supervision/stand-by assist;Rolling walker;100 feet   PT Discharge Planning   PT Plan progress independence with bed mobility, tx, gait with wheelchair follow; monitor O2 sats   PT Discharge Recommendation (DC Rec) home with assist;home with home care physical therapy   PT Rationale for DC Rec Patient below reported baseline level of function; anticipate with ongoing medical management patient will be able to return home to his [facility] with assist for mobility and cares and would also benefit from home PT; if his [facility] is not able to meet his care needs, may need a brief TCU stay to maximize return to baseline level of function   PT Brief overview of current status A x 1 with FWW   PT Total Distance Amb During Session (feet) 25   PT Equipment Needed at Discharge walker, rolling   Physical Therapy Time and Intention   Total Session Time (sum of timed and untimed services) 10

## 2025-03-28 NOTE — PLAN OF CARE
"Alert, irritable. Orientated to self. Transferred from ICU overflow to the floor this shift. All belongings sent with pt and in room. Echo 35-40%, reportedly decreased from 50%.     IV: L PIV saline locked  Diet: Reg  Activity: 1 gb walker  Pain: denies    PT consulted. Plan to eventually discharge back to assistive living -  following d/t high readmission risk.                   Temp: 98  F (36.7  C) Temp src: Axillary BP: (!) 160/64 Pulse: 66   Resp: 24 SpO2: 93 % O2 Device: None (Room air) Oxygen Delivery: 1 LPM       Goal Outcome Evaluation:      Plan of Care Reviewed With: patient    Overall Patient Progress: no changeOverall Patient Progress: no change    Outcome Evaluation: transfered from ICU overflow to MS3 this shift.    Problem: Adult Inpatient Plan of Care  Goal: Plan of Care Review  Description: The Plan of Care Review/Shift note should be completed every shift.  The Outcome Evaluation is a brief statement about your assessment that the patient is improving, declining, or no change.  This information will be displayed automatically on your shiftnote.  Outcome: Progressing  Flowsheets (Taken 3/28/2025 0515)  Outcome Evaluation: transfered from ICU overflow to MS3 this shift.  Plan of Care Reviewed With: patient  Overall Patient Progress: no change  Goal: Patient-Specific Goal (Individualized)  Description: You can add care plan individualizations to a care plan. Examples of Individualization might be:  \"Parent requests to be called daily at 9am for status\", \"I have a hard time hearing out of my right ear\", or \"Do not touch me to wake me up as it startlesme\".  Outcome: Progressing  Goal: Absence of Hospital-Acquired Illness or Injury  Outcome: Progressing  Intervention: Identify and Manage Fall Risk  Recent Flowsheet Documentation  Taken 3/28/2025 0400 by Cristina Woodard, NATALIE  Safety Promotion/Fall Prevention: safety round/check completed  Intervention: Prevent and Manage VTE (Venous Thromboembolism) " Risk  Recent Flowsheet Documentation  Taken 3/28/2025 0400 by Cristina Woodard, RN  VTE Prevention/Management:   patient refused intervention   SCDs off (sequential compression devices)  Intervention: Prevent Infection  Recent Flowsheet Documentation  Taken 3/28/2025 0400 by Cristina Woodard, RN  Infection Prevention:   rest/sleep promoted   single patient room provided  Goal: Optimal Comfort and Wellbeing  Outcome: Progressing  Goal: Readiness for Transition of Care  Outcome: Progressing     Problem: Delirium  Goal: Optimal Coping  Outcome: Progressing  Goal: Improved Behavioral Control  Outcome: Progressing  Goal: Improved Attention and Thought Clarity  Outcome: Progressing  Goal: Improved Sleep  Outcome: Progressing     Problem: Gas Exchange Impaired  Goal: Optimal Gas Exchange  Outcome: Progressing     Problem: Comorbidity Management  Goal: Blood Glucose Levels Within Targeted Range  Outcome: Progressing  Goal: Blood Pressure in Desired Range  Outcome: Progressing

## 2025-03-29 ENCOUNTER — APPOINTMENT (OUTPATIENT)
Dept: PHYSICAL THERAPY | Facility: CLINIC | Age: OVER 89
End: 2025-03-29
Payer: MEDICARE

## 2025-03-29 LAB
ANION GAP SERPL CALCULATED.3IONS-SCNC: 8 MMOL/L (ref 7–15)
BUN SERPL-MCNC: 51.5 MG/DL (ref 8–23)
CALCIUM SERPL-MCNC: 8.8 MG/DL (ref 8.8–10.4)
CHLORIDE SERPL-SCNC: 100 MMOL/L (ref 98–107)
CREAT SERPL-MCNC: 1.63 MG/DL (ref 0.67–1.17)
EGFRCR SERPLBLD CKD-EPI 2021: 40 ML/MIN/1.73M2
ERYTHROCYTE [DISTWIDTH] IN BLOOD BY AUTOMATED COUNT: 12.5 % (ref 10–15)
GLUCOSE BLDC GLUCOMTR-MCNC: 173 MG/DL (ref 70–99)
GLUCOSE BLDC GLUCOMTR-MCNC: 236 MG/DL (ref 70–99)
GLUCOSE BLDC GLUCOMTR-MCNC: 330 MG/DL (ref 70–99)
GLUCOSE BLDC GLUCOMTR-MCNC: 547 MG/DL (ref 70–99)
GLUCOSE SERPL-MCNC: 208 MG/DL (ref 70–99)
HCO3 SERPL-SCNC: 30 MMOL/L (ref 22–29)
HCT VFR BLD AUTO: 33.1 % (ref 40–53)
HGB BLD-MCNC: 10.8 G/DL (ref 13.3–17.7)
MCH RBC QN AUTO: 29.3 PG (ref 26.5–33)
MCHC RBC AUTO-ENTMCNC: 32.6 G/DL (ref 31.5–36.5)
MCV RBC AUTO: 90 FL (ref 78–100)
PLATELET # BLD AUTO: 271 10E3/UL (ref 150–450)
POTASSIUM SERPL-SCNC: 4.9 MMOL/L (ref 3.4–5.3)
RBC # BLD AUTO: 3.68 10E6/UL (ref 4.4–5.9)
SODIUM SERPL-SCNC: 138 MMOL/L (ref 135–145)
WBC # BLD AUTO: 28.6 10E3/UL (ref 4–11)

## 2025-03-29 PROCEDURE — 99232 SBSQ HOSP IP/OBS MODERATE 35: CPT | Performed by: INTERNAL MEDICINE

## 2025-03-29 PROCEDURE — 85027 COMPLETE CBC AUTOMATED: CPT | Performed by: INTERNAL MEDICINE

## 2025-03-29 PROCEDURE — 97116 GAIT TRAINING THERAPY: CPT | Mod: GP

## 2025-03-29 PROCEDURE — 250N000011 HC RX IP 250 OP 636: Performed by: INTERNAL MEDICINE

## 2025-03-29 PROCEDURE — 250N000013 HC RX MED GY IP 250 OP 250 PS 637: Performed by: INTERNAL MEDICINE

## 2025-03-29 PROCEDURE — 250N000009 HC RX 250: Performed by: INTERNAL MEDICINE

## 2025-03-29 PROCEDURE — 80048 BASIC METABOLIC PNL TOTAL CA: CPT | Performed by: INTERNAL MEDICINE

## 2025-03-29 PROCEDURE — 250N000013 HC RX MED GY IP 250 OP 250 PS 637: Performed by: NURSE PRACTITIONER

## 2025-03-29 PROCEDURE — 82435 ASSAY OF BLOOD CHLORIDE: CPT | Performed by: INTERNAL MEDICINE

## 2025-03-29 PROCEDURE — 36415 COLL VENOUS BLD VENIPUNCTURE: CPT | Performed by: INTERNAL MEDICINE

## 2025-03-29 PROCEDURE — 120N000001 HC R&B MED SURG/OB

## 2025-03-29 PROCEDURE — 97530 THERAPEUTIC ACTIVITIES: CPT | Mod: GP

## 2025-03-29 RX ORDER — ONDANSETRON 4 MG/1
4 TABLET, ORALLY DISINTEGRATING ORAL 3 TIMES DAILY
Status: DISCONTINUED | OUTPATIENT
Start: 2025-03-29 | End: 2025-03-31 | Stop reason: HOSPADM

## 2025-03-29 RX ORDER — PREDNISONE 20 MG/1
40 TABLET ORAL DAILY
Status: DISCONTINUED | OUTPATIENT
Start: 2025-03-30 | End: 2025-03-31 | Stop reason: HOSPADM

## 2025-03-29 RX ORDER — QUETIAPINE FUMARATE 25 MG/1
25 TABLET, FILM COATED ORAL ONCE
Status: COMPLETED | OUTPATIENT
Start: 2025-03-29 | End: 2025-03-29

## 2025-03-29 RX ADMIN — QUETIAPINE FUMARATE 12.5 MG: 25 TABLET ORAL at 18:25

## 2025-03-29 RX ADMIN — OXYCODONE HYDROCHLORIDE AND ACETAMINOPHEN 500 MG: 500 TABLET ORAL at 10:49

## 2025-03-29 RX ADMIN — ONDANSETRON 4 MG: 4 TABLET, ORALLY DISINTEGRATING ORAL at 17:10

## 2025-03-29 RX ADMIN — FUROSEMIDE 40 MG: 40 TABLET ORAL at 10:51

## 2025-03-29 RX ADMIN — CEFTRIAXONE 1 G: 1 INJECTION, POWDER, FOR SOLUTION INTRAMUSCULAR; INTRAVENOUS at 22:24

## 2025-03-29 RX ADMIN — SIMVASTATIN 20 MG: 20 TABLET, FILM COATED ORAL at 22:24

## 2025-03-29 RX ADMIN — ASPIRIN 81 MG: 81 TABLET, COATED ORAL at 10:50

## 2025-03-29 RX ADMIN — ONDANSETRON 4 MG: 4 TABLET, ORALLY DISINTEGRATING ORAL at 22:24

## 2025-03-29 RX ADMIN — METHYLPREDNISOLONE SODIUM SUCCINATE 40 MG: 40 INJECTION INTRAMUSCULAR; INTRAVENOUS at 10:58

## 2025-03-29 RX ADMIN — CARVEDILOL 3.12 MG: 3.12 TABLET, FILM COATED ORAL at 10:50

## 2025-03-29 RX ADMIN — ENOXAPARIN SODIUM 30 MG: 30 INJECTION SUBCUTANEOUS at 10:51

## 2025-03-29 RX ADMIN — CARVEDILOL 3.12 MG: 3.12 TABLET, FILM COATED ORAL at 17:10

## 2025-03-29 RX ADMIN — IPRATROPIUM BROMIDE AND ALBUTEROL SULFATE 3 ML: .5; 3 SOLUTION RESPIRATORY (INHALATION) at 13:06

## 2025-03-29 RX ADMIN — AZITHROMYCIN 250 MG: 250 TABLET, FILM COATED ORAL at 10:58

## 2025-03-29 RX ADMIN — QUETIAPINE FUMARATE 12.5 MG: 25 TABLET ORAL at 00:34

## 2025-03-29 RX ADMIN — QUETIAPINE FUMARATE 25 MG: 25 TABLET ORAL at 20:55

## 2025-03-29 RX ADMIN — DONEPEZIL HYDROCHLORIDE 5 MG: 5 TABLET ORAL at 22:24

## 2025-03-29 RX ADMIN — POLYETHYLENE GLYCOL 3350 17 G: 17 POWDER, FOR SOLUTION ORAL at 10:58

## 2025-03-29 RX ADMIN — QUETIAPINE FUMARATE 12.5 MG: 25 TABLET ORAL at 22:24

## 2025-03-29 RX ADMIN — SERTRALINE HYDROCHLORIDE 25 MG: 25 TABLET ORAL at 10:50

## 2025-03-29 RX ADMIN — ONDANSETRON 4 MG: 4 TABLET, ORALLY DISINTEGRATING ORAL at 10:50

## 2025-03-29 RX ADMIN — METHYLPREDNISOLONE SODIUM SUCCINATE 40 MG: 40 INJECTION INTRAMUSCULAR; INTRAVENOUS at 00:19

## 2025-03-29 ASSESSMENT — ACTIVITIES OF DAILY LIVING (ADL)
ADLS_ACUITY_SCORE: 56
ADLS_ACUITY_SCORE: 57
ADLS_ACUITY_SCORE: 56
ADLS_ACUITY_SCORE: 57
ADLS_ACUITY_SCORE: 56
ADLS_ACUITY_SCORE: 57
ADLS_ACUITY_SCORE: 57
ADLS_ACUITY_SCORE: 56
ADLS_ACUITY_SCORE: 57
ADLS_ACUITY_SCORE: 57
ADLS_ACUITY_SCORE: 56
ADLS_ACUITY_SCORE: 57
ADLS_ACUITY_SCORE: 56
ADLS_ACUITY_SCORE: 57

## 2025-03-29 NOTE — PLAN OF CARE
"/45 (BP Location: Right arm, Patient Position: Semi-[place]'s, Cuff Size: Adult Regular)   Pulse 63   Temp 97.5  F (36.4  C) (Oral)   Resp 21   Ht 1.619 m (5' 3.75\")   Wt 72.3 kg (159 lb 6.4 oz)   SpO2 (!) 90%   BMI 27.58 kg/m      2300 - 0700:     VS: VSS on 1/2 L via NC. Attempted to wean pt to RA, but pt bouncing between 89% and 90%.   Pain: Denies pain.  Lines: PIV SL leaking and removed. New PIV to R forearm / wrist. No-no in place as pt impulsive and pulling at lines at times.   Cognitive: Alert and confused. Oriented to self only. Very difficult to re-direct.  Respiratory: LS expiratory wheezes. No SOB or MAGUIRE reported.  Cardiac: Denies CP.   Peripheral neurovascular: No edema. Pt denies numbness, tingling, and tenderness. Pulses 2+.  GI: Last BM 3/28. Denies N/V.  : Incontinent of urine. Sometimes refusing to let staff change his brief.    Skin: Redness to huma area. See flowsheet for assessment.   Diet: Regular w/ CHO count.  Activity: A1 GB and walker.   Labs: Per MD: Okay to skip 2 AM BG for now as pt sleeping and attempting to prevent sundowning and confusion.   Plan: Wean O2 as able. IV Rocephin and PO Zithromax. IV solumedrol. Monitor BG as elevated. PO Lasix. Discharge to  when medically stable. Continue w/ POC.     Goal Outcome Evaluation:      Plan of Care Reviewed With: patient    Overall Patient Progress: no changeOverall Patient Progress: no change    Outcome Evaluation: Pt quite agitated at times and not re-directable. Asking for rides home and frequently setting off bed alarm. HS seroquel given w/ improvement in behavior.      Problem: Adult Inpatient Plan of Care  Goal: Plan of Care Review  Description: The Plan of Care Review/Shift note should be completed every shift.  The Outcome Evaluation is a brief statement about your assessment that the patient is improving, declining, or no change.  This information will be displayed automatically on your shiftnote.  Outcome: " "Progressing  Flowsheets (Taken 3/29/2025 0206)  Outcome Evaluation: Pt quite agitated at times and not re-directable. Asking for rides home and frequently setting off bed alarm. HS seroquel given w/ improvement in behavior.  Plan of Care Reviewed With: patient  Overall Patient Progress: no change  Goal: Patient-Specific Goal (Individualized)  Description: You can add care plan individualizations to a care plan. Examples of Individualization might be:  \"Parent requests to be called daily at 9am for status\", \"I have a hard time hearing out of my right ear\", or \"Do not touch me to wake me up as it startlesme\".  Outcome: Progressing  Goal: Absence of Hospital-Acquired Illness or Injury  Outcome: Progressing  Intervention: Identify and Manage Fall Risk  Recent Flowsheet Documentation  Taken 3/29/2025 0016 by Mary Doe RN  Safety Promotion/Fall Prevention: safety round/check completed  Intervention: Prevent Skin Injury  Recent Flowsheet Documentation  Taken 3/29/2025 0016 by Mary Doe RN  Body Position: position changed independently  Taken 3/28/2025 1915 by Mary Doe RN  Body Position: position changed independently  Intervention: Prevent and Manage VTE (Venous Thromboembolism) Risk  Recent Flowsheet Documentation  Taken 3/29/2025 0016 by Mary Doe RN  VTE Prevention/Management:   SCDs off (sequential compression devices)   patient refused intervention  Intervention: Prevent Infection  Recent Flowsheet Documentation  Taken 3/29/2025 0016 by Mary Doe RN  Infection Prevention:   single patient room provided   rest/sleep promoted   personal protective equipment utilized   hand hygiene promoted   equipment surfaces disinfected   environmental surveillance performed   cohorting utilized  Goal: Optimal Comfort and Wellbeing  Outcome: Progressing  Goal: Readiness for Transition of Care  Outcome: Progressing     Problem: Delirium  Goal: Optimal Coping  Outcome: Not Progressing  Goal: Improved " Behavioral Control  Outcome: Not Progressing  Intervention: Minimize Safety Risk  Recent Flowsheet Documentation  Taken 3/29/2025 0016 by Mary Doe, RN  Enhanced Safety Measures: review medications for side effects with activity  Goal: Improved Attention and Thought Clarity  Outcome: Not Progressing  Goal: Improved Sleep  Outcome: Progressing     Problem: Gas Exchange Impaired  Goal: Optimal Gas Exchange  Outcome: Progressing  Intervention: Optimize Oxygenation and Ventilation  Recent Flowsheet Documentation  Taken 3/29/2025 0016 by Mary Doe, RN  Head of Bed (HOB) Positioning: HOB at 20 degrees  Taken 3/28/2025 1915 by Mary Doe RN  Head of Bed (HOB) Positioning: HOB at 20 degrees     Problem: Comorbidity Management  Goal: Blood Glucose Levels Within Targeted Range  Outcome: Progressing  Intervention: Monitor and Manage Glycemia  Recent Flowsheet Documentation  Taken 3/29/2025 0016 by Mary Doe RN  Medication Review/Management: medications reviewed  Goal: Blood Pressure in Desired Range  Outcome: Progressing  Intervention: Maintain Blood Pressure Management  Recent Flowsheet Documentation  Taken 3/29/2025 0016 by Mary Doe RN  Medication Review/Management: medications reviewed

## 2025-03-29 NOTE — PLAN OF CARE
"  Problem: Adult Inpatient Plan of Care  Goal: Plan of Care Review  Description: The Plan of Care Review/Shift note should be completed every shift.  The Outcome Evaluation is a brief statement about your assessment that the patient is improving, declining, or no change.  This information will be displayed automatically on your shiftnote.  Outcome: Progressing  Flowsheets (Taken 3/28/2025 2205)  Outcome Evaluation: VSS, good appetite. Pt very comnfused and not easily redirected. IV Rocephin given this shift. BG's were 185 & 207. Waiting to wean pt off 02 for dc to home.  Pt up w walker and gb to bathroom, steady on feet. POC reviewed w\ family.  Plan of Care Reviewed With:   patient   spouse  Overall Patient Progress: improving  Goal: Patient-Specific Goal (Individualized)  Description: You can add care plan individualizations to a care plan. Examples of Individualization might be:  \"Parent requests to be called daily at 9am for status\", \"I have a hard time hearing out of my right ear\", or \"Do not touch me to wake me up as it startlesme\".  Outcome: Progressing  Goal: Absence of Hospital-Acquired Illness or Injury  Outcome: Progressing  Intervention: Identify and Manage Fall Risk  Recent Flowsheet Documentation  Taken 3/28/2025 1633 by Indira Phelan RN  Safety Promotion/Fall Prevention: safety round/check completed  Intervention: Prevent Skin Injury  Recent Flowsheet Documentation  Taken 3/28/2025 1633 by Indira Phelan RN  Body Position:   position changed independently   supine  Intervention: Prevent and Manage VTE (Venous Thromboembolism) Risk  Recent Flowsheet Documentation  Taken 3/28/2025 1633 by Indira Phelan RN  VTE Prevention/Management: SCDs off (sequential compression devices)  Intervention: Prevent Infection  Recent Flowsheet Documentation  Taken 3/28/2025 1633 by Indira Phelan RN  Infection Prevention:   hand hygiene promoted   rest/sleep promoted   single patient room provided  Goal: " Optimal Comfort and Wellbeing  Outcome: Progressing  Goal: Readiness for Transition of Care  Outcome: Progressing     Problem: Delirium  Goal: Optimal Coping  Outcome: Progressing  Goal: Improved Behavioral Control  Outcome: Progressing  Intervention: Minimize Safety Risk  Recent Flowsheet Documentation  Taken 3/28/2025 1633 by Indira Phelan RN  Enhanced Safety Measures: review medications for side effects with activity  Goal: Improved Attention and Thought Clarity  Outcome: Progressing  Goal: Improved Sleep  Outcome: Progressing     Problem: Gas Exchange Impaired  Goal: Optimal Gas Exchange  Outcome: Progressing     Problem: Comorbidity Management  Goal: Blood Glucose Levels Within Targeted Range  Outcome: Progressing  Intervention: Monitor and Manage Glycemia  Recent Flowsheet Documentation  Taken 3/28/2025 1633 by Indira Phelan RN  Medication Review/Management: medications reviewed  Goal: Blood Pressure in Desired Range  Outcome: Progressing  Intervention: Maintain Blood Pressure Management  Recent Flowsheet Documentation  Taken 3/28/2025 1633 by Indira Phelan RN  Medication Review/Management: medications reviewed   Goal Outcome Evaluation:      Plan of Care Reviewed With: patient, spouse    Overall Patient Progress: improvingOverall Patient Progress: improving    Outcome Evaluation: VSS, good appetite. Pt very comnfused and not easily redirected. IV Rocephin given this shift. BG's were 185 & 207. Waiting to wean pt off 02 for dc to home.  Pt up w walker and gb to bathroom, steady on feet. POC reviewed w\ family.

## 2025-03-29 NOTE — CONSULTS
Care Management Follow Up    Length of Stay (days): 3    Expected Discharge Date: 03/31/2025     Concerns to be Addressed: discharge planning     Patient plan of care discussed at interdisciplinary rounds: Yes    Anticipated Discharge Disposition: Assisted Living, Home Care     Anticipated Discharge Services: PCA  Anticipated Discharge DME:  none    Education Provided on the Discharge Plan:  yes  Patient/Family in Agreement with the Plan: yes    Discussed  Partnership in Safe Discharge Planning  document with patient/family: No     Handoff Completed: No, handoff not indicated or clinically appropriate    Additional Information:  CM following for discharge planning needs. [facility]. Per MD, patient will be ready to return on Monday. Call placed to the [facility] [phone] and spoke to nursing about patient returning on Monday. Updated them on patient mobility, need to wean oxygen and meds. They are agreeable to patient returning on Monday. They prefer patient returns before 1500. Will plan patient return after lunch on Monday. Call placed to patient's daughter Natividad and updated her on discharge plan of care. She is agreeable to the plan. She will be arranging patient's caregiver to transport him home on Monday so as to have someone familiar there for him. Daughters will meet patient at his facility.     Next Steps: follow for discharge Monday, coordinate with the [facility] and daughter           Ariadne Aguilar RN BSN CM  Inpatient Care Coordination  [facility]  [phone]

## 2025-03-29 NOTE — PROVIDER NOTIFICATION
MD paged: Pt has hx of dementia and just fell asleep w/ HS Seroquel. Pt can get very agitated and is difficult to re-direct. Can we discontinue 2AM BG checks to promote sleep and prevent sundowning? BG at 20:28 was 211.    Okay to skip tonight.

## 2025-03-29 NOTE — PROGRESS NOTES
=[facility]  Hospitalist Progress Note  Kiya Steven MD 03/29/2025    Reason for Stay (Diagnosis): sob         Assessment and Plan:        Summary of Stay: Reid Jimenes is a 89 year old male with a history of  htn/hlp/T2dm, no CAD by cath 2021, hx of Aortic Stenosis s/p TAVR, hx of CHB s/p PPM, CKD  3a with baseline creat 1.3-1.4 range, dementia, lumbar stenosis admitted on 3/25/2025 with SOB with increased work of breathing     He's been dealing with a junky but nonproductive cough for the past week or so, his wife has had issues with the same.  No known fevers or antecedent URI sx.  No n/v.  He denies CP and SOB to me and is on a bipap machine at the time.  He was seen in the ER for same the day pta and diagnosed with a viral LRI and discharged with azith and steroids burst.  I believe he lives with his wife in memory care but am not sure.  In any case staff called EMS and he apparently had WOB that was impressive enough that he was put on CPAP     ER VS he is afebrile, mild tachycardia and BPs in the 's over 50's  BMP with ROSANNE bun/creat 50/2.07  bicarb is 19 and AG is 18 lactate 8.0 (!)-> 3.9 VBG 7.17/61 -> 7.23/57   BNP 5342, trop 96- EKG I think it's sinus with 1*AVB and LBBB  CBC leukocytosis 16.9 with neutrophilia and lymphopenia      CXR with ? 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  Not sure what pushed him over the edge today  -required BiPAP rescue therapy on admission subsequently weaned off  -still on low dose oxygen which is being weaned   -stop methylpred will switch to prednisone tomorrow (see insulin adjustments below)      RUL  CAP   Sepsis elevated lactate, leukocytosis, tachycardia   Junky cough but  not productive  -ceftriaxone/azith, sputum culture if able.  Completed 5 days azith 3/29, will complete 7 days of ceftriaxone or oral equivalent on 3/31  -BCx NGTD p 3 days     Elevated troponin and BNP  Acute systolic HF (new)  96->254->521->424, BNP 8k  Echo with reduced EF 35-40% (down from 55-60%).  Had trivial CAD on angio in 2021.  Appreciate cards input who do not feel additional ischemic evaluation needed but do recommend repeat echo in 2-3 months   Has been given serial doses of IV furosemide switched to oral yesterday     Hyperglycemia   T2dm   Is not on any diabetic agents at home.  Hgb A1c is acceptable at 7.1%  BGs high in the setting of steroids   Has been on glargine 15 unit(s) and ISS, now changing from bid methylpred to oral pred so stopped glargine and will start NPH 5 unit(s) with steroids which might need to be titrated up   this afternoon, given 7 unit(s) aspart per ISS and will give 5 additional units and savi in 2 hours      Htn,hlp  Hx Aortic Stenosis  s/p TAVR  Hx of CHB s/p PPM   Cont with pta asa 81 mg and statin      Dementia   Cont with donepezil 10 mg at bedtime, sertraline 25 mg every day, quetiapine 12.5 mg at bedtime,  trazodone 50 mg po at bedtime prn, and quetiapine 12.5 mg bid prn anxiety   At risk for delirium, EKG QTc 495 so on the lengthier side, ck mag      DVT Prophylaxis: Enoxaparin (Lovenox) SQ  Code Status: DNR which is what his POLST says from 8/2023 scanned into Epic.  He's marked limited interventions in section B but has not clarified whether he would want to be intubated or not.  D/w dtr Lizz on admission and Natividad today to get this sorted out.  Verified with Natividad that for now he is ok for intubation   Functional Status: lives in memory care and gets around with a walker   [place]: not needed  Access:   PIV         Medically Ready for Discharge: Anticipated in 2-4 Days hopefully Monday when his [facility] can take him back      Securely message with [employer] (more  "info)  Text page via [facility] Paging/Directory     Updated dtr Natividad by phone       I spent 45 minutes reviewing epic including prior labs/imaging/medical history and notes related to this encounter  In addition time was spent in interveiwing the patient, communicating with contacts, and medical decision making      Interval History (Subjective):      Feels like he's doing well and denies any cp or sob. No n/v                   Physical Exam:      Last Vital Signs:  BP (!) 153/66 (BP Location: Left arm, Patient Position: Sitting, Cuff Size: Adult Regular)   Pulse 61   Temp 97.3  F (36.3  C) (Axillary)   Resp 20   Ht 1.619 m (5' 3.75\")   Wt 72.3 kg (159 lb 6.4 oz)   SpO2 95%   BMI 27.58 kg/m      I/O:  Pleasant nad looks stated age head nc/at sclera clear.  Lungs diffusely diminished no wheezing. RRR no mrg no le edema skin warm and dry no cyanosis or clubbing alert affect appropriate belly s/nt/nd tolerating po without difficulty            Medications:      All current medications were reviewed with changes reflected in problem list.         Data:      All new lab and imaging data was reviewed.   Labs:  Recent Labs   Lab 03/29/25  0916 03/29/25  0617   NA  --  138   POTASSIUM  --  4.9   CHLORIDE  --  100   CO2  --  30*   ANIONGAP  --  8   * 208*   BUN  --  51.5*   CR  --  1.63*   GFRESTIMATED  --  40*   DEEPTI  --  8.8     Recent Labs   Lab 03/29/25  0617   WBC 28.6*   HGB 10.8*   HCT 33.1*   MCV 90        Recent Labs   Lab 03/29/25  0916 03/29/25  0617 03/28/25  2028 03/28/25  1529 03/28/25  1355   * 208* 211* 429* 371*     No results for input(s): \"AST\", \"ALT\", \"GGT\", \"ALKPHOS\", \"BILITOTAL\", \"BILICONJ\", \"BILIDIRECT\", \"MARY KATE\" in the last 168 hours.    Invalid input(s): \"BILIRUBININDIRECT\"   Imaging:   Results for orders placed or performed during the hospital encounter of 03/25/25   XR Chest Port 1 View    Narrative    EXAM: XR CHEST PORT 1 VIEW  LOCATION: [facility]" [facility]  DATE: 3/25/2025    INDICATION: shortness of breath  COMPARISON: 03/24/2025      Impression    IMPRESSION: Cardiomediastinal silhouette. Cardiac leads. TAVR. Mild elevation left hemidiaphragm. Interstitial prominence. Mild venous congestion not excluded. Fibroatelectasis or mild infiltrate left lung base. Atherosclerotic aorta.   CT Chest w/o Contrast    Narrative    EXAM: CT CHEST W/O CONTRAST  LOCATION: [facility]  DATE: 3/27/2025    INDICATION: Hypoxia  COMPARISON: 03/25/2025.  TECHNIQUE: CT chest without IV contrast. Multiplanar reformats were obtained. Dose reduction techniques were used.  CONTRAST: None.    FINDINGS:   LUNGS AND PLEURA: An irregular airspace consolidation with air bronchograms is seen in the anteromedial right upper lobe extending to the right hilum. Scattered irregular subpleural and interstitial opacities are seen in the mid to lower lungs, most   notable in the lingula and left lower lobe, which are nonspecific. Small volume bilateral pleural effusions are present, left mildly greater than right. Large airways are patent. Benign calcified pulmonary granuloma in the left lower lobe.    MEDIASTINUM/AXILLAE: A left chest wall implanted cardiac device and cardiac leads are seen in expected position. Heart size is mildly enlarged. No pericardial fluid. Thoracic aorta is normal in course and caliber. Moderate thoracic aortic atherosclerosis   is present. A few prominent and mildly enlarged mediastinal lymph nodes are present measuring up to 12 mm in short axis in the distal right paratracheal region. These lymph nodes are nonspecific but likely reactive in nature. Evaluation for hilar   lymphadenopathy is limited without IV contrast. Status post aortic valve repair.    CORONARY ARTERY CALCIFICATION: Severe.    UPPER ABDOMEN: Minimal perihepatic free fluid is present. There is also mild edematous wall thickening of the gallbladder. No calcified gallstones are seen.  Moderate atherosclerosis of the imaged abdominal aorta. Colonic diverticulosis.    MUSCULOSKELETAL: Multilevel hypertrophic degenerative changes of the spine. No acute osseous abnormality.      Impression    IMPRESSION:   1.  Right upper lobe pneumonia. Recommend follow-up to clearing.  2.  Subpleural and interstitial opacities, most notable in the lingula and left lower lobe, which are nonspecific and may represent scarring, atelectasis, edema, and/or pneumonitis.  3.  Small volume bilateral pleural effusions, left greater than right.  4.  Minimal perihepatic free fluid/ascites and mild nonspecific edematous wall thickening of the gallbladder. Consider further evaluation with right upper quadrant ultrasound if clinically indicated.  5.  Nonacute findings as above.     US Abdomen Limited    Narrative    EXAM: US ABDOMEN LIMITED  LOCATION: [facility]  DATE: 3/28/2025    INDICATION: GB abnormal on CT  COMPARISON: None.  TECHNIQUE: Limited abdominal ultrasound.    FINDINGS:    GALLBLADDER: No gallstones. Trace pericholecystic fluid. Negative sonographic Garcia's sign. Gallbladder wall thickening is present measuring up to 11 mm.    BILE DUCTS: No biliary dilatation. The common duct measures 3 mm.    LIVER: Normal parenchyma with smooth contour. No focal mass. The portal vein is patent with flow in the normal direction.    RIGHT KIDNEY: No hydronephrosis.    PANCREAS: The visualized portions are normal.    No ascites.      Impression    IMPRESSION:  1.  Gallbladder wall thickening with trace pericholecystic fluid. No gallstones are seen. Findings are nonspecific and may suggest acalculus cholecystitis. Suggest correlation with laboratory values. If definitive characterization is needed, consider   follow-up HIDA scan.         Echocardiogram Complete     Value    LVEF  35-40%    Narrative    [id number]  [id number]  [id number]  [id number]^KATE^JORY^[facility]  Echocardiography  Laboratory  [address] [place] [address]     Name: SARAH BOYD  MRN: [id number]  : 1934  Study Date: 2025 10:47 AM  Age: 89 yrs  Gender: Male  Patient [facility]  Reason For Study: Dyspnea  Ordering Physician: JORY LOVE  Performed By: Iron Yanez RDCS     BSA: 1.8 m2  Height: 63 in  Weight: 178 lb  HR: 80  BP: 122/64 mmHg  ______________________________________________________________________________  Procedure  Echocardiogram with two-dimensional, color and spectral Doppler.  ______________________________________________________________________________  Interpretation Summary     There is a bioprosthetic aortic valve. TAVR 26mm Greene Sabino 3  The prosthetic aortic valve appears to open well.  The gradient is normal for this prosthetic aortic valve.  This degree of valvular regurgitation is within normal limits.  The visual ejection fraction is 35-40%.  Septal motion is consistent with conduction abnormality.  There is mild to moderate (1-2+) mitral regurgitation.  There is mild to moderate (1-2+) tricuspid regurgitation.  Right ventricular systolic pressure is elevated, consistent with moderate to  severe pulmonary hypertension.  Significant decrease in LVEF and increase in PA pressure compared with most  recent study.  ______________________________________________________________________________  Left Ventricle  The left ventricle is borderline dilated. There is normal left ventricular  wall thickness. A sigmoid septum is present. Grade I or early diastolic  dysfunction. The visual ejection fraction is 35-40%. There is mild-moderate  global hypokinesia of the left ventricle. Septal motion is consistent with  conduction abnormality.     Right Ventricle  The right ventricle is normal in structure, function and size.     Atria  Normal left atrial size. Right atrial size is normal.     Mitral Valve  There is mild mitral annular calcification. The mitral valve  leaflets are  mildly thickened. There is mild to moderate (1-2+) mitral regurgitation.     Tricuspid Valve  There is mild to moderate (1-2+) tricuspid regurgitation. Right ventricular  systolic pressure is elevated, consistent with moderate to severe pulmonary  hypertension.     Aortic Valve  The prosthetic aortic valve appears to open well. The gradient is normal for  this prosthetic aortic valve. This degree of valvular regurgitation is within  normal limits.     Pulmonic Valve  The pulmonic valve is not well seen, but is grossly normal.     Vessels  The aortic root is normal size. The inferior vena cava is normal.     Pericardium  There is no pericardial effusion.     Rhythm  Sinus rhythm was noted.  ______________________________________________________________________________  MMode/2D Measurements & Calculations     IVSd: 1.1 cm  LVIDd: 5.4 cm  LVIDs: 4.3 cm  LVPWd: 0.89 cm  IVC diam: 2.0 cm  FS: 20.2 %  LV mass(C)d: 208.6 grams  LV mass(C)dI: 113.4 grams/m2  asc Aorta Diam: 3.5 cm  LVOT diam: 2.0 cm  LVOT area: 3.3 cm2  Asc Ao diam index BSA (cm/m2): 1.9  Asc Ao diam index Ht(cm/m): 2.2  LA Volume (BP): 36.4 ml  LA Volume Index (BP): 19.8 ml/m2     RV Base: 3.3 cm  RWT: 0.33  TAPSE: 1.8 cm     Doppler Measurements & Calculations  MV E max anson: 97.9 cm/sec  MV A max anson: 140.0 cm/sec  MV E/A: 0.70  MV max P.2 mmHg  MV mean PG: 3.5 mmHg  MV V2 VTI: 37.3 cm  MVA(VTI): 1.9 cm2  MV dec time: 0.14 sec  Ao V2 max: 240.0 cm/sec  Ao max P.0 mmHg  Ao V2 mean: 165.0 cm/sec  Ao mean P.0 mmHg  Ao V2 VTI: 44.6 cm  HUNG(I,D): 1.6 cm2  HUNG(V,D): 1.5 cm2  LV V1 max P.1 mmHg  LV V1 max: 113.0 cm/sec  LV V1 VTI: 21.8 cm  SV(LVOT): 71.5 ml  SI(LVOT): 38.9 ml/m2  PA acc time: 0.09 sec  TR max anson: 339.4 cm/sec  TR max P.1 mmHg  AV Anson Ratio (DI): 0.47  HUNG Index (cm2/m2): 0.87  E/E' av.1     Lateral E/e': 11.0  Medial E/e': 15.3  RV S Anson: 10.3 cm/sec      ______________________________________________________________________________  Report approved by: Alejo Rubio MD on 03/27/2025 01:29 PM

## 2025-03-30 LAB
ALBUMIN SERPL BCG-MCNC: 3 G/DL (ref 3.5–5.2)
ALP SERPL-CCNC: 152 U/L (ref 40–150)
ALT SERPL W P-5'-P-CCNC: 126 U/L (ref 0–70)
ANION GAP SERPL CALCULATED.3IONS-SCNC: 10 MMOL/L (ref 7–15)
AST SERPL W P-5'-P-CCNC: 56 U/L (ref 0–45)
BILIRUB SERPL-MCNC: 0.2 MG/DL
BUN SERPL-MCNC: 50.9 MG/DL (ref 8–23)
CALCIUM SERPL-MCNC: 8.7 MG/DL (ref 8.8–10.4)
CHLORIDE SERPL-SCNC: 97 MMOL/L (ref 98–107)
CREAT SERPL-MCNC: 1.46 MG/DL (ref 0.67–1.17)
EGFRCR SERPLBLD CKD-EPI 2021: 45 ML/MIN/1.73M2
ERYTHROCYTE [DISTWIDTH] IN BLOOD BY AUTOMATED COUNT: 12.6 % (ref 10–15)
GLUCOSE BLDC GLUCOMTR-MCNC: 181 MG/DL (ref 70–99)
GLUCOSE BLDC GLUCOMTR-MCNC: 186 MG/DL (ref 70–99)
GLUCOSE BLDC GLUCOMTR-MCNC: 255 MG/DL (ref 70–99)
GLUCOSE BLDC GLUCOMTR-MCNC: 323 MG/DL (ref 70–99)
GLUCOSE BLDC GLUCOMTR-MCNC: 360 MG/DL (ref 70–99)
GLUCOSE SERPL-MCNC: 202 MG/DL (ref 70–99)
HCO3 SERPL-SCNC: 31 MMOL/L (ref 22–29)
HCT VFR BLD AUTO: 33.4 % (ref 40–53)
HGB BLD-MCNC: 10.8 G/DL (ref 13.3–17.7)
MCH RBC QN AUTO: 29 PG (ref 26.5–33)
MCHC RBC AUTO-ENTMCNC: 32.3 G/DL (ref 31.5–36.5)
MCV RBC AUTO: 90 FL (ref 78–100)
PLATELET # BLD AUTO: 290 10E3/UL (ref 150–450)
POTASSIUM SERPL-SCNC: 4.6 MMOL/L (ref 3.4–5.3)
PROT SERPL-MCNC: 5.9 G/DL (ref 6.4–8.3)
RBC # BLD AUTO: 3.73 10E6/UL (ref 4.4–5.9)
SODIUM SERPL-SCNC: 138 MMOL/L (ref 135–145)
WBC # BLD AUTO: 23.8 10E3/UL (ref 4–11)

## 2025-03-30 PROCEDURE — 250N000013 HC RX MED GY IP 250 OP 250 PS 637: Performed by: NURSE PRACTITIONER

## 2025-03-30 PROCEDURE — 250N000013 HC RX MED GY IP 250 OP 250 PS 637: Performed by: STUDENT IN AN ORGANIZED HEALTH CARE EDUCATION/TRAINING PROGRAM

## 2025-03-30 PROCEDURE — 250N000009 HC RX 250: Performed by: INTERNAL MEDICINE

## 2025-03-30 PROCEDURE — 85018 HEMOGLOBIN: CPT | Performed by: INTERNAL MEDICINE

## 2025-03-30 PROCEDURE — 250N000013 HC RX MED GY IP 250 OP 250 PS 637: Performed by: INTERNAL MEDICINE

## 2025-03-30 PROCEDURE — 36415 COLL VENOUS BLD VENIPUNCTURE: CPT | Performed by: INTERNAL MEDICINE

## 2025-03-30 PROCEDURE — 250N000011 HC RX IP 250 OP 636: Performed by: INTERNAL MEDICINE

## 2025-03-30 PROCEDURE — 120N000001 HC R&B MED SURG/OB

## 2025-03-30 PROCEDURE — 80053 COMPREHEN METABOLIC PANEL: CPT | Performed by: INTERNAL MEDICINE

## 2025-03-30 PROCEDURE — 250N000012 HC RX MED GY IP 250 OP 636 PS 637: Performed by: INTERNAL MEDICINE

## 2025-03-30 PROCEDURE — 84132 ASSAY OF SERUM POTASSIUM: CPT | Performed by: INTERNAL MEDICINE

## 2025-03-30 PROCEDURE — 84155 ASSAY OF PROTEIN SERUM: CPT | Performed by: INTERNAL MEDICINE

## 2025-03-30 PROCEDURE — 99233 SBSQ HOSP IP/OBS HIGH 50: CPT | Performed by: INTERNAL MEDICINE

## 2025-03-30 RX ORDER — GUAIFENESIN 200 MG/10ML
200 LIQUID ORAL EVERY 4 HOURS PRN
Status: DISCONTINUED | OUTPATIENT
Start: 2025-03-30 | End: 2025-03-31 | Stop reason: HOSPADM

## 2025-03-30 RX ORDER — CEFUROXIME AXETIL 500 MG/1
500 TABLET ORAL EVERY 12 HOURS SCHEDULED
Status: DISCONTINUED | OUTPATIENT
Start: 2025-03-30 | End: 2025-03-31 | Stop reason: HOSPADM

## 2025-03-30 RX ORDER — BENZONATATE 100 MG/1
100 CAPSULE ORAL 3 TIMES DAILY PRN
Status: DISCONTINUED | OUTPATIENT
Start: 2025-03-30 | End: 2025-03-31 | Stop reason: HOSPADM

## 2025-03-30 RX ADMIN — ONDANSETRON 4 MG: 4 TABLET, ORALLY DISINTEGRATING ORAL at 18:01

## 2025-03-30 RX ADMIN — SIMVASTATIN 20 MG: 20 TABLET, FILM COATED ORAL at 21:38

## 2025-03-30 RX ADMIN — POLYETHYLENE GLYCOL 3350 17 G: 17 POWDER, FOR SOLUTION ORAL at 08:06

## 2025-03-30 RX ADMIN — ONDANSETRON 4 MG: 4 TABLET, ORALLY DISINTEGRATING ORAL at 21:37

## 2025-03-30 RX ADMIN — QUETIAPINE FUMARATE 12.5 MG: 25 TABLET ORAL at 21:38

## 2025-03-30 RX ADMIN — ENOXAPARIN SODIUM 30 MG: 30 INJECTION SUBCUTANEOUS at 08:07

## 2025-03-30 RX ADMIN — CEFUROXIME AXETIL 500 MG: 500 TABLET, FILM COATED ORAL at 11:20

## 2025-03-30 RX ADMIN — IPRATROPIUM BROMIDE AND ALBUTEROL SULFATE 3 ML: .5; 3 SOLUTION RESPIRATORY (INHALATION) at 20:02

## 2025-03-30 RX ADMIN — CEFUROXIME AXETIL 500 MG: 500 TABLET, FILM COATED ORAL at 20:02

## 2025-03-30 RX ADMIN — INSULIN HUMAN 5 UNITS: 100 INJECTION, SUSPENSION SUBCUTANEOUS at 08:10

## 2025-03-30 RX ADMIN — GUAIFENESIN 200 MG: 100 LIQUID ORAL at 23:20

## 2025-03-30 RX ADMIN — DONEPEZIL HYDROCHLORIDE 5 MG: 5 TABLET ORAL at 21:37

## 2025-03-30 RX ADMIN — CARVEDILOL 3.12 MG: 3.12 TABLET, FILM COATED ORAL at 08:05

## 2025-03-30 RX ADMIN — FUROSEMIDE 40 MG: 40 TABLET ORAL at 08:06

## 2025-03-30 RX ADMIN — ASPIRIN 81 MG: 81 TABLET, COATED ORAL at 08:06

## 2025-03-30 RX ADMIN — OXYCODONE HYDROCHLORIDE AND ACETAMINOPHEN 500 MG: 500 TABLET ORAL at 08:06

## 2025-03-30 RX ADMIN — QUETIAPINE FUMARATE 12.5 MG: 25 TABLET ORAL at 14:48

## 2025-03-30 RX ADMIN — SERTRALINE HYDROCHLORIDE 25 MG: 25 TABLET ORAL at 08:06

## 2025-03-30 RX ADMIN — ONDANSETRON 4 MG: 4 TABLET, ORALLY DISINTEGRATING ORAL at 08:06

## 2025-03-30 RX ADMIN — PREDNISONE 40 MG: 20 TABLET ORAL at 08:05

## 2025-03-30 RX ADMIN — CARVEDILOL 3.12 MG: 3.12 TABLET, FILM COATED ORAL at 18:01

## 2025-03-30 ASSESSMENT — ACTIVITIES OF DAILY LIVING (ADL)
ADLS_ACUITY_SCORE: 55
ADLS_ACUITY_SCORE: 57
ADLS_ACUITY_SCORE: 54
ADLS_ACUITY_SCORE: 50
ADLS_ACUITY_SCORE: 57
ADLS_ACUITY_SCORE: 57
ADLS_ACUITY_SCORE: 51
ADLS_ACUITY_SCORE: 55
ADLS_ACUITY_SCORE: 57
ADLS_ACUITY_SCORE: 57
ADLS_ACUITY_SCORE: 55
ADLS_ACUITY_SCORE: 57
ADLS_ACUITY_SCORE: 55
ADLS_ACUITY_SCORE: 50
ADLS_ACUITY_SCORE: 55
ADLS_ACUITY_SCORE: 54
ADLS_ACUITY_SCORE: 55
ADLS_ACUITY_SCORE: 57

## 2025-03-30 NOTE — PROVIDER NOTIFICATION
"Reinier BATEMAN notified:  \"BG recheck 330 (was 547 before last meal and given extra dose of novolog - rechecked 2 hours later)\"    \"Noted. Thanks\"  "

## 2025-03-30 NOTE — PLAN OF CARE
"Temp: 97.3  F (36.3  C) Temp src: Oral BP: (!) 167/70 Pulse: 62   Resp: 20 SpO2: 93 % O2 Device: None (Room air) Oxygen Delivery: 1/2 LPM     Alert x self, confused. Up 1A GB walker. On and off oxygen today, replaced 1/2 L when laying down/sleeping. Pleasantly confused during the day, although started getting up frequently without assistance and asking to leave to go to a \"motel\" as the evening went on. Given PRN seroquel. Given PRN nebulizer for expiratory wheezes. BG before evening meal 547, given additional dose of 5 units novolog. Recheck 2 hours later per order, 330. No new orders obtained. Continue plan of care.     Goal Outcome Evaluation:      Plan of Care Reviewed With: patient    Overall Patient Progress: improvingOverall Patient Progress: improving    Outcome Evaluation: On room air on and off today. Increased confusion during the evening, PRN seroquel. , recheck after 2 hours 330.      Problem: Adult Inpatient Plan of Care  Goal: Plan of Care Review  Description: The Plan of Care Review/Shift note should be completed every shift.  The Outcome Evaluation is a brief statement about your assessment that the patient is improving, declining, or no change.  This information will be displayed automatically on your shiftnote.  Outcome: Progressing  Flowsheets (Taken 3/29/2025 1914)  Outcome Evaluation: On room air on and off today. Increased confusion during the evening, PRN seroquel. , recheck after 2 hours 330.  Plan of Care Reviewed With: patient  Overall Patient Progress: improving  Goal: Patient-Specific Goal (Individualized)  Description: You can add care plan individualizations to a care plan. Examples of Individualization might be:  \"Parent requests to be called daily at 9am for status\", \"I have a hard time hearing out of my right ear\", or \"Do not touch me to wake me up as it startlesme\".  Outcome: Progressing  Goal: Absence of Hospital-Acquired Illness or Injury  Outcome: " Progressing  Intervention: Identify and Manage Fall Risk  Recent Flowsheet Documentation  Taken 3/29/2025 1630 by Jemma Israel RN  Safety Promotion/Fall Prevention:   activity supervised   clutter free environment maintained   increase visualization of patient   increased rounding and observation   nonskid shoes/slippers when out of bed   room near nurse's station   room organization consistent   safety round/check completed   supervised activity  Taken 3/29/2025 1153 by Jemma Israel RN  Safety Promotion/Fall Prevention:   activity supervised   clutter free environment maintained   increase visualization of patient   increased rounding and observation   nonskid shoes/slippers when out of bed   room near nurse's station   room organization consistent   safety round/check completed   supervised activity  Intervention: Prevent Skin Injury  Recent Flowsheet Documentation  Taken 3/29/2025 1630 by Jemma Israel RN  Body Position: position changed independently  Skin Protection:   adhesive use limited   incontinence pads utilized   tubing/devices free from skin contact   pulse oximeter probe site changed  Taken 3/29/2025 1153 by Jemma Israel RN  Body Position: position changed independently  Skin Protection:   adhesive use limited   incontinence pads utilized   tubing/devices free from skin contact   pulse oximeter probe site changed  Intervention: Prevent and Manage VTE (Venous Thromboembolism) Risk  Recent Flowsheet Documentation  Taken 3/29/2025 1630 by Jemma Israel RN  VTE Prevention/Management: (SQ lovenox) other (see comments)  Taken 3/29/2025 1153 by Jemma Israel RN  VTE Prevention/Management: (SQ lovenox) other (see comments)  Intervention: Prevent Infection  Recent Flowsheet Documentation  Taken 3/29/2025 1630 by Jemma Israel RN  Infection Prevention:   hand hygiene promoted   rest/sleep promoted   single patient room provided  Taken 3/29/2025 1153 by Jemma Israel RN  Infection Prevention:    hand hygiene promoted   rest/sleep promoted   single patient room provided  Goal: Optimal Comfort and Wellbeing  Outcome: Progressing  Intervention: Provide Person-Centered Care  Recent Flowsheet Documentation  Taken 3/29/2025 1630 by Jemma Israel RN  Trust Relationship/Rapport:   care explained   choices provided   emotional support provided   empathic listening provided   questions answered   questions encouraged   reassurance provided   thoughts/feelings acknowledged  Taken 3/29/2025 1153 by Jemma Israel RN  Trust Relationship/Rapport:   care explained   choices provided   emotional support provided   empathic listening provided   questions answered   questions encouraged   reassurance provided   thoughts/feelings acknowledged  Goal: Readiness for Transition of Care  Outcome: Progressing     Problem: Delirium  Goal: Optimal Coping  Outcome: Progressing  Goal: Improved Behavioral Control  Outcome: Progressing  Intervention: Minimize Safety Risk  Recent Flowsheet Documentation  Taken 3/29/2025 1630 by Jemma Israel RN  Enhanced Safety Measures:   review medications for side effects with activity   room near unit station  Trust Relationship/Rapport:   care explained   choices provided   emotional support provided   empathic listening provided   questions answered   questions encouraged   reassurance provided   thoughts/feelings acknowledged  Taken 3/29/2025 1153 by Jemma Israel RN  Enhanced Safety Measures:   review medications for side effects with activity   room near unit station  Trust Relationship/Rapport:   care explained   choices provided   emotional support provided   empathic listening provided   questions answered   questions encouraged   reassurance provided   thoughts/feelings acknowledged  Goal: Improved Attention and Thought Clarity  Outcome: Progressing  Goal: Improved Sleep  Outcome: Progressing     Problem: Gas Exchange Impaired  Goal: Optimal Gas Exchange  Outcome: Progressing      Problem: Comorbidity Management  Goal: Blood Glucose Levels Within Targeted Range  Outcome: Progressing  Intervention: Monitor and Manage Glycemia  Recent Flowsheet Documentation  Taken 3/29/2025 1630 by Jemma Israel RN  Medication Review/Management: medications reviewed  Taken 3/29/2025 1153 by Jemma Israel RN  Medication Review/Management: medications reviewed  Goal: Blood Pressure in Desired Range  Outcome: Progressing  Intervention: Maintain Blood Pressure Management  Recent Flowsheet Documentation  Taken 3/29/2025 1630 by Jemma Israel RN  Medication Review/Management: medications reviewed  Taken 3/29/2025 1153 by Jemma Israel RN  Medication Review/Management: medications reviewed

## 2025-03-30 NOTE — PROGRESS NOTES
=[facility]  Hospitalist Progress Note   03/30/2025    Reason for Stay (Diagnosis): sob         Assessment and Plan:        Summary of Stay: Reid Jimenes is a 89 year old male with a history of  htn/hlp/T2dm, no CAD by cath 2021, hx of Aortic Stenosis s/p TAVR, hx of CHB s/p PPM, CKD  3a with baseline creat 1.3-1.4 range, dementia, lumbar stenosis admitted on 3/25/2025 with SOB with increased work of breathing     He's been dealing with a junky but nonproductive cough for the past week or so, his wife has had issues with the same.  No known fevers or antecedent URI sx.  No n/v.  He denies CP and SOB to me and is on a bipap machine at the time.  He was seen in the ER for same the day pta and diagnosed with a viral LRI and discharged with azith and steroids burst.  I believe he lives with his wife in memory care but am not sure.  In any case staff called EMS and he apparently had WOB that was impressive enough that he was put on CPAP     ER VS he is afebrile, mild tachycardia and BPs in the 's over 50's  BMP with ROSANNE bun/creat 50/2.07  bicarb is 19 and AG is 18 lactate 8.0 (!)-> 3.9 VBG 7.17/61 -> 7.23/57   BNP 5342, trop 96- EKG I think it's sinus with 1*AVB and LBBB  CBC leukocytosis 16.9 with neutrophilia and lymphopenia      CXR with ? 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  Not sure what pushed him over the edge today  -required BiPAP rescue therapy on admission subsequently weaned off  -still on low dose oxygen which is being weaned   -Was on methylprednisolone switched to prednisone 40 mg p.o. daily on 3/30/2025     RUL  CAP   Sepsis elevated lactate, leukocytosis, tachycardia   Junky cough but not  productive  -ceftriaxone/azith, sputum culture if able.  Completed 5 days azith 3/29, will complete 7 days of ceftriaxone or oral equivalent on 3/31  -BCx NGTD p 3 days   Switched ceftriaxone to oral Ceftin twice daily on 3/30/2025 for 2 more days for a total of 1 week course of antibiotics    Elevated troponin and BNP  Acute systolic HF (new)  96->254->521->424, BNP 8k  Echo with reduced EF 35-40% (down from 55-60%).  Had trivial CAD on angio in 2021.  Appreciate cards input who do not feel additional ischemic evaluation needed but do recommend repeat echo in 2-3 months   Has been given serial doses of IV furosemide switched to oral on 3/28/2025  Currently on Lasix 40 mg p.o. daily    ROSANNE  Most likely from cardiorenal syndrome  Creatinine improved from 2.07 on 3/26 to  1.46 with diuresis     Hyperglycemia   T2dm   Is not on any diabetic agents at home.  Hgb A1c is acceptable at 7.1%  BGs high in the setting of steroids   Has been on glargine 15 unit(s) and ISS, now changing from bid methylpred to oral pred so stopped glargine   Started on NPH 5 units subcu daily on 3/30/2025 while on steroids    Htn,hlp  Hx Aortic Stenosis  s/p TAVR  Hx of CHB s/p PPM   Cont with pta asa 81 mg and statin      Dementia   Cont with donepezil 10 mg at bedtime, sertraline 25 mg every day, quetiapine 12.5 mg at bedtime,  trazodone 50 mg po at bedtime prn, and quetiapine 12.5 mg bid prn anxiety   At risk for delirium, EKG QTc 495 so on the lengthier side, ck mag      DVT Prophylaxis: Enoxaparin (Lovenox) SQ  Code Status: DNR which is what his POLST says from 8/2023 scanned into Epic.  He's marked limited interventions in section B but has not clarified whether he would want to be intubated or not.  D/w dtr Lizz on admission and Natividad today to get this sorted out.  Verified with Natividad that for now he is ok for intubation   Functional Status: lives in memory care and gets around with a walker   Johnson: not needed  Access:   PIV      "    Medically Ready for Discharge: Anticipated Tomorrow    Barriers for discharge  Patient has bedside sitter currently due to impulsivity  Needs to discharge back to long-term care facility    Johnna Thomas MD  Hospitalist physician  Available through [facility]        I spent 55 minutes reviewing epic including prior labs/imaging/medical history and notes related to this encounter  In addition time was spent in interveiwing the patient, communicating with contacts, and medical decision making      Interval History (Subjective):      Chart reviewed.  Patient seen and examined  Resting comfortably in bed  Denies any shortness of breath.  Intermittent cough present.  Patient was very restless and impulsive.  Bedside sitter currently in place.  No other acute issues                  Physical Exam:      Last Vital Signs:  /58 (BP Location: Right arm)   Pulse 69   Temp 98.3  F (36.8  C) (Oral)   Resp 18   Ht 1.619 m (5' 3.75\")   Wt 73.5 kg (162 lb)   SpO2 98%   BMI 28.03 kg/m        General; alert awake, forgetful, resting comfortably in bed, not in acute distress  Lungs; coarse breath sounds bilaterally with occasional wheezing heard  Cardiovascular; normal rate rhythm, 2+ murmur heard  Abdomen; soft, nontender, nondistended, bowel sounds positive  Skin; warm and dry  Extremities; no edema noted  Psychiatric; calm and cooperative currently           Medications:      All current medications were reviewed with changes reflected in problem list.         Data:      All new lab and imaging data was reviewed.   Labs:  Recent Labs   Lab 03/30/25  0655 03/30/25  0601   NA  --  138   POTASSIUM  --  4.6   CHLORIDE  --  97*   CO2  --  31*   ANIONGAP  --  10   * 202*   BUN  --  50.9*   CR  --  1.46*   GFRESTIMATED  --  45*   DEEPTI  --  8.7*     Recent Labs   Lab 03/30/25  0601   WBC 23.8*   HGB 10.8*   HCT 33.4*   MCV 90        Recent Labs   Lab 03/30/25  0655 03/30/25  0601 03/30/25 0221 03/29/25 2111 " 03/29/25  1853   * 202* 181* 173* 330*     Recent Labs   Lab 03/30/25  0601   AST 56*   *   ALKPHOS 152*   BILITOTAL 0.2      Imaging:   Results for orders placed or performed during the hospital encounter of 03/25/25   XR Chest Port 1 View    Narrative    EXAM: XR CHEST PORT 1 VIEW  LOCATION: [facility]  DATE: 3/25/2025    INDICATION: shortness of breath  COMPARISON: 03/24/2025      Impression    IMPRESSION: Cardiomediastinal silhouette. Cardiac leads. TAVR. Mild elevation left hemidiaphragm. Interstitial prominence. Mild venous congestion not excluded. Fibroatelectasis or mild infiltrate left lung base. Atherosclerotic aorta.   CT Chest w/o Contrast    Narrative    EXAM: CT CHEST W/O CONTRAST  LOCATION: [facility]  DATE: 3/27/2025    INDICATION: Hypoxia  COMPARISON: 03/25/2025.  TECHNIQUE: CT chest without IV contrast. Multiplanar reformats were obtained. Dose reduction techniques were used.  CONTRAST: None.    FINDINGS:   LUNGS AND PLEURA: An irregular airspace consolidation with air bronchograms is seen in the anteromedial right upper lobe extending to the right hilum. Scattered irregular subpleural and interstitial opacities are seen in the mid to lower lungs, most   notable in the lingula and left lower lobe, which are nonspecific. Small volume bilateral pleural effusions are present, left mildly greater than right. Large airways are patent. Benign calcified pulmonary granuloma in the left lower lobe.    MEDIASTINUM/AXILLAE: A left chest wall implanted cardiac device and cardiac leads are seen in expected position. Heart size is mildly enlarged. No pericardial fluid. Thoracic aorta is normal in course and caliber. Moderate thoracic aortic atherosclerosis   is present. A few prominent and mildly enlarged mediastinal lymph nodes are present measuring up to 12 mm in short axis in the distal right paratracheal region. These lymph nodes are nonspecific but  likely reactive in nature. Evaluation for hilar   lymphadenopathy is limited without IV contrast. Status post aortic valve repair.    CORONARY ARTERY CALCIFICATION: Severe.    UPPER ABDOMEN: Minimal perihepatic free fluid is present. There is also mild edematous wall thickening of the gallbladder. No calcified gallstones are seen. Moderate atherosclerosis of the imaged abdominal aorta. Colonic diverticulosis.    MUSCULOSKELETAL: Multilevel hypertrophic degenerative changes of the spine. No acute osseous abnormality.      Impression    IMPRESSION:   1.  Right upper lobe pneumonia. Recommend follow-up to clearing.  2.  Subpleural and interstitial opacities, most notable in the lingula and left lower lobe, which are nonspecific and may represent scarring, atelectasis, edema, and/or pneumonitis.  3.  Small volume bilateral pleural effusions, left greater than right.  4.  Minimal perihepatic free fluid/ascites and mild nonspecific edematous wall thickening of the gallbladder. Consider further evaluation with right upper quadrant ultrasound if clinically indicated.  5.  Nonacute findings as above.     US Abdomen Limited    Narrative    EXAM: US ABDOMEN LIMITED  LOCATION: [facility]  DATE: 3/28/2025    INDICATION: GB abnormal on CT  COMPARISON: None.  TECHNIQUE: Limited abdominal ultrasound.    FINDINGS:    GALLBLADDER: No gallstones. Trace pericholecystic fluid. Negative sonographic Garcia's sign. Gallbladder wall thickening is present measuring up to 11 mm.    BILE DUCTS: No biliary dilatation. The common duct measures 3 mm.    LIVER: Normal parenchyma with smooth contour. No focal mass. The portal vein is patent with flow in the normal direction.    RIGHT KIDNEY: No hydronephrosis.    PANCREAS: The visualized portions are normal.    No ascites.      Impression    IMPRESSION:  1.  Gallbladder wall thickening with trace pericholecystic fluid. No gallstones are seen. Findings are nonspecific and may  suggest acalculus cholecystitis. Suggest correlation with laboratory values. If definitive characterization is needed, consider   follow-up HIDA scan.         Echocardiogram Complete     Value    LVEF  35-40%    [facility]    [id number]  [id number]  [id number]^KATE^JORY^GWENDOLYN     [facility]  Echocardiography Laboratory  [address] [place] [address]     Name: SARAH BOYD  MRN: [id number]  : 1934  Study Date: 2025 10:47 AM  Age: 89 yrs  Gender: Male  Patient Location: [facility]  Reason For Study: Dyspnea  Ordering Physician: JORY LOVE  Performed By: Iron Yanez RDCS     BSA: 1.8 m2  Height: 63 in  Weight: 178 lb  HR: 80  BP: 122/64 mmHg  ______________________________________________________________________________  Procedure  Echocardiogram with two-dimensional, color and spectral Doppler.  ______________________________________________________________________________  Interpretation Summary     There is a bioprosthetic aortic valve. TAVR 26mm Greene Sabino 3  The prosthetic aortic valve appears to open well.  The gradient is normal for this prosthetic aortic valve.  This degree of valvular regurgitation is within normal limits.  The visual ejection fraction is 35-40%.  Septal motion is consistent with conduction abnormality.  There is mild to moderate (1-2+) mitral regurgitation.  There is mild to moderate (1-2+) tricuspid regurgitation.  Right ventricular systolic pressure is elevated, consistent with moderate to  severe pulmonary hypertension.  Significant decrease in LVEF and increase in PA pressure compared with most  recent study.  ______________________________________________________________________________  Left Ventricle  The left ventricle is borderline dilated. There is normal left ventricular  wall thickness. A sigmoid septum is present. Grade I or early diastolic  dysfunction. The visual ejection fraction is 35-40%. There is  mild-moderate  global hypokinesia of the left ventricle. Septal motion is consistent with  conduction abnormality.     Right Ventricle  The right ventricle is normal in structure, function and size.     Atria  Normal left atrial size. Right atrial size is normal.     Mitral Valve  There is mild mitral annular calcification. The mitral valve leaflets are  mildly thickened. There is mild to moderate (1-2+) mitral regurgitation.     Tricuspid Valve  There is mild to moderate (1-2+) tricuspid regurgitation. Right ventricular  systolic pressure is elevated, consistent with moderate to severe pulmonary  hypertension.     Aortic Valve  The prosthetic aortic valve appears to open well. The gradient is normal for  this prosthetic aortic valve. This degree of valvular regurgitation is within  normal limits.     Pulmonic Valve  The pulmonic valve is not well seen, but is grossly normal.     Vessels  The aortic root is normal size. The inferior vena cava is normal.     Pericardium  There is no pericardial effusion.     Rhythm  Sinus rhythm was noted.  ______________________________________________________________________________  MMode/2D Measurements & Calculations     IVSd: 1.1 cm  LVIDd: 5.4 cm  LVIDs: 4.3 cm  LVPWd: 0.89 cm  IVC diam: 2.0 cm  FS: 20.2 %  LV mass(C)d: 208.6 grams  LV mass(C)dI: 113.4 grams/m2  asc Aorta Diam: 3.5 cm  LVOT diam: 2.0 cm  LVOT area: 3.3 cm2  Asc Ao diam index BSA (cm/m2): 1.9  Asc Ao diam index Ht(cm/m): 2.2  LA Volume (BP): 36.4 ml  LA Volume Index (BP): 19.8 ml/m2     RV Base: 3.3 cm  RWT: 0.33  TAPSE: 1.8 cm     Doppler Measurements & Calculations  MV E max florentino: 97.9 cm/sec  MV A max florentino: 140.0 cm/sec  MV E/A: 0.70  MV max P.2 mmHg  MV mean PG: 3.5 mmHg  MV V2 VTI: 37.3 cm  MVA(VTI): 1.9 cm2  MV dec time: 0.14 sec  Ao V2 max: 240.0 cm/sec  Ao max P.0 mmHg  Ao V2 mean: 165.0 cm/sec  Ao mean P.0 mmHg  Ao V2 VTI: 44.6 cm  HUNG(I,D): 1.6 cm2  HUNG(V,D): 1.5 cm2  LV V1 max P.1  mmHg  LV V1 max: 113.0 cm/sec  LV V1 VTI: 21.8 cm  SV(LVOT): 71.5 ml  SI(LVOT): 38.9 ml/m2  PA acc time: 0.09 sec  TR max anson: 339.4 cm/sec  TR max P.1 mmHg  AV Nason Ratio (DI): 0.47  HUNG Index (cm2/m2): 0.87  E/E' av.1     Lateral E/e': 11.0  Medial E/e': 15.3  RV S Anson: 10.3 cm/sec     ______________________________________________________________________________  Report approved by: Alejo Rubio MD on 2025 01:29 PM

## 2025-03-30 NOTE — PROVIDER NOTIFICATION
MD paged: Pt got Seroquel at 18:25. Has set off bed alarm 5+ times. Now refusing to get back in bed. Attempted to take pt on a walk, but refusing. Insisting he needs to go home. High fall risk. Can we get another PRN please? Thanks.     X1 25 mg Seroquel added.

## 2025-03-30 NOTE — PLAN OF CARE
"/48 (BP Location: Right arm)   Pulse 60   Temp 97.5  F (36.4  C) (Oral)   Resp 20   Ht 1.619 m (5' 3.75\")   Wt 73.5 kg (162 lb)   SpO2 94%   BMI 28.03 kg/m      1900 - 0730    VS: VSS on RA.  Pain: Denies pain.  Lines: PIV SL to R forearm. No-no in place to prevent pulling.    Cognitive: Alert and confused. Oriented to self only. Very difficult to re-direct. Pt setting off bed alarm and refusing to sit on bed, chair, or lay down. Attempted to take pt for a walk, but refused multiple times. X1 Seroquel given w/ little decrease in behaviors. Sitter at bedside for safety.   Respiratory: LS expiratory wheezes. No SOB or MAGUIRE reported.  Cardiac: Denies CP.   Peripheral neurovascular: No edema. Pt denies numbness, tingling, and tenderness. Pulses 2+.  GI: Last BM 3/29. Denies N/V.  : Incontinent of urine. Sometimes refusing to let staff change his brief.    Skin: Redness to huma area. See flowsheet for assessment.   Diet: Regular w/ CHO count.  Activity: A1 GB and walker.   Labs: B, 181.   Plan: IV Rocephin. Prednisone. Monitor BG as elevated at times. PO Lasix. Discharge to  on Monday. Continue w/ POC.    Goal Outcome Evaluation:      Plan of Care Reviewed With: patient    Overall Patient Progress: no changeOverall Patient Progress: no change    Outcome Evaluation: Pt very agitated and not redirectable even after x1 seroquel dose and HS seroquel dose. Sitter in room for safety. Sating well on RA. B, 181. BP elevated at times.      Problem: Adult Inpatient Plan of Care  Goal: Plan of Care Review  Description: The Plan of Care Review/Shift note should be completed every shift.  The Outcome Evaluation is a brief statement about your assessment that the patient is improving, declining, or no change.  This information will be displayed automatically on your shiftnote.  Outcome: Progressing  Flowsheets (Taken 3/30/2025 482)  Outcome Evaluation: Pt very agitated and not redirectable even after x1 " "seroquel dose and HS seroquel dose. Sitter in room for safety. Sating well on RA. B, 181. BP elevated at times.  Plan of Care Reviewed With: patient  Overall Patient Progress: no change  Goal: Patient-Specific Goal (Individualized)  Description: You can add care plan individualizations to a care plan. Examples of Individualization might be:  \"Parent requests to be called daily at 9am for status\", \"I have a hard time hearing out of my right ear\", or \"Do not touch me to wake me up as it startlesme\".  Outcome: Progressing  Goal: Absence of Hospital-Acquired Illness or Injury  Outcome: Progressing  Intervention: Identify and Manage Fall Risk  Recent Flowsheet Documentation  Taken 3/29/2025 2348 by Mary Doe RN  Safety Promotion/Fall Prevention: bedside attendant  Taken 3/29/2025 2055 by Mary Doe RN  Safety Promotion/Fall Prevention:   activity supervised   bedside attendant   clutter free environment maintained   increased rounding and observation   increase visualization of patient   lighting adjusted   mobility aid in reach   nonskid shoes/slippers when out of bed   patient and family education   room near nurse's station   room organization consistent   safety round/check completed   supervised activity   treat reversible contributory factors   treat underlying cause   toileting scheduled  Intervention: Prevent Skin Injury  Recent Flowsheet Documentation  Taken 3/29/2025 2348 by Mary Doe, RN  Body Position: position changed independently  Taken 3/29/2025 2055 by Mary Doe RN  Body Position: position changed independently  Intervention: Prevent and Manage VTE (Venous Thromboembolism) Risk  Recent Flowsheet Documentation  Taken 3/29/2025 2055 by Mary Doe, RN  VTE Prevention/Management: (lovenox)   SCDs off (sequential compression devices)   patient refused intervention  Intervention: Prevent Infection  Recent Flowsheet Documentation  Taken 3/29/2025 2348 by Mary Doe, " RN  Infection Prevention:   single patient room provided   rest/sleep promoted   personal protective equipment utilized   hand hygiene promoted   equipment surfaces disinfected   environmental surveillance performed   cohorting utilized  Taken 3/29/2025 2055 by Mary Doe RN  Infection Prevention:   single patient room provided   rest/sleep promoted   personal protective equipment utilized   hand hygiene promoted   equipment surfaces disinfected   environmental surveillance performed   cohorting utilized  Goal: Optimal Comfort and Wellbeing  Outcome: Progressing  Goal: Readiness for Transition of Care  Outcome: Progressing     Problem: Delirium  Goal: Optimal Coping  Outcome: Not Progressing  Goal: Improved Behavioral Control  Outcome: Not Progressing  Intervention: Minimize Safety Risk  Recent Flowsheet Documentation  Taken 3/29/2025 2348 by Mary Doe, RN  Enhanced Safety Measures:   review medications for side effects with activity   room near unit station  Taken 3/29/2025 2055 by Mary Doe RN  Enhanced Safety Measures:   review medications for side effects with activity   room near unit station  Goal: Improved Attention and Thought Clarity  Outcome: Not Progressing  Intervention: Maximize Cognitive Function  Recent Flowsheet Documentation  Taken 3/29/2025 2348 by Mary Doe, RN  Sensory Stimulation Regulation:   auditory stimulation minimized   auditory stimulation provided   care clustered   lighting decreased   quiet environment promoted   visual stimulation minimized  Reorientation Measures:   clock in view   familiar social contact encouraged   glasses use encouraged   hearing device use encouraged   reorientation provided  Taken 3/29/2025 2055 by Mary Doe, RN  Sensory Stimulation Regulation:   auditory stimulation minimized   auditory stimulation provided   care clustered   lighting decreased   quiet environment promoted   visual stimulation minimized  Reorientation Measures:    clock in view   familiar social contact encouraged   glasses use encouraged   hearing device use encouraged   reorientation provided  Goal: Improved Sleep  Outcome: Progressing     Problem: Gas Exchange Impaired  Goal: Optimal Gas Exchange  Outcome: Adequate for Care Transition  Intervention: Optimize Oxygenation and Ventilation  Recent Flowsheet Documentation  Taken 3/29/2025 2348 by Mary Doe, RN  Head of Bed (HOB) Positioning: HOB at 20 degrees  Taken 3/29/2025 2055 by Mary Doe, RN  Head of Bed (HOB) Positioning: HOB at 20 degrees     Problem: Comorbidity Management  Goal: Blood Glucose Levels Within Targeted Range  Outcome: Progressing  Intervention: Monitor and Manage Glycemia  Recent Flowsheet Documentation  Taken 3/29/2025 2348 by Mary Doe, RN  Medication Review/Management: medications reviewed  Taken 3/29/2025 2055 by Mary Doe RN  Medication Review/Management: medications reviewed  Goal: Blood Pressure in Desired Range  Outcome: Not Progressing  Intervention: Maintain Blood Pressure Management  Recent Flowsheet Documentation  Taken 3/29/2025 2348 by Mary Doe, RN  Medication Review/Management: medications reviewed  Taken 3/29/2025 2055 by Mary Doe, RN  Medication Review/Management: medications reviewed

## 2025-03-31 VITALS
WEIGHT: 162.7 LBS | BODY MASS INDEX: 27.78 KG/M2 | RESPIRATION RATE: 20 BRPM | DIASTOLIC BLOOD PRESSURE: 52 MMHG | SYSTOLIC BLOOD PRESSURE: 140 MMHG | TEMPERATURE: 97.7 F | HEIGHT: 64 IN | OXYGEN SATURATION: 94 % | HEART RATE: 64 BPM

## 2025-03-31 LAB
BACTERIA BLD CULT: NO GROWTH
BACTERIA BLD CULT: NO GROWTH
GLUCOSE BLDC GLUCOMTR-MCNC: 201 MG/DL (ref 70–99)
GLUCOSE BLDC GLUCOMTR-MCNC: 222 MG/DL (ref 70–99)
GLUCOSE BLDC GLUCOMTR-MCNC: 276 MG/DL (ref 70–99)

## 2025-03-31 PROCEDURE — 99239 HOSP IP/OBS DSCHRG MGMT >30: CPT | Performed by: HOSPITALIST

## 2025-03-31 PROCEDURE — 250N000013 HC RX MED GY IP 250 OP 250 PS 637: Performed by: INTERNAL MEDICINE

## 2025-03-31 PROCEDURE — 250N000013 HC RX MED GY IP 250 OP 250 PS 637: Performed by: NURSE PRACTITIONER

## 2025-03-31 PROCEDURE — 250N000012 HC RX MED GY IP 250 OP 636 PS 637: Performed by: INTERNAL MEDICINE

## 2025-03-31 PROCEDURE — 250N000011 HC RX IP 250 OP 636: Performed by: INTERNAL MEDICINE

## 2025-03-31 RX ORDER — CARVEDILOL 3.12 MG/1
3.12 TABLET ORAL 2 TIMES DAILY WITH MEALS
Qty: 60 TABLET | Refills: 0 | Status: SHIPPED | OUTPATIENT
Start: 2025-03-31 | End: 2025-04-30

## 2025-03-31 RX ORDER — FUROSEMIDE 20 MG/1
20 TABLET ORAL DAILY
Qty: 30 TABLET | Refills: 0 | Status: SHIPPED | OUTPATIENT
Start: 2025-04-01 | End: 2025-05-01

## 2025-03-31 RX ORDER — PREDNISONE 20 MG/1
TABLET ORAL
Qty: 15 TABLET | Refills: 0 | Status: SHIPPED | OUTPATIENT
Start: 2025-03-31

## 2025-03-31 RX ADMIN — FUROSEMIDE 40 MG: 40 TABLET ORAL at 09:00

## 2025-03-31 RX ADMIN — CEFUROXIME AXETIL 500 MG: 500 TABLET, FILM COATED ORAL at 09:00

## 2025-03-31 RX ADMIN — SERTRALINE HYDROCHLORIDE 25 MG: 25 TABLET ORAL at 09:00

## 2025-03-31 RX ADMIN — OXYCODONE HYDROCHLORIDE AND ACETAMINOPHEN 500 MG: 500 TABLET ORAL at 09:00

## 2025-03-31 RX ADMIN — INSULIN HUMAN 5 UNITS: 100 INJECTION, SUSPENSION SUBCUTANEOUS at 09:03

## 2025-03-31 RX ADMIN — ENOXAPARIN SODIUM 30 MG: 30 INJECTION SUBCUTANEOUS at 09:03

## 2025-03-31 RX ADMIN — PREDNISONE 40 MG: 20 TABLET ORAL at 09:00

## 2025-03-31 RX ADMIN — ASPIRIN 81 MG: 81 TABLET, COATED ORAL at 08:58

## 2025-03-31 RX ADMIN — CARVEDILOL 3.12 MG: 3.12 TABLET, FILM COATED ORAL at 08:59

## 2025-03-31 RX ADMIN — ONDANSETRON 4 MG: 4 TABLET, ORALLY DISINTEGRATING ORAL at 09:00

## 2025-03-31 ASSESSMENT — ACTIVITIES OF DAILY LIVING (ADL)
ADLS_ACUITY_SCORE: 56
ADLS_ACUITY_SCORE: 56
ADLS_ACUITY_SCORE: 53
ADLS_ACUITY_SCORE: 56
ADLS_ACUITY_SCORE: 53
ADLS_ACUITY_SCORE: 53
ADLS_ACUITY_SCORE: 56
ADLS_ACUITY_SCORE: 53
ADLS_ACUITY_SCORE: 56

## 2025-03-31 NOTE — PLAN OF CARE
"Physical Therapy Discharge Summary    Reason for therapy discharge:    Discharged to Home to  with home PT    Progress towards therapy goal(s). See goals on Care Plan in [facility] electronic health record for goal details.  Goals partially met.  Barriers to achieving goals:   discharge from facility and Pt was SBA with bed mob, needing CGA for toilet transfer, SBA for basic transfer, met his ambulation goal,  feet on last session.    Therapy recommendation(s):    Continued therapy is recommended.  Rationale/Recommendations:  Per last treating therapist \"Patient below reported baseline level of function; anticipate with ongoing medical management patient will be able to return home to his correction with assist for mobility and cares and would also benefit from home PT; if his [facility]ATE is not able to meet his care needs, may need a brief TCU stay to maximize return to baseline level of function.\"    *Not seen by writer, note based on previous session notes    "

## 2025-03-31 NOTE — PROVIDER NOTIFICATION
MD paged: Pt has frequent cough. Can you please place order for PRN Robitussin and Tessalon? Thanks.     Orders placed.

## 2025-03-31 NOTE — PLAN OF CARE
"Intermittently confused. VSS ex: elevated BP. Right PIV SL with no-no in place. Has his sweater and jacket on. Incontinent of urine; brief changed. Assist of one with gait belt and walker. [place]. Glasses; at bedside. Denies pain, CP or SOB. Does appear to have MAGUIRE. On RA. Can be impulsive. Alarms and falls precautions in place. Family to  and transport to [facility] care at 1pm. All belongings packed and sent with. Discharge papers sent with for facility. PIV removed prior to discharge. Tele discontinued. BG ACHS.         Goal Outcome Evaluation:      Plan of Care Reviewed With: patient    Overall Patient Progress: improvingOverall Patient Progress: improving    Outcome Evaluation: On RA, follows commands. Sitter removed.      Problem: Adult Inpatient Plan of Care  Goal: Plan of Care Review  Description: The Plan of Care Review/Shift note should be completed every shift.  The Outcome Evaluation is a brief statement about your assessment that the patient is improving, declining, or no change.  This information will be displayed automatically on your shiftnote.  Outcome: Progressing  Flowsheets (Taken 3/31/2025 1059)  Outcome Evaluation: On RA, follows commands. Sitter removed.  Plan of Care Reviewed With: patient  Overall Patient Progress: improving  Goal: Patient-Specific Goal (Individualized)  Description: You can add care plan individualizations to a care plan. Examples of Individualization might be:  \"Parent requests to be called daily at 9am for status\", \"I have a hard time hearing out of my right ear\", or \"Do not touch me to wake me up as it startlesme\".  Outcome: Progressing  Goal: Absence of Hospital-Acquired Illness or Injury  Outcome: Progressing  Intervention: Identify and Manage Fall Risk  Flowsheets  Taken 3/31/2025 1055  Safety Promotion/Fall Prevention:   safety round/check completed   activity supervised  Taken 3/31/2025 3460  Safety Promotion/Fall Prevention:   safety round/check completed   " activity supervised  Intervention: Prevent Skin Injury  Flowsheets  Taken 3/31/2025 1059  Body Position: position changed independently  Skin Protection:   adhesive use limited   tubing/devices free from skin contact  Taken 3/31/2025 1015  Body Position: position changed independently  Taken 3/31/2025 0759  Body Position: position changed independently  Skin Protection:   adhesive use limited   tubing/devices free from skin contact  Intervention: Prevent and Manage VTE (Venous Thromboembolism) Risk  Flowsheets  Taken 3/31/2025 1059  VTE Prevention/Management: SCDs off (sequential compression devices)  Taken 3/31/2025 0759  VTE Prevention/Management: (baby ASA) SCDs off (sequential compression devices)  Intervention: Prevent Infection  Flowsheets  Taken 3/31/2025 1059  Infection Prevention:   single patient room provided   rest/sleep promoted   hand hygiene promoted  Taken 3/31/2025 0759  Infection Prevention:   single patient room provided   rest/sleep promoted   hand hygiene promoted  Goal: Optimal Comfort and Wellbeing  Outcome: Progressing  Intervention: Monitor Pain and Promote Comfort  Flowsheets (Taken 3/31/2025 1059)  Pain Management Interventions:   diversional activity provided   distraction  Intervention: Provide Person-Centered Care  Flowsheets  Taken 3/31/2025 1059  Trust Relationship/Rapport:   care explained   choices provided   emotional support provided   empathic listening provided   questions answered   questions encouraged   reassurance provided   thoughts/feelings acknowledged  Taken 3/31/2025 0759  Trust Relationship/Rapport:   care explained   choices provided   emotional support provided   empathic listening provided   questions answered   questions encouraged   reassurance provided   thoughts/feelings acknowledged  Goal: Readiness for Transition of Care  Outcome: Progressing     Problem: Delirium  Goal: Optimal Coping  Outcome: Progressing  Intervention: Optimize Psychosocial Adjustment to  Delirium  Flowsheets  Taken 3/31/2025 1059  Supportive Measures:   verbalization of feelings encouraged   active listening utilized  Family/Support System Care: self-care encouraged  Taken 3/31/2025 0759  Supportive Measures:   verbalization of feelings encouraged   active listening utilized  Family/Support System Care: self-care encouraged  Goal: Improved Behavioral Control  Outcome: Progressing  Intervention: Prevent and Manage Agitation  Flowsheets  Taken 3/31/2025 1059  Environment Familiarity/Consistency:   daily routine followed   familiar objects from home provided  Taken 3/31/2025 0759  Environment Familiarity/Consistency:   daily routine followed   familiar objects from home provided  Intervention: Minimize Safety Risk  Flowsheets  Taken 3/31/2025 1059  Communication Support Strategies:   active listening utilized   one-step directions provided   simple statements used  Enhanced Safety Measures:   room near unit station   assistive devices when indicated  Trust Relationship/Rapport:   care explained   choices provided   emotional support provided   empathic listening provided   questions answered   questions encouraged   reassurance provided   thoughts/feelings acknowledged  Taken 3/31/2025 0759  Enhanced Safety Measures: room near unit station  Trust Relationship/Rapport:   care explained   choices provided   emotional support provided   empathic listening provided   questions answered   questions encouraged   reassurance provided   thoughts/feelings acknowledged  Goal: Improved Attention and Thought Clarity  Outcome: Progressing  Intervention: Maximize Cognitive Function  Flowsheets  Taken 3/31/2025 1059  Sensory Stimulation Regulation:   care clustered   television on  Reorientation Measures:   clock in view   glasses use encouraged  Taken 3/31/2025 0759  Sensory Stimulation Regulation:   care clustered   television on  Reorientation Measures:   clock in view   glasses use encouraged  Goal: Improved  Sleep  Outcome: Progressing  Intervention: Promote Sleep  Flowsheets  Taken 3/31/2025 1059  Sleep/Rest Enhancement: regular sleep/rest pattern promoted  Taken 3/31/2025 0759  Sleep/Rest Enhancement: regular sleep/rest pattern promoted     Problem: Comorbidity Management  Goal: Blood Glucose Levels Within Targeted Range  Outcome: Progressing  Intervention: Monitor and Manage Glycemia  Flowsheets  Taken 3/31/2025 1059  Medication Review/Management: medications reviewed  Taken 3/31/2025 0759  Medication Review/Management: medications reviewed  Goal: Blood Pressure in Desired Range  Outcome: Progressing  Intervention: Maintain Blood Pressure Management  Flowsheets  Taken 3/31/2025 1059  Medication Review/Management: medications reviewed  Taken 3/31/2025 0759  Medication Review/Management: medications reviewed

## 2025-03-31 NOTE — PROGRESS NOTES
Care Management Discharge Note    Discharge Date: 03/31/2025       Discharge Disposition: Assisted Living, Home Care    Discharge Services: PCA    Discharge DME:  none    Discharge Transportation: family or friend will provide around 1300    Education Provided on the Discharge Plan:  yes  Persons Notified of Discharge Plans: Jr RN, daughter, MD, bedside RN, charge RN  Patient/Family in Agreement with the Plan: yes    Handoff Referral Completed: No, handoff not indicated or clinically appropriate    Additional Information:  CM following for discharge planning needs. Patient will return to the [facility] today. MD is working on orders. Call placed to the [facility] Nurse line [phone] and spoke to the nurse about patient returning today. They are agreeable to patient returning today. They prefer he return before 1500. Orders will be faxed to them at fax: [phone] when received. New medications should be sent to [facility]. MD updated.    Call placed to patient's daughter Natividad regarding discharge today. She states she will be at the hospital with patient's caregiver around 1300 for transport. Bedside RN and charge RN updated.     ADDENDUM 1140:  Discharge orders received and faxed via Epic.           Ariadne Aguilar RN BSN CM  Inpatient Care Coordination  [facility]  [phone]

## 2025-03-31 NOTE — PLAN OF CARE
"BP (!) 168/66 (BP Location: Left arm)   Pulse 65   Temp 98  F (36.7  C) (Oral)   Resp 20   Ht 1.619 m (5' 3.75\")   Wt 73.8 kg (162 lb 11.2 oz)   SpO2 95%   BMI 28.15 kg/m      1900 - 0700:     VS: VSS except BP elevated at times. Sating well on RA.  Pain: Denies pain.  Lines: PIV SL.  Cognitive: Alert to self only. Sitter at bedside for safety as pt a high fall risk.   Respiratory: LS: expiratory wheezes. Sudden onset of SOB. Pt placed on 2 L via NC and neb given. Decreased O2 to 1/2 L and decreased WOB noted. Frequent cough. PRN Robitussin given.   Cardiac: Denies CP. Tele: 100% A paced, BBB, prolonged QT, and inverted T waves.  Peripheral neurovascular: No edema. Pt denies numbness, tingling, and tenderness. Pulses 2+.  GI: Last BM 3/30. Denies N/V.  : Incontinent of urine.    Skin: See flowsheet for assessment.   Diet: Regular.  Activity: A1 GB and walker. .   Labs: B, 201.   Plan: Wean O2. Plan to discharge back to  today. Continue w/ POC.     Goal Outcome Evaluation:      Plan of Care Reviewed With: patient    Overall Patient Progress: no changeOverall Patient Progress: no change    Outcome Evaluation: Sill confused and difficult to redirect, but much better behavior control w/ sitter in place. BG elevated. BP WDL.      Problem: Adult Inpatient Plan of Care  Goal: Plan of Care Review  Description: The Plan of Care Review/Shift note should be completed every shift.  The Outcome Evaluation is a brief statement about your assessment that the patient is improving, declining, or no change.  This information will be displayed automatically on your shiftnote.  Outcome: Progressing  Flowsheets (Taken 3/30/2025 2129)  Outcome Evaluation: Sill confused and difficult to redirect, but much better behavior control w/ sitter in place. BG elevated. BP WDL.  Plan of Care Reviewed With: patient  Overall Patient Progress: no change  Goal: Patient-Specific Goal (Individualized)  Description: You can add care plan " "individualizations to a care plan. Examples of Individualization might be:  \"Parent requests to be called daily at 9am for status\", \"I have a hard time hearing out of my right ear\", or \"Do not touch me to wake me up as it startlesme\".  Outcome: Progressing  Goal: Absence of Hospital-Acquired Illness or Injury  Outcome: Progressing  Intervention: Identify and Manage Fall Risk  Recent Flowsheet Documentation  Taken 3/30/2025 2009 by Mary Doe, RN  Safety Promotion/Fall Prevention:   safety round/check completed   activity supervised   bedside attendant   clutter free environment maintained   increased rounding and observation   lighting adjusted   increase visualization of patient   mobility aid in reach   nonskid shoes/slippers when out of bed   patient and family education   room near nurse's station   room door open   supervised activity   toileting scheduled   treat reversible contributory factors  Intervention: Prevent Skin Injury  Recent Flowsheet Documentation  Taken 3/30/2025 2009 by Mary Doe, RN  Body Position: position changed independently  Intervention: Prevent and Manage VTE (Venous Thromboembolism) Risk  Recent Flowsheet Documentation  Taken 3/30/2025 2009 by Mary Doe, RN  VTE Prevention/Management:   patient refused intervention   SCDs off (sequential compression devices)  Intervention: Prevent Infection  Recent Flowsheet Documentation  Taken 3/30/2025 2009 by Mary Doe, RN  Infection Prevention:   rest/sleep promoted   single patient room provided   personal protective equipment utilized   hand hygiene promoted   equipment surfaces disinfected   environmental surveillance performed   cohorting utilized  Goal: Optimal Comfort and Wellbeing  Outcome: Progressing  Goal: Readiness for Transition of Care  Outcome: Progressing     Problem: Delirium  Goal: Optimal Coping  Outcome: Progressing  Goal: Improved Behavioral Control  Outcome: Not Progressing  Intervention: Minimize Safety " Risk  Recent Flowsheet Documentation  Taken 3/30/2025 2009 by Mary Doe, RN  Enhanced Safety Measures:   anti-tipping device on wheelchairs   assistive devices when indicated   monitor patients coagulation values   patient/family teach back on injury risk   review medications for side effects with activity    at bedside  Goal: Improved Attention and Thought Clarity  Outcome: Not Progressing  Intervention: Maximize Cognitive Function  Recent Flowsheet Documentation  Taken 3/30/2025 2009 by Mary Doe, RN  Sensory Stimulation Regulation:   auditory stimulation provided   care clustered   music on   tactile stimulation provided   television on   visual stimulation provided  Reorientation Measures:   calendar in view   clock in view   glasses use encouraged   reorientation provided  Goal: Improved Sleep  Outcome: Progressing     Problem: Gas Exchange Impaired  Goal: Optimal Gas Exchange  Intervention: Optimize Oxygenation and Ventilation  Recent Flowsheet Documentation  Taken 3/30/2025 2009 by Mary Doe, RN  Head of Bed (HOB) Positioning: HOB at 20 degrees     Problem: Comorbidity Management  Goal: Blood Glucose Levels Within Targeted Range  Outcome: Not Progressing  Intervention: Monitor and Manage Glycemia  Recent Flowsheet Documentation  Taken 3/30/2025 2009 by Mary Doe, RN  Medication Review/Management: medications reviewed  Goal: Blood Pressure in Desired Range  Outcome: Progressing  Intervention: Maintain Blood Pressure Management  Recent Flowsheet Documentation  Taken 3/30/2025 2009 by Mary Doe, RN  Medication Review/Management: medications reviewed

## 2025-03-31 NOTE — DISCHARGE SUMMARY
"[facility]  Hospitalist Discharge Summary      Date of Admission:  3/25/2025  Date of Discharge:  3/31/2025  Discharging Provider: Huber Huizar MD  Discharge Service: Hospitalist Service    Discharge Diagnoses   Acute hypercapnic and suspected hypoxic respiratory failure   Acute respiratory acidosis   RAD   RUL - CAP   Sepsis secondary to pneumonia   Elevated troponin and BNP suspect demand suppply  Acute systolic HFrEF (new) LV EF 35 - 40%   ROSANNE, suspect cardiorenal syndrome   T2DM, Hyperglycemia   HTN,  HLP  Hx Aortic Stenosis  s/p TAVR  Hx of High degree AV block s/p PPM        Clinically Significant Risk Factors     # DMII: A1C = 7.1 % (Ref range: <5.7 %) within past 6 months  # Overweight: Estimated body mass index is 28.15 kg/m  as calculated from the following:    Height as of this encounter: 1.619 m (5' 3.75\").    Weight as of this encounter: 73.8 kg (162 lb 11.2 oz).       Follow-ups Needed After Discharge    Additional follow-up instructions/to-do's for PCP    : assessment after completion of Prednisone taper     Unresulted Labs Ordered in the Past 30 Days of this Admission       No orders found from 2/23/2025 to 3/26/2025.        These results will be followed up by PCP    Discharge Disposition   Discharged to [facility]  Condition at discharge: Stable    Hospital Course   Summary of Stay: Reid Jimenes is a 89 year old male with a history of  htn/hlp/T2dm, no CAD by cath 2021, hx of Aortic Stenosis s/p TAVR, hx of CHB s/p PPM, CKD  3a with baseline creat 1.3-1.4 range, dementia, lumbar stenosis admitted on 3/25/2025 with SOB with increased work of breathing     He's been dealing with a junky but nonproductive cough for the past week or so, his wife has had issues with the same.  No known fevers or antecedent URI sx.  No n/v.  He denies CP and SOB to me and is on a bipap machine at the time.  He was seen in the ER for same the day pta and diagnosed with a viral LRI and discharged " with azith and steroids burst.  I believe he lives with his wife in memory care but am not sure.  In any case staff called EMS and he apparently had WOB that was impressive enough that he was put on CPAP     ER VS he is afebrile, mild tachycardia and BPs in the 's over 50's  BMP with ROSANNE bun/creat 50/2.07  bicarb is 19 and AG is 18 lactate 8.0 (!)-> 3.9 VBG 7.17/61 -> 7.23/57   BNP 5342, trop 96- EKG I think it's sinus with 1*AVB and LBBB  CBC leukocytosis 16.9 with neutrophilia and lymphopenia      CXR with ? 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  Not sure what pushed him over the edge today  -required BiPAP rescue therapy on admission subsequently weaned off  -still on low dose oxygen which is being weaned   -Was on methylprednisolone switched to prednisone 40 mg p.o. daily on 3/30/2025     RUL  CAP   Sepsis elevated lactate, leukocytosis, tachycardia   Junky cough but not productive  -ceftriaxone/azith, sputum culture if able.  Completed 5 days azith 3/29, will complete 7 days of ceftriaxone or oral equivalent on 3/31  -BCx NGTD p 3 days   Switched ceftriaxone to oral Ceftin twice daily on 3/30/2025 for 2 more days for a total of 1 week course of antibiotics     Elevated troponin and BNP  Acute systolic HFrEF (new) LV EF 35 - 40%  96->254->521->424, BNP 8k  Echo with reduced EF 35-40% (down from 55-60%).  Had trivial CAD on angio in 2021.  Appreciate cards input who do not feel additional ischemic evaluation needed but do recommend repeat echo in 2-3 months   Has been given serial doses of IV furosemide switched to oral on 3/28/2025  Currently on Lasix 40 mg p.o. daily     ROSANNE  Most likely from cardiorenal syndrome  Creatinine  improved from 2.07 on 3/26 to  1.46 with diuresis     Hyperglycemia   T2dm   Is not on any diabetic agents at home.  Hgb A1c is acceptable at 7.1%  BGs high in the setting of steroids   Has been on glargine 15 unit(s) and ISS, now changing from bid methylpred to oral pred so stopped glargine   Started on NPH 5 units subcu daily on 3/30/2025 while on steroids     Htn,hlp  Hx Aortic Stenosis  s/p TAVR  Hx of CHB s/p PPM   Cont with pta asa 81 mg and statin      Dementia   Cont with donepezil 10 mg at bedtime, sertraline 25 mg every day, quetiapine 12.5 mg at bedtime,  trazodone 50 mg po at bedtime prn, and quetiapine 12.5 mg bid prn anxiety   At risk for delirium, EKG QTc 495 so on the lengthier side, ck mag               Consultations This Hospital Stay   PHYSICAL THERAPY ADULT IP CONSULT  CARE MANAGEMENT / SOCIAL WORK IP CONSULT  CARE MANAGEMENT / SOCIAL WORK IP CONSULT  SPIRITUAL HEALTH SERVICES IP CONSULT  CARDIOLOGY IP CONSULT  CARDIOLOGY IP CONSULT  CARE MANAGEMENT / SOCIAL WORK IP CONSULT    Code Status   No CPR- Pre-arrest intubation OK    Time Spent on this Encounter   I, Huber Huizar MD, personally saw the patient today and spent greater than 30 minutes discharging this patient.       Huber Huizar MD  [place] [address] [facility]  [address] [place] [address]  Phone: [phone]  Fax: [phone]  ______________________________________________________________________    Physical Exam   Vital Signs: Temp: 97.7  F (36.5  C) Temp src: Oral BP: (!) 140/52 Pulse: 64   Resp: 20 SpO2: 94 % O2 Device: None (Room air) Oxygen Delivery: 1/2 LPM  Weight: 162 lbs 11.2 oz  Constitutional: awake, alert, cooperative, no apparent distress, and appears stated age  Respiratory: No increased work of breathing, good air exchange, clear to auscultation bilaterally, no crackles or wheezing  Cardiovascular regular paced rhythm, and no murmur noted       Primary Care Physician   [facility]    Discharge Orders   No discharge procedures on file.    Significant Results and Procedures   Results for orders placed or performed during the hospital encounter of 03/25/25   XR Chest Port 1 View    Narrative    EXAM: XR CHEST PORT 1 VIEW  LOCATION: [facility]  DATE: 3/25/2025    INDICATION: shortness of breath  COMPARISON: 03/24/2025      Impression    IMPRESSION: Cardiomediastinal silhouette. Cardiac leads. TAVR. Mild elevation left hemidiaphragm. Interstitial prominence. Mild venous congestion not excluded. Fibroatelectasis or mild infiltrate left lung base. Atherosclerotic aorta.   CT Chest w/o Contrast    Narrative    EXAM: CT CHEST W/O CONTRAST  LOCATION: [facility]  DATE: 3/27/2025    INDICATION: Hypoxia  COMPARISON: 03/25/2025.  TECHNIQUE: CT chest without IV contrast. Multiplanar reformats were obtained. Dose reduction techniques were used.  CONTRAST: None.    FINDINGS:   LUNGS AND PLEURA: An irregular airspace consolidation with air bronchograms is seen in the anteromedial right upper lobe extending to the right hilum. Scattered irregular subpleural and interstitial opacities are seen in the mid to lower lungs, most   notable in the lingula and left lower lobe, which are nonspecific. Small volume bilateral pleural effusions are present, left mildly greater than right. Large airways are patent. Benign calcified pulmonary granuloma in the left lower lobe.    MEDIASTINUM/AXILLAE: A left chest wall implanted cardiac device and cardiac leads are seen in expected position. Heart size is mildly enlarged. No pericardial fluid. Thoracic aorta is normal in course and caliber. Moderate thoracic aortic atherosclerosis   is present. A few prominent and mildly enlarged mediastinal lymph nodes are present measuring up to 12 mm in short axis in the distal right paratracheal region. These lymph nodes are nonspecific but likely reactive in nature. Evaluation  for hilar   lymphadenopathy is limited without IV contrast. Status post aortic valve repair.    CORONARY ARTERY CALCIFICATION: Severe.    UPPER ABDOMEN: Minimal perihepatic free fluid is present. There is also mild edematous wall thickening of the gallbladder. No calcified gallstones are seen. Moderate atherosclerosis of the imaged abdominal aorta. Colonic diverticulosis.    MUSCULOSKELETAL: Multilevel hypertrophic degenerative changes of the spine. No acute osseous abnormality.      Impression    IMPRESSION:   1.  Right upper lobe pneumonia. Recommend follow-up to clearing.  2.  Subpleural and interstitial opacities, most notable in the lingula and left lower lobe, which are nonspecific and may represent scarring, atelectasis, edema, and/or pneumonitis.  3.  Small volume bilateral pleural effusions, left greater than right.  4.  Minimal perihepatic free fluid/ascites and mild nonspecific edematous wall thickening of the gallbladder. Consider further evaluation with right upper quadrant ultrasound if clinically indicated.  5.  Nonacute findings as above.     US Abdomen Limited    Narrative    EXAM: US ABDOMEN LIMITED  LOCATION: [facility]  DATE: 3/28/2025    INDICATION: GB abnormal on CT  COMPARISON: None.  TECHNIQUE: Limited abdominal ultrasound.    FINDINGS:    GALLBLADDER: No gallstones. Trace pericholecystic fluid. Negative sonographic Garcia's sign. Gallbladder wall thickening is present measuring up to 11 mm.    BILE DUCTS: No biliary dilatation. The common duct measures 3 mm.    LIVER: Normal parenchyma with smooth contour. No focal mass. The portal vein is patent with flow in the normal direction.    RIGHT KIDNEY: No hydronephrosis.    PANCREAS: The visualized portions are normal.    No ascites.      Impression    IMPRESSION:  1.  Gallbladder wall thickening with trace pericholecystic fluid. No gallstones are seen. Findings are nonspecific and may suggest acalculus cholecystitis.  Suggest correlation with laboratory values. If definitive characterization is needed, consider   follow-up HIDA scan.         Echocardiogram Complete     Value    LVEF  35-40%    [facility]    [id number]  [id number]  [id number]  [id number]^KATE^JORY^GWENDOLYN     [facility]  Echocardiography Laboratory  [address] [place] [address]     Name: SARAH BOYD  MRN: [id number]  : 1934  Study Date: 2025 10:47 AM  Age: 89 yrs  Gender: Male  Patient Location: [facility]  Reason For Study: Dyspnea  Ordering Physician: JORY LOVE  Performed By: Iron Yanez RDCS     BSA: 1.8 m2  Height: 63 in  Weight: 178 lb  HR: 80  BP: 122/64 mmHg  ______________________________________________________________________________  Procedure  Echocardiogram with two-dimensional, color and spectral Doppler.  ______________________________________________________________________________  Interpretation Summary     There is a bioprosthetic aortic valve. TAVR 26mm Greene Sabino 3  The prosthetic aortic valve appears to open well.  The gradient is normal for this prosthetic aortic valve.  This degree of valvular regurgitation is within normal limits.  The visual ejection fraction is 35-40%.  Septal motion is consistent with conduction abnormality.  There is mild to moderate (1-2+) mitral regurgitation.  There is mild to moderate (1-2+) tricuspid regurgitation.  Right ventricular systolic pressure is elevated, consistent with moderate to  severe pulmonary hypertension.  Significant decrease in LVEF and increase in PA pressure compared with most  recent study.  ______________________________________________________________________________  Left Ventricle  The left ventricle is borderline dilated. There is normal left ventricular  wall thickness. A sigmoid septum is present. Grade I or early diastolic  dysfunction. The visual ejection fraction is 35-40%. There is mild-moderate  global hypokinesia of the  left ventricle. Septal motion is consistent with  conduction abnormality.     Right Ventricle  The right ventricle is normal in structure, function and size.     Atria  Normal left atrial size. Right atrial size is normal.     Mitral Valve  There is mild mitral annular calcification. The mitral valve leaflets are  mildly thickened. There is mild to moderate (1-2+) mitral regurgitation.     Tricuspid Valve  There is mild to moderate (1-2+) tricuspid regurgitation. Right ventricular  systolic pressure is elevated, consistent with moderate to severe pulmonary  hypertension.     Aortic Valve  The prosthetic aortic valve appears to open well. The gradient is normal for  this prosthetic aortic valve. This degree of valvular regurgitation is within  normal limits.     Pulmonic Valve  The pulmonic valve is not well seen, but is grossly normal.     Vessels  The aortic root is normal size. The inferior vena cava is normal.     Pericardium  There is no pericardial effusion.     Rhythm  Sinus rhythm was noted.  ______________________________________________________________________________  MMode/2D Measurements & Calculations     IVSd: 1.1 cm  LVIDd: 5.4 cm  LVIDs: 4.3 cm  LVPWd: 0.89 cm  IVC diam: 2.0 cm  FS: 20.2 %  LV mass(C)d: 208.6 grams  LV mass(C)dI: 113.4 grams/m2  asc Aorta Diam: 3.5 cm  LVOT diam: 2.0 cm  LVOT area: 3.3 cm2  Asc Ao diam index BSA (cm/m2): 1.9  Asc Ao diam index Ht(cm/m): 2.2  LA Volume (BP): 36.4 ml  LA Volume Index (BP): 19.8 ml/m2     RV Base: 3.3 cm  RWT: 0.33  TAPSE: 1.8 cm     Doppler Measurements & Calculations  MV E max florentino: 97.9 cm/sec  MV A max florentino: 140.0 cm/sec  MV E/A: 0.70  MV max P.2 mmHg  MV mean PG: 3.5 mmHg  MV V2 VTI: 37.3 cm  MVA(VTI): 1.9 cm2  MV dec time: 0.14 sec  Ao V2 max: 240.0 cm/sec  Ao max P.0 mmHg  Ao V2 mean: 165.0 cm/sec  Ao mean P.0 mmHg  Ao V2 VTI: 44.6 cm  HUNG(I,D): 1.6 cm2  HUNG(V,D): 1.5 cm2  LV V1 max P.1 mmHg  LV V1 max: 113.0 cm/sec  LV V1 VTI:  21.8 cm  SV(LVOT): 71.5 ml  SI(LVOT): 38.9 ml/m2  PA acc time: 0.09 sec  TR max anson: 339.4 cm/sec  TR max P.1 mmHg  AV Anson Ratio (DI): 0.47  HUNG Index (cm2/m2): 0.87  E/E' av.1     Lateral E/e': 11.0  Medial E/e': 15.3  RV S Anson: 10.3 cm/sec     ______________________________________________________________________________  Report approved by: Alejo Rubio MD on 2025 01:29 PM             Discharge Medications   Current Discharge Medication List        START taking these medications    Details   carvedilol (COREG) 3.125 MG tablet Take 1 tablet (3.125 mg) by mouth 2 times daily (with meals).  Qty: 60 tablet, Refills: 0    Associated Diagnoses: Systolic heart failure, unspecified HF chronicity (H)      furosemide (LASIX) 20 MG tablet Take 1 tablet (20 mg) by mouth daily.  Qty: 30 tablet, Refills: 0    Associated Diagnoses: Systolic heart failure, unspecified HF chronicity (H)           CONTINUE these medications which have CHANGED    Details   predniSONE (DELTASONE) 20 MG tablet Take 2 tabs daily x 3 days, then 1 tab daily x 6 days, then 1/2 tab daily x 6 days.  Qty: 15 tablet, Refills: 0    Associated Diagnoses: Community acquired pneumonia of right lower lobe of lung           CONTINUE these medications which have NOT CHANGED    Details   acetaminophen (TYLENOL) 325 MG tablet TAKE 2 TABLETS BY MOUTH THREE TIMES DAILY . DO NOT EXCEED 3000 MG OF ACETAMINOPHEN PER 24 HOURS  Qty: 180 tablet, Refills: 3    Associated Diagnoses: Lumbosacral spinal stenosis; Acute right-sided low back pain without sciatica      albuterol (PROAIR HFA/PROVENTIL HFA/VENTOLIN HFA) 108 (90 Base) MCG/ACT inhaler Inhale 2 puffs into the lungs every 6 hours as needed for shortness of breath, wheezing or cough.  Qty: 18 g, Refills: 0    Comments: Pharmacy may dispense brand covered by insurance (Proair, or proventil or ventolin or generic albuterol inhaler)      ascorbic acid 500 MG TABS Take 500 mg by mouth daily.      aspirin 81 MG EC  tablet Take 1 tablet (81 mg) by mouth daily  Qty: 30 tablet    Associated Diagnoses: Severe aortic stenosis      donepezil (ARICEPT) 5 MG tablet Take 5 mg by mouth at bedtime.      fluocinonide (LIDEX) 0.05 % external ointment Apply topically 2 times daily.  Qty: 30 g, Refills: 3    Associated Diagnoses: Other atopic dermatitis      guaiFENesin (MUCINEX) 600 MG 12 hr tablet Take 1,200 mg by mouth 2 times daily as needed for congestion.      Multiple Vitamins-Iron (MULTIVITAMIN/IRON PO) Take 1 tablet by mouth daily. Certavite senior*      ondansetron (ZOFRAN) 4 MG tablet Take 4 mg by mouth 3 times daily. 3/20/25 TID x7 days.      !! QUEtiapine (SEROQUEL) 25 MG tablet Take 12.5 mg by mouth at bedtime. Dose = 1/2 tab = 12.5mg      !! QUEtiapine (SEROQUEL) 25 MG tablet Take 12.5 mg by mouth 2 times daily as needed (anxiety or severe agitation). Dose = 1/2 tab = 12.5mg      sertraline (ZOLOFT) 25 MG tablet Take 1 tablet by mouth once daily  Qty: 90 tablet, Refills: 0    Comments: Please schedule appt  Associated Diagnoses: Anxiety      simvastatin (ZOCOR) 20 MG tablet TAKE 1 TABLET BY MOUTH AT BEDTIME  Qty: 90 tablet, Refills: 0    Comments: Please schedule appt  Associated Diagnoses: Hyperlipidemia with target LDL less than 100      traZODone (DESYREL) 50 MG tablet TAKE 1 TABLET BY MOUTH NIGHTLY AS NEEDED FOR SLEEP  Qty: 90 tablet, Refills: 3    Associated Diagnoses: Insomnia, unspecified type      metFORMIN (GLUCOPHAGE XR) 500 MG 24 hr tablet TAKE 1 TABLET BY MOUTH TWICE DAILY WITH MEALS  Qty: 180 tablet, Refills: 0    Comments: Please schedule appt  Associated Diagnoses: Type 2 diabetes mellitus with microalbuminuria, without long-term current use of insulin (H)       !! - Potential duplicate medications found. Please discuss with provider.        STOP taking these medications       azithromycin (ZITHROMAX) 250 MG tablet Comments:   Reason for Stopping:             Allergies   Allergies   Allergen Reactions     Fentanyl Other (See Comments)     Confusion and agitation after PPM    Versed [Midazolam] Other (See Comments)     Confusion and agitation post PPM

## 2025-04-01 ENCOUNTER — PATIENT OUTREACH (OUTPATIENT)
Dept: CARE COORDINATION | Facility: CLINIC | Age: OVER 89
End: 2025-04-01

## 2025-04-01 ENCOUNTER — ANCILLARY PROCEDURE (OUTPATIENT)
Dept: CARDIOLOGY | Facility: CLINIC | Age: OVER 89
End: 2025-04-01
Attending: INTERNAL MEDICINE
Payer: MEDICARE

## 2025-04-01 DIAGNOSIS — Z95.0 CARDIAC PACEMAKER IN SITU: ICD-10-CM

## 2025-04-01 DIAGNOSIS — Z95.0 CARDIAC PACEMAKER IN SITU: Primary | ICD-10-CM

## 2025-04-01 DIAGNOSIS — I44.2 CHB (COMPLETE HEART BLOCK) (H): ICD-10-CM

## 2025-04-01 PROCEDURE — 93296 REM INTERROG EVL PM/IDS: CPT | Performed by: INTERNAL MEDICINE

## 2025-04-01 PROCEDURE — 93294 REM INTERROG EVL PM/LDLS PM: CPT | Performed by: INTERNAL MEDICINE

## 2025-04-01 NOTE — PROGRESS NOTES
Connected Care Resource Center: [facility] [place]    Background: Transitional Care Management program identified per system criteria and reviewed by [facility] [place] team for possible outreach.    Assessment: Upon chart review, [facility] Team member will not proceed with patient outreach related to this episode of Transitional Care Management program due to reason below:    Patient has discharged to a Memory Care, Long-term Care, Assisted Living or Group Home where patient is receiving on-site support with their daily cares, including support with hospital follow up plan.    Plan: Transitional Care Management episode addressed appropriately per reason noted above.      Denisa Sanchez  Community Health Worker  [facility], [facility]  [phone]     *Connected Care Resource Team does NOT follow patient ongoing. Referrals are identified based on internal discharge reports and the outreach is to ensure patient has an understanding of their discharge instructions.

## 2025-04-02 LAB
MDC_IDC_EPISODE_DTM: NORMAL
MDC_IDC_EPISODE_ID: NORMAL
MDC_IDC_EPISODE_TYPE: NORMAL
MDC_IDC_LEAD_CONNECTION_STATUS: NORMAL
MDC_IDC_LEAD_CONNECTION_STATUS: NORMAL
MDC_IDC_LEAD_IMPLANT_DT: NORMAL
MDC_IDC_LEAD_IMPLANT_DT: NORMAL
MDC_IDC_LEAD_LOCATION: NORMAL
MDC_IDC_LEAD_LOCATION: NORMAL
MDC_IDC_LEAD_LOCATION_DETAIL_1: NORMAL
MDC_IDC_LEAD_LOCATION_DETAIL_1: NORMAL
MDC_IDC_LEAD_MFG: NORMAL
MDC_IDC_LEAD_MFG: NORMAL
MDC_IDC_LEAD_MODEL: NORMAL
MDC_IDC_LEAD_MODEL: NORMAL
MDC_IDC_LEAD_POLARITY_TYPE: NORMAL
MDC_IDC_LEAD_POLARITY_TYPE: NORMAL
MDC_IDC_LEAD_SERIAL: NORMAL
MDC_IDC_LEAD_SERIAL: NORMAL
MDC_IDC_MSMT_BATTERY_DTM: NORMAL
MDC_IDC_MSMT_BATTERY_REMAINING_LONGEVITY: 66 MO
MDC_IDC_MSMT_BATTERY_REMAINING_PERCENTAGE: 100 %
MDC_IDC_MSMT_BATTERY_STATUS: NORMAL
MDC_IDC_MSMT_LEADCHNL_RA_IMPEDANCE_VALUE: 581 OHM
MDC_IDC_MSMT_LEADCHNL_RA_PACING_THRESHOLD_AMPLITUDE: 0.6 V
MDC_IDC_MSMT_LEADCHNL_RA_PACING_THRESHOLD_PULSEWIDTH: 0.4 MS
MDC_IDC_MSMT_LEADCHNL_RV_IMPEDANCE_VALUE: 561 OHM
MDC_IDC_MSMT_LEADCHNL_RV_PACING_THRESHOLD_AMPLITUDE: 0.9 V
MDC_IDC_MSMT_LEADCHNL_RV_PACING_THRESHOLD_PULSEWIDTH: 0.4 MS
MDC_IDC_PG_IMPLANT_DTM: NORMAL
MDC_IDC_PG_MFG: NORMAL
MDC_IDC_PG_MODEL: NORMAL
MDC_IDC_PG_SERIAL: NORMAL
MDC_IDC_PG_TYPE: NORMAL
MDC_IDC_SESS_CLINIC_NAME: NORMAL
MDC_IDC_SESS_DTM: NORMAL
MDC_IDC_SESS_TYPE: NORMAL
MDC_IDC_SET_BRADY_AT_MODE_SWITCH_MODE: NORMAL
MDC_IDC_SET_BRADY_AT_MODE_SWITCH_RATE: 170 {BEATS}/MIN
MDC_IDC_SET_BRADY_LOWRATE: 60 {BEATS}/MIN
MDC_IDC_SET_BRADY_MAX_SENSOR_RATE: 130 {BEATS}/MIN
MDC_IDC_SET_BRADY_MAX_TRACKING_RATE: 130 {BEATS}/MIN
MDC_IDC_SET_BRADY_MODE: NORMAL
MDC_IDC_SET_BRADY_PAV_DELAY_HIGH: 150 MS
MDC_IDC_SET_BRADY_PAV_DELAY_LOW: 200 MS
MDC_IDC_SET_BRADY_SAV_DELAY_HIGH: 150 MS
MDC_IDC_SET_BRADY_SAV_DELAY_LOW: 200 MS
MDC_IDC_SET_LEADCHNL_RA_PACING_AMPLITUDE: 2 V
MDC_IDC_SET_LEADCHNL_RA_PACING_CAPTURE_MODE: NORMAL
MDC_IDC_SET_LEADCHNL_RA_PACING_POLARITY: NORMAL
MDC_IDC_SET_LEADCHNL_RA_PACING_PULSEWIDTH: 0.4 MS
MDC_IDC_SET_LEADCHNL_RA_SENSING_ADAPTATION_MODE: NORMAL
MDC_IDC_SET_LEADCHNL_RA_SENSING_POLARITY: NORMAL
MDC_IDC_SET_LEADCHNL_RA_SENSING_SENSITIVITY: 0.25 MV
MDC_IDC_SET_LEADCHNL_RV_PACING_AMPLITUDE: 1.4 V
MDC_IDC_SET_LEADCHNL_RV_PACING_CAPTURE_MODE: NORMAL
MDC_IDC_SET_LEADCHNL_RV_PACING_POLARITY: NORMAL
MDC_IDC_SET_LEADCHNL_RV_PACING_PULSEWIDTH: 0.4 MS
MDC_IDC_SET_LEADCHNL_RV_SENSING_ADAPTATION_MODE: NORMAL
MDC_IDC_SET_LEADCHNL_RV_SENSING_POLARITY: NORMAL
MDC_IDC_SET_LEADCHNL_RV_SENSING_SENSITIVITY: 1.5 MV
MDC_IDC_SET_ZONE_DETECTION_INTERVAL: 375 MS
MDC_IDC_SET_ZONE_STATUS: NORMAL
MDC_IDC_SET_ZONE_TYPE: NORMAL
MDC_IDC_SET_ZONE_VENDOR_TYPE: NORMAL
MDC_IDC_STAT_AT_BURDEN_PERCENT: 0 %
MDC_IDC_STAT_AT_DTM_END: NORMAL
MDC_IDC_STAT_AT_DTM_START: NORMAL
MDC_IDC_STAT_BRADY_DTM_END: NORMAL
MDC_IDC_STAT_BRADY_DTM_START: NORMAL
MDC_IDC_STAT_BRADY_RA_PERCENT_PACED: 48 %
MDC_IDC_STAT_BRADY_RV_PERCENT_PACED: 44 %
MDC_IDC_STAT_EPISODE_RECENT_COUNT: 0
MDC_IDC_STAT_EPISODE_RECENT_COUNT_DTM_END: NORMAL
MDC_IDC_STAT_EPISODE_RECENT_COUNT_DTM_START: NORMAL
MDC_IDC_STAT_EPISODE_TYPE: NORMAL
MDC_IDC_STAT_EPISODE_VENDOR_TYPE: NORMAL
MDC_IDC_STAT_EPISODE_VENDOR_TYPE: NORMAL

## 2025-04-07 ENCOUNTER — HOSPITAL ENCOUNTER (EMERGENCY)
Facility: CLINIC | Age: OVER 89
Discharge: NURSING, REHAB OR LONG-TERM CARE | End: 2025-04-08
Attending: EMERGENCY MEDICINE | Admitting: EMERGENCY MEDICINE
Payer: MEDICARE

## 2025-04-07 ENCOUNTER — APPOINTMENT (OUTPATIENT)
Dept: GENERAL RADIOLOGY | Facility: CLINIC | Age: OVER 89
End: 2025-04-07
Attending: EMERGENCY MEDICINE
Payer: MEDICARE

## 2025-04-07 VITALS
WEIGHT: 162.7 LBS | HEIGHT: 66 IN | BODY MASS INDEX: 26.15 KG/M2 | RESPIRATION RATE: 14 BRPM | SYSTOLIC BLOOD PRESSURE: 162 MMHG | TEMPERATURE: 98.1 F | DIASTOLIC BLOOD PRESSURE: 67 MMHG | OXYGEN SATURATION: 97 % | HEART RATE: 60 BPM

## 2025-04-07 DIAGNOSIS — E11.65 TYPE 2 DIABETES MELLITUS WITH HYPERGLYCEMIA, WITHOUT LONG-TERM CURRENT USE OF INSULIN (H): ICD-10-CM

## 2025-04-07 LAB
ANION GAP SERPL CALCULATED.3IONS-SCNC: 11 MMOL/L (ref 7–15)
B-OH-BUTYR SERPL-SCNC: <0.18 MMOL/L
BASE EXCESS BLDV CALC-SCNC: 1.6 MMOL/L (ref -3–3)
BASOPHILS # BLD AUTO: 0 10E3/UL (ref 0–0.2)
BASOPHILS NFR BLD AUTO: 0 %
BUN SERPL-MCNC: 40.9 MG/DL (ref 8–23)
CALCIUM SERPL-MCNC: 8.3 MG/DL (ref 8.8–10.4)
CHLORIDE SERPL-SCNC: 95 MMOL/L (ref 98–107)
CREAT SERPL-MCNC: 1.51 MG/DL (ref 0.67–1.17)
EGFRCR SERPLBLD CKD-EPI 2021: 43 ML/MIN/1.73M2
EOSINOPHIL # BLD AUTO: 0 10E3/UL (ref 0–0.7)
EOSINOPHIL NFR BLD AUTO: 0 %
ERYTHROCYTE [DISTWIDTH] IN BLOOD BY AUTOMATED COUNT: 13.2 % (ref 10–15)
EST. AVERAGE GLUCOSE BLD GHB EST-MCNC: 197 MG/DL
FLUAV RNA SPEC QL NAA+PROBE: NEGATIVE
FLUBV RNA RESP QL NAA+PROBE: NEGATIVE
GLUCOSE BLDC GLUCOMTR-MCNC: 472 MG/DL (ref 70–99)
GLUCOSE BLDC GLUCOMTR-MCNC: 575 MG/DL (ref 70–99)
GLUCOSE SERPL-MCNC: 650 MG/DL (ref 70–99)
HBA1C MFR BLD: 8.5 %
HCO3 BLDV-SCNC: 27 MMOL/L (ref 21–28)
HCO3 SERPL-SCNC: 24 MMOL/L (ref 22–29)
HCT VFR BLD AUTO: 31 % (ref 40–53)
HGB BLD-MCNC: 10.3 G/DL (ref 13.3–17.7)
IMM GRANULOCYTES # BLD: 0.1 10E3/UL
IMM GRANULOCYTES NFR BLD: 1 %
LYMPHOCYTES # BLD AUTO: 0.9 10E3/UL (ref 0.8–5.3)
LYMPHOCYTES NFR BLD AUTO: 7 %
MCH RBC QN AUTO: 29.5 PG (ref 26.5–33)
MCHC RBC AUTO-ENTMCNC: 33.2 G/DL (ref 31.5–36.5)
MCV RBC AUTO: 89 FL (ref 78–100)
MONOCYTES # BLD AUTO: 0.8 10E3/UL (ref 0–1.3)
MONOCYTES NFR BLD AUTO: 6 %
NEUTROPHILS # BLD AUTO: 10.6 10E3/UL (ref 1.6–8.3)
NEUTROPHILS NFR BLD AUTO: 86 %
NRBC # BLD AUTO: 0 10E3/UL
NRBC BLD AUTO-RTO: 0 /100
O2/TOTAL GAS SETTING VFR VENT: 21 %
OXYHGB MFR BLDV: 72 % (ref 70–75)
PCO2 BLDV: 47 MM HG (ref 40–50)
PH BLDV: 7.38 [PH] (ref 7.32–7.43)
PLATELET # BLD AUTO: 171 10E3/UL (ref 150–450)
PO2 BLDV: 41 MM HG (ref 25–47)
POTASSIUM SERPL-SCNC: 4.8 MMOL/L (ref 3.4–5.3)
RBC # BLD AUTO: 3.49 10E6/UL (ref 4.4–5.9)
RSV RNA SPEC NAA+PROBE: NEGATIVE
SAO2 % BLDV: 73.3 % (ref 70–75)
SARS-COV-2 RNA RESP QL NAA+PROBE: NEGATIVE
SODIUM SERPL-SCNC: 130 MMOL/L (ref 135–145)
WBC # BLD AUTO: 12.4 10E3/UL (ref 4–11)

## 2025-04-07 PROCEDURE — 258N000003 HC RX IP 258 OP 636: Performed by: EMERGENCY MEDICINE

## 2025-04-07 PROCEDURE — 250N000012 HC RX MED GY IP 250 OP 636 PS 637: Performed by: EMERGENCY MEDICINE

## 2025-04-07 PROCEDURE — 82565 ASSAY OF CREATININE: CPT | Performed by: EMERGENCY MEDICINE

## 2025-04-07 PROCEDURE — 87637 SARSCOV2&INF A&B&RSV AMP PRB: CPT | Performed by: EMERGENCY MEDICINE

## 2025-04-07 PROCEDURE — 96360 HYDRATION IV INFUSION INIT: CPT

## 2025-04-07 PROCEDURE — 85004 AUTOMATED DIFF WBC COUNT: CPT | Performed by: EMERGENCY MEDICINE

## 2025-04-07 PROCEDURE — 82962 GLUCOSE BLOOD TEST: CPT

## 2025-04-07 PROCEDURE — 82310 ASSAY OF CALCIUM: CPT | Performed by: EMERGENCY MEDICINE

## 2025-04-07 PROCEDURE — 83036 HEMOGLOBIN GLYCOSYLATED A1C: CPT | Performed by: EMERGENCY MEDICINE

## 2025-04-07 PROCEDURE — 80048 BASIC METABOLIC PNL TOTAL CA: CPT | Performed by: EMERGENCY MEDICINE

## 2025-04-07 PROCEDURE — 71046 X-RAY EXAM CHEST 2 VIEWS: CPT

## 2025-04-07 PROCEDURE — 85014 HEMATOCRIT: CPT | Performed by: EMERGENCY MEDICINE

## 2025-04-07 PROCEDURE — 36415 COLL VENOUS BLD VENIPUNCTURE: CPT | Performed by: EMERGENCY MEDICINE

## 2025-04-07 PROCEDURE — 96361 HYDRATE IV INFUSION ADD-ON: CPT

## 2025-04-07 PROCEDURE — 99284 EMERGENCY DEPT VISIT MOD MDM: CPT | Mod: 25

## 2025-04-07 PROCEDURE — 82010 KETONE BODYS QUAN: CPT | Performed by: EMERGENCY MEDICINE

## 2025-04-07 PROCEDURE — 82805 BLOOD GASES W/O2 SATURATION: CPT | Performed by: EMERGENCY MEDICINE

## 2025-04-07 RX ADMIN — INSULIN ASPART 8 UNITS: 100 INJECTION, SOLUTION INTRAVENOUS; SUBCUTANEOUS at 23:23

## 2025-04-07 RX ADMIN — SODIUM CHLORIDE 1000 ML: 9 INJECTION, SOLUTION INTRAVENOUS at 21:29

## 2025-04-07 ASSESSMENT — ACTIVITIES OF DAILY LIVING (ADL)
ADLS_ACUITY_SCORE: 58

## 2025-04-08 LAB — GLUCOSE BLDC GLUCOMTR-MCNC: 449 MG/DL (ref 70–99)

## 2025-04-08 PROCEDURE — 96361 HYDRATE IV INFUSION ADD-ON: CPT

## 2025-04-08 RX ORDER — INSULIN ASPART 100 [IU]/ML
INJECTION, SOLUTION INTRAVENOUS; SUBCUTANEOUS
Qty: 15 ML | Refills: 0 | Status: SHIPPED | OUTPATIENT
Start: 2025-04-07

## 2025-04-08 NOTE — ED TRIAGE NOTES
"Arrives from the [facility] via EMS. Pt is known type 2 diabetic, blood sugar at facility was 583, recheck was \"high.\" Denies pain, alert but disorientated to place and self.      Triage Assessment (Adult)       Row Name 04/07/25 [id number]          Triage Assessment    Airway WDL WDL        Respiratory WDL    Respiratory WDL WDL        Skin Circulation/Temperature WDL    Skin Circulation/Temperature WDL WDL        Cardiac WDL    Cardiac WDL WDL        Peripheral/Neurovascular WDL    Peripheral Neurovascular WDL WDL        Cognitive/Neuro/Behavioral WDL    Cognitive/Neuro/Behavioral WDL X     Level of Consciousness --  dementia                     "

## 2025-04-08 NOTE — ED NOTES
"Spoke with multiple people at patient's home ([facility] most recent whom will be there upon arrival) [place] and spoke with and updated Patient's wife (Alyssa)  "

## 2025-04-08 NOTE — ED PROVIDER NOTES
Emergency Department Note      History of Present Illness     Chief Complaint   Hyperglycemia      HPI History is limited due to patient's baseline dementia.   Reid Jimenes is a 89 year old male with a history of dementia, diabetes mellitus type 2, hypertension, coronary artery disease, STEMI, chronic kidney disease stage 3, and hyperglycemia presenting to the ED for hyperglycemia. He comes from an assisted living facility, which he moved into 2 days ago, with reports of a blood sugar of 480 yesterday and 580 today. He reports a slight cough for the past 1-2 weeks. He has been reportedly been received his medications regularly, though he receives no diabetes medications. Denies emesis, fever, diarrhea, congestion, abdominal pain, leg pain, or leg swelling.     Per daughter, he was recently hospitalized for pneumonia and sepsis, he was on prednisone at that time and is now tapered off.    Independent Historian   Nurse provides additional history regarding the assisted living facility.     Facility medication review report provides history regarding medications.     Daughter as noted above.     Review of External Notes   [facility] health worker note reviewed from 4/1/2025.    Past Medical History     Medical History and Problem List   DM2  CAD  Dementia  Aortic valve stenosis  HLD  HTN  Spinal stenosis   PVD  Pseudoaneurysm of femoral artery  LBBB  STEMI  Tobacco abuse  GERD  Scoliosis of lumbar spine  DDD  CKD stage 3  Hyperglycemia  Acute respiratory failure with hypoxia  Pneumonia of right lower lobe of lung     Medications   Albuterol  Coreg  Lasix  Deltasone  Zoloft  Zocor  Seroquel  Zofran  Desyrel  Aricept    Surgical History   CV heart cath   CV temporary pacemaker insertion  Mandible surgery  Cardiac cath   Transcatheter aortic valve implant   Cataract removal, right  Cataract removal, left     Physical Exam     Patient Vitals for the past 24 hrs:   BP SpO2   04/07/25 2334 (!)  162/67 97 %   04/07/25 2304 (!) 172/74 98 %     04/07 2206 168/73 Important  -- 60 14 97 %            Initial        04/07 2104 -- 98.1  F (36.7  C) 60 18 100 %     Physical Exam  General: Elderly adult sitting upright  Eyes: PERRL, Conjunctive within normal limits  ENT: Moist mucous membranes, oropharynx clear.   CV: Normal S1S2, no murmur, rub or gallop. Regular rate and rhythm  Resp: Clear to auscultation bilaterally, no wheezes, rales or rhonchi. Normal respiratory effort.  GI: Abdomen is soft, nontender and nondistended. No palpable masses. No rebound or guarding.  MSK: No edema. Nontender. Normal active range of motion.  Skin: Warm and dry. No rashes or lesions or ecchymoses on visible skin.  Neuro: Alert and oriented to person and place.  Thinks it is 1990.  Responds appropriately to all questions and commands.  Unable to give details of today.  No focal findings appreciated. Normal muscle tone.  Psych: Normal mood and affect.     Diagnostics     Lab Results   Labs Ordered and Resulted from Time of ED Arrival to Time of ED Departure   BASIC METABOLIC PANEL - Abnormal       Result Value    Sodium 130 (*)     Potassium 4.8      Chloride 95 (*)     Carbon Dioxide (CO2) 24      Anion Gap 11      Urea Nitrogen 40.9 (*)     Creatinine 1.51 (*)     GFR Estimate 43 (*)     Calcium 8.3 (*)     Glucose 650 (*)    HEMOGLOBIN A1C - Abnormal    Estimated Average Glucose 197 (*)     Hemoglobin A1C 8.5 (*)    GLUCOSE BY METER - Abnormal    GLUCOSE BY METER POCT 575 (*)    CBC WITH PLATELETS AND DIFFERENTIAL - Abnormal    WBC Count 12.4 (*)     RBC Count 3.49 (*)     Hemoglobin 10.3 (*)     Hematocrit 31.0 (*)     MCV 89      MCH 29.5      MCHC 33.2      RDW 13.2      Platelet Count 171      % Neutrophils 86      % Lymphocytes 7      % Monocytes 6      % Eosinophils 0      % Basophils 0      % Immature Granulocytes 1      NRBCs per 100 WBC 0      Absolute Neutrophils 10.6 (*)     Absolute Lymphocytes 0.9      Absolute  Monocytes 0.8      Absolute Eosinophils 0.0      Absolute Basophils 0.0      Absolute Immature Granulocytes 0.1      Absolute NRBCs 0.0     GLUCOSE BY METER - Abnormal    GLUCOSE BY METER POCT 472 (*)    GLUCOSE BY METER - Abnormal    GLUCOSE BY METER POCT 449 (*)    KETONE BETA-HYDROXYBUTYRATE QUANTITATIVE, RAPID - Normal    Ketone (Beta-Hydroxybutyrate) Quantitative <0.18     INFLUENZA A/B, RSV AND SARS-COV2 PCR - Normal    Influenza A PCR Negative      Influenza B PCR Negative      RSV PCR Negative      SARS CoV2 PCR Negative     BLOOD GAS VENOUS    pH Venous 7.38      pCO2 Venous 47      pO2 Venous 41      Bicarbonate Venous 27      Base Excess/Deficit Venous 1.6      FIO2 21      Oxyhemoglobin Venous 72      O2 Sat, Venous 73.3         Imaging   Chest XR,  PA & LAT   Final Result   IMPRESSION:       Left subclavian approach pacemaker. TAVR.      Left hemidiaphragm elevation.      No focal airspace disease. No pleural effusion or pneumothorax.      The cardiomediastinal silhouette is unremarkable. Aortic calcifications.      Multilevel degenerative changes of the spine. Chronic compression fractures of a lower thoracic or upper lumbar vertebral body with exaggerated kyphosis.        Independent Interpretation   CXR: No pneumothorax, infiltrate, pleural effusion, pulmonary edema, or cardiomegaly.    ED Course      Medications Administered   Medications   sodium chloride 0.9% BOLUS 1,000 mL (0 mLs Intravenous Stopped 4/8/25 0008)   insulin aspart (NovoLOG) injection (RAPID ACTING) (8 Units Subcutaneous $Given 4/7/25 5951)       Discussion of Management   I discussed the patient's care with pharmacy.  They felt that short-term sliding scale insulin would be ideal given that the patient's hyperglycemia in the setting of underlying type 2 diabetes mellitus is likely due to exogenous prednisone use.    ED Course   ED Course as of 04/08/25 2013 Mon Apr 07, 2025 2156 I obtained history and examined the patient as  noted above.   2259 I consulted with the ED pharmacist regarding insulin dosing.    I reassessed the patient.  He is stable.  He has no concerns.    Additional Documentation  None    Medical Decision Making / Diagnosis     CMS Diagnoses: None    MIPS       None    MDM   Reid Jimenes is a 89 year old male with known type 2 diabetes mellitus, not on current diabetes medications with a reassuring hemoglobin A1c in the recent hospitalization for pneumonia, who was placed on prednisone for a taper, now off prednisone with elevation of blood sugars.  No sign of DKA.  This hyperglycemia is likely short-term related to prednisone dosing.  His hemoglobin A1c is elevated today reflecting the elevated blood sugars recently.  He was given subq insulin here which helped with bringing his blood sugar down and I suspect dietary control, and sliding scale insulin should be enough to further control his hyperglycemia.  He is recommended follow-up with his primary care provider within 2 to 3 days to monitor blood sugars and return to the emergency department if significantly elevated despite use of sliding scale insulin and diabetic diet.  He is asymptomatic and overall well-appearing.  No indication for further emergency department evaluation or admission.  All questions were answered prior to discharge.    Disposition   The patient was discharged.     Diagnosis     ICD-10-CM    1. Type 2 diabetes mellitus with hyperglycemia, without long-term current use of insulin (H)  E11.65            Discharge Medications   Discharge Medication List as of 4/8/2025  1:16 AM        START taking these medications    Details   insulin aspart (NOVOLOG FLEXPEN) 100 UNIT/ML pen For Pre-Meal  - 239 give 1 unit.  For Pre-Meal  - 339 give 2 units.  For Pre-Meal BG greater than or equal to 340 give 3 units.  FOR BEDTIME: For  - 299 give 1 unit.  For  - 399 give 2 units. For BG greater than or equal 400 give  3 units., Disp-15  mL, R-0, E-Prescribe      insulin pen needle 30G X 5 MM Use pen needles daily as directed.Disp-100 each, [id number]               Scribe Disclosure:  I, Nader Avalos, am serving as a scribe at 9:07 PM on 4/7/2025 to document services personally performed by Macie Doan MD based on my observations and the provider's statements to me.        Macie Doan MD  04/08/25 1292

## 2025-04-08 NOTE — DISCHARGE INSTRUCTIONS
Due to the recent use of prednisone/steroids, your blood sugars are high.  Likely as the steroid effect wears off, your blood sugar will return to normal levels which seem to be controlled even without medications.  Continue to follow a diabetic diet and for now, use sliding scale insulin as recommended.  This may only be needed for a few days or week.  Discuss ongoing use with your primary care provider.

## 2025-05-06 ENCOUNTER — TELEPHONE (OUTPATIENT)
Dept: INTERNAL MEDICINE | Facility: CLINIC | Age: OVER 89
End: 2025-05-06
Payer: MEDICARE

## 2025-05-06 NOTE — TELEPHONE ENCOUNTER
"[place] records request received via fax. It was sent to [facility] due to top of form reading \" Medical Records\".     Medical Records sent this form back to  as it is asking for Md to sign med list.     Med list printed as was lov 7-3-2024    Forms in Dr. mail box for review and signature.    "

## 2025-07-29 NOTE — PLAN OF CARE
"Temp: 97.7  F (36.5  C) Temp src: Oral BP: 131/54 Pulse: 65   Resp: 20 SpO2: 94 % O2 Device: None (Room air)       Alert x self only. Up 1A GB walker. VSS on room air. PSC majority of shift. Given PRN seroquel around 1500 for agitation, packing up/changing clothes/wanting to leave. Behavior and mood improved with activity and . On PO prednisone and ceftin. Denies pain. Continue plan of care. Possible discharge back to [facility] care tomorrow.     Goal Outcome Evaluation:      Plan of Care Reviewed With: patient    Overall Patient Progress: improvingOverall Patient Progress: improving    Outcome Evaluation: Remains on RA. PSC for most of shift. PRN seroquel around 1500 for agitation. Behavior and mood improved with activity.      Problem: Adult Inpatient Plan of Care  Goal: Plan of Care Review  Description: The Plan of Care Review/Shift note should be completed every shift.  The Outcome Evaluation is a brief statement about your assessment that the patient is improving, declining, or no change.  This information will be displayed automatically on your shiftnote.  Outcome: Progressing  Flowsheets (Taken 3/30/2025 1902)  Outcome Evaluation: Remains on RA. PSC for most of shift. PRN seroquel around 1500 for agitation. Behavior and mood improved with activity.  Plan of Care Reviewed With: patient  Overall Patient Progress: improving  Goal: Patient-Specific Goal (Individualized)  Description: You can add care plan individualizations to a care plan. Examples of Individualization might be:  \"Parent requests to be called daily at 9am for status\", \"I have a hard time hearing out of my right ear\", or \"Do not touch me to wake me up as it startlesme\".  Outcome: Progressing  Goal: Absence of Hospital-Acquired Illness or Injury  Outcome: Progressing  Intervention: Identify and Manage Fall Risk  Recent Flowsheet Documentation  Taken 3/30/2025 1825 by Jemma Israel RN  Safety Promotion/Fall Prevention: bedside " attendant  Taken 3/30/2025 0825 by Jemma Israel RN  Safety Promotion/Fall Prevention:   bedside attendant   clutter free environment maintained   nonskid shoes/slippers when out of bed   room door open   room near nurse's station   room organization consistent   safety round/check completed   supervised activity  Intervention: Prevent Skin Injury  Recent Flowsheet Documentation  Taken 3/30/2025 1825 by Jemma Israel RN  Skin Protection:   adhesive use limited   transparent dressing maintained   tubing/devices free from skin contact  Taken 3/30/2025 0825 by Jemma Israel RN  Body Position: position changed independently  Skin Protection:   adhesive use limited   transparent dressing maintained   tubing/devices free from skin contact  Intervention: Prevent and Manage VTE (Venous Thromboembolism) Risk  Recent Flowsheet Documentation  Taken 3/30/2025 1825 by Jemma Israel RN  VTE Prevention/Management: (On Lovenox) other (see comments)  Taken 3/30/2025 0825 by Jemma Israel RN  VTE Prevention/Management: (Lovenox) other (see comments)  Intervention: Prevent Infection  Recent Flowsheet Documentation  Taken 3/30/2025 1825 by Jemma Israel RN  Infection Prevention:   hand hygiene promoted   rest/sleep promoted   single patient room provided  Taken 3/30/2025 0825 by Jemma Israel RN  Infection Prevention:   hand hygiene promoted   rest/sleep promoted   single patient room provided  Goal: Optimal Comfort and Wellbeing  Outcome: Progressing  Intervention: Provide Person-Centered Care  Recent Flowsheet Documentation  Taken 3/30/2025 1825 by Jemma Israel RN  Trust Relationship/Rapport:   care explained   choices provided   emotional support provided   empathic listening provided   questions answered   questions encouraged   reassurance provided   thoughts/feelings acknowledged  Taken 3/30/2025 0825 by Jemma Israel RN  Trust Relationship/Rapport:   care explained   choices provided   emotional support  provided   empathic listening provided   questions answered   questions encouraged   reassurance provided   thoughts/feelings acknowledged  Goal: Readiness for Transition of Care  Outcome: Progressing     Problem: Delirium  Goal: Optimal Coping  Outcome: Progressing  Intervention: Optimize Psychosocial Adjustment to Delirium  Recent Flowsheet Documentation  Taken 3/30/2025 1825 by Jemma Israel RN  Supportive Measures:   active listening utilized   verbalization of feelings encouraged  Family/Support System Care:   involvement promoted   presence promoted  Taken 3/30/2025 0825 by Jemma Israel RN  Family/Support System Care:   involvement promoted   presence promoted  Goal: Improved Behavioral Control  Outcome: Progressing  Intervention: Prevent and Manage Agitation  Recent Flowsheet Documentation  Taken 3/30/2025 1825 by Jemma Israel RN  Environment Familiarity/Consistency: personal clothing/items utilized  Intervention: Minimize Safety Risk  Recent Flowsheet Documentation  Taken 3/30/2025 1825 by Jemma Israel RN  Enhanced Safety Measures:   review medications for side effects with activity   room near unit station  Trust Relationship/Rapport:   care explained   choices provided   emotional support provided   empathic listening provided   questions answered   questions encouraged   reassurance provided   thoughts/feelings acknowledged  Taken 3/30/2025 0825 by Jemma Israel RN  Enhanced Safety Measures:   review medications for side effects with activity   room near unit station  Trust Relationship/Rapport:   care explained   choices provided   emotional support provided   empathic listening provided   questions answered   questions encouraged   reassurance provided   thoughts/feelings acknowledged  Goal: Improved Attention and Thought Clarity  Outcome: Progressing  Goal: Improved Sleep  Outcome: Progressing     Problem: Comorbidity Management  Goal: Blood Glucose Levels Within Targeted Range  Outcome:  Progressing  Intervention: Monitor and Manage Glycemia  Recent Flowsheet Documentation  Taken 3/30/2025 1825 by Jemma Israel RN  Medication Review/Management: medications reviewed  Taken 3/30/2025 0825 by Jemma Israel RN  Medication Review/Management: medications reviewed  Goal: Blood Pressure in Desired Range  Outcome: Progressing  Intervention: Maintain Blood Pressure Management  Recent Flowsheet Documentation  Taken 3/30/2025 1825 by Jemma Israel RN  Medication Review/Management: medications reviewed  Taken 3/30/2025 0825 by Jemma Israel RN  Medication Review/Management: medications reviewed        +pain with ROM; no palpable bony deformities; no redness; no swelling; no warmth; no crepitus; no joint laxity; 5/5 strength; sensation intact to light touch; < 2 sec cap refill;  2+ pulses; no palpable cords; soft compartments; FROM

## (undated) DEVICE — WIRE GUIDE 0.035"X260CM SAFE-T-J EXCHANGE G00517

## (undated) DEVICE — CATH EP PACEL 5FRX110CM 1MM RIGHT HEART CURVE 401763

## (undated) DEVICE — DEFIB PRO-PADZ LVP LQD GEL ADULT 8900-2105-01

## (undated) DEVICE — CABLE PACING ALLIGATOR CLIP 301-CG

## (undated) DEVICE — LINEN TOWEL PACK X30 5481

## (undated) DEVICE — Device

## (undated) DEVICE — KIT LG BORE TOUHY ACCESS PLUS MAP152

## (undated) DEVICE — PACK PCMKR PERM SRG PROC LF SAN32PC573

## (undated) DEVICE — CATH JACKY 5FR 3.5 CURVE 40-5023

## (undated) DEVICE — RAD G/W INQWIRE .035X260CM J-TIP EXCHANGE IQ35F260J1O5RS

## (undated) DEVICE — INTRO GLIDESHEATH SLENDER 6FR 10X45CM 60-1060

## (undated) DEVICE — GLOVE PROTEXIS W/NEU-THERA 7.5  2D73TE75

## (undated) DEVICE — RAD INFLATOR BASIC COMPAK  IN4130

## (undated) DEVICE — INTRODUCER SHEATH FAST-CATH 6FRX12CM 406103

## (undated) DEVICE — GLIDEWIRE STRAIGHT .035CM GR3501

## (undated) DEVICE — KIT HAND CONTROL ANGIOTOUCH ACIST 65CM AT-P65

## (undated) DEVICE — SHEATH PRELUDE SNAP 13CM 6FR

## (undated) DEVICE — SOL WATER IRRIG 1000ML BOTTLE 2F7114

## (undated) DEVICE — RAD INTRODUCER KIT MICRO 5FRX10CM .018 NITINOL G/W

## (undated) DEVICE — SLEEVE TR BAND RADIAL COMPRESSION DEVICE 24CM TRB24-REG

## (undated) RX ORDER — HEPARIN SODIUM 1000 [USP'U]/ML
INJECTION, SOLUTION INTRAVENOUS; SUBCUTANEOUS
Status: DISPENSED
Start: 2018-07-24

## (undated) RX ORDER — NITROGLYCERIN 5 MG/ML
VIAL (ML) INTRAVENOUS
Status: DISPENSED
Start: 2018-07-24

## (undated) RX ORDER — VERAPAMIL HYDROCHLORIDE 2.5 MG/ML
INJECTION, SOLUTION INTRAVENOUS
Status: DISPENSED
Start: 2018-08-28

## (undated) RX ORDER — LIDOCAINE HYDROCHLORIDE 10 MG/ML
INJECTION, SOLUTION EPIDURAL; INFILTRATION; INTRACAUDAL; PERINEURAL
Status: DISPENSED
Start: 2018-08-28

## (undated) RX ORDER — FENTANYL CITRATE 50 UG/ML
INJECTION, SOLUTION INTRAMUSCULAR; INTRAVENOUS
Status: DISPENSED
Start: 2018-08-28

## (undated) RX ORDER — LIDOCAINE HYDROCHLORIDE 10 MG/ML
INJECTION, SOLUTION EPIDURAL; INFILTRATION; INTRACAUDAL; PERINEURAL
Status: DISPENSED
Start: 2018-07-24

## (undated) RX ORDER — HEPARIN SODIUM 1000 [USP'U]/ML
INJECTION, SOLUTION INTRAVENOUS; SUBCUTANEOUS
Status: DISPENSED
Start: 2018-08-28

## (undated) RX ORDER — BUPIVACAINE HYDROCHLORIDE 2.5 MG/ML
INJECTION, SOLUTION EPIDURAL; INFILTRATION; INTRACAUDAL
Status: DISPENSED
Start: 2021-06-10

## (undated) RX ORDER — LIDOCAINE HYDROCHLORIDE 10 MG/ML
INJECTION, SOLUTION EPIDURAL; INFILTRATION; INTRACAUDAL; PERINEURAL
Status: DISPENSED
Start: 2021-06-10

## (undated) RX ORDER — CEFAZOLIN SODIUM 2 G/100ML
INJECTION, SOLUTION INTRAVENOUS
Status: DISPENSED
Start: 2018-08-28

## (undated) RX ORDER — FENTANYL CITRATE 50 UG/ML
INJECTION, SOLUTION INTRAMUSCULAR; INTRAVENOUS
Status: DISPENSED
Start: 2021-06-10

## (undated) RX ORDER — VERAPAMIL HYDROCHLORIDE 2.5 MG/ML
INJECTION, SOLUTION INTRAVENOUS
Status: DISPENSED
Start: 2018-07-24

## (undated) RX ORDER — FENTANYL CITRATE 50 UG/ML
INJECTION, SOLUTION INTRAMUSCULAR; INTRAVENOUS
Status: DISPENSED
Start: 2018-07-24

## (undated) RX ORDER — NITROGLYCERIN 5 MG/ML
VIAL (ML) INTRAVENOUS
Status: DISPENSED
Start: 2018-08-28